# Patient Record
Sex: FEMALE | Race: WHITE | Employment: OTHER | URBAN - METROPOLITAN AREA
[De-identification: names, ages, dates, MRNs, and addresses within clinical notes are randomized per-mention and may not be internally consistent; named-entity substitution may affect disease eponyms.]

---

## 2017-01-17 RX ORDER — BUSPIRONE HYDROCHLORIDE 10 MG/1
10 TABLET ORAL 3 TIMES DAILY
Qty: 90 TABLET | Refills: 0 | Status: SHIPPED | OUTPATIENT
Start: 2017-01-17 | End: 2017-02-13 | Stop reason: SDUPTHER

## 2017-01-17 RX ORDER — ALPRAZOLAM 1 MG/1
1 TABLET ORAL NIGHTLY PRN
Qty: 90 TABLET | Refills: 0 | Status: SHIPPED | OUTPATIENT
Start: 2017-01-17 | End: 2017-02-16

## 2017-02-13 RX ORDER — BUSPIRONE HYDROCHLORIDE 10 MG/1
TABLET ORAL
Qty: 90 TABLET | Refills: 0 | Status: SHIPPED | OUTPATIENT
Start: 2017-02-13 | End: 2017-03-09 | Stop reason: SINTOL

## 2017-03-09 ENCOUNTER — OFFICE VISIT (OUTPATIENT)
Dept: FAMILY MEDICINE CLINIC | Age: 52
End: 2017-03-09

## 2017-03-09 VITALS
DIASTOLIC BLOOD PRESSURE: 97 MMHG | WEIGHT: 251 LBS | HEART RATE: 84 BPM | SYSTOLIC BLOOD PRESSURE: 150 MMHG | BODY MASS INDEX: 40.51 KG/M2 | RESPIRATION RATE: 16 BRPM | TEMPERATURE: 98.4 F

## 2017-03-09 DIAGNOSIS — F41.9 ANXIETY: Primary | ICD-10-CM

## 2017-03-09 DIAGNOSIS — R03.0 ELEVATED BP WITHOUT DIAGNOSIS OF HYPERTENSION: ICD-10-CM

## 2017-03-09 PROCEDURE — 99213 OFFICE O/P EST LOW 20 MIN: CPT | Performed by: FAMILY MEDICINE

## 2017-03-09 RX ORDER — ALPRAZOLAM 1 MG/1
1 TABLET ORAL 2 TIMES DAILY
COMMUNITY
End: 2017-03-27 | Stop reason: SDUPTHER

## 2017-03-09 ASSESSMENT — ENCOUNTER SYMPTOMS
COUGH: 0
SHORTNESS OF BREATH: 0
CONSTIPATION: 0
DIARRHEA: 0
WHEEZING: 0
ABDOMINAL PAIN: 0

## 2017-03-27 RX ORDER — ALPRAZOLAM 1 MG/1
1 TABLET ORAL 2 TIMES DAILY
Qty: 60 TABLET | Refills: 1 | Status: SHIPPED | OUTPATIENT
Start: 2017-03-27 | End: 2017-03-28 | Stop reason: DRUGHIGH

## 2017-03-27 RX ORDER — OMEPRAZOLE 40 MG/1
CAPSULE, DELAYED RELEASE ORAL
Qty: 30 CAPSULE | Refills: 5 | Status: SHIPPED | OUTPATIENT
Start: 2017-03-27 | End: 2017-03-28 | Stop reason: DRUGHIGH

## 2017-03-27 RX ORDER — IBUPROFEN 800 MG/1
TABLET ORAL
Qty: 90 TABLET | Refills: 3 | Status: SHIPPED | OUTPATIENT
Start: 2017-03-27 | End: 2017-08-30 | Stop reason: SDUPTHER

## 2017-03-28 ENCOUNTER — TELEPHONE (OUTPATIENT)
Dept: FAMILY MEDICINE CLINIC | Age: 52
End: 2017-03-28

## 2017-03-28 RX ORDER — OMEPRAZOLE 40 MG/1
40 CAPSULE, DELAYED RELEASE ORAL 2 TIMES DAILY
Qty: 60 CAPSULE | Refills: 3 | Status: SHIPPED | OUTPATIENT
Start: 2017-03-28 | End: 2017-06-28 | Stop reason: SDUPTHER

## 2017-03-29 ENCOUNTER — OFFICE VISIT (OUTPATIENT)
Dept: FAMILY MEDICINE CLINIC | Age: 52
End: 2017-03-29

## 2017-03-29 VITALS
SYSTOLIC BLOOD PRESSURE: 140 MMHG | HEART RATE: 70 BPM | OXYGEN SATURATION: 94 % | RESPIRATION RATE: 16 BRPM | DIASTOLIC BLOOD PRESSURE: 88 MMHG | HEIGHT: 66 IN | WEIGHT: 259 LBS | BODY MASS INDEX: 41.62 KG/M2

## 2017-03-29 DIAGNOSIS — M99.9 NONALLOPATHIC LESION OF RIB CAGE: ICD-10-CM

## 2017-03-29 DIAGNOSIS — S20.211A CONTUSION OF RIB ON RIGHT SIDE, INITIAL ENCOUNTER: Primary | ICD-10-CM

## 2017-03-29 PROCEDURE — 99213 OFFICE O/P EST LOW 20 MIN: CPT | Performed by: FAMILY MEDICINE

## 2017-03-29 PROCEDURE — 98925 OSTEOPATH MANJ 1-2 REGIONS: CPT | Performed by: FAMILY MEDICINE

## 2017-03-29 RX ORDER — HYDROCODONE BITARTRATE AND ACETAMINOPHEN 5; 325 MG/1; MG/1
1 TABLET ORAL EVERY 6 HOURS PRN
Qty: 15 TABLET | Refills: 0 | Status: SHIPPED | OUTPATIENT
Start: 2017-03-29 | End: 2017-04-05

## 2017-03-29 RX ORDER — METHOCARBAMOL 500 MG/1
500 TABLET, FILM COATED ORAL 4 TIMES DAILY
Qty: 40 TABLET | Refills: 0 | Status: SHIPPED | OUTPATIENT
Start: 2017-03-29 | End: 2017-04-08

## 2017-03-29 ASSESSMENT — ENCOUNTER SYMPTOMS
VOMITING: 0
DIARRHEA: 0
ABDOMINAL PAIN: 0
BACK PAIN: 0
NAUSEA: 0
CONSTIPATION: 0

## 2017-03-30 ENCOUNTER — TELEPHONE (OUTPATIENT)
Dept: FAMILY MEDICINE CLINIC | Age: 52
End: 2017-03-30

## 2017-04-03 ENCOUNTER — TELEPHONE (OUTPATIENT)
Dept: FAMILY MEDICINE CLINIC | Age: 52
End: 2017-04-03

## 2017-04-10 ENCOUNTER — TELEPHONE (OUTPATIENT)
Dept: FAMILY MEDICINE CLINIC | Age: 52
End: 2017-04-10

## 2017-04-10 RX ORDER — PREDNISONE 10 MG/1
TABLET ORAL
Qty: 30 TABLET | Refills: 0 | Status: SHIPPED | OUTPATIENT
Start: 2017-04-10 | End: 2017-06-08 | Stop reason: HOSPADM

## 2017-04-12 ENCOUNTER — TELEPHONE (OUTPATIENT)
Dept: FAMILY MEDICINE CLINIC | Age: 52
End: 2017-04-12

## 2017-04-13 RX ORDER — HYDROCODONE BITARTRATE AND ACETAMINOPHEN 5; 325 MG/1; MG/1
1 TABLET ORAL EVERY 6 HOURS PRN
Qty: 20 TABLET | Refills: 0 | Status: SHIPPED | OUTPATIENT
Start: 2017-04-13 | End: 2017-04-18 | Stop reason: SDUPTHER

## 2017-04-17 ENCOUNTER — TELEPHONE (OUTPATIENT)
Dept: FAMILY MEDICINE CLINIC | Age: 52
End: 2017-04-17

## 2017-04-17 DIAGNOSIS — S29.8XXD BLUNT TRAUMA TO CHEST, SUBSEQUENT ENCOUNTER: Primary | ICD-10-CM

## 2017-04-18 RX ORDER — HYDROCODONE BITARTRATE AND ACETAMINOPHEN 5; 325 MG/1; MG/1
1 TABLET ORAL EVERY 6 HOURS PRN
Qty: 20 TABLET | Refills: 0 | Status: SHIPPED | OUTPATIENT
Start: 2017-04-18 | End: 2017-04-28 | Stop reason: SDUPTHER

## 2017-04-19 ENCOUNTER — TELEPHONE (OUTPATIENT)
Dept: FAMILY MEDICINE CLINIC | Age: 52
End: 2017-04-19

## 2017-04-24 ENCOUNTER — TELEPHONE (OUTPATIENT)
Dept: FAMILY MEDICINE CLINIC | Age: 52
End: 2017-04-24

## 2017-04-26 ENCOUNTER — HOSPITAL ENCOUNTER (OUTPATIENT)
Dept: CT IMAGING | Age: 52
Discharge: HOME | End: 2017-04-26
Attending: FAMILY MEDICINE | Admitting: FAMILY MEDICINE

## 2017-04-26 DIAGNOSIS — S29.8XXD: ICD-10-CM

## 2017-04-26 DIAGNOSIS — S29.8XXD BLUNT TRAUMA TO CHEST, SUBSEQUENT ENCOUNTER: ICD-10-CM

## 2017-04-28 RX ORDER — HYDROCODONE BITARTRATE AND ACETAMINOPHEN 5; 325 MG/1; MG/1
1 TABLET ORAL EVERY 6 HOURS PRN
Qty: 20 TABLET | Refills: 0 | Status: SHIPPED | OUTPATIENT
Start: 2017-04-28 | End: 2017-06-08 | Stop reason: ALTCHOICE

## 2017-05-08 RX ORDER — HYDROCODONE BITARTRATE AND ACETAMINOPHEN 5; 325 MG/1; MG/1
1 TABLET ORAL EVERY 6 HOURS PRN
Qty: 20 TABLET | Refills: 0 | Status: CANCELLED | OUTPATIENT
Start: 2017-05-08

## 2017-05-22 RX ORDER — ALPRAZOLAM 1 MG/1
TABLET ORAL
Qty: 90 TABLET | Refills: 1 | Status: SHIPPED | OUTPATIENT
Start: 2017-05-22 | End: 2017-07-27 | Stop reason: SDUPTHER

## 2017-06-08 ENCOUNTER — OFFICE VISIT (OUTPATIENT)
Dept: FAMILY MEDICINE CLINIC | Age: 52
End: 2017-06-08

## 2017-06-08 VITALS
WEIGHT: 257 LBS | HEART RATE: 93 BPM | DIASTOLIC BLOOD PRESSURE: 86 MMHG | BODY MASS INDEX: 41.48 KG/M2 | TEMPERATURE: 98.2 F | RESPIRATION RATE: 16 BRPM | SYSTOLIC BLOOD PRESSURE: 125 MMHG

## 2017-06-08 DIAGNOSIS — J30.9 ALLERGIC RHINITIS, UNSPECIFIED ALLERGIC RHINITIS TRIGGER, UNSPECIFIED RHINITIS SEASONALITY: ICD-10-CM

## 2017-06-08 DIAGNOSIS — J40 SINOBRONCHITIS: Primary | ICD-10-CM

## 2017-06-08 DIAGNOSIS — J32.9 SINOBRONCHITIS: Primary | ICD-10-CM

## 2017-06-08 PROCEDURE — 99213 OFFICE O/P EST LOW 20 MIN: CPT | Performed by: FAMILY MEDICINE

## 2017-06-08 RX ORDER — MONTELUKAST SODIUM 10 MG/1
10 TABLET ORAL NIGHTLY
Qty: 30 TABLET | Refills: 3 | Status: SHIPPED | OUTPATIENT
Start: 2017-06-08 | End: 2017-07-13 | Stop reason: ALTCHOICE

## 2017-06-08 RX ORDER — PROMETHAZINE HYDROCHLORIDE AND CODEINE PHOSPHATE 6.25; 1 MG/5ML; MG/5ML
5 SYRUP ORAL 4 TIMES DAILY PRN
Qty: 240 ML | Refills: 1 | Status: SHIPPED | OUTPATIENT
Start: 2017-06-08 | End: 2017-07-13 | Stop reason: ALTCHOICE

## 2017-06-08 ASSESSMENT — ENCOUNTER SYMPTOMS
DIARRHEA: 0
SORE THROAT: 1
NAUSEA: 0
COUGH: 1
TROUBLE SWALLOWING: 0
SHORTNESS OF BREATH: 0
CONSTIPATION: 0

## 2017-06-11 PROBLEM — J18.9 PNEUMONIA: Status: ACTIVE | Noted: 2017-06-11

## 2017-06-11 PROBLEM — R06.09 DYSPNEA ON EXERTION: Status: ACTIVE | Noted: 2017-06-11

## 2017-06-11 PROBLEM — J20.9 BRONCHITIS WITH BRONCHOSPASM: Status: ACTIVE | Noted: 2017-06-11

## 2017-06-16 ENCOUNTER — OFFICE VISIT (OUTPATIENT)
Dept: FAMILY MEDICINE CLINIC | Age: 52
End: 2017-06-16

## 2017-06-16 VITALS
WEIGHT: 256.4 LBS | BODY MASS INDEX: 41.38 KG/M2 | OXYGEN SATURATION: 94 % | HEART RATE: 81 BPM | RESPIRATION RATE: 16 BRPM | DIASTOLIC BLOOD PRESSURE: 73 MMHG | TEMPERATURE: 97.8 F | SYSTOLIC BLOOD PRESSURE: 114 MMHG

## 2017-06-16 DIAGNOSIS — F32.A DEPRESSION, UNSPECIFIED DEPRESSION TYPE: Primary | ICD-10-CM

## 2017-06-16 DIAGNOSIS — Z13.31 POSITIVE DEPRESSION SCREENING: ICD-10-CM

## 2017-06-16 DIAGNOSIS — J18.9 CAP (COMMUNITY ACQUIRED PNEUMONIA): ICD-10-CM

## 2017-06-16 PROCEDURE — 99214 OFFICE O/P EST MOD 30 MIN: CPT | Performed by: NURSE PRACTITIONER

## 2017-06-16 PROCEDURE — 96160 PT-FOCUSED HLTH RISK ASSMT: CPT | Performed by: NURSE PRACTITIONER

## 2017-06-16 PROCEDURE — G8431 POS CLIN DEPRES SCRN F/U DOC: HCPCS | Performed by: NURSE PRACTITIONER

## 2017-06-16 RX ORDER — CITALOPRAM 10 MG/1
10 TABLET ORAL DAILY
Qty: 30 TABLET | Refills: 1 | Status: SHIPPED | OUTPATIENT
Start: 2017-06-16 | End: 2017-08-16 | Stop reason: SDUPTHER

## 2017-06-16 ASSESSMENT — PATIENT HEALTH QUESTIONNAIRE - PHQ9
1. LITTLE INTEREST OR PLEASURE IN DOING THINGS: 0
10. IF YOU CHECKED OFF ANY PROBLEMS, HOW DIFFICULT HAVE THESE PROBLEMS MADE IT FOR YOU TO DO YOUR WORK, TAKE CARE OF THINGS AT HOME, OR GET ALONG WITH OTHER PEOPLE: 3
SUM OF ALL RESPONSES TO PHQ QUESTIONS 1-9: 15
8. MOVING OR SPEAKING SO SLOWLY THAT OTHER PEOPLE COULD HAVE NOTICED. OR THE OPPOSITE, BEING SO FIGETY OR RESTLESS THAT YOU HAVE BEEN MOVING AROUND A LOT MORE THAN USUAL: 0
6. FEELING BAD ABOUT YOURSELF - OR THAT YOU ARE A FAILURE OR HAVE LET YOURSELF OR YOUR FAMILY DOWN: 0
7. TROUBLE CONCENTRATING ON THINGS, SUCH AS READING THE NEWSPAPER OR WATCHING TELEVISION: 3
9. THOUGHTS THAT YOU WOULD BE BETTER OFF DEAD, OR OF HURTING YOURSELF: 0
4. FEELING TIRED OR HAVING LITTLE ENERGY: 3
5. POOR APPETITE OR OVEREATING: 3
3. TROUBLE FALLING OR STAYING ASLEEP: 3
2. FEELING DOWN, DEPRESSED OR HOPELESS: 3
SUM OF ALL RESPONSES TO PHQ9 QUESTIONS 1 & 2: 3

## 2017-06-16 ASSESSMENT — ENCOUNTER SYMPTOMS
NAUSEA: 0
TROUBLE SWALLOWING: 0
EYE REDNESS: 0
RHINORRHEA: 0
ABDOMINAL PAIN: 0
CHOKING: 0
CONSTIPATION: 0
HEMOPTYSIS: 0
DIARRHEA: 0
FREQUENT THROAT CLEARING: 0
SORE THROAT: 1
SINUS PRESSURE: 0
CHEST TIGHTNESS: 1
HOARSE VOICE: 0
VOICE CHANGE: 1
DIFFICULTY BREATHING: 0
WHEEZING: 0
SHORTNESS OF BREATH: 1
SPUTUM PRODUCTION: 1
COUGH: 1
EYE ITCHING: 0

## 2017-06-28 RX ORDER — OMEPRAZOLE 40 MG/1
40 CAPSULE, DELAYED RELEASE ORAL 2 TIMES DAILY
Qty: 60 CAPSULE | Refills: 3 | Status: SHIPPED | OUTPATIENT
Start: 2017-06-28 | End: 2017-07-13 | Stop reason: DRUGHIGH

## 2017-07-13 ENCOUNTER — OFFICE VISIT (OUTPATIENT)
Dept: FAMILY MEDICINE CLINIC | Age: 52
End: 2017-07-13

## 2017-07-13 VITALS
BODY MASS INDEX: 42.45 KG/M2 | HEART RATE: 63 BPM | WEIGHT: 263 LBS | RESPIRATION RATE: 16 BRPM | DIASTOLIC BLOOD PRESSURE: 88 MMHG | SYSTOLIC BLOOD PRESSURE: 134 MMHG | TEMPERATURE: 97.8 F

## 2017-07-13 DIAGNOSIS — J18.9 PNEUMONIA OF RIGHT LOWER LOBE DUE TO INFECTIOUS ORGANISM: Primary | ICD-10-CM

## 2017-07-13 PROCEDURE — 99213 OFFICE O/P EST LOW 20 MIN: CPT | Performed by: FAMILY MEDICINE

## 2017-07-13 RX ORDER — OMEPRAZOLE 40 MG/1
40 CAPSULE, DELAYED RELEASE ORAL DAILY
Qty: 30 CAPSULE | Refills: 3 | Status: SHIPPED
Start: 2017-07-13 | End: 2017-09-18 | Stop reason: SDUPTHER

## 2017-07-13 ASSESSMENT — ENCOUNTER SYMPTOMS
COUGH: 0
WHEEZING: 0
SHORTNESS OF BREATH: 0
DIARRHEA: 0
CONSTIPATION: 0

## 2017-07-18 ENCOUNTER — TELEPHONE (OUTPATIENT)
Dept: FAMILY MEDICINE CLINIC | Age: 52
End: 2017-07-18

## 2017-08-28 ENCOUNTER — OFFICE VISIT (OUTPATIENT)
Dept: ORTHOPEDIC SURGERY | Age: 52
End: 2017-08-28

## 2017-08-28 VITALS — WEIGHT: 259 LBS | HEIGHT: 66 IN | BODY MASS INDEX: 41.62 KG/M2

## 2017-08-28 DIAGNOSIS — M25.562 PAIN, JOINT, KNEE, LEFT: Primary | ICD-10-CM

## 2017-08-28 PROCEDURE — 99214 OFFICE O/P EST MOD 30 MIN: CPT | Performed by: PHYSICIAN ASSISTANT

## 2017-08-28 PROCEDURE — L1810 KO ELASTIC WITH JOINTS: HCPCS | Performed by: PHYSICIAN ASSISTANT

## 2017-08-28 PROCEDURE — 73564 X-RAY EXAM KNEE 4 OR MORE: CPT | Performed by: PHYSICIAN ASSISTANT

## 2017-08-28 RX ORDER — IBUPROFEN 800 MG/1
800 TABLET ORAL EVERY 8 HOURS PRN
Qty: 120 TABLET | Refills: 0 | Status: ON HOLD | OUTPATIENT
Start: 2017-08-28 | End: 2017-12-27 | Stop reason: HOSPADM

## 2017-08-30 ENCOUNTER — OFFICE VISIT (OUTPATIENT)
Dept: FAMILY MEDICINE CLINIC | Age: 52
End: 2017-08-30

## 2017-08-30 ENCOUNTER — HOSPITAL ENCOUNTER (OUTPATIENT)
Dept: GENERAL RADIOLOGY | Age: 52
Discharge: HOME | End: 2017-08-30

## 2017-08-30 VITALS
BODY MASS INDEX: 41.64 KG/M2 | SYSTOLIC BLOOD PRESSURE: 118 MMHG | WEIGHT: 258 LBS | DIASTOLIC BLOOD PRESSURE: 85 MMHG | RESPIRATION RATE: 16 BRPM | HEART RATE: 70 BPM | TEMPERATURE: 98 F

## 2017-08-30 DIAGNOSIS — G25.81 RESTLESS LEG SYNDROME: ICD-10-CM

## 2017-08-30 DIAGNOSIS — J18.9 PNEUMONIA OF RIGHT LOWER LOBE DUE TO INFECTIOUS ORGANISM: Primary | ICD-10-CM

## 2017-08-30 DIAGNOSIS — M17.0 PRIMARY OSTEOARTHRITIS OF BOTH KNEES: ICD-10-CM

## 2017-08-30 DIAGNOSIS — I10 ESSENTIAL HYPERTENSION: ICD-10-CM

## 2017-08-30 DIAGNOSIS — R53.83 FATIGUE, UNSPECIFIED TYPE: ICD-10-CM

## 2017-08-30 DIAGNOSIS — J18.9 PNEUMONIA OF RIGHT LOWER LOBE DUE TO INFECTIOUS ORGANISM: ICD-10-CM

## 2017-08-30 LAB
A/G RATIO: 1.6 (ref 1.1–2.2)
ALBUMIN SERPL-MCNC: 3.9 G/DL (ref 3.4–5)
ALP BLD-CCNC: 71 U/L (ref 40–129)
ALT SERPL-CCNC: 14 U/L (ref 10–40)
ANION GAP SERPL CALCULATED.3IONS-SCNC: 14 MMOL/L (ref 3–16)
AST SERPL-CCNC: 12 U/L (ref 15–37)
BILIRUB SERPL-MCNC: 0.3 MG/DL (ref 0–1)
BUN BLDV-MCNC: 9 MG/DL (ref 7–20)
CALCIUM SERPL-MCNC: 9.2 MG/DL (ref 8.3–10.6)
CHLORIDE BLD-SCNC: 107 MMOL/L (ref 99–110)
CHOLESTEROL, TOTAL: 169 MG/DL (ref 0–199)
CO2: 23 MMOL/L (ref 21–32)
CREAT SERPL-MCNC: 0.8 MG/DL (ref 0.6–1.1)
GFR AFRICAN AMERICAN: >60
GFR NON-AFRICAN AMERICAN: >60
GLOBULIN: 2.4 G/DL
GLUCOSE BLD-MCNC: 113 MG/DL (ref 70–99)
HDLC SERPL-MCNC: 33 MG/DL (ref 40–60)
LDL CHOLESTEROL CALCULATED: 98 MG/DL
MAGNESIUM: 2 MG/DL (ref 1.8–2.4)
POTASSIUM SERPL-SCNC: 4.3 MMOL/L (ref 3.5–5.1)
SODIUM BLD-SCNC: 144 MMOL/L (ref 136–145)
TOTAL PROTEIN: 6.3 G/DL (ref 6.4–8.2)
TRIGL SERPL-MCNC: 190 MG/DL (ref 0–150)
TSH REFLEX: 3.31 UIU/ML (ref 0.27–4.2)
VITAMIN D 25-HYDROXY: 15.8 NG/ML
VLDLC SERPL CALC-MCNC: 38 MG/DL

## 2017-08-30 PROCEDURE — 99214 OFFICE O/P EST MOD 30 MIN: CPT | Performed by: FAMILY MEDICINE

## 2017-08-30 ASSESSMENT — ENCOUNTER SYMPTOMS
WHEEZING: 0
CONSTIPATION: 0
SHORTNESS OF BREATH: 0
COUGH: 0
DIARRHEA: 0
BLOOD IN STOOL: 0

## 2017-08-31 ENCOUNTER — HOSPITAL ENCOUNTER (OUTPATIENT)
Dept: PHYSICAL THERAPY | Age: 52
Discharge: HOME | End: 2017-08-31
Admitting: ORTHOPAEDIC SURGERY

## 2017-09-05 ENCOUNTER — TELEPHONE (OUTPATIENT)
Dept: FAMILY MEDICINE CLINIC | Age: 52
End: 2017-09-05

## 2017-09-05 ENCOUNTER — HOSPITAL ENCOUNTER (OUTPATIENT)
Dept: PHYSICAL THERAPY | Age: 52
Discharge: HOME | End: 2017-09-05
Admitting: ORTHOPAEDIC SURGERY

## 2017-09-07 ENCOUNTER — OFFICE VISIT (OUTPATIENT)
Dept: ORTHOPEDIC SURGERY | Age: 52
End: 2017-09-07

## 2017-09-07 VITALS
SYSTOLIC BLOOD PRESSURE: 124 MMHG | WEIGHT: 257.94 LBS | HEART RATE: 70 BPM | HEIGHT: 66 IN | DIASTOLIC BLOOD PRESSURE: 81 MMHG | BODY MASS INDEX: 41.45 KG/M2

## 2017-09-07 DIAGNOSIS — M54.30 SCIATICA, UNSPECIFIED LATERALITY: Primary | ICD-10-CM

## 2017-09-07 DIAGNOSIS — M17.12 OSTEOARTHRITIS OF LEFT KNEE, UNSPECIFIED OSTEOARTHRITIS TYPE: ICD-10-CM

## 2017-09-07 PROCEDURE — 20610 DRAIN/INJ JOINT/BURSA W/O US: CPT | Performed by: ORTHOPAEDIC SURGERY

## 2017-09-07 PROCEDURE — 99203 OFFICE O/P NEW LOW 30 MIN: CPT | Performed by: ORTHOPAEDIC SURGERY

## 2017-09-25 ENCOUNTER — TELEPHONE (OUTPATIENT)
Dept: FAMILY MEDICINE CLINIC | Age: 52
End: 2017-09-25

## 2017-09-27 ENCOUNTER — OFFICE VISIT (OUTPATIENT)
Dept: FAMILY MEDICINE CLINIC | Age: 52
End: 2017-09-27

## 2017-09-27 VITALS
BODY MASS INDEX: 41.27 KG/M2 | SYSTOLIC BLOOD PRESSURE: 119 MMHG | RESPIRATION RATE: 16 BRPM | TEMPERATURE: 97.7 F | HEIGHT: 66 IN | WEIGHT: 256.8 LBS | DIASTOLIC BLOOD PRESSURE: 76 MMHG | HEART RATE: 66 BPM | OXYGEN SATURATION: 98 %

## 2017-09-27 DIAGNOSIS — N95.0 POSTMENOPAUSAL VAGINAL BLEEDING: Primary | ICD-10-CM

## 2017-09-27 PROCEDURE — 99214 OFFICE O/P EST MOD 30 MIN: CPT | Performed by: NURSE PRACTITIONER

## 2017-09-27 ASSESSMENT — ENCOUNTER SYMPTOMS
DIARRHEA: 0
NAUSEA: 0
CONSTIPATION: 0
VOMITING: 0
GASTROINTESTINAL NEGATIVE: 1
ABDOMINAL PAIN: 0
BACK PAIN: 0
SORE THROAT: 0
ALLERGIC/IMMUNOLOGIC NEGATIVE: 1
RESPIRATORY NEGATIVE: 1
EYES NEGATIVE: 1

## 2017-10-09 RX ORDER — OMEPRAZOLE 40 MG/1
40 CAPSULE, DELAYED RELEASE ORAL DAILY
Qty: 30 CAPSULE | Refills: 3 | Status: SHIPPED | OUTPATIENT
Start: 2017-10-09 | End: 2018-02-07 | Stop reason: SDUPTHER

## 2017-10-09 RX ORDER — ROPINIROLE 2 MG/1
TABLET, FILM COATED ORAL
Qty: 30 TABLET | Refills: 11 | Status: SHIPPED | OUTPATIENT
Start: 2017-10-09 | End: 2018-01-24 | Stop reason: SDUPTHER

## 2017-10-24 ENCOUNTER — HOSPITAL ENCOUNTER (OUTPATIENT)
Dept: ULTRASOUND IMAGING | Age: 52
Discharge: HOME | End: 2017-10-24
Attending: NURSE PRACTITIONER | Admitting: NURSE PRACTITIONER

## 2017-10-24 DIAGNOSIS — N95.0 POSTMENOPAUSAL VAGINAL BLEEDING: ICD-10-CM

## 2017-10-24 DIAGNOSIS — N95.0 POSTMENOPAUSAL BLEEDING: ICD-10-CM

## 2017-10-25 NOTE — PROGRESS NOTES
Please call patient and let them know that the lab results are normal. However I would like her to make an appointment if she is still having any bleeding.

## 2017-10-27 ENCOUNTER — OFFICE VISIT (OUTPATIENT)
Dept: FAMILY MEDICINE CLINIC | Age: 52
End: 2017-10-27

## 2017-10-27 VITALS
DIASTOLIC BLOOD PRESSURE: 73 MMHG | BODY MASS INDEX: 42.23 KG/M2 | WEIGHT: 262.8 LBS | HEIGHT: 66 IN | TEMPERATURE: 97.5 F | SYSTOLIC BLOOD PRESSURE: 124 MMHG | OXYGEN SATURATION: 96 % | HEART RATE: 67 BPM | RESPIRATION RATE: 16 BRPM

## 2017-10-27 DIAGNOSIS — F32.A DEPRESSION, UNSPECIFIED DEPRESSION TYPE: ICD-10-CM

## 2017-10-27 DIAGNOSIS — N95.0 POSTMENOPAUSAL VAGINAL BLEEDING: Primary | ICD-10-CM

## 2017-10-27 PROCEDURE — 1036F TOBACCO NON-USER: CPT | Performed by: NURSE PRACTITIONER

## 2017-10-27 PROCEDURE — 3017F COLORECTAL CA SCREEN DOC REV: CPT | Performed by: NURSE PRACTITIONER

## 2017-10-27 PROCEDURE — 3014F SCREEN MAMMO DOC REV: CPT | Performed by: NURSE PRACTITIONER

## 2017-10-27 PROCEDURE — G8417 CALC BMI ABV UP PARAM F/U: HCPCS | Performed by: NURSE PRACTITIONER

## 2017-10-27 PROCEDURE — G8484 FLU IMMUNIZE NO ADMIN: HCPCS | Performed by: NURSE PRACTITIONER

## 2017-10-27 PROCEDURE — 99214 OFFICE O/P EST MOD 30 MIN: CPT | Performed by: NURSE PRACTITIONER

## 2017-10-27 PROCEDURE — G8427 DOCREV CUR MEDS BY ELIG CLIN: HCPCS | Performed by: NURSE PRACTITIONER

## 2017-10-27 RX ORDER — PENICILLIN V POTASSIUM 500 MG/1
TABLET ORAL
COMMUNITY
Start: 2017-10-09 | End: 2017-11-20 | Stop reason: ALTCHOICE

## 2017-10-27 RX ORDER — CITALOPRAM 10 MG/1
15 TABLET ORAL DAILY
Qty: 45 TABLET | Refills: 1 | Status: ON HOLD | OUTPATIENT
Start: 2017-10-27 | End: 2017-12-26 | Stop reason: SDUPTHER

## 2017-10-27 ASSESSMENT — ENCOUNTER SYMPTOMS
TROUBLE SWALLOWING: 0
SHORTNESS OF BREATH: 0
COLOR CHANGE: 0
CONSTIPATION: 0
WHEEZING: 0
ABDOMINAL PAIN: 0
SINUS PRESSURE: 0
DIARRHEA: 0
EYE ITCHING: 0
CHEST TIGHTNESS: 0
EYE REDNESS: 0
COUGH: 0
SORE THROAT: 0
NAUSEA: 0

## 2017-10-27 NOTE — PROGRESS NOTES
2    ibuprofen (ADVIL;MOTRIN) 800 MG tablet Take 1 tablet by mouth every 8 hours as needed for Pain 120 tablet 0    albuterol sulfate (PROAIR RESPICLICK) 816 (90 BASE) MCG/ACT aerosol powder inhalation Inhale 2 puffs into the lungs every 4 hours as needed for Wheezing or Shortness of Breath 1 Inhaler 2    penicillin v potassium (VEETID) 500 MG tablet        No current facility-administered medications for this visit. Allergies   Allergen Reactions    Percocet [Oxycodone-Acetaminophen] Rash       /73 (Site: Left Arm, Position: Sitting, Cuff Size: Medium Adult)   Pulse 67   Temp 97.5 °F (36.4 °C) (Oral)   Resp 16   Ht 5' 6\" (1.676 m)   Wt 262 lb 12.8 oz (119.2 kg)   LMP 09/20/2017 Comment: spotting for a few days   SpO2 96%   Breastfeeding? No   BMI 42.42 kg/m²     Social History   Substance Use Topics    Smoking status: Never Smoker    Smokeless tobacco: Never Used    Alcohol use Yes      Comment: rarely       Review of Systems   Constitutional: Negative for activity change, chills, fatigue, fever and unexpected weight change. HENT: Negative for ear discharge, mouth sores, postnasal drip, sinus pressure, sore throat and trouble swallowing. Eyes: Negative for redness, itching and visual disturbance. Respiratory: Negative for cough, chest tightness, shortness of breath and wheezing. Cardiovascular: Negative for chest pain, palpitations and leg swelling. Gastrointestinal: Negative for abdominal pain, constipation, diarrhea and nausea. Bloating and early satiety     Endocrine: Negative for cold intolerance, heat intolerance, polydipsia, polyphagia and polyuria. Genitourinary: Positive for vaginal bleeding. Negative for dysuria, frequency, urgency and vaginal discharge. Musculoskeletal: Negative for arthralgias, joint swelling and myalgias. Skin: Negative for color change, pallor and rash.    Allergic/Immunologic: Negative for environmental allergies, food allergies and Additional Contrast? Radiologist Recommendation      Ludmila Wilson MD   2. Depression, unspecified depression type F32.9 311         Plan    CT of the abdomen with contrast due to unable to visualize ovaries. And prolonged vaginal bleeding. Referral placed to Dr. Tommie Sharif for further evaluation  Situational depression and anxiety  We'll increase Celexa to 1.5 tablets daily  Name is given for counseling services in the area, patient is to call her insurance and see he is on her plan so that we may make an appropriate referral   she should follow-up in one month or sooner if signs and symptoms worsen    Orders Placed This Encounter   Procedures    CT ABDOMEN PELVIS W IV CONTRAST Additional Contrast? Radiologist Recommendation     Standing Status:   Future     Standing Expiration Date:   10/27/2018     Order Specific Question:   Additional Contrast?     Answer:   Radiologist Recommendation     Order Specific Question:   Reason for exam:     Answer:   vaginal bleeding with normal US, non visualized ovaries on US abdominal bloating   Ludmila Wilson MD     Referral Priority:   Routine     Referral Type:   Consult for Advice and Opinion     Referral Reason:   Specialty Services Required     Referred to Provider:   Sherie Blevins MD     Requested Specialty:   Gynecology     Number of Visits Requested:   1       Orders Placed This Encounter   Medications    citalopram (CELEXA) 10 MG tablet     Sig: Take 1.5 tablets by mouth daily     Dispense:  45 tablet     Refill:  1       Return in about 4 weeks (around 11/24/2017) for Mental health.

## 2017-10-27 NOTE — PROGRESS NOTES
The medications were discussed with the patient and the physician. Prescription and over the counter medicines reviewed. Pt is taking medication as prescribed  any side effects or barriers such as difficulty in taking the medication reveiwed. The purpose, side effects, dose of medication of new medications were explained to the patient and questions answered by the physician. The patients understands the information concerning medication. Individual treatment plan developed:Self-management plan and goals developed and discussed. Resources given. See patient instructions. Medication treatment plan developed by the physician. See orders. Healthy behavior discussed: Preventative behaviors discussed regarding healthy diet, exercise, and not smoking. Self management confidence of patient being able to put goal into action assessed: medium confidence. Barriers to treatment evaluated:none unless otherwise noted in the HPI.

## 2017-10-27 NOTE — PATIENT INSTRUCTIONS
[facility]  [facility]  [facility]  [facility]      Increase citalopram to 1.5 tablets daily  Call to find counselor to talk to about home situations  Return in 1 month or sooner    Call with name of GI for colonoscopy    Call to make appointment for CT scan and gynecology

## 2017-11-01 DIAGNOSIS — Z63.6 CAREGIVER STRESS: Primary | ICD-10-CM

## 2017-11-01 SDOH — SOCIAL STABILITY - SOCIAL INSECURITY: DEPENDENT RELATIVE NEEDING CARE AT HOME: Z63.6

## 2017-11-02 ENCOUNTER — CLINICAL DOCUMENTATION (OUTPATIENT)
Dept: SPIRITUAL SERVICES | Age: 52
End: 2017-11-02

## 2017-11-06 ENCOUNTER — TELEPHONE (OUTPATIENT)
Dept: FAMILY MEDICINE CLINIC | Age: 52
End: 2017-11-06

## 2017-11-09 ENCOUNTER — CLINICAL DOCUMENTATION (OUTPATIENT)
Dept: SPIRITUAL SERVICES | Age: 52
End: 2017-11-09

## 2017-11-09 NOTE — PROGRESS NOTES
11/9/2017  4:02pm/1602    Outpatient [facility] team member attempted telephone call to patient. There was no answer and voice message was left encouraging patient to contact Outpatient SCS. Contact information for Outpatient [facility] provided.

## 2017-11-20 ENCOUNTER — OFFICE VISIT (OUTPATIENT)
Dept: FAMILY MEDICINE CLINIC | Age: 52
End: 2017-11-20

## 2017-11-20 VITALS
WEIGHT: 256.8 LBS | DIASTOLIC BLOOD PRESSURE: 72 MMHG | BODY MASS INDEX: 41.27 KG/M2 | HEART RATE: 71 BPM | RESPIRATION RATE: 16 BRPM | SYSTOLIC BLOOD PRESSURE: 130 MMHG | HEIGHT: 66 IN | OXYGEN SATURATION: 98 % | TEMPERATURE: 97.6 F

## 2017-11-20 DIAGNOSIS — J40 BRONCHITIS: Primary | ICD-10-CM

## 2017-11-20 PROCEDURE — 3014F SCREEN MAMMO DOC REV: CPT | Performed by: NURSE PRACTITIONER

## 2017-11-20 PROCEDURE — 94640 AIRWAY INHALATION TREATMENT: CPT | Performed by: NURSE PRACTITIONER

## 2017-11-20 PROCEDURE — 1036F TOBACCO NON-USER: CPT | Performed by: NURSE PRACTITIONER

## 2017-11-20 PROCEDURE — 3017F COLORECTAL CA SCREEN DOC REV: CPT | Performed by: NURSE PRACTITIONER

## 2017-11-20 PROCEDURE — G8417 CALC BMI ABV UP PARAM F/U: HCPCS | Performed by: NURSE PRACTITIONER

## 2017-11-20 PROCEDURE — G8484 FLU IMMUNIZE NO ADMIN: HCPCS | Performed by: NURSE PRACTITIONER

## 2017-11-20 PROCEDURE — 99213 OFFICE O/P EST LOW 20 MIN: CPT | Performed by: NURSE PRACTITIONER

## 2017-11-20 PROCEDURE — G8427 DOCREV CUR MEDS BY ELIG CLIN: HCPCS | Performed by: NURSE PRACTITIONER

## 2017-11-20 RX ORDER — PREDNISONE 10 MG/1
TABLET ORAL
Qty: 18 TABLET | Refills: 0 | Status: SHIPPED | OUTPATIENT
Start: 2017-11-20 | End: 2017-12-07 | Stop reason: ALTCHOICE

## 2017-11-20 RX ORDER — PROMETHAZINE HYDROCHLORIDE AND CODEINE PHOSPHATE 6.25; 1 MG/5ML; MG/5ML
5 SYRUP ORAL 4 TIMES DAILY PRN
Qty: 180 ML | Refills: 0 | Status: ON HOLD | OUTPATIENT
Start: 2017-11-20 | End: 2017-12-25 | Stop reason: ALTCHOICE

## 2017-11-20 RX ORDER — AZITHROMYCIN 250 MG/1
TABLET, FILM COATED ORAL
Qty: 1 PACKET | Refills: 0 | Status: SHIPPED | OUTPATIENT
Start: 2017-11-20 | End: 2017-11-30

## 2017-11-20 RX ORDER — IPRATROPIUM BROMIDE AND ALBUTEROL SULFATE 2.5; .5 MG/3ML; MG/3ML
1 SOLUTION RESPIRATORY (INHALATION) ONCE
Status: COMPLETED | OUTPATIENT
Start: 2017-11-20 | End: 2017-11-20

## 2017-11-20 RX ORDER — PROMETHAZINE HYDROCHLORIDE AND CODEINE PHOSPHATE 6.25; 1 MG/5ML; MG/5ML
SYRUP ORAL
COMMUNITY
Start: 2017-11-15 | End: 2017-11-20 | Stop reason: SDUPTHER

## 2017-11-20 RX ADMIN — IPRATROPIUM BROMIDE AND ALBUTEROL SULFATE 1 AMPULE: 2.5; .5 SOLUTION RESPIRATORY (INHALATION) at 09:50

## 2017-11-20 ASSESSMENT — ENCOUNTER SYMPTOMS
ABDOMINAL PAIN: 0
RHINORRHEA: 1
DIARRHEA: 0
WHEEZING: 1
CONSTIPATION: 0
GASTROINTESTINAL NEGATIVE: 1
COUGH: 1
EYES NEGATIVE: 1
VOMITING: 0
SORE THROAT: 1
ALLERGIC/IMMUNOLOGIC NEGATIVE: 1
CHEST TIGHTNESS: 0
NAUSEA: 0
SHORTNESS OF BREATH: 1

## 2017-11-20 NOTE — PROGRESS NOTES
(ADVIL;MOTRIN) 800 MG tablet Take 1 tablet by mouth every 8 hours as needed for Pain 120 tablet 0    albuterol sulfate (PROAIR RESPICLICK) 008 (90 BASE) MCG/ACT aerosol powder inhalation Inhale 2 puffs into the lungs every 4 hours as needed for Wheezing or Shortness of Breath 1 Inhaler 2     No current facility-administered medications for this visit. Allergies   Allergen Reactions    Percocet [Oxycodone-Acetaminophen] Rash       /72 (Site: Left Arm, Position: Sitting, Cuff Size: Medium Adult)   Pulse 71   Temp 97.6 °F (36.4 °C) (Oral)   Resp 16   Ht 5' 6\" (1.676 m)   Wt 256 lb 12.8 oz (116.5 kg)   LMP 09/20/2017 Comment: spotting for a few days   SpO2 98%   Breastfeeding? No   BMI 41.45 kg/m²     Social History   Substance Use Topics    Smoking status: Never Smoker    Smokeless tobacco: Never Used    Alcohol use Yes      Comment: rarely       Review of Systems   Constitutional: Positive for chills, fatigue and fever. Negative for activity change, appetite change, diaphoresis and unexpected weight change. HENT: Positive for congestion, postnasal drip, rhinorrhea and sore throat. Eyes: Negative. Respiratory: Positive for cough, shortness of breath and wheezing. Negative for chest tightness. Using albuterol 2-3 times a day   Cardiovascular: Negative. Negative for chest pain, palpitations and leg swelling. Gastrointestinal: Negative. Negative for abdominal pain, constipation, diarrhea, nausea and vomiting. Endocrine: Negative. Genitourinary: Negative. Negative for difficulty urinating and dysuria. Musculoskeletal: Positive for myalgias. Negative for arthralgias and joint swelling. Skin: Negative. Negative for rash. Allergic/Immunologic: Negative. Neurological: Negative. Hematological: Negative. Psychiatric/Behavioral: Negative. Physical Exam   Constitutional: She is oriented to person, place, and time.  She appears well-developed and well-nourished. No distress. HENT:   Head: Normocephalic and atraumatic. Right Ear: External ear normal.   Left Ear: External ear normal.   Nose: Nose normal.   Mouth/Throat: Oropharynx is clear and moist. No oropharyngeal exudate. Eyes: Conjunctivae and EOM are normal. Right eye exhibits no discharge. Left eye exhibits no discharge. Neck: Normal range of motion. Neck supple. Cardiovascular: Normal rate, regular rhythm and normal heart sounds. Exam reveals no gallop and no friction rub. No murmur heard. Pulmonary/Chest: Effort normal. No respiratory distress. She has no decreased breath sounds. She has wheezes. She has no rales. Diffuse wheezes throughout all lung fields, improved after breathing treatment and office, fair air movement, encouraged deep breaths  No areas of consolidation   Abdominal: Soft. Bowel sounds are normal. She exhibits no distension. There is no tenderness. Musculoskeletal: Normal range of motion. She exhibits no edema or tenderness. Lymphadenopathy:     She has no cervical adenopathy. Neurological: She is alert and oriented to person, place, and time. She exhibits normal muscle tone. Coordination normal.   Skin: Skin is warm and dry. No rash noted. She is not diaphoretic. No erythema. No pallor. Psychiatric: She has a normal mood and affect. Her behavior is normal. Judgment and thought content normal.   Nursing note and vitals reviewed. Diagnosis    ICD-10-CM ICD-9-CM    1.  Bronchitis J40 490 NH PRESSURIZED/NONPRESSURIZED INHALATION TREATMENT        Plan  Bronchitis: Continue at-home albuterol  Fixed chest  Mucinex to break up the congestion  Start antibiotics and steroids  Refilled cough syrup for nighttime, cautioned on somnolence  Signs and symptoms to report to the emergency room discussed  Report to the office in 7 days if not better    Orders Placed This Encounter   Procedures    NH PRESSURIZED/NONPRESSURIZED INHALATION TREATMENT       Orders Placed This Encounter   Medications    azithromycin (ZITHROMAX) 250 MG tablet     Sig: Take 2 tabs (500 mg) on Day 1, and take 1 tab (250 mg) on days 2 through 5. Dispense:  1 packet     Refill:  0    predniSONE (DELTASONE) 10 MG tablet     Sig: Take 3 tabs for days 1-3, take 2 tabs day 4-6, take 1 tab days 7-9. Dispense:  18 tablet     Refill:  0    ipratropium-albuterol (DUONEB) nebulizer solution 1 ampule    promethazine-codeine (PHENERGAN WITH CODEINE) 6.25-10 MG/5ML syrup     Sig: Take 5 mLs by mouth 4 times daily as needed for Cough . Dispense:  180 mL     Refill:  0       Return in about 7 days (around 11/27/2017) for if not better.

## 2017-11-22 ENCOUNTER — CLINICAL DOCUMENTATION (OUTPATIENT)
Dept: SPIRITUAL SERVICES | Age: 52
End: 2017-11-22

## 2017-11-22 NOTE — PROGRESS NOTES
11/22/2017  2:49pm/1549      Outpatient [facility]CS team member attempted telephone call to patient. There was no answer and voice message was left encouraging patient to contact Outpatient SCS. Contact information for Outpatient SCS provided.

## 2017-12-07 ENCOUNTER — OFFICE VISIT (OUTPATIENT)
Dept: FAMILY MEDICINE CLINIC | Age: 52
End: 2017-12-07

## 2017-12-07 VITALS
BODY MASS INDEX: 42.46 KG/M2 | OXYGEN SATURATION: 94 % | HEART RATE: 76 BPM | SYSTOLIC BLOOD PRESSURE: 126 MMHG | HEIGHT: 66 IN | TEMPERATURE: 97.5 F | DIASTOLIC BLOOD PRESSURE: 72 MMHG | WEIGHT: 264.2 LBS | RESPIRATION RATE: 16 BRPM

## 2017-12-07 DIAGNOSIS — K52.9 ACUTE GASTROENTERITIS: Primary | ICD-10-CM

## 2017-12-07 PROCEDURE — 99213 OFFICE O/P EST LOW 20 MIN: CPT | Performed by: NURSE PRACTITIONER

## 2017-12-07 PROCEDURE — 1036F TOBACCO NON-USER: CPT | Performed by: NURSE PRACTITIONER

## 2017-12-07 PROCEDURE — 3014F SCREEN MAMMO DOC REV: CPT | Performed by: NURSE PRACTITIONER

## 2017-12-07 PROCEDURE — G8484 FLU IMMUNIZE NO ADMIN: HCPCS | Performed by: NURSE PRACTITIONER

## 2017-12-07 PROCEDURE — 3017F COLORECTAL CA SCREEN DOC REV: CPT | Performed by: NURSE PRACTITIONER

## 2017-12-07 PROCEDURE — G8417 CALC BMI ABV UP PARAM F/U: HCPCS | Performed by: NURSE PRACTITIONER

## 2017-12-07 PROCEDURE — G8427 DOCREV CUR MEDS BY ELIG CLIN: HCPCS | Performed by: NURSE PRACTITIONER

## 2017-12-07 RX ORDER — SIMETHICONE 80 MG
80 TABLET,CHEWABLE ORAL 4 TIMES DAILY PRN
Qty: 180 TABLET | Refills: 1 | Status: SHIPPED | OUTPATIENT
Start: 2017-12-07 | End: 2018-02-15 | Stop reason: ALTCHOICE

## 2017-12-07 ASSESSMENT — ENCOUNTER SYMPTOMS
COUGH: 0
EYES NEGATIVE: 1
RESPIRATORY NEGATIVE: 1
DIARRHEA: 1
SHORTNESS OF BREATH: 0
CHEST TIGHTNESS: 0
CHOKING: 0
NAUSEA: 0
ABDOMINAL PAIN: 1
WHEEZING: 0
VOMITING: 0
ALLERGIC/IMMUNOLOGIC NEGATIVE: 1
CONSTIPATION: 0

## 2017-12-07 NOTE — PROGRESS NOTES
mouth every 8 hours as needed for Pain 120 tablet 0    albuterol sulfate (PROAIR RESPICLICK) 163 (90 BASE) MCG/ACT aerosol powder inhalation Inhale 2 puffs into the lungs every 4 hours as needed for Wheezing or Shortness of Breath 1 Inhaler 2     No current facility-administered medications for this visit. Allergies   Allergen Reactions    Percocet [Oxycodone-Acetaminophen] Rash       /72 (Site: Left Arm, Position: Sitting, Cuff Size: Medium Adult)   Pulse 76   Temp 97.5 °F (36.4 °C) (Oral)   Resp 16   Ht 5' 6\" (1.676 m)   Wt 264 lb 3.2 oz (119.8 kg)   LMP 09/20/2017 Comment: spotting for a few days   SpO2 94%   Breastfeeding? No   BMI 42.64 kg/m²     Social History   Substance Use Topics    Smoking status: Never Smoker    Smokeless tobacco: Never Used    Alcohol use Yes      Comment: rarely       Review of Systems   Constitutional: Positive for appetite change. Negative for activity change, chills, fatigue and fever. HENT: Negative. Negative for dental problem. Dysgeusia   Eyes: Negative. Respiratory: Negative. Negative for cough, choking, chest tightness, shortness of breath and wheezing. Cardiovascular: Negative. Gastrointestinal: Positive for abdominal pain (Gas cramping) and diarrhea. Negative for constipation, nausea and vomiting. Sour somach with bad taste in mouth   Endocrine: Negative. Genitourinary: Negative. Musculoskeletal: Negative. Skin: Negative. Allergic/Immunologic: Negative. Neurological: Negative. Hematological: Negative. Psychiatric/Behavioral: Negative. Physical Exam   Constitutional: She is oriented to person, place, and time. She appears well-developed and well-nourished. No distress. HENT:   Head: Normocephalic and atraumatic. Right Ear: External ear normal.   Left Ear: External ear normal.   Nose: Nose normal.   Mouth/Throat: Oropharynx is clear and moist. No oropharyngeal exudate.    Eyes: Conjunctivae and EOM are normal. Right eye exhibits no discharge. Left eye exhibits no discharge. Neck: Normal range of motion. Neck supple. Cardiovascular: Normal rate, regular rhythm and normal heart sounds. Exam reveals no gallop and no friction rub. No murmur heard. Pulmonary/Chest: Effort normal and breath sounds normal. No respiratory distress. She has no wheezes. She has no rales. Abdominal: Soft. Normal appearance. She exhibits no distension. Bowel sounds are increased. There is tenderness (mild epigastric). There is no rigidity, no rebound and no guarding. Musculoskeletal: Normal range of motion. She exhibits no edema or tenderness. Lymphadenopathy:     She has no cervical adenopathy. Neurological: She is alert and oriented to person, place, and time. She exhibits normal muscle tone. Coordination normal.   Skin: Skin is warm and dry. No rash noted. She is not diaphoretic. No erythema. No pallor. Psychiatric: She has a normal mood and affect. Her behavior is normal. Judgment and thought content normal.   Nursing note and vitals reviewed. Diagnosis    ICD-10-CM ICD-9-CM    1. Acute gastroenteritis K52.9 558.9         Plan    Supportive care discussed  Push fluids  Simethicone for increased gas  Patient are has appointment with Dr Jonna Navarro for GI evaluation tomorrow, encouraged to share her current symptoms with him as well. Report any worsening diarrhea or if there is any blood in the stool. No orders of the defined types were placed in this encounter. Orders Placed This Encounter   Medications    simethicone (MYLICON) 80 MG chewable tablet     Sig: Take 1 tablet by mouth 4 times daily as needed for Flatulence     Dispense:  180 tablet     Refill:  1       Patient Education:  Acute gastroenteritis  Return if symptoms worsen or fail to improve.

## 2017-12-08 ENCOUNTER — OFFICE VISIT (OUTPATIENT)
Dept: SURGERY | Age: 52
End: 2017-12-08

## 2017-12-08 VITALS
WEIGHT: 265 LBS | HEIGHT: 66 IN | DIASTOLIC BLOOD PRESSURE: 84 MMHG | BODY MASS INDEX: 42.59 KG/M2 | SYSTOLIC BLOOD PRESSURE: 119 MMHG | TEMPERATURE: 97.7 F

## 2017-12-08 DIAGNOSIS — K44.9 HIATAL HERNIA: Primary | ICD-10-CM

## 2017-12-08 PROCEDURE — G8417 CALC BMI ABV UP PARAM F/U: HCPCS | Performed by: SURGERY

## 2017-12-08 PROCEDURE — G8427 DOCREV CUR MEDS BY ELIG CLIN: HCPCS | Performed by: SURGERY

## 2017-12-08 PROCEDURE — 99214 OFFICE O/P EST MOD 30 MIN: CPT | Performed by: SURGERY

## 2017-12-08 PROCEDURE — 1036F TOBACCO NON-USER: CPT | Performed by: SURGERY

## 2017-12-08 PROCEDURE — 3014F SCREEN MAMMO DOC REV: CPT | Performed by: SURGERY

## 2017-12-08 PROCEDURE — G8484 FLU IMMUNIZE NO ADMIN: HCPCS | Performed by: SURGERY

## 2017-12-08 PROCEDURE — 3017F COLORECTAL CA SCREEN DOC REV: CPT | Performed by: SURGERY

## 2017-12-15 ENCOUNTER — CLINICAL DOCUMENTATION (OUTPATIENT)
Dept: SPIRITUAL SERVICES | Age: 52
End: 2017-12-15

## 2017-12-15 NOTE — PROGRESS NOTES
12/15/2017 10:35a/1035    Outpatient [facility] team member mailed patient a note encouraging patient to contact us. [facility] contact information provided. Plan: [facility] will move patient to inactive if no response within 1 month.

## 2017-12-18 ENCOUNTER — TELEPHONE (OUTPATIENT)
Dept: FAMILY MEDICINE CLINIC | Age: 52
End: 2017-12-18

## 2017-12-18 DIAGNOSIS — K29.70 GASTRITIS, PRESENCE OF BLEEDING UNSPECIFIED, UNSPECIFIED CHRONICITY, UNSPECIFIED GASTRITIS TYPE: Primary | ICD-10-CM

## 2017-12-18 RX ORDER — ALPRAZOLAM 1 MG/1
TABLET ORAL
Qty: 90 TABLET | Refills: 1 | OUTPATIENT
Start: 2017-12-18

## 2017-12-18 NOTE — TELEPHONE ENCOUNTER
Can you please call the patient and see why she would like to see Dr Inder Miller? He is a general surgeon.   Thanks  Karla Guillaume

## 2017-12-18 NOTE — TELEPHONE ENCOUNTER
Please call the patient and tell her this prescription refill is too early. It was felt December 1. The prescription needs to last close to 30 days.

## 2017-12-21 ENCOUNTER — TELEPHONE (OUTPATIENT)
Dept: SURGERY | Age: 52
End: 2017-12-21

## 2017-12-21 DIAGNOSIS — K44.9 HIATAL HERNIA: Primary | ICD-10-CM

## 2017-12-21 NOTE — PROGRESS NOTES
perinephric stranding. No retroperitoneal   lymphadenopathy or aneurysm. Small bowel and appendix are normal.   Scattered diverticula within the distal colon without evidence of   diverticulitis. No large or small bowel distention and images   through the pelvis reveal 2 cm cyst like area within the right   ovary. No suspicious adnexal mass or free fluid in the cul-de-sac. IMPRESSION/RECOMMENDATIONS:    The patient has what appears to be a symptomatic hiatal hernia. We will obtain an esophagram and plan on EGD. I will see her back in the office after these studies.      Ramírez Taylor

## 2017-12-21 NOTE — TELEPHONE ENCOUNTER
Patient called back, gave [phone] to schedule EGD. Scheduled Esophagram FRI 1/5/18 @ 11:30am arrive @ 11:00am Raul BARRON after midnight. Patient called [facility] to schedule EGD, they need a referral to schedule appointment.     Faxed referral.

## 2017-12-25 PROBLEM — R11.2 INTRACTABLE VOMITING WITH NAUSEA: Status: ACTIVE | Noted: 2017-12-25

## 2017-12-27 ENCOUNTER — TELEPHONE (OUTPATIENT)
Dept: FAMILY MEDICINE CLINIC | Age: 52
End: 2017-12-27

## 2017-12-27 NOTE — TELEPHONE ENCOUNTER
Patient needs a refill on xanax. They need a 30 day supply.      Mail order or local pharmacy: local    Pharmacy: [email or web address] on file    Patient  or mail to patient(If mail order):     Last OV 12/7/17    Future Appointments  Date Time Provider Diann Sotelo   1/5/2018 11:30 AM [facility] 1481 [address]

## 2017-12-28 RX ORDER — ALPRAZOLAM 1 MG/1
TABLET ORAL
Qty: 90 TABLET | Refills: 1 | Status: SHIPPED | OUTPATIENT
Start: 2017-12-28 | End: 2018-02-20 | Stop reason: SDUPTHER

## 2018-01-05 DIAGNOSIS — Z12.11 SCREENING FOR COLON CANCER: Primary | ICD-10-CM

## 2018-01-11 ENCOUNTER — HOSPITAL ENCOUNTER (OUTPATIENT)
Dept: GENERAL RADIOLOGY | Age: 53
Discharge: HOME | End: 2018-01-11
Admitting: SURGERY

## 2018-01-11 DIAGNOSIS — K44.9 DIAPHRAGMATIC HERNIA WITHOUT OBSTRUCTION OR GANGRENE: ICD-10-CM

## 2018-01-11 DIAGNOSIS — K44.9 HIATAL HERNIA: ICD-10-CM

## 2018-01-17 ENCOUNTER — OFFICE VISIT (OUTPATIENT)
Dept: FAMILY MEDICINE CLINIC | Age: 53
End: 2018-01-17

## 2018-01-17 VITALS
OXYGEN SATURATION: 93 % | HEART RATE: 83 BPM | HEIGHT: 67 IN | SYSTOLIC BLOOD PRESSURE: 134 MMHG | WEIGHT: 263 LBS | DIASTOLIC BLOOD PRESSURE: 86 MMHG | BODY MASS INDEX: 41.28 KG/M2

## 2018-01-17 DIAGNOSIS — R19.7 DIARRHEA, UNSPECIFIED TYPE: Primary | ICD-10-CM

## 2018-01-17 DIAGNOSIS — F41.9 ANXIETY: ICD-10-CM

## 2018-01-17 DIAGNOSIS — K21.00 GASTROESOPHAGEAL REFLUX DISEASE WITH ESOPHAGITIS: ICD-10-CM

## 2018-01-17 PROCEDURE — 99214 OFFICE O/P EST MOD 30 MIN: CPT | Performed by: FAMILY MEDICINE

## 2018-01-17 PROCEDURE — 3014F SCREEN MAMMO DOC REV: CPT | Performed by: FAMILY MEDICINE

## 2018-01-17 PROCEDURE — 3017F COLORECTAL CA SCREEN DOC REV: CPT | Performed by: FAMILY MEDICINE

## 2018-01-17 PROCEDURE — 1036F TOBACCO NON-USER: CPT | Performed by: FAMILY MEDICINE

## 2018-01-17 PROCEDURE — G8417 CALC BMI ABV UP PARAM F/U: HCPCS | Performed by: FAMILY MEDICINE

## 2018-01-17 PROCEDURE — G8484 FLU IMMUNIZE NO ADMIN: HCPCS | Performed by: FAMILY MEDICINE

## 2018-01-17 PROCEDURE — G8427 DOCREV CUR MEDS BY ELIG CLIN: HCPCS | Performed by: FAMILY MEDICINE

## 2018-01-17 RX ORDER — SUCRALFATE 1 G/1
TABLET ORAL
Qty: 60 TABLET | Refills: 3 | Status: SHIPPED | OUTPATIENT
Start: 2018-01-17 | End: 2018-03-21 | Stop reason: SDUPTHER

## 2018-01-17 RX ORDER — DICYCLOMINE HCL 20 MG
20 TABLET ORAL EVERY 8 HOURS
Qty: 60 TABLET | Refills: 3 | Status: SHIPPED | OUTPATIENT
Start: 2018-01-17 | End: 2018-04-12 | Stop reason: ALTCHOICE

## 2018-01-17 ASSESSMENT — ENCOUNTER SYMPTOMS
COUGH: 0
SHORTNESS OF BREATH: 0

## 2018-01-23 ENCOUNTER — TELEPHONE (OUTPATIENT)
Dept: FAMILY MEDICINE CLINIC | Age: 53
End: 2018-01-23

## 2018-01-23 NOTE — TELEPHONE ENCOUNTER
Pt had endoscopy done today by Dr Alen Gross. She needs hernia surgery. Pt has appt with Dr Jarvis Huerta Monday to discuss. Pt has noticed increased cramps in her legs and wants to know if the requip dose could be increased. Please advise.

## 2018-01-24 RX ORDER — ROPINIROLE 2 MG/1
TABLET, FILM COATED ORAL
Qty: 60 TABLET | Refills: 11 | Status: SHIPPED | OUTPATIENT
Start: 2018-01-24 | End: 2019-01-11 | Stop reason: SDUPTHER

## 2018-01-29 ENCOUNTER — TELEPHONE (OUTPATIENT)
Dept: FAMILY MEDICINE CLINIC | Age: 53
End: 2018-01-29

## 2018-01-29 ENCOUNTER — OFFICE VISIT (OUTPATIENT)
Dept: SURGERY | Age: 53
End: 2018-01-29

## 2018-01-29 VITALS
HEIGHT: 67 IN | SYSTOLIC BLOOD PRESSURE: 139 MMHG | TEMPERATURE: 97.7 F | WEIGHT: 266 LBS | BODY MASS INDEX: 41.75 KG/M2 | DIASTOLIC BLOOD PRESSURE: 79 MMHG

## 2018-01-29 DIAGNOSIS — K44.9 HIATAL HERNIA: Primary | ICD-10-CM

## 2018-01-29 PROCEDURE — 3014F SCREEN MAMMO DOC REV: CPT | Performed by: SURGERY

## 2018-01-29 PROCEDURE — G8484 FLU IMMUNIZE NO ADMIN: HCPCS | Performed by: SURGERY

## 2018-01-29 PROCEDURE — G8427 DOCREV CUR MEDS BY ELIG CLIN: HCPCS | Performed by: SURGERY

## 2018-01-29 PROCEDURE — 99214 OFFICE O/P EST MOD 30 MIN: CPT | Performed by: SURGERY

## 2018-01-29 PROCEDURE — G8417 CALC BMI ABV UP PARAM F/U: HCPCS | Performed by: SURGERY

## 2018-01-29 PROCEDURE — 3017F COLORECTAL CA SCREEN DOC REV: CPT | Performed by: SURGERY

## 2018-01-29 PROCEDURE — 1036F TOBACCO NON-USER: CPT | Performed by: SURGERY

## 2018-01-30 ENCOUNTER — TELEPHONE (OUTPATIENT)
Dept: FAMILY MEDICINE CLINIC | Age: 53
End: 2018-01-30

## 2018-01-30 NOTE — TELEPHONE ENCOUNTER
Pt mother was dx with type b flu today. She is concerned and wants to know what precautions she should take. Pt says she doesn't feel bad at this time, just tired. Please call back and advise. Pharm on file correct.

## 2018-02-01 ENCOUNTER — OFFICE VISIT (OUTPATIENT)
Dept: FAMILY MEDICINE CLINIC | Age: 53
End: 2018-02-01

## 2018-02-01 VITALS
DIASTOLIC BLOOD PRESSURE: 82 MMHG | HEIGHT: 67 IN | RESPIRATION RATE: 16 BRPM | BODY MASS INDEX: 41.25 KG/M2 | WEIGHT: 262.8 LBS | HEART RATE: 67 BPM | TEMPERATURE: 98.3 F | SYSTOLIC BLOOD PRESSURE: 127 MMHG | OXYGEN SATURATION: 91 %

## 2018-02-01 DIAGNOSIS — J10.1 INFLUENZA B: Primary | ICD-10-CM

## 2018-02-01 LAB
INFLUENZA A ANTIBODY: ABNORMAL
INFLUENZA B ANTIBODY: POSITIVE

## 2018-02-01 PROCEDURE — 3014F SCREEN MAMMO DOC REV: CPT | Performed by: NURSE PRACTITIONER

## 2018-02-01 PROCEDURE — G8417 CALC BMI ABV UP PARAM F/U: HCPCS | Performed by: NURSE PRACTITIONER

## 2018-02-01 PROCEDURE — G8427 DOCREV CUR MEDS BY ELIG CLIN: HCPCS | Performed by: NURSE PRACTITIONER

## 2018-02-01 PROCEDURE — 87804 INFLUENZA ASSAY W/OPTIC: CPT | Performed by: NURSE PRACTITIONER

## 2018-02-01 PROCEDURE — 1036F TOBACCO NON-USER: CPT | Performed by: NURSE PRACTITIONER

## 2018-02-01 PROCEDURE — 99213 OFFICE O/P EST LOW 20 MIN: CPT | Performed by: NURSE PRACTITIONER

## 2018-02-01 PROCEDURE — G8484 FLU IMMUNIZE NO ADMIN: HCPCS | Performed by: NURSE PRACTITIONER

## 2018-02-01 PROCEDURE — 3017F COLORECTAL CA SCREEN DOC REV: CPT | Performed by: NURSE PRACTITIONER

## 2018-02-01 RX ORDER — DEXTROMETHORPHAN HYDROBROMIDE AND PROMETHAZINE HYDROCHLORIDE 15; 6.25 MG/5ML; MG/5ML
5 SYRUP ORAL 4 TIMES DAILY PRN
Qty: 120 ML | Refills: 0 | Status: SHIPPED | OUTPATIENT
Start: 2018-02-01 | End: 2018-02-05 | Stop reason: SDUPTHER

## 2018-02-01 RX ORDER — OSELTAMIVIR PHOSPHATE 75 MG/1
75 CAPSULE ORAL 2 TIMES DAILY
Qty: 10 CAPSULE | Refills: 0 | Status: SHIPPED | OUTPATIENT
Start: 2018-02-01 | End: 2018-02-06

## 2018-02-01 ASSESSMENT — ENCOUNTER SYMPTOMS
NAUSEA: 1
TROUBLE SWALLOWING: 0
WHEEZING: 1
SHORTNESS OF BREATH: 0
EYE REDNESS: 0
VOMITING: 0
RHINORRHEA: 1
CHEST TIGHTNESS: 0
EYE ITCHING: 0
DIARRHEA: 0
COUGH: 1
SINUS PRESSURE: 1
ABDOMINAL PAIN: 0
SORE THROAT: 1
EYE DISCHARGE: 0

## 2018-02-01 NOTE — PROGRESS NOTES
2/1/2018    This is a 46 y.o. female   Chief Complaint   Patient presents with    Cough    Headache    Nausea    Fever     99.3 yesterday    . Jos Pascual is here for evaluation of flu like symptoms. Her mother and granddaughter whom she lives with tested positive for influenza. Her mother was known tested positive for influenza B. She presents with her symptoms that started approximately a day ago. She has rhinorrhea, fever, chills, cough, wheezing, nausea. Patient Active Problem List   Diagnosis    Depression    Knee pain    Chondromalacia of left knee    Synovitis of knee    Follow-up exam    Post-operative state    Bilateral carpal tunnel syndrome    Lumbar strain    Anxiety    Restless leg syndrome    De Quervain's disease (radial styloid tenosynovitis)    Osteoarthritis of carpometacarpal joint of thumb    Hemorrhoid prolapse    Pneumonia unspecified bacteria    Bronchitis with bronchospasm    Dyspnea on exertion    Sciatica    Intractable vomiting with nausea       Current Outpatient Prescriptions   Medication Sig Dispense Refill    promethazine-dextromethorphan (PROMETHAZINE-DM) 6.25-15 MG/5ML syrup Take 5 mLs by mouth 4 times daily as needed for Cough 120 mL 0    oseltamivir (TAMIFLU) 75 MG capsule Take 1 capsule by mouth 2 times daily for 5 days 10 capsule 0    rOPINIRole (REQUIP) 2 MG tablet TAKE ONE TABLET BY MOUTH BID 60 tablet 11    dicyclomine (BENTYL) 20 MG tablet Take 1 tablet by mouth every 8 hours For cramps and diarrhea 60 tablet 3    sucralfate (CARAFATE) 1 GM tablet Take 1/2 hour before mealtime and HS for reflux 60 tablet 3    ALPRAZolam (XANAX) 1 MG tablet TAKE ONE TABLET BY MOUTH THREE TIMES A DAY AS NEEDED FOR ANXIETY.  90 tablet 1    citalopram (CELEXA) 10 MG tablet TAKE ONE AND ONE-HALF TABLET BY MOUTH DAILY 45 tablet 5    simethicone (MYLICON) 80 MG chewable tablet Take 1 tablet by mouth 4 times daily as needed for Flatulence 180 tablet 1    discussed  tamiful    Orders Placed This Encounter   Procedures    POCT Influenza A/B       Orders Placed This Encounter   Medications    promethazine-dextromethorphan (PROMETHAZINE-DM) 6.25-15 MG/5ML syrup     Sig: Take 5 mLs by mouth 4 times daily as needed for Cough     Dispense:  120 mL     Refill:  0    oseltamivir (TAMIFLU) 75 MG capsule     Sig: Take 1 capsule by mouth 2 times daily for 5 days     Dispense:  10 capsule     Refill:  0       Patient Education:  Influenza self care    Return if symptoms worsen or fail to improve.

## 2018-02-01 NOTE — PATIENT INSTRUCTIONS
your doctor if:  ? · You begin to get better and then get worse. ? · You are not getting better after 1 week. Where can you learn more? Go to [email or web address] and sign in to your [employer] account. Enter Q718 in the [facility] box to learn more about \"Influenza (Flu): Care Instructions. \"     If you do not have an account, please click on the \"Sign Up Now\" link. Current as of: May 12, 2017  Content Version: 11.5  © 3388-5624 [employer]wise, Incorporated. Care instructions adapted under license by [facility] ([facility]). If you have questions about a medical condition or this instruction, always ask your healthcare professional. [address] any warranty or liability for your use of this information.

## 2018-02-05 ENCOUNTER — TELEPHONE (OUTPATIENT)
Dept: FAMILY MEDICINE CLINIC | Age: 53
End: 2018-02-05

## 2018-02-05 RX ORDER — DEXTROMETHORPHAN HYDROBROMIDE AND PROMETHAZINE HYDROCHLORIDE 15; 6.25 MG/5ML; MG/5ML
5 SYRUP ORAL 4 TIMES DAILY PRN
Qty: 180 ML | Refills: 0 | Status: SHIPPED | OUTPATIENT
Start: 2018-02-05 | End: 2018-02-15 | Stop reason: ALTCHOICE

## 2018-02-05 NOTE — TELEPHONE ENCOUNTER
PAtient states she still isnt feeling better is on Tamiflu, now has head congestion and cough.      Reminded patient to return fit test.

## 2018-02-07 NOTE — TELEPHONE ENCOUNTER
Dorothy Godinez- please check with Pablo Buckley and see if she can contact [facility] about resources available.

## 2018-02-08 ENCOUNTER — TELEPHONE (OUTPATIENT)
Dept: FAMILY MEDICINE CLINIC | Age: 53
End: 2018-02-08

## 2018-02-15 ENCOUNTER — OFFICE VISIT (OUTPATIENT)
Dept: BARIATRICS/WEIGHT MGMT | Age: 53
End: 2018-02-15

## 2018-02-15 VITALS
SYSTOLIC BLOOD PRESSURE: 129 MMHG | BODY MASS INDEX: 41.94 KG/M2 | HEART RATE: 61 BPM | HEIGHT: 67 IN | DIASTOLIC BLOOD PRESSURE: 90 MMHG | WEIGHT: 267.2 LBS

## 2018-02-15 DIAGNOSIS — E66.01 MORBID OBESITY WITH BMI OF 40.0-44.9, ADULT (HCC): Primary | ICD-10-CM

## 2018-02-15 DIAGNOSIS — K44.9 HIATAL HERNIA: ICD-10-CM

## 2018-02-15 PROCEDURE — G8427 DOCREV CUR MEDS BY ELIG CLIN: HCPCS | Performed by: SURGERY

## 2018-02-15 PROCEDURE — 3017F COLORECTAL CA SCREEN DOC REV: CPT | Performed by: SURGERY

## 2018-02-15 PROCEDURE — G8417 CALC BMI ABV UP PARAM F/U: HCPCS | Performed by: SURGERY

## 2018-02-15 PROCEDURE — 99204 OFFICE O/P NEW MOD 45 MIN: CPT | Performed by: SURGERY

## 2018-02-15 PROCEDURE — 3014F SCREEN MAMMO DOC REV: CPT | Performed by: SURGERY

## 2018-02-15 PROCEDURE — 1036F TOBACCO NON-USER: CPT | Performed by: SURGERY

## 2018-02-15 PROCEDURE — G8484 FLU IMMUNIZE NO ADMIN: HCPCS | Performed by: SURGERY

## 2018-02-15 RX ORDER — IBUPROFEN 600 MG/1
TABLET ORAL
COMMUNITY
Start: 2018-02-01 | End: 2018-04-10 | Stop reason: SDUPTHER

## 2018-02-15 NOTE — PROGRESS NOTES
TAKE ONE CAPSULE BY MOUTH DAILY, Disp: 30 capsule, Rfl: 5    rOPINIRole (REQUIP) 2 MG tablet, TAKE ONE TABLET BY MOUTH BID, Disp: 60 tablet, Rfl: 11    dicyclomine (BENTYL) 20 MG tablet, Take 1 tablet by mouth every 8 hours For cramps and diarrhea, Disp: 60 tablet, Rfl: 3    sucralfate (CARAFATE) 1 GM tablet, Take 1/2 hour before mealtime and HS for reflux, Disp: 60 tablet, Rfl: 3    ALPRAZolam (XANAX) 1 MG tablet, TAKE ONE TABLET BY MOUTH THREE TIMES A DAY AS NEEDED FOR ANXIETY., Disp: 90 tablet, Rfl: 1    citalopram (CELEXA) 10 MG tablet, TAKE ONE AND ONE-HALF TABLET BY MOUTH DAILY, Disp: 45 tablet, Rfl: 5    albuterol sulfate (PROAIR RESPICLICK) 506 (90 BASE) MCG/ACT aerosol powder inhalation, Inhale 2 puffs into the lungs every 4 hours as needed for Wheezing or Shortness of Breath, Disp: 1 Inhaler, Rfl: 2    ibuprofen (ADVIL;MOTRIN) 600 MG tablet, , Disp: , Rfl:       Review of Systems - History obtained from the patient  General ROS: negative  Psychological ROS: negative  Ophthalmic ROS: negative  Neurological ROS: negative  ENT ROS: negative  Allergy and Immunology ROS: negative  Hematological and Lymphatic ROS: negative  Endocrine ROS: negative  Respiratory ROS: negative  Cardiovascular ROS: negative  Gastrointestinal ROS:   Genito-Urinary ROS: negative  Musculoskeletal ROS: negative   Skin ROS: negative    Physical Exam   Constitutional: Patient is oriented to person, place, and time. Vital signs are normal. Patient  appears well-developed and well-nourished. Patient  is active and cooperative. Non-toxic appearance. No distress. HENT:   Head: Normocephalic and atraumatic. Head is without laceration. Right Ear: External ear normal. No lacerations. No drainage, swelling or tenderness. Left Ear: External ear normal. No lacerations. No drainage, swelling or tenderness. Nose: Nose normal. No nose lacerations or nasal deformity.    Mouth/Throat: Uvula is midline, oropharynx is clear and moist and

## 2018-02-19 ENCOUNTER — OFFICE VISIT (OUTPATIENT)
Dept: SURGERY | Age: 53
End: 2018-02-19

## 2018-02-19 VITALS
BODY MASS INDEX: 41.91 KG/M2 | DIASTOLIC BLOOD PRESSURE: 95 MMHG | WEIGHT: 267 LBS | HEIGHT: 67 IN | SYSTOLIC BLOOD PRESSURE: 136 MMHG | TEMPERATURE: 98.1 F

## 2018-02-19 DIAGNOSIS — K44.9 HIATAL HERNIA: Primary | ICD-10-CM

## 2018-02-19 PROCEDURE — G8484 FLU IMMUNIZE NO ADMIN: HCPCS | Performed by: SURGERY

## 2018-02-19 PROCEDURE — 1036F TOBACCO NON-USER: CPT | Performed by: SURGERY

## 2018-02-19 PROCEDURE — G8417 CALC BMI ABV UP PARAM F/U: HCPCS | Performed by: SURGERY

## 2018-02-19 PROCEDURE — 99213 OFFICE O/P EST LOW 20 MIN: CPT | Performed by: SURGERY

## 2018-02-19 PROCEDURE — 3014F SCREEN MAMMO DOC REV: CPT | Performed by: SURGERY

## 2018-02-19 PROCEDURE — 3017F COLORECTAL CA SCREEN DOC REV: CPT | Performed by: SURGERY

## 2018-02-19 PROCEDURE — G8427 DOCREV CUR MEDS BY ELIG CLIN: HCPCS | Performed by: SURGERY

## 2018-02-19 NOTE — LETTER
[facility] [address] [phone]   [phone]    Hannah Guillaume MD FACS                                     SURGERY ORDER   -- Time of order -- 18    12:01 PM    [facility]:   [facility] # _________________                                  Scheduled by: ____________    Surgery Date & Time:                                              Nuc Med / Inj. - or - Needle/Seed Loc:                                            Pt arrival:      Patient Name:  Delio Vila     :  1965 PCP:  Syed Russell, DO       Home Ph:    [phone] (home) [phone] (work)                                                    PROCEDURE:  Laparoscopic paraesophageal hernia repair with mesh    DIAGNOSIS:  Paraesophageal hernia    Anesthesia: _General  Time Needed:  2.5 hours   Pt Position:  low lithotomy         Outpatient ____ Admit ___x___  Assist._____  Pre-Op H & P to be done by: ___      Labs Needed:   CBC ___  PT/PTT___ INR ____  CMP ___ EKG ___   Urine Hcg ___    Cardiac Clearance ___  (if Michelle Bleacher to be done by) __________________    Patient to meet with Anesthesiology prior to surgery ___no__     Medications to be stopped 5 days before surgery: _________  Additional / Special Orders:     **Ancef 2 gm IV OCTOR   (If patient weight is > 120 kg, give 3 gm IV OCTOR)                                                                                                       Hannah Guillaume MD [address] [place] [address]   Fax   [phone]            Date Of Procedure:    PATIENT:       Delio Vila                    :  1965        Allergies   Allergen Reactions    Percocet [Oxycodone-Acetaminophen] Rash       No.   PHYSICIAN ORDERS   HUC/  RN      ORDERS WITH CHECK BOXES MUST BE SELECTED. ALL OTHER ORDERS WILL BE AUTOMATICALLY INITIATED.            Date / Time of Order:   18   1:11 PM

## 2018-02-23 ENCOUNTER — TELEPHONE (OUTPATIENT)
Dept: SURGERY | Age: 53
End: 2018-02-23

## 2018-02-23 NOTE — TELEPHONE ENCOUNTER
Called patient to schedule surgery for 3/6/18 to arrive @ 8:30am main entrance of [facility]. Gave instructions & E-mailed to patient. Patient is scheduling pre-op with [facility] prior to surgery.

## 2018-02-26 NOTE — PROGRESS NOTES
hydronephrosis or perinephric stranding. No retroperitoneal   lymphadenopathy or aneurysm. Small bowel and appendix are normal.   Scattered diverticula within the distal colon without evidence of   diverticulitis. No large or small bowel distention and images   through the pelvis reveal 2 cm cyst like area within the right   ovary. No suspicious adnexal mass or free fluid in the cul-de-sac. UGI:  Deglutition performed normally. Initiation of normal primary   peristaltic stripping wave. Interruption of the normal primary   wave in mid-lower thoracic esophagus with retention of some of the   barium and development of a few nonperistaltic tertiary   contractions. Eventual emptying of esophagus by repetitive   swallowing in by gravity.       Normal prompt passage of 12 mm barium tablet. Small volume   gastroesophageal reflux to lower thoracic level occurs   spontaneously. No moderate or large volume gastroesophageal   reflux.       Moderate size-large hiatal hernia. Mildly narrowing thick Schatzki   ring at esophagogastric junction nonobstructive to passage of 12   mm barium tablet. Otherwise no esophageal stricture.       No esophageal mass or large ulceration. Endoscopy: Hiatal hernia, no esophagitis, no esophageal dilation      IMPRESSION/RECOMMENDATIONS:    The patient has a symptomatic hiatal hernia that should be repaired. We discussed the the details of the procedure as well as the risks, benefits, and expected recovery and she wishes to proceed. Given her BMI and after consultation with Dr. Ifrah Sykes we have elected to proceed with paraesophageal hernia repair only so as he can proceed with a sleeve at a later time.      Ramírez Taylor

## 2018-02-26 NOTE — PROGRESS NOTES
[facility] / [facility] ([facility]) [address], [address]    Acknowledgment of Informed Consent for Surgical or Medical Procedure and Sedation  I agree to allow doctor(s) DORY DONALD and his/her associates or assistants, including residents and/or other qualified medical practitioner to perform the following medical treatment or procedure and to administer or direct the administration of sedation as necessary:  Procedure(s): LAPAROSCOPIC PARAESOPHAGEAL [address]   My doctor has explained the following regarding the proposed procedure:   the explanation of the procedure   the benefits of the procedure   the potential problems that might occur during recuperation   the risks and side effects of the procedure which could include but are not limited to severe blood loss, infection, stroke or death   the benefits, risks and side effect of alternative procedures including the consequences of declining this procedure or any alternative procedures   the likelihood of achieving satisfactory results. I acknowledge no guarantee or assurance has been made to me regarding the results. I understand that during the course of this treatment/procedure, unforeseen conditions can occur which require an additional or different procedure. I agree to allow my physician or assistants to perform such extension of the original procedure as they may find necessary. I understand that sedation will often result in temporary impairment of memory and fine motor skills and that sedation can occasionally progress to a state of deep sedation or general anesthesia. I understand the risks of anesthesia for surgery include, but are not limited to, sore throat, hoarseness, injury to face, mouth, or teeth; nausea; headache; injury to blood vessels or nerves; death, brain damage, or paralysis.     I understand that if I have a Limitation of Treatment order in effect during my hospitalization, the order may or may not be in effect during this procedure. I give my doctor permission to give me blood or blood products. I understand that there are risks with receiving blood such as hepatitis, AIDS, fever, or allergic reaction. I acknowledge that the risks, benefits, and alternatives of this treatment have been explained to me and that no express or implied warranty has been given by the hospital, any blood bank, or any person or entity as to the blood or blood components transfused. At the discretion of my doctor, I agree to allow observers, equipment/product representatives and allow photographing, and/or televising of the procedure, provided my name or identity is maintained confidentially. I agree the hospital may dispose of or use for scientific or educational purposes any tissue, fluid, or body parts which may be removed.     ________________________________Date________Time______ am/pm  ([place] [facility])  Patient or Signature of Closest Relative or Legal Guardian    ________________________________Date________Time______am/pm      Page 1 of  1  Witness

## 2018-03-01 ENCOUNTER — OFFICE VISIT (OUTPATIENT)
Dept: FAMILY MEDICINE CLINIC | Age: 53
End: 2018-03-01

## 2018-03-01 VITALS
WEIGHT: 270 LBS | DIASTOLIC BLOOD PRESSURE: 88 MMHG | BODY MASS INDEX: 42.38 KG/M2 | HEIGHT: 67 IN | HEART RATE: 79 BPM | OXYGEN SATURATION: 94 % | SYSTOLIC BLOOD PRESSURE: 132 MMHG

## 2018-03-01 DIAGNOSIS — K44.9 HIATAL HERNIA WITH GERD: Primary | ICD-10-CM

## 2018-03-01 DIAGNOSIS — G25.81 RESTLESS LEG SYNDROME: ICD-10-CM

## 2018-03-01 DIAGNOSIS — K21.9 HIATAL HERNIA WITH GERD: Primary | ICD-10-CM

## 2018-03-01 DIAGNOSIS — F41.8 SITUATIONAL ANXIETY: ICD-10-CM

## 2018-03-01 DIAGNOSIS — F41.9 ANXIETY: ICD-10-CM

## 2018-03-01 DIAGNOSIS — Z01.818 PRE-OP EXAM: ICD-10-CM

## 2018-03-01 PROCEDURE — 1036F TOBACCO NON-USER: CPT | Performed by: FAMILY MEDICINE

## 2018-03-01 PROCEDURE — G8484 FLU IMMUNIZE NO ADMIN: HCPCS | Performed by: FAMILY MEDICINE

## 2018-03-01 PROCEDURE — G8427 DOCREV CUR MEDS BY ELIG CLIN: HCPCS | Performed by: FAMILY MEDICINE

## 2018-03-01 PROCEDURE — 99214 OFFICE O/P EST MOD 30 MIN: CPT | Performed by: FAMILY MEDICINE

## 2018-03-01 PROCEDURE — 3017F COLORECTAL CA SCREEN DOC REV: CPT | Performed by: FAMILY MEDICINE

## 2018-03-01 PROCEDURE — G8417 CALC BMI ABV UP PARAM F/U: HCPCS | Performed by: FAMILY MEDICINE

## 2018-03-01 PROCEDURE — 3014F SCREEN MAMMO DOC REV: CPT | Performed by: FAMILY MEDICINE

## 2018-03-01 ASSESSMENT — ENCOUNTER SYMPTOMS
CHOKING: 0
TROUBLE SWALLOWING: 0
ABDOMINAL PAIN: 0
BLOOD IN STOOL: 0
COUGH: 0
NAUSEA: 0
CONSTIPATION: 0
SORE THROAT: 0
WHEEZING: 0
SHORTNESS OF BREATH: 0
BACK PAIN: 0
PHOTOPHOBIA: 0
DIARRHEA: 0

## 2018-03-01 NOTE — PROGRESS NOTES
Subjective:      Patient ID: Ewa Mckinney is a 48 y.o. female. HPI   The patient is being seen for a pre-operative medical evaluation and medical clearance for surgery. Has hx significant GERD sx and mostly refractory to medication management. Going to have laparoscopic repair. March 6  [facility] [address]    The patients medications, medical hx and family hx were reviewed        Review of Systems   Constitutional: Negative for activity change, appetite change, fatigue and unexpected weight change. HENT: Negative for congestion, hearing loss, sore throat and trouble swallowing. Eyes: Negative for photophobia and visual disturbance. Lateral aspect left eye feels sore    Eye OK  No drainage /   Vision OK   Respiratory: Negative for cough, choking, shortness of breath and wheezing. Cardiovascular: Negative for chest pain, palpitations and leg swelling. Gastrointestinal: Negative for abdominal pain, blood in stool, constipation, diarrhea and nausea. GERD sx and heartburn. Some dysphagia   Endocrine: Negative for cold intolerance and heat intolerance. Genitourinary: Negative for dysuria, flank pain, frequency, hematuria, menstrual problem and urgency. Mild stress incontinence    Musculoskeletal: Positive for arthralgias (knees ache some. no locking). Negative for back pain and myalgias. Skin: Negative for rash. Allergic/Immunologic: Negative for environmental allergies and food allergies. Neurological: Negative for seizures, syncope, facial asymmetry, speech difficulty, numbness and headaches. Psychiatric/Behavioral: Negative for dysphoric mood and sleep disturbance. The patient is nervous/anxious. Has some anxiety. Generally managed / controlled       Objective:   Physical Exam   Constitutional: She is oriented to person, place, and time. She appears well-developed and well-nourished. No distress. HENT:   Head: Normocephalic.    Right Ear: Hearing,

## 2018-03-02 ENCOUNTER — TELEPHONE (OUTPATIENT)
Dept: FAMILY MEDICINE CLINIC | Age: 53
End: 2018-03-02

## 2018-03-02 RX ORDER — ERYTHROMYCIN 5 MG/G
OINTMENT OPHTHALMIC
Qty: 3.5 G | Refills: 0 | Status: SHIPPED | OUTPATIENT
Start: 2018-03-02 | End: 2018-03-05

## 2018-03-02 NOTE — TELEPHONE ENCOUNTER
Pharm called per pt said Dr Jabari Fletcher was supposed to call in an antibiotic eye drop for her. Chart note says L eye feels sore. Please advise if something will be sent to pharm.

## 2018-03-05 ENCOUNTER — HOSPITAL ENCOUNTER (OUTPATIENT)
Dept: PREADMISSION TESTING | Age: 53
Discharge: HOME | End: 2018-03-06
Attending: SURGERY | Admitting: SURGERY

## 2018-03-05 VITALS — HEIGHT: 66 IN | WEIGHT: 270 LBS | BODY MASS INDEX: 43.39 KG/M2

## 2018-03-05 NOTE — PROGRESS NOTES
you have not been preregistered yet. On the day of your procedure bring your insurance card and photo ID. You will be registered at your bedside once brought back to your room. 5. DO NOT EAT OR DRINK ANYTHING AFTER MIDNIGHT. 6. MEDICATIONS    Take the following medications with a SMALL sip of water: prilosec, xanax, celexa   Use your usual dose of inhalers the morning of surgery. BRING your rescue inhaler with you to hospital.    Anesthesia does NOT want you to take insulin the morning of surgery. They will control your blood sugar while you are at the hospital. Please contact your ordering physician for instructions regarding your insulin the night before your procedure. If you have an insulin pump, please keep it set on basal rate. 7. Do not swallow water when brushing teeth. No gum, candy, mints or ice chips. Refrain from smoking or at least decrease the amount. 8. Dress in loose, comfortable clothing appropriate for redressing after your procedure. Do not wear jewelry (including body piercings), make-up (especially NO eye make-up), fingernail polish (NO toenail polish if foot/leg surgery), lotion, powders or metal hairclips. 9. Dentures, glasses, or contacts will need to be removed before your procedure. Bring cases for your glasses, contacts, dentures, or hearing aids to protect them while you are in surgery. 10. If you use a CPAP, please bring it with you on the day of your procedure. 11. We recommend that valuable personal  belongings, such as credit cards, cash, cell phones, e-tablets or jewelry, be left at home during your stay. The hospital will not be responsible for valuables that are not secured in the hospital safe. However, if your insurance requires a co-pay, you may want to bring a method of payment, i.e. Check or credit card, if you wish to pay your co-pay the day of surgery. 12. If you are to stay overnight, you may bring a bag with personal items.  Please have

## 2018-03-06 PROBLEM — K44.9 HIATAL HERNIA: Status: ACTIVE | Noted: 2018-03-06

## 2018-03-19 ENCOUNTER — OFFICE VISIT (OUTPATIENT)
Dept: SURGERY | Age: 53
End: 2018-03-19

## 2018-03-19 VITALS
HEIGHT: 66 IN | DIASTOLIC BLOOD PRESSURE: 90 MMHG | BODY MASS INDEX: 42.43 KG/M2 | TEMPERATURE: 97.7 F | WEIGHT: 264 LBS | SYSTOLIC BLOOD PRESSURE: 131 MMHG

## 2018-03-19 DIAGNOSIS — Z98.890 S/P REPAIR OF PARAESOPHAGEAL HERNIA: Primary | ICD-10-CM

## 2018-03-19 DIAGNOSIS — Z87.19 S/P REPAIR OF PARAESOPHAGEAL HERNIA: Primary | ICD-10-CM

## 2018-03-19 PROCEDURE — 99024 POSTOP FOLLOW-UP VISIT: CPT | Performed by: SURGERY

## 2018-03-19 NOTE — PROGRESS NOTES
Department of General Surgery - Adult  General Surgery Service  Resident Clinic Note      CHIEF COMPLAINT:  Follow up after a lap. Paraesophageal hernia repair with mesh    History Obtained From:  patient    HISTORY OF PRESENT ILLNESS:    The patient is a 48 y.o. female with significant past medical history of large symptomatic hiatal hernia that was repaired on 3/6/2018 as a laparoscopic para-esophageal hernia repair with mesh. Since her surgery, patient has been able to tolerate her full liquid diet without any symptoms of dysphagia or odynophagia nor any nausea. vomiting. Having BM's, no coughing episodes. Has been feeling \"crummy\" but that has been present even before her surgery. Denies and fevers/chills.     Past Medical History:   Diagnosis Date    Anxiety     Depression     GERD (gastroesophageal reflux disease)     Osteoarthritis     Restless leg syndrome      Past Surgical History:   Procedure Laterality Date     SECTION      x3    HERNIA REPAIR  2018    LAPAROSCOPIC PARAESOPHAGEAL HERNIA REPAIR WITH MESH    KNEE ARTHROSCOPY Left 11/15/12    ARTHROSCOPY LEFT KNEE WITH SYOVECTOMY AND CHONDROPLASTY    OVARY REMOVAL Right 2010    TONSILLECTOMY      UPPER GASTROINTESTINAL ENDOSCOPY      UPPER GASTROINTESTINAL ENDOSCOPY      esophageasl stretch     Current Outpatient Prescriptions   Medication Sig Dispense Refill    ALPRAZolam (XANAX) 1 MG tablet TAKE ONE TABLET BY MOUTH THREE TIMES A DAY AS NEEDED FOR ANXIETY 90 tablet 1    ibuprofen (ADVIL;MOTRIN) 600 MG tablet       omeprazole (PRILOSEC) 40 MG delayed release capsule TAKE ONE CAPSULE BY MOUTH DAILY 30 capsule 5    rOPINIRole (REQUIP) 2 MG tablet TAKE ONE TABLET BY MOUTH BID 60 tablet 11    dicyclomine (BENTYL) 20 MG tablet Take 1 tablet by mouth every 8 hours For cramps and diarrhea 60 tablet 3    sucralfate (CARAFATE) 1 GM tablet Take 1/2 hour before mealtime and HS for reflux 60 tablet 3    citalopram (CELEXA) 10

## 2018-03-21 RX ORDER — SUCRALFATE 1 G/1
TABLET ORAL
Qty: 120 TABLET | Refills: 3 | Status: SHIPPED | OUTPATIENT
Start: 2018-03-21 | End: 2018-05-10 | Stop reason: ALTCHOICE

## 2018-03-21 NOTE — TELEPHONE ENCOUNTER
Last OV 3/1/18  Future Appointments  Date Time Provider Diann Sotelo   4/6/2018 11:45 AM Daphne Escobar MD [facility]

## 2018-03-29 ENCOUNTER — TELEPHONE (OUTPATIENT)
Dept: SURGERY | Age: 53
End: 2018-03-29

## 2018-03-29 NOTE — TELEPHONE ENCOUNTER
Patient called, she has a lot of gas with constant belching and feels an uncomfortable gurgling in her stomach, no pain. She states she has been drinking soda and has had mashed potatoes, trying to remain on full liquid diet for 4 weeks. Per  called Reglan to [facility]. Called patient with prescription called and advised side effect may cause diarrhea, if diarrhea starts to stop taking Reglan & Drinking soda will cause more gas. Patient agreeable.

## 2018-03-29 NOTE — TELEPHONE ENCOUNTER
Patient states that she is still having a lot of gas and OTC medication is not working. Patient would like Dr. Sophia Novak to give her something to help with gas.

## 2018-04-04 ENCOUNTER — TELEPHONE (OUTPATIENT)
Dept: FAMILY MEDICINE CLINIC | Age: 53
End: 2018-04-04

## 2018-04-06 ENCOUNTER — OFFICE VISIT (OUTPATIENT)
Dept: SURGERY | Age: 53
End: 2018-04-06

## 2018-04-06 ENCOUNTER — TELEPHONE (OUTPATIENT)
Dept: SURGERY | Age: 53
End: 2018-04-06

## 2018-04-06 VITALS
HEIGHT: 66 IN | TEMPERATURE: 98.1 F | WEIGHT: 272 LBS | BODY MASS INDEX: 43.71 KG/M2 | SYSTOLIC BLOOD PRESSURE: 128 MMHG | DIASTOLIC BLOOD PRESSURE: 80 MMHG

## 2018-04-06 DIAGNOSIS — Z98.890 S/P REPAIR OF PARAESOPHAGEAL HERNIA: Primary | ICD-10-CM

## 2018-04-06 DIAGNOSIS — Z87.19 S/P REPAIR OF PARAESOPHAGEAL HERNIA: Primary | ICD-10-CM

## 2018-04-06 PROCEDURE — 99024 POSTOP FOLLOW-UP VISIT: CPT | Performed by: SURGERY

## 2018-04-10 DIAGNOSIS — Z12.11 COLON CANCER SCREENING: Primary | ICD-10-CM

## 2018-04-10 RX ORDER — IBUPROFEN 600 MG/1
600 TABLET ORAL EVERY 6 HOURS PRN
Qty: 120 TABLET | Refills: 1 | Status: SHIPPED | OUTPATIENT
Start: 2018-04-10 | End: 2019-06-09 | Stop reason: SDUPTHER

## 2018-04-12 ENCOUNTER — OFFICE VISIT (OUTPATIENT)
Dept: FAMILY MEDICINE CLINIC | Age: 53
End: 2018-04-12

## 2018-04-12 VITALS
HEART RATE: 86 BPM | DIASTOLIC BLOOD PRESSURE: 84 MMHG | SYSTOLIC BLOOD PRESSURE: 136 MMHG | HEIGHT: 66 IN | OXYGEN SATURATION: 94 % | BODY MASS INDEX: 43.71 KG/M2 | WEIGHT: 272 LBS

## 2018-04-12 DIAGNOSIS — R10.10 PAIN OF UPPER ABDOMEN: Primary | ICD-10-CM

## 2018-04-12 DIAGNOSIS — F41.9 ANXIETY: ICD-10-CM

## 2018-04-12 PROCEDURE — G8417 CALC BMI ABV UP PARAM F/U: HCPCS | Performed by: FAMILY MEDICINE

## 2018-04-12 PROCEDURE — 1036F TOBACCO NON-USER: CPT | Performed by: FAMILY MEDICINE

## 2018-04-12 PROCEDURE — 99213 OFFICE O/P EST LOW 20 MIN: CPT | Performed by: FAMILY MEDICINE

## 2018-04-12 PROCEDURE — 3014F SCREEN MAMMO DOC REV: CPT | Performed by: FAMILY MEDICINE

## 2018-04-12 PROCEDURE — G8427 DOCREV CUR MEDS BY ELIG CLIN: HCPCS | Performed by: FAMILY MEDICINE

## 2018-04-12 PROCEDURE — 3017F COLORECTAL CA SCREEN DOC REV: CPT | Performed by: FAMILY MEDICINE

## 2018-04-12 RX ORDER — SIMETHICONE 80 MG
TABLET,CHEWABLE ORAL
Qty: 180 TABLET | Refills: 3 | Status: SHIPPED | OUTPATIENT
Start: 2018-04-12 | End: 2018-05-10 | Stop reason: ALTCHOICE

## 2018-04-12 RX ORDER — METOCLOPRAMIDE 10 MG/1
TABLET ORAL
COMMUNITY
Start: 2018-03-29 | End: 2018-04-21

## 2018-04-12 ASSESSMENT — ENCOUNTER SYMPTOMS
ABDOMINAL PAIN: 1
DIARRHEA: 1
SHORTNESS OF BREATH: 0
ABDOMINAL DISTENTION: 1

## 2018-04-13 ENCOUNTER — TELEPHONE (OUTPATIENT)
Dept: SURGERY | Age: 53
End: 2018-04-13

## 2018-04-20 RX ORDER — ALPRAZOLAM 1 MG/1
TABLET ORAL
Qty: 90 TABLET | Refills: 1 | Status: SHIPPED | OUTPATIENT
Start: 2018-04-20 | End: 2018-06-14 | Stop reason: SDUPTHER

## 2018-05-07 ENCOUNTER — TELEPHONE (OUTPATIENT)
Dept: FAMILY MEDICINE CLINIC | Age: 53
End: 2018-05-07

## 2018-05-07 DIAGNOSIS — M25.569 ACUTE KNEE PAIN, UNSPECIFIED LATERALITY: Primary | ICD-10-CM

## 2018-05-09 ENCOUNTER — TELEPHONE (OUTPATIENT)
Dept: SURGERY | Age: 53
End: 2018-05-09

## 2018-05-10 ENCOUNTER — HOSPITAL ENCOUNTER (OUTPATIENT)
Dept: ENDOSCOPY | Age: 53
Discharge: HOME | End: 2018-05-10
Attending: SURGERY | Admitting: SURGERY

## 2018-05-10 VITALS
SYSTOLIC BLOOD PRESSURE: 136 MMHG | HEART RATE: 82 BPM | DIASTOLIC BLOOD PRESSURE: 72 MMHG | BODY MASS INDEX: 32.14 KG/M2 | WEIGHT: 200 LBS | TEMPERATURE: 98.6 F | RESPIRATION RATE: 18 BRPM | HEIGHT: 66 IN | OXYGEN SATURATION: 97 %

## 2018-05-10 PROCEDURE — 45378 DIAGNOSTIC COLONOSCOPY: CPT | Performed by: SURGERY

## 2018-05-10 ASSESSMENT — ENCOUNTER SYMPTOMS: SHORTNESS OF BREATH: 1

## 2018-05-14 ENCOUNTER — OFFICE VISIT (OUTPATIENT)
Dept: ORTHOPEDIC SURGERY | Age: 53
End: 2018-05-14

## 2018-05-14 VITALS — HEIGHT: 66 IN | WEIGHT: 260 LBS | BODY MASS INDEX: 41.78 KG/M2

## 2018-05-14 DIAGNOSIS — M25.562 LEFT KNEE PAIN, UNSPECIFIED CHRONICITY: Primary | ICD-10-CM

## 2018-05-14 PROCEDURE — G8427 DOCREV CUR MEDS BY ELIG CLIN: HCPCS | Performed by: ORTHOPAEDIC SURGERY

## 2018-05-14 PROCEDURE — 1036F TOBACCO NON-USER: CPT | Performed by: ORTHOPAEDIC SURGERY

## 2018-05-14 PROCEDURE — 99214 OFFICE O/P EST MOD 30 MIN: CPT | Performed by: ORTHOPAEDIC SURGERY

## 2018-05-14 PROCEDURE — 20610 DRAIN/INJ JOINT/BURSA W/O US: CPT | Performed by: ORTHOPAEDIC SURGERY

## 2018-05-14 PROCEDURE — 3017F COLORECTAL CA SCREEN DOC REV: CPT | Performed by: ORTHOPAEDIC SURGERY

## 2018-05-14 PROCEDURE — G8417 CALC BMI ABV UP PARAM F/U: HCPCS | Performed by: ORTHOPAEDIC SURGERY

## 2018-05-14 RX ORDER — IBUPROFEN 600 MG/1
600 TABLET ORAL EVERY 8 HOURS PRN
Qty: 90 TABLET | Refills: 3 | Status: SHIPPED | OUTPATIENT
Start: 2018-05-14 | End: 2018-10-02 | Stop reason: SDUPTHER

## 2018-06-14 ENCOUNTER — OFFICE VISIT (OUTPATIENT)
Dept: FAMILY MEDICINE CLINIC | Age: 53
End: 2018-06-14

## 2018-06-14 VITALS
WEIGHT: 272.8 LBS | OXYGEN SATURATION: 95 % | RESPIRATION RATE: 16 BRPM | HEIGHT: 66 IN | HEART RATE: 71 BPM | BODY MASS INDEX: 43.84 KG/M2 | DIASTOLIC BLOOD PRESSURE: 66 MMHG | SYSTOLIC BLOOD PRESSURE: 107 MMHG | TEMPERATURE: 97.4 F

## 2018-06-14 DIAGNOSIS — Z00.00 ANNUAL PHYSICAL EXAM: Primary | ICD-10-CM

## 2018-06-14 DIAGNOSIS — Z12.39 SCREENING FOR BREAST CANCER: ICD-10-CM

## 2018-06-14 DIAGNOSIS — Z23 NEED FOR SHINGLES VACCINE: ICD-10-CM

## 2018-06-14 DIAGNOSIS — F32.A DEPRESSION, UNSPECIFIED DEPRESSION TYPE: ICD-10-CM

## 2018-06-14 PROCEDURE — 99396 PREV VISIT EST AGE 40-64: CPT | Performed by: NURSE PRACTITIONER

## 2018-06-14 RX ORDER — CITALOPRAM 20 MG/1
20 TABLET ORAL DAILY
Qty: 30 TABLET | Refills: 5 | Status: SHIPPED | OUTPATIENT
Start: 2018-06-14 | End: 2018-06-25 | Stop reason: SDUPTHER

## 2018-06-14 ASSESSMENT — ENCOUNTER SYMPTOMS
EYE ITCHING: 0
WHEEZING: 0
TROUBLE SWALLOWING: 0
CONSTIPATION: 0
EYE REDNESS: 0
COUGH: 0
SHORTNESS OF BREATH: 0
ABDOMINAL PAIN: 0
COLOR CHANGE: 0
SINUS PRESSURE: 0
NAUSEA: 0
CHEST TIGHTNESS: 0
SORE THROAT: 0
DIARRHEA: 0

## 2018-06-25 ENCOUNTER — OFFICE VISIT (OUTPATIENT)
Dept: FAMILY MEDICINE CLINIC | Age: 53
End: 2018-06-25

## 2018-06-25 VITALS
SYSTOLIC BLOOD PRESSURE: 118 MMHG | WEIGHT: 272 LBS | DIASTOLIC BLOOD PRESSURE: 72 MMHG | RESPIRATION RATE: 16 BRPM | OXYGEN SATURATION: 95 % | HEART RATE: 82 BPM | BODY MASS INDEX: 43.71 KG/M2 | HEIGHT: 66 IN

## 2018-06-25 DIAGNOSIS — B37.31 VAGINAL CANDIDIASIS: ICD-10-CM

## 2018-06-25 DIAGNOSIS — K13.0 INTERTRIGO LABIALIS: Primary | ICD-10-CM

## 2018-06-25 PROCEDURE — G8427 DOCREV CUR MEDS BY ELIG CLIN: HCPCS | Performed by: NURSE PRACTITIONER

## 2018-06-25 PROCEDURE — 1036F TOBACCO NON-USER: CPT | Performed by: NURSE PRACTITIONER

## 2018-06-25 PROCEDURE — 99213 OFFICE O/P EST LOW 20 MIN: CPT | Performed by: NURSE PRACTITIONER

## 2018-06-25 PROCEDURE — 3017F COLORECTAL CA SCREEN DOC REV: CPT | Performed by: NURSE PRACTITIONER

## 2018-06-25 PROCEDURE — G8417 CALC BMI ABV UP PARAM F/U: HCPCS | Performed by: NURSE PRACTITIONER

## 2018-06-25 RX ORDER — NYSTATIN 100000 U/G
OINTMENT TOPICAL
Qty: 30 G | Refills: 0 | Status: SHIPPED | OUTPATIENT
Start: 2018-06-25 | End: 2020-01-08

## 2018-06-25 RX ORDER — FLUCONAZOLE 150 MG/1
TABLET ORAL
Qty: 2 TABLET | Refills: 0 | Status: SHIPPED | OUTPATIENT
Start: 2018-06-25 | End: 2018-09-12 | Stop reason: ALTCHOICE

## 2018-06-25 ASSESSMENT — ENCOUNTER SYMPTOMS
GASTROINTESTINAL NEGATIVE: 1
CHEST TIGHTNESS: 0
COUGH: 0
WHEEZING: 0

## 2018-06-26 LAB
CANDIDA SPECIES, DNA PROBE: NORMAL
GARDNERELLA VAGINALIS, DNA PROBE: NORMAL
TRICHOMONAS VAGINALIS DNA: NORMAL

## 2018-06-28 ENCOUNTER — TELEPHONE (OUTPATIENT)
Dept: FAMILY MEDICINE CLINIC | Age: 53
End: 2018-06-28

## 2018-06-28 DIAGNOSIS — K13.0 INTERTRIGO LABIALIS: Primary | ICD-10-CM

## 2018-06-28 RX ORDER — CLOTRIMAZOLE 1 %
CREAM (GRAM) TOPICAL
Qty: 60 G | Refills: 0 | Status: SHIPPED | OUTPATIENT
Start: 2018-06-28 | End: 2018-09-12 | Stop reason: SDUPTHER

## 2018-07-02 ENCOUNTER — TELEPHONE (OUTPATIENT)
Dept: FAMILY MEDICINE CLINIC | Age: 53
End: 2018-07-02

## 2018-07-02 RX ORDER — METRONIDAZOLE 500 MG/1
500 TABLET ORAL 3 TIMES DAILY
Qty: 21 TABLET | Refills: 0 | Status: SHIPPED | OUTPATIENT
Start: 2018-07-02 | End: 2018-07-09

## 2018-07-13 DIAGNOSIS — F41.9 ANXIETY: ICD-10-CM

## 2018-07-16 RX ORDER — ALPRAZOLAM 1 MG/1
TABLET ORAL
Qty: 90 TABLET | Refills: 0 | Status: SHIPPED | OUTPATIENT
Start: 2018-07-16 | End: 2018-08-15 | Stop reason: SDUPTHER

## 2018-08-01 ENCOUNTER — OFFICE VISIT (OUTPATIENT)
Dept: ORTHOPEDIC SURGERY | Age: 53
End: 2018-08-01

## 2018-08-01 VITALS — WEIGHT: 260 LBS | BODY MASS INDEX: 41.78 KG/M2 | HEIGHT: 66 IN

## 2018-08-01 DIAGNOSIS — M17.12 OSTEOARTHRITIS OF LEFT KNEE, UNSPECIFIED OSTEOARTHRITIS TYPE: ICD-10-CM

## 2018-08-01 DIAGNOSIS — M25.462 EFFUSION OF LEFT KNEE: ICD-10-CM

## 2018-08-01 DIAGNOSIS — M25.562 LEFT KNEE PAIN, UNSPECIFIED CHRONICITY: Primary | ICD-10-CM

## 2018-08-01 PROCEDURE — G8427 DOCREV CUR MEDS BY ELIG CLIN: HCPCS | Performed by: ORTHOPAEDIC SURGERY

## 2018-08-01 PROCEDURE — 99213 OFFICE O/P EST LOW 20 MIN: CPT | Performed by: ORTHOPAEDIC SURGERY

## 2018-08-01 PROCEDURE — 20610 DRAIN/INJ JOINT/BURSA W/O US: CPT | Performed by: ORTHOPAEDIC SURGERY

## 2018-08-01 PROCEDURE — 3017F COLORECTAL CA SCREEN DOC REV: CPT | Performed by: ORTHOPAEDIC SURGERY

## 2018-08-01 PROCEDURE — G8417 CALC BMI ABV UP PARAM F/U: HCPCS | Performed by: ORTHOPAEDIC SURGERY

## 2018-08-01 PROCEDURE — 1036F TOBACCO NON-USER: CPT | Performed by: ORTHOPAEDIC SURGERY

## 2018-08-01 RX ORDER — MELOXICAM 15 MG/1
15 TABLET ORAL DAILY
Qty: 30 TABLET | Refills: 3 | Status: SHIPPED | OUTPATIENT
Start: 2018-08-01 | End: 2019-09-02

## 2018-08-01 NOTE — PROGRESS NOTES
[id number]     Order Specific Question:   Reason for exam:     Answer:   Pain       Treatment Plan:    I discussed the diagnosis and treatment options with Cheri Baldwin today. ***  Patient was asked to follow-up in *** weeks and we can re-evaluate her again at that time. Cheri Baldwin was instructed to call the office if her symptoms worsen or if new symptoms appear prior to the next scheduled visit. She is specifically instructed to contact the office between now and schedule appointment if she has concerns related to her condition or if she needs assistance in scheduling any above tests. She is welcome to call for an appointment sooner if she has any additional concerns or questions. This dictation was performed with a verbal recognition program (DRAGON) and it was checked for errors. It is possible that there are still dictated errors within this office note. If so, please bring any errors to my attention for an addendum. All efforts were made to ensure that this office note is accurate.

## 2018-08-01 NOTE — PROGRESS NOTES
CHIEF COMPLAINT: Left knee pain. Previous visit was 2018. History:   Barry Dillard is a 48 y.o. White female self-referred for evaluation and treatment of left knee pain. The patient complains of left knee pain. The symptoms are worse with activity. The knee has not given out or felt unstable. The patient cannot bend and straighten the knee fully. She was seen here in May for the pain in the same knee. That time it was following an injury. She was then injected and did extremely well until just the last 2 weeks. She had another injury which was a simple fall and now has once again developed swelling and pain. There is swelling in the knee. There was catching / locking of the knee. The patient has not had PT. The patient has taken NSAIDs. Outside reports reviewed: none. Past Medical History:   Diagnosis Date    Anxiety     Depression     GERD (gastroesophageal reflux disease)     Osteoarthritis     Restless leg syndrome        Past Surgical History:   Procedure Laterality Date     SECTION      x3    HERNIA REPAIR  2018    LAPAROSCOPIC PARAESOPHAGEAL HERNIA REPAIR WITH MESH    HIATAL HERNIA REPAIR  2018    KNEE ARTHROSCOPY Left 11/15/12    ARTHROSCOPY LEFT KNEE WITH SYOVECTOMY AND CHONDROPLASTY    OVARY REMOVAL Right 2010    TONSILLECTOMY  1981    UPPER GASTROINTESTINAL ENDOSCOPY      UPPER GASTROINTESTINAL ENDOSCOPY      esophageasl stretch       Family History   Problem Relation Age of Onset    Arthritis Mother     Obesity Mother    [facility] COPD Mother     Anemia Mother     Arthritis Father     Arrhythmia Father     Diabetes Other     Diabetes Paternal Grandfather     Cancer Neg Hx     Breast Cancer Neg Hx     Colon Cancer Neg Hx        Social History     Social History    Marital status:       Spouse name: N/A    Number of children: 4    Years of education: N/A     Occupational History          Social History Main Examination:     Vital signs:   Ht 5' 6\" (1.676 m)   Wt 260 lb (117.9 kg)   LMP 09/20/2013 (Exact Date)   BMI 41.97 kg/m²    General:  alert, appears stated age, cooperative and no distress   Gait:  Abnormal. The patient can bear weight on the injured extremity. Left Knee  Alignment:  varus   ROM:  -5 degrees extension to 90 degrees flexion      Crepitus:  no   Joint Tenderness:  medial joint line   Effusion:   15 cc       Patellar tilt test:  negative   Patellar facet tenderness:  negative medial   negative lateral   Trochlear tenderness:  negative   Moving patellar apprehension test:  negative   Hoffa's test:  negative medial   negative lateral      Hyperextension test:  negative medial   negative lateral   Lachman test:  negative      Anterior drawer test:  negative   Pivot shift test:  negative   Posterior drawer:   negative   Varus laxity at 30 degrees:  positive      Valgus laxity at 30 degrees:   negative      Abdi's test:  positive       Quadriceps atrophy:  None     There is not any cellulitis, lymphedema or cutaneous lesions noted in the lower extremities. Motor exam of the lower extremities show quadriceps, hamstrings, foot dorsiflexion and plantarflexion grossly intact. Sensation to both feet is grossly intact to light touch. The bilateral lower extremities are warm and well-perfused with brisk capillary refill.    4 views of the left knee showed evidence of degenerative changes in her knee. Assessment:     Left knee DJD, with acute injury. Plan:     today we elected to aspirate and inject her left knee. She has not done well with over-the-counter anti-inflammatories using at he'll. Today we elected to start her on meloxicam 15 mg daily for the next 6 weeks. The risks and benefits of an injection were discussed with the patient. The patient had full opportunity to ask questions and all were answered. The patient then provided verbal informed consent.   The skin was then

## 2018-08-27 RX ORDER — OMEPRAZOLE 40 MG/1
CAPSULE, DELAYED RELEASE ORAL
Qty: 30 CAPSULE | Refills: 4 | Status: SHIPPED | OUTPATIENT
Start: 2018-08-27 | End: 2019-01-11 | Stop reason: SDUPTHER

## 2018-09-12 ENCOUNTER — OFFICE VISIT (OUTPATIENT)
Dept: FAMILY MEDICINE CLINIC | Age: 53
End: 2018-09-12

## 2018-09-12 VITALS
DIASTOLIC BLOOD PRESSURE: 86 MMHG | HEIGHT: 66 IN | BODY MASS INDEX: 44.03 KG/M2 | TEMPERATURE: 97.6 F | OXYGEN SATURATION: 96 % | HEART RATE: 68 BPM | RESPIRATION RATE: 16 BRPM | WEIGHT: 274 LBS | SYSTOLIC BLOOD PRESSURE: 140 MMHG

## 2018-09-12 DIAGNOSIS — K64.4 BLEEDING EXTERNAL HEMORRHOIDS: ICD-10-CM

## 2018-09-12 DIAGNOSIS — R03.0 ELEVATED BLOOD PRESSURE READING: ICD-10-CM

## 2018-09-12 DIAGNOSIS — K13.0 INTERTRIGO LABIALIS: Primary | ICD-10-CM

## 2018-09-12 PROCEDURE — 1036F TOBACCO NON-USER: CPT | Performed by: NURSE PRACTITIONER

## 2018-09-12 PROCEDURE — G8417 CALC BMI ABV UP PARAM F/U: HCPCS | Performed by: NURSE PRACTITIONER

## 2018-09-12 PROCEDURE — G8427 DOCREV CUR MEDS BY ELIG CLIN: HCPCS | Performed by: NURSE PRACTITIONER

## 2018-09-12 PROCEDURE — 99214 OFFICE O/P EST MOD 30 MIN: CPT | Performed by: NURSE PRACTITIONER

## 2018-09-12 PROCEDURE — 3017F COLORECTAL CA SCREEN DOC REV: CPT | Performed by: NURSE PRACTITIONER

## 2018-09-12 RX ORDER — METOCLOPRAMIDE 10 MG/1
TABLET ORAL
COMMUNITY
Start: 2018-07-25 | End: 2019-07-01 | Stop reason: ALTCHOICE

## 2018-09-12 RX ORDER — HYDROCORTISONE ACETATE 25 MG/1
25 SUPPOSITORY RECTAL EVERY 12 HOURS
Qty: 24 SUPPOSITORY | Refills: 0 | Status: SHIPPED | OUTPATIENT
Start: 2018-09-12 | End: 2018-12-19 | Stop reason: ALTCHOICE

## 2018-09-12 RX ORDER — CLOTRIMAZOLE 1 %
CREAM (GRAM) TOPICAL
Qty: 60 G | Refills: 1 | Status: SHIPPED | OUTPATIENT
Start: 2018-09-12 | End: 2018-12-19 | Stop reason: ALTCHOICE

## 2018-09-12 RX ORDER — CITALOPRAM 20 MG/1
TABLET ORAL
COMMUNITY
Start: 2018-07-16 | End: 2018-12-19 | Stop reason: ALTCHOICE

## 2018-09-12 ASSESSMENT — ENCOUNTER SYMPTOMS
ALLERGIC/IMMUNOLOGIC NEGATIVE: 1
RESPIRATORY NEGATIVE: 1
GASTROINTESTINAL NEGATIVE: 1

## 2018-09-12 NOTE — PROGRESS NOTES
others  Discussed possible cognitive base therapy  Continue current medications  Monitor blood pressure at home 3 times weekly and return in one month as scheduled appointment with Dr. Jasmin Cates  Bring home monitored blood pressures with her to appointment    No orders of the defined types were placed in this encounter. Orders Placed This Encounter   Medications    hydrocortisone (ANUSOL-HC) 25 MG suppository     Sig: Place 1 suppository rectally every 12 hours     Dispense:  24 suppository     Refill:  0    clotrimazole (LOTRIMIN) 1 % cream     Sig: Apply topically 2 times daily for 2-4 weeks     Dispense:  60 g     Refill:  1       Patient Education:   intertrigo    Return for as scheduled next month, Mental health.

## 2018-09-19 ENCOUNTER — TELEPHONE (OUTPATIENT)
Dept: PRIMARY CARE CLINIC | Age: 53
End: 2018-09-19

## 2018-10-01 ENCOUNTER — TELEPHONE (OUTPATIENT)
Dept: FAMILY MEDICINE CLINIC | Age: 53
End: 2018-10-01

## 2018-10-02 ENCOUNTER — TELEPHONE (OUTPATIENT)
Dept: FAMILY MEDICINE CLINIC | Age: 53
End: 2018-10-02

## 2018-10-02 ENCOUNTER — OFFICE VISIT (OUTPATIENT)
Dept: FAMILY MEDICINE CLINIC | Age: 53
End: 2018-10-02
Payer: COMMERCIAL

## 2018-10-02 VITALS
RESPIRATION RATE: 16 BRPM | TEMPERATURE: 97.8 F | SYSTOLIC BLOOD PRESSURE: 137 MMHG | DIASTOLIC BLOOD PRESSURE: 86 MMHG | HEART RATE: 72 BPM | WEIGHT: 286 LBS | BODY MASS INDEX: 46.16 KG/M2

## 2018-10-02 DIAGNOSIS — K64.9 ACUTE HEMORRHOID: ICD-10-CM

## 2018-10-02 DIAGNOSIS — B37.9 YEAST INFECTION: Primary | ICD-10-CM

## 2018-10-02 DIAGNOSIS — N39.0 URINARY TRACT INFECTION WITHOUT HEMATURIA, SITE UNSPECIFIED: ICD-10-CM

## 2018-10-02 LAB
BILIRUBIN, POC: NORMAL
BLOOD URINE, POC: NORMAL
CLARITY, POC: NORMAL
COLOR, POC: NORMAL
GLUCOSE URINE, POC: NORMAL
KETONES, POC: NORMAL
LEUKOCYTE EST, POC: NORMAL
NITRITE, POC: NORMAL
PH, POC: 5.5
PROTEIN, POC: NORMAL
SPECIFIC GRAVITY, POC: 1.02
UROBILINOGEN, POC: NORMAL

## 2018-10-02 PROCEDURE — 99214 OFFICE O/P EST MOD 30 MIN: CPT | Performed by: NURSE PRACTITIONER

## 2018-10-02 PROCEDURE — G8484 FLU IMMUNIZE NO ADMIN: HCPCS | Performed by: NURSE PRACTITIONER

## 2018-10-02 PROCEDURE — 1036F TOBACCO NON-USER: CPT | Performed by: NURSE PRACTITIONER

## 2018-10-02 PROCEDURE — 81003 URINALYSIS AUTO W/O SCOPE: CPT | Performed by: NURSE PRACTITIONER

## 2018-10-02 PROCEDURE — 3017F COLORECTAL CA SCREEN DOC REV: CPT | Performed by: NURSE PRACTITIONER

## 2018-10-02 PROCEDURE — G8417 CALC BMI ABV UP PARAM F/U: HCPCS | Performed by: NURSE PRACTITIONER

## 2018-10-02 PROCEDURE — G8427 DOCREV CUR MEDS BY ELIG CLIN: HCPCS | Performed by: NURSE PRACTITIONER

## 2018-10-02 RX ORDER — CALCIUM ACETATE MONOHYDRATE AND ALUMINUM SULFATE TETRADECAHYDRATE 952; 1347 MG/2299MG; MG/2299MG
POWDER, FOR SOLUTION TOPICAL
Qty: 3 PACKET | Refills: 3 | Status: SHIPPED | OUTPATIENT
Start: 2018-10-02 | End: 2018-11-01

## 2018-10-02 RX ORDER — FLUCONAZOLE 150 MG/1
150 TABLET ORAL ONCE
Qty: 1 TABLET | Refills: 1 | Status: SHIPPED | OUTPATIENT
Start: 2018-10-02 | End: 2018-10-02

## 2018-10-02 RX ORDER — FLUCONAZOLE 150 MG/1
150 TABLET ORAL ONCE
Qty: 1 TABLET | Refills: 2 | Status: SHIPPED | OUTPATIENT
Start: 2018-10-02 | End: 2018-10-02

## 2018-10-02 ASSESSMENT — ENCOUNTER SYMPTOMS
ABDOMINAL PAIN: 1
BACK PAIN: 1
RESPIRATORY NEGATIVE: 1

## 2018-10-02 NOTE — TELEPHONE ENCOUNTER
Patient called and stated that [facility] aluminum sulfate-calcium acetate [facility] packet is there anything else you can call in.

## 2018-10-03 ENCOUNTER — TELEPHONE (OUTPATIENT)
Dept: FAMILY MEDICINE CLINIC | Age: 53
End: 2018-10-03

## 2018-10-04 ENCOUNTER — TELEPHONE (OUTPATIENT)
Dept: FAMILY MEDICINE CLINIC | Age: 53
End: 2018-10-04

## 2018-10-04 RX ORDER — CLOTRIMAZOLE AND BETAMETHASONE DIPROPIONATE 10; .64 MG/G; MG/G
CREAM TOPICAL
Qty: 45 G | Refills: 1 | Status: SHIPPED | OUTPATIENT
Start: 2018-10-04 | End: 2018-12-19 | Stop reason: ALTCHOICE

## 2018-10-04 NOTE — TELEPHONE ENCOUNTER
Pt called back and said the infection is not doing any better; she has used both antibiotics and doesn't know what she should do.       [phone] ([phone]) is pt preferred call back number.

## 2018-10-04 NOTE — TELEPHONE ENCOUNTER
Called & gave pt the message from Dr. Jonny Ortiz.     She is still needing to know what to do for her BP since it is running high?  (140-150 top & 90s for bottom)

## 2018-10-05 RX ORDER — FLUCONAZOLE 100 MG/1
100 TABLET ORAL DAILY
Qty: 7 TABLET | Refills: 0 | Status: SHIPPED | OUTPATIENT
Start: 2018-10-05 | End: 2018-10-12

## 2018-10-05 NOTE — TELEPHONE ENCOUNTER
Pt called with an update on her condition:     \"I wanted to let him know that I have a doctor appt with Dr. Gumaro Ernst on Monday to have him look at the issue going on down there; BP is still running between 140-160 with 75-90 on the bottom; discomfort seems a little better today but really really sore\".

## 2018-10-11 ENCOUNTER — TELEPHONE (OUTPATIENT)
Dept: FAMILY MEDICINE CLINIC | Age: 53
End: 2018-10-11

## 2018-10-11 NOTE — TELEPHONE ENCOUNTER
He should call the on-call provider with complaints of elevated blood pressure and mild headache. She states since her son's death her blood pressure has started to creep up. She noted blood pressures this evening of 160s to 170s over middle to high 90s. She denies any chest pain or palpitations. She denies any shortness of breath. She denies any substernal chest pressure or arm pain. She was instructed to call the office with an appointment in the morning. She was also instructed to attempt relaxation techniques tonight. However she was instructed to go to the emergency room if any of her symptoms worsened or if she became short of breath had chest pain or palpitations. She verbalized understanding.

## 2018-10-15 ENCOUNTER — TELEPHONE (OUTPATIENT)
Dept: FAMILY MEDICINE CLINIC | Age: 53
End: 2018-10-15

## 2018-10-17 DIAGNOSIS — F41.9 ANXIETY: ICD-10-CM

## 2018-10-17 RX ORDER — ALPRAZOLAM 1 MG/1
TABLET ORAL
Qty: 90 TABLET | Refills: 1 | Status: SHIPPED | OUTPATIENT
Start: 2018-10-17 | End: 2018-12-11 | Stop reason: SDUPTHER

## 2018-10-31 ENCOUNTER — OFFICE VISIT (OUTPATIENT)
Dept: ORTHOPEDIC SURGERY | Age: 53
End: 2018-10-31
Payer: COMMERCIAL

## 2018-10-31 VITALS — BODY MASS INDEX: 41.78 KG/M2 | HEIGHT: 66 IN | WEIGHT: 260 LBS

## 2018-10-31 DIAGNOSIS — M17.12 PRIMARY OSTEOARTHRITIS OF LEFT KNEE: Primary | ICD-10-CM

## 2018-10-31 PROCEDURE — 3017F COLORECTAL CA SCREEN DOC REV: CPT | Performed by: ORTHOPAEDIC SURGERY

## 2018-10-31 PROCEDURE — 1036F TOBACCO NON-USER: CPT | Performed by: ORTHOPAEDIC SURGERY

## 2018-10-31 PROCEDURE — G8427 DOCREV CUR MEDS BY ELIG CLIN: HCPCS | Performed by: ORTHOPAEDIC SURGERY

## 2018-10-31 PROCEDURE — 20610 DRAIN/INJ JOINT/BURSA W/O US: CPT | Performed by: ORTHOPAEDIC SURGERY

## 2018-10-31 PROCEDURE — G8484 FLU IMMUNIZE NO ADMIN: HCPCS | Performed by: ORTHOPAEDIC SURGERY

## 2018-10-31 PROCEDURE — G8417 CALC BMI ABV UP PARAM F/U: HCPCS | Performed by: ORTHOPAEDIC SURGERY

## 2018-10-31 PROCEDURE — 99213 OFFICE O/P EST LOW 20 MIN: CPT | Performed by: ORTHOPAEDIC SURGERY

## 2018-10-31 NOTE — PROGRESS NOTES
SITE: LEFT KNEE    MARCAINE  NDC# 9601-8670-81  LOT NUMBER#  [id number]    DEPO 40MG  NDC# [id number]  LOT NUMBER#  [id number]    LIDOCAINE    NDC# [id number]  LOT NUMBER#  [id number]

## 2018-11-07 ENCOUNTER — OFFICE VISIT (OUTPATIENT)
Dept: FAMILY MEDICINE CLINIC | Age: 53
End: 2018-11-07
Payer: COMMERCIAL

## 2018-11-07 VITALS
HEIGHT: 66 IN | WEIGHT: 274 LBS | BODY MASS INDEX: 44.03 KG/M2 | HEART RATE: 74 BPM | SYSTOLIC BLOOD PRESSURE: 128 MMHG | DIASTOLIC BLOOD PRESSURE: 86 MMHG | OXYGEN SATURATION: 98 %

## 2018-11-07 DIAGNOSIS — F32.A DEPRESSION, UNSPECIFIED DEPRESSION TYPE: ICD-10-CM

## 2018-11-07 DIAGNOSIS — F41.9 ANXIETY: Primary | ICD-10-CM

## 2018-11-07 PROCEDURE — 3017F COLORECTAL CA SCREEN DOC REV: CPT | Performed by: FAMILY MEDICINE

## 2018-11-07 PROCEDURE — 1036F TOBACCO NON-USER: CPT | Performed by: FAMILY MEDICINE

## 2018-11-07 PROCEDURE — 99213 OFFICE O/P EST LOW 20 MIN: CPT | Performed by: FAMILY MEDICINE

## 2018-11-07 PROCEDURE — G8427 DOCREV CUR MEDS BY ELIG CLIN: HCPCS | Performed by: FAMILY MEDICINE

## 2018-11-07 PROCEDURE — G8482 FLU IMMUNIZE ORDER/ADMIN: HCPCS | Performed by: FAMILY MEDICINE

## 2018-11-07 PROCEDURE — G8417 CALC BMI ABV UP PARAM F/U: HCPCS | Performed by: FAMILY MEDICINE

## 2018-11-07 ASSESSMENT — ENCOUNTER SYMPTOMS
ABDOMINAL PAIN: 0
CONSTIPATION: 0
COUGH: 0
SHORTNESS OF BREATH: 0
DIARRHEA: 0

## 2018-11-11 RX ORDER — ONDANSETRON 4 MG/1
4 TABLET, ORALLY DISINTEGRATING ORAL EVERY 8 HOURS PRN
Qty: 15 TABLET | Refills: 0 | Status: SHIPPED | OUTPATIENT
Start: 2018-11-11 | End: 2019-01-04 | Stop reason: SDUPTHER

## 2018-11-12 ENCOUNTER — TELEPHONE (OUTPATIENT)
Dept: FAMILY MEDICINE CLINIC | Age: 53
End: 2018-11-12

## 2018-11-12 DIAGNOSIS — R51.9 ACUTE NONINTRACTABLE HEADACHE, UNSPECIFIED HEADACHE TYPE: Primary | ICD-10-CM

## 2018-11-12 PROCEDURE — 90471 IMMUNIZATION ADMIN: CPT | Performed by: FAMILY MEDICINE

## 2018-11-12 PROCEDURE — 90688 IIV4 VACCINE SPLT 0.5 ML IM: CPT | Performed by: FAMILY MEDICINE

## 2018-11-12 RX ORDER — TRAMADOL HYDROCHLORIDE 50 MG/1
50 TABLET ORAL EVERY 6 HOURS PRN
Qty: 20 TABLET | Refills: 0 | Status: SHIPPED | OUTPATIENT
Start: 2018-11-12 | End: 2018-11-26 | Stop reason: SDUPTHER

## 2018-11-12 NOTE — TELEPHONE ENCOUNTER
. the patient earlier today. Her fever has abated. She has much less upset stomach and nausea. Achy and headachy.  We'll prescribe analgesic and follow in the next 1-2 days

## 2018-11-14 ENCOUNTER — TELEPHONE (OUTPATIENT)
Dept: FAMILY MEDICINE CLINIC | Age: 53
End: 2018-11-14

## 2018-11-26 ENCOUNTER — E-VISIT (OUTPATIENT)
Dept: FAMILY MEDICINE CLINIC | Age: 53
End: 2018-11-26
Payer: COMMERCIAL

## 2018-11-26 ENCOUNTER — TELEPHONE (OUTPATIENT)
Dept: FAMILY MEDICINE CLINIC | Age: 53
End: 2018-11-26

## 2018-11-26 DIAGNOSIS — R51.9 ACUTE NONINTRACTABLE HEADACHE, UNSPECIFIED HEADACHE TYPE: ICD-10-CM

## 2018-11-26 DIAGNOSIS — R19.7 DIARRHEA OF PRESUMED INFECTIOUS ORIGIN: Primary | ICD-10-CM

## 2018-11-26 DIAGNOSIS — R19.7 DIARRHEA, UNSPECIFIED TYPE: Primary | ICD-10-CM

## 2018-11-26 PROCEDURE — 98969 PR NONPHYSICIAN ONLINE ASSESSMENT AND MANAGEMENT: CPT | Performed by: NURSE PRACTITIONER

## 2018-11-26 RX ORDER — ONDANSETRON 4 MG/1
4 TABLET, FILM COATED ORAL EVERY 8 HOURS PRN
Qty: 30 TABLET | Refills: 0 | Status: SHIPPED | OUTPATIENT
Start: 2018-11-26 | End: 2020-01-08

## 2018-11-26 RX ORDER — TRAMADOL HYDROCHLORIDE 50 MG/1
50 TABLET ORAL EVERY 6 HOURS PRN
Qty: 20 TABLET | Refills: 0 | Status: SHIPPED | OUTPATIENT
Start: 2018-11-26 | End: 2018-12-01

## 2018-11-26 RX ORDER — DIPHENOXYLATE HYDROCHLORIDE AND ATROPINE SULFATE 2.5; .025 MG/1; MG/1
1 TABLET ORAL 4 TIMES DAILY PRN
Qty: 20 TABLET | Refills: 0 | Status: SHIPPED | OUTPATIENT
Start: 2018-11-26 | End: 2018-12-01

## 2018-11-26 NOTE — TELEPHONE ENCOUNTER
Called pt & gave her Dr. Michelle Juan message.   Pt made an apt tomorrow with [facility] since we did not have any openings because her BP has been running high & not sure which medications to take

## 2018-11-27 ENCOUNTER — OFFICE VISIT (OUTPATIENT)
Dept: FAMILY MEDICINE CLINIC | Age: 53
End: 2018-11-27
Payer: COMMERCIAL

## 2018-11-27 VITALS
BODY MASS INDEX: 45.35 KG/M2 | TEMPERATURE: 98 F | HEART RATE: 62 BPM | RESPIRATION RATE: 16 BRPM | OXYGEN SATURATION: 98 % | SYSTOLIC BLOOD PRESSURE: 138 MMHG | WEIGHT: 281 LBS | DIASTOLIC BLOOD PRESSURE: 82 MMHG

## 2018-11-27 DIAGNOSIS — R51.9 NONINTRACTABLE HEADACHE, UNSPECIFIED CHRONICITY PATTERN, UNSPECIFIED HEADACHE TYPE: ICD-10-CM

## 2018-11-27 DIAGNOSIS — R19.7 DIARRHEA, UNSPECIFIED TYPE: ICD-10-CM

## 2018-11-27 DIAGNOSIS — M54.2 NECK PAIN: ICD-10-CM

## 2018-11-27 DIAGNOSIS — F43.21 GRIEVING: ICD-10-CM

## 2018-11-27 DIAGNOSIS — R53.83 FATIGUE, UNSPECIFIED TYPE: ICD-10-CM

## 2018-11-27 DIAGNOSIS — F32.A DEPRESSION, UNSPECIFIED DEPRESSION TYPE: ICD-10-CM

## 2018-11-27 DIAGNOSIS — F43.9 STRESS: ICD-10-CM

## 2018-11-27 DIAGNOSIS — I10 ESSENTIAL HYPERTENSION: ICD-10-CM

## 2018-11-27 LAB
A/G RATIO: 1.6 (ref 1.1–2.2)
ALBUMIN SERPL-MCNC: 4 G/DL (ref 3.4–5)
ALP BLD-CCNC: 85 U/L (ref 40–129)
ALT SERPL-CCNC: 30 U/L (ref 10–40)
ANION GAP SERPL CALCULATED.3IONS-SCNC: 14 MMOL/L (ref 3–16)
AST SERPL-CCNC: 21 U/L (ref 15–37)
BASOPHILS ABSOLUTE: 0 K/UL (ref 0–0.2)
BASOPHILS RELATIVE PERCENT: 0.3 %
BILIRUB SERPL-MCNC: 0.3 MG/DL (ref 0–1)
BILIRUBIN, POC: NORMAL
BLOOD URINE, POC: NORMAL
BUN BLDV-MCNC: 7 MG/DL (ref 7–20)
CALCIUM SERPL-MCNC: 9.2 MG/DL (ref 8.3–10.6)
CHLORIDE BLD-SCNC: 107 MMOL/L (ref 99–110)
CLARITY, POC: CLEAR
CO2: 25 MMOL/L (ref 21–32)
COLOR, POC: YELLOW
CREAT SERPL-MCNC: 0.9 MG/DL (ref 0.6–1.1)
EOSINOPHILS ABSOLUTE: 0.1 K/UL (ref 0–0.6)
EOSINOPHILS RELATIVE PERCENT: 1.9 %
GFR AFRICAN AMERICAN: >60
GFR NON-AFRICAN AMERICAN: >60
GLOBULIN: 2.5 G/DL
GLUCOSE BLD-MCNC: 104 MG/DL (ref 70–99)
GLUCOSE URINE, POC: NORMAL
HCT VFR BLD CALC: 39.7 % (ref 36–48)
HEMOGLOBIN: 12.8 G/DL (ref 12–16)
KETONES, POC: NORMAL
LEUKOCYTE EST, POC: NORMAL
LIPASE: 31 U/L (ref 13–60)
LYMPHOCYTES ABSOLUTE: 1.8 K/UL (ref 1–5.1)
LYMPHOCYTES RELATIVE PERCENT: 23.4 %
MCH RBC QN AUTO: 27.8 PG (ref 26–34)
MCHC RBC AUTO-ENTMCNC: 32.2 G/DL (ref 31–36)
MCV RBC AUTO: 86.2 FL (ref 80–100)
MONOCYTES ABSOLUTE: 0.6 K/UL (ref 0–1.3)
MONOCYTES RELATIVE PERCENT: 7.6 %
NEUTROPHILS ABSOLUTE: 5.3 K/UL (ref 1.7–7.7)
NEUTROPHILS RELATIVE PERCENT: 66.8 %
NITRITE, POC: NORMAL
PDW BLD-RTO: 17.1 % (ref 12.4–15.4)
PH, POC: 5.5
PLATELET # BLD: 311 K/UL (ref 135–450)
PMV BLD AUTO: 9.5 FL (ref 5–10.5)
POTASSIUM SERPL-SCNC: 4.5 MMOL/L (ref 3.5–5.1)
PROTEIN, POC: 30
RBC # BLD: 4.61 M/UL (ref 4–5.2)
SODIUM BLD-SCNC: 146 MMOL/L (ref 136–145)
SPECIFIC GRAVITY, POC: 1.01
TOTAL PROTEIN: 6.5 G/DL (ref 6.4–8.2)
UROBILINOGEN, POC: 0.2
WBC # BLD: 7.9 K/UL (ref 4–11)

## 2018-11-27 PROCEDURE — G8482 FLU IMMUNIZE ORDER/ADMIN: HCPCS | Performed by: NURSE PRACTITIONER

## 2018-11-27 PROCEDURE — 81002 URINALYSIS NONAUTO W/O SCOPE: CPT | Performed by: NURSE PRACTITIONER

## 2018-11-27 PROCEDURE — G8417 CALC BMI ABV UP PARAM F/U: HCPCS | Performed by: NURSE PRACTITIONER

## 2018-11-27 PROCEDURE — G8427 DOCREV CUR MEDS BY ELIG CLIN: HCPCS | Performed by: NURSE PRACTITIONER

## 2018-11-27 PROCEDURE — 3017F COLORECTAL CA SCREEN DOC REV: CPT | Performed by: NURSE PRACTITIONER

## 2018-11-27 PROCEDURE — 99214 OFFICE O/P EST MOD 30 MIN: CPT | Performed by: NURSE PRACTITIONER

## 2018-11-27 PROCEDURE — 1036F TOBACCO NON-USER: CPT | Performed by: NURSE PRACTITIONER

## 2018-11-27 RX ORDER — METHOCARBAMOL 500 MG/1
500 TABLET, FILM COATED ORAL 3 TIMES DAILY PRN
Qty: 20 TABLET | Refills: 0 | Status: SHIPPED | OUTPATIENT
Start: 2018-11-27 | End: 2018-12-19 | Stop reason: SDUPTHER

## 2018-11-30 LAB
ORGANISM: ABNORMAL
URINE CULTURE, ROUTINE: ABNORMAL
URINE CULTURE, ROUTINE: ABNORMAL

## 2018-11-30 RX ORDER — SULFAMETHOXAZOLE AND TRIMETHOPRIM 800; 160 MG/1; MG/1
1 TABLET ORAL 2 TIMES DAILY
Qty: 6 TABLET | Refills: 0 | Status: SHIPPED | OUTPATIENT
Start: 2018-11-30 | End: 2018-12-03

## 2018-12-02 ASSESSMENT — ENCOUNTER SYMPTOMS
ABDOMINAL PAIN: 0
VOMITING: 0
DIARRHEA: 1
BLOOD IN STOOL: 0
COUGH: 0
SHORTNESS OF BREATH: 0
NAUSEA: 0
WHEEZING: 0
CHEST TIGHTNESS: 0

## 2018-12-11 DIAGNOSIS — F41.9 ANXIETY: ICD-10-CM

## 2018-12-12 RX ORDER — ALPRAZOLAM 1 MG/1
TABLET ORAL
Qty: 90 TABLET | Refills: 1 | Status: SHIPPED | OUTPATIENT
Start: 2018-12-12 | End: 2019-02-01 | Stop reason: SDUPTHER

## 2018-12-19 ENCOUNTER — OFFICE VISIT (OUTPATIENT)
Dept: FAMILY MEDICINE CLINIC | Age: 53
End: 2018-12-19
Payer: COMMERCIAL

## 2018-12-19 VITALS
HEART RATE: 68 BPM | WEIGHT: 271.8 LBS | RESPIRATION RATE: 16 BRPM | BODY MASS INDEX: 43.87 KG/M2 | OXYGEN SATURATION: 96 % | TEMPERATURE: 97.7 F | DIASTOLIC BLOOD PRESSURE: 64 MMHG | SYSTOLIC BLOOD PRESSURE: 102 MMHG

## 2018-12-19 DIAGNOSIS — Z11.59 ENCOUNTER FOR HEPATITIS C SCREENING TEST FOR LOW RISK PATIENT: ICD-10-CM

## 2018-12-19 DIAGNOSIS — F41.9 ANXIETY AND DEPRESSION: Primary | ICD-10-CM

## 2018-12-19 DIAGNOSIS — I10 ESSENTIAL HYPERTENSION: ICD-10-CM

## 2018-12-19 DIAGNOSIS — Z13.1 ENCOUNTER FOR SCREENING FOR DIABETES MELLITUS: ICD-10-CM

## 2018-12-19 DIAGNOSIS — Z23 NEED FOR PROPHYLACTIC VACCINATION AND INOCULATION AGAINST VARICELLA: ICD-10-CM

## 2018-12-19 DIAGNOSIS — F32.A ANXIETY AND DEPRESSION: Primary | ICD-10-CM

## 2018-12-19 DIAGNOSIS — Z12.31 ENCOUNTER FOR SCREENING MAMMOGRAM FOR BREAST CANCER: ICD-10-CM

## 2018-12-19 DIAGNOSIS — Z13.31 POSITIVE DEPRESSION SCREENING: ICD-10-CM

## 2018-12-19 PROCEDURE — 96160 PT-FOCUSED HLTH RISK ASSMT: CPT | Performed by: NURSE PRACTITIONER

## 2018-12-19 PROCEDURE — 1111F DSCHRG MED/CURRENT MED MERGE: CPT | Performed by: NURSE PRACTITIONER

## 2018-12-19 PROCEDURE — 99214 OFFICE O/P EST MOD 30 MIN: CPT | Performed by: NURSE PRACTITIONER

## 2018-12-19 PROCEDURE — G8431 POS CLIN DEPRES SCRN F/U DOC: HCPCS | Performed by: NURSE PRACTITIONER

## 2018-12-19 RX ORDER — DULOXETIN HYDROCHLORIDE 30 MG/1
30 CAPSULE, DELAYED RELEASE ORAL DAILY
COMMUNITY
Start: 2018-12-18 | End: 2019-01-16 | Stop reason: DRUGHIGH

## 2018-12-19 RX ORDER — LISINOPRIL 5 MG/1
5 TABLET ORAL DAILY
COMMUNITY
Start: 2018-12-11 | End: 2019-01-16 | Stop reason: DRUGHIGH

## 2018-12-19 ASSESSMENT — ENCOUNTER SYMPTOMS
VOMITING: 0
RESPIRATORY NEGATIVE: 1
NAUSEA: 1
ABDOMINAL PAIN: 0

## 2018-12-19 ASSESSMENT — PATIENT HEALTH QUESTIONNAIRE - PHQ9
3. TROUBLE FALLING OR STAYING ASLEEP: 3
8. MOVING OR SPEAKING SO SLOWLY THAT OTHER PEOPLE COULD HAVE NOTICED. OR THE OPPOSITE, BEING SO FIGETY OR RESTLESS THAT YOU HAVE BEEN MOVING AROUND A LOT MORE THAN USUAL: 0
10. IF YOU CHECKED OFF ANY PROBLEMS, HOW DIFFICULT HAVE THESE PROBLEMS MADE IT FOR YOU TO DO YOUR WORK, TAKE CARE OF THINGS AT HOME, OR GET ALONG WITH OTHER PEOPLE: 3
2. FEELING DOWN, DEPRESSED OR HOPELESS: 3
9. THOUGHTS THAT YOU WOULD BE BETTER OFF DEAD, OR OF HURTING YOURSELF: 0
SUM OF ALL RESPONSES TO PHQ9 QUESTIONS 1 & 2: 6
5. POOR APPETITE OR OVEREATING: 3
SUM OF ALL RESPONSES TO PHQ QUESTIONS 1-9: 18
1. LITTLE INTEREST OR PLEASURE IN DOING THINGS: 3
4. FEELING TIRED OR HAVING LITTLE ENERGY: 3
7. TROUBLE CONCENTRATING ON THINGS, SUCH AS READING THE NEWSPAPER OR WATCHING TELEVISION: 3
6. FEELING BAD ABOUT YOURSELF - OR THAT YOU ARE A FAILURE OR HAVE LET YOURSELF OR YOUR FAMILY DOWN: 0
SUM OF ALL RESPONSES TO PHQ QUESTIONS 1-9: 18

## 2018-12-19 NOTE — PROGRESS NOTES
2018    This is a 48 y.o. female No chief complaint on file. Trudi MOSES     Past Medical History:   Diagnosis Date    Anxiety     Depression     GERD (gastroesophageal reflux disease)     Osteoarthritis     Restless leg syndrome        Past Surgical History:   Procedure Laterality Date    ANUS SURGERY  2018    fissure     SECTION      x3    HERNIA REPAIR  2018    LAPAROSCOPIC PARAESOPHAGEAL HERNIA REPAIR WITH MESH    HIATAL HERNIA REPAIR  2018    KNEE ARTHROSCOPY Left 11/15/12    ARTHROSCOPY LEFT KNEE WITH SYOVECTOMY AND CHONDROPLASTY    OVARY REMOVAL Right 2010    TONSILLECTOMY  1981    UPPER GASTROINTESTINAL ENDOSCOPY      UPPER GASTROINTESTINAL ENDOSCOPY  2015    esophageasl stretch       Social History     Social History    Marital status:      Spouse name: N/A    Number of children: 4    Years of education: N/A     Occupational History          Social History Main Topics    Smoking status: Never Smoker    Smokeless tobacco: Never Used    Alcohol use Yes      Comment: 1 -2 x a month    Drug use: Yes     Types: Marijuana      Comment: quit years ago    Sexual activity: No     Other Topics Concern    Not on file     Social History Narrative    No narrative on file       Family History   Problem Relation Age of Onset    Arthritis Mother     Obesity Mother     COPD Mother     Anemia Mother     Arthritis Father     Arrhythmia Father     Diabetes Other     Diabetes Paternal Grandfather     Cancer Neg Hx     Breast Cancer Neg Hx     Colon Cancer Neg Hx        Current Outpatient Prescriptions   Medication Sig Dispense Refill    ALPRAZolam (XANAX) 1 MG tablet TAKE ONE TABLET BY MOUTH THREE TIMES A DAY AS NEEDED FOR ANXIETY.  90 tablet 1    ondansetron (ZOFRAN) 4 MG tablet Take 1 tablet by mouth every 8 hours as needed for Nausea or Vomiting 30 tablet 0    atenolol (TENORMIN) 25 MG tablet TAKE 1 TABLET BY MOUTH EVERY DAY 30 tablet 5   

## 2019-01-04 ENCOUNTER — OFFICE VISIT (OUTPATIENT)
Dept: FAMILY MEDICINE CLINIC | Age: 54
End: 2019-01-04
Payer: COMMERCIAL

## 2019-01-04 VITALS
RESPIRATION RATE: 16 BRPM | TEMPERATURE: 98.3 F | WEIGHT: 271.8 LBS | BODY MASS INDEX: 43.87 KG/M2 | SYSTOLIC BLOOD PRESSURE: 118 MMHG | DIASTOLIC BLOOD PRESSURE: 86 MMHG | OXYGEN SATURATION: 98 % | HEART RATE: 65 BPM

## 2019-01-04 DIAGNOSIS — R11.10 VOMITING, INTRACTABILITY OF VOMITING NOT SPECIFIED, PRESENCE OF NAUSEA NOT SPECIFIED, UNSPECIFIED VOMITING TYPE: ICD-10-CM

## 2019-01-04 DIAGNOSIS — K52.9 ACUTE GASTROENTERITIS: Primary | ICD-10-CM

## 2019-01-04 LAB
INFLUENZA A ANTIGEN, POC: NEGATIVE
INFLUENZA B ANTIGEN, POC: NEGATIVE

## 2019-01-04 PROCEDURE — 3017F COLORECTAL CA SCREEN DOC REV: CPT | Performed by: NURSE PRACTITIONER

## 2019-01-04 PROCEDURE — G8417 CALC BMI ABV UP PARAM F/U: HCPCS | Performed by: NURSE PRACTITIONER

## 2019-01-04 PROCEDURE — G8482 FLU IMMUNIZE ORDER/ADMIN: HCPCS | Performed by: NURSE PRACTITIONER

## 2019-01-04 PROCEDURE — 1036F TOBACCO NON-USER: CPT | Performed by: NURSE PRACTITIONER

## 2019-01-04 PROCEDURE — 99213 OFFICE O/P EST LOW 20 MIN: CPT | Performed by: NURSE PRACTITIONER

## 2019-01-04 PROCEDURE — 96160 PT-FOCUSED HLTH RISK ASSMT: CPT | Performed by: NURSE PRACTITIONER

## 2019-01-04 PROCEDURE — G8427 DOCREV CUR MEDS BY ELIG CLIN: HCPCS | Performed by: NURSE PRACTITIONER

## 2019-01-04 PROCEDURE — 87804 INFLUENZA ASSAY W/OPTIC: CPT | Performed by: NURSE PRACTITIONER

## 2019-01-04 ASSESSMENT — ENCOUNTER SYMPTOMS
WHEEZING: 0
BLOOD IN STOOL: 0
COUGH: 0
CHEST TIGHTNESS: 0
RECTAL PAIN: 0
SHORTNESS OF BREATH: 0
VOMITING: 1
DIARRHEA: 1
EYES NEGATIVE: 1
NAUSEA: 1
ABDOMINAL PAIN: 0

## 2019-01-04 ASSESSMENT — PATIENT HEALTH QUESTIONNAIRE - PHQ9
10. IF YOU CHECKED OFF ANY PROBLEMS, HOW DIFFICULT HAVE THESE PROBLEMS MADE IT FOR YOU TO DO YOUR WORK, TAKE CARE OF THINGS AT HOME, OR GET ALONG WITH OTHER PEOPLE: 1
SUM OF ALL RESPONSES TO PHQ QUESTIONS 1-9: 14
1. LITTLE INTEREST OR PLEASURE IN DOING THINGS: 3
7. TROUBLE CONCENTRATING ON THINGS, SUCH AS READING THE NEWSPAPER OR WATCHING TELEVISION: 2
4. FEELING TIRED OR HAVING LITTLE ENERGY: 3
2. FEELING DOWN, DEPRESSED OR HOPELESS: 3
SUM OF ALL RESPONSES TO PHQ QUESTIONS 1-9: 14
3. TROUBLE FALLING OR STAYING ASLEEP: 3
SUM OF ALL RESPONSES TO PHQ9 QUESTIONS 1 & 2: 6
5. POOR APPETITE OR OVEREATING: 0
8. MOVING OR SPEAKING SO SLOWLY THAT OTHER PEOPLE COULD HAVE NOTICED. OR THE OPPOSITE, BEING SO FIGETY OR RESTLESS THAT YOU HAVE BEEN MOVING AROUND A LOT MORE THAN USUAL: 0
6. FEELING BAD ABOUT YOURSELF - OR THAT YOU ARE A FAILURE OR HAVE LET YOURSELF OR YOUR FAMILY DOWN: 0
9. THOUGHTS THAT YOU WOULD BE BETTER OFF DEAD, OR OF HURTING YOURSELF: 0

## 2019-01-11 RX ORDER — ROPINIROLE 2 MG/1
TABLET, FILM COATED ORAL
Qty: 60 TABLET | Refills: 10 | Status: SHIPPED | OUTPATIENT
Start: 2019-01-11 | End: 2019-11-17 | Stop reason: SDUPTHER

## 2019-01-11 RX ORDER — OMEPRAZOLE 40 MG/1
CAPSULE, DELAYED RELEASE ORAL
Qty: 30 CAPSULE | Refills: 3 | Status: SHIPPED | OUTPATIENT
Start: 2019-01-11 | End: 2019-06-02 | Stop reason: SDUPTHER

## 2019-01-16 ENCOUNTER — OFFICE VISIT (OUTPATIENT)
Dept: FAMILY MEDICINE CLINIC | Age: 54
End: 2019-01-16
Payer: COMMERCIAL

## 2019-01-16 VITALS
HEART RATE: 56 BPM | SYSTOLIC BLOOD PRESSURE: 144 MMHG | OXYGEN SATURATION: 98 % | HEIGHT: 66 IN | DIASTOLIC BLOOD PRESSURE: 92 MMHG | WEIGHT: 269 LBS | BODY MASS INDEX: 43.23 KG/M2

## 2019-01-16 DIAGNOSIS — F32.A DEPRESSION, UNSPECIFIED DEPRESSION TYPE: ICD-10-CM

## 2019-01-16 DIAGNOSIS — I10 ESSENTIAL HYPERTENSION: Primary | ICD-10-CM

## 2019-01-16 DIAGNOSIS — F41.9 ANXIETY: ICD-10-CM

## 2019-01-16 PROCEDURE — G8427 DOCREV CUR MEDS BY ELIG CLIN: HCPCS | Performed by: FAMILY MEDICINE

## 2019-01-16 PROCEDURE — 1036F TOBACCO NON-USER: CPT | Performed by: FAMILY MEDICINE

## 2019-01-16 PROCEDURE — G8482 FLU IMMUNIZE ORDER/ADMIN: HCPCS | Performed by: FAMILY MEDICINE

## 2019-01-16 PROCEDURE — 99213 OFFICE O/P EST LOW 20 MIN: CPT | Performed by: FAMILY MEDICINE

## 2019-01-16 PROCEDURE — 3017F COLORECTAL CA SCREEN DOC REV: CPT | Performed by: FAMILY MEDICINE

## 2019-01-16 PROCEDURE — G8417 CALC BMI ABV UP PARAM F/U: HCPCS | Performed by: FAMILY MEDICINE

## 2019-01-16 RX ORDER — DULOXETIN HYDROCHLORIDE 60 MG/1
60 CAPSULE, DELAYED RELEASE ORAL DAILY
COMMUNITY
End: 2019-01-16 | Stop reason: SDUPTHER

## 2019-01-16 RX ORDER — LISINOPRIL 10 MG/1
10 TABLET ORAL DAILY
Qty: 30 TABLET | Refills: 5 | Status: SHIPPED | OUTPATIENT
Start: 2019-01-16 | End: 2019-02-01 | Stop reason: DRUGHIGH

## 2019-01-16 RX ORDER — DULOXETIN HYDROCHLORIDE 60 MG/1
60 CAPSULE, DELAYED RELEASE ORAL DAILY
Qty: 30 CAPSULE | Refills: 3 | Status: SHIPPED | OUTPATIENT
Start: 2019-01-16 | End: 2019-04-08 | Stop reason: SDUPTHER

## 2019-01-16 ASSESSMENT — ENCOUNTER SYMPTOMS: COUGH: 0

## 2019-01-31 DIAGNOSIS — F41.9 ANXIETY: ICD-10-CM

## 2019-02-01 ENCOUNTER — TELEPHONE (OUTPATIENT)
Dept: FAMILY MEDICINE CLINIC | Age: 54
End: 2019-02-01

## 2019-02-01 DIAGNOSIS — R03.0 ELEVATED BLOOD PRESSURE READING: Primary | ICD-10-CM

## 2019-02-01 DIAGNOSIS — F41.9 ANXIETY: ICD-10-CM

## 2019-02-01 RX ORDER — METHOCARBAMOL 500 MG/1
500 TABLET, FILM COATED ORAL 3 TIMES DAILY PRN
Refills: 1 | COMMUNITY
Start: 2019-01-14 | End: 2019-02-01 | Stop reason: SDUPTHER

## 2019-02-01 RX ORDER — LISINOPRIL 20 MG/1
20 TABLET ORAL DAILY
Qty: 30 TABLET | Refills: 0 | Status: SHIPPED | OUTPATIENT
Start: 2019-02-01 | End: 2019-03-15 | Stop reason: SDUPTHER

## 2019-02-01 RX ORDER — METHOCARBAMOL 500 MG/1
500 TABLET, FILM COATED ORAL 3 TIMES DAILY PRN
Qty: 20 TABLET | Refills: 1 | OUTPATIENT
Start: 2019-02-01 | End: 2019-02-11

## 2019-02-01 RX ORDER — METHOCARBAMOL 500 MG/1
500 TABLET, FILM COATED ORAL 3 TIMES DAILY PRN
Qty: 90 TABLET | Refills: 2 | Status: SHIPPED | OUTPATIENT
Start: 2019-02-01 | End: 2019-05-04 | Stop reason: SDUPTHER

## 2019-02-01 RX ORDER — ALPRAZOLAM 1 MG/1
TABLET ORAL
Qty: 90 TABLET | Refills: 1 | OUTPATIENT
Start: 2019-02-01 | End: 2019-03-03

## 2019-02-01 RX ORDER — ALPRAZOLAM 1 MG/1
TABLET ORAL
Qty: 90 TABLET | Refills: 1 | Status: SHIPPED | OUTPATIENT
Start: 2019-02-01 | End: 2019-04-02 | Stop reason: SDUPTHER

## 2019-03-14 ENCOUNTER — TELEPHONE (OUTPATIENT)
Dept: FAMILY MEDICINE CLINIC | Age: 54
End: 2019-03-14

## 2019-03-15 DIAGNOSIS — R03.0 ELEVATED BLOOD PRESSURE READING: ICD-10-CM

## 2019-03-15 RX ORDER — LISINOPRIL 20 MG/1
TABLET ORAL
Qty: 30 TABLET | Refills: 0 | Status: SHIPPED | OUTPATIENT
Start: 2019-03-15 | End: 2019-04-15 | Stop reason: SDUPTHER

## 2019-03-27 ENCOUNTER — OFFICE VISIT (OUTPATIENT)
Dept: ORTHOPEDIC SURGERY | Age: 54
End: 2019-03-27
Payer: COMMERCIAL

## 2019-03-27 VITALS — HEIGHT: 66 IN | WEIGHT: 268.96 LBS | BODY MASS INDEX: 43.23 KG/M2

## 2019-03-27 DIAGNOSIS — M25.562 LEFT KNEE PAIN, UNSPECIFIED CHRONICITY: Primary | ICD-10-CM

## 2019-03-27 DIAGNOSIS — M17.12 ARTHRITIS OF KNEE, LEFT: ICD-10-CM

## 2019-03-27 PROCEDURE — G8482 FLU IMMUNIZE ORDER/ADMIN: HCPCS | Performed by: ORTHOPAEDIC SURGERY

## 2019-03-27 PROCEDURE — G8427 DOCREV CUR MEDS BY ELIG CLIN: HCPCS | Performed by: ORTHOPAEDIC SURGERY

## 2019-03-27 PROCEDURE — G8417 CALC BMI ABV UP PARAM F/U: HCPCS | Performed by: ORTHOPAEDIC SURGERY

## 2019-03-27 PROCEDURE — 99213 OFFICE O/P EST LOW 20 MIN: CPT | Performed by: ORTHOPAEDIC SURGERY

## 2019-03-27 PROCEDURE — 1036F TOBACCO NON-USER: CPT | Performed by: ORTHOPAEDIC SURGERY

## 2019-03-27 PROCEDURE — 3017F COLORECTAL CA SCREEN DOC REV: CPT | Performed by: ORTHOPAEDIC SURGERY

## 2019-04-08 ENCOUNTER — OFFICE VISIT (OUTPATIENT)
Dept: FAMILY MEDICINE CLINIC | Age: 54
End: 2019-04-08
Payer: COMMERCIAL

## 2019-04-08 VITALS
SYSTOLIC BLOOD PRESSURE: 126 MMHG | DIASTOLIC BLOOD PRESSURE: 74 MMHG | HEART RATE: 76 BPM | BODY MASS INDEX: 43.82 KG/M2 | HEIGHT: 65 IN | WEIGHT: 263 LBS | OXYGEN SATURATION: 98 %

## 2019-04-08 DIAGNOSIS — I10 ESSENTIAL HYPERTENSION: ICD-10-CM

## 2019-04-08 DIAGNOSIS — F32.A ANXIETY AND DEPRESSION: Primary | ICD-10-CM

## 2019-04-08 DIAGNOSIS — F41.9 ANXIETY AND DEPRESSION: Primary | ICD-10-CM

## 2019-04-08 PROCEDURE — G8427 DOCREV CUR MEDS BY ELIG CLIN: HCPCS | Performed by: FAMILY MEDICINE

## 2019-04-08 PROCEDURE — 3017F COLORECTAL CA SCREEN DOC REV: CPT | Performed by: FAMILY MEDICINE

## 2019-04-08 PROCEDURE — 99213 OFFICE O/P EST LOW 20 MIN: CPT | Performed by: FAMILY MEDICINE

## 2019-04-08 PROCEDURE — 1036F TOBACCO NON-USER: CPT | Performed by: FAMILY MEDICINE

## 2019-04-08 PROCEDURE — G8417 CALC BMI ABV UP PARAM F/U: HCPCS | Performed by: FAMILY MEDICINE

## 2019-04-08 RX ORDER — DULOXETIN HYDROCHLORIDE 60 MG/1
60 CAPSULE, DELAYED RELEASE ORAL DAILY
Qty: 30 CAPSULE | Refills: 5 | Status: SHIPPED | OUTPATIENT
Start: 2019-04-08 | End: 2019-10-23 | Stop reason: SDUPTHER

## 2019-04-08 ASSESSMENT — ENCOUNTER SYMPTOMS
RESPIRATORY NEGATIVE: 1
GASTROINTESTINAL NEGATIVE: 1

## 2019-04-08 NOTE — PROGRESS NOTES
Subjective:      Patient ID: Madelyn Li is a 47 y.o. y.o. female. here to follow up her meds. Has been feeling some better. Feels like the nerve meds are doing ok and the right combo. Health wise feels some better'  Doing some part time work / jobs    HPI      Chief Complaint   Patient presents with    Hypertension     follow up        Allergies   Allergen Reactions    Toradol [Ketorolac Tromethamine] Other (See Comments)     \"out of it\"  \"felt like sleep paralysis\"    Mobic [Meloxicam]     Percocet [Oxycodone-Acetaminophen] Itching and Rash       Past Medical History:   Diagnosis Date    Anxiety     Depression     GERD (gastroesophageal reflux disease)     Hypertension     Osteoarthritis     Restless leg syndrome        Past Surgical History:   Procedure Laterality Date    ANUS SURGERY  2018    fissure     SECTION      x3    HERNIA REPAIR  2018    LAPAROSCOPIC PARAESOPHAGEAL HERNIA REPAIR WITH MESH    HIATAL HERNIA REPAIR  2018    KNEE ARTHROSCOPY Left 11/15/12    ARTHROSCOPY LEFT KNEE WITH SYOVECTOMY AND CHONDROPLASTY    OVARY REMOVAL Right     TONSILLECTOMY      UPPER GASTROINTESTINAL ENDOSCOPY      UPPER GASTROINTESTINAL ENDOSCOPY      esophageasl stretch       Social History     Socioeconomic History    Marital status:       Spouse name: Not on file    Number of children: 3    Years of education: Not on file    Highest education level: Not on file   Occupational History    Occupation:    Social Needs    Financial resource strain: Not on file    Food insecurity:     Worry: Not on file     Inability: Not on file    Transportation needs:     Medical: Not on file     Non-medical: Not on file   Tobacco Use    Smoking status: Never Smoker    Smokeless tobacco: Never Used   Substance and Sexual Activity    Alcohol use: Yes     Comment: 1 -2 x a month    Drug use: Yes     Types: Marijuana     Comment: quit years ago   Ethan Malhotra Sexual activity: Never   Lifestyle    Physical activity:     Days per week: Not on file     Minutes per session: Not on file    Stress: Not on file   Relationships    Social connections:     Talks on phone: Not on file     Gets together: Not on file     Attends [employer] service: Not on file     Active member of club or organization: Not on file     Attends meetings of clubs or organizations: Not on file     Relationship status: Not on file    Intimate partner violence:     Fear of current or ex partner: Not on file     Emotionally abused: Not on file     Physically abused: Not on file     Forced sexual activity: Not on file   Other Topics Concern    Not on file   Social History Narrative    Not on file       Family History   Problem Relation Age of Onset    Arthritis Mother     Obesity Mother     COPD Mother     Anemia Mother     Arthritis Father     Arrhythmia Father     Diabetes Other     Diabetes Paternal Grandfather     Cancer Neg Hx     Breast Cancer Neg Hx     Colon Cancer Neg Hx        Vitals:    04/08/19 1119   BP: 126/74   Pulse: 76   SpO2: 98%       Wt Readings from Last 3 Encounters:   04/08/19 263 lb (119.3 kg)   03/27/19 268 lb 15.4 oz (122 kg)   01/16/19 269 lb (122 kg)       Review of Systems   Constitutional: Negative. Respiratory: Negative. Gastrointestinal: Negative. Neurological: Negative. Psychiatric/Behavioral: Positive for dysphoric mood. The patient is nervous/anxious. Mood and nerves managed pretty well with the meds. Made some changes also so  Better. Does some on-line support groups for stress and death of her child       Objective:   Physical Exam   Constitutional: She is oriented to person, place, and time. She appears well-developed and well-nourished. No distress. Eyes: No scleral icterus. Neck: Neck supple. No thyromegaly present. Cardiovascular: Normal rate, regular rhythm and intact distal pulses. No murmur heard.   Pulmonary/Chest: Breath sounds normal. No respiratory distress. She has no wheezes. Abdominal: She exhibits no distension and no mass. There is no tenderness. Musculoskeletal: She exhibits no edema. Lymphadenopathy:     She has no cervical adenopathy. Neurological: She is alert and oriented to person, place, and time. No cranial nerve deficit. Psychiatric: She has a normal mood and affect. Her behavior is normal.   Vitals reviewed. Assessment:      Anxiety/depression  Hypertension, controlled        Plan:     The patient's OARRS report was reviewed today. It appeared normal and appropriate. Looks better / sounds better. Mood/affect/behavior seemed much improved. Current medication program seems appropriate and effective. Tolerating the medications well and clinically much better than previously. We'll continue the current program.    Same meds      Current Outpatient Medications   Medication Sig Dispense Refill    ALPRAZolam (XANAX) 1 MG tablet TAKE ONE TABLET BY MOUTH THREE TIMES A DAY AS NEEDED FOR ANXIETY. . 90 tablet 1    lisinopril (PRINIVIL;ZESTRIL) 20 MG tablet TAKE 1 TABLET BY MOUTH EVERY DAY 30 tablet 0    methocarbamol (ROBAXIN) 500 MG tablet Take 1 tablet by mouth 3 times daily as needed (for neck pain) 90 tablet 2    DULoxetine (CYMBALTA) 60 MG extended release capsule Take 1 capsule by mouth daily 30 capsule 3    rOPINIRole (REQUIP) 2 MG tablet TAKE ONE TABLET BY MOUTH TWICE A DAY 60 tablet 10    omeprazole (PRILOSEC) 40 MG delayed release capsule TAKE ONE CAPSULE BY MOUTH DAILY 30 capsule 3    zoster recombinant adjuvanted vaccine (SHINGRIX) 50 MCG/0.5ML SUSR injection 50 MCG IM then repeat 2-6 months.  0.5 mL 1    ondansetron (ZOFRAN) 4 MG tablet Take 1 tablet by mouth every 8 hours as needed for Nausea or Vomiting 30 tablet 0    atenolol (TENORMIN) 25 MG tablet TAKE 1 TABLET BY MOUTH EVERY DAY 30 tablet 5    metoclopramide (REGLAN) 10 MG tablet       meloxicam (MOBIC) 15 MG tablet Take 1

## 2019-04-14 ENCOUNTER — APPOINTMENT (OUTPATIENT)
Dept: CT IMAGING | Age: 54
End: 2019-04-14
Payer: COMMERCIAL

## 2019-04-14 ENCOUNTER — HOSPITAL ENCOUNTER (EMERGENCY)
Age: 54
Discharge: HOME | End: 2019-04-14
Attending: EMERGENCY MEDICINE
Payer: COMMERCIAL

## 2019-04-14 ENCOUNTER — APPOINTMENT (OUTPATIENT)
Dept: GENERAL RADIOLOGY | Age: 54
End: 2019-04-14
Payer: COMMERCIAL

## 2019-04-14 VITALS
DIASTOLIC BLOOD PRESSURE: 103 MMHG | WEIGHT: 265 LBS | OXYGEN SATURATION: 96 % | HEART RATE: 66 BPM | RESPIRATION RATE: 14 BRPM | BODY MASS INDEX: 44.1 KG/M2 | SYSTOLIC BLOOD PRESSURE: 157 MMHG | TEMPERATURE: 97 F

## 2019-04-14 DIAGNOSIS — R06.02 SOB (SHORTNESS OF BREATH): ICD-10-CM

## 2019-04-14 DIAGNOSIS — R06.02 SHORTNESS OF BREATH: ICD-10-CM

## 2019-04-14 DIAGNOSIS — R07.81 RIB PAIN ON RIGHT SIDE: Primary | ICD-10-CM

## 2019-04-14 DIAGNOSIS — S29.019A THORACIC MYOFASCIAL STRAIN, INITIAL ENCOUNTER: ICD-10-CM

## 2019-04-14 DIAGNOSIS — R42 LIGHTHEADEDNESS: ICD-10-CM

## 2019-04-14 LAB
A/G RATIO: 1.3 (ref 1.1–2.2)
ALBUMIN SERPL-MCNC: 4.1 G/DL (ref 3.4–5)
ALP BLD-CCNC: 102 U/L (ref 40–129)
ALT SERPL-CCNC: 27 U/L (ref 10–40)
ANION GAP SERPL CALCULATED.3IONS-SCNC: 13 MMOL/L (ref 3–16)
AST SERPL-CCNC: 15 U/L (ref 15–37)
BASOPHILS ABSOLUTE: 0.1 K/UL (ref 0–0.2)
BASOPHILS RELATIVE PERCENT: 0.8 %
BILIRUB SERPL-MCNC: 0.7 MG/DL (ref 0–1)
BUN BLDV-MCNC: 5 MG/DL (ref 7–20)
CALCIUM SERPL-MCNC: 9.2 MG/DL (ref 8.3–10.6)
CHLORIDE BLD-SCNC: 101 MMOL/L (ref 99–110)
CO2: 25 MMOL/L (ref 21–32)
CREAT SERPL-MCNC: 0.8 MG/DL (ref 0.6–1.1)
D DIMER: 232 NG/ML DDU (ref 0–229)
EKG ATRIAL RATE: 64 BPM
EKG DIAGNOSIS: NORMAL
EKG P AXIS: 52 DEGREES
EKG P-R INTERVAL: 108 MS
EKG Q-T INTERVAL: 432 MS
EKG QRS DURATION: 86 MS
EKG QTC CALCULATION (BAZETT): 445 MS
EKG R AXIS: 29 DEGREES
EKG T AXIS: 41 DEGREES
EKG VENTRICULAR RATE: 64 BPM
EOSINOPHILS ABSOLUTE: 0.1 K/UL (ref 0–0.6)
EOSINOPHILS RELATIVE PERCENT: 0.6 %
GFR AFRICAN AMERICAN: >60
GFR NON-AFRICAN AMERICAN: >60
GLOBULIN: 3.1 G/DL
GLUCOSE BLD-MCNC: 115 MG/DL (ref 70–99)
HCT VFR BLD CALC: 43.6 % (ref 36–48)
HEMOGLOBIN: 14.5 G/DL (ref 12–16)
LYMPHOCYTES ABSOLUTE: 2 K/UL (ref 1–5.1)
LYMPHOCYTES RELATIVE PERCENT: 18.6 %
MCH RBC QN AUTO: 28.9 PG (ref 26–34)
MCHC RBC AUTO-ENTMCNC: 33.2 G/DL (ref 31–36)
MCV RBC AUTO: 87.2 FL (ref 80–100)
MONOCYTES ABSOLUTE: 0.5 K/UL (ref 0–1.3)
MONOCYTES RELATIVE PERCENT: 4.7 %
NEUTROPHILS ABSOLUTE: 8.1 K/UL (ref 1.7–7.7)
NEUTROPHILS RELATIVE PERCENT: 75.3 %
PDW BLD-RTO: 16.6 % (ref 12.4–15.4)
PLATELET # BLD: 237 K/UL (ref 135–450)
PMV BLD AUTO: 8.8 FL (ref 5–10.5)
POTASSIUM SERPL-SCNC: 4 MMOL/L (ref 3.5–5.1)
RBC # BLD: 5 M/UL (ref 4–5.2)
SODIUM BLD-SCNC: 139 MMOL/L (ref 136–145)
TOTAL PROTEIN: 7.2 G/DL (ref 6.4–8.2)
TROPONIN: <0.01 NG/ML
WBC # BLD: 10.8 K/UL (ref 4–11)

## 2019-04-14 PROCEDURE — 80053 COMPREHEN METABOLIC PANEL: CPT

## 2019-04-14 PROCEDURE — 6360000004 HC RX CONTRAST MEDICATION: Performed by: EMERGENCY MEDICINE

## 2019-04-14 PROCEDURE — 71275 CT ANGIOGRAPHY CHEST: CPT

## 2019-04-14 PROCEDURE — 71260 CT THORAX DX C+: CPT

## 2019-04-14 PROCEDURE — 96374 THER/PROPH/DIAG INJ IV PUSH: CPT

## 2019-04-14 PROCEDURE — 2580000003 HC RX 258: Performed by: NURSE PRACTITIONER

## 2019-04-14 PROCEDURE — 96361 HYDRATE IV INFUSION ADD-ON: CPT

## 2019-04-14 PROCEDURE — 36415 COLL VENOUS BLD VENIPUNCTURE: CPT

## 2019-04-14 PROCEDURE — 99285 EMERGENCY DEPT VISIT HI MDM: CPT

## 2019-04-14 PROCEDURE — 6360000002 HC RX W HCPCS: Performed by: NURSE PRACTITIONER

## 2019-04-14 PROCEDURE — 71046 X-RAY EXAM CHEST 2 VIEWS: CPT

## 2019-04-14 PROCEDURE — 85025 COMPLETE CBC W/AUTO DIFF WBC: CPT

## 2019-04-14 PROCEDURE — 85379 FIBRIN DEGRADATION QUANT: CPT

## 2019-04-14 PROCEDURE — 93005 ELECTROCARDIOGRAM TRACING: CPT | Performed by: EMERGENCY MEDICINE

## 2019-04-14 PROCEDURE — 96375 TX/PRO/DX INJ NEW DRUG ADDON: CPT

## 2019-04-14 PROCEDURE — 84484 ASSAY OF TROPONIN QUANT: CPT

## 2019-04-14 PROCEDURE — 96372 THER/PROPH/DIAG INJ SC/IM: CPT

## 2019-04-14 PROCEDURE — 93010 ELECTROCARDIOGRAM REPORT: CPT | Performed by: INTERNAL MEDICINE

## 2019-04-14 RX ORDER — MORPHINE SULFATE 4 MG/ML
4 INJECTION, SOLUTION INTRAMUSCULAR; INTRAVENOUS ONCE
Status: COMPLETED | OUTPATIENT
Start: 2019-04-14 | End: 2019-04-14

## 2019-04-14 RX ORDER — ORPHENADRINE CITRATE 30 MG/ML
60 INJECTION INTRAMUSCULAR; INTRAVENOUS ONCE
Status: COMPLETED | OUTPATIENT
Start: 2019-04-14 | End: 2019-04-14

## 2019-04-14 RX ORDER — ORPHENADRINE CITRATE 100 MG/1
100 TABLET, EXTENDED RELEASE ORAL 2 TIMES DAILY
Qty: 14 TABLET | Refills: 0 | Status: SHIPPED | OUTPATIENT
Start: 2019-04-14 | End: 2019-04-21

## 2019-04-14 RX ORDER — 0.9 % SODIUM CHLORIDE 0.9 %
1000 INTRAVENOUS SOLUTION INTRAVENOUS ONCE
Status: COMPLETED | OUTPATIENT
Start: 2019-04-14 | End: 2019-04-14

## 2019-04-14 RX ORDER — ONDANSETRON 2 MG/ML
4 INJECTION INTRAMUSCULAR; INTRAVENOUS ONCE
Status: COMPLETED | OUTPATIENT
Start: 2019-04-14 | End: 2019-04-14

## 2019-04-14 RX ORDER — HYDROCODONE BITARTRATE AND ACETAMINOPHEN 5; 325 MG/1; MG/1
1 TABLET ORAL EVERY 6 HOURS PRN
Qty: 8 TABLET | Refills: 0 | Status: SHIPPED | OUTPATIENT
Start: 2019-04-14 | End: 2019-04-17

## 2019-04-14 RX ADMIN — MORPHINE SULFATE 4 MG: 4 INJECTION INTRAVENOUS at 12:26

## 2019-04-14 RX ADMIN — ONDANSETRON 4 MG: 2 INJECTION INTRAMUSCULAR; INTRAVENOUS at 12:26

## 2019-04-14 RX ADMIN — ORPHENADRINE CITRATE 60 MG: 60 INJECTION INTRAMUSCULAR; INTRAVENOUS at 13:49

## 2019-04-14 RX ADMIN — IOPAMIDOL 75 ML: 755 INJECTION, SOLUTION INTRAVENOUS at 12:54

## 2019-04-14 RX ADMIN — SODIUM CHLORIDE 1000 ML: 9 INJECTION, SOLUTION INTRAVENOUS at 12:26

## 2019-04-14 ASSESSMENT — ENCOUNTER SYMPTOMS
ABDOMINAL DISTENTION: 0
EYES NEGATIVE: 1
WHEEZING: 0
NAUSEA: 1
BACK PAIN: 0
COUGH: 0
VOMITING: 0
ABDOMINAL PAIN: 0
ALLERGIC/IMMUNOLOGIC NEGATIVE: 1
SHORTNESS OF BREATH: 1

## 2019-04-14 ASSESSMENT — PAIN DESCRIPTION - ORIENTATION: ORIENTATION: RIGHT

## 2019-04-14 ASSESSMENT — PAIN DESCRIPTION - LOCATION: LOCATION: RIB CAGE

## 2019-04-14 ASSESSMENT — PAIN DESCRIPTION - PAIN TYPE: TYPE: ACUTE PAIN

## 2019-04-14 ASSESSMENT — PAIN SCALES - GENERAL
PAINLEVEL_OUTOF10: 7
PAINLEVEL_OUTOF10: 9
PAINLEVEL_OUTOF10: 9

## 2019-04-14 NOTE — ED NOTES
Ambulated pt without assistance from rm 9 to rm 7. Pt did not feel comfortable going further.  O2 was 97% and HR was 82 bpm. NP notified     Lucho Art  04/14/19 2849

## 2019-04-14 NOTE — ED PROVIDER NOTES
I independently performed a history and physical on Kaitlin Su. All diagnostic, treatment, and disposition decisions were made by myself in conjunction with the advanced practice provider.     -Kaitlin Su is a 47 y.o. female  presents to ED for right rib pain that started two day s ago that radiates to RUQ, asociated with shortness of breath started last night. .  Endorses nausea, loose stool and decreased appetite. An dnausea. And loose stool an ddecrased appetite. -PE: point tenderness to right flank, CTAB/l, RRR, abdomen soft, ND, NTTP  -lab workup wnl, except for elevated D. Dimer 232  -CXR: Acute cardiopulmonary disease  -No evidence of pulmonary embolic disease. No acute pulmonary findings. The Ekg interpreted by me shows  normal sinus rhythm with a rate of 64  Axis is   Normal  QTc is  445  Intervals and Durations are unremarkable. ST Segments: no acute change  No significant change from prior EKG dated 6/11/2017    -Tachycardia secondary to muscular skeletal etiology as no other findings are unremarkable on workup. I needs were discussed with patient and plan for discharge home with close follow up with PCP was discussed with patient. Strict ED return precautions given for new/worsending symptoms. Patient in agreement with plan, verbally confirm understanding and have no further questions/concerns. For further details of [facility] emergency department encounter, please see NP Randa Goodpasture documentation.        Marii Loaiza MD  04/14/19 152       Marii Loaiza MD  04/14/19 1741

## 2019-04-14 NOTE — ED PROVIDER NOTES
[facility]  ED  eMERGENCY dEPARTMENT eNCOUnter        Pt Name: Azul Granger  MRN: [id number]  [place] 1965  Date of evaluation: 4/14/2019  Provider: AVERY Dean CNP  PCP: Lucero Hoffmann DO    This patient was seen and evaluated by the attending physician Jaylene Hardy MD.      [address] [place] [address]       Chief Complaint   Patient presents with    Rib Pain     started yesterday    Shortness of Breath     started last night    Dizziness       HISTORY OF PRESENT ILLNESS   (Location/Symptom, Timing/Onset, Context/Setting, Quality, Duration, Modifying Factors, Severity)  Note limiting factors. Azul Granger is a 47 y.o. female who presents with right sided rib pain, shortness of breath, nausea and lightheadedness that started  Last night. Denies any chest pain, cough, fevers, vomiting, abdominal pain, headache or vision changes. Denies any fall or trauma. Denies any history of blood clots, PE or recent travel. Rates pain a 9/10     Nursing Notes were all reviewed and agreed with or any disagreements were addressed  in the HPI. REVIEW OF SYSTEMS    (2-9 systems for level 4, 10 or more for level 5)     Review of Systems   Constitutional: Negative for activity change, appetite change, chills, diaphoresis, fatigue and fever. HENT: Negative. Eyes: Negative. Respiratory: Positive for shortness of breath. Negative for cough and wheezing. Cardiovascular: Negative for chest pain, palpitations and leg swelling. Gastrointestinal: Positive for nausea. Negative for abdominal distention, abdominal pain and vomiting. Endocrine: Negative. Genitourinary: Negative for dysuria, flank pain and hematuria. Musculoskeletal: Negative for back pain, myalgias and neck pain. Right sided posterior rib pain   Skin: Negative. Allergic/Immunologic: Negative. Neurological: Positive for light-headedness.  Negative for dizziness, seizures, syncope, speech difficulty, weakness, numbness and headaches. Hematological: Negative. Psychiatric/Behavioral: Negative. Positives and Pertinent negatives as per HPI. Except as noted abovein the ROS, all other systems were reviewed and negative. PAST MEDICAL HISTORY     Past Medical History:   Diagnosis Date    Anxiety     Depression     GERD (gastroesophageal reflux disease)     Hypertension     Osteoarthritis     Restless leg syndrome          SURGICAL HISTORY     Past Surgical History:   Procedure Laterality Date    ANUS SURGERY  2018    fissure     SECTION      x3    HERNIA REPAIR  2018    LAPAROSCOPIC PARAESOPHAGEAL HERNIA REPAIR WITH MESH    HIATAL HERNIA REPAIR  2018    KNEE ARTHROSCOPY Left 11/15/12    ARTHROSCOPY LEFT KNEE WITH SYOVECTOMY AND CHONDROPLASTY    OVARY REMOVAL Right 2010    TONSILLECTOMY  1981    UPPER GASTROINTESTINAL ENDOSCOPY      UPPER GASTROINTESTINAL ENDOSCOPY      esophageasl stretch         CURRENTMEDICATIONS       Discharge Medication List as of 2019  2:10 PM      CONTINUE these medications which have NOT CHANGED    Details   DULoxetine (CYMBALTA) 60 MG extended release capsule Take 1 capsule by mouth daily, Disp-30 capsule, R-5Normal      ALPRAZolam (XANAX) 1 MG tablet TAKE ONE TABLET BY MOUTH THREE TIMES A DAY AS NEEDED FOR ANXIETY. ., Disp-90 tablet, R-1Normal      lisinopril (PRINIVIL;ZESTRIL) 20 MG tablet TAKE 1 TABLET BY MOUTH EVERY DAY, Disp-30 tablet, R-0DX Code Needed  . Normal      methocarbamol (ROBAXIN) 500 MG tablet Take 1 tablet by mouth 3 times daily as needed (for neck pain), Disp-90 tablet, R-2Normal      rOPINIRole (REQUIP) 2 MG tablet TAKE ONE TABLET BY MOUTH TWICE A DAY, Disp-60 tablet, R-10Normal      omeprazole (PRILOSEC) 40 MG delayed release capsule TAKE ONE CAPSULE BY MOUTH DAILY, Disp-30 capsule, R-3Normal      zoster recombinant adjuvanted vaccine (SHINGRIX) 50 MCG/0.5ML SUSR injection 50 MCG IM then repeat 2-6 months., Disp-0.5 mL, R-1Print      ondansetron (ZOFRAN) 4 MG tablet Take 1 tablet by mouth every 8 hours as needed for Nausea or Vomiting, Disp-30 tablet, R-0Normal      atenolol (TENORMIN) 25 MG tablet TAKE 1 TABLET BY MOUTH EVERY DAY, Disp-30 tablet, R-5Normal      metoclopramide (REGLAN) 10 MG tablet Historical Med      meloxicam (MOBIC) 15 MG tablet Take 1 tablet by mouth daily, Disp-30 tablet, R-3Normal      nystatin (MYCOSTATIN) 716623 UNIT/GM ointment Apply topically 2 times daily. , Disp-30 g, R-0, Normal      MELATONIN PO Take 1 tablet by mouth nightly as neededHistorical Med      ibuprofen (ADVIL;MOTRIN) 600 MG tablet Take 1 tablet by mouth every 6 hours as needed for Pain, Disp-120 tablet, R-1Normal               ALLERGIES     Toradol [ketorolac tromethamine]; Mobic [meloxicam]; and Percocet [oxycodone-acetaminophen]    FAMILYHISTORY       Family History   Problem Relation Age of Onset    Arthritis Mother     Obesity Mother     COPD Mother     Anemia Mother     Arthritis Father     Arrhythmia Father     Diabetes Other     Diabetes Paternal Grandfather     Cancer Neg Hx     Breast Cancer Neg Hx     Colon Cancer Neg Hx           SOCIAL HISTORY       Social History     Socioeconomic History    Marital status:       Spouse name: Not on file    Number of children: 3    Years of education: Not on file    Highest education level: Not on file   Occupational History    Occupation:    Social Needs    Financial resource strain: Not on file    Food insecurity:     Worry: Not on file     Inability: Not on file    Transportation needs:     Medical: Not on file     Non-medical: Not on file   Tobacco Use    Smoking status: Never Smoker    Smokeless tobacco: Never Used   Substance and Sexual Activity    Alcohol use: Yes     Comment: 1 -2 x a month    Drug use: Yes     Types: Marijuana     Comment: quit years ago    Sexual activity: Never   Lifestyle    Physical activity:     Days per likely represents an adenoma.  Assuming no history of  malignancy, consider follow-up CT using adrenal protocol in 1 year. Ns bolus given along with zofran and morphine  Diagnosis: Right rib pain, shortness of breath, thoracic myofacial strain, lightheadedness  Patient ambulated well in the ED without difficulty  Patient will be discharged with Norco and Norflex and instructed to follow up with  PCP this week       FINAL IMPRESSION      1. Rib pain on right side    2. Thoracic myofascial strain, initial encounter    3. Shortness of breath    4. Lightheadedness    5. SOB (shortness of breath)          DISPOSITION/PLAN   DISPOSITION        PATIENT REFERREDTO:  Demetrius Steinberg DO  [address] [facility]  [address] [facility]  [phone]    Schedule an appointment as soon as possible for a visit in 3 days  For recheck    [facility]  ED  [address] [facility] [address] [facility]  Go to   For new or worsening symptoms      DISCHARGE MEDICATIONS:  Discharge Medication List as of 4/14/2019  2:10 PM      START taking these medications    Details   HYDROcodone-acetaminophen (NORCO) 5-325 MG per tablet Take 1 tablet by mouth every 6 hours as needed for Pain for up to 3 days.  Sedation precautions please, Disp-8 tablet, R-0Print      orphenadrine (NORFLEX) 100 MG extended release tablet Take 1 tablet by mouth 2 times daily for 7 days, Disp-14 tablet, R-0Print             DISCONTINUED MEDICATIONS:  Discharge Medication List as of 4/14/2019  2:10 PM                 (Please note that portions ofthis note were completed with a voice recognition program.  Efforts were made to edit the dictations but occasionally words are mis-transcribed.)    AVERY Anderson CNP (electronically signed)    AVERY Kat CNP  04/14/19 AVERY Kerr CNP  04/15/19 [id number]

## 2019-04-15 ENCOUNTER — TELEPHONE (OUTPATIENT)
Dept: FAMILY MEDICINE CLINIC | Age: 54
End: 2019-04-15

## 2019-04-15 NOTE — TELEPHONE ENCOUNTER
Pt called again. She took a nap and missed a call. Pt wasn't sure if it was our office. Please call back.

## 2019-04-15 NOTE — TELEPHONE ENCOUNTER
Pt went the [facility] yesterday for right side/back pain. They did testing and really couldn't find anything. Pt said the pain is sharp, she is vomiting, and her BP is up (this morning 186/111). Pt would like to know what to do. Please call back and advise.

## 2019-05-06 RX ORDER — ATENOLOL 25 MG/1
TABLET ORAL
Qty: 30 TABLET | Refills: 5 | Status: SHIPPED | OUTPATIENT
Start: 2019-05-06 | End: 2019-10-24 | Stop reason: SDUPTHER

## 2019-05-06 RX ORDER — METHOCARBAMOL 500 MG/1
TABLET, FILM COATED ORAL
Qty: 90 TABLET | Refills: 1 | Status: SHIPPED | OUTPATIENT
Start: 2019-05-06 | End: 2019-07-25 | Stop reason: SDUPTHER

## 2019-05-06 NOTE — TELEPHONE ENCOUNTER
Future Appointments   Date Time Provider Diann Sotelo   6/26/2019  1:10 PM [place] Madeleine Dubois, MD [facility]     4/8/2019 LOV

## 2019-05-10 ENCOUNTER — TELEPHONE (OUTPATIENT)
Dept: FAMILY MEDICINE CLINIC | Age: 54
End: 2019-05-10

## 2019-05-23 NOTE — TELEPHONE ENCOUNTER
Called pt. She states she IS using [facility]  She told someone last week. I will fax her order.

## 2019-05-28 ENCOUNTER — OFFICE VISIT (OUTPATIENT)
Dept: ORTHOPEDIC SURGERY | Age: 54
End: 2019-05-28
Payer: COMMERCIAL

## 2019-05-28 VITALS — WEIGHT: 260 LBS | BODY MASS INDEX: 41.78 KG/M2 | HEIGHT: 66 IN

## 2019-05-28 DIAGNOSIS — M25.562 LEFT KNEE PAIN, UNSPECIFIED CHRONICITY: Primary | ICD-10-CM

## 2019-05-28 PROCEDURE — G8427 DOCREV CUR MEDS BY ELIG CLIN: HCPCS | Performed by: PHYSICIAN ASSISTANT

## 2019-05-28 PROCEDURE — 1036F TOBACCO NON-USER: CPT | Performed by: PHYSICIAN ASSISTANT

## 2019-05-28 PROCEDURE — 3017F COLORECTAL CA SCREEN DOC REV: CPT | Performed by: PHYSICIAN ASSISTANT

## 2019-05-28 PROCEDURE — G8417 CALC BMI ABV UP PARAM F/U: HCPCS | Performed by: PHYSICIAN ASSISTANT

## 2019-05-28 PROCEDURE — 99214 OFFICE O/P EST MOD 30 MIN: CPT | Performed by: PHYSICIAN ASSISTANT

## 2019-05-28 RX ORDER — PREDNISONE 10 MG/1
10 TABLET ORAL DAILY
Qty: 10 TABLET | Refills: 0 | Status: SHIPPED | OUTPATIENT
Start: 2019-05-28 | End: 2019-06-07

## 2019-05-28 NOTE — PROGRESS NOTES
Subjective    Patient ID: Cindy Ewing is a 47 y.o..  female. Chief Complaint   Patient presents with    Knee Injury     knee popped 1 week ago; difficulty ambulating       Pain Assessment  Location of Pain: Knee  Location Modifiers: Left  Severity of Pain: 6  Quality of Pain: Sharp, Aching, Dull  Duration of Pain: Persistent  Frequency of Pain: Constant  Date Pain First Started: 05/22/19  Aggravating Factors: Walking, Stairs, Other (Comment), Bending, Straightening  Limiting Behavior: Yes  Relieving Factors: Rest, Ice, Nsaids, Other (Comment)  Result of Injury: Yes  Work-Related Injury: No  Are there other pain locations you wish to document?: No    Knee Pain  Patient complains of left knee pain. This is evaluated as a personal injury. There was not a history of injury. The pain began 5 days ago. The pain is located medial. She describes the symptoms as aching, sharp and shooting. Symptoms improve with rest, ice. The symptoms are worse with activity, stair climbing, kneeling, deep knee bending, getting up from a chair, weight bearing. The knee has not given out or felt unstable. The patient can bend and straighten the knee fully. The patient is active in none. Treatment to date has been ice, Tylenol, NSAID's, knee sleeve/brace, without significant relief. Patient's medications, allergies, past medical, surgical, social and family histories were reviewed and updated as appropriate. Physical Exam:   Constitutional:  Pt well groomed, no acute distress, well developed, no obvious deformities  Vitals:    05/28/19 1737   Weight: 260 lb (117.9 kg)   Height: 5' 6\" (1.676 m)     -Oriented to person, place, and time  -mood and affect are appropriate    Knee exam - left knee exam shows;    -range of motion of R. Knee is 0 to 120, and L. Knee is 0 to 120.  The patient does have  pain on motion  -there is an effusion small   - there is tenderness over the  medial, anterior region  -there are not any

## 2019-06-03 ENCOUNTER — TELEPHONE (OUTPATIENT)
Dept: FAMILY MEDICINE CLINIC | Age: 54
End: 2019-06-03

## 2019-06-03 NOTE — TELEPHONE ENCOUNTER
Pt called again to check and see if we received a new request from [facility] requesting a second dx code. Please advise.

## 2019-06-17 ENCOUNTER — HOSPITAL ENCOUNTER (OUTPATIENT)
Dept: MAMMOGRAPHY | Age: 54
Discharge: HOME | End: 2019-06-17
Payer: COMMERCIAL

## 2019-06-17 DIAGNOSIS — Z12.31 ENCOUNTER FOR SCREENING MAMMOGRAM FOR BREAST CANCER: ICD-10-CM

## 2019-06-17 PROCEDURE — 77067 SCR MAMMO BI INCL CAD: CPT

## 2019-06-26 ENCOUNTER — OFFICE VISIT (OUTPATIENT)
Dept: ORTHOPEDIC SURGERY | Age: 54
End: 2019-06-26
Payer: COMMERCIAL

## 2019-06-26 VITALS — HEIGHT: 66 IN | WEIGHT: 260 LBS | BODY MASS INDEX: 41.78 KG/M2

## 2019-06-26 DIAGNOSIS — M17.12 PRIMARY OSTEOARTHRITIS OF LEFT KNEE: Primary | ICD-10-CM

## 2019-06-26 PROCEDURE — 99213 OFFICE O/P EST LOW 20 MIN: CPT | Performed by: ORTHOPAEDIC SURGERY

## 2019-06-26 PROCEDURE — G8427 DOCREV CUR MEDS BY ELIG CLIN: HCPCS | Performed by: ORTHOPAEDIC SURGERY

## 2019-06-26 PROCEDURE — G8417 CALC BMI ABV UP PARAM F/U: HCPCS | Performed by: ORTHOPAEDIC SURGERY

## 2019-06-26 PROCEDURE — 3017F COLORECTAL CA SCREEN DOC REV: CPT | Performed by: ORTHOPAEDIC SURGERY

## 2019-06-26 PROCEDURE — 1036F TOBACCO NON-USER: CPT | Performed by: ORTHOPAEDIC SURGERY

## 2019-06-26 NOTE — PROGRESS NOTES
CHIEF COMPLAINT: Left knee pain. History:   Guido Wagner is a 47 y.o. White female self-referred for evaluation and treatment of left knee pain. Last seen on 2019 for treatment of her severe degenerative osteoarthritis of the left knee. She did have to go to the after-hours clinic on May 28, 2018. She has a previous clinical and radiographic diagnosis of knee arthritis, worse in medial compartment. Her knee pain has gotten worse most recently. She and her mother currently live together. This is currently working out well for both. The symptoms are worse with activity. The knee has not given out or felt unstable. The patient cannot bend and straighten the knee fully. There is swelling in the knee. There was catching / locking of the knee. The patient has taken NSAIDs.  (Meloxicam)    Outside reports reviewed: none. Past Medical History:   Diagnosis Date    Anxiety     Depression     GERD (gastroesophageal reflux disease)     Hypertension     Osteoarthritis     Restless leg syndrome        Past Surgical History:   Procedure Laterality Date    ANUS SURGERY  2018    fissure     SECTION      x3    HERNIA REPAIR  2018    LAPAROSCOPIC PARAESOPHAGEAL HERNIA REPAIR WITH MESH    HIATAL HERNIA REPAIR  2018    KNEE ARTHROSCOPY Left 11/15/12    ARTHROSCOPY LEFT KNEE WITH SYOVECTOMY AND CHONDROPLASTY    OVARY REMOVAL Right     TONSILLECTOMY  1981    UPPER GASTROINTESTINAL ENDOSCOPY      UPPER GASTROINTESTINAL ENDOSCOPY      esophageasl stretch       Family History   Problem Relation Age of Onset    Arthritis Mother     Obesity Mother     COPD Mother     Anemia Mother     Arthritis Father     Arrhythmia Father     Diabetes Other     Diabetes Paternal Grandfather     Cancer Neg Hx     Breast Cancer Neg Hx     Colon Cancer Neg Hx        Social History     Socioeconomic History    Marital status:       Spouse name: None    Number of children: 4    Years of education: None    Highest education level: None   Occupational History    Occupation:    Social Needs    Financial resource strain: None    Food insecurity:     Worry: None     Inability: None    Transportation needs:     Medical: None     Non-medical: None   Tobacco Use    Smoking status: Never Smoker    Smokeless tobacco: Never Used   Substance and Sexual Activity    Alcohol use: Yes     Comment: 1 -2 x a month    Drug use: Yes     Types: Marijuana     Comment: quit years ago    Sexual activity: Never   Lifestyle    Physical activity:     Days per week: None     Minutes per session: None    Stress: None   Relationships    Social connections:     Talks on phone: None     Gets together: None     Attends [employer] service: None     Active member of club or organization: None     Attends meetings of clubs or organizations: None     Relationship status: None    Intimate partner violence:     Fear of current or ex partner: None     Emotionally abused: None     Physically abused: None     Forced sexual activity: None   Other Topics Concern    None   Social History Narrative    None       Current Outpatient Medications   Medication Sig Dispense Refill    ibuprofen (ADVIL;MOTRIN) 600 MG tablet TAKE 1 TABLET BY MOUTH EVERY 8 HOURS AS NEEDED FOR PAIN 60 tablet 1    omeprazole (PRILOSEC) 40 MG delayed release capsule TAKE ONE CAPSULE BY MOUTH EVERY DAY 30 capsule 3    ALPRAZolam (XANAX) 1 MG tablet TAKE ONE TABLET BY MOUTH THREE TIMES A DAY AS NEEDED FOR ANXIETY. . 90 tablet 1    atenolol (TENORMIN) 25 MG tablet TAKE 1 TABLET BY MOUTH EVERY DAY 30 tablet 5    methocarbamol (ROBAXIN) 500 MG tablet TAKE 1 TABLET BY MOUTH 3 TIMES A DAY AS NEEDED FOR NECK PAIN 90 tablet 1    lisinopril (PRINIVIL;ZESTRIL) 20 MG tablet TAKE 1 TABLET BY MOUTH EVERY DAY 30 tablet 6    DULoxetine (CYMBALTA) 60 MG extended release capsule Take 1 capsule by mouth daily 30 capsule 5    rOPINIRole (REQUIP) 2 MG tablet TAKE ONE TABLET BY MOUTH TWICE A DAY 60 tablet 10    zoster recombinant adjuvanted vaccine (SHINGRIX) 50 MCG/0.5ML SUSR injection 50 MCG IM then repeat 2-6 months. 0.5 mL 1    ondansetron (ZOFRAN) 4 MG tablet Take 1 tablet by mouth every 8 hours as needed for Nausea or Vomiting 30 tablet 0    metoclopramide (REGLAN) 10 MG tablet       meloxicam (MOBIC) 15 MG tablet Take 1 tablet by mouth daily 30 tablet 3    nystatin (MYCOSTATIN) 679361 UNIT/GM ointment Apply topically 2 times daily. 30 g 0    MELATONIN PO Take 1 tablet by mouth nightly as needed       No current facility-administered medications for this visit. Allergies   Allergen Reactions    Toradol [Ketorolac Tromethamine] Other (See Comments)     \"out of it\"  \"felt like sleep paralysis\"    Mobic [Meloxicam]     Percocet [Oxycodone-Acetaminophen] Itching and Rash       Review of Systems:  14 point review of systems otherwise negative. See history for pertinent musculoskeletal positives. Review orthopaedic intake questionnaire for further details. Physical Examination:     Vital signs:   Ht 5' 6\" (1.676 m)   Wt 260 lb (117.9 kg)   LMP 09/20/2013 (Exact Date)   BMI 41.97 kg/m²    General:  alert, appears stated age, cooperative and no distress   Gait:  Abnormal. The patient can bear weight on the injured extremity.      She is a relatively large lady with a BMI of 43 which places her as a class III obesity    Left Knee:  Alignment:  varus   ROM:  0 degrees extension to 110 degrees flexion      Crepitus:  no   Joint Tenderness:  medial joint line   Effusion:   30 cc       Patellar tilt test:  negative   Patellar facet tenderness:  negative medial   negative lateral   Trochlear tenderness:  negative   Moving patellar apprehension test:  negative   Hoffa's test:  negative medial   negative lateral      Hyperextension test:  negative medial   negative lateral   Lachman test:  negative      Anterior drawer test: xylocaine, then approx 10ml of clear yellow fluid was aspirated. Then a mixture of 3cc of 0.25% marcaine and 2cc (80 mg) of Depomedrol was injected. There were no immediate complications following the injection. The patient was advised of the possibility of injection site reaction and instructed to apply ice to the area. Suhail Townsend PA-C, scribing for and in the presence of Dr. Liz Vegas   6/26/2019 1:23 PM    I have evaluated the patient myself and completed the examination of the patient on date of visit. I have discussed the case and reviewed all pertinent data with the Suhail AREVALO, and agree with the plan noted above. Dr. Liz Vegas MD        This dictation was performed with a verbal recognition program [facility]) and it was checked for errors. It is possible that there are still dictated errors within this office note. If so, please bring any errors to my attention for an addendum. All efforts were made to ensure that this office note is accurate.

## 2019-07-01 ENCOUNTER — OFFICE VISIT (OUTPATIENT)
Dept: FAMILY MEDICINE CLINIC | Age: 54
End: 2019-07-01
Payer: COMMERCIAL

## 2019-07-01 VITALS
SYSTOLIC BLOOD PRESSURE: 114 MMHG | OXYGEN SATURATION: 98 % | WEIGHT: 263 LBS | HEART RATE: 67 BPM | HEIGHT: 66 IN | BODY MASS INDEX: 42.27 KG/M2 | DIASTOLIC BLOOD PRESSURE: 80 MMHG

## 2019-07-01 DIAGNOSIS — Z00.00 ANNUAL PHYSICAL EXAM: Primary | ICD-10-CM

## 2019-07-01 PROCEDURE — 99396 PREV VISIT EST AGE 40-64: CPT | Performed by: FAMILY MEDICINE

## 2019-07-01 ASSESSMENT — ENCOUNTER SYMPTOMS: TROUBLE SWALLOWING: 0

## 2019-07-05 ENCOUNTER — TELEPHONE (OUTPATIENT)
Dept: ORTHOPEDIC SURGERY | Age: 54
End: 2019-07-05

## 2019-07-05 NOTE — TELEPHONE ENCOUNTER
07/05/2019. [address]  (SERIES OF 3) LEFT KNEE. APPROVED #[id number]. PROCEDURE CODE 60364. APPROVED #[id number]. PER FAX FROM [facility].   PADMINI

## 2019-07-10 ENCOUNTER — TELEPHONE (OUTPATIENT)
Dept: FAMILY MEDICINE CLINIC | Age: 54
End: 2019-07-10

## 2019-07-10 DIAGNOSIS — S62.91XD CLOSED FRACTURE OF RIGHT HAND WITH ROUTINE HEALING, SUBSEQUENT ENCOUNTER: Primary | ICD-10-CM

## 2019-07-10 RX ORDER — HYDROCODONE BITARTRATE AND ACETAMINOPHEN 5; 325 MG/1; MG/1
1 TABLET ORAL EVERY 8 HOURS PRN
Qty: 15 TABLET | Refills: 0 | Status: SHIPPED | OUTPATIENT
Start: 2019-07-10 | End: 2019-07-15

## 2019-07-10 NOTE — TELEPHONE ENCOUNTER
Pt states she fell yesterday. Went to [facility]gent care. States she broke 3 fingers on her right hand. Would like to get a referral to [facility] (I:[phone]) for evaluation. Also requesting a few days worth of pain medicine until she can be seen at [facility]. Please advise.

## 2019-07-10 NOTE — TELEPHONE ENCOUNTER
Spoke to pt and she stated she is in a lot of pain and needs a referral to [facility] for a new ortho that specializes in hands. I entered a new referral for her to see any provider that can get her in quickest. Pt would like for you to give her something for the pain while she waits. Please advise.

## 2019-07-15 ENCOUNTER — OFFICE VISIT (OUTPATIENT)
Dept: FAMILY MEDICINE CLINIC | Age: 54
End: 2019-07-15
Payer: COMMERCIAL

## 2019-07-15 VITALS
OXYGEN SATURATION: 98 % | DIASTOLIC BLOOD PRESSURE: 86 MMHG | SYSTOLIC BLOOD PRESSURE: 132 MMHG | BODY MASS INDEX: 41.97 KG/M2 | WEIGHT: 260 LBS | RESPIRATION RATE: 18 BRPM | HEART RATE: 85 BPM

## 2019-07-15 DIAGNOSIS — S80.12XA CONTUSION OF LEFT KNEE AND LOWER LEG, INITIAL ENCOUNTER: Primary | ICD-10-CM

## 2019-07-15 DIAGNOSIS — S80.02XA CONTUSION OF LEFT KNEE AND LOWER LEG, INITIAL ENCOUNTER: Primary | ICD-10-CM

## 2019-07-15 PROCEDURE — G8427 DOCREV CUR MEDS BY ELIG CLIN: HCPCS | Performed by: NURSE PRACTITIONER

## 2019-07-15 PROCEDURE — 99213 OFFICE O/P EST LOW 20 MIN: CPT | Performed by: NURSE PRACTITIONER

## 2019-07-15 PROCEDURE — 3017F COLORECTAL CA SCREEN DOC REV: CPT | Performed by: NURSE PRACTITIONER

## 2019-07-15 PROCEDURE — 1036F TOBACCO NON-USER: CPT | Performed by: NURSE PRACTITIONER

## 2019-07-15 PROCEDURE — G8417 CALC BMI ABV UP PARAM F/U: HCPCS | Performed by: NURSE PRACTITIONER

## 2019-07-15 RX ORDER — NAPROXEN 500 MG/1
1 TABLET ORAL 2 TIMES DAILY PRN
Refills: 0 | COMMUNITY
Start: 2019-07-09 | End: 2019-10-03 | Stop reason: ALTCHOICE

## 2019-07-15 ASSESSMENT — ENCOUNTER SYMPTOMS
DIARRHEA: 0
COLOR CHANGE: 1
GASTROINTESTINAL NEGATIVE: 1
SHORTNESS OF BREATH: 0
VOMITING: 0
NAUSEA: 0
ABDOMINAL PAIN: 0
CHEST TIGHTNESS: 0

## 2019-07-24 ENCOUNTER — OFFICE VISIT (OUTPATIENT)
Dept: ORTHOPEDIC SURGERY | Age: 54
End: 2019-07-24
Payer: COMMERCIAL

## 2019-07-24 VITALS — BODY MASS INDEX: 41.77 KG/M2 | WEIGHT: 259.92 LBS | HEIGHT: 66 IN

## 2019-07-24 DIAGNOSIS — M25.562 LEFT KNEE PAIN, UNSPECIFIED CHRONICITY: Primary | ICD-10-CM

## 2019-07-24 DIAGNOSIS — M25.572 LEFT ANKLE PAIN, UNSPECIFIED CHRONICITY: ICD-10-CM

## 2019-07-24 DIAGNOSIS — M17.12 ARTHRITIS OF LEFT KNEE: ICD-10-CM

## 2019-07-24 PROCEDURE — 1036F TOBACCO NON-USER: CPT | Performed by: PHYSICIAN ASSISTANT

## 2019-07-24 PROCEDURE — G8417 CALC BMI ABV UP PARAM F/U: HCPCS | Performed by: PHYSICIAN ASSISTANT

## 2019-07-24 PROCEDURE — 99213 OFFICE O/P EST LOW 20 MIN: CPT | Performed by: PHYSICIAN ASSISTANT

## 2019-07-24 PROCEDURE — G8427 DOCREV CUR MEDS BY ELIG CLIN: HCPCS | Performed by: PHYSICIAN ASSISTANT

## 2019-07-24 PROCEDURE — 3017F COLORECTAL CA SCREEN DOC REV: CPT | Performed by: PHYSICIAN ASSISTANT

## 2019-07-24 NOTE — PROGRESS NOTES
has concerns related to her condition or if she needs assistance in scheduling any above tests. She is welcome to call for an appointment sooner if she has any additional concerns or questions. This dictation was performed with a verbal recognition program (DRAGON) and it was checked for errors. It is possible that there are still dictated errors within this office note. If so, please bring any errors to my attention for an addendum. All efforts were made to ensure that this office note is accurate.

## 2019-07-25 NOTE — PROGRESS NOTES
11/2/2017  2:53pm/1553    [facility]patient [facility] team member attempted telephone call to patient. There was no answer and voice message was left encouraging patient to contact Outpatient [facility]. Contact information for Outpatient [facility] provided. Impression: Age-related nuclear cataract, bilateral: H25.13. Plan: Discussed diagnosis in detail with patient. No treatment is required at this time. Will continue to observe condition and or symptoms. Patient instructed to call if condition gets worse.

## 2019-07-29 ENCOUNTER — APPOINTMENT (OUTPATIENT)
Dept: GENERAL RADIOLOGY | Age: 54
End: 2019-07-29
Payer: COMMERCIAL

## 2019-07-29 ENCOUNTER — HOSPITAL ENCOUNTER (EMERGENCY)
Age: 54
Discharge: HOME | End: 2019-07-29
Attending: EMERGENCY MEDICINE
Payer: COMMERCIAL

## 2019-07-29 ENCOUNTER — TELEPHONE (OUTPATIENT)
Dept: FAMILY MEDICINE CLINIC | Age: 54
End: 2019-07-29

## 2019-07-29 VITALS
RESPIRATION RATE: 17 BRPM | TEMPERATURE: 97.7 F | HEIGHT: 66 IN | WEIGHT: 250 LBS | DIASTOLIC BLOOD PRESSURE: 75 MMHG | BODY MASS INDEX: 40.18 KG/M2 | SYSTOLIC BLOOD PRESSURE: 139 MMHG | OXYGEN SATURATION: 92 % | HEART RATE: 70 BPM

## 2019-07-29 DIAGNOSIS — S52.571A OTHER CLOSED INTRA-ARTICULAR FRACTURE OF DISTAL END OF RIGHT RADIUS, INITIAL ENCOUNTER: Primary | ICD-10-CM

## 2019-07-29 PROCEDURE — 73120 X-RAY EXAM OF HAND: CPT

## 2019-07-29 PROCEDURE — 73110 X-RAY EXAM OF WRIST: CPT

## 2019-07-29 PROCEDURE — 99284 EMERGENCY DEPT VISIT MOD MDM: CPT

## 2019-07-29 PROCEDURE — 96374 THER/PROPH/DIAG INJ IV PUSH: CPT

## 2019-07-29 PROCEDURE — 73100 X-RAY EXAM OF WRIST: CPT

## 2019-07-29 PROCEDURE — 73090 X-RAY EXAM OF FOREARM: CPT

## 2019-07-29 PROCEDURE — 6360000002 HC RX W HCPCS: Performed by: EMERGENCY MEDICINE

## 2019-07-29 PROCEDURE — 4500000024 HC ED LEVEL 4 PROCEDURE

## 2019-07-29 PROCEDURE — 2500000003 HC RX 250 WO HCPCS

## 2019-07-29 PROCEDURE — 2700000000 HC OXYGEN THERAPY PER DAY

## 2019-07-29 PROCEDURE — 94770 HC ETCO2 MONITOR DAILY: CPT

## 2019-07-29 PROCEDURE — 96376 TX/PRO/DX INJ SAME DRUG ADON: CPT

## 2019-07-29 PROCEDURE — 96375 TX/PRO/DX INJ NEW DRUG ADDON: CPT

## 2019-07-29 PROCEDURE — 94761 N-INVAS EAR/PLS OXIMETRY MLT: CPT

## 2019-07-29 PROCEDURE — 73562 X-RAY EXAM OF KNEE 3: CPT

## 2019-07-29 RX ORDER — HYDROCODONE BITARTRATE AND ACETAMINOPHEN 5; 325 MG/1; MG/1
1 TABLET ORAL EVERY 6 HOURS PRN
Qty: 20 TABLET | Refills: 0 | Status: SHIPPED | OUTPATIENT
Start: 2019-07-29 | End: 2019-08-03

## 2019-07-29 RX ORDER — LIDOCAINE HYDROCHLORIDE 20 MG/ML
INJECTION, SOLUTION INFILTRATION; PERINEURAL
Status: COMPLETED
Start: 2019-07-29 | End: 2019-07-29

## 2019-07-29 RX ORDER — LIDOCAINE HYDROCHLORIDE 20 MG/ML
5 INJECTION, SOLUTION INFILTRATION; PERINEURAL ONCE
Status: COMPLETED | OUTPATIENT
Start: 2019-07-29 | End: 2019-07-29

## 2019-07-29 RX ORDER — FENTANYL CITRATE 50 UG/ML
25 INJECTION, SOLUTION INTRAMUSCULAR; INTRAVENOUS ONCE
Status: COMPLETED | OUTPATIENT
Start: 2019-07-29 | End: 2019-07-29

## 2019-07-29 RX ORDER — KETAMINE HYDROCHLORIDE 50 MG/ML
INJECTION, SOLUTION, CONCENTRATE INTRAMUSCULAR; INTRAVENOUS
Status: COMPLETED
Start: 2019-07-29 | End: 2019-07-29

## 2019-07-29 RX ORDER — KETAMINE HYDROCHLORIDE 50 MG/ML
1 INJECTION, SOLUTION, CONCENTRATE INTRAMUSCULAR; INTRAVENOUS ONCE
Status: COMPLETED | OUTPATIENT
Start: 2019-07-29 | End: 2019-07-29

## 2019-07-29 RX ADMIN — LIDOCAINE HYDROCHLORIDE 20 ML: 20 INJECTION, SOLUTION INFILTRATION; PERINEURAL at 13:37

## 2019-07-29 RX ADMIN — KETAMINE HYDROCHLORIDE 80 MG: 50 INJECTION INTRAMUSCULAR; INTRAVENOUS at 14:23

## 2019-07-29 RX ADMIN — HYDROMORPHONE HYDROCHLORIDE 0.5 MG: 1 INJECTION, SOLUTION INTRAMUSCULAR; INTRAVENOUS; SUBCUTANEOUS at 11:29

## 2019-07-29 RX ADMIN — FENTANYL CITRATE 25 MCG: 50 INJECTION INTRAMUSCULAR; INTRAVENOUS at 13:36

## 2019-07-29 RX ADMIN — HYDROMORPHONE HYDROCHLORIDE 1 MG: 1 INJECTION, SOLUTION INTRAMUSCULAR; INTRAVENOUS; SUBCUTANEOUS at 12:33

## 2019-07-29 RX ADMIN — KETAMINE HYDROCHLORIDE 80 MG: 50 INJECTION, SOLUTION, CONCENTRATE INTRAMUSCULAR; INTRAVENOUS at 14:23

## 2019-07-29 ASSESSMENT — PAIN DESCRIPTION - PROGRESSION: CLINICAL_PROGRESSION: GRADUALLY WORSENING

## 2019-07-29 ASSESSMENT — PAIN DESCRIPTION - PAIN TYPE
TYPE: ACUTE PAIN
TYPE: ACUTE PAIN

## 2019-07-29 ASSESSMENT — PAIN SCALES - GENERAL
PAINLEVEL_OUTOF10: 4
PAINLEVEL_OUTOF10: 10
PAINLEVEL_OUTOF10: 7
PAINLEVEL_OUTOF10: 6

## 2019-07-29 ASSESSMENT — PAIN DESCRIPTION - FREQUENCY: FREQUENCY: CONTINUOUS

## 2019-07-29 ASSESSMENT — PAIN DESCRIPTION - LOCATION
LOCATION: WRIST
LOCATION: WRIST

## 2019-07-29 ASSESSMENT — PAIN DESCRIPTION - ORIENTATION
ORIENTATION: RIGHT
ORIENTATION: RIGHT

## 2019-07-29 ASSESSMENT — PAIN DESCRIPTION - DESCRIPTORS: DESCRIPTORS: SHARP

## 2019-07-29 NOTE — ED PROVIDER NOTES
tobacco. She reports that she drinks alcohol. She reports that she has current or past drug history. Drug: Marijuana. Medications     Previous Medications    ALPRAZOLAM (XANAX) 1 MG TABLET    TAKE ONE TABLET BY MOUTH THREE TIMES A DAY AS NEEDED FOR ANXIETY. Ramo [place] ATENOLOL (TENORMIN) 25 MG TABLET    TAKE 1 TABLET BY MOUTH EVERY DAY    DULOXETINE (CYMBALTA) 60 MG EXTENDED RELEASE CAPSULE    Take 1 capsule by mouth daily    IBUPROFEN (ADVIL;MOTRIN) 600 MG TABLET    TAKE 1 TABLET BY MOUTH EVERY 8 HOURS AS NEEDED FOR PAIN    LISINOPRIL (PRINIVIL;ZESTRIL) 20 MG TABLET    TAKE 1 TABLET BY MOUTH EVERY DAY    MELATONIN PO    Take 1 tablet by mouth nightly as needed    MELOXICAM (MOBIC) 15 MG TABLET    Take 1 tablet by mouth daily    METHOCARBAMOL (ROBAXIN) 500 MG TABLET    TAKE 1 TABLET BY MOUTH 3 TIMES A DAY AS NEEDED FOR NECK PAIN    NAPROXEN (NAPROSYN) 500 MG TABLET    Take 1 tablet by mouth 2 times daily as needed    NYSTATIN (MYCOSTATIN) 834851 UNIT/GM OINTMENT    Apply topically 2 times daily. OMEPRAZOLE (PRILOSEC) 40 MG DELAYED RELEASE CAPSULE    TAKE ONE CAPSULE BY MOUTH EVERY DAY    ONDANSETRON (ZOFRAN) 4 MG TABLET    Take 1 tablet by mouth every 8 hours as needed for Nausea or Vomiting    ROPINIROLE (REQUIP) 2 MG TABLET    TAKE ONE TABLET BY MOUTH TWICE A DAY    ZOSTER RECOMBINANT ADJUVANTED VACCINE (SHINGRIX) 50 MCG/0.5ML SUSR INJECTION    50 MCG IM then repeat 2-6 months. Allergies     She is allergic to toradol [ketorolac tromethamine]; mobic [meloxicam]; and percocet [oxycodone-acetaminophen]. Physical Exam     INITIAL VITALS: BP: (!) 108/55, Temp: 97.5 °F (36.4 °C), Pulse: 71, Resp: 17, SpO2: 100 %   General: 63-year-old female lying in bed in mild discomfort.   HEENT:  head is atraumatic, pupils equal round and reactive to light, sclera are clear, oropharynx is nonerythematous  Neck: supple, no lymphadenopathy  Chest: clear to auscultation bilaterally with no wheezes rhonchi, 7/29/2019 2:20 PM    Preoxygenation:  Room air    Sedation:  Ketamine    Intra-procedure monitoring:  Blood pressure monitoring, continuous capnometry, continuous pulse oximetry, frequent LOC assessments, frequent vital sign checks and cardiac monitor    Intra-procedure events: none    Post-procedure details:     Post-sedation assessment completed:  7/29/2019 3:34 PM    Attendance: Constant attendance by certified staff until patient recovered      Recovery: Patient returned to pre-procedure baseline      Patient tolerance: Tolerated well, no immediate complications          ED Course     Nursing Notes, Past Medical Hx, Past Surgical Hx, Social Hx, Allergies, and Family Hx were reviewed. The patient was given the following medications:  Orders Placed This Encounter   Medications    HYDROmorphone (DILAUDID) injection 0.5 mg    HYDROmorphone (DILAUDID) injection 1 mg    lidocaine 2 % injection 5 mL    lidocaine 2 % injection     SUJEY HANSON: cabinet override    fentaNYL (SUBLIMAZE) injection 25 mcg    ketamine (KETALAR) injection 115 mg    ketamine (KETALAR) 50 MG/ML injection     SUJEY HANSON: cabinet override    HYDROcodone-acetaminophen (NORCO) 5-325 MG per tablet     Sig: Take 1 tablet by mouth every 6 hours as needed for Pain for up to 5 days. Intended supply: 3 days. Take lowest dose possible to manage pain     Dispense:  20 tablet     Refill:  0       CONSULTS:  [facility] / ASSESSMENT / Kim Derrek is a 47 y.o. female who presents emergency department after a mechanical fall on outstretched right hand. On initial examination patient had an obvious deformity of the right wrist as well as ecchymoses over the left patella. X-rays of these areas revealed no evidence of fracture of the patella but a comminuted intra-articular fracture of the patient's right radius. There was significant displacement.   Initial reduction was attempted using a hematoma block with 2% lidocaine injected into the hematoma bed I attempted to place the patient in finger splints and perform a manual reduction however did not get satisfactory results the patient did not tolerate it well. Decision was then surgically made to perform a procedural sedation using ketamine. The sedation went well closed reduction went well patient was placed in a volar splint and postreduction films that showed improved alignment of the bone fragments. The patient was observed to a period of recovery to normal mental status. I am trying to speak with orthopedic surgeon with whom the patient has then scheduled follow-up tomorrow. If she is not able to follow-up with that physician she is also been provided with a orthopedic surgeon that is on-call for our group. Patient will be discharged home with pain medication and instructions to follow-up as described above    Clinical Impression     1. Other closed intra-articular fracture of distal end of right radius, initial encounter        Disposition     PATIENT REFERRED TO:  Radha Galicia MD  [facility] [address]  [phone]    Schedule an appointment as soon as possible for a visit   if your orthopedic surgeon is not comfortable taking care of your fracture      DISCHARGE MEDICATIONS:  New Prescriptions    HYDROCODONE-ACETAMINOPHEN (NORCO) 5-325 MG PER TABLET    Take 1 tablet by mouth every 6 hours as needed for Pain for up to 5 days. Intended supply: 3 days.  Take lowest dose possible to manage pain       DISPOSITION Decision To Discharge 07/29/2019 03:30:41 PM         Vadim Salgado MD  07/29/19 [id number]

## 2019-08-07 ENCOUNTER — OFFICE VISIT (OUTPATIENT)
Dept: FAMILY MEDICINE CLINIC | Age: 54
End: 2019-08-07
Payer: COMMERCIAL

## 2019-08-07 VITALS
WEIGHT: 276 LBS | OXYGEN SATURATION: 98 % | BODY MASS INDEX: 44.55 KG/M2 | DIASTOLIC BLOOD PRESSURE: 86 MMHG | HEART RATE: 80 BPM | SYSTOLIC BLOOD PRESSURE: 132 MMHG

## 2019-08-07 DIAGNOSIS — I10 ESSENTIAL HYPERTENSION: ICD-10-CM

## 2019-08-07 DIAGNOSIS — S62.101S CLOSED FRACTURE OF RIGHT WRIST, SEQUELA: Primary | ICD-10-CM

## 2019-08-07 DIAGNOSIS — Z01.818 PRE-OP EXAM: ICD-10-CM

## 2019-08-07 PROCEDURE — G8427 DOCREV CUR MEDS BY ELIG CLIN: HCPCS | Performed by: FAMILY MEDICINE

## 2019-08-07 PROCEDURE — 3017F COLORECTAL CA SCREEN DOC REV: CPT | Performed by: FAMILY MEDICINE

## 2019-08-07 PROCEDURE — 1036F TOBACCO NON-USER: CPT | Performed by: FAMILY MEDICINE

## 2019-08-07 PROCEDURE — G8417 CALC BMI ABV UP PARAM F/U: HCPCS | Performed by: FAMILY MEDICINE

## 2019-08-07 PROCEDURE — 99214 OFFICE O/P EST MOD 30 MIN: CPT | Performed by: FAMILY MEDICINE

## 2019-08-07 NOTE — PROGRESS NOTES
Take 1 capsule by mouth daily 30 capsule 5    rOPINIRole (REQUIP) 2 MG tablet TAKE ONE TABLET BY MOUTH TWICE A DAY 60 tablet 10    zoster recombinant adjuvanted vaccine (SHINGRIX) 50 MCG/0.5ML SUSR injection 50 MCG IM then repeat 2-6 months. 0.5 mL 1    ondansetron (ZOFRAN) 4 MG tablet Take 1 tablet by mouth every 8 hours as needed for Nausea or Vomiting 30 tablet 0    meloxicam (MOBIC) 15 MG tablet Take 1 tablet by mouth daily 30 tablet 3    nystatin (MYCOSTATIN) 954450 UNIT/GM ointment Apply topically 2 times daily. 30 g 0    MELATONIN PO Take 1 tablet by mouth nightly as needed       No current facility-administered medications for this visit.

## 2019-08-20 ASSESSMENT — ENCOUNTER SYMPTOMS
SHORTNESS OF BREATH: 0
BACK PAIN: 1
WHEEZING: 0
COUGH: 0
ABDOMINAL PAIN: 0

## 2019-08-29 ENCOUNTER — NURSE ONLY (OUTPATIENT)
Dept: ORTHOPEDIC SURGERY | Age: 54
End: 2019-08-29
Payer: COMMERCIAL

## 2019-08-29 VITALS — WEIGHT: 259 LBS | BODY MASS INDEX: 43.15 KG/M2 | HEIGHT: 65 IN

## 2019-08-29 DIAGNOSIS — M17.12 PRIMARY OSTEOARTHRITIS OF LEFT KNEE: Primary | ICD-10-CM

## 2019-08-29 PROCEDURE — 20610 DRAIN/INJ JOINT/BURSA W/O US: CPT | Performed by: PHYSICIAN ASSISTANT

## 2019-08-29 NOTE — PROGRESS NOTES
Subjective    Patient ID: Vivian Ibarra is a 47 y.o..  female. Chief Complaint   Patient presents with    Knee Pain     # 1 left [address]       Patient presents today for the 1st injection of Orthovisc  . Pt has been previously diagnosed with osteoarthritis of the knee as documented in the prior progress notes. This injection is for the patients Left knee. Patient denies and fevers, chills, recent infections, or new injuries to the knee. Pain Assessment:       Patient's medications, allergies, past medical, surgical, social and family histories were reviewed and updated as appropriate. Physical Exam:    The patient does have  pain on motion, there is an effusion, there is tenderness over the  medial, lateral region. No erythema noted. Sensation intact to touch. Pulses present distally. Procedure Note:    The patients Left knee was initially prepped using Betadine swab. The superior lateral aspect of the knee was prepped and used for this injection. Under aseptic technique the soft tissues were anesthetized using 1% Lidocaine without epinephrine. A total of 2ml of Lidocaine was injected into the soft tissues leading up to the joint capsule. Following adequate anesthesia, then approx 12ml of clear yellow fluid was aspirated. Then the 2ml of Orthovisc  injection was performed. This was injected directly into the joint capsule of the knee. No complications were encountered and the patient tolerated the procedure well. A band aid was placed over the injection site following the procedure.       ME ARTHROCENTESIS ASPIR&/INJ MAJOR JT/BURSA W/O US  ORTHOVISC INJ PER DOSE  Assessment:  1) Orthovisc injection of the Left knee  2) Osteoarthritis    Plan:  1) ice, elevation, gentle range of motion as needed for swelling or stiffness of the knee  2) NSAIDs for any pain after injection   3) F/U 1wk for 2nd injection

## 2019-08-29 NOTE — PROGRESS NOTES
8/29/19 2:26 PM     Site & comments:   LEFT KNEE    NDC: [address]-20    Lot number: [id number]    Product:  [address]

## 2019-09-02 ENCOUNTER — HOSPITAL ENCOUNTER (EMERGENCY)
Age: 54
Discharge: HOME | End: 2019-09-02
Attending: EMERGENCY MEDICINE
Payer: COMMERCIAL

## 2019-09-02 ENCOUNTER — APPOINTMENT (OUTPATIENT)
Dept: GENERAL RADIOLOGY | Age: 54
End: 2019-09-02
Payer: COMMERCIAL

## 2019-09-02 VITALS
HEART RATE: 70 BPM | TEMPERATURE: 97.8 F | RESPIRATION RATE: 16 BRPM | HEIGHT: 66 IN | OXYGEN SATURATION: 93 % | BODY MASS INDEX: 43.87 KG/M2 | WEIGHT: 273 LBS | DIASTOLIC BLOOD PRESSURE: 102 MMHG | SYSTOLIC BLOOD PRESSURE: 146 MMHG

## 2019-09-02 DIAGNOSIS — M25.562 CHRONIC PAIN OF LEFT KNEE: Primary | ICD-10-CM

## 2019-09-02 DIAGNOSIS — G89.29 CHRONIC PAIN OF LEFT KNEE: Primary | ICD-10-CM

## 2019-09-02 PROCEDURE — 99283 EMERGENCY DEPT VISIT LOW MDM: CPT

## 2019-09-02 PROCEDURE — 6370000000 HC RX 637 (ALT 250 FOR IP): Performed by: PHYSICIAN ASSISTANT

## 2019-09-02 PROCEDURE — 73562 X-RAY EXAM OF KNEE 3: CPT

## 2019-09-02 RX ORDER — ALPRAZOLAM 0.5 MG/1
0.5 TABLET ORAL 3 TIMES DAILY PRN
COMMUNITY
End: 2019-09-30 | Stop reason: SDUPTHER

## 2019-09-02 RX ORDER — IBUPROFEN 400 MG/1
800 TABLET ORAL ONCE
Status: COMPLETED | OUTPATIENT
Start: 2019-09-02 | End: 2019-09-02

## 2019-09-02 RX ORDER — OXYCODONE HYDROCHLORIDE 5 MG/1
5 TABLET ORAL EVERY 6 HOURS PRN
Qty: 6 TABLET | Refills: 0 | Status: SHIPPED | OUTPATIENT
Start: 2019-09-02 | End: 2019-09-05

## 2019-09-02 RX ORDER — HYDROCODONE BITARTRATE AND ACETAMINOPHEN 5; 325 MG/1; MG/1
2 TABLET ORAL ONCE
Status: COMPLETED | OUTPATIENT
Start: 2019-09-02 | End: 2019-09-02

## 2019-09-02 RX ADMIN — HYDROCODONE BITARTRATE AND ACETAMINOPHEN 2 TABLET: 5; 325 TABLET ORAL at 13:23

## 2019-09-02 RX ADMIN — IBUPROFEN 800 MG: 400 TABLET, FILM COATED ORAL at 14:30

## 2019-09-02 ASSESSMENT — PAIN SCALES - GENERAL
PAINLEVEL_OUTOF10: 4
PAINLEVEL_OUTOF10: 8

## 2019-09-02 NOTE — ED PROVIDER NOTES
ED Attending Attestation Note     Date of evaluation: 9/2/2019    This patient was seen by the advance practice provider. I have seen and examined the patient, agree with the workup, evaluation, management and diagnosis. The care plan has been discussed. My assessment reveals an uncomfortable appearing 63-year-old woman who presents with worsening left knee pain in the setting of a fall 3 weeks ago. She is seeing an orthopedic surgeon and doing gel injections for her severe osteoarthritis. Her examination reveals a tender medial left knee. She has no movement with an anterior posterior drawer sign, though she does have some mild tenderness with rotational loading of her knee. I advised her that no acute interventions were necessary, and that we would try to make her comfortable before discharging her from the emergency department. She has a follow-up scheduled with her orthopedist on Thursday.       Marcina Barthel, MD  9/2/2019 1:48 PM         Marcina Barthel, MD  09/02/19 4213
(03/2018); and Anus surgery (09/2018). Her family history includes Anemia in her mother; Arrhythmia in her father; Arthritis in her father and mother; COPD in her mother; Diabetes in her paternal grandfather and another family member; Obesity in her mother. She reports that she has never smoked. She has never used smokeless tobacco. She reports that she drinks alcohol. She reports that she has current or past drug history. Drug: Marijuana. Medications     Previous Medications    ALPRAZOLAM (XANAX) 0.5 MG TABLET    Take 0.5 mg by mouth 3 times daily as needed for Sleep or Anxiety. ATENOLOL (TENORMIN) 25 MG TABLET    TAKE 1 TABLET BY MOUTH EVERY DAY    DULOXETINE (CYMBALTA) 60 MG EXTENDED RELEASE CAPSULE    Take 1 capsule by mouth daily    IBUPROFEN (ADVIL;MOTRIN) 600 MG TABLET    TAKE 1 TABLET BY MOUTH EVERY 8 HOURS AS NEEDED FOR PAIN    LISINOPRIL (PRINIVIL;ZESTRIL) 20 MG TABLET    TAKE 1 TABLET BY MOUTH EVERY DAY    MELATONIN PO    Take 1 tablet by mouth nightly as needed    METHOCARBAMOL (ROBAXIN) 500 MG TABLET    TAKE 1 TABLET BY MOUTH 3 TIMES A DAY AS NEEDED FOR NECK PAIN    NAPROXEN (NAPROSYN) 500 MG TABLET    Take 1 tablet by mouth 2 times daily as needed    NYSTATIN (MYCOSTATIN) 393972 UNIT/GM OINTMENT    Apply topically 2 times daily. OMEPRAZOLE (PRILOSEC) 40 MG DELAYED RELEASE CAPSULE    TAKE ONE CAPSULE BY MOUTH EVERY DAY    ONDANSETRON (ZOFRAN) 4 MG TABLET    Take 1 tablet by mouth every 8 hours as needed for Nausea or Vomiting    ROPINIROLE (REQUIP) 2 MG TABLET    TAKE ONE TABLET BY MOUTH TWICE A DAY    ZOSTER RECOMBINANT ADJUVANTED VACCINE (SHINGRIX) 50 MCG/0.5ML SUSR INJECTION    50 MCG IM then repeat 2-6 months. Allergies     She is allergic to toradol [ketorolac tromethamine]; haldol [haloperidol lactate]; mobic [meloxicam]; and percocet [oxycodone-acetaminophen].     Physical Exam     INITIAL VITALS: BP: (!) 142/99, Temp: 97.8 °F (36.6 °C), Pulse: 70, Resp: 16, SpO2: 96 %  Physical

## 2019-09-05 ENCOUNTER — NURSE ONLY (OUTPATIENT)
Dept: ORTHOPEDIC SURGERY | Age: 54
End: 2019-09-05
Payer: COMMERCIAL

## 2019-09-05 VITALS — HEIGHT: 65 IN | WEIGHT: 259 LBS | BODY MASS INDEX: 43.15 KG/M2

## 2019-09-05 DIAGNOSIS — M17.12 PRIMARY OSTEOARTHRITIS OF LEFT KNEE: Primary | ICD-10-CM

## 2019-09-05 PROCEDURE — L1810 KO ELASTIC WITH JOINTS: HCPCS | Performed by: PHYSICIAN ASSISTANT

## 2019-09-05 PROCEDURE — 99213 OFFICE O/P EST LOW 20 MIN: CPT | Performed by: PHYSICIAN ASSISTANT

## 2019-09-05 RX ORDER — METHYLPREDNISOLONE 4 MG/1
TABLET ORAL
Qty: 1 KIT | Refills: 0 | Status: SHIPPED | OUTPATIENT
Start: 2019-09-05 | End: 2019-09-11

## 2019-09-05 NOTE — PROGRESS NOTES
over-the-counter or prescribed. We discussed taking the NSAID properly and the precautions. The patient understands that this medication may potentially interfere with other medications. Patient was also instructed to immediately discontinue the medication is there is any possible complication. Ascencion Rutherford was instructed to call the office if her symptoms worsen or if new symptoms appear prior to the next scheduled visit. She is specifically instructed to contact the office between now and schedule appointment if she has concerns related to her condition or if she needs assistance in scheduling any above tests. She is welcome to call for an appointment sooner if she has any additional concerns or questions. This dictation was performed with a verbal recognition program (DRAGON) and it was checked for errors. It is possible that there are still dictated errors within this office note. If so, please bring any errors to my attention for an addendum. All efforts were made to ensure that this office note is accurate.

## 2019-09-06 ENCOUNTER — TELEPHONE (OUTPATIENT)
Dept: ORTHOPEDIC SURGERY | Age: 54
End: 2019-09-06

## 2019-09-06 NOTE — TELEPHONE ENCOUNTER
9/5/19  DME   - NOT COVERED - FOLLOW MEDICAID FEE SCHEDULE AND ITEM NOT ON FEE SCHEDULE - SPLIT CODING CROSSWALK FOR MEDICAID  IS COVERED AND NO PRECERT REQUIRED - PER NOTES - NDS

## 2019-09-12 ENCOUNTER — NURSE ONLY (OUTPATIENT)
Dept: ORTHOPEDIC SURGERY | Age: 54
End: 2019-09-12
Payer: COMMERCIAL

## 2019-09-12 VITALS — HEIGHT: 65 IN | WEIGHT: 259 LBS | BODY MASS INDEX: 43.15 KG/M2

## 2019-09-12 DIAGNOSIS — M17.12 PRIMARY OSTEOARTHRITIS OF LEFT KNEE: Primary | ICD-10-CM

## 2019-09-12 PROCEDURE — 20610 DRAIN/INJ JOINT/BURSA W/O US: CPT | Performed by: PHYSICIAN ASSISTANT

## 2019-09-12 NOTE — PROGRESS NOTES
9/12/19 1:25 PM     Site & comments:   LEFT KNEE    NDC: [address]-01    Lot number: [id number]    Product:  [address]

## 2019-09-26 ENCOUNTER — OFFICE VISIT (OUTPATIENT)
Dept: ORTHOPEDIC SURGERY | Age: 54
End: 2019-09-26
Payer: COMMERCIAL

## 2019-09-26 VITALS — HEIGHT: 65 IN | BODY MASS INDEX: 43.15 KG/M2 | WEIGHT: 259 LBS

## 2019-09-26 DIAGNOSIS — M17.12 PRIMARY OSTEOARTHRITIS OF LEFT KNEE: Primary | ICD-10-CM

## 2019-09-26 DIAGNOSIS — M25.462 EFFUSION OF LEFT KNEE: ICD-10-CM

## 2019-09-26 PROCEDURE — 20610 DRAIN/INJ JOINT/BURSA W/O US: CPT | Performed by: PHYSICIAN ASSISTANT

## 2019-09-26 RX ORDER — OMEPRAZOLE 40 MG/1
CAPSULE, DELAYED RELEASE ORAL
Qty: 30 CAPSULE | Refills: 3 | Status: SHIPPED | OUTPATIENT
Start: 2019-09-26 | End: 2020-01-20

## 2019-10-03 ENCOUNTER — OFFICE VISIT (OUTPATIENT)
Dept: FAMILY MEDICINE CLINIC | Age: 54
End: 2019-10-03
Payer: COMMERCIAL

## 2019-10-03 VITALS
BODY MASS INDEX: 44.48 KG/M2 | HEIGHT: 65 IN | SYSTOLIC BLOOD PRESSURE: 114 MMHG | WEIGHT: 267 LBS | HEART RATE: 77 BPM | OXYGEN SATURATION: 98 % | DIASTOLIC BLOOD PRESSURE: 76 MMHG

## 2019-10-03 DIAGNOSIS — E78.2 ELEVATED TRIGLYCERIDES WITH HIGH CHOLESTEROL: ICD-10-CM

## 2019-10-03 DIAGNOSIS — I10 ESSENTIAL HYPERTENSION: Primary | ICD-10-CM

## 2019-10-03 DIAGNOSIS — Z23 NEED FOR INFLUENZA VACCINATION: ICD-10-CM

## 2019-10-03 DIAGNOSIS — F32.A DEPRESSION, UNSPECIFIED DEPRESSION TYPE: ICD-10-CM

## 2019-10-03 DIAGNOSIS — F41.9 ANXIETY: ICD-10-CM

## 2019-10-03 PROCEDURE — G8427 DOCREV CUR MEDS BY ELIG CLIN: HCPCS | Performed by: FAMILY MEDICINE

## 2019-10-03 PROCEDURE — 3017F COLORECTAL CA SCREEN DOC REV: CPT | Performed by: FAMILY MEDICINE

## 2019-10-03 PROCEDURE — 99213 OFFICE O/P EST LOW 20 MIN: CPT | Performed by: FAMILY MEDICINE

## 2019-10-03 PROCEDURE — 90471 IMMUNIZATION ADMIN: CPT | Performed by: FAMILY MEDICINE

## 2019-10-03 PROCEDURE — G8417 CALC BMI ABV UP PARAM F/U: HCPCS | Performed by: FAMILY MEDICINE

## 2019-10-03 PROCEDURE — G8482 FLU IMMUNIZE ORDER/ADMIN: HCPCS | Performed by: FAMILY MEDICINE

## 2019-10-03 PROCEDURE — 90686 IIV4 VACC NO PRSV 0.5 ML IM: CPT | Performed by: FAMILY MEDICINE

## 2019-10-03 PROCEDURE — 1036F TOBACCO NON-USER: CPT | Performed by: FAMILY MEDICINE

## 2019-10-03 ASSESSMENT — ENCOUNTER SYMPTOMS: RESPIRATORY NEGATIVE: 1

## 2019-10-14 ENCOUNTER — TELEPHONE (OUTPATIENT)
Dept: FAMILY MEDICINE CLINIC | Age: 54
End: 2019-10-14

## 2019-10-14 DIAGNOSIS — K04.7 ABSCESSED TOOTH: Primary | ICD-10-CM

## 2019-10-14 RX ORDER — HYDROCODONE BITARTRATE AND ACETAMINOPHEN 5; 325 MG/1; MG/1
1 TABLET ORAL EVERY 6 HOURS PRN
Qty: 16 TABLET | Refills: 0 | Status: SHIPPED | OUTPATIENT
Start: 2019-10-14 | End: 2019-10-18

## 2019-10-14 RX ORDER — AMOXICILLIN 500 MG/1
500 CAPSULE ORAL 3 TIMES DAILY
Qty: 30 CAPSULE | Refills: 0 | Status: SHIPPED | OUTPATIENT
Start: 2019-10-14 | End: 2019-11-21 | Stop reason: ALTCHOICE

## 2019-10-14 RX ORDER — METHOCARBAMOL 500 MG/1
TABLET, FILM COATED ORAL
Qty: 90 TABLET | Refills: 1 | Status: SHIPPED | OUTPATIENT
Start: 2019-10-14 | End: 2019-12-13 | Stop reason: SDUPTHER

## 2019-10-23 ENCOUNTER — OFFICE VISIT (OUTPATIENT)
Dept: ORTHOPEDIC SURGERY | Age: 54
End: 2019-10-23
Payer: COMMERCIAL

## 2019-10-23 VITALS — BODY MASS INDEX: 43.13 KG/M2 | HEIGHT: 66 IN | WEIGHT: 268.4 LBS

## 2019-10-23 DIAGNOSIS — M17.12 PRIMARY OSTEOARTHRITIS OF LEFT KNEE: Primary | ICD-10-CM

## 2019-10-23 PROCEDURE — G8427 DOCREV CUR MEDS BY ELIG CLIN: HCPCS | Performed by: ORTHOPAEDIC SURGERY

## 2019-10-23 PROCEDURE — 99214 OFFICE O/P EST MOD 30 MIN: CPT | Performed by: ORTHOPAEDIC SURGERY

## 2019-10-23 PROCEDURE — G8417 CALC BMI ABV UP PARAM F/U: HCPCS | Performed by: ORTHOPAEDIC SURGERY

## 2019-10-23 PROCEDURE — 3017F COLORECTAL CA SCREEN DOC REV: CPT | Performed by: ORTHOPAEDIC SURGERY

## 2019-10-23 PROCEDURE — G8482 FLU IMMUNIZE ORDER/ADMIN: HCPCS | Performed by: ORTHOPAEDIC SURGERY

## 2019-10-23 PROCEDURE — 1036F TOBACCO NON-USER: CPT | Performed by: ORTHOPAEDIC SURGERY

## 2019-10-23 RX ORDER — DULOXETIN HYDROCHLORIDE 60 MG/1
CAPSULE, DELAYED RELEASE ORAL
Qty: 30 CAPSULE | Refills: 5 | Status: SHIPPED | OUTPATIENT
Start: 2019-10-23 | End: 2020-04-13

## 2019-10-23 RX ORDER — METHYLPREDNISOLONE 4 MG/1
TABLET ORAL
Qty: 1 KIT | Refills: 0 | Status: SHIPPED | OUTPATIENT
Start: 2019-10-23 | End: 2019-11-21 | Stop reason: ALTCHOICE

## 2019-10-24 RX ORDER — ATENOLOL 25 MG/1
TABLET ORAL
Qty: 30 TABLET | Refills: 5 | Status: SHIPPED | OUTPATIENT
Start: 2019-10-24 | End: 2020-04-09

## 2019-11-18 RX ORDER — ROPINIROLE 2 MG/1
TABLET, FILM COATED ORAL
Qty: 60 TABLET | Refills: 5 | Status: SHIPPED | OUTPATIENT
Start: 2019-11-18 | End: 2020-05-05

## 2019-11-21 ENCOUNTER — OFFICE VISIT (OUTPATIENT)
Dept: FAMILY MEDICINE CLINIC | Age: 54
End: 2019-11-21
Payer: COMMERCIAL

## 2019-11-21 VITALS
TEMPERATURE: 97.6 F | SYSTOLIC BLOOD PRESSURE: 132 MMHG | OXYGEN SATURATION: 98 % | BODY MASS INDEX: 44.65 KG/M2 | HEART RATE: 74 BPM | WEIGHT: 268 LBS | HEIGHT: 65 IN | DIASTOLIC BLOOD PRESSURE: 86 MMHG

## 2019-11-21 DIAGNOSIS — J40 BRONCHITIS: Primary | ICD-10-CM

## 2019-11-21 DIAGNOSIS — J06.9 VIRAL URI: ICD-10-CM

## 2019-11-21 PROCEDURE — G8482 FLU IMMUNIZE ORDER/ADMIN: HCPCS | Performed by: FAMILY MEDICINE

## 2019-11-21 PROCEDURE — 1036F TOBACCO NON-USER: CPT | Performed by: FAMILY MEDICINE

## 2019-11-21 PROCEDURE — 99213 OFFICE O/P EST LOW 20 MIN: CPT | Performed by: FAMILY MEDICINE

## 2019-11-21 PROCEDURE — 3017F COLORECTAL CA SCREEN DOC REV: CPT | Performed by: FAMILY MEDICINE

## 2019-11-21 PROCEDURE — G8417 CALC BMI ABV UP PARAM F/U: HCPCS | Performed by: FAMILY MEDICINE

## 2019-11-21 PROCEDURE — G8427 DOCREV CUR MEDS BY ELIG CLIN: HCPCS | Performed by: FAMILY MEDICINE

## 2019-11-21 RX ORDER — GUAIFENESIN AND CODEINE PHOSPHATE 100; 10 MG/5ML; MG/5ML
5 SOLUTION ORAL 4 TIMES DAILY PRN
Qty: 180 ML | Refills: 1 | Status: SHIPPED | OUTPATIENT
Start: 2019-11-21 | End: 2019-12-18

## 2019-11-21 RX ORDER — AZITHROMYCIN 250 MG/1
TABLET, FILM COATED ORAL
Qty: 1 PACKET | Refills: 0 | Status: SHIPPED | OUTPATIENT
Start: 2019-11-21 | End: 2019-12-31 | Stop reason: ALTCHOICE

## 2019-12-08 DIAGNOSIS — R03.0 ELEVATED BLOOD PRESSURE READING: ICD-10-CM

## 2019-12-10 RX ORDER — LISINOPRIL 20 MG/1
TABLET ORAL
Qty: 30 TABLET | Refills: 6 | Status: SHIPPED | OUTPATIENT
Start: 2019-12-10 | End: 2020-07-06

## 2019-12-16 ENCOUNTER — OFFICE VISIT (OUTPATIENT)
Dept: ORTHOPEDIC SURGERY | Age: 54
End: 2019-12-16
Payer: COMMERCIAL

## 2019-12-16 VITALS — WEIGHT: 272.6 LBS | BODY MASS INDEX: 45.42 KG/M2 | HEIGHT: 65 IN

## 2019-12-16 DIAGNOSIS — M17.12 PRIMARY OSTEOARTHRITIS OF LEFT KNEE: Primary | ICD-10-CM

## 2019-12-16 PROCEDURE — 1036F TOBACCO NON-USER: CPT | Performed by: ORTHOPAEDIC SURGERY

## 2019-12-16 PROCEDURE — G8417 CALC BMI ABV UP PARAM F/U: HCPCS | Performed by: ORTHOPAEDIC SURGERY

## 2019-12-16 PROCEDURE — G8427 DOCREV CUR MEDS BY ELIG CLIN: HCPCS | Performed by: ORTHOPAEDIC SURGERY

## 2019-12-16 PROCEDURE — G8482 FLU IMMUNIZE ORDER/ADMIN: HCPCS | Performed by: ORTHOPAEDIC SURGERY

## 2019-12-16 PROCEDURE — 99213 OFFICE O/P EST LOW 20 MIN: CPT | Performed by: ORTHOPAEDIC SURGERY

## 2019-12-16 PROCEDURE — 3017F COLORECTAL CA SCREEN DOC REV: CPT | Performed by: ORTHOPAEDIC SURGERY

## 2019-12-16 RX ORDER — METHYLPREDNISOLONE 4 MG/1
TABLET ORAL
Qty: 1 KIT | Refills: 0 | Status: SHIPPED | OUTPATIENT
Start: 2019-12-16 | End: 2019-12-31

## 2019-12-16 RX ORDER — METHOCARBAMOL 500 MG/1
TABLET, FILM COATED ORAL
Qty: 90 TABLET | Refills: 1 | Status: SHIPPED | OUTPATIENT
Start: 2019-12-16 | End: 2020-02-10

## 2019-12-16 RX ORDER — HYDROCODONE BITARTRATE AND ACETAMINOPHEN 5; 325 MG/1; MG/1
1 TABLET ORAL EVERY 6 HOURS PRN
Qty: 20 TABLET | Refills: 0 | Status: SHIPPED | OUTPATIENT
Start: 2019-12-16 | End: 2019-12-21

## 2019-12-31 ENCOUNTER — OFFICE VISIT (OUTPATIENT)
Dept: FAMILY MEDICINE CLINIC | Age: 54
End: 2019-12-31
Payer: COMMERCIAL

## 2019-12-31 VITALS
OXYGEN SATURATION: 93 % | HEIGHT: 66 IN | DIASTOLIC BLOOD PRESSURE: 70 MMHG | RESPIRATION RATE: 16 BRPM | BODY MASS INDEX: 44.2 KG/M2 | WEIGHT: 275 LBS | HEART RATE: 66 BPM | SYSTOLIC BLOOD PRESSURE: 116 MMHG

## 2019-12-31 PROBLEM — S62.624A CLOSED DISPLACED FRACTURE OF MIDDLE PHALANX OF RIGHT RING FINGER: Status: ACTIVE | Noted: 2019-07-12

## 2019-12-31 PROBLEM — F33.1 MAJOR DEPRESSIVE DISORDER, RECURRENT, MODERATE (HCC): Status: ACTIVE | Noted: 2019-12-31

## 2019-12-31 PROBLEM — K62.9 ANAL LESION: Status: ACTIVE | Noted: 2019-12-31

## 2019-12-31 PROBLEM — I10 ESSENTIAL HYPERTENSION: Status: ACTIVE | Noted: 2018-12-10

## 2019-12-31 PROBLEM — F43.21 ADJUSTMENT DISORDER WITH DEPRESSED MOOD: Status: ACTIVE | Noted: 2018-12-10

## 2019-12-31 PROBLEM — F41.1 GENERALIZED ANXIETY DISORDER: Status: ACTIVE | Noted: 2019-12-31

## 2019-12-31 PROCEDURE — 99213 OFFICE O/P EST LOW 20 MIN: CPT | Performed by: FAMILY MEDICINE

## 2019-12-31 PROCEDURE — G8428 CUR MEDS NOT DOCUMENT: HCPCS | Performed by: FAMILY MEDICINE

## 2019-12-31 PROCEDURE — G8417 CALC BMI ABV UP PARAM F/U: HCPCS | Performed by: FAMILY MEDICINE

## 2019-12-31 PROCEDURE — 1036F TOBACCO NON-USER: CPT | Performed by: FAMILY MEDICINE

## 2019-12-31 PROCEDURE — G8482 FLU IMMUNIZE ORDER/ADMIN: HCPCS | Performed by: FAMILY MEDICINE

## 2019-12-31 PROCEDURE — 3017F COLORECTAL CA SCREEN DOC REV: CPT | Performed by: FAMILY MEDICINE

## 2019-12-31 RX ORDER — ALPRAZOLAM 1 MG/1
1 TABLET ORAL 3 TIMES DAILY PRN
COMMUNITY
End: 2020-02-18

## 2019-12-31 RX ORDER — AMOXICILLIN 500 MG/1
500 CAPSULE ORAL 3 TIMES DAILY
Qty: 30 CAPSULE | Refills: 0 | Status: SHIPPED | OUTPATIENT
Start: 2019-12-31 | End: 2020-01-08 | Stop reason: ALTCHOICE

## 2019-12-31 ASSESSMENT — PATIENT HEALTH QUESTIONNAIRE - PHQ9
1. LITTLE INTEREST OR PLEASURE IN DOING THINGS: 0
SUM OF ALL RESPONSES TO PHQ9 QUESTIONS 1 & 2: 0
SUM OF ALL RESPONSES TO PHQ QUESTIONS 1-9: 0
SUM OF ALL RESPONSES TO PHQ QUESTIONS 1-9: 0
2. FEELING DOWN, DEPRESSED OR HOPELESS: 0

## 2019-12-31 NOTE — PROGRESS NOTES
Sexual Activity    Alcohol use: Yes     Comment: 1 -2 x a month    Drug use: Not Currently     Types: Marijuana     Comment: quit years ago    Sexual activity: Never   Lifestyle    Physical activity:     Days per week: Not on file     Minutes per session: Not on file    Stress: Not on file   Relationships    Social connections:     Talks on phone: Not on file     Gets together: Not on file     Attends Zoroastrian service: Not on file     Active member of club or organization: Not on file     Attends meetings of clubs or organizations: Not on file     Relationship status: Not on file    Intimate partner violence:     Fear of current or ex partner: Not on file     Emotionally abused: Not on file     Physically abused: Not on file     Forced sexual activity: Not on file   Other Topics Concern    Not on file   Social History Narrative    Not on file       Family History   Problem Relation Age of Onset    Arthritis Mother     Obesity Mother     COPD Mother    [facility] Anemia Mother     Arthritis Father     Arrhythmia Father     Diabetes Other     Diabetes Paternal Grandfather     Cancer Neg Hx     Breast Cancer Neg Hx     Colon Cancer Neg Hx        Vitals:    12/31/19 1138   BP: 116/70   Pulse: 66   Resp: 16   SpO2: 93%       Wt Readings from Last 3 Encounters:   12/31/19 275 lb (124.7 kg)   12/16/19 272 lb 9.6 oz (123.7 kg)   11/21/19 268 lb (121.6 kg)       Review of Systems   Constitutional: Positive for fever (low grade fever). HENT: Positive for congestion and ear pain. Respiratory: Positive for cough. Gastrointestinal: Positive for diarrhea. Neurological: Negative for dizziness. Objective:   Physical Exam  Constitutional:       General: She is not in acute distress. Appearance: She is ill-appearing. HENT:      Right Ear: There is impacted cerumen. Left Ear: There is impacted cerumen. Nose: Congestion and rhinorrhea present.       Mouth/Throat:      Pharynx: Posterior

## 2020-01-08 ENCOUNTER — OFFICE VISIT (OUTPATIENT)
Dept: FAMILY MEDICINE CLINIC | Age: 55
End: 2020-01-08
Payer: COMMERCIAL

## 2020-01-08 VITALS
SYSTOLIC BLOOD PRESSURE: 132 MMHG | BODY MASS INDEX: 42.59 KG/M2 | RESPIRATION RATE: 14 BRPM | DIASTOLIC BLOOD PRESSURE: 76 MMHG | OXYGEN SATURATION: 95 % | HEART RATE: 78 BPM | WEIGHT: 265 LBS | HEIGHT: 66 IN

## 2020-01-08 DIAGNOSIS — I10 ESSENTIAL HYPERTENSION: ICD-10-CM

## 2020-01-08 DIAGNOSIS — E78.2 ELEVATED TRIGLYCERIDES WITH HIGH CHOLESTEROL: ICD-10-CM

## 2020-01-08 LAB
A/G RATIO: 1.9 (ref 1.1–2.2)
ALBUMIN SERPL-MCNC: 4.7 G/DL (ref 3.4–5)
ALP BLD-CCNC: 76 U/L (ref 40–129)
ALT SERPL-CCNC: 28 U/L (ref 10–40)
ANION GAP SERPL CALCULATED.3IONS-SCNC: 19 MMOL/L (ref 3–16)
AST SERPL-CCNC: 22 U/L (ref 15–37)
BASOPHILS ABSOLUTE: 0.1 K/UL (ref 0–0.2)
BASOPHILS RELATIVE PERCENT: 0.7 %
BILIRUB SERPL-MCNC: 1 MG/DL (ref 0–1)
BUN BLDV-MCNC: 13 MG/DL (ref 7–20)
CALCIUM SERPL-MCNC: 10.1 MG/DL (ref 8.3–10.6)
CHLORIDE BLD-SCNC: 99 MMOL/L (ref 99–110)
CHOLESTEROL, TOTAL: 204 MG/DL (ref 0–199)
CO2: 20 MMOL/L (ref 21–32)
CREAT SERPL-MCNC: 0.9 MG/DL (ref 0.6–1.1)
EOSINOPHILS ABSOLUTE: 0.1 K/UL (ref 0–0.6)
EOSINOPHILS RELATIVE PERCENT: 1.4 %
GFR AFRICAN AMERICAN: >60
GFR NON-AFRICAN AMERICAN: >60
GLOBULIN: 2.5 G/DL
GLUCOSE BLD-MCNC: 111 MG/DL (ref 70–99)
HCT VFR BLD CALC: 45.8 % (ref 36–48)
HDLC SERPL-MCNC: 33 MG/DL (ref 40–60)
HEMOGLOBIN: 15.1 G/DL (ref 12–16)
LDL CHOLESTEROL CALCULATED: 124 MG/DL
LYMPHOCYTES ABSOLUTE: 2.2 K/UL (ref 1–5.1)
LYMPHOCYTES RELATIVE PERCENT: 23.2 %
MCH RBC QN AUTO: 30.7 PG (ref 26–34)
MCHC RBC AUTO-ENTMCNC: 33 G/DL (ref 31–36)
MCV RBC AUTO: 93.3 FL (ref 80–100)
MONOCYTES ABSOLUTE: 0.8 K/UL (ref 0–1.3)
MONOCYTES RELATIVE PERCENT: 8.1 %
NEUTROPHILS ABSOLUTE: 6.2 K/UL (ref 1.7–7.7)
NEUTROPHILS RELATIVE PERCENT: 66.6 %
PDW BLD-RTO: 15.6 % (ref 12.4–15.4)
PLATELET # BLD: 218 K/UL (ref 135–450)
PMV BLD AUTO: 10 FL (ref 5–10.5)
POTASSIUM SERPL-SCNC: 3.9 MMOL/L (ref 3.5–5.1)
RBC # BLD: 4.9 M/UL (ref 4–5.2)
SODIUM BLD-SCNC: 138 MMOL/L (ref 136–145)
TOTAL PROTEIN: 7.2 G/DL (ref 6.4–8.2)
TRIGL SERPL-MCNC: 234 MG/DL (ref 0–150)
VLDLC SERPL CALC-MCNC: 47 MG/DL
WBC # BLD: 9.4 K/UL (ref 4–11)

## 2020-01-08 PROCEDURE — G8482 FLU IMMUNIZE ORDER/ADMIN: HCPCS | Performed by: FAMILY MEDICINE

## 2020-01-08 PROCEDURE — 99213 OFFICE O/P EST LOW 20 MIN: CPT | Performed by: FAMILY MEDICINE

## 2020-01-08 PROCEDURE — 1036F TOBACCO NON-USER: CPT | Performed by: FAMILY MEDICINE

## 2020-01-08 PROCEDURE — G8427 DOCREV CUR MEDS BY ELIG CLIN: HCPCS | Performed by: FAMILY MEDICINE

## 2020-01-08 PROCEDURE — 3017F COLORECTAL CA SCREEN DOC REV: CPT | Performed by: FAMILY MEDICINE

## 2020-01-08 PROCEDURE — G8417 CALC BMI ABV UP PARAM F/U: HCPCS | Performed by: FAMILY MEDICINE

## 2020-01-08 ASSESSMENT — PATIENT HEALTH QUESTIONNAIRE - PHQ9
2. FEELING DOWN, DEPRESSED OR HOPELESS: 0
SUM OF ALL RESPONSES TO PHQ9 QUESTIONS 1 & 2: 0
SUM OF ALL RESPONSES TO PHQ QUESTIONS 1-9: 0
1. LITTLE INTEREST OR PLEASURE IN DOING THINGS: 0
SUM OF ALL RESPONSES TO PHQ QUESTIONS 1-9: 0

## 2020-01-08 NOTE — PROGRESS NOTES
Subjective:      Patient ID: Silvio Francis is a 47 y.o. y.o. female. Here to follow up her ears. Left was real clogged. Used peroxide and a large wax plug came out of the left. Feels better  Still ringing in the right ear. Severity is less. HPI      Chief Complaint   Patient presents with    Cerumen Impaction     left ear ringing still but feels better don't hurt anymore but still have ringing in Right ear       Allergies   Allergen Reactions    Toradol [Ketorolac Tromethamine] Other (See Comments)     \"out of it\"  \"felt like sleep paralysis\"    Haldol [Haloperidol Lactate] Other (See Comments)     \"felt out of it, similar to the toradol\"    Mobic [Meloxicam]     Percocet [Oxycodone-Acetaminophen] Itching and Rash       Past Medical History:   Diagnosis Date    Anxiety     Depression     GERD (gastroesophageal reflux disease)     Hypertension     Osteoarthritis     Restless leg syndrome        Past Surgical History:   Procedure Laterality Date    ANUS SURGERY  2018    fissure     SECTION      x3    HERNIA REPAIR  2018    LAPAROSCOPIC PARAESOPHAGEAL HERNIA REPAIR WITH MESH    HIATAL HERNIA REPAIR  2018    KNEE ARTHROSCOPY Left 11/15/12    ARTHROSCOPY LEFT KNEE WITH SYOVECTOMY AND CHONDROPLASTY    OVARY REMOVAL Right     TONSILLECTOMY  1981    UPPER GASTROINTESTINAL ENDOSCOPY      UPPER GASTROINTESTINAL ENDOSCOPY  2015    esophageasl stretch       Social History     Socioeconomic History    Marital status:       Spouse name: Not on file    Number of children: 3    Years of education: Not on file    Highest education level: Not on file   Occupational History    Occupation:    Social Needs    Financial resource strain: Not on file    Food insecurity:     Worry: Not on file     Inability: Not on file    Transportation needs:     Medical: Not on file     Non-medical: Not on file   Tobacco Use    Smoking status: Never Smoker    Smokeless tobacco: Never Used   Substance and Sexual Activity    Alcohol use: Yes     Comment: 1 -2 x a month    Drug use: Not Currently     Types: Marijuana     Comment: quit years ago    Sexual activity: Never   Lifestyle    Physical activity:     Days per week: Not on file     Minutes per session: Not on file    Stress: Not on file   Relationships    Social connections:     Talks on phone: Not on file     Gets together: Not on file     Attends Buddhism service: Not on file     Active member of club or organization: Not on file     Attends meetings of clubs or organizations: Not on file     Relationship status: Not on file    Intimate partner violence:     Fear of current or ex partner: Not on file     Emotionally abused: Not on file     Physically abused: Not on file     Forced sexual activity: Not on file   Other Topics Concern    Not on file   Social History Narrative    Not on file       Family History   Problem Relation Age of Onset    Arthritis Mother     Obesity Mother     COPD Mother    Wandalee Brooker Anemia Mother     Arthritis Father     Arrhythmia Father     Diabetes Other     Diabetes Paternal Grandfather     Cancer Neg Hx     Breast Cancer Neg Hx     Colon Cancer Neg Hx        Vitals:    01/08/20 1103   BP: 132/76   Pulse: 78   Resp: 14   SpO2: 95%       Wt Readings from Last 3 Encounters:   01/08/20 265 lb (120.2 kg)   12/31/19 275 lb (124.7 kg)   12/16/19 272 lb 9.6 oz (123.7 kg)       Review of Systems    Objective:   Physical Exam  HENT:      Ears:      Comments: bilat cerumen    Cleared  TM and canal OK  Subjectively better        Assessment:      Cerumen impaction bilaterally with hearing loss        Plan:     Ear lavage and evacuation of impacted cerumen. Current Outpatient Medications   Medication Sig Dispense Refill    ALPRAZolam (XANAX) 1 MG tablet Take 1 mg by mouth 3 times daily as needed for Sleep.       methocarbamol (ROBAXIN) 500 MG tablet TAKE 1 TABLET BY MOUTH 3 TIMES A DAY AS

## 2020-01-09 ENCOUNTER — OFFICE VISIT (OUTPATIENT)
Dept: ORTHOPEDIC SURGERY | Age: 55
End: 2020-01-09
Payer: COMMERCIAL

## 2020-01-09 VITALS — WEIGHT: 268.6 LBS | BODY MASS INDEX: 43.17 KG/M2 | HEIGHT: 66 IN

## 2020-01-09 PROCEDURE — 1036F TOBACCO NON-USER: CPT | Performed by: PHYSICIAN ASSISTANT

## 2020-01-09 PROCEDURE — G8417 CALC BMI ABV UP PARAM F/U: HCPCS | Performed by: PHYSICIAN ASSISTANT

## 2020-01-09 PROCEDURE — G8427 DOCREV CUR MEDS BY ELIG CLIN: HCPCS | Performed by: PHYSICIAN ASSISTANT

## 2020-01-09 PROCEDURE — 99213 OFFICE O/P EST LOW 20 MIN: CPT | Performed by: PHYSICIAN ASSISTANT

## 2020-01-09 PROCEDURE — 3017F COLORECTAL CA SCREEN DOC REV: CPT | Performed by: PHYSICIAN ASSISTANT

## 2020-01-09 PROCEDURE — G8482 FLU IMMUNIZE ORDER/ADMIN: HCPCS | Performed by: PHYSICIAN ASSISTANT

## 2020-01-09 NOTE — PROGRESS NOTES
CHIEF COMPLAINT:    Chief Complaint   Patient presents with    Knee Pain     CK LEFT KNEE OA, WEIGHT CHECK       HISTORY OF PRESENT ILLNESS:                The patient is a 47 y.o. female this patient presents today for follow-up regarding her LEFT knee. She has known significant osteoarthritis of her LEFT knee. She is working on weight loss in an effort to prepare for possible upcoming total joint surgery this year. She is had a cortisone injection which really has not helped her much. She has been doing physical therapy and has lost about 10 pounds. This has improved her overall knee pain. She has been through a lot personally recently with the death of her son. She is also getting have some pretty significant dental work performed over the next 2 months including pulling all of her upper and lower teeth and fitting for dentures, this should be completed by March.   Past Medical History:   Diagnosis Date    Anxiety     Depression     GERD (gastroesophageal reflux disease)     Hypertension     Osteoarthritis     Restless leg syndrome             The pain assessment was noted & is as follows:  Pain Assessment  Location of Pain: Knee  Location Modifiers: Left  Severity of Pain: 3  Quality of Pain: Aching  Duration of Pain: Persistent  Frequency of Pain: Intermittent]      Work Status/Functionality:     Past Medical History: Medical history form was reviewed today & can be found in the media tab  Past Medical History:   Diagnosis Date    Anxiety     Depression     GERD (gastroesophageal reflux disease)     Hypertension     Osteoarthritis     Restless leg syndrome       Past Surgical History:     Past Surgical History:   Procedure Laterality Date    ANUS SURGERY  2018    fissure     SECTION      x3    HERNIA REPAIR  2018    LAPAROSCOPIC PARAESOPHAGEAL HERNIA REPAIR WITH MESH    HIATAL HERNIA REPAIR  2018    KNEE ARTHROSCOPY Left 11/15/12    ARTHROSCOPY LEFT KNEE WITH patient's chart. CONSTITUTIONAL: Denies unexplained weight loss, fevers, chills   NEUROLOGICAL: Denies unsteady gait or progressive weakness  SKIN: Denies skin changes, delayed healing, rash, itching       PHYSICAL EXAM:    Vitals: Height 5' 5.98\" (1.676 m), weight 268 lb 9.6 oz (121.8 kg), last menstrual period 09/20/2013, not currently breastfeeding. GENERAL EXAM:  · General Apparence: Patient is adequately groomed with no evidence of malnutrition. · Orientation: The patient is oriented to time, place and person. · Mood & Affect:The patient's mood and affect are appropriate       LEFT knee PHYSICAL EXAMINATION:  · Inspection: Overweight, trace swelling of the LEFT knee    · Palpation: Mild tenderness medially and laterally    · Range of Motion: 125 degrees    · Strength: Extensor mechanism intact    · Special Tests: ligamentously stable          · Skin:  There are no rashes, ulcerations or lesions. · There are no distal  dysvascular changes     Gait & station: Slightly antalgic favoring the LEFT knee      Additional Examinations:        Right Lower Extremity: Examination of the right lower extremity does not show any tenderness, deformity or injury. Range of motion is unremarkable. There is no gross instability. There are no rashes, ulcerations or lesions. Strength and tone are normal.      Diagnostic Testing:    Orders   No orders of the defined types were placed in this encounter. Assessment / Treatment Plan:     1. LEFT knee osteoarthritis    Patient will continue to work on weight loss. She is can have some dental work so we would not consider surgery until sometime late spring early summer anyway. I like to see her back in about 6 to 8 weeks for a weight check and possibly plan for total joint replacement at the earliest around May. She is in agreement.

## 2020-01-13 ENCOUNTER — TELEPHONE (OUTPATIENT)
Dept: FAMILY MEDICINE CLINIC | Age: 55
End: 2020-01-13

## 2020-01-13 RX ORDER — PRAVASTATIN SODIUM 20 MG
20 TABLET ORAL DAILY
Qty: 30 TABLET | Refills: 5 | Status: SHIPPED | OUTPATIENT
Start: 2020-01-13 | End: 2020-07-22

## 2020-01-20 RX ORDER — OMEPRAZOLE 40 MG/1
CAPSULE, DELAYED RELEASE ORAL
Qty: 30 CAPSULE | Refills: 3 | Status: SHIPPED | OUTPATIENT
Start: 2020-01-20 | End: 2020-05-11

## 2020-01-20 NOTE — TELEPHONE ENCOUNTER
Future Appointments   Date Time Provider Diann Sotelo   2/24/2020  1:00 PM MD [facility]     1/8/2020 [facility]

## 2020-01-24 ENCOUNTER — TELEPHONE (OUTPATIENT)
Dept: FAMILY MEDICINE CLINIC | Age: 55
End: 2020-01-24

## 2020-01-24 DIAGNOSIS — F41.8 ANTICIPATORY ANXIETY: Primary | ICD-10-CM

## 2020-01-24 RX ORDER — DIAZEPAM 5 MG/1
TABLET ORAL
Qty: 2 TABLET | Refills: 0 | Status: SHIPPED | OUTPATIENT
Start: 2020-01-24 | End: 2020-02-01

## 2020-01-27 ENCOUNTER — TELEPHONE (OUTPATIENT)
Dept: FAMILY MEDICINE CLINIC | Age: 55
End: 2020-01-27

## 2020-01-27 NOTE — TELEPHONE ENCOUNTER
Patient calling to let Dr Karen Zhong know since starting she has body aches and diarrhea, Please call patient back to advise at [phone]

## 2020-02-24 ENCOUNTER — OFFICE VISIT (OUTPATIENT)
Dept: ORTHOPEDIC SURGERY | Age: 55
End: 2020-02-24
Payer: COMMERCIAL

## 2020-02-24 VITALS — WEIGHT: 272 LBS | HEIGHT: 66 IN | BODY MASS INDEX: 43.71 KG/M2

## 2020-02-24 PROCEDURE — 99214 OFFICE O/P EST MOD 30 MIN: CPT | Performed by: ORTHOPAEDIC SURGERY

## 2020-02-24 PROCEDURE — 3017F COLORECTAL CA SCREEN DOC REV: CPT | Performed by: ORTHOPAEDIC SURGERY

## 2020-02-24 PROCEDURE — G8428 CUR MEDS NOT DOCUMENT: HCPCS | Performed by: ORTHOPAEDIC SURGERY

## 2020-02-24 PROCEDURE — 1036F TOBACCO NON-USER: CPT | Performed by: ORTHOPAEDIC SURGERY

## 2020-02-24 PROCEDURE — G8482 FLU IMMUNIZE ORDER/ADMIN: HCPCS | Performed by: ORTHOPAEDIC SURGERY

## 2020-02-24 PROCEDURE — G8417 CALC BMI ABV UP PARAM F/U: HCPCS | Performed by: ORTHOPAEDIC SURGERY

## 2020-02-24 RX ORDER — MELOXICAM 15 MG/1
15 TABLET ORAL DAILY
Qty: 30 TABLET | Refills: 0 | Status: SHIPPED | OUTPATIENT
Start: 2020-02-24 | End: 2020-03-19

## 2020-02-24 NOTE — PROGRESS NOTES
CHIEF COMPLAINT:    Chief Complaint   Patient presents with    Knee Pain     LET KNEE OA, WEIGHT CHECK       HISTORY OF PRESENT ILLNESS:    Returns in long-term follow-up for her Lefton knee. She is been struggle with weight reduction trying to get her BMI below 40. I went back and reviewed weights. She was at Dr. Evy Weaver office at 265 pounds in January. She is now at 272. In the process of getting her teeth pulled and has not been eating much she reports. Also reports that the note nighttime issues are difficult. She is been taking Motrin and muscle relaxant which are not helping her much. The patient is a 47 y.o. female   Past Medical History:   Diagnosis Date    Anxiety     Depression     GERD (gastroesophageal reflux disease)     Hypertension     Osteoarthritis     Restless leg syndrome         Work Status:      The pain assessment was noted & is as follows:  Pain Assessment  Location of Pain: Knee  Location Modifiers: Left  Severity of Pain: 8  Quality of Pain: Aching  Duration of Pain: Persistent  Frequency of Pain: Constant]      Work Status/Functionality:     Past Medical History: Medical history form was reviewed today & can be found in the media tab  Past Medical History:   Diagnosis Date    Anxiety     Depression     GERD (gastroesophageal reflux disease)     Hypertension     Osteoarthritis     Restless leg syndrome       Past Surgical History:     Past Surgical History:   Procedure Laterality Date    ANUS SURGERY  2018    fissure     SECTION      x3    HERNIA REPAIR  2018    LAPAROSCOPIC PARAESOPHAGEAL HERNIA REPAIR WITH MESH    HIATAL HERNIA REPAIR  2018    KNEE ARTHROSCOPY Left 11/15/12    ARTHROSCOPY LEFT KNEE WITH SYOVECTOMY AND CHONDROPLASTY    OVARY REMOVAL Right     TONSILLECTOMY      UPPER GASTROINTESTINAL ENDOSCOPY      UPPER GASTROINTESTINAL ENDOSCOPY      esophageasl stretch     Current Medications:     Current medical decision making and agree with all pertinent clinical information. I have also reviewed and agree with the past medical, family and social history unless otherwise noted. This dictation was performed with a verbal recognition program (DRAGON) and it was checked for errors. It is possible that there are still dictated errors within this office note. If so, please bring any errors to my attention for an addendum. All efforts were made to ensure that this office note is accurate.           Alejandro Hatfield MD

## 2020-03-12 ENCOUNTER — TELEPHONE (OUTPATIENT)
Dept: FAMILY MEDICINE CLINIC | Age: 55
End: 2020-03-12

## 2020-03-12 NOTE — TELEPHONE ENCOUNTER
Patient called in stating she has been going through a lot of health issues and Tuesday 3/10 she got her top teeth pulled. Has an appointment with Dr. Marcia Hughes for both her knees to be replaced.

## 2020-03-19 RX ORDER — MELOXICAM 15 MG/1
TABLET ORAL
Qty: 30 TABLET | Refills: 0 | Status: SHIPPED | OUTPATIENT
Start: 2020-03-19 | End: 2020-04-13

## 2020-04-08 ENCOUNTER — NURSE TRIAGE (OUTPATIENT)
Dept: OTHER | Facility: CLINIC | Age: 55
End: 2020-04-08

## 2020-04-08 NOTE — TELEPHONE ENCOUNTER
PREGNANCY: \"Is there any chance you are pregnant? \" \"When was your last menstrual period? \"       lmp-5 years ago    Protocols used: HEADACHE-ADULT-[place]

## 2020-04-09 RX ORDER — ATENOLOL 25 MG/1
TABLET ORAL
Qty: 30 TABLET | Refills: 1 | Status: SHIPPED | OUTPATIENT
Start: 2020-04-09 | End: 2020-06-04

## 2020-04-09 NOTE — TELEPHONE ENCOUNTER
Future Appointments   Date Time Provider Diann Sotelo   5/13/2020 12:30 PM MD [facility]     [facility] 1/8/2020

## 2020-04-10 ENCOUNTER — VIRTUAL VISIT (OUTPATIENT)
Dept: FAMILY MEDICINE CLINIC | Age: 55
End: 2020-04-10
Payer: COMMERCIAL

## 2020-04-10 VITALS — RESPIRATION RATE: 18 BRPM | DIASTOLIC BLOOD PRESSURE: 80 MMHG | TEMPERATURE: 98.7 F | SYSTOLIC BLOOD PRESSURE: 128 MMHG

## 2020-04-10 PROCEDURE — G8427 DOCREV CUR MEDS BY ELIG CLIN: HCPCS | Performed by: NURSE PRACTITIONER

## 2020-04-10 PROCEDURE — 99214 OFFICE O/P EST MOD 30 MIN: CPT | Performed by: NURSE PRACTITIONER

## 2020-04-10 PROCEDURE — 3017F COLORECTAL CA SCREEN DOC REV: CPT | Performed by: NURSE PRACTITIONER

## 2020-04-10 PROCEDURE — 1036F TOBACCO NON-USER: CPT | Performed by: NURSE PRACTITIONER

## 2020-04-10 PROCEDURE — G8417 CALC BMI ABV UP PARAM F/U: HCPCS | Performed by: NURSE PRACTITIONER

## 2020-04-10 ASSESSMENT — ENCOUNTER SYMPTOMS
CHEST TIGHTNESS: 0
BLOOD IN STOOL: 0
SINUS PRESSURE: 0
VOMITING: 0
ABDOMINAL PAIN: 0
SHORTNESS OF BREATH: 0
COUGH: 0
WHEEZING: 0
NAUSEA: 0
DIARRHEA: 1

## 2020-04-10 NOTE — PATIENT INSTRUCTIONS
room and bathroom, unless the room is occupied by child or another person for whom such supplies would not be appropriate. These supplies include tissues, paper towels,  and EPA-registered disinfectants (see list link at [facility] website). - If a separate bathroom is not available, the bathroom should be cleaned and disinfected after each use by an ill person. If this is not possible, the caregiver should wait as long as practical after use by an ill person to clean and disinfect the high-touch surfaces. How to clean and disinfect:  Hard Surfaces   Wear disposable gloves when cleaning and disinfecting surfaces. Gloves should be discarded after each cleaning. If reusable gloves are used, those gloves should be dedicated for cleaning and disinfection of surfaces for COVID-19 and should not be used for other purposes. Consult the 's instructions for cleaning and disinfection products used. Clean hands immediately after gloves are removed.  If surfaces are dirty, they should be cleaned using a detergent or soap and water prior to disinfection.  For disinfection, diluted household bleach solutions, alcohol solutions with at least 70% alcohol, and most common EPA-registered household disinfectants should be effective.   o Diluted household bleach solutions can be used if appropriate for the surface. Follow 's instructions for application and proper ventilation. Check to ensure the product is not past its expiration date. Never mix household bleach with ammonia or any other cleanser. Unexpired household bleach will be effective against coronaviruses when properly diluted. - Prepare a bleach solution by mixing:   - 5 tablespoons (1/3rd cup) bleach per gallon of water or  - 4 teaspoons bleach per quart of water  o Products with EPA-approved emerging viral pathogens [facility] iconexternal icon are expected to be effective against COVID-19 based on data for harder to kill viruses.  Follow
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
## 2020-04-13 RX ORDER — DULOXETIN HYDROCHLORIDE 60 MG/1
CAPSULE, DELAYED RELEASE ORAL
Qty: 30 CAPSULE | Refills: 5 | Status: SHIPPED | OUTPATIENT
Start: 2020-04-13 | End: 2020-09-15

## 2020-04-13 RX ORDER — MELOXICAM 15 MG/1
TABLET ORAL
Qty: 30 TABLET | Refills: 0 | Status: SHIPPED | OUTPATIENT
Start: 2020-04-13 | End: 2020-05-05

## 2020-04-13 NOTE — TELEPHONE ENCOUNTER
Future Appointments   Date Time Provider Diann Sotelo   5/13/2020 12:30 PM MD [facility] 1/8/2020

## 2020-04-27 ENCOUNTER — TELEPHONE (OUTPATIENT)
Dept: FAMILY MEDICINE CLINIC | Age: 55
End: 2020-04-27

## 2020-04-29 ENCOUNTER — OFFICE VISIT (OUTPATIENT)
Dept: FAMILY MEDICINE CLINIC | Age: 55
End: 2020-04-29
Payer: COMMERCIAL

## 2020-04-29 VITALS
TEMPERATURE: 98.5 F | WEIGHT: 272 LBS | DIASTOLIC BLOOD PRESSURE: 84 MMHG | BODY MASS INDEX: 45.32 KG/M2 | HEART RATE: 58 BPM | SYSTOLIC BLOOD PRESSURE: 126 MMHG | OXYGEN SATURATION: 97 % | HEIGHT: 65 IN

## 2020-04-29 PROCEDURE — G8417 CALC BMI ABV UP PARAM F/U: HCPCS | Performed by: NURSE PRACTITIONER

## 2020-04-29 PROCEDURE — 1036F TOBACCO NON-USER: CPT | Performed by: NURSE PRACTITIONER

## 2020-04-29 PROCEDURE — G8427 DOCREV CUR MEDS BY ELIG CLIN: HCPCS | Performed by: NURSE PRACTITIONER

## 2020-04-29 PROCEDURE — 99213 OFFICE O/P EST LOW 20 MIN: CPT | Performed by: NURSE PRACTITIONER

## 2020-04-29 PROCEDURE — 3017F COLORECTAL CA SCREEN DOC REV: CPT | Performed by: NURSE PRACTITIONER

## 2020-04-29 RX ORDER — NYSTATIN 100000 U/G
CREAM TOPICAL
Qty: 60 G | Refills: 1 | Status: SHIPPED | OUTPATIENT
Start: 2020-04-29 | End: 2020-06-16

## 2020-04-29 RX ORDER — ALPRAZOLAM 0.5 MG/1
0.5 TABLET ORAL 3 TIMES DAILY
COMMUNITY
End: 2020-05-11 | Stop reason: SDUPTHER

## 2020-04-29 RX ORDER — KETOCONAZOLE 20 MG/G
CREAM TOPICAL
Qty: 60 G | Refills: 1 | Status: SHIPPED | OUTPATIENT
Start: 2020-04-29 | End: 2020-06-16

## 2020-04-29 ASSESSMENT — ENCOUNTER SYMPTOMS
DIARRHEA: 0
ABDOMINAL PAIN: 0
COLOR CHANGE: 1
RESPIRATORY NEGATIVE: 1

## 2020-04-29 NOTE — PROGRESS NOTES
present. Left: Rash and tenderness present. Rectum: Normal.             Diagnosis       ICD-10-CM    1. Intertrigo labialis K13.0    2. Anal itch L29.0    3. Intertrigo L30.4         Plan    Start Nizoral and nystatin creams as prescribed  Wash with warm water and pat dry  Use vasoline as moisture barrier  Report if fails to improve    No orders of the defined types were placed in this encounter. Orders Placed This Encounter   Medications    ketoconazole (NIZORAL) 2 % cream     Sig: Apply topically twice daily for 7     Dispense:  60 g     Refill:  1    nystatin (MYCOSTATIN) 916440 UNIT/GM cream     Sig: Apply topically 2 times daily for 7 days     Dispense:  60 g     Refill:  1       Patient Education:  intertrigo    Return if symptoms worsen or fail to improve.

## 2020-05-05 RX ORDER — MELOXICAM 15 MG/1
TABLET ORAL
Qty: 30 TABLET | Refills: 0 | Status: SHIPPED | OUTPATIENT
Start: 2020-05-05 | End: 2020-06-02

## 2020-05-05 RX ORDER — ROPINIROLE 2 MG/1
TABLET, FILM COATED ORAL
Qty: 60 TABLET | Refills: 5 | Status: SHIPPED | OUTPATIENT
Start: 2020-05-05 | End: 2020-10-13

## 2020-05-11 RX ORDER — OMEPRAZOLE 40 MG/1
CAPSULE, DELAYED RELEASE ORAL
Qty: 30 CAPSULE | Refills: 3 | Status: SHIPPED | OUTPATIENT
Start: 2020-05-11 | End: 2020-07-27

## 2020-05-26 ENCOUNTER — OFFICE VISIT (OUTPATIENT)
Dept: ORTHOPEDIC SURGERY | Age: 55
End: 2020-05-26
Payer: COMMERCIAL

## 2020-05-26 PROCEDURE — 3017F COLORECTAL CA SCREEN DOC REV: CPT | Performed by: PHYSICIAN ASSISTANT

## 2020-05-26 PROCEDURE — 99213 OFFICE O/P EST LOW 20 MIN: CPT | Performed by: PHYSICIAN ASSISTANT

## 2020-05-26 PROCEDURE — 1036F TOBACCO NON-USER: CPT | Performed by: PHYSICIAN ASSISTANT

## 2020-05-26 PROCEDURE — G8417 CALC BMI ABV UP PARAM F/U: HCPCS | Performed by: PHYSICIAN ASSISTANT

## 2020-05-26 PROCEDURE — G8427 DOCREV CUR MEDS BY ELIG CLIN: HCPCS | Performed by: PHYSICIAN ASSISTANT

## 2020-05-26 NOTE — PROGRESS NOTES
activity     Days per week: Not on file     Minutes per session: Not on file    Stress: Not on file   Relationships    Social connections     Talks on phone: Not on file     Gets together: Not on file     Attends Cheondoism service: Not on file     Active member of club or organization: Not on file     Attends meetings of clubs or organizations: Not on file     Relationship status: Not on file    Intimate partner violence     Fear of current or ex partner: Not on file     Emotionally abused: Not on file     Physically abused: Not on file     Forced sexual activity: Not on file   Other Topics Concern    Not on file   Social History Narrative    Not on file     Current Outpatient Medications   Medication Sig Dispense Refill    omeprazole (PRILOSEC) 40 MG delayed release capsule TAKE 1 CAPSULE BY MOUTH EVERY DAY 30 capsule 3    ALPRAZolam (XANAX) 1 MG tablet TAKE 1 TABLET BY MOUTH THREE TIMES A DAY AS NEEDED FOR ANXIETY 90 tablet 1    meloxicam (MOBIC) 15 MG tablet TAKE 1 TABLET BY MOUTH EVERY DAY 30 tablet 0    rOPINIRole (REQUIP) 2 MG tablet TAKE 1 TABLET BY MOUTH TWICE A DAY 60 tablet 5    ketoconazole (NIZORAL) 2 % cream Apply topically twice daily for 7 60 g 1    nystatin (MYCOSTATIN) 207159 UNIT/GM cream Apply topically 2 times daily for 7 days 60 g 1    DULoxetine (CYMBALTA) 60 MG extended release capsule TAKE 1 CAPSULE BY MOUTH EVERY DAY 30 capsule 5    atenolol (TENORMIN) 25 MG tablet TAKE 1 TABLET BY MOUTH EVERY DAY 30 tablet 1    methocarbamol (ROBAXIN) 500 MG tablet TAKE 1 TABLET BY MOUTH 3 TIMES A DAY AS NEEDED FOR NECK PAIN 90 tablet 3    pravastatin (PRAVACHOL) 20 MG tablet Take 1 tablet by mouth daily For cholesterol (Patient not taking: Reported on 4/29/2020) 30 tablet 5    lisinopril (PRINIVIL;ZESTRIL) 20 MG tablet TAKE 1 TABLET BY MOUTH EVERY DAY 30 tablet 6    ibuprofen (ADVIL;MOTRIN) 600 MG tablet TAKE 1 TABLET BY MOUTH EVERY 8 HOURS AS NEEDED FOR PAIN 40 tablet 1    MELATONIN PO

## 2020-05-27 RX ORDER — IBUPROFEN 600 MG/1
TABLET ORAL
Qty: 40 TABLET | Refills: 1 | Status: SHIPPED | OUTPATIENT
Start: 2020-05-27 | End: 2020-08-31 | Stop reason: SDUPTHER

## 2020-05-27 NOTE — TELEPHONE ENCOUNTER
4/29/2020        Future Appointments   Date Time Provider Diann Sotelo   6/15/2020  1:00 PM MD [facility]

## 2020-05-28 ENCOUNTER — TELEPHONE (OUTPATIENT)
Dept: FAMILY MEDICINE CLINIC | Age: 55
End: 2020-05-28

## 2020-05-28 NOTE — TELEPHONE ENCOUNTER
Patient wants to know if [facility] in [place] has sent the order for her Right rotator cup, patient insurance has to approve the mri please advise.

## 2020-06-02 RX ORDER — MELOXICAM 15 MG/1
TABLET ORAL
Qty: 30 TABLET | Refills: 0 | Status: SHIPPED | OUTPATIENT
Start: 2020-06-02 | End: 2020-07-07 | Stop reason: ALTCHOICE

## 2020-06-03 ENCOUNTER — HOSPITAL ENCOUNTER (OUTPATIENT)
Dept: MRI IMAGING | Age: 55
Discharge: HOME | End: 2020-06-03
Payer: COMMERCIAL

## 2020-06-03 PROCEDURE — 73221 MRI JOINT UPR EXTREM W/O DYE: CPT

## 2020-06-05 ENCOUNTER — TELEPHONE (OUTPATIENT)
Dept: FAMILY MEDICINE CLINIC | Age: 55
End: 2020-06-05

## 2020-06-05 ENCOUNTER — OFFICE VISIT (OUTPATIENT)
Dept: ORTHOPEDIC SURGERY | Age: 55
End: 2020-06-05
Payer: COMMERCIAL

## 2020-06-05 VITALS — WEIGHT: 272.05 LBS | HEIGHT: 65 IN | BODY MASS INDEX: 45.33 KG/M2

## 2020-06-05 PROCEDURE — G8427 DOCREV CUR MEDS BY ELIG CLIN: HCPCS | Performed by: PHYSICIAN ASSISTANT

## 2020-06-05 PROCEDURE — 1036F TOBACCO NON-USER: CPT | Performed by: PHYSICIAN ASSISTANT

## 2020-06-05 PROCEDURE — G8417 CALC BMI ABV UP PARAM F/U: HCPCS | Performed by: PHYSICIAN ASSISTANT

## 2020-06-05 PROCEDURE — 99214 OFFICE O/P EST MOD 30 MIN: CPT | Performed by: PHYSICIAN ASSISTANT

## 2020-06-05 PROCEDURE — 3017F COLORECTAL CA SCREEN DOC REV: CPT | Performed by: PHYSICIAN ASSISTANT

## 2020-06-05 RX ORDER — TRAMADOL HYDROCHLORIDE 50 MG/1
50 TABLET ORAL EVERY 6 HOURS PRN
Qty: 28 TABLET | Refills: 0 | Status: SHIPPED | OUTPATIENT
Start: 2020-06-05 | End: 2020-06-12 | Stop reason: SINTOL

## 2020-06-05 NOTE — PROGRESS NOTES
Current Outpatient Medications:     traMADol (ULTRAM) 50 MG tablet, Take 1 tablet by mouth every 6 hours as needed for Pain for up to 7 days. Intended supply: 7 days. Take lowest dose possible to manage pain, Disp: 28 tablet, Rfl: 0    atenolol (TENORMIN) 25 MG tablet, TAKE 1 TABLET BY MOUTH EVERY DAY, Disp: 30 tablet, Rfl: 3    meloxicam (MOBIC) 15 MG tablet, TAKE 1 TABLET BY MOUTH EVERY DAY, Disp: 30 tablet, Rfl: 0    ibuprofen (ADVIL;MOTRIN) 600 MG tablet, TAKE 1 TABLET BY MOUTH EVERY 8 HOURS AS NEEDED FOR PAIN, Disp: 40 tablet, Rfl: 1    omeprazole (PRILOSEC) 40 MG delayed release capsule, TAKE 1 CAPSULE BY MOUTH EVERY DAY, Disp: 30 capsule, Rfl: 3    ALPRAZolam (XANAX) 1 MG tablet, TAKE 1 TABLET BY MOUTH THREE TIMES A DAY AS NEEDED FOR ANXIETY, Disp: 90 tablet, Rfl: 1    rOPINIRole (REQUIP) 2 MG tablet, TAKE 1 TABLET BY MOUTH TWICE A DAY, Disp: 60 tablet, Rfl: 5    ketoconazole (NIZORAL) 2 % cream, Apply topically twice daily for 7, Disp: 60 g, Rfl: 1    nystatin (MYCOSTATIN) 718117 UNIT/GM cream, Apply topically 2 times daily for 7 days, Disp: 60 g, Rfl: 1    DULoxetine (CYMBALTA) 60 MG extended release capsule, TAKE 1 CAPSULE BY MOUTH EVERY DAY, Disp: 30 capsule, Rfl: 5    methocarbamol (ROBAXIN) 500 MG tablet, TAKE 1 TABLET BY MOUTH 3 TIMES A DAY AS NEEDED FOR NECK PAIN, Disp: 90 tablet, Rfl: 3    pravastatin (PRAVACHOL) 20 MG tablet, Take 1 tablet by mouth daily For cholesterol (Patient not taking: Reported on 4/29/2020), Disp: 30 tablet, Rfl: 5    lisinopril (PRINIVIL;ZESTRIL) 20 MG tablet, TAKE 1 TABLET BY MOUTH EVERY DAY, Disp: 30 tablet, Rfl: 6    MELATONIN PO, Take 1 tablet by mouth nightly as needed, Disp: , Rfl:   Allergies: Toradol [ketorolac tromethamine]; Haldol [haloperidol lactate]; Mobic [meloxicam]; and Percocet [oxycodone-acetaminophen]  Social History:    reports that she has never smoked. She has never used smokeless tobacco. She reports current alcohol use.  She reports previous drug use. Drug: Marijuana. Family History:   Family History   Problem Relation Age of Onset    Arthritis Mother     Obesity Mother     COPD Mother     Anemia Mother     Arthritis Father     Arrhythmia Father     Diabetes Other     Diabetes Paternal Grandfather     Cancer Neg Hx     Breast Cancer Neg Hx     Colon Cancer Neg Hx        REVIEW OF SYSTEMS:   For new problems, a full review of systems will be found scanned in the patient's chart. CONSTITUTIONAL: Denies unexplained weight loss, fevers, chills   NEUROLOGICAL: Denies unsteady gait or progressive weakness  SKIN: Denies skin changes, delayed healing, rash, itching       PHYSICAL EXAM:    Vitals: Height 5' 5\" (1.651 m), weight 272 lb 0.8 oz (123.4 kg), last menstrual period 09/20/2017, not currently breastfeeding. GENERAL EXAM:  · General Apparence: Patient is adequately groomed with no evidence of malnutrition. · Orientation: The patient is oriented to time, place and person. · Mood & Affect:The patient's mood and affect are appropriate       Right Shoulder PHYSICAL EXAMINATION:  · Inspection: The patient holds her aright arm close to the body. No significant deformity noted    · Palpation: tender through the deltoid insertion and through the trapezius. Mild tenderness over the [facility] joint as well    · Range of Motion: She cannot perform any active forward flexion or abduction today. Gentle range of motion of the wrist and elbow is tolerated, mild stiffness to the wrist consistent with surgical history    · Strength: Gross deficits with rotator cuff testing today.  strength is intact    · Special Tests: Positive drop arm test.  Radial pulse 2+          · Skin:  There are no rashes, ulcerations or lesions.   · There are no distal dysvascular changes     Gait & station: She ambulates today unassisted      Additional Examinations:        Left Upper Extremity: Examination of the left upper extremity does not show any tenderness, deformity or injury. Range of motion is unremarkable. There is no gross instability. There are no rashes, ulcerations or lesions. Strength and tone are normal.      Diagnostic Testing: The following x rays were read and interpreted by myself        1. Near complete retracted full-thickness tear of the majority of   supraspinatus with retraction of the torn fibers measuring up to 1 cm. Moderate underlying supraspinatus tendinosis. 2. Low-grade partial-thickness interstitial and articular surface tearing of   anterior superior infraspinatus.  Moderate underlying infraspinatus   tendinopathy. 3. Low-grade partial-thickness articular surface tearing of the insertional   fibers of the subscapularis. 4. Tearing of the superior and posterosuperior labrum with associated   paralabral cyst formation extending into the suprascapular and spinoglenoid   notch.  No significant atrophy or fatty degeneration of the visualized   rotator cuff musculature. 5. Findings in association with the inferior glenohumeral ligament can be   seen in the setting of adhesive capsulitis. 6. Mild glenohumeral chondromalacia.  Small glenohumeral joint effusion. 7. Moderate degenerative change of the right AC joint. Orders   No orders of the defined types were placed in this encounter. Assessment / Treatment Plan:     1. Near complete retracted tear of supraspinatus with low-grade partial-thickness tear infraspinatus and labrum. Moderate AC joint arthritis    We discussed surgical intervention. The patient understands the lengthy recovery with rotator cuff repair as well as the severity of her tear. She may have some functional limitations postoperatively. Patient would like to move forward with right shoulder mini open rotator cuff repair, near acromioplasty, Desi procedure and debridement of the labrum    We discussed shoulder arthroscopy surgery in detail.  We talked about the arthroscopic nature of the procedure, the portals utilized and what can be done through these portals. We also discussed concerns regarding surgery, specifically including infection, deep vein thrombosis, pulmonary dystrophy, arthrofibrosis, delayed rehabilitation, etc.  We also discussed the possibility of an interscalene block and that anesthesia personnel would talk to them about this procedure and any concerns in that regard. We also touched briefly on the degree of rotator cuff damage that can be treated and labral damage that can be treated and how it is always possible that the injury is more severe than expected on clinical examination by MRI. The reality is that if there is a full thickness rotator cuff tear, the treatment is dramatically different and the rehabilitation much slower. The same would be true of labral pathology, whether it be an anterior labral tear or a slap lesion that would potentially require repair versus a lesion that can just be debrided. We also discussed prophylaxis in terms of positioning carefully intravenous antibiotics preoperative, etc. All questions were answered from the patient. I spent 25 minutes face-to-face with the patient and greater than 50% of that time was spent counseling/coordinating care for the above-stated diagnosis and treatment.

## 2020-06-08 RX ORDER — METHOCARBAMOL 500 MG/1
TABLET, FILM COATED ORAL
Qty: 90 TABLET | Refills: 3 | Status: SHIPPED | OUTPATIENT
Start: 2020-06-08 | End: 2020-09-15

## 2020-06-08 NOTE — TELEPHONE ENCOUNTER
Future Appointments   Date Time Provider Diann Sotelo   6/17/2020 10:20 AM Lindalou Gosselin, DO [place]  [facility]   6/26/2020 11:45 AM MD ELIECER Mahoney AND MARTIN QUEVEDO 4/29/2020

## 2020-06-09 ENCOUNTER — TELEPHONE (OUTPATIENT)
Dept: ADMINISTRATIVE | Age: 55
End: 2020-06-09

## 2020-06-09 PROBLEM — M54.2 NECK PAIN: Status: ACTIVE | Noted: 2020-06-09

## 2020-06-11 ENCOUNTER — TELEPHONE (OUTPATIENT)
Dept: FAMILY MEDICINE CLINIC | Age: 55
End: 2020-06-11

## 2020-06-12 ENCOUNTER — VIRTUAL VISIT (OUTPATIENT)
Dept: FAMILY MEDICINE CLINIC | Age: 55
End: 2020-06-12
Payer: COMMERCIAL

## 2020-06-12 PROCEDURE — G8417 CALC BMI ABV UP PARAM F/U: HCPCS | Performed by: NURSE PRACTITIONER

## 2020-06-12 PROCEDURE — 3017F COLORECTAL CA SCREEN DOC REV: CPT | Performed by: NURSE PRACTITIONER

## 2020-06-12 PROCEDURE — G8427 DOCREV CUR MEDS BY ELIG CLIN: HCPCS | Performed by: NURSE PRACTITIONER

## 2020-06-12 PROCEDURE — 99213 OFFICE O/P EST LOW 20 MIN: CPT | Performed by: NURSE PRACTITIONER

## 2020-06-12 PROCEDURE — 1036F TOBACCO NON-USER: CPT | Performed by: NURSE PRACTITIONER

## 2020-06-12 RX ORDER — HYDROCODONE BITARTRATE AND ACETAMINOPHEN 5; 325 MG/1; MG/1
1 TABLET ORAL EVERY 6 HOURS PRN
Qty: 10 TABLET | Refills: 0 | Status: SHIPPED | OUTPATIENT
Start: 2020-06-12 | End: 2020-06-15

## 2020-06-12 ASSESSMENT — ENCOUNTER SYMPTOMS
GASTROINTESTINAL NEGATIVE: 1
RESPIRATORY NEGATIVE: 1

## 2020-06-12 NOTE — PROGRESS NOTES
TABLET BY MOUTH THREE TIMES A DAY AS NEEDED FOR ANXIETY Yes Radha Joshua DO   rOPINIRole (REQUIP) 2 MG tablet TAKE 1 TABLET BY MOUTH TWICE A DAY Yes Radha Joshua DO   ketoconazole (NIZORAL) 2 % cream Apply topically twice daily for 7 Yes Elroy Pride APRJUWAN - CNP   nystatin (MYCOSTATIN) 129655 UNIT/GM cream Apply topically 2 times daily for 7 days Yes Elroy Pride APRN - CNP   DULoxetine (CYMBALTA) 60 MG extended release capsule TAKE 1 CAPSULE BY MOUTH EVERY DAY Yes Radha Joshau DO   pravastatin (PRAVACHOL) 20 MG tablet Take 1 tablet by mouth daily For cholesterol Yes Radha Joshua DO   lisinopril (PRINIVIL;ZESTRIL) 20 MG tablet TAKE 1 TABLET BY MOUTH EVERY DAY Yes Radha Joshua DO   MELATONIN PO Take 1 tablet by mouth nightly as needed Yes Historical Provider, MD       Social History     Tobacco Use    Smoking status: Never Smoker    Smokeless tobacco: Never Used   Substance Use Topics    Alcohol use: Yes     Comment: 1 -2 x a month    Drug use: Not Currently     Types: Marijuana     Comment: quit years ago        Allergies   Allergen Reactions    Toradol [Ketorolac Tromethamine] Other (See Comments)     \"out of it\"  \"felt like sleep paralysis\"    Haldol [Haloperidol Lactate] Other (See Comments)     \"felt out of it, similar to the toradol\"    Mobic [Meloxicam]     Tramadol Hives    Percocet [Oxycodone-Acetaminophen] Itching and Rash       PHYSICAL EXAMINATION:  There were no vitals filed for this visit. Physical Exam  Constitutional:       Appearance: Normal appearance. HENT:      Head: Normocephalic and atraumatic. Right Ear: External ear normal.      Left Ear: External ear normal.      Nose: Nose normal. No rhinorrhea. Mouth/Throat:      Pharynx: Oropharynx is clear. Eyes:      General:         Right eye: No discharge. Left eye: No discharge. Conjunctiva/sclera: Conjunctivae normal.   Neck:      Musculoskeletal: Normal range of motion.       Trachea: Phonation normal.   Pulmonary:      Effort: Pulmonary effort is normal. No respiratory distress. Musculoskeletal:         General: Tenderness (visible wincing when moving shoulder and neck) present. Skin:     Coloration: Skin is not jaundiced or pale. Neurological:      General: No focal deficit present. Mental Status: She is alert and oriented to person, place, and time. Psychiatric:         Mood and Affect: Mood normal.         Behavior: Behavior normal.         Thought Content: Thought content normal.         Judgment: Judgment normal.           ASSESSMENT/PLAN:    ICD-10-CM    1. Acute pain of right shoulder M25.511 HYDROcodone-acetaminophen (NORCO) 5-325 MG per tablet   2. Glenoid labrum tear, right, subsequent encounter S43.431D HYDROcodone-acetaminophen (NORCO) 5-325 MG per tablet       OARRS reviewed  Short course pain med for as needed  Discussed using as little as possible    Orders Placed This Encounter   Medications    HYDROcodone-acetaminophen (NORCO) 5-325 MG per tablet     Sig: Take 1 tablet by mouth every 6 hours as needed for Pain for up to 3 days. Intended supply: 3 days. Take lowest dose possible to manage pain     Dispense:  10 tablet     Refill:  0     Reduce doses taken as pain becomes manageable     No orders of the defined types were placed in this encounter. Return for keep scheduled appointment. Kris Fernandes is a 54 y.o. female being evaluated by a Virtual Visit (video visit) encounter to address concerns as mentioned above. A caregiver was present when appropriate. Due to this being a TeleHealth encounter (During [facility]- public health emergency), evaluation of the following organ systems was limited: Vitals/Constitutional/EENT/Resp/CV/GI//MS/Neuro/Skin/Heme-Lymph-Imm.   Pursuant to the emergency declaration under the [facility] authority and the [employer] and [employer] [facility] Act, this Virtual Visit was conducted with patient's (and/or legal guardian's) consent, to reduce the patient's risk of exposure to COVID-19 and provide necessary medical care. The patient (and/or legal guardian) has also been advised to contact this office for worsening conditions or problems, and seek emergency medical treatment and/or call 911 if deemed necessary. Patient identification was verified at the start of the visit: Yes    Total time spent on this encounter: Not billed by time    Services were provided through a video synchronous discussion virtually to substitute for in-person clinic visit. Patient and provider were located at their individual homes. --AVERY Healy CNP on 6/12/2020 at 9:43 AM    An electronic signature was used to authenticate this note.

## 2020-06-17 ENCOUNTER — OFFICE VISIT (OUTPATIENT)
Dept: FAMILY MEDICINE CLINIC | Age: 55
End: 2020-06-17
Payer: COMMERCIAL

## 2020-06-17 VITALS
DIASTOLIC BLOOD PRESSURE: 68 MMHG | HEART RATE: 54 BPM | SYSTOLIC BLOOD PRESSURE: 116 MMHG | RESPIRATION RATE: 18 BRPM | BODY MASS INDEX: 45.43 KG/M2 | WEIGHT: 273 LBS | OXYGEN SATURATION: 96 % | TEMPERATURE: 98.1 F

## 2020-06-17 LAB
ANION GAP SERPL CALCULATED.3IONS-SCNC: 19 MMOL/L (ref 3–16)
BASOPHILS ABSOLUTE: 0 K/UL (ref 0–0.2)
BASOPHILS RELATIVE PERCENT: 0.7 %
BUN BLDV-MCNC: 15 MG/DL (ref 7–20)
CALCIUM SERPL-MCNC: 9.7 MG/DL (ref 8.3–10.6)
CHLORIDE BLD-SCNC: 101 MMOL/L (ref 99–110)
CO2: 19 MMOL/L (ref 21–32)
CREAT SERPL-MCNC: 0.8 MG/DL (ref 0.6–1.1)
EOSINOPHILS ABSOLUTE: 0.2 K/UL (ref 0–0.6)
EOSINOPHILS RELATIVE PERCENT: 2.6 %
GFR AFRICAN AMERICAN: >60
GFR NON-AFRICAN AMERICAN: >60
GLUCOSE BLD-MCNC: 95 MG/DL (ref 70–99)
HCT VFR BLD CALC: 42 % (ref 36–48)
HEMOGLOBIN: 13.7 G/DL (ref 12–16)
LYMPHOCYTES ABSOLUTE: 1.7 K/UL (ref 1–5.1)
LYMPHOCYTES RELATIVE PERCENT: 24.1 %
MCH RBC QN AUTO: 30.6 PG (ref 26–34)
MCHC RBC AUTO-ENTMCNC: 32.6 G/DL (ref 31–36)
MCV RBC AUTO: 94 FL (ref 80–100)
MONOCYTES ABSOLUTE: 0.4 K/UL (ref 0–1.3)
MONOCYTES RELATIVE PERCENT: 6.5 %
NEUTROPHILS ABSOLUTE: 4.6 K/UL (ref 1.7–7.7)
NEUTROPHILS RELATIVE PERCENT: 66.1 %
PDW BLD-RTO: 15.4 % (ref 12.4–15.4)
PLATELET # BLD: 219 K/UL (ref 135–450)
PMV BLD AUTO: 9.6 FL (ref 5–10.5)
POTASSIUM SERPL-SCNC: 4.3 MMOL/L (ref 3.5–5.1)
RBC # BLD: 4.47 M/UL (ref 4–5.2)
SODIUM BLD-SCNC: 139 MMOL/L (ref 136–145)
WBC # BLD: 6.9 K/UL (ref 4–11)

## 2020-06-17 PROCEDURE — G8417 CALC BMI ABV UP PARAM F/U: HCPCS | Performed by: FAMILY MEDICINE

## 2020-06-17 PROCEDURE — 1036F TOBACCO NON-USER: CPT | Performed by: FAMILY MEDICINE

## 2020-06-17 PROCEDURE — G8427 DOCREV CUR MEDS BY ELIG CLIN: HCPCS | Performed by: FAMILY MEDICINE

## 2020-06-17 PROCEDURE — 99214 OFFICE O/P EST MOD 30 MIN: CPT | Performed by: FAMILY MEDICINE

## 2020-06-17 PROCEDURE — 3017F COLORECTAL CA SCREEN DOC REV: CPT | Performed by: FAMILY MEDICINE

## 2020-06-17 PROCEDURE — 93000 ELECTROCARDIOGRAM COMPLETE: CPT | Performed by: FAMILY MEDICINE

## 2020-06-17 ASSESSMENT — ENCOUNTER SYMPTOMS
RESPIRATORY NEGATIVE: 1
GASTROINTESTINAL NEGATIVE: 1

## 2020-06-17 NOTE — PROGRESS NOTES
Subjective:      Patient ID: Rossi Blevins is a 54 y.o. y.o. female. Here for pre-op for right shoulder surgery. Rotator cuff injury    Dr Gila Treviño     [facility]      Chief Complaint   Patient presents with    Pre-op Exam     R shoulder, 20, Dr. Fern Lee, [facility]       Allergies   Allergen Reactions    Toradol [Ketorolac Tromethamine] Other (See Comments)     \"out of it\"  \"felt like sleep paralysis\"    Haldol [Haloperidol Lactate] Other (See Comments)     \"felt out of it, similar to the toradol\"    Tramadol Hives       Past Medical History:   Diagnosis Date    Anxiety     Depression     GERD (gastroesophageal reflux disease)     Hypertension     Osteoarthritis     Restless leg syndrome        Past Surgical History:   Procedure Laterality Date    ANUS SURGERY  2018    fissure     SECTION      x3    FRACTURE SURGERY      right wrist    HERNIA REPAIR  2018    LAPAROSCOPIC PARAESOPHAGEAL HERNIA REPAIR WITH MESH    HIATAL HERNIA REPAIR  2018    KNEE ARTHROSCOPY Left 11/15/12    ARTHROSCOPY LEFT KNEE WITH SYOVECTOMY AND CHONDROPLASTY    OVARY REMOVAL Right     TONSILLECTOMY      UPPER GASTROINTESTINAL ENDOSCOPY      UPPER GASTROINTESTINAL ENDOSCOPY  2015    esophageasl stretch       Social History     Socioeconomic History    Marital status:       Spouse name: Not on file    Number of children: 3    Years of education: Not on file    Highest education level: Not on file   Occupational History    Occupation:    Social Needs    Financial resource strain: Not on file    Food insecurity     Worry: Not on file     Inability: Not on file   [employer] needs     Medical: Not on file     Non-medical: Not on file   Tobacco Use    Smoking status: Never Smoker    Smokeless tobacco: Never Used   Substance and Sexual Activity    Alcohol use: Yes     Comment: 1 -2 x a month    Drug use: Not Currently     Types: Marijuana 40 tablet 1    omeprazole (PRILOSEC) 40 MG delayed release capsule TAKE 1 CAPSULE BY MOUTH EVERY DAY 30 capsule 3    ALPRAZolam (XANAX) 1 MG tablet TAKE 1 TABLET BY MOUTH THREE TIMES A DAY AS NEEDED FOR ANXIETY 90 tablet 1    rOPINIRole (REQUIP) 2 MG tablet TAKE 1 TABLET BY MOUTH TWICE A DAY 60 tablet 5    DULoxetine (CYMBALTA) 60 MG extended release capsule TAKE 1 CAPSULE BY MOUTH EVERY DAY 30 capsule 5    lisinopril (PRINIVIL;ZESTRIL) 20 MG tablet TAKE 1 TABLET BY MOUTH EVERY DAY 30 tablet 6    MELATONIN PO Take 1 tablet by mouth nightly as needed      meloxicam (MOBIC) 15 MG tablet TAKE 1 TABLET BY MOUTH EVERY DAY (Patient not taking: Reported on 6/17/2020) 30 tablet 0    pravastatin (PRAVACHOL) 20 MG tablet Take 1 tablet by mouth daily For cholesterol (Patient not taking: Reported on 6/17/2020) 30 tablet 5     No current facility-administered medications for this visit.

## 2020-06-18 ENCOUNTER — OFFICE VISIT (OUTPATIENT)
Dept: PRIMARY CARE CLINIC | Age: 55
End: 2020-06-18

## 2020-06-18 ENCOUNTER — TELEPHONE (OUTPATIENT)
Dept: FAMILY MEDICINE CLINIC | Age: 55
End: 2020-06-18

## 2020-06-18 RX ORDER — HYDROCODONE BITARTRATE AND ACETAMINOPHEN 5; 325 MG/1; MG/1
1 TABLET ORAL EVERY 6 HOURS PRN
Qty: 20 TABLET | Refills: 0 | Status: ON HOLD | OUTPATIENT
Start: 2020-06-18 | End: 2020-06-23 | Stop reason: HOSPADM

## 2020-06-18 RX ORDER — ACETAMINOPHEN AND CODEINE PHOSPHATE 300; 30 MG/1; MG/1
1 TABLET ORAL PRN
Refills: 0 | Status: CANCELLED | OUTPATIENT
Start: 2020-06-18 | End: 2020-06-21

## 2020-06-18 NOTE — TELEPHONE ENCOUNTER
Tell the patient that I gave her the same medicine that Pedro Johnson had given her for the pain. I gave her enough to last till her surgery. No record of Tylenol with codeine. She was given generic for Norco.  That is what I sent to the pharmacy.

## 2020-06-18 NOTE — TELEPHONE ENCOUNTER
Please call Sandi Montgomery and tell her that the medicine that she got with generic for Vicodin. I can give her enough until her surgery. After the surgery the surgeon needs to be the one that controls her pain medicine.

## 2020-06-18 NOTE — PROGRESS NOTES
Patient is having a/an shoulder with Dr. Tommy Hurst on 6/23/20 and was seen at clinic for pre op covid test. . Patient instructed to self quarantine until the procedure.

## 2020-06-20 LAB
SARS-COV-2: NOT DETECTED
SOURCE: NORMAL

## 2020-06-22 ENCOUNTER — TELEPHONE (OUTPATIENT)
Dept: ORTHOPEDIC SURGERY | Age: 55
End: 2020-06-22

## 2020-06-22 ENCOUNTER — ANESTHESIA EVENT (OUTPATIENT)
Dept: OPERATING ROOM | Age: 55
End: 2020-06-22
Payer: COMMERCIAL

## 2020-06-23 ENCOUNTER — HOSPITAL ENCOUNTER (OUTPATIENT)
Age: 55
Setting detail: OUTPATIENT SURGERY
Discharge: HOME | End: 2020-06-23
Attending: ORTHOPAEDIC SURGERY | Admitting: ORTHOPAEDIC SURGERY
Payer: COMMERCIAL

## 2020-06-23 ENCOUNTER — ANESTHESIA (OUTPATIENT)
Dept: OPERATING ROOM | Age: 55
End: 2020-06-23
Payer: COMMERCIAL

## 2020-06-23 VITALS
DIASTOLIC BLOOD PRESSURE: 29 MMHG | SYSTOLIC BLOOD PRESSURE: 49 MMHG | OXYGEN SATURATION: 88 % | RESPIRATION RATE: 1 BRPM

## 2020-06-23 VITALS
RESPIRATION RATE: 18 BRPM | BODY MASS INDEX: 45.32 KG/M2 | HEIGHT: 65 IN | TEMPERATURE: 97 F | DIASTOLIC BLOOD PRESSURE: 97 MMHG | WEIGHT: 272 LBS | OXYGEN SATURATION: 96 % | HEART RATE: 56 BPM | SYSTOLIC BLOOD PRESSURE: 153 MMHG

## 2020-06-23 PROCEDURE — C9290 INJ, BUPIVACAINE LIPOSOME: HCPCS | Performed by: ORTHOPAEDIC SURGERY

## 2020-06-23 PROCEDURE — 6360000002 HC RX W HCPCS: Performed by: ANESTHESIOLOGY

## 2020-06-23 PROCEDURE — 6360000002 HC RX W HCPCS: Performed by: ORTHOPAEDIC SURGERY

## 2020-06-23 PROCEDURE — 2720000010 HC SURG SUPPLY STERILE: Performed by: ORTHOPAEDIC SURGERY

## 2020-06-23 PROCEDURE — 6360000002 HC RX W HCPCS: Performed by: NURSE ANESTHETIST, CERTIFIED REGISTERED

## 2020-06-23 PROCEDURE — 3700000000 HC ANESTHESIA ATTENDED CARE: Performed by: ORTHOPAEDIC SURGERY

## 2020-06-23 PROCEDURE — 7100000010 HC PHASE II RECOVERY - FIRST 15 MIN: Performed by: ORTHOPAEDIC SURGERY

## 2020-06-23 PROCEDURE — 2580000003 HC RX 258: Performed by: ANESTHESIOLOGY

## 2020-06-23 PROCEDURE — 2500000003 HC RX 250 WO HCPCS: Performed by: ORTHOPAEDIC SURGERY

## 2020-06-23 PROCEDURE — 2500000003 HC RX 250 WO HCPCS: Performed by: ANESTHESIOLOGY

## 2020-06-23 PROCEDURE — 2500000003 HC RX 250 WO HCPCS: Performed by: NURSE ANESTHETIST, CERTIFIED REGISTERED

## 2020-06-23 PROCEDURE — 6370000000 HC RX 637 (ALT 250 FOR IP): Performed by: ANESTHESIOLOGY

## 2020-06-23 PROCEDURE — 3600000014 HC SURGERY LEVEL 4 ADDTL 15MIN: Performed by: ORTHOPAEDIC SURGERY

## 2020-06-23 PROCEDURE — 2709999900 HC NON-CHARGEABLE SUPPLY: Performed by: ORTHOPAEDIC SURGERY

## 2020-06-23 PROCEDURE — 7100000000 HC PACU RECOVERY - FIRST 15 MIN: Performed by: ORTHOPAEDIC SURGERY

## 2020-06-23 PROCEDURE — 2580000003 HC RX 258: Performed by: ORTHOPAEDIC SURGERY

## 2020-06-23 PROCEDURE — 7100000011 HC PHASE II RECOVERY - ADDTL 15 MIN: Performed by: ORTHOPAEDIC SURGERY

## 2020-06-23 PROCEDURE — 7100000001 HC PACU RECOVERY - ADDTL 15 MIN: Performed by: ORTHOPAEDIC SURGERY

## 2020-06-23 PROCEDURE — 3700000001 HC ADD 15 MINUTES (ANESTHESIA): Performed by: ORTHOPAEDIC SURGERY

## 2020-06-23 PROCEDURE — C1713 ANCHOR/SCREW BN/BN,TIS/BN: HCPCS | Performed by: ORTHOPAEDIC SURGERY

## 2020-06-23 PROCEDURE — 3600000004 HC SURGERY LEVEL 4 BASE: Performed by: ORTHOPAEDIC SURGERY

## 2020-06-23 DEVICE — HEALICOIL RG SA 5.5MM W/2 UB-BL                                    CBRD BL
Type: IMPLANTABLE DEVICE | Site: SHOULDER | Status: FUNCTIONAL
Brand: HEALICOIL / ULTRABRAID

## 2020-06-23 DEVICE — MULTIFIX S-ULTRA 5.5MM KNOTLESS ANCHOR
Type: IMPLANTABLE DEVICE | Site: SHOULDER | Status: FUNCTIONAL
Brand: MULTIFIX

## 2020-06-23 RX ORDER — ONDANSETRON 2 MG/ML
INJECTION INTRAMUSCULAR; INTRAVENOUS PRN
Status: DISCONTINUED | OUTPATIENT
Start: 2020-06-23 | End: 2020-06-23 | Stop reason: SDUPTHER

## 2020-06-23 RX ORDER — SODIUM CHLORIDE 0.9 % (FLUSH) 0.9 %
10 SYRINGE (ML) INJECTION PRN
Status: DISCONTINUED | OUTPATIENT
Start: 2020-06-23 | End: 2020-06-23 | Stop reason: HOSPADM

## 2020-06-23 RX ORDER — MIDAZOLAM HYDROCHLORIDE 1 MG/ML
INJECTION INTRAMUSCULAR; INTRAVENOUS PRN
Status: DISCONTINUED | OUTPATIENT
Start: 2020-06-23 | End: 2020-06-23 | Stop reason: SDUPTHER

## 2020-06-23 RX ORDER — LIDOCAINE HYDROCHLORIDE 20 MG/ML
INJECTION, SOLUTION INFILTRATION; PERINEURAL PRN
Status: DISCONTINUED | OUTPATIENT
Start: 2020-06-23 | End: 2020-06-23 | Stop reason: SDUPTHER

## 2020-06-23 RX ORDER — ONDANSETRON 2 MG/ML
4 INJECTION INTRAMUSCULAR; INTRAVENOUS
Status: DISCONTINUED | OUTPATIENT
Start: 2020-06-23 | End: 2020-06-23 | Stop reason: HOSPADM

## 2020-06-23 RX ORDER — OXYCODONE AND ACETAMINOPHEN 7.5; 325 MG/1; MG/1
1 TABLET ORAL EVERY 4 HOURS PRN
Qty: 28 TABLET | Refills: 0 | Status: SHIPPED | OUTPATIENT
Start: 2020-06-23 | End: 2020-06-29 | Stop reason: SDUPTHER

## 2020-06-23 RX ORDER — DEXAMETHASONE SODIUM PHOSPHATE 10 MG/ML
INJECTION INTRAMUSCULAR; INTRAVENOUS PRN
Status: DISCONTINUED | OUTPATIENT
Start: 2020-06-23 | End: 2020-06-23 | Stop reason: SDUPTHER

## 2020-06-23 RX ORDER — HYDRALAZINE HYDROCHLORIDE 20 MG/ML
5 INJECTION INTRAMUSCULAR; INTRAVENOUS EVERY 10 MIN PRN
Status: DISCONTINUED | OUTPATIENT
Start: 2020-06-23 | End: 2020-06-23 | Stop reason: HOSPADM

## 2020-06-23 RX ORDER — ROCURONIUM BROMIDE 10 MG/ML
INJECTION, SOLUTION INTRAVENOUS PRN
Status: DISCONTINUED | OUTPATIENT
Start: 2020-06-23 | End: 2020-06-23 | Stop reason: SDUPTHER

## 2020-06-23 RX ORDER — OXYCODONE HYDROCHLORIDE AND ACETAMINOPHEN 5; 325 MG/1; MG/1
1 TABLET ORAL PRN
Status: COMPLETED | OUTPATIENT
Start: 2020-06-23 | End: 2020-06-23

## 2020-06-23 RX ORDER — PROMETHAZINE HYDROCHLORIDE 25 MG/ML
6.25 INJECTION, SOLUTION INTRAMUSCULAR; INTRAVENOUS
Status: DISCONTINUED | OUTPATIENT
Start: 2020-06-23 | End: 2020-06-23 | Stop reason: HOSPADM

## 2020-06-23 RX ORDER — OXYCODONE HYDROCHLORIDE AND ACETAMINOPHEN 5; 325 MG/1; MG/1
2 TABLET ORAL PRN
Status: COMPLETED | OUTPATIENT
Start: 2020-06-23 | End: 2020-06-23

## 2020-06-23 RX ORDER — FENTANYL CITRATE 50 UG/ML
25 INJECTION, SOLUTION INTRAMUSCULAR; INTRAVENOUS EVERY 5 MIN PRN
Status: DISCONTINUED | OUTPATIENT
Start: 2020-06-23 | End: 2020-06-23 | Stop reason: HOSPADM

## 2020-06-23 RX ORDER — LABETALOL HYDROCHLORIDE 5 MG/ML
INJECTION, SOLUTION INTRAVENOUS PRN
Status: DISCONTINUED | OUTPATIENT
Start: 2020-06-23 | End: 2020-06-23 | Stop reason: SDUPTHER

## 2020-06-23 RX ORDER — SUCCINYLCHOLINE CHLORIDE 20 MG/ML
INJECTION INTRAMUSCULAR; INTRAVENOUS PRN
Status: DISCONTINUED | OUTPATIENT
Start: 2020-06-23 | End: 2020-06-23 | Stop reason: SDUPTHER

## 2020-06-23 RX ORDER — FENTANYL CITRATE 50 UG/ML
INJECTION, SOLUTION INTRAMUSCULAR; INTRAVENOUS PRN
Status: DISCONTINUED | OUTPATIENT
Start: 2020-06-23 | End: 2020-06-23 | Stop reason: SDUPTHER

## 2020-06-23 RX ORDER — SODIUM CHLORIDE 0.9 % (FLUSH) 0.9 %
10 SYRINGE (ML) INJECTION EVERY 12 HOURS SCHEDULED
Status: DISCONTINUED | OUTPATIENT
Start: 2020-06-23 | End: 2020-06-23 | Stop reason: HOSPADM

## 2020-06-23 RX ORDER — SODIUM CHLORIDE, SODIUM LACTATE, POTASSIUM CHLORIDE, CALCIUM CHLORIDE 600; 310; 30; 20 MG/100ML; MG/100ML; MG/100ML; MG/100ML
INJECTION, SOLUTION INTRAVENOUS CONTINUOUS
Status: DISCONTINUED | OUTPATIENT
Start: 2020-06-23 | End: 2020-06-23 | Stop reason: HOSPADM

## 2020-06-23 RX ORDER — MEPERIDINE HYDROCHLORIDE 50 MG/ML
12.5 INJECTION INTRAMUSCULAR; INTRAVENOUS; SUBCUTANEOUS EVERY 5 MIN PRN
Status: DISCONTINUED | OUTPATIENT
Start: 2020-06-23 | End: 2020-06-23 | Stop reason: HOSPADM

## 2020-06-23 RX ORDER — PROPOFOL 10 MG/ML
INJECTION, EMULSION INTRAVENOUS PRN
Status: DISCONTINUED | OUTPATIENT
Start: 2020-06-23 | End: 2020-06-23 | Stop reason: SDUPTHER

## 2020-06-23 RX ORDER — BUPIVACAINE HYDROCHLORIDE 2.5 MG/ML
INJECTION, SOLUTION INFILTRATION; PERINEURAL PRN
Status: DISCONTINUED | OUTPATIENT
Start: 2020-06-23 | End: 2020-06-23 | Stop reason: ALTCHOICE

## 2020-06-23 RX ADMIN — FENTANYL CITRATE 100 MCG: 50 INJECTION INTRAMUSCULAR; INTRAVENOUS at 10:22

## 2020-06-23 RX ADMIN — DEXAMETHASONE SODIUM PHOSPHATE 10 MG: 10 INJECTION INTRAMUSCULAR; INTRAVENOUS at 10:34

## 2020-06-23 RX ADMIN — ROCURONIUM BROMIDE 10 MG: 10 SOLUTION INTRAVENOUS at 11:24

## 2020-06-23 RX ADMIN — HYDROMORPHONE HYDROCHLORIDE 0.25 MG: 1 INJECTION, SOLUTION INTRAMUSCULAR; INTRAVENOUS; SUBCUTANEOUS at 13:34

## 2020-06-23 RX ADMIN — HYDROMORPHONE HYDROCHLORIDE 0.25 MG: 1 INJECTION, SOLUTION INTRAMUSCULAR; INTRAVENOUS; SUBCUTANEOUS at 13:19

## 2020-06-23 RX ADMIN — MIDAZOLAM HYDROCHLORIDE 1 MG: 2 INJECTION, SOLUTION INTRAMUSCULAR; INTRAVENOUS at 10:14

## 2020-06-23 RX ADMIN — SUCCINYLCHOLINE CHLORIDE 140 MG: 20 INJECTION, SOLUTION INTRAMUSCULAR; INTRAVENOUS at 10:22

## 2020-06-23 RX ADMIN — SODIUM CHLORIDE, POTASSIUM CHLORIDE, SODIUM LACTATE AND CALCIUM CHLORIDE: 600; 310; 30; 20 INJECTION, SOLUTION INTRAVENOUS at 11:26

## 2020-06-23 RX ADMIN — FENTANYL CITRATE 50 MCG: 50 INJECTION INTRAMUSCULAR; INTRAVENOUS at 10:46

## 2020-06-23 RX ADMIN — FAMOTIDINE 20 MG: 10 INJECTION, SOLUTION INTRAVENOUS at 09:48

## 2020-06-23 RX ADMIN — OXYCODONE HYDROCHLORIDE AND ACETAMINOPHEN 2 TABLET: 5; 325 TABLET ORAL at 13:03

## 2020-06-23 RX ADMIN — HYDROMORPHONE HYDROCHLORIDE 0.5 MG: 1 INJECTION, SOLUTION INTRAMUSCULAR; INTRAVENOUS; SUBCUTANEOUS at 12:29

## 2020-06-23 RX ADMIN — PROPOFOL 250 MG: 10 INJECTION, EMULSION INTRAVENOUS at 10:22

## 2020-06-23 RX ADMIN — HYDROMORPHONE HYDROCHLORIDE 0.5 MG: 1 INJECTION, SOLUTION INTRAMUSCULAR; INTRAVENOUS; SUBCUTANEOUS at 12:17

## 2020-06-23 RX ADMIN — SODIUM CHLORIDE, POTASSIUM CHLORIDE, SODIUM LACTATE AND CALCIUM CHLORIDE: 600; 310; 30; 20 INJECTION, SOLUTION INTRAVENOUS at 09:36

## 2020-06-23 RX ADMIN — HYDROMORPHONE HYDROCHLORIDE 0.25 MG: 1 INJECTION, SOLUTION INTRAMUSCULAR; INTRAVENOUS; SUBCUTANEOUS at 12:42

## 2020-06-23 RX ADMIN — Medication 3 G: at 10:31

## 2020-06-23 RX ADMIN — ROCURONIUM BROMIDE 45 MG: 10 SOLUTION INTRAVENOUS at 10:26

## 2020-06-23 RX ADMIN — ROCURONIUM BROMIDE 5 MG: 10 SOLUTION INTRAVENOUS at 10:22

## 2020-06-23 RX ADMIN — FENTANYL CITRATE 50 MCG: 50 INJECTION INTRAMUSCULAR; INTRAVENOUS at 11:07

## 2020-06-23 RX ADMIN — HYDROMORPHONE HYDROCHLORIDE 0.25 MG: 1 INJECTION, SOLUTION INTRAMUSCULAR; INTRAVENOUS; SUBCUTANEOUS at 12:53

## 2020-06-23 RX ADMIN — ONDANSETRON 4 MG: 2 INJECTION INTRAMUSCULAR; INTRAVENOUS at 10:34

## 2020-06-23 RX ADMIN — LABETALOL HYDROCHLORIDE 2.5 MG: 5 INJECTION, SOLUTION INTRAVENOUS at 11:00

## 2020-06-23 RX ADMIN — LIDOCAINE HYDROCHLORIDE 80 MG: 20 INJECTION, SOLUTION INFILTRATION; PERINEURAL at 10:22

## 2020-06-23 RX ADMIN — SUGAMMADEX 300 MG: 100 INJECTION, SOLUTION INTRAVENOUS at 11:39

## 2020-06-23 RX ADMIN — LABETALOL HYDROCHLORIDE 2.5 MG: 5 INJECTION, SOLUTION INTRAVENOUS at 10:55

## 2020-06-23 ASSESSMENT — PULMONARY FUNCTION TESTS
PIF_VALUE: 22
PIF_VALUE: 6
PIF_VALUE: 18
PIF_VALUE: 20
PIF_VALUE: 15
PIF_VALUE: 23
PIF_VALUE: 22
PIF_VALUE: 21
PIF_VALUE: 1
PIF_VALUE: 2
PIF_VALUE: 21
PIF_VALUE: 22
PIF_VALUE: 3
PIF_VALUE: 23
PIF_VALUE: 22
PIF_VALUE: 23
PIF_VALUE: 26
PIF_VALUE: 22
PIF_VALUE: 21
PIF_VALUE: 2
PIF_VALUE: 22
PIF_VALUE: 22
PIF_VALUE: 19
PIF_VALUE: 21
PIF_VALUE: 20
PIF_VALUE: 22
PIF_VALUE: 3
PIF_VALUE: 20
PIF_VALUE: 22
PIF_VALUE: 22
PIF_VALUE: 21
PIF_VALUE: 19
PIF_VALUE: 23
PIF_VALUE: 12
PIF_VALUE: 21
PIF_VALUE: 22
PIF_VALUE: 22
PIF_VALUE: 19
PIF_VALUE: 2
PIF_VALUE: 21
PIF_VALUE: 11
PIF_VALUE: 21
PIF_VALUE: 22
PIF_VALUE: 22
PIF_VALUE: 23
PIF_VALUE: 22
PIF_VALUE: 21
PIF_VALUE: 20
PIF_VALUE: 20
PIF_VALUE: 3
PIF_VALUE: 23
PIF_VALUE: 4
PIF_VALUE: 22
PIF_VALUE: 11
PIF_VALUE: 2
PIF_VALUE: 22
PIF_VALUE: 20
PIF_VALUE: 21
PIF_VALUE: 22
PIF_VALUE: 22
PIF_VALUE: 20
PIF_VALUE: 23
PIF_VALUE: 22
PIF_VALUE: 2
PIF_VALUE: 2
PIF_VALUE: 22
PIF_VALUE: 12
PIF_VALUE: 21
PIF_VALUE: 22
PIF_VALUE: 4
PIF_VALUE: 22
PIF_VALUE: 11
PIF_VALUE: 21
PIF_VALUE: 22
PIF_VALUE: 2
PIF_VALUE: 2
PIF_VALUE: 1
PIF_VALUE: 25
PIF_VALUE: 15
PIF_VALUE: 20
PIF_VALUE: 1
PIF_VALUE: 1
PIF_VALUE: 20
PIF_VALUE: 22
PIF_VALUE: 22
PIF_VALUE: 4
PIF_VALUE: 6
PIF_VALUE: 22
PIF_VALUE: 21
PIF_VALUE: 21
PIF_VALUE: 22

## 2020-06-23 ASSESSMENT — PAIN DESCRIPTION - PROGRESSION
CLINICAL_PROGRESSION: GRADUALLY IMPROVING
CLINICAL_PROGRESSION: NOT CHANGED
CLINICAL_PROGRESSION: NOT CHANGED
CLINICAL_PROGRESSION: GRADUALLY IMPROVING
CLINICAL_PROGRESSION: RAPIDLY WORSENING
CLINICAL_PROGRESSION: NOT CHANGED
CLINICAL_PROGRESSION: GRADUALLY IMPROVING
CLINICAL_PROGRESSION: NOT CHANGED

## 2020-06-23 ASSESSMENT — PAIN DESCRIPTION - FREQUENCY: FREQUENCY: CONTINUOUS

## 2020-06-23 ASSESSMENT — PAIN SCALES - GENERAL
PAINLEVEL_OUTOF10: 9
PAINLEVEL_OUTOF10: 9
PAINLEVEL_OUTOF10: 4
PAINLEVEL_OUTOF10: 9
PAINLEVEL_OUTOF10: 7
PAINLEVEL_OUTOF10: 7
PAINLEVEL_OUTOF10: 5
PAINLEVEL_OUTOF10: 9

## 2020-06-23 ASSESSMENT — PAIN DESCRIPTION - DESCRIPTORS
DESCRIPTORS: ACHING;CONSTANT
DESCRIPTORS: ACHING

## 2020-06-23 ASSESSMENT — PAIN - FUNCTIONAL ASSESSMENT: PAIN_FUNCTIONAL_ASSESSMENT: 0-10

## 2020-06-23 ASSESSMENT — PAIN DESCRIPTION - ORIENTATION: ORIENTATION: RIGHT

## 2020-06-23 ASSESSMENT — LIFESTYLE VARIABLES: SMOKING_STATUS: 0

## 2020-06-23 ASSESSMENT — PAIN DESCRIPTION - LOCATION: LOCATION: SHOULDER

## 2020-06-23 ASSESSMENT — ENCOUNTER SYMPTOMS: SHORTNESS OF BREATH: 0

## 2020-06-23 ASSESSMENT — PAIN DESCRIPTION - PAIN TYPE: TYPE: SURGICAL PAIN

## 2020-06-23 NOTE — OP NOTE
We talked about treatment options and she elected  to have arthroscopic surgery done to address her shoulder and  potentially a rotator cuff repair. She understood the likelihood of the  repair was quite high given the MRI scan findings, although they did  describe this was a partial thickness \"retracted\" tear. We talked about  the rehabilitation after this type of surgery as well as the risks,  benefits and potential complications of the operation including concerns  regarding infection, deep vein thrombosis, pulmonary embolism,  arthrofibrosis, delayed rehabilitation, anesthetic complications  including death, cardiopulmonary issues etc.  All questions were  answered. I signed her shoulder in the preprocedure holding area. SPECIAL PROCEDURES:  The patient did receive Exparel into the shoulder  after the rotator cuff repair. The portals were injected. The incision  was injected. Subacromial space and deltoid area was injected. Nothing  was placed into the joint. DETAILS OF SURGERY:  The patient was taken to the operating room and  placed on the operating table in supine position. General anesthesia  was induced and maintained without difficulty. Examination under  anesthesia did not demonstrate any signs of adhesive capsulitis or  instability. The patient was then positioned as described above. The shoulder was  prepped and draped and the glenohumeral joint was entered. A  full-thickness rotator cuff tear was readily evident. Otherwise, there  were some fraying of the glenoid, but no significant arthritic change in  the biceps and the biceps anchor was intact. Attention was then drawn to the subacromial region and new arthroscopic  portals were established. The arthroscopic Neer acromioplasty was  performed after a fairly extensive subacromial bursectomy was completed  exposing the superior surface of the rotator cuff and the undersurface  of the acromion process.   About 6-mm of bone was

## 2020-06-23 NOTE — BRIEF OP NOTE
Brief Postoperative Note      Patient: Criselda Zuleta  YOB: 1965  MRN: [id number]    Date of Procedure: 6/23/2020    Pre-Op Diagnosis: RIGHT SHOULDER ROTATOR CUFF TEAR, AC JOINT ARTHRITIS, GLENOID LABRUM TEAR    Post-Op Diagnosis: Same       Procedure(s):  EXAM UNDER ANESTHESIA VIDEO ARTHROSCOPY RIGHT SHOULDER, MINI- OPEN ROTATOR CUFF REPAIR, NEER ACROMIOPLASTY, LENO PROCEDURE WITH EXPAREL    Surgeon(s):  Silvestre Hung MD    Assistant:  Surgical Assistant: Russ Blandon  Physician Assistant: Earla Lesches, PA    Anesthesia: General    Estimated Blood Loss (mL): Minimal    Complications: None    Specimens:   * No specimens in log *    Implants:  Implant Name Type Inv.  Item Serial No.  Lot No. LRB No. Used Action   ANCHOR SUT REGEN HEALICOIL 3.7HI Fastener ANCHOR SUT REGEN HEALICOIL 5.7GF  [employer]  Right 2 Implanted   ANCHOR MULTFX-S 5.5MM KNOTLSS Fastener ANCHOR MULTFX-S 5.5MM KNOTLSS  [employer]: ENDOSCOPY  Right 2 Implanted         Drains: * No LDAs found *    Findings: same    Electronically signed by Earla Lesches, PA on 6/23/2020 at 12:35 PM

## 2020-06-29 ENCOUNTER — OFFICE VISIT (OUTPATIENT)
Dept: ORTHOPEDIC SURGERY | Age: 55
End: 2020-06-29

## 2020-06-29 VITALS — HEIGHT: 65 IN | BODY MASS INDEX: 45.33 KG/M2 | WEIGHT: 272.05 LBS

## 2020-06-29 PROCEDURE — 99024 POSTOP FOLLOW-UP VISIT: CPT | Performed by: PHYSICIAN ASSISTANT

## 2020-06-29 RX ORDER — OXYCODONE AND ACETAMINOPHEN 7.5; 325 MG/1; MG/1
1 TABLET ORAL EVERY 4 HOURS PRN
Qty: 28 TABLET | Refills: 0 | Status: SHIPPED | OUTPATIENT
Start: 2020-06-29 | End: 2020-07-04

## 2020-06-29 NOTE — PROGRESS NOTES
Chief Complaint   Patient presents with    Post-Op Check     PO RT SHOULDER SX:6/23/2020     PREOPERATIVE DIAGNOSES:  Full-thickness rotator cuff tear - entire  supraspinatus tendon - right shoulder with associated AC joint arthritis  and lateral arch stenosis.     POSTOPERATIVE DIAGNOSES:  1.  Full-thickness supraspinatus tear, right shoulder involving 2.5 cm  with retraction. 2.  Chronic rotator cuff tendonitis and lateral impingement. 3.  AC joint arthritis and medial arch stenosis.     OPERATIVE PROCEDURE:  1. Exam under anesthesia and video arthroscopy of the right shoulder. 2.  Mini-open rotator cuff repair using two [employer] HEALICOIL  anchors and two [employer] MultiFix anchors. 3.  Arthroscopic Neer acromioplasty. 4.  Arthroscopic Desi procedure. History of present illness: The patient returns today for their first postoperative visit after right shoulder arthroscopy and mini open rotator cuff repair performed on 6/23/2020. Pain control has been satisfactory with oral medications. There have been no fevers or chills. She is struggling with pain postoperatively but the pain medication does help. Physical examination: Inspection reveals expected swelling. Incisions are clean, dry, and intact. No signs of infection. Range of motion limited by pain and swelling as expected. Strength was not assessed today. Neurovascular exam is intact. Assessment/plan: The patient is doing well after shoulder arthroscopy and rotator cuff repair. Surgical findings were reviewed. They will drain in the sling for 3 weeks postoperatively. They may remove the sling for bathing and to perform range of motion of the elbow only. I have recommended ice and judicious use of NSAIDs with GI precautions. We will see the patient back in 3 weeks with x-rays. They verbalized good understanding of the plan.       Candy Galan, [facility]    This dictation was performed with a verbal recognition program (DRAGON) and it was checked for errors. It is possible that there are still dictated errors within this office note. If so, please bring any errors to my attention for an addendum. All efforts were made to ensure that this office note is accurate.

## 2020-07-01 ENCOUNTER — TELEPHONE (OUTPATIENT)
Dept: ORTHOPEDIC SURGERY | Age: 55
End: 2020-07-01

## 2020-07-01 ENCOUNTER — TELEPHONE (OUTPATIENT)
Dept: FAMILY MEDICINE CLINIC | Age: 55
End: 2020-07-01

## 2020-07-01 ENCOUNTER — VIRTUAL VISIT (OUTPATIENT)
Dept: FAMILY MEDICINE CLINIC | Age: 55
End: 2020-07-01
Payer: COMMERCIAL

## 2020-07-01 PROCEDURE — G8427 DOCREV CUR MEDS BY ELIG CLIN: HCPCS | Performed by: FAMILY MEDICINE

## 2020-07-01 PROCEDURE — 99213 OFFICE O/P EST LOW 20 MIN: CPT | Performed by: FAMILY MEDICINE

## 2020-07-01 PROCEDURE — 3017F COLORECTAL CA SCREEN DOC REV: CPT | Performed by: FAMILY MEDICINE

## 2020-07-01 RX ORDER — ONDANSETRON 4 MG/1
TABLET, ORALLY DISINTEGRATING ORAL
Qty: 15 TABLET | Refills: 1 | Status: SHIPPED | OUTPATIENT
Start: 2020-07-01 | End: 2020-07-07 | Stop reason: ALTCHOICE

## 2020-07-01 NOTE — TELEPHONE ENCOUNTER
Patient called in to advise she has been vomiting and belching really bad . She advised it smells like rotten eggs . She advised she also has a lot of gas . She would like someone to call her to discuss .

## 2020-07-01 NOTE — TELEPHONE ENCOUNTER
Future Appointments   Date Time Provider Diann Sotelo   7/1/2020  2:40 PM [facility]   7/20/2020 12:30 PM MD ELIECER Davila AND MARTIN

## 2020-07-02 ENCOUNTER — TELEPHONE (OUTPATIENT)
Dept: FAMILY MEDICINE CLINIC | Age: 55
End: 2020-07-02

## 2020-07-03 ENCOUNTER — APPOINTMENT (OUTPATIENT)
Dept: CT IMAGING | Age: 55
End: 2020-07-03
Payer: COMMERCIAL

## 2020-07-03 ENCOUNTER — APPOINTMENT (OUTPATIENT)
Dept: GENERAL RADIOLOGY | Age: 55
End: 2020-07-03
Payer: COMMERCIAL

## 2020-07-03 ENCOUNTER — HOSPITAL ENCOUNTER (EMERGENCY)
Age: 55
Discharge: HOME | End: 2020-07-03
Attending: EMERGENCY MEDICINE
Payer: COMMERCIAL

## 2020-07-03 ENCOUNTER — TELEPHONE (OUTPATIENT)
Dept: FAMILY MEDICINE CLINIC | Age: 55
End: 2020-07-03

## 2020-07-03 VITALS
SYSTOLIC BLOOD PRESSURE: 133 MMHG | RESPIRATION RATE: 20 BRPM | HEIGHT: 66 IN | TEMPERATURE: 98.6 F | DIASTOLIC BLOOD PRESSURE: 94 MMHG | BODY MASS INDEX: 41.78 KG/M2 | OXYGEN SATURATION: 99 % | HEART RATE: 94 BPM | WEIGHT: 260 LBS

## 2020-07-03 LAB
A/G RATIO: 1.2 (ref 1.1–2.2)
ALBUMIN SERPL-MCNC: 3.6 G/DL (ref 3.4–5)
ALP BLD-CCNC: 74 U/L (ref 40–129)
ALT SERPL-CCNC: 38 U/L (ref 10–40)
ANION GAP SERPL CALCULATED.3IONS-SCNC: 14 MMOL/L (ref 3–16)
AST SERPL-CCNC: 21 U/L (ref 15–37)
BASOPHILS ABSOLUTE: 0.1 K/UL (ref 0–0.2)
BASOPHILS RELATIVE PERCENT: 0.8 %
BILIRUB SERPL-MCNC: 0.5 MG/DL (ref 0–1)
BUN BLDV-MCNC: 11 MG/DL (ref 7–20)
CALCIUM SERPL-MCNC: 9.1 MG/DL (ref 8.3–10.6)
CHLORIDE BLD-SCNC: 103 MMOL/L (ref 99–110)
CO2: 17 MMOL/L (ref 21–32)
CREAT SERPL-MCNC: 0.9 MG/DL (ref 0.6–1.1)
EKG ATRIAL RATE: 75 BPM
EKG DIAGNOSIS: NORMAL
EKG P AXIS: 33 DEGREES
EKG P-R INTERVAL: 98 MS
EKG Q-T INTERVAL: 400 MS
EKG QRS DURATION: 78 MS
EKG QTC CALCULATION (BAZETT): 446 MS
EKG R AXIS: 8 DEGREES
EKG T AXIS: -5 DEGREES
EKG VENTRICULAR RATE: 75 BPM
EOSINOPHILS ABSOLUTE: 0.2 K/UL (ref 0–0.6)
EOSINOPHILS RELATIVE PERCENT: 2.3 %
GFR AFRICAN AMERICAN: >60
GFR NON-AFRICAN AMERICAN: >60
GLOBULIN: 2.9 G/DL
GLUCOSE BLD-MCNC: 141 MG/DL (ref 70–99)
HCT VFR BLD CALC: 38.4 % (ref 36–48)
HEMOGLOBIN: 12.7 G/DL (ref 12–16)
LIPASE: 47 U/L (ref 13–60)
LYMPHOCYTES ABSOLUTE: 1.7 K/UL (ref 1–5.1)
LYMPHOCYTES RELATIVE PERCENT: 17.1 %
MCH RBC QN AUTO: 30.1 PG (ref 26–34)
MCHC RBC AUTO-ENTMCNC: 33 G/DL (ref 31–36)
MCV RBC AUTO: 91 FL (ref 80–100)
MONOCYTES ABSOLUTE: 0.8 K/UL (ref 0–1.3)
MONOCYTES RELATIVE PERCENT: 7.8 %
NEUTROPHILS ABSOLUTE: 7 K/UL (ref 1.7–7.7)
NEUTROPHILS RELATIVE PERCENT: 72 %
PDW BLD-RTO: 15.8 % (ref 12.4–15.4)
PLATELET # BLD: 231 K/UL (ref 135–450)
PMV BLD AUTO: 9 FL (ref 5–10.5)
POTASSIUM SERPL-SCNC: 3.4 MMOL/L (ref 3.5–5.1)
RBC # BLD: 4.22 M/UL (ref 4–5.2)
SODIUM BLD-SCNC: 134 MMOL/L (ref 136–145)
TOTAL PROTEIN: 6.5 G/DL (ref 6.4–8.2)
TROPONIN: <0.01 NG/ML
WBC # BLD: 9.7 K/UL (ref 4–11)

## 2020-07-03 PROCEDURE — 6370000000 HC RX 637 (ALT 250 FOR IP): Performed by: NURSE PRACTITIONER

## 2020-07-03 PROCEDURE — 6360000004 HC RX CONTRAST MEDICATION: Performed by: NURSE PRACTITIONER

## 2020-07-03 PROCEDURE — 80053 COMPREHEN METABOLIC PANEL: CPT

## 2020-07-03 PROCEDURE — 96374 THER/PROPH/DIAG INJ IV PUSH: CPT

## 2020-07-03 PROCEDURE — 96376 TX/PRO/DX INJ SAME DRUG ADON: CPT

## 2020-07-03 PROCEDURE — 85025 COMPLETE CBC W/AUTO DIFF WBC: CPT

## 2020-07-03 PROCEDURE — 74177 CT ABD & PELVIS W/CONTRAST: CPT

## 2020-07-03 PROCEDURE — 71046 X-RAY EXAM CHEST 2 VIEWS: CPT

## 2020-07-03 PROCEDURE — 93010 ELECTROCARDIOGRAM REPORT: CPT | Performed by: INTERNAL MEDICINE

## 2020-07-03 PROCEDURE — 99285 EMERGENCY DEPT VISIT HI MDM: CPT

## 2020-07-03 PROCEDURE — 93005 ELECTROCARDIOGRAM TRACING: CPT | Performed by: NURSE PRACTITIONER

## 2020-07-03 PROCEDURE — 96375 TX/PRO/DX INJ NEW DRUG ADDON: CPT

## 2020-07-03 PROCEDURE — 83690 ASSAY OF LIPASE: CPT

## 2020-07-03 PROCEDURE — 84484 ASSAY OF TROPONIN QUANT: CPT

## 2020-07-03 PROCEDURE — 6360000002 HC RX W HCPCS

## 2020-07-03 PROCEDURE — 6360000002 HC RX W HCPCS: Performed by: NURSE PRACTITIONER

## 2020-07-03 PROCEDURE — 2580000003 HC RX 258: Performed by: NURSE PRACTITIONER

## 2020-07-03 RX ORDER — DICYCLOMINE HYDROCHLORIDE 10 MG/1
10 CAPSULE ORAL
Qty: 30 CAPSULE | Refills: 0 | Status: SHIPPED | OUTPATIENT
Start: 2020-07-03 | End: 2020-07-22 | Stop reason: DRUGHIGH

## 2020-07-03 RX ORDER — NALOXONE HYDROCHLORIDE 1 MG/ML
2 INJECTION INTRAMUSCULAR; INTRAVENOUS; SUBCUTANEOUS ONCE
Status: DISCONTINUED | OUTPATIENT
Start: 2020-07-03 | End: 2020-07-03

## 2020-07-03 RX ORDER — MORPHINE SULFATE 4 MG/ML
4 INJECTION, SOLUTION INTRAMUSCULAR; INTRAVENOUS ONCE
Status: COMPLETED | OUTPATIENT
Start: 2020-07-03 | End: 2020-07-03

## 2020-07-03 RX ORDER — 0.9 % SODIUM CHLORIDE 0.9 %
1000 INTRAVENOUS SOLUTION INTRAVENOUS ONCE
Status: COMPLETED | OUTPATIENT
Start: 2020-07-03 | End: 2020-07-03

## 2020-07-03 RX ORDER — DICYCLOMINE HYDROCHLORIDE 10 MG/1
10 CAPSULE ORAL ONCE
Status: COMPLETED | OUTPATIENT
Start: 2020-07-03 | End: 2020-07-03

## 2020-07-03 RX ORDER — POTASSIUM CHLORIDE 20 MEQ/1
40 TABLET, EXTENDED RELEASE ORAL ONCE
Status: COMPLETED | OUTPATIENT
Start: 2020-07-03 | End: 2020-07-03

## 2020-07-03 RX ORDER — LOPERAMIDE HYDROCHLORIDE 2 MG/1
2 CAPSULE ORAL 4 TIMES DAILY PRN
Qty: 10 CAPSULE | Refills: 0 | Status: SHIPPED | OUTPATIENT
Start: 2020-07-03 | End: 2020-07-07 | Stop reason: ALTCHOICE

## 2020-07-03 RX ORDER — NALOXONE HYDROCHLORIDE 1 MG/ML
INJECTION INTRAMUSCULAR; INTRAVENOUS; SUBCUTANEOUS
Status: COMPLETED
Start: 2020-07-03 | End: 2020-07-03

## 2020-07-03 RX ORDER — AMMONIA INHALANTS 0.04 G/.3ML
INHALANT RESPIRATORY (INHALATION)
Status: DISCONTINUED
Start: 2020-07-03 | End: 2020-07-03 | Stop reason: HOSPADM

## 2020-07-03 RX ORDER — ONDANSETRON 2 MG/ML
INJECTION INTRAMUSCULAR; INTRAVENOUS
Status: COMPLETED
Start: 2020-07-03 | End: 2020-07-03

## 2020-07-03 RX ORDER — ONDANSETRON 2 MG/ML
4 INJECTION INTRAMUSCULAR; INTRAVENOUS ONCE
Status: COMPLETED | OUTPATIENT
Start: 2020-07-03 | End: 2020-07-03

## 2020-07-03 RX ORDER — NALOXONE HYDROCHLORIDE 1 MG/ML
2 INJECTION INTRAMUSCULAR; INTRAVENOUS; SUBCUTANEOUS ONCE
Status: COMPLETED | OUTPATIENT
Start: 2020-07-03 | End: 2020-07-03

## 2020-07-03 RX ORDER — OXYCODONE HYDROCHLORIDE AND ACETAMINOPHEN 5; 325 MG/1; MG/1
1 TABLET ORAL ONCE
Status: COMPLETED | OUTPATIENT
Start: 2020-07-03 | End: 2020-07-03

## 2020-07-03 RX ORDER — DIPHENHYDRAMINE HYDROCHLORIDE 50 MG/ML
6.25 INJECTION INTRAMUSCULAR; INTRAVENOUS ONCE
Status: COMPLETED | OUTPATIENT
Start: 2020-07-03 | End: 2020-07-03

## 2020-07-03 RX ORDER — PROMETHAZINE HYDROCHLORIDE 25 MG/1
12.5-25 TABLET ORAL EVERY 6 HOURS PRN
Qty: 12 TABLET | Refills: 0 | Status: SHIPPED | OUTPATIENT
Start: 2020-07-03 | End: 2020-07-07 | Stop reason: ALTCHOICE

## 2020-07-03 RX ORDER — METOCLOPRAMIDE HYDROCHLORIDE 5 MG/ML
10 INJECTION INTRAMUSCULAR; INTRAVENOUS ONCE
Status: COMPLETED | OUTPATIENT
Start: 2020-07-03 | End: 2020-07-03

## 2020-07-03 RX ADMIN — ONDANSETRON 4 MG: 2 INJECTION INTRAMUSCULAR; INTRAVENOUS at 05:55

## 2020-07-03 RX ADMIN — OXYCODONE HYDROCHLORIDE AND ACETAMINOPHEN 1 TABLET: 5; 325 TABLET ORAL at 04:42

## 2020-07-03 RX ADMIN — POTASSIUM CHLORIDE 40 MEQ: 1500 TABLET, EXTENDED RELEASE ORAL at 04:48

## 2020-07-03 RX ADMIN — IOPAMIDOL 75 ML: 755 INJECTION, SOLUTION INTRAVENOUS at 02:05

## 2020-07-03 RX ADMIN — ONDANSETRON 4 MG: 2 INJECTION INTRAMUSCULAR; INTRAVENOUS at 01:30

## 2020-07-03 RX ADMIN — NALOXONE HYDROCHLORIDE 2 MG: 1 INJECTION INTRAMUSCULAR; INTRAVENOUS; SUBCUTANEOUS at 05:55

## 2020-07-03 RX ADMIN — DIPHENHYDRAMINE HYDROCHLORIDE 6.25 MG: 50 INJECTION, SOLUTION INTRAMUSCULAR; INTRAVENOUS at 04:43

## 2020-07-03 RX ADMIN — MORPHINE SULFATE 4 MG: 4 INJECTION, SOLUTION INTRAMUSCULAR; INTRAVENOUS at 01:42

## 2020-07-03 RX ADMIN — NALOXONE HYDROCHLORIDE 2 MG: 1 INJECTION PARENTERAL at 05:55

## 2020-07-03 RX ADMIN — SODIUM CHLORIDE 1000 ML: 9 INJECTION, SOLUTION INTRAVENOUS at 01:30

## 2020-07-03 RX ADMIN — DICYCLOMINE HYDROCHLORIDE 10 MG: 10 CAPSULE ORAL at 04:43

## 2020-07-03 RX ADMIN — METOCLOPRAMIDE 10 MG: 5 INJECTION, SOLUTION INTRAMUSCULAR; INTRAVENOUS at 04:45

## 2020-07-03 ASSESSMENT — PAIN SCALES - GENERAL
PAINLEVEL_OUTOF10: 0
PAINLEVEL_OUTOF10: 8
PAINLEVEL_OUTOF10: 7
PAINLEVEL_OUTOF10: 9

## 2020-07-03 NOTE — ED NOTES
Attempted to call Angelica Card, patient's mother, to transport her home and no answer.        Yaneth Ang RN  07/03/20 0006

## 2020-07-03 NOTE — ED NOTES
Pt was given discharge instructions and repeated information back correctly when prompted, when asked to sign for the discharge paperwork she refused to hold the pen to sign. Pt sitting in bed with eyes closed and jerking but responding verbally.       Khoa Nassar RN  07/03/20 [id number]

## 2020-07-03 NOTE — ED PROVIDER NOTES
Patient does have a psychiatric history which may be contributing to her current symptomology. She was informed that she will be discharged. Her vital signs are stable and she does not have any symptoms consistent with seizure syncope or medication reaction. Disposition:  1. Nausea and vomiting, intractability of vomiting not specified, unspecified vomiting type    2.  Diarrhea, unspecified type          Chay Ibarra MD  Attending Emergency Physician       Chay Ibarra MD  07/08/20 2007

## 2020-07-03 NOTE — ED NOTES

## 2020-07-03 NOTE — ED PROVIDER NOTES
[facility] Emergency Department    CHIEF COMPLAINT  Shortness of Breath [facility])      HISTORY OF PRESENT ILLNESS  Epifanio Cervantes is a 54 y.o. female who presents to the ED complaining of generalized abdominal pain, nausea, vomiting, diarrhea, fever, and shortness of breath intermittently since this past . Patient reports that she did have a virtual visit with her primary care physician this past Tuesday and he prescribed her Zofran and antidiarrheal medication with no relief of the above symptoms. Patient recently had right shoulder surgery and is taking Percocet for pain. Patient denies known sick contacts or Covid-19 exposure. Patient denies cough, sore throat, chest pain, blood in her emesis or diarrhea, urinary frequency, urgency, dysuria. Patient denies any identifiable aggravating or alleviating factors. No other complaints, modifying factors or associated symptoms. Nursing notes reviewed.    Past Medical History:   Diagnosis Date    Anxiety     Depression     GERD (gastroesophageal reflux disease)     Hypertension     Osteoarthritis     Restless leg syndrome      Past Surgical History:   Procedure Laterality Date    ANUS SURGERY  2018    fissure     SECTION      x3    FRACTURE SURGERY      right wrist    HERNIA REPAIR  2018    LAPAROSCOPIC PARAESOPHAGEAL HERNIA REPAIR WITH MESH    HIATAL HERNIA REPAIR  2018    KNEE ARTHROSCOPY Left 11/15/12    ARTHROSCOPY LEFT KNEE WITH SYOVECTOMY AND CHONDROPLASTY    OVARY REMOVAL Right     ROTATOR CUFF REPAIR Right 2020    EXAM UNDER ANESTHESIA VIDEO ARTHROSCOPY RIGHT SHOULDER, MINI- OPEN ROTATOR CUFF REPAIR, NEER ACROMIOPLASTY, LENO PROCEDURE WITH EXPAREL performed by Stanley Jacques MD at [facility]      UPPER GASTROINTESTINAL ENDOSCOPY  2015    esophageasl stretch     Family History   Problem Relation Age of Onset    Arthritis Mother     Obesity Mother    [facility] Anemia Mother     Other Mother         pulmonary embolism    Arthritis Father     Arrhythmia Father     Diabetes Other     Diabetes Paternal Grandfather     Cancer Neg Hx     Breast Cancer Neg Hx     Colon Cancer Neg Hx      Social History     Socioeconomic History    Marital status:       Spouse name: Not on file    Number of children: 3    Years of education: Not on file    Highest education level: Not on file   Occupational History    Occupation:    Social Needs    Financial resource strain: Not on file    Food insecurity     Worry: Not on file     Inability: Not on file    Transportation needs     Medical: Not on file     Non-medical: Not on file   Tobacco Use    Smoking status: Never Smoker    Smokeless tobacco: Never Used   Substance and Sexual Activity    Alcohol use: Yes     Comment: 1 -2 x a month    Drug use: Not Currently     Types: Marijuana     Comment: quit 2016    Sexual activity: Never   Lifestyle    Physical activity     Days per week: Not on file     Minutes per session: Not on file    Stress: Not on file   Relationships    Social connections     Talks on phone: Not on file     Gets together: Not on file     Attends Taoist service: Not on file     Active member of club or organization: Not on file     Attends meetings of clubs or organizations: Not on file     Relationship status: Not on file    Intimate partner violence     Fear of current or ex partner: Not on file     Emotionally abused: Not on file     Physically abused: Not on file     Forced sexual activity: Not on file   Other Topics Concern    Not on file   Social History Narrative    Not on file     Current Facility-Administered Medications   Medication Dose Route Frequency Provider Last Rate Last Dose    dicyclomine (BENTYL) capsule 10 mg  10 mg Oral Once AVERY Palomares - CNP        potassium chloride (KLOR-CON M) sleep paralysis\"    Haldol [Haloperidol Lactate] Other (See Comments)     \"felt out of it, similar to the toradol\"    Tramadol Hives       REVIEW OF SYSTEMS  10 systems reviewed, pertinent positives per HPI otherwise noted to be negative    PHYSICAL EXAM  /67   Pulse 73   Temp 99.8 °F (37.7 °C) (Oral)   Resp 16   Ht 5' 6\" (1.676 m)   Wt 260 lb (117.9 kg)   LMP 09/20/2017 Comment: spotting for a few days   SpO2 96%   BMI 41.97 kg/m²   GENERAL APPEARANCE: Awake and alert. Cooperative. Patient actively vomiting. Hemodynamically stable. Afebrile. EYES: PERRL. EOM's grossly intact. ENT: Mucous membranes are dry. NECK: Supple. Normal ROM. HEART: RRR. Distal pulses are equal and intact. Cap refill less than 2 seconds. LUNGS: Respirations unlabored. CTAB. Good air exchange. Speaking comfortably in full sentences. No wheezing, rhonchi, rales, stridor. ABDOMEN: Rounded abdomen, soft, no rebound or rigidity. Tenderness diffusely noted specifically in the right upper and midepigastric region. No CVA tenderness bilaterally. EXTREMITIES: No peripheral edema. Moves all extremities equally. All extremities neurovascularly intact. SKIN: Warm and dry. No acute rashes. NEUROLOGICAL: Alert and oriented. No gross facial drooping. Strength 5/5, sensation intact. PSYCHIATRIC: Normal mood and affect. SCREENINGS       RADIOLOGY  Xr Chest Standard (2 Vw)    Result Date: 7/3/2020  EXAMINATION: TWO XRAY VIEWS OF THE CHEST 7/3/2020 12:20 am COMPARISON: April 14, 2019 HISTORY: ORDERING SYSTEM PROVIDED HISTORY: sob TECHNOLOGIST PROVIDED HISTORY: Reason for exam:->sob Reason for Exam: SOB, vomiting, denies chest pains Acuity: Acute Type of Exam: Initial FINDINGS: The lungs are without acute focal process. There is no effusion or pneumothorax. The cardiomediastinal silhouette is without acute process. The osseous structures are without acute process. Prominent cervical rib is seen bilaterally.      No acute process. Ct Abdomen Pelvis W Iv Contrast Additional Contrast? None    Result Date: 7/3/2020  EXAMINATION: CT OF THE ABDOMEN AND PELVIS WITH CONTRAST 7/3/2020 2:04 am TECHNIQUE: CT of the abdomen and pelvis was performed with the administration of intravenous contrast. Multiplanar reformatted images are provided for review. Dose modulation, iterative reconstruction, and/or weight based adjustment of the mA/kV was utilized to reduce the radiation dose to as low as reasonably achievable. COMPARISON: CT abdomen pelvis 04/29/2018, 07/08/2014 HISTORY: ORDERING SYSTEM PROVIDED HISTORY: n/v/d fever TECHNOLOGIST PROVIDED HISTORY: Reason for exam:->n/v/d fever Additional Contrast?->None Reason for Exam: VOMITING, DIARRHEA, FEVER, SHOULDER SURGERY RECENTLY Acuity: Acute Type of Exam: Initial FINDINGS: LOWER CHEST Lung bases: Clear lung bases. Normal included heart and pericardium. ABDOMEN Liver: Stable too small to characterize hypodensity in the right hepatic lobe. Mild hepatomegaly with the right hepatic lobe measuring over 19 cm craniocaudal.  Smooth liver contour. Gallbladder: Normal. Biliary: No intra or extrahepatic bile duct dilatation. Pancreas: Normal. Spleen: Normal. Adrenals: Stable bilateral adrenal gland nodules, unchanged dating back to 2014 where they measured less than 10 Hounsfield units, consistent with benign adenomas. Kidneys: Kidneys are symmetric in size and parenchymal enhancement. No hydronephrosis or nephrolithiasis. GI Tract: Normal distal esophagus, stomach and duodenal sweep. No apparent bowel wall thickening or obstruction. Colonic air-fluid levels. Normal appendix. Peritoneum/Retroperitoneum: No enlarged lymph nodes, free air or free fluid. Vascular: Normal caliber abdominal aorta and IVC. PELVIS Genitourinary: Normal urinary bladder. Normal uterus. Ovaries not well delineated. Other: No free fluid. No enlarged lymph nodes.  MUSCULOSKELETAL Bones and Soft Tissues: No acute soft tissue or osseous abnormality. Lumbar spondylosis, most pronounced at L5-S1. Colonic air-fluid levels consistent with diarrheal illness. No bowel obstruction or appreciable wall thickening. Mild hepatomegaly. ED COURSE/MDM  Patient seen and evaluated. Old records reviewed. Diagnostic testing reviewed and results discussed. I have seen this patient in collaboration with supervising physician Dr. Tito Mcmahon. We thoroughly discussed the history, physical exam, diagnostic testing and emergency department course. Danyell Angel presented to the ED today with above noted complaints. Upon arrival to the emergency department patient is actively vomiting and complaining of diffuse abdominal pain and postsurgical right shoulder pain. Patient was initially given morphine, Zofran, sodium chloride bolus for her symptoms. Initial emergency department work-up CBC and CMP unremarkable no leukocytosis, bandemia, anemia, acute kidney injury. Mild hypokalemia at 3.4. LFTs and lipase are unremarkable. Troponin less than 0.01. EKG interpreted by my attending physician. CT of the abdomen and pelvis shows colonic air fluid levels consistent with diarrheal illness. No bowel obstruction or appreciable wall thickening. Mild hepatomegaly. Gallbladder is normal no intra-or extrahepatic bile duct dilatation. Chest x-ray shows no acute process. No effusion or pneumothorax. Upon reevaluation patient is still complaining of her postop right shoulder pain, abdominal pain, cramping, nausea. Patient is no longer actively vomiting and has no episodes of diarrhea. Patient was given her home dose of Percocet, Reglan, Benadryl, and Bentyl for abdominal cramping and nausea potassium replaced and patient was signed out to my attending physician for further observation and reevaluation. Please see his note for final disposition.     While in ED patient received   Medications   dicyclomine (BENTYL) capsule 10 mg (has no

## 2020-07-03 NOTE — ED NOTES
Pt seen from nurse's desk closing her eyes and jerking. Patient will stop these movements and look out at the desk. Patient observed to be talking to herself and upon making eye contact with patient, she resumes jerking and closing eyes. Upon entering room, patient will respond verbally.       Avis Smiley RN  07/03/20 0184

## 2020-07-03 NOTE — ED NOTES
Report from Juana Márquez, night shift RN. She states patient has been uncooperative throughout her visit. She stated patient had frequent episodes of flailing arms and stating she was having a seizure, but was able to converse during each episode. Pt does have psychiatric history. Dr. Neves Patches at bedside and states patient is okay to go home. Patient states \"I want to go to sleep and I can't. \"      Willie Estes, CHAITANYA  07/03/20 [id number]

## 2020-07-03 NOTE — ED NOTES
Pt had several episodes of stating that she \"is having problems that she had years ago to Toradol\" explain to pt that she did not get any Toradol today md at bedside d/t the activity that pt has been showing.        Makenzie Cifuentes RN  07/03/20 [id number]

## 2020-07-05 ENCOUNTER — CARE COORDINATION (OUTPATIENT)
Dept: CARE COORDINATION | Age: 55
End: 2020-07-05

## 2020-07-05 ASSESSMENT — ENCOUNTER SYMPTOMS
RESPIRATORY NEGATIVE: 1
BLOOD IN STOOL: 0
DIARRHEA: 1

## 2020-07-05 NOTE — PATIENT INSTRUCTIONS
Limited diet strategies discussed. Begin the Imodium and use the Zofran as needed. Follow over 1-3 days.

## 2020-07-05 NOTE — PROGRESS NOTES
signs of difficulty breathing or respiratory distress        [] Abnormal-      Musculoskeletal:   [] Normal gait with no signs of ataxia         [] Normal range of motion of neck        [] Abnormal-       Neurological:        [x] No Facial Asymmetry (Cranial nerve 7 motor function) (limited exam to video visit)          [x] No gaze palsy        [] Abnormal-         Skin:        [x] No significant exanthematous lesions or discoloration noted on facial skin         [] Abnormal-            Psychiatric:       [x] Normal Affect [x] No Hallucinations        [] Abnormal-     Other pertinent observable physical exam findings-     ASSESSMENT/PLAN:  Diarrhea. Viral or functional in nature. Patient has not taken any antidiarrheal medicine. Nausea is minimal.  Discussed diet, trial of over-the-counter diarrhea medicine, and prescription Zofran if nauseated. Discussed expectations of the medicine and symptoms to watch for and when to get additional medical care. We will follow clinically. Danyell Angel is a 54 y.o. female being evaluated by a Virtual Visit (video visit) encounter to address concerns as mentioned above. A caregiver was present when appropriate. Due to this being a TeleHealth encounter (During [facility] public health emergency), evaluation of the following organ systems was limited: Vitals/Constitutional/EENT/Resp/CV/GI//MS/Neuro/Skin/Heme-Lymph-Imm. Pursuant to the emergency declaration under the [address] [place] [address] authority and the [employer] and [employer] Act, this Virtual Visit was conducted with patient's (and/or legal guardian's) consent, to reduce the patient's risk of exposure to COVID-19 and provide necessary medical care.   The patient (and/or legal guardian) has also been advised to contact this office for worsening conditions or problems, and seek emergency medical treatment and/or call 911 if deemed necessary. Patient identification was verified at the start of the visit: Yes    Total time spent on this encounter: Not billed by time    Services were provided through a video synchronous discussion virtually to substitute for in-person clinic visit. Patient and provider were located at their individual homes. --Nataliya Garcia DO on 7/5/2020 at 3:30 PM    An electronic signature was used to authenticate this note.

## 2020-07-05 NOTE — CARE COORDINATION
First attempt to reach the pt by the RN, GORDON. The RN, Ambulatory Care Manager called and left a message with the nurse's call back number asking the pt to return the nurse's call. As a resource only, due to the recent COVID-19 pandemic, [facility]) has a [employer], [phone] for anyone that is experiencing respiratory problems, fever or Flu-like symptoms.

## 2020-07-06 RX ORDER — LISINOPRIL 20 MG/1
TABLET ORAL
Qty: 30 TABLET | Refills: 6 | Status: SHIPPED | OUTPATIENT
Start: 2020-07-06 | End: 2021-02-08

## 2020-07-06 NOTE — TELEPHONE ENCOUNTER
Future Appointments   Date Time Provider Diann Sotelo   7/20/2020 12:30 PM MD ELIECER Llamas AND MARTIN QUEVEDO 6/17/2020

## 2020-07-07 ENCOUNTER — CARE COORDINATION (OUTPATIENT)
Dept: CARE COORDINATION | Age: 55
End: 2020-07-07

## 2020-07-07 ENCOUNTER — VIRTUAL VISIT (OUTPATIENT)
Dept: FAMILY MEDICINE CLINIC | Age: 55
End: 2020-07-07
Payer: COMMERCIAL

## 2020-07-07 PROCEDURE — 1111F DSCHRG MED/CURRENT MED MERGE: CPT | Performed by: FAMILY MEDICINE

## 2020-07-07 PROCEDURE — G8427 DOCREV CUR MEDS BY ELIG CLIN: HCPCS | Performed by: FAMILY MEDICINE

## 2020-07-07 PROCEDURE — 99213 OFFICE O/P EST LOW 20 MIN: CPT | Performed by: FAMILY MEDICINE

## 2020-07-07 PROCEDURE — 99214 OFFICE O/P EST MOD 30 MIN: CPT | Performed by: FAMILY MEDICINE

## 2020-07-07 PROCEDURE — 3017F COLORECTAL CA SCREEN DOC REV: CPT | Performed by: FAMILY MEDICINE

## 2020-07-11 ASSESSMENT — ENCOUNTER SYMPTOMS: RESPIRATORY NEGATIVE: 1

## 2020-07-11 NOTE — PROGRESS NOTES
2020    TELEHEALTH EVALUATION -- Audio/Visual (During [facility]-55 public health emergency)    HPI:    Lenard Lopez (:  1965) has requested an audio/video evaluation for the following concern(s):    Diarrhea. ER follow up. This patient is \"seen\" in a virtual medical visit for follow-up from the ER for complaints of diarrhea. Patient was seen in the ER on July 3 with diarrhea abdominal cramps and weakness. She was evaluated with labs and a CT and was able to be treated and released. As of this visit she is feeling better. She is able to eat. She has loose stools at this point of 2 or 3/day but not significant diarrhea. She is afebrile and generally feels better. The patient had shoulder surgery 2 weeks before the episode. She reports now she is improved with her shoulder, less pain, less medicine and overall feels like she is on the mend. Review of Systems   Constitutional: Negative for chills and fever. Respiratory: Negative. Cardiovascular: Negative. Gastrointestinal:        Improving GI symptoms. Genitourinary: Negative for dysuria and hematuria. Musculoskeletal:        Shoulder pain from her surgery. Psychiatric/Behavioral:        Doing generally well with her depressive condition. Functionally fairly well without acute change in her activity or mood. She has no dark thoughts or suicidal ideation. Prior to Visit Medications    Medication Sig Taking?  Authorizing Provider   lisinopril (PRINIVIL;ZESTRIL) 20 MG tablet TAKE 1 TABLET BY MOUTH EVERY DAY Yes Tala Melendezlim, DO   methocarbamol (ROBAXIN) 500 MG tablet TAKE 1 TABLET BY MOUTH 3 TIMES A DAY AS NEEDED FOR NECK PAIN Yes Tala Melendezlim, DO   atenolol (TENORMIN) 25 MG tablet TAKE 1 TABLET BY MOUTH EVERY DAY Yes Tala Melendezlim, DO   ibuprofen (ADVIL;MOTRIN) 600 MG tablet TAKE 1 TABLET BY MOUTH EVERY 8 HOURS AS NEEDED FOR PAIN Yes Tala Melendezlim, DO   omeprazole (PRILOSEC) 40 MG delayed release capsule TAKE 1 CAPSULE BY MOUTH EVERY DAY Yes Thien Camacho DO   rOPINIRole (REQUIP) 2 MG tablet TAKE 1 TABLET BY MOUTH TWICE A DAY Yes Thien Camacho DO   DULoxetine (CYMBALTA) 60 MG extended release capsule TAKE 1 CAPSULE BY MOUTH EVERY DAY Yes Thien Camacho DO   MELATONIN PO Take 1 tablet by mouth nightly as needed Yes Historical Provider, MD   ALPRAZolam (XANAX) 1 MG tablet TAKE 1 TABLET BY MOUTH THREE TIMES A DAY AS NEEDED FOR ANXIETY  Thien Camacho DO   dicyclomine (BENTYL) 10 MG capsule Take 1 capsule by mouth 4 times daily (before meals and nightly)  Patient not taking: Reported on 7/7/2020  Simon Barnes MD   pravastatin (PRAVACHOL) 20 MG tablet Take 1 tablet by mouth daily For cholesterol  Patient not taking: Reported on 7/7/2020  Thien Camacho DO       Social History     Tobacco Use    Smoking status: Never Smoker    Smokeless tobacco: Never Used   Substance Use Topics    Alcohol use: Yes     Comment: 1 -2 x a month    Drug use: Not Currently     Types: Marijuana     Comment: quit 2016            PHYSICAL EXAMINATION:  [ INSTRUCTIONS:  \"[x]\" Indicates a positive item  \"[]\" Indicates a negative item  -- DELETE ALL ITEMS NOT EXAMINED]  Vital Signs: (As obtained by patient/caregiver or practitioner observation)    Blood pressure-  Heart rate-    Respiratory rate-    Temperature-  Pulse oximetry-     Constitutional: [x] Appears well-developed and well-nourished [] No apparent distress      [x] Abnormal-she appears a bit tired. Not in acute distress. Mental status  [x] Alert and awake  [x] Oriented to person/place/time [x]Able to follow commands      Eyes:  EOM    []  Normal  [] Abnormal-  Sclera  []  Normal  [] Abnormal -         Discharge []  None visible  [] Abnormal -    HENT:   [] Normocephalic, atraumatic.   [] Abnormal   [] Mouth/Throat: Mucous membranes are moist.     External Ears [] Normal  [] Abnormal-     Neck: [] No visualized mass     Pulmonary/Chest: [x] Respiratory effort normal.  [x] No visualized signs of difficulty breathing or respiratory distress        [] Abnormal-      Musculoskeletal:   [] Normal gait with no signs of ataxia         [] Normal range of motion of neck        [] Abnormal-       Neurological:        [x] No Facial Asymmetry (Cranial nerve 7 motor function) (limited exam to video visit)          [x] No gaze palsy        [] Abnormal-         Skin:        [x] No significant exanthematous lesions or discoloration noted on facial skin         [] Abnormal-            Psychiatric:       [x] Normal Affect [] No Hallucinations        [] Abnormal-     Other pertinent observable physical exam findings-     ASSESSMENT/PLAN:  Acute diarrhea illness, nonspecific  Depressive disorder, remission  Hypertension  Obesity    The patient is counseled regarding progression of her diet. She will continue her routine medications and follow-up in the orthopedic direction regarding her shoulder and therapy and activity. The specific etiology of the diarrhea and abdominal symptoms is indeterminate. Viral and/or stress phenomenon may been involved. I asked her to follow-up with me in the fall or call sooner if any changes or worsening of her condition. Daynell Angel is a 54 y.o. female being evaluated by a Virtual Visit (video visit) encounter to address concerns as mentioned above. A caregiver was present when appropriate. Due to this being a TeleHealth encounter (During [facility]- public health emergency), evaluation of the following organ systems was limited: Vitals/Constitutional/EENT/Resp/CV/GI//MS/Neuro/Skin/Heme-Lymph-Imm. Pursuant to the emergency declaration under the [address] [place] [address], [address] [place] [address] authority and the [employer] Act, this Virtual Visit was conducted with patient's (and/or legal guardian's) consent, to reduce the patient's risk of exposure to COVID-19 and provide necessary medical care.   The patient (and/or legal guardian) has also been advised to contact this office for worsening conditions or problems, and seek emergency medical treatment and/or call 911 if deemed necessary. Patient identification was verified at the start of the visit: Yes    Total time spent on this encounter: Not billed by time    Services were provided through a video synchronous discussion virtually to substitute for in-person clinic visit. Patient and provider were located at their individual homes. --Nataliya Garcia DO on 7/11/2020 at 4:21 PM    An electronic signature was used to authenticate this note.

## 2020-07-15 ENCOUNTER — TELEPHONE (OUTPATIENT)
Dept: FAMILY MEDICINE CLINIC | Age: 55
End: 2020-07-15

## 2020-07-15 NOTE — PROGRESS NOTES
Spoke to my medical assistant, Ophelia Little, who will gather the correct specimen containers and contact Ms. Downs.

## 2020-07-18 ENCOUNTER — NURSE TRIAGE (OUTPATIENT)
Dept: OTHER | Facility: CLINIC | Age: 55
End: 2020-07-18

## 2020-07-18 NOTE — TELEPHONE ENCOUNTER
Reason for Disposition   Health Information question, no triage required and triager able to answer question    Protocols used: INFORMATION ONLY CALL-ADULT-    Received call from [facility]. Caller reports she has a c-diff sample to be dropped off at the office. Office is closed this AM. Patient will follow up on Monday AM.       Please do not reply to the triage nurse through this encounter. Any subsequent communication should be directly with the patient.

## 2020-07-20 ENCOUNTER — TELEPHONE (OUTPATIENT)
Dept: FAMILY MEDICINE CLINIC | Age: 55
End: 2020-07-20

## 2020-07-20 NOTE — TELEPHONE ENCOUNTER
Patient stated that she is still having terrible gas and belching foul taste and smell stated that it is not getting better patient stated that her diarrhea is getting better stated that she had medication from hospital that she started taking.  Runny noise , little cough, Fatigue

## 2020-07-22 ENCOUNTER — OFFICE VISIT (OUTPATIENT)
Dept: FAMILY MEDICINE CLINIC | Age: 55
End: 2020-07-22
Payer: COMMERCIAL

## 2020-07-22 ENCOUNTER — OFFICE VISIT (OUTPATIENT)
Dept: PRIMARY CARE CLINIC | Age: 55
End: 2020-07-22
Payer: COMMERCIAL

## 2020-07-22 VITALS
OXYGEN SATURATION: 97 % | SYSTOLIC BLOOD PRESSURE: 120 MMHG | TEMPERATURE: 98.4 F | HEART RATE: 54 BPM | HEIGHT: 66 IN | RESPIRATION RATE: 14 BRPM | BODY MASS INDEX: 44.84 KG/M2 | DIASTOLIC BLOOD PRESSURE: 68 MMHG | WEIGHT: 279 LBS

## 2020-07-22 LAB — C DIFF TOXIN/ANTIGEN: NORMAL

## 2020-07-22 PROCEDURE — 99213 OFFICE O/P EST LOW 20 MIN: CPT | Performed by: FAMILY MEDICINE

## 2020-07-22 PROCEDURE — G8428 CUR MEDS NOT DOCUMENT: HCPCS | Performed by: NURSE PRACTITIONER

## 2020-07-22 PROCEDURE — G8417 CALC BMI ABV UP PARAM F/U: HCPCS | Performed by: FAMILY MEDICINE

## 2020-07-22 PROCEDURE — 1036F TOBACCO NON-USER: CPT | Performed by: FAMILY MEDICINE

## 2020-07-22 PROCEDURE — 3017F COLORECTAL CA SCREEN DOC REV: CPT | Performed by: FAMILY MEDICINE

## 2020-07-22 PROCEDURE — G8427 DOCREV CUR MEDS BY ELIG CLIN: HCPCS | Performed by: FAMILY MEDICINE

## 2020-07-22 PROCEDURE — 99211 OFF/OP EST MAY X REQ PHY/QHP: CPT | Performed by: NURSE PRACTITIONER

## 2020-07-22 PROCEDURE — G8417 CALC BMI ABV UP PARAM F/U: HCPCS | Performed by: NURSE PRACTITIONER

## 2020-07-22 RX ORDER — DICYCLOMINE HCL 20 MG
20 TABLET ORAL
Qty: 60 TABLET | Refills: 2 | Status: SHIPPED | OUTPATIENT
Start: 2020-07-22 | End: 2020-11-23

## 2020-07-22 RX ORDER — ACETAMINOPHEN AND CODEINE PHOSPHATE 300; 30 MG/1; MG/1
1 TABLET ORAL EVERY 6 HOURS PRN
Qty: 28 TABLET | Refills: 0 | Status: SHIPPED | OUTPATIENT
Start: 2020-07-22 | End: 2020-09-11 | Stop reason: SDUPTHER

## 2020-07-22 RX ORDER — SACCHAROMYCES BOULARDII 250 MG
250 CAPSULE ORAL 2 TIMES DAILY
Qty: 28 CAPSULE | Refills: 1 | Status: SHIPPED | OUTPATIENT
Start: 2020-07-22 | End: 2020-11-18

## 2020-07-22 ASSESSMENT — PATIENT HEALTH QUESTIONNAIRE - PHQ9
SUM OF ALL RESPONSES TO PHQ QUESTIONS 1-9: 2
SUM OF ALL RESPONSES TO PHQ QUESTIONS 1-9: 2
1. LITTLE INTEREST OR PLEASURE IN DOING THINGS: 1
SUM OF ALL RESPONSES TO PHQ9 QUESTIONS 1 & 2: 2
2. FEELING DOWN, DEPRESSED OR HOPELESS: 1

## 2020-07-22 ASSESSMENT — ENCOUNTER SYMPTOMS
DIARRHEA: 1
ABDOMINAL PAIN: 1

## 2020-07-22 NOTE — PATIENT INSTRUCTIONS
Take the probiotic.   Take the dicyclomine- spasm and cramps-  Back down the dose as you improve    Advise me Monday

## 2020-07-22 NOTE — PROGRESS NOTES
Tobacco Use    Smoking status: Never Smoker    Smokeless tobacco: Never Used   Substance and Sexual Activity    Alcohol use: Yes     Comment: 1 -2 x a month    Drug use: Not Currently     Types: Marijuana     Comment: quit 2016    Sexual activity: Never   Lifestyle    Physical activity     Days per week: Not on file     Minutes per session: Not on file    Stress: Not on file   Relationships    Social connections     Talks on phone: Not on file     Gets together: Not on file     Attends Taoist service: Not on file     Active member of club or organization: Not on file     Attends meetings of clubs or organizations: Not on file     Relationship status: Not on file    Intimate partner violence     Fear of current or ex partner: Not on file     Emotionally abused: Not on file     Physically abused: Not on file     Forced sexual activity: Not on file   Other Topics Concern    Not on file   Social History Narrative    Not on file       Family History   Problem Relation Age of Onset    Arthritis Mother     Obesity Mother    [employer] Anemia Mother     Other Mother         pulmonary embolism    Arthritis Father     Arrhythmia Father     Diabetes Other     Diabetes Paternal Grandfather     Cancer Neg Hx     Breast Cancer Neg Hx     Colon Cancer Neg Hx        Vitals:    07/22/20 1520   BP: 120/68   Pulse: 54   Resp: 14   Temp: 98.4 °F (36.9 °C)   SpO2: 97%       Wt Readings from Last 3 Encounters:   07/22/20 279 lb (126.6 kg)   07/03/20 260 lb (117.9 kg)   06/29/20 272 lb 0.8 oz (123.4 kg)       Review of Systems   Constitutional: Positive for fatigue. Gastrointestinal: Positive for abdominal pain and diarrhea. Negative for blood in stool and vomiting. Gas / bloat   Genitourinary: Negative for dysuria. Musculoskeletal:        Had shoulder surgery is still pretty sore. Psychiatric/Behavioral: Negative for self-injury and suicidal ideas. The patient is nervous/anxious.         Objective:   Physical

## 2020-07-25 ASSESSMENT — ENCOUNTER SYMPTOMS
BLOOD IN STOOL: 0
VOMITING: 0

## 2020-07-28 ENCOUNTER — OFFICE VISIT (OUTPATIENT)
Dept: ORTHOPEDIC SURGERY | Age: 55
End: 2020-07-28

## 2020-07-28 ENCOUNTER — TELEPHONE (OUTPATIENT)
Dept: FAMILY MEDICINE CLINIC | Age: 55
End: 2020-07-28

## 2020-07-28 VITALS — WEIGHT: 279 LBS | BODY MASS INDEX: 44.84 KG/M2 | HEIGHT: 66 IN

## 2020-07-28 LAB
SARS-COV-2: NOT DETECTED
SOURCE: NORMAL

## 2020-07-28 PROCEDURE — 99024 POSTOP FOLLOW-UP VISIT: CPT | Performed by: PHYSICIAN ASSISTANT

## 2020-07-28 RX ORDER — DIPHENOXYLATE HYDROCHLORIDE AND ATROPINE SULFATE 2.5; .025 MG/1; MG/1
1 TABLET ORAL 4 TIMES DAILY PRN
Qty: 12 TABLET | Refills: 0 | Status: SHIPPED | OUTPATIENT
Start: 2020-07-28 | End: 2020-07-31

## 2020-07-28 NOTE — TELEPHONE ENCOUNTER
----- Message from Zackray Witt sent at 7/28/2020  3:00 PM EDT -----  Subject: Message to Provider    QUESTIONS  Information for Provider? Patient states she is still having diarrhea . Needs to know should she be seen or go for ultrasound. Pleased call and   advise if order will be written or to be seen. ---------------------------------------------------------------------------  --------------  Anupama SANTANA  What is the best way for the office to contact you? OK to leave message on   voicemail  Preferred Call Back Phone Number? [phone]  ---------------------------------------------------------------------------  --------------  SCRIPT ANSWERS  Relationship to Patient?  Self

## 2020-07-28 NOTE — TELEPHONE ENCOUNTER
Spoke to the patient. Persistent loose stools. We will empirically use a short course of Lomotil. Patient was instructed. I asked her to call me in 2 days and report.

## 2020-07-28 NOTE — PROGRESS NOTES
OPERATIVE PROCEDURE: Surgery 6/23/2020  1.  Exam under anesthesia and video arthroscopy of the right shoulder. 2.  Mini-open rotator cuff repair using two [employer] HEALICOIL  anchors and two [employer] and [employer] MultiFix anchors. 3.  Arthroscopic Neer acromioplasty. 4.  Arthroscopic Desi procedure. History of present illness: This patient returns today for postop visit. We have not seen her since her 6/29/2020 visit. Overall, she states she is improving. She is been doing some home stretches. She admits she does not typically wear her sling. She recently had to be in the emergency department for gallbladder issue. Pain Assessment  Location of Pain: Shoulder  Location Modifiers: Right  Severity of Pain: 6  Quality of Pain: Aching, Dull, Sharp  Duration of Pain: Persistent  Frequency of Pain: Intermittent  Aggravating Factors: Stretching, Bending  Limiting Behavior: Some  Relieving Factors: Rest]     Physical examination:  Incisions are well-healed with no signs of infection. Neurovascular exam is intact. X-rays: 3 x-rays of the Right shoulder reveal evidence of NEER acromioplasty Desi procedure with no acute bony abnormalities     Assessment/plan:   Organic begin outpatient physical therapy. The patient prefers to go closer to home in a [facility] facility. I like to see her back in 3 to 4 weeks.

## 2020-07-30 NOTE — TELEPHONE ENCOUNTER
Pt calling in to reported in that yesterday she was feeling better ,but woke up at 5am this morning with stomach pain and loose stools again the patient states that she was to report in how she was feeling because she may need ultrasound of gallbladder .

## 2020-08-01 ENCOUNTER — HOSPITAL ENCOUNTER (OUTPATIENT)
Dept: ULTRASOUND IMAGING | Age: 55
Discharge: HOME | End: 2020-08-01
Payer: COMMERCIAL

## 2020-08-01 PROCEDURE — 76705 ECHO EXAM OF ABDOMEN: CPT

## 2020-08-03 ENCOUNTER — TELEPHONE (OUTPATIENT)
Dept: FAMILY MEDICINE CLINIC | Age: 55
End: 2020-08-03

## 2020-08-03 NOTE — TELEPHONE ENCOUNTER
Had [address] of gallbladder done Saturday wanted to make sure looking for results

## 2020-08-03 NOTE — TELEPHONE ENCOUNTER
Patient was advised and stated that she would call dr. Angeline Pa and would call us if she had any issues

## 2020-08-18 RX ORDER — PRAVASTATIN SODIUM 20 MG
TABLET ORAL
Qty: 30 TABLET | Refills: 5 | Status: SHIPPED | OUTPATIENT
Start: 2020-08-18 | End: 2020-11-18

## 2020-08-18 NOTE — TELEPHONE ENCOUNTER
Future Appointments   Date Time Provider iDann Sotelo   8/24/2020 11:15 AM MD ELIECER Hood AND M[facility]IN   8/27/2020 10:30 AM [facility] RM 1 [facility] MARTINM Dia Rad   9/16/2020 10:40 AM DO JOHNSON Alexander  MMA     LOV 7/22/2020

## 2020-08-20 ENCOUNTER — NURSE TRIAGE (OUTPATIENT)
Dept: OTHER | Facility: CLINIC | Age: 55
End: 2020-08-20

## 2020-08-20 ENCOUNTER — OFFICE VISIT (OUTPATIENT)
Dept: FAMILY MEDICINE CLINIC | Age: 55
End: 2020-08-20
Payer: COMMERCIAL

## 2020-08-20 VITALS
BODY MASS INDEX: 44.8 KG/M2 | SYSTOLIC BLOOD PRESSURE: 122 MMHG | TEMPERATURE: 97.5 F | OXYGEN SATURATION: 97 % | DIASTOLIC BLOOD PRESSURE: 86 MMHG | WEIGHT: 277.4 LBS | HEART RATE: 70 BPM | RESPIRATION RATE: 18 BRPM

## 2020-08-20 PROCEDURE — G8417 CALC BMI ABV UP PARAM F/U: HCPCS | Performed by: NURSE PRACTITIONER

## 2020-08-20 PROCEDURE — 99213 OFFICE O/P EST LOW 20 MIN: CPT | Performed by: NURSE PRACTITIONER

## 2020-08-20 PROCEDURE — 1036F TOBACCO NON-USER: CPT | Performed by: NURSE PRACTITIONER

## 2020-08-20 PROCEDURE — 3017F COLORECTAL CA SCREEN DOC REV: CPT | Performed by: NURSE PRACTITIONER

## 2020-08-20 PROCEDURE — G8427 DOCREV CUR MEDS BY ELIG CLIN: HCPCS | Performed by: NURSE PRACTITIONER

## 2020-08-20 RX ORDER — CEPHALEXIN 500 MG/1
500 CAPSULE ORAL 4 TIMES DAILY
Qty: 28 CAPSULE | Refills: 0 | Status: SHIPPED | OUTPATIENT
Start: 2020-08-20 | End: 2020-08-27

## 2020-08-20 ASSESSMENT — ENCOUNTER SYMPTOMS
VOMITING: 0
ALLERGIC/IMMUNOLOGIC NEGATIVE: 1
ANAL BLEEDING: 0
ABDOMINAL PAIN: 1
DIARRHEA: 1
RESPIRATORY NEGATIVE: 1

## 2020-08-20 NOTE — PATIENT INSTRUCTIONS
Continue probiotic and bentyl  Start antibiotics for abscess  Warm compresses for boil  May take ibuprofen up to three times a day- try to take a dose at bedtime with robaxin

## 2020-08-20 NOTE — PROGRESS NOTES
8/20/2020    This is a 54 y.o. female   Chief Complaint   Patient presents with    Lesion(s)     pt has a boil like lesion on her upper right flank. states it started yesterday. Very painful, red and swollen.  Other     had right shoulder operated on 6/23/2020 and wants to make sure this has nothing to do with surgery    Diarrhea     states it is better than before, but still happening. had Cdiff test done on 7/21/2020. Rossana Smith Abdoul Parsons is here for evaluation of a lesion near her right axilla. It started yesterday. It is red, raised and very painful. She is concerned it may have something to do with her recent shoulder surgery in June. She denies any fever or chills, drainage, or limitations in movement. She is also continuing to have diarrhea. She had a c diff test in July which was negative. The diarrhea is improving nut not resolved. She is having fewer stools a day and the consistency has improved.         Patient Active Problem List   Diagnosis    Depression    Knee pain    Chondromalacia of left knee    Synovitis of knee    Bilateral carpal tunnel syndrome    Lumbar strain    Anxiety    Restless leg syndrome    De Quervain's disease (radial styloid tenosynovitis)    Osteoarthritis of carpometacarpal joint of thumb    Hemorrhoid prolapse    Pneumonia unspecified bacteria    Bronchitis with bronchospasm    Dyspnea on exertion    Sciatica    Intractable vomiting with nausea    Hiatal hernia    Elevated blood pressure reading    Major depressive disorder, recurrent, moderate (HCC)    Generalized anxiety disorder    Essential hypertension    Closed displaced fracture of middle phalanx of right ring finger    Anal lesion    Adjustment disorder with depressed mood    Neck pain       Current Outpatient Medications   Medication Sig Dispense Refill    pravastatin (PRAVACHOL) 20 MG tablet TAKE 1 TABLET BY MOUTH EVERY DAY FOR CHOLESTEROL 30 tablet 5    omeprazole (PRILOSEC) 40 MG delayed abdominal pain (cramping) and diarrhea. Negative for anal bleeding and vomiting. Musculoskeletal: Positive for arthralgias (right shoulder). Negative for joint swelling. Skin: Positive for wound (1 cm diameter abcess with surrounding 2 in area of redness). Allergic/Immunologic: Negative. Neurological: Negative. Hematological: Negative. Physical Exam  Vitals signs and nursing note reviewed. Constitutional:       General: She is not in acute distress. Appearance: She is well-developed. She is not diaphoretic. HENT:      Head: Normocephalic and atraumatic. Right Ear: External ear normal.      Left Ear: External ear normal.      Nose: Nose normal.      Mouth/Throat:      Pharynx: No oropharyngeal exudate. Eyes:      General:         Right eye: No discharge. Left eye: No discharge. Conjunctiva/sclera: Conjunctivae normal.   Neck:      Musculoskeletal: Normal range of motion and neck supple. Cardiovascular:      Rate and Rhythm: Normal rate and regular rhythm. Heart sounds: Normal heart sounds. No murmur. No friction rub. No gallop. Pulmonary:      Effort: Pulmonary effort is normal. No respiratory distress. Breath sounds: Normal breath sounds. No wheezing or rales. Chest:       Abdominal:      General: Bowel sounds are normal. There is no distension. Palpations: Abdomen is soft. Tenderness: There is no abdominal tenderness. Musculoskeletal: Normal range of motion. General: No tenderness. Lymphadenopathy:      Cervical: No cervical adenopathy. Skin:     General: Skin is warm and dry. Coloration: Skin is not pale. Findings: No erythema or rash. Neurological:      Mental Status: She is alert and oriented to person, place, and time. Motor: No abnormal muscle tone. Coordination: Coordination normal.   Psychiatric:         Behavior: Behavior normal.         Thought Content:  Thought content normal.         Judgment: Judgment normal.         Diagnosis       ICD-10-CM    1. Cutaneous abscess of chest wall  L02.213 cephALEXin (KEFLEX) 500 MG capsule   2. Diarrhea, unspecified type  R19.7         Plan    Start keflex  Warm compresses  Pt wanted pain medication- encouraged ibuprofen at bedtime with robaxin  Continue plan for diarrhea since improving    No orders of the defined types were placed in this encounter. Orders Placed This Encounter   Medications    cephALEXin (KEFLEX) 500 MG capsule     Sig: Take 1 capsule by mouth 4 times daily for 7 days     Dispense:  28 capsule     Refill:  0       Patient Education:  Abscess care    Return if symptoms worsen or fail to improve.

## 2020-08-24 ENCOUNTER — OFFICE VISIT (OUTPATIENT)
Dept: ORTHOPEDIC SURGERY | Age: 55
End: 2020-08-24

## 2020-08-24 VITALS — HEIGHT: 65 IN | BODY MASS INDEX: 46.15 KG/M2 | WEIGHT: 277 LBS

## 2020-08-24 PROCEDURE — 99024 POSTOP FOLLOW-UP VISIT: CPT | Performed by: ORTHOPAEDIC SURGERY

## 2020-08-24 NOTE — PROGRESS NOTES
OPERATIVE PROCEDURE: Surgery 6/23/2020  1.  Exam under anesthesia and video arthroscopy of the right shoulder. 2.  Mini-open rotator cuff repair using two [employer] HEALICOIL  anchors and two [employer] and [employer] MultiFix anchors. 3.  Arthroscopic Neer acromioplasty. 4.  Arthroscopic Desi procedure. She returns today in. She reports he is doing extremely well is very pleased with the results. She is finishing up with physical therapy. Vision looks excellent. [place]ley no signs of infection or DVT. She is got about 95% normal range of motion with no tenderness. End: Plan: Finish up physical therapy and return to see us PRN. I have personally performed and/or participated in the history, exam and medical decision making and agree with all pertinent clinical information. I have also reviewed and agree with the past medical, family and social history unless otherwise noted. This dictation was performed with a verbal recognition program (DRAGON) and it was checked for errors. It is possible that there are still dictated errors within this office note. If so, please bring any errors to my attention for an addendum. All efforts were made to ensure that this office note is accurate.           Maverick Bill MD

## 2020-08-28 ENCOUNTER — HOSPITAL ENCOUNTER (OUTPATIENT)
Dept: MAMMOGRAPHY | Age: 55
Discharge: HOME | End: 2020-08-28
Payer: COMMERCIAL

## 2020-08-28 PROCEDURE — 77063 BREAST TOMOSYNTHESIS BI: CPT

## 2020-08-31 ENCOUNTER — VIRTUAL VISIT (OUTPATIENT)
Dept: FAMILY MEDICINE CLINIC | Age: 55
End: 2020-08-31
Payer: COMMERCIAL

## 2020-08-31 ENCOUNTER — NURSE TRIAGE (OUTPATIENT)
Dept: OTHER | Facility: CLINIC | Age: 55
End: 2020-08-31

## 2020-08-31 PROCEDURE — G8427 DOCREV CUR MEDS BY ELIG CLIN: HCPCS | Performed by: NURSE PRACTITIONER

## 2020-08-31 PROCEDURE — 1036F TOBACCO NON-USER: CPT | Performed by: NURSE PRACTITIONER

## 2020-08-31 PROCEDURE — G8417 CALC BMI ABV UP PARAM F/U: HCPCS | Performed by: NURSE PRACTITIONER

## 2020-08-31 PROCEDURE — 99213 OFFICE O/P EST LOW 20 MIN: CPT | Performed by: NURSE PRACTITIONER

## 2020-08-31 PROCEDURE — 3017F COLORECTAL CA SCREEN DOC REV: CPT | Performed by: NURSE PRACTITIONER

## 2020-08-31 RX ORDER — SULFAMETHOXAZOLE AND TRIMETHOPRIM 800; 160 MG/1; MG/1
1 TABLET ORAL 2 TIMES DAILY
Qty: 20 TABLET | Refills: 0 | Status: SHIPPED | OUTPATIENT
Start: 2020-08-31 | End: 2020-09-10

## 2020-08-31 RX ORDER — LIDOCAINE 50 MG/G
OINTMENT TOPICAL
Qty: 50 G | Refills: 0 | Status: SHIPPED | OUTPATIENT
Start: 2020-08-31 | End: 2020-11-10

## 2020-08-31 ASSESSMENT — ENCOUNTER SYMPTOMS
CHEST TIGHTNESS: 0
WHEEZING: 0
COLOR CHANGE: 1
GASTROINTESTINAL NEGATIVE: 1
COUGH: 0
SHORTNESS OF BREATH: 0

## 2020-08-31 NOTE — TELEPHONE ENCOUNTER
Future Appointments   Date Time Provider Diann Sotelo   9/16/2020 10:40 AM DO JOHNSON Alexander  [facility] 8/20/2020

## 2020-08-31 NOTE — TELEPHONE ENCOUNTER
Pt finished Kelfex Saturday night. States boil popped about 3 days after being seen in office on 8/20/2020. Pt states pain is much worse. States she was using a heating pad all weekend. Pain in now radiating under right breast, under right shoulder blade. Are of boil is very red, swollen, tender. \"looks infected\". Alivia Owens advised pt to be seen virtually. Added pt at 5:00pm per Rosendo Adjutant.

## 2020-08-31 NOTE — TELEPHONE ENCOUNTER
----- Message from Ed Cabrera sent at 8/31/2020  9:52 AM EDT -----  Subject: Message to Provider    QUESTIONS  Information for Provider? Nurse Triage? Patient has a broil (right) arm. Pain (7 out of 10) and redness. patient needs an appointment for today. Please call as soon as possible  ---------------------------------------------------------------------------  --------------  CALL BACK INFO  What is the best way for the office to contact you? OK to leave message on   voicemail  Preferred Call Back Phone Number? [phone]  ---------------------------------------------------------------------------  --------------  SCRIPT ANSWERS  Relationship to Patient?  Self

## 2020-08-31 NOTE — PROGRESS NOTES
2020    TELEHEALTH EVALUATION -- Audio/Visual (During TRWOP-33 public health emergency)    HPI:    Epifanio Cervantes (:  1965) has requested an audio/video evaluation for the following concern(s):    Chief Complaint   Patient presents with    Wound Infection     Pt finished Kelfex Saturday night. Boil popped about 3 days after being seen in office. Pt states pain is much worse, using a heating pad all weekend. Pain now radiating under right breast, under right shoulder blade. Boil is very red, swollen, tender. \"looks infected\". Norberto Brenner is seen today through virtual visit for follow-up on her boil. She has completed the Keflex and she still feels like the skin has an infection. It drained approximately 3 days after starting the Keflex. However it is still very sore with the tenderness going from underneath her breast all the way around to her back. There is still redness and warmth at the site of the boil however this area has decreased in size since the boil drained. She has had no fever or chills. She does note some new lesions underneath her breast and more underneath her armpit. These are red and tender. They have no visible head and are not draining. Review of Systems   Constitutional: Negative for activity change, appetite change, fatigue and fever. HENT: Negative. Respiratory: Negative for cough, chest tightness, shortness of breath and wheezing. Cardiovascular: Negative for chest pain, palpitations and leg swelling. Gastrointestinal: Negative. Musculoskeletal: Positive for arthralgias (shoulder) and myalgias. Skin: Positive for color change and wound. Neurological: Negative. Prior to Visit Medications    Medication Sig Taking?  Authorizing Provider   pravastatin (PRAVACHOL) 20 MG tablet TAKE 1 TABLET BY MOUTH EVERY DAY FOR CHOLESTEROL Yes Ashwin Valdez, DO   omeprazole (PRILOSEC) 40 MG delayed release capsule TAKE 1 CAPSULE BY MOUTH EVERY DAY Yes Luc Ray A Sorenson, DO   dicyclomine (BENTYL) 20 MG tablet Take 1 tablet by mouth 4 times daily (after meals and at bedtime) For cramps and gas Yes Mio Whitt DO   saccharomyces boulardii (FLORASTOR) 250 MG capsule Take 1 capsule by mouth 2 times daily Yes Mio Whitt DO   ALPRAZolam (XANAX) 1 MG tablet TAKE 1 TABLET BY MOUTH THREE TIMES A DAY AS NEEDED FOR ANXIETY Yes Mio Whitt DO   lisinopril (PRINIVIL;ZESTRIL) 20 MG tablet TAKE 1 TABLET BY MOUTH EVERY DAY Yes Mio Whitt DO   methocarbamol (ROBAXIN) 500 MG tablet TAKE 1 TABLET BY MOUTH 3 TIMES A DAY AS NEEDED FOR NECK PAIN Yes Mio Whitt DO   atenolol (TENORMIN) 25 MG tablet TAKE 1 TABLET BY MOUTH EVERY DAY Yes Mio Whitt DO   ibuprofen (ADVIL;MOTRIN) 600 MG tablet TAKE 1 TABLET BY MOUTH EVERY 8 HOURS AS NEEDED FOR PAIN Yes Mio Whitt DO   rOPINIRole (REQUIP) 2 MG tablet TAKE 1 TABLET BY MOUTH TWICE A DAY Yes Mio Whitt DO   DULoxetine (CYMBALTA) 60 MG extended release capsule TAKE 1 CAPSULE BY MOUTH EVERY DAY Yes Mio Whitt DO   MELATONIN PO Take 1 tablet by mouth nightly as needed Yes Historical Provider, MD       Social History     Tobacco Use    Smoking status: Never Smoker    Smokeless tobacco: Never Used   Substance Use Topics    Alcohol use: Yes     Comment: 1 -2 x a month    Drug use: Not Currently     Types: Marijuana     Comment: quit 2016        Allergies   Allergen Reactions    Toradol [Ketorolac Tromethamine] Other (See Comments)     \"out of it\"  \"felt like sleep paralysis\"    Haldol [Haloperidol Lactate] Other (See Comments)     \"felt out of it, similar to the toradol\"    Tramadol Hives       PHYSICAL EXAMINATION:  Patient-Reported Vitals 7/7/2020   Patient-Reported Weight -   Patient-Reported Height -   Patient-Reported Diastolic 82         Physical Exam  Constitutional:       General: She is not in acute distress. Appearance: Normal appearance. She is obese. She is not ill-appearing or diaphoretic.    HENT: Head: Normocephalic and atraumatic. Right Ear: External ear normal.      Left Ear: External ear normal.      Nose: Nose normal. No rhinorrhea. Mouth/Throat:      Mouth: Mucous membranes are moist.      Pharynx: Oropharynx is clear. Eyes:      General:         Right eye: No discharge. Left eye: No discharge. Conjunctiva/sclera: Conjunctivae normal.   Neck:      Musculoskeletal: Normal range of motion. Trachea: Phonation normal.   Pulmonary:      Effort: Pulmonary effort is normal. No respiratory distress. Chest:      Breasts:         Right: Skin change present. Skin:     Coloration: Skin is not jaundiced or pale. Neurological:      General: No focal deficit present. Mental Status: She is alert and oriented to person, place, and time. Psychiatric:         Mood and Affect: Mood normal.         Behavior: Behavior normal.         Thought Content: Thought content normal.         Judgment: Judgment normal.           ASSESSMENT/PLAN:    ICD-10-CM    1. Cutaneous abscess of chest wall  L02.213      Start bactrim  Wash twice daily with antibacterial soap  Apply triple antibiotic cream  Lidocaine cream for shoulder/chest wall discomfort  If not improving by Thursday will get culture and reevaluate need for possible I&D    Orders Placed This Encounter   Medications    sulfamethoxazole-trimethoprim (BACTRIM DS;SEPTRA DS) 800-160 MG per tablet     Sig: Take 1 tablet by mouth 2 times daily for 10 days     Dispense:  20 tablet     Refill:  0    lidocaine (XYLOCAINE) 5 % ointment     Sig: Apply topically as needed to area of tenderness under arm. Dispense:  50 g     Refill:  0     No orders of the defined types were placed in this encounter. Return if symptoms worsen or fail to improve. Geovanna Ernst is a 54 y.o. female being evaluated by a Virtual Visit (video visit) encounter to address concerns as mentioned above. A caregiver was present when appropriate.  Due to this being a TeleHealth encounter (During YXQKA-57 public health emergency), evaluation of the following organ systems was limited: Vitals/Constitutional/EENT/Resp/CV/GI//MS/Neuro/Skin/Heme-Lymph-Imm. Pursuant to the emergency declaration under the [address] [place] [address], [address] [place] [address] and the [employer] Act, this Virtual Visit was conducted with patient's (and/or legal guardian's) consent, to reduce the patient's risk of exposure to COVID-19 and provide necessary medical care. The patient (and/or legal guardian) has also been advised to contact this office for worsening conditions or problems, and seek emergency medical treatment and/or call 911 if deemed necessary. Patient identification was verified at the start of the visit: Yes    Total time spent on this encounter: Not billed by time    Services were provided through a video synchronous discussion virtually to substitute for in-person clinic visit. Patient and provider were located at their individual homes. --AVERY Villareal CNP on 8/31/2020 at 4:36 PM    An electronic signature was used to authenticate this note.

## 2020-09-01 RX ORDER — IBUPROFEN 600 MG/1
TABLET ORAL
Qty: 40 TABLET | Refills: 1 | Status: SHIPPED | OUTPATIENT
Start: 2020-09-01 | End: 2020-12-03

## 2020-09-01 RX ORDER — ALPRAZOLAM 1 MG/1
1 TABLET ORAL 3 TIMES DAILY PRN
Qty: 90 TABLET | Refills: 1 | Status: SHIPPED | OUTPATIENT
Start: 2020-09-01 | End: 2020-10-26 | Stop reason: SDUPTHER

## 2020-09-10 ENCOUNTER — OFFICE VISIT (OUTPATIENT)
Dept: ORTHOPEDIC SURGERY | Age: 55
End: 2020-09-10
Payer: COMMERCIAL

## 2020-09-10 VITALS — WEIGHT: 277 LBS | HEIGHT: 65 IN | BODY MASS INDEX: 46.15 KG/M2

## 2020-09-10 PROCEDURE — 1036F TOBACCO NON-USER: CPT | Performed by: PHYSICIAN ASSISTANT

## 2020-09-10 PROCEDURE — 3017F COLORECTAL CA SCREEN DOC REV: CPT | Performed by: PHYSICIAN ASSISTANT

## 2020-09-10 PROCEDURE — G8427 DOCREV CUR MEDS BY ELIG CLIN: HCPCS | Performed by: PHYSICIAN ASSISTANT

## 2020-09-10 PROCEDURE — G8417 CALC BMI ABV UP PARAM F/U: HCPCS | Performed by: PHYSICIAN ASSISTANT

## 2020-09-10 PROCEDURE — 99213 OFFICE O/P EST LOW 20 MIN: CPT | Performed by: PHYSICIAN ASSISTANT

## 2020-09-10 NOTE — PROGRESS NOTES
Chief Complaint    Knee Pain (LEFT KNEE PAIN FROM FALL 4 DAYS AGO )      History of Present Illness:  Uyen Thompson is a 54 y.o. female presents to the after-hours clinic today for a new problem. She has seen Dr. Balbir Martinez in the past and diagnosed with advanced osteoarthritis of her left knee. She has had cortisone injections lubricating shots in the past.  She was contemplating a total knee arthroplasty but needed to lose some weight before hand. Her current BMI is 46.10. She did suffer a fall on Monday with increased medial knee pain.         Pain Assessment  Location of Pain: Knee  Location Modifiers: Left  Severity of Pain: 8  Quality of Pain: Aching  Duration of Pain: Persistent  Frequency of Pain: Constant]    Medical History:  Past Medical History:   Diagnosis Date    Anxiety     Depression     GERD (gastroesophageal reflux disease)     Hypertension     Osteoarthritis     Restless leg syndrome      Patient Active Problem List    Diagnosis Date Noted    Neck pain 06/09/2020    Major depressive disorder, recurrent, moderate ([address] 12/31/2019    Generalized anxiety disorder 12/31/2019    Anal lesion 12/31/2019    Closed displaced fracture of middle phalanx of right ring finger 07/12/2019    Elevated blood pressure reading 02/01/2019    Essential hypertension 12/10/2018    Adjustment disorder with depressed mood 12/10/2018    Hiatal hernia 03/06/2018    Intractable vomiting with nausea 12/25/2017    Sciatica 09/07/2017    Pneumonia unspecified bacteria 06/11/2017    Bronchitis with bronchospasm 06/11/2017    Dyspnea on exertion 06/11/2017    Hemorrhoid prolapse 05/18/2016    De Quervain's disease (radial styloid tenosynovitis) 11/20/2014    Osteoarthritis of carpometacarpal joint of thumb 11/20/2014    Anxiety     Restless leg syndrome     Lumbar strain 07/08/2014    Bilateral carpal tunnel syndrome 03/19/2013    Chondromalacia of left knee 10/31/2012    Synovitis of knee 10/31/2012    Knee pain 10/24/2012    Depression 2012     Past Surgical History:   Procedure Laterality Date    ANUS SURGERY  2018    fissure     SECTION      x3    FRACTURE SURGERY      right wrist    HERNIA REPAIR  2018    LAPAROSCOPIC PARAESOPHAGEAL HERNIA REPAIR WITH MESH    HIATAL HERNIA REPAIR  2018    KNEE ARTHROSCOPY Left 11/15/12    ARTHROSCOPY LEFT KNEE WITH SYOVECTOMY AND CHONDROPLASTY    OVARY REMOVAL Right 2010    ROTATOR CUFF REPAIR Right 2020    EXAM UNDER ANESTHESIA VIDEO ARTHROSCOPY RIGHT SHOULDER, MINI- OPEN ROTATOR CUFF REPAIR, NEER ACROMIOPLASTY, [place] PROCEDURE WITH EXPAREL performed by Pilar Harris MD at [address] [place] [address] ENDOSCOPY      UPPER GASTROINTESTINAL ENDOSCOPY      esophageasl stretch     Family History   Problem Relation Age of Onset    Arthritis Mother     Obesity Mother    [facility] Anemia Mother    [facility] Other Mother         pulmonary embolism    Arthritis Father     Arrhythmia Father     Diabetes Other     Diabetes Paternal Grandfather     Cancer Neg Hx     Breast Cancer Neg Hx     Colon Cancer Neg Hx      Social History     Socioeconomic History    Marital status:       Spouse name: None    Number of children: 3    Years of education: None    Highest education level: None   Occupational History    Occupation:    Social Needs    Financial resource strain: None    Food insecurity     Worry: None     Inability: None    Transportation needs     Medical: None     Non-medical: None   Tobacco Use    Smoking status: Never Smoker    Smokeless tobacco: Never Used   Substance and Sexual Activity    Alcohol use: Yes     Comment: 1 -2 x a month    Drug use: Not Currently     Types: Marijuana     Comment: quit     Sexual activity: Never   Lifestyle    Physical activity     Days per week: None     Minutes per session: None    Stress: None   Relationships    Social connections     Talks on phone: None     Gets together: None     Attends Congregation service: None     Active member of club or organization: None     Attends meetings of clubs or organizations: None     Relationship status: None    Intimate partner violence     Fear of current or ex partner: None     Emotionally abused: None     Physically abused: None     Forced sexual activity: None   Other Topics Concern    None   Social History Narrative    None     Current Outpatient Medications   Medication Sig Dispense Refill    ibuprofen (ADVIL;MOTRIN) 600 MG tablet TAKE 1 TABLET BY MOUTH EVERY 8 HOURS AS NEEDED FOR PAIN 40 tablet 1    ALPRAZolam (XANAX) 1 MG tablet Take 1 tablet by mouth 3 times daily as needed for Anxiety for up to 60 days. 90 tablet 1    sulfamethoxazole-trimethoprim (BACTRIM DS;SEPTRA DS) 800-160 MG per tablet Take 1 tablet by mouth 2 times daily for 10 days 20 tablet 0    lidocaine (XYLOCAINE) 5 % ointment Apply topically as needed to area of tenderness under arm.  50 g 0    pravastatin (PRAVACHOL) 20 MG tablet TAKE 1 TABLET BY MOUTH EVERY DAY FOR CHOLESTEROL 30 tablet 5    omeprazole (PRILOSEC) 40 MG delayed release capsule TAKE 1 CAPSULE BY MOUTH EVERY DAY 30 capsule 6    dicyclomine (BENTYL) 20 MG tablet Take 1 tablet by mouth 4 times daily (after meals and at bedtime) For cramps and gas 60 tablet 2    saccharomyces boulardii (FLORASTOR) 250 MG capsule Take 1 capsule by mouth 2 times daily 28 capsule 1    lisinopril (PRINIVIL;ZESTRIL) 20 MG tablet TAKE 1 TABLET BY MOUTH EVERY DAY 30 tablet 6    methocarbamol (ROBAXIN) 500 MG tablet TAKE 1 TABLET BY MOUTH 3 TIMES A DAY AS NEEDED FOR NECK PAIN 90 tablet 3    atenolol (TENORMIN) 25 MG tablet TAKE 1 TABLET BY MOUTH EVERY DAY 30 tablet 3    rOPINIRole (REQUIP) 2 MG tablet TAKE 1 TABLET BY MOUTH TWICE A DAY 60 tablet 5    DULoxetine (CYMBALTA) 60 MG extended release capsule TAKE 1 CAPSULE BY MOUTH EVERY DAY 30 capsule 5    MELATONIN PO Take 1 tablet by mouth nightly as needed       No current facility-administered medications for this visit. Review of Systems:  Relevant review of systems reviewed and available in the patient's chart    Vital Signs: There were no vitals filed for this visit. General Exam:   Constitutional: Patient is adequately groomed with no evidence of malnutrition  DTRs: Deep tendon reflexes are intact  Mental Status: The patient is oriented to time, place and person. The patient's mood and affect are appropriate. Lymphatic: The lymphatic examination bilaterally reveals all areas to be without enlargement or induration. Vascular: Examination reveals no swelling or calf tenderness. Peripheral pulses are palpable and 2+. Neurological: The patient has good coordination. There is no weakness or sensory deficit. Body mass index is 46.1 kg/m². Left Knee Examination:    Inspection:  No erythema or signs of infection. There are no cutaneous lesions    Palpation:  There is tenderness to palpation along the medial joint line. Range of Motion:  0 extension to 110 of active flexion. Strength:  4+/5 quadriceps and hamstrings    Special Tests: The knee is stable to varus valgus stressing/anterior posterior drawer. Negative Homans test.                                 Skin: There are no rashes, ulcerations or lesions. Gait: mildly antalgic favoring the right side    Reflex 2+ patellar    Examination of the right knee reveals intact skin. There is no focal tenderness. The patient demonstrates full painless range of motion with regard to flexion and extension. Strength is 5/5 throughout all planes. Ligamentous stability is grossly intact. Examination of the left hip reveals intact skin. The patient demonstrates full painless range of motion with regards to flexion, abduction, internal and external rotation. There is no tenderness about the greater trochanter.  There is a negative straight leg raise against resistance. Strength is 5/5 throughout all planes. Radiology:   X-rays obtained and reviewed in office:  Views 4 views including AP weightbearing, PA flexed weightbearing, lateral, and skyline  Location left knee:    Impression:   There is advanced medial joint space thinning with sclerosis and osteophyte formation present. The patellofemoral joint with joint space thinning and osteophyte formation  No fractures are identified. Impression:  Encounter Diagnoses   Name Primary?  Left knee pain, unspecified chronicity Yes    Arthritis of left knee        Office Procedures:  Orders Placed This Encounter   Procedures    XR KNEE LEFT (MIN 4 VIEWS)     Standing Status:   Future     Number of Occurrences:   1     Standing Expiration Date:   10/10/2020       Treatment Plan:  I discussed with the patient the nature of osteoarthritis of the knee. We talked about treatment of arthritis and the various options that are involved with this. The patient understands that the treatments can vary from essentially doing nothing to a total joint replacement arthroplasty for arthritis. I then went on to describe the utilization of glucosamine and chondroitin sulfate as a joint nutrition product. We talked about the fact that this is essentially a joint vitamin with typically minimal side effects. We also talked about utilization of prescription over-the-counter anti-inflammatory medications as the next option. We also discussed the possibility of brace wear or orthotic wear if the patient has significant varus alignment. We then went on to discuss the possibility of [place] supplementation with hyaluronate products. We talked about the typical course of this type of treatment and the fact that often times in the treatment for significant arthritis, this is successful less than half the time.  We also talked about the corticosteroid injections and the fact that this can give a brief window of relief, but does not cure the problem; in fact, the pain often has a rebound effect in 6-10 weeks after the steroid has worn off. We also discussed arthroscopy surgery in attempts to debride the joint, but the fact that this is relatively unreliable treatment in the face of significant arthritis. It can occasionally be used, particularly if there is significant meniscus pathology. Lastly we discussed total joint replacement arthroplasty as the final and definitive step in treatment of arthritis. Patient realizes the magnitude of this type of treatment as well as having voiced a general understanding to the duration of the prosthesis. The patient voiced understanding to these continuum of treatment options. Patient is suffering from advanced osteoarthritis of her knee. She will follow-up with her physician Dr. Nestor Mendoza. Patient does understand that she will still need to have a BMI under 40 before proceeding with surgery. Patient should use a walker or cane for balance control and pain control.

## 2020-09-11 ENCOUNTER — TELEPHONE (OUTPATIENT)
Dept: FAMILY MEDICINE CLINIC | Age: 55
End: 2020-09-11

## 2020-09-11 RX ORDER — ACETAMINOPHEN AND CODEINE PHOSPHATE 300; 30 MG/1; MG/1
1 TABLET ORAL EVERY 6 HOURS PRN
Qty: 20 TABLET | Refills: 0 | Status: SHIPPED | OUTPATIENT
Start: 2020-09-11 | End: 2020-09-16

## 2020-09-11 NOTE — TELEPHONE ENCOUNTER
----- Message from Nitin Saavedra sent at 9/11/2020 12:54 PM EDT -----  Subject: Message to Provider    QUESTIONS  Information for Provider? pt wants to know can she get a script for   tylenol. she fell over the weekend    stated that dr Mayra Novak did not prescribe her anything yet until she see him   Monday. please f/u  ---------------------------------------------------------------------------  --------------  CALL BACK INFO  What is the best way for the office to contact you? OK to leave message on   voicemail  Preferred Call Back Phone Number? [phone]  ---------------------------------------------------------------------------  --------------  SCRIPT ANSWERS  Relationship to Patient?  Self

## 2020-09-14 ENCOUNTER — OFFICE VISIT (OUTPATIENT)
Dept: ORTHOPEDIC SURGERY | Age: 55
End: 2020-09-14
Payer: COMMERCIAL

## 2020-09-14 VITALS — HEIGHT: 65 IN | WEIGHT: 277 LBS | BODY MASS INDEX: 46.15 KG/M2

## 2020-09-14 PROCEDURE — G8427 DOCREV CUR MEDS BY ELIG CLIN: HCPCS | Performed by: PHYSICIAN ASSISTANT

## 2020-09-14 PROCEDURE — G8417 CALC BMI ABV UP PARAM F/U: HCPCS | Performed by: PHYSICIAN ASSISTANT

## 2020-09-14 PROCEDURE — 3017F COLORECTAL CA SCREEN DOC REV: CPT | Performed by: PHYSICIAN ASSISTANT

## 2020-09-14 PROCEDURE — 99214 OFFICE O/P EST MOD 30 MIN: CPT | Performed by: PHYSICIAN ASSISTANT

## 2020-09-14 PROCEDURE — 1036F TOBACCO NON-USER: CPT | Performed by: PHYSICIAN ASSISTANT

## 2020-09-14 RX ORDER — ACETAMINOPHEN AND CODEINE PHOSPHATE 300; 30 MG/1; MG/1
1 TABLET ORAL EVERY 8 HOURS PRN
Qty: 9 TABLET | Refills: 0 | Status: SHIPPED | OUTPATIENT
Start: 2020-09-14 | End: 2020-09-30 | Stop reason: SDUPTHER

## 2020-09-14 RX ORDER — METHYLPREDNISOLONE ACETATE 40 MG/ML
80 INJECTION, SUSPENSION INTRA-ARTICULAR; INTRALESIONAL; INTRAMUSCULAR; SOFT TISSUE ONCE
Status: COMPLETED | OUTPATIENT
Start: 2020-09-14 | End: 2020-09-14

## 2020-09-14 RX ORDER — LIDOCAINE HYDROCHLORIDE 10 MG/ML
20 INJECTION, SOLUTION INFILTRATION; PERINEURAL ONCE
Status: COMPLETED | OUTPATIENT
Start: 2020-09-14 | End: 2020-09-14

## 2020-09-14 RX ORDER — BUPIVACAINE HYDROCHLORIDE 2.5 MG/ML
30 INJECTION, SOLUTION INFILTRATION; PERINEURAL ONCE
Status: COMPLETED | OUTPATIENT
Start: 2020-09-14 | End: 2020-09-14

## 2020-09-14 RX ADMIN — LIDOCAINE HYDROCHLORIDE 20 ML: 10 INJECTION, SOLUTION INFILTRATION; PERINEURAL at 17:58

## 2020-09-14 RX ADMIN — BUPIVACAINE HYDROCHLORIDE 75 MG: 2.5 INJECTION, SOLUTION INFILTRATION; PERINEURAL at 17:57

## 2020-09-14 RX ADMIN — METHYLPREDNISOLONE ACETATE 80 MG: 40 INJECTION, SUSPENSION INTRA-ARTICULAR; INTRALESIONAL; INTRAMUSCULAR; SOFT TISSUE at 17:57

## 2020-09-14 NOTE — PROGRESS NOTES
6/23/2020    EXAM UNDER ANESTHESIA VIDEO ARTHROSCOPY RIGHT SHOULDER, MINI- OPEN ROTATOR CUFF REPAIR, NEER ACROMIOPLASTY, [place] PROCEDURE WITH EXPAREL performed by Eliazar Rehman MD at [facility]  2011    UPPER GASTROINTESTINAL ENDOSCOPY  2015    esophageasl stretch     Current Medications:     Current Outpatient Medications:     acetaminophen-codeine (TYLENOL/CODEINE #3) 300-30 MG per tablet, Take 1 tablet by mouth every 6 hours as needed for Pain for up to 5 days. Intended supply: 7 days. Take lowest dose possible to manage pain, Disp: 20 tablet, Rfl: 0    ibuprofen (ADVIL;MOTRIN) 600 MG tablet, TAKE 1 TABLET BY MOUTH EVERY 8 HOURS AS NEEDED FOR PAIN, Disp: 40 tablet, Rfl: 1    ALPRAZolam (XANAX) 1 MG tablet, Take 1 tablet by mouth 3 times daily as needed for Anxiety for up to 60 days. , Disp: 90 tablet, Rfl: 1    lidocaine (XYLOCAINE) 5 % ointment, Apply topically as needed to area of tenderness under arm., Disp: 50 g, Rfl: 0    pravastatin (PRAVACHOL) 20 MG tablet, TAKE 1 TABLET BY MOUTH EVERY DAY FOR CHOLESTEROL, Disp: 30 tablet, Rfl: 5    omeprazole (PRILOSEC) 40 MG delayed release capsule, TAKE 1 CAPSULE BY MOUTH EVERY DAY, Disp: 30 capsule, Rfl: 6    dicyclomine (BENTYL) 20 MG tablet, Take 1 tablet by mouth 4 times daily (after meals and at bedtime) For cramps and gas, Disp: 60 tablet, Rfl: 2    saccharomyces boulardii (FLORASTOR) 250 MG capsule, Take 1 capsule by mouth 2 times daily, Disp: 28 capsule, Rfl: 1    lisinopril (PRINIVIL;ZESTRIL) 20 MG tablet, TAKE 1 TABLET BY MOUTH EVERY DAY, Disp: 30 tablet, Rfl: 6    methocarbamol (ROBAXIN) 500 MG tablet, TAKE 1 TABLET BY MOUTH 3 TIMES A DAY AS NEEDED FOR NECK PAIN, Disp: 90 tablet, Rfl: 3    atenolol (TENORMIN) 25 MG tablet, TAKE 1 TABLET BY MOUTH EVERY DAY, Disp: 30 tablet, Rfl: 3    rOPINIRole (REQUIP) 2 MG tablet, TAKE 1 TABLET BY MOUTH TWICE A DAY, Disp: 60 tablet, Rfl: 5   DULoxetine (CYMBALTA) 60 MG extended release capsule, TAKE 1 CAPSULE BY MOUTH EVERY DAY, Disp: 30 capsule, Rfl: 5    MELATONIN PO, Take 1 tablet by mouth nightly as needed, Disp: , Rfl:   Allergies: Toradol [ketorolac tromethamine]; Haldol [haloperidol lactate]; and Tramadol  Social History:    reports that she has never smoked. She has never used smokeless tobacco. She reports current alcohol use. She reports previous drug use. Drug: Marijuana. Family History:   Family History   Problem Relation Age of Onset    Arthritis Mother     Obesity Mother     Anemia Mother     Other Mother         pulmonary embolism    Arthritis Father     Arrhythmia Father     Diabetes Other     Diabetes Paternal Grandfather     Cancer Neg Hx     Breast Cancer Neg Hx     Colon Cancer Neg Hx        REVIEW OF SYSTEMS:   For new problems, a full review of systems will be found scanned in the patient's chart. CONSTITUTIONAL: Denies unexplained weight loss, fevers, chills   NEUROLOGICAL: Denies unsteady gait or progressive weakness  SKIN: Denies skin changes, delayed healing, rash, itching       PHYSICAL EXAM:    Vitals: Height 5' 5\" (1.651 m), weight 277 lb (125.6 kg), last menstrual period 09/20/2017, not currently breastfeeding. GENERAL EXAM:  · General Apparence: Patient is adequately groomed with no evidence of malnutrition. · Orientation: The patient is oriented to time, place and person. · Mood & Affect:The patient's mood and affect are appropriate       LEFT knee PHYSICAL EXAMINATION:  · Inspection: Upon inspection, the patient has mild effusion. No significant erythema or ecchymosis    · Palpation: He is very tender through the medial compartment of the knee even to light touch. Mild tenderness laterally as well, no significant tenderness of the lower leg. No tenderness through the hip    · Range of Motion: Range of motion today is 0 to 100 degrees.     · Strength: Extensor mechanism is intact    · Special Tests: to localize the placement of a 22-gauge needle into the Left  knee joint. Findings: Successful needle placement for knee injection. Final images were taken and saved for permanent record. The patient tolerated the injection well. The patient was instructed to call the office immediately if there is any pain, redness, warmth, fever, or chills. I spent 25 minutes face-to-face with the patient and greater than 50% of that time was spent counseling/coordinating care for the above-stated diagnosis and treatment.

## 2020-09-15 ENCOUNTER — TELEPHONE (OUTPATIENT)
Dept: FAMILY MEDICINE CLINIC | Age: 55
End: 2020-09-15

## 2020-09-15 NOTE — TELEPHONE ENCOUNTER
I spoke to patient she was just letting you know what was going on with her now she will talk to you tomorrow at her visit

## 2020-09-15 NOTE — TELEPHONE ENCOUNTER
----- Message from Bryan Gill sent at 9/15/2020  9:00 AM EDT -----  Subject: Message to Provider    QUESTIONS  Information for Provider? pt needs flu shot   blood work for A1C   she fell last weekend sept 5 seen Dr Karlee Flood at [address] has a stress   fracture on left knee   and boil under right underarm is not heal she also stated she has one on   left underarm. ---------------------------------------------------------------------------  --------------  Leyda SANTANA  What is the best way for the office to contact you? OK to leave message on   voicemail  Preferred Call Back Phone Number? [phone]  ---------------------------------------------------------------------------  --------------  SCRIPT ANSWERS  Relationship to Patient?  Self

## 2020-09-16 ENCOUNTER — OFFICE VISIT (OUTPATIENT)
Dept: FAMILY MEDICINE CLINIC | Age: 55
End: 2020-09-16
Payer: COMMERCIAL

## 2020-09-16 VITALS
RESPIRATION RATE: 14 BRPM | HEART RATE: 72 BPM | HEIGHT: 65 IN | WEIGHT: 265 LBS | OXYGEN SATURATION: 96 % | TEMPERATURE: 98.4 F | SYSTOLIC BLOOD PRESSURE: 148 MMHG | BODY MASS INDEX: 44.15 KG/M2 | DIASTOLIC BLOOD PRESSURE: 84 MMHG

## 2020-09-16 LAB — HBA1C MFR BLD: 5.8 %

## 2020-09-16 PROCEDURE — 83036 HEMOGLOBIN GLYCOSYLATED A1C: CPT | Performed by: FAMILY MEDICINE

## 2020-09-16 PROCEDURE — 3017F COLORECTAL CA SCREEN DOC REV: CPT | Performed by: FAMILY MEDICINE

## 2020-09-16 PROCEDURE — 99396 PREV VISIT EST AGE 40-64: CPT | Performed by: FAMILY MEDICINE

## 2020-09-16 RX ORDER — DULOXETIN HYDROCHLORIDE 60 MG/1
120 CAPSULE, DELAYED RELEASE ORAL DAILY
Qty: 60 CAPSULE | Refills: 5 | Status: SHIPPED | OUTPATIENT
Start: 2020-09-16 | End: 2021-04-09

## 2020-09-16 RX ORDER — MELOXICAM 15 MG/1
15 TABLET ORAL DAILY
COMMUNITY
End: 2020-11-18

## 2020-09-16 ASSESSMENT — PATIENT HEALTH QUESTIONNAIRE - PHQ9
2. FEELING DOWN, DEPRESSED OR HOPELESS: 2
SUM OF ALL RESPONSES TO PHQ9 QUESTIONS 1 & 2: 3
1. LITTLE INTEREST OR PLEASURE IN DOING THINGS: 1
4. FEELING TIRED OR HAVING LITTLE ENERGY: 2
SUM OF ALL RESPONSES TO PHQ QUESTIONS 1-9: 3
6. FEELING BAD ABOUT YOURSELF - OR THAT YOU ARE A FAILURE OR HAVE LET YOURSELF OR YOUR FAMILY DOWN: 1
10. IF YOU CHECKED OFF ANY PROBLEMS, HOW DIFFICULT HAVE THESE PROBLEMS MADE IT FOR YOU TO DO YOUR WORK, TAKE CARE OF THINGS AT HOME, OR GET ALONG WITH OTHER PEOPLE: 1
SUM OF ALL RESPONSES TO PHQ QUESTIONS 1-9: 9
3. TROUBLE FALLING OR STAYING ASLEEP: 2
SUM OF ALL RESPONSES TO PHQ QUESTIONS 1-9: 9
7. TROUBLE CONCENTRATING ON THINGS, SUCH AS READING THE NEWSPAPER OR WATCHING TELEVISION: 1
8. MOVING OR SPEAKING SO SLOWLY THAT OTHER PEOPLE COULD HAVE NOTICED. OR THE OPPOSITE, BEING SO FIGETY OR RESTLESS THAT YOU HAVE BEEN MOVING AROUND A LOT MORE THAN USUAL: 0
5. POOR APPETITE OR OVEREATING: 0
SUM OF ALL RESPONSES TO PHQ QUESTIONS 1-9: 3
2. FEELING DOWN, DEPRESSED OR HOPELESS: 2
9. THOUGHTS THAT YOU WOULD BE BETTER OFF DEAD, OR OF HURTING YOURSELF: 0
SUM OF ALL RESPONSES TO PHQ9 QUESTIONS 1 & 2: 3
1. LITTLE INTEREST OR PLEASURE IN DOING THINGS: 1

## 2020-09-16 ASSESSMENT — LIFESTYLE VARIABLES: HOW OFTEN DO YOU HAVE A DRINK CONTAINING ALCOHOL: 0

## 2020-09-16 NOTE — PROGRESS NOTES
Subjective:      Patient ID: Gifty Chiu is a 54 y.o. y.o. female. Left knee tibial fx. Dr Erin Navas-  Rest and limit weight bearing. Falls discussed. Uses a cane,  Has a walker and occ w/c for long distances. Bad recently. Lost a son this time of year. Friends took her out. Diamond Vizcarra out with his twin,  Sherleen Schwab not to hibernate. Cymbalta- for mood. Recurring boils  HPI      Chief Complaint   Patient presents with    Medicare AWV       Allergies   Allergen Reactions    Toradol [Ketorolac Tromethamine] Other (See Comments)     \"out of it\"  \"felt like sleep paralysis\"    Haldol [Haloperidol Lactate] Other (See Comments)     \"felt out of it, similar to the toradol\"    Tramadol Hives       Past Medical History:   Diagnosis Date    Anxiety     Depression     GERD (gastroesophageal reflux disease)     Hypertension     Osteoarthritis     Restless leg syndrome        Past Surgical History:   Procedure Laterality Date    ANUS SURGERY  2018    fissure     SECTION      x3    FRACTURE SURGERY      right wrist    HERNIA REPAIR  2018    LAPAROSCOPIC PARAESOPHAGEAL HERNIA REPAIR WITH MESH    HIATAL HERNIA REPAIR  2018    KNEE ARTHROSCOPY Left 11/15/12    ARTHROSCOPY LEFT KNEE WITH SYOVECTOMY AND CHONDROPLASTY    OVARY REMOVAL Right     ROTATOR CUFF REPAIR Right 2020    EXAM UNDER ANESTHESIA VIDEO ARTHROSCOPY RIGHT SHOULDER, MINI- OPEN ROTATOR CUFF REPAIR, NEER ACROMIOPLASTY, [place] PROCEDURE WITH EXPAREL performed by Miracle Mcgrath MD at [address] [place] [address]      UPPER GASTROINTESTINAL ENDOSCOPY  2015    esophageasl stretch       Social History     Socioeconomic History    Marital status:       Spouse name: Not on file    Number of children: 3    Years of education: Not on file    Highest education level: Not on file   Occupational History    Occupation:    Social Needs    Financial resource strain: Not on file    Food insecurity     Worry: Not on file     Inability: Not on file    Transportation needs     Medical: Not on file     Non-medical: Not on file   Tobacco Use    Smoking status: Never Smoker    Smokeless tobacco: Never Used   Substance and Sexual Activity    Alcohol use: Yes     Comment: 1 -2 x a month    Drug use: Not Currently     Types: Marijuana     Comment: quit 2016    Sexual activity: Never   Lifestyle    Physical activity     Days per week: Not on file     Minutes per session: Not on file    Stress: Not on file   Relationships    Social connections     Talks on phone: Not on file     Gets together: Not on file     Attends Oriental orthodox service: Not on file     Active member of club or organization: Not on file     Attends meetings of clubs or organizations: Not on file     Relationship status: Not on file    Intimate partner violence     Fear of current or ex partner: Not on file     Emotionally abused: Not on file     Physically abused: Not on file     Forced sexual activity: Not on file   Other Topics Concern    Not on file   Social History Narrative    Not on file       Family History   Problem Relation Age of Onset    Arthritis Mother     Obesity Mother    Olam Azul Anemia Mother     Other Mother         pulmonary embolism    Arthritis Father     Arrhythmia Father     Diabetes Other     Diabetes Paternal Grandfather     Cancer Neg Hx     Breast Cancer Neg Hx     Colon Cancer Neg Hx        Vitals:    09/16/20 1056   BP: (!) 148/84   Pulse:    Resp:    Temp:    SpO2:        Wt Readings from Last 3 Encounters:   09/16/20 265 lb (120.2 kg)   09/14/20 277 lb (125.6 kg)   09/10/20 277 lb (125.6 kg)       Review of Systems    Objective:   Physical Exam    Assessment:       Diagnosis Orders   1. Encounter for screening for diabetes mellitus  Glucose, Fasting   2.  Need for prophylactic vaccination and inoculation against varicella  zoster recombinant adjuvanted vaccine [facility] 50 MCG/0.5ML SUSR injection   Medicare wellness physical  IGT  Osteoarthritis          Plan:   HgbA1c  5.8   no diabetes. Discussed prediabetes. Nasal swab for staph or boils. Increase the cymbalta to 60 mg x 2      Current Outpatient Medications   Medication Sig Dispense Refill    meloxicam (MOBIC) 15 MG tablet Take 15 mg by mouth daily      atenolol (TENORMIN) 25 MG tablet TAKE 1 TABLET BY MOUTH EVERY DAY 30 tablet 5    methocarbamol (ROBAXIN) 500 MG tablet TAKE 1 TABLET BY MOUTH 3 TIMES A DAY AS NEEDED FOR NECK PAIN 90 tablet 3    DULoxetine (CYMBALTA) 60 MG extended release capsule TAKE 1 CAPSULE BY MOUTH EVERY DAY 30 capsule 5    acetaminophen-codeine (TYLENOL #3) 300-30 MG per tablet Take 1 tablet by mouth every 8 hours as needed for Pain for up to 3 days. 9 tablet 0    ALPRAZolam (XANAX) 1 MG tablet Take 1 tablet by mouth 3 times daily as needed for Anxiety for up to 60 days. 90 tablet 1    lidocaine (XYLOCAINE) 5 % ointment Apply topically as needed to area of tenderness under arm.  50 g 0    omeprazole (PRILOSEC) 40 MG delayed release capsule TAKE 1 CAPSULE BY MOUTH EVERY DAY 30 capsule 6    saccharomyces boulardii (FLORASTOR) 250 MG capsule Take 1 capsule by mouth 2 times daily 28 capsule 1    lisinopril (PRINIVIL;ZESTRIL) 20 MG tablet TAKE 1 TABLET BY MOUTH EVERY DAY 30 tablet 6    rOPINIRole (REQUIP) 2 MG tablet TAKE 1 TABLET BY MOUTH TWICE A DAY 60 tablet 5    MELATONIN PO Take 1 tablet by mouth nightly as needed      ibuprofen (ADVIL;MOTRIN) 600 MG tablet TAKE 1 TABLET BY MOUTH EVERY 8 HOURS AS NEEDED FOR PAIN (Patient not taking: Reported on 9/16/2020) 40 tablet 1    pravastatin (PRAVACHOL) 20 MG tablet TAKE 1 TABLET BY MOUTH EVERY DAY FOR CHOLESTEROL (Patient not taking: Reported on 9/16/2020) 30 tablet 5    dicyclomine (BENTYL) 20 MG tablet Take 1 tablet by mouth 4 times daily (after meals and at bedtime) For cramps and gas (Patient not taking: Reported on 9/16/2020) 60 tablet 2 No current facility-administered medications for this visit. Medicare Annual Wellness Visit  Name: Vilma Ross Date: 2020   MRN: [id number] Sex: Female   Age: 54 y.o. Ethnicity: Non-/Non    : 1965 Race: Sarwat Orozco is here for Medicare AWV    Screenings for behavioral, psychosocial and functional/safety risks, and cognitive dysfunction are all negative except as indicated below. These results, as well as other patient data from the [address] [facility] [address] form, are documented in Flowsheets linked to this Encounter. Allergies   Allergen Reactions    Toradol [Ketorolac Tromethamine] Other (See Comments)     \"out of it\"  \"felt like sleep paralysis\"    Haldol [Haloperidol Lactate] Other (See Comments)     \"felt out of it, similar to the toradol\"    Tramadol Hives       Prior to Visit Medications    Medication Sig Taking? Authorizing Provider   meloxicam (MOBIC) 15 MG tablet Take 15 mg by mouth daily Yes Historical Provider, MD   DULoxetine (CYMBALTA) 60 MG extended release capsule Take 2 capsules by mouth daily Yes Steph Hutton DO   atenolol (TENORMIN) 25 MG tablet TAKE 1 TABLET BY MOUTH EVERY DAY Yes Steph Hutton DO   methocarbamol (ROBAXIN) 500 MG tablet TAKE 1 TABLET BY MOUTH 3 TIMES A DAY AS NEEDED FOR NECK PAIN Yes Steph Hutton DO   ALPRAZolam (XANAX) 1 MG tablet Take 1 tablet by mouth 3 times daily as needed for Anxiety for up to 60 days. Yes Steph Hutton DO   lidocaine (XYLOCAINE) 5 % ointment Apply topically as needed to area of tenderness under arm.  Yes AVERY Miranda CNP   omeprazole (PRILOSEC) 40 MG delayed release capsule TAKE 1 CAPSULE BY MOUTH EVERY DAY Yes Steph Hutton DO   saccharomyces boulardii (FLORASTOR) 250 MG capsule Take 1 capsule by mouth 2 times daily Yes Steph Hutton DO   lisinopril (PRINIVIL;ZESTRIL) 20 MG tablet TAKE 1 TABLET BY MOUTH EVERY DAY Yes Steph Hutton DO   rOPINIRole (REQUIP) 2 MG providers/suppliers regularly involved in providing care):   Patient Care Team:  Kareem Cancer, DO as PCP - General (Family Medicine)  Kareem Cancer DO as PCP - [facility] Empaneled Provider  Parminder Nicholas MD as Consulting Physician (Otolaryngology)  Peter Freedman MD as Surgeon (General Surgery)  [place], [place] as Physician Assistant (Physician Assistant Surgical)  Dmitri Ramos MD (Orthopedic Surgery)    Wt Readings from Last 3 Encounters:   09/16/20 265 lb (120.2 kg)   09/14/20 277 lb (125.6 kg)   09/10/20 277 lb (125.6 kg)     Vitals:    09/16/20 1038 09/16/20 1056   BP: (!) 154/84 (!) 148/84   Site: Left Upper Arm Left Lower Arm   Pulse: 72    Resp: 14    Temp: 98.4 °F (36.9 °C)    TempSrc: Temporal    SpO2: 96%    Weight: 265 lb (120.2 kg)    Height: 5' 5\" (1.651 m)      Body mass index is 44.1 kg/m². Based upon direct observation of the patient, evaluation of cognition reveals recent and remote memory intact. Patient's complete Health Risk Assessment and screening values have been reviewed and are found in Flowsheets. The following problems were reviewed today and where indicated follow up appointments were made and/or referrals ordered. Positive Risk Factor Screenings with Interventions:     Fall Risk:  Timed Up and Go Test > 12 seconds? (Complete if either Fall Risk answers are Yes): no  2 or more falls in past year?: (!) yes  Fall with injury in past year?: (!) yes  Fall Risk Interventions:    · Safety and fall risk discussed. Strategies that should be employed and use continuously discussed. Patient is under Ortho care for her joint and gait problems. General Health:  General  In general, how would you say your health is?: Fair  In the past 7 days, have you experienced any of the following?  New or Increased Pain, New or Increased Fatigue, Loneliness, Social Isolation, Stress or Anger?: (!) New or Increased Pain  Do you get the social and emotional support that you need?: Yes  Do you have a Living Will?: (!) No  General Health Risk Interventions:  · General discussion of health maintenance and therapies discussed. Health Habits/Nutrition:  Health Habits/Nutrition  Do you exercise for at least 20 minutes 2-3 times per week?: (!) No  Have you lost any weight without trying in the past 3 months?: No  Do you eat fewer than 2 meals per day?: No  Have you seen a dentist within the past year?: Yes  Body mass index is 44.1 kg/m². Health Habits/Nutrition Interventions:  · Discussed recommendations for dental and eye care annually. Discussed recommendations for exercise and activity. Hearing/Vision:  No exam data present  Hearing/Vision  Do you or your family notice any trouble with your hearing?: No  Do you have difficulty driving, watching TV, or doing any of your daily activities because of your eyesight?: No  Have you had an eye exam within the past year?: (!) No  Hearing/Vision Interventions:  · Discussed recommendations for yearly care. Safety:  Safety  Do you have working smoke detectors?: Yes  Have all throw rugs been removed or fastened?: (!) No  Do you have non-slip mats or surfaces in all bathtubs/showers?: Yes  Do all of your stairways have a railing or banister?: Yes  Are your doorways, halls and stairs free of clutter?: Yes  Do you always fasten your seatbelt when you are in a car?: Yes  Safety Interventions:  · Home safety tips provided    ADL:  ADLs  In the past 7 days, did you need help from others to perform any of the following everyday activities? Eating, dressing, grooming, bathing, toileting, or walking/balance?: (!) Walking/Balance  In the past 7 days, did you need help from others to take care of any of the following? Laundry, housekeeping, banking/finances, shopping, telephone use, food preparation, transportation, or taking medications?: None  ADL Interventions:  · Strategies for safety and management discussed.     Personalized Preventive Plan hemoglobin (Hb A1C)    Other orders  -     DULoxetine (CYMBALTA) 60 MG extended release capsule;  Take 2 capsules by mouth daily

## 2020-09-19 LAB — MRSA CULTURE ONLY: NORMAL

## 2020-09-20 NOTE — PATIENT INSTRUCTIONS
Personalized Preventive Plan for [place] - 9/16/2020  Medicare offers a range of preventive health benefits. Some of the tests and screenings are paid in full while other may be subject to a deductible, co-insurance, and/or copay. Some of these benefits include a comprehensive review of your medical history including lifestyle, illnesses that may run in your family, and various assessments and screenings as appropriate. After reviewing your medical record and screening and assessments performed today your provider may have ordered immunizations, labs, imaging, and/or referrals for you. A list of these orders (if applicable) as well as your Preventive Care list are included within your After Visit Summary for your review. Other Preventive Recommendations:    · A preventive eye exam performed by an eye specialist is recommended every 1-2 years to screen for glaucoma; cataracts, macular degeneration, and other eye disorders. · A preventive dental visit is recommended every 6 months. · Try to get at least 150 minutes of exercise per week or 10,000 steps per day on a pedometer . · Order or download the FREE \"Exercise & Physical Activity: Your Everyday Guide\" from [employer]. Call 1-540.380.9094 or search [employer] online. · You need 4365-5460 mg of calcium and 7774-8101 IU of vitamin D per day. It is possible to meet your calcium requirement with diet alone, but a vitamin D supplement is usually necessary to meet this goal.  · When exposed to the sun, use a sunscreen that protects against both UVA and UVB radiation with an SPF of 30 or greater. Reapply every 2 to 3 hours or after sweating, drying off with a towel, or swimming. · Always wear a seat belt when traveling in a car. Always wear a helmet when riding a bicycle or motorcycle. Call for the lab results first of the week.

## 2020-09-28 ENCOUNTER — TELEPHONE (OUTPATIENT)
Dept: FAMILY MEDICINE CLINIC | Age: 55
End: 2020-09-28

## 2020-09-28 RX ORDER — CEPHALEXIN 500 MG/1
500 CAPSULE ORAL 4 TIMES DAILY
Qty: 40 CAPSULE | Refills: 1 | Status: SHIPPED | OUTPATIENT
Start: 2020-09-28 | End: 2020-10-07

## 2020-09-28 NOTE — TELEPHONE ENCOUNTER
Tell the patient that I am sending a different antibiotic in and I want her to let me know after week if it is improving him what seems to be going on.

## 2020-09-28 NOTE — TELEPHONE ENCOUNTER
----- Message from Antionette Pruitt sent at 9/28/2020 10:13 AM EDT -----  Subject: Message to Provider    QUESTIONS  Information for Provider? Pt states that her boils are not going away and   bleeding. She has them under her left and right arm.  ---------------------------------------------------------------------------  --------------  CALL BACK INFO  What is the best way for the office to contact you? OK to leave message on   voicemail  Preferred Call Back Phone Number? [phone]  ---------------------------------------------------------------------------  --------------  SCRIPT ANSWERS  Relationship to Patient? Self  Appointment reason? Well Care/Follow Ups  Select a Well Care/Follow Ups appointment reason? Adult Existing Condition   Follow Up [Diabetes   CHF   COPD   Hypertension/Blood Pressure Check]  (Is the patient requesting to be seen urgently for their symptoms?)? No  Is this follow up request related to routine Diabetes Management? No  Are you having any new concerns about your existing condition?  Yes

## 2020-09-30 ENCOUNTER — TELEPHONE (OUTPATIENT)
Dept: ORTHOPEDIC SURGERY | Age: 55
End: 2020-09-30

## 2020-09-30 ENCOUNTER — OFFICE VISIT (OUTPATIENT)
Dept: ORTHOPEDIC SURGERY | Age: 55
End: 2020-09-30
Payer: COMMERCIAL

## 2020-09-30 VITALS — HEIGHT: 65 IN | BODY MASS INDEX: 46.32 KG/M2 | WEIGHT: 278 LBS

## 2020-09-30 PROCEDURE — 3017F COLORECTAL CA SCREEN DOC REV: CPT | Performed by: PHYSICIAN ASSISTANT

## 2020-09-30 PROCEDURE — 99213 OFFICE O/P EST LOW 20 MIN: CPT | Performed by: PHYSICIAN ASSISTANT

## 2020-09-30 PROCEDURE — 1036F TOBACCO NON-USER: CPT | Performed by: PHYSICIAN ASSISTANT

## 2020-09-30 PROCEDURE — G8427 DOCREV CUR MEDS BY ELIG CLIN: HCPCS | Performed by: PHYSICIAN ASSISTANT

## 2020-09-30 PROCEDURE — L1810 KO ELASTIC WITH JOINTS: HCPCS | Performed by: PHYSICIAN ASSISTANT

## 2020-09-30 PROCEDURE — G8417 CALC BMI ABV UP PARAM F/U: HCPCS | Performed by: PHYSICIAN ASSISTANT

## 2020-09-30 RX ORDER — METHYLPREDNISOLONE 4 MG/1
TABLET ORAL
Qty: 1 KIT | Refills: 0 | Status: SHIPPED | OUTPATIENT
Start: 2020-09-30 | End: 2020-10-06

## 2020-09-30 RX ORDER — ACETAMINOPHEN AND CODEINE PHOSPHATE 300; 30 MG/1; MG/1
1 TABLET ORAL EVERY 12 HOURS PRN
Qty: 10 TABLET | Refills: 0 | Status: SHIPPED | OUTPATIENT
Start: 2020-09-30 | End: 2020-10-26 | Stop reason: SDUPTHER

## 2020-09-30 NOTE — PROGRESS NOTES
saccharomyces boulardii (FLORASTOR) 250 MG capsule, Take 1 capsule by mouth 2 times daily, Disp: 28 capsule, Rfl: 1    lisinopril (PRINIVIL;ZESTRIL) 20 MG tablet, TAKE 1 TABLET BY MOUTH EVERY DAY, Disp: 30 tablet, Rfl: 6    rOPINIRole (REQUIP) 2 MG tablet, TAKE 1 TABLET BY MOUTH TWICE A DAY, Disp: 60 tablet, Rfl: 5    MELATONIN PO, Take 1 tablet by mouth nightly as needed, Disp: , Rfl:   Allergies: Toradol [ketorolac tromethamine]; Haldol [haloperidol lactate]; and Tramadol  Social History:    reports that she has never smoked. She has never used smokeless tobacco. She reports current alcohol use. She reports previous drug use. Drug: Marijuana. Family History:   Family History   Problem Relation Age of Onset    Arthritis Mother     Obesity Mother     Anemia Mother     Other Mother         pulmonary embolism    Arthritis Father     Arrhythmia Father     Diabetes Other     Diabetes Paternal Grandfather     Cancer Neg Hx     Breast Cancer Neg Hx     Colon Cancer Neg Hx        REVIEW OF SYSTEMS:   For new problems, a full review of systems will be found scanned in the patient's chart. CONSTITUTIONAL: Denies unexplained weight loss, fevers, chills   NEUROLOGICAL: Denies unsteady gait or progressive weakness  SKIN: Denies skin changes, delayed healing, rash, itching       PHYSICAL EXAM:    Vitals: Height 5' 5\" (1.651 m), weight 278 lb (126.1 kg), last menstrual period 09/20/2017, not currently breastfeeding. GENERAL EXAM:  · General Apparence: Patient is adequately groomed with no evidence of malnutrition. · Orientation: The patient is oriented to time, place and person. · Mood & Affect:The patient's mood and affect are appropriate       Left knee PHYSICAL EXAMINATION:  · Inspection: No visible deformity. No significant edema, erythema or ecchymosis.     · Palpation: Tenderness to palpation primarily along the anterior aspect of the knee    · Range of Motion: Range of motion is limited today approximately 0 to 90 degrees pain    · Strength: No gross strength deficits are noted    · Special Tests: Amenta's testing is normal.  Negative Homans testing. · Skin:  There are no rashes, ulcerations or lesions. · There are no dysvascular changes     Gait & station: Antalgic with a cane      Additional Examinations:        Right Lower Extremity: Examination of the right lower extremity does not show any tenderness, deformity or injury. Range of motion is unremarkable. There is no gross instability. There are no rashes, ulcerations or lesions. Strength and tone are normal.      Diagnostic Testin. X-rays of the left knee were reviewed from her previous visit and reveal severe tricompartmental osteoarthritis with bone-on-bone findings the medial patellofemoral compartment    Orders     Orders Placed This Encounter   Procedures    EUFLEXXA INJ PER DOSE     LT     Standing Status:   Future     Standing Expiration Date:   2021   Liv Dunne / DJO Economy Hinged Knee Brace     Patient was prescribed an Economy Hinged Knee Brace. The left knee will require stabilization / immobilization from this semi-rigid / rigid orthosis to improve their function. The orthosis will assist in protecting the affected area, provide functional support and facilitate healing. The patient was educated and fit by a healthcare professional with expert knowledge and specialization in brace application while under the direct supervision of the treating physician. Verbal and written instructions for the use of and application of this item were provided. They were instructed to contact the office immediately should the brace result in increased pain, decreased sensation, increased swelling or worsening of the condition. Assessment / Treatment Plan:     1. Left knee osteoarthritis    2.   Left knee contusion    We discussed other treatment options and she was prescribed a Medrol Dosepak today and given some home physical therapy exercises. I discussed with her that she needs to work pretty aggressively on her range of motion of this knee. We will also submit for Visco supplementation injections in this knee to try and help with her osteoarthritis. I had a lengthy discussion with the patient regarding a possible total knee replacement. She understands that this might be in her near future however, she needs to lose a significant amount of weight before she is a good candidate. She requested a small amount of Tylenol 3 to help with her pain at nighttime. I gave her 10 pills for this and this medication will not be filled again through our office. I instructed her to ask her primary care physician if she needs more of this medication. 3.  Obesity:    I also discussed with the patient the importance of weight loss and the impact that obesity can have on their overall joint health. Given her a goal weight of 240 pounds before she will be eligible for a total knee replacement.     I spent 15 minutes face-to-face with the patient and greater than 50% of that time was spent counseling/coordinating care for the above-stated diagnosis

## 2020-10-01 ENCOUNTER — TELEPHONE (OUTPATIENT)
Dept: ORTHOPEDIC SURGERY | Age: 55
End: 2020-10-01

## 2020-10-01 NOTE — TELEPHONE ENCOUNTER
GAVE [facility] 01/15/2016 TO PRESENT ( AUGIE ) TO HEBER REECE TO SCAN IN MRO TO OOD

## 2020-10-02 ENCOUNTER — NURSE ONLY (OUTPATIENT)
Dept: FAMILY MEDICINE CLINIC | Age: 55
End: 2020-10-02
Payer: COMMERCIAL

## 2020-10-02 VITALS — TEMPERATURE: 96.7 F

## 2020-10-02 PROCEDURE — 90686 IIV4 VACC NO PRSV 0.5 ML IM: CPT | Performed by: FAMILY MEDICINE

## 2020-10-02 PROCEDURE — 90471 IMMUNIZATION ADMIN: CPT | Performed by: FAMILY MEDICINE

## 2020-10-02 NOTE — PROGRESS NOTES
Vaccine Information Sheet, \"Influenza - Inactivated\"  given to Constanza Licona, or parent/legal guardian of  Constanza Licona and verbalized understanding. Patient responses:    Have you ever had a reaction to a flu vaccine? No  Are you able to eat eggs without adverse effects? Yes  Do you have any current illness? No  Have you ever had Guillian Beverly Syndrome? No    Flu vaccine given per order. Please see immunization tab.

## 2020-10-07 ENCOUNTER — OFFICE VISIT (OUTPATIENT)
Dept: FAMILY MEDICINE CLINIC | Age: 55
End: 2020-10-07
Payer: COMMERCIAL

## 2020-10-07 ENCOUNTER — TELEPHONE (OUTPATIENT)
Dept: ORTHOPEDIC SURGERY | Age: 55
End: 2020-10-07

## 2020-10-07 VITALS
WEIGHT: 278 LBS | BODY MASS INDEX: 46.26 KG/M2 | RESPIRATION RATE: 18 BRPM | HEART RATE: 58 BPM | OXYGEN SATURATION: 94 % | SYSTOLIC BLOOD PRESSURE: 116 MMHG | DIASTOLIC BLOOD PRESSURE: 74 MMHG | TEMPERATURE: 98 F

## 2020-10-07 PROCEDURE — 3017F COLORECTAL CA SCREEN DOC REV: CPT | Performed by: NURSE PRACTITIONER

## 2020-10-07 PROCEDURE — 99213 OFFICE O/P EST LOW 20 MIN: CPT | Performed by: NURSE PRACTITIONER

## 2020-10-07 PROCEDURE — G8482 FLU IMMUNIZE ORDER/ADMIN: HCPCS | Performed by: NURSE PRACTITIONER

## 2020-10-07 PROCEDURE — 1036F TOBACCO NON-USER: CPT | Performed by: NURSE PRACTITIONER

## 2020-10-07 PROCEDURE — G8427 DOCREV CUR MEDS BY ELIG CLIN: HCPCS | Performed by: NURSE PRACTITIONER

## 2020-10-07 PROCEDURE — G8417 CALC BMI ABV UP PARAM F/U: HCPCS | Performed by: NURSE PRACTITIONER

## 2020-10-07 ASSESSMENT — ENCOUNTER SYMPTOMS
CONSTIPATION: 0
CHOKING: 0
EYE ITCHING: 0
STRIDOR: 0
NAUSEA: 0
EYE DISCHARGE: 0
CHEST TIGHTNESS: 0
VOMITING: 0
COUGH: 0
DIARRHEA: 0
RHINORRHEA: 0

## 2020-10-07 ASSESSMENT — VISUAL ACUITY: OU: 1

## 2020-10-07 NOTE — PROGRESS NOTES
10/7/2020    This is a 54 y.o. female   Chief Complaint   Patient presents with    Mass     boils on left and right upper flank are not healing as fast as pt thinks they should be. pt states they boils will start itching, she will scratch and then they will bleed. states she still get some puss out. Suhas Ascencio presents to the office for a f/u for axillary boils. Her boils have overall improved but she would like to have them looked at to make sure everything looks okay. She has finished her oral Keflex and prednisone taper. She has also tried lidocaine cream and an antibiotic cream. She continues to use the antibiotic cream once daily. She thinks it was an OTC triple antibiotic ointment. Overall, she feels that it is doing much better but conitinues to itch and will occasionally bleed after scratching her skin or having the boils rub against her clothes or bra.         Patient Active Problem List   Diagnosis    Depression    Knee pain    Chondromalacia of left knee    Synovitis of knee    Bilateral carpal tunnel syndrome    Lumbar strain    Anxiety    Restless leg syndrome    De Quervain's disease (radial styloid tenosynovitis)    Osteoarthritis of carpometacarpal joint of thumb    Hemorrhoid prolapse    Pneumonia unspecified bacteria    Bronchitis with bronchospasm    Dyspnea on exertion    Sciatica    Intractable vomiting with nausea    Hiatal hernia    Elevated blood pressure reading    Major depressive disorder, recurrent, moderate (HCC)    Generalized anxiety disorder    Essential hypertension    Closed displaced fracture of middle phalanx of right ring finger    Anal lesion    Adjustment disorder with depressed mood    Neck pain       Current Outpatient Medications   Medication Sig Dispense Refill    cephALEXin (KEFLEX) 500 MG capsule Take 1 capsule by mouth 4 times daily 40 capsule 1    meloxicam (MOBIC) 15 MG tablet Take 15 mg by mouth daily      DULoxetine (CYMBALTA) 60 MG extended release capsule Take 2 capsules by mouth daily 60 capsule 5    atenolol (TENORMIN) 25 MG tablet TAKE 1 TABLET BY MOUTH EVERY DAY 30 tablet 5    methocarbamol (ROBAXIN) 500 MG tablet TAKE 1 TABLET BY MOUTH 3 TIMES A DAY AS NEEDED FOR NECK PAIN 90 tablet 3    ibuprofen (ADVIL;MOTRIN) 600 MG tablet TAKE 1 TABLET BY MOUTH EVERY 8 HOURS AS NEEDED FOR PAIN (Patient not taking: Reported on 9/16/2020) 40 tablet 1    ALPRAZolam (XANAX) 1 MG tablet Take 1 tablet by mouth 3 times daily as needed for Anxiety for up to 60 days. 90 tablet 1    lidocaine (XYLOCAINE) 5 % ointment Apply topically as needed to area of tenderness under arm. 50 g 0    pravastatin (PRAVACHOL) 20 MG tablet TAKE 1 TABLET BY MOUTH EVERY DAY FOR CHOLESTEROL (Patient not taking: Reported on 9/16/2020) 30 tablet 5    omeprazole (PRILOSEC) 40 MG delayed release capsule TAKE 1 CAPSULE BY MOUTH EVERY DAY 30 capsule 6    dicyclomine (BENTYL) 20 MG tablet Take 1 tablet by mouth 4 times daily (after meals and at bedtime) For cramps and gas (Patient not taking: Reported on 9/16/2020) 60 tablet 2    saccharomyces boulardii (FLORASTOR) 250 MG capsule Take 1 capsule by mouth 2 times daily 28 capsule 1    lisinopril (PRINIVIL;ZESTRIL) 20 MG tablet TAKE 1 TABLET BY MOUTH EVERY DAY 30 tablet 6    rOPINIRole (REQUIP) 2 MG tablet TAKE 1 TABLET BY MOUTH TWICE A DAY 60 tablet 5    MELATONIN PO Take 1 tablet by mouth nightly as needed       No current facility-administered medications for this visit.         Allergies   Allergen Reactions    Toradol [Ketorolac Tromethamine] Other (See Comments)     \"out of it\"  \"felt like sleep paralysis\"    Haldol [Haloperidol Lactate] Other (See Comments)     \"felt out of it, similar to the toradol\"    Tramadol Hives       LMP 09/20/2017     Social History     Tobacco Use    Smoking status: Never Smoker    Smokeless tobacco: Never Used   Substance Use Topics    Alcohol use: Yes     Comment: 1 -2 x a month Review of Systems   Constitutional: Negative for activity change, appetite change, fatigue and fever. HENT: Positive for tinnitus (will schedule appointment with ENT). Negative for ear discharge, ear pain, nosebleeds, rhinorrhea and sneezing. Eyes: Negative for discharge and itching. Respiratory: Negative for cough, choking, chest tightness and stridor. Cardiovascular: Negative for chest pain, palpitations and leg swelling. Gastrointestinal: Negative for constipation, diarrhea, nausea and vomiting. Endocrine: Negative. Genitourinary: Negative. Musculoskeletal: Negative for myalgias. Skin: Positive for rash and wound. Allergic/Immunologic: Negative for environmental allergies. Neurological: Negative for dizziness, syncope, light-headedness and headaches. Psychiatric/Behavioral: Negative for decreased concentration, dysphoric mood and sleep disturbance. Physical Exam  Vitals signs and nursing note reviewed. Constitutional:       General: She is not in acute distress. Appearance: Normal appearance. She is obese. She is not ill-appearing. HENT:      Head: Normocephalic and atraumatic. Right Ear: Hearing, tympanic membrane, ear canal and external ear normal.      Left Ear: Hearing, tympanic membrane, ear canal and external ear normal.      Nose: Nose normal. No congestion. Mouth/Throat:      Lips: Pink. Mouth: Mucous membranes are moist.      Pharynx: Oropharynx is clear. Eyes:      General: Vision grossly intact. Conjunctiva/sclera: Conjunctivae normal.   Neck:      Musculoskeletal: Normal range of motion. Cardiovascular:      Rate and Rhythm: Normal rate and regular rhythm. Pulses: Normal pulses. Heart sounds: Normal heart sounds. Pulmonary:      Effort: Pulmonary effort is normal.      Breath sounds: Normal breath sounds. No stridor. No wheezing or rales. Musculoskeletal: Normal range of motion. General: No swelling. Skin:     General: Skin is warm and dry. Capillary Refill: Capillary refill takes less than 2 seconds. Findings: Erythema, rash and wound present. Rash is crusting, macular, papular and purpuric. Comments: Several scattered well-defined erythematous to purple macular and papular healing furuncles. Affected lesions were dry, healing, and no signs of infection   Neurological:      General: No focal deficit present. Mental Status: She is alert. Psychiatric:         Attention and Perception: Attention and perception normal.         Mood and Affect: Mood and affect normal.         Speech: Speech normal.         Behavior: Behavior normal. Behavior is cooperative. Thought Content: Thought content normal.         Cognition and Memory: Cognition and memory normal.         Judgment: Judgment normal.         ICD-10-CM    1. Furuncles  L02.92        Diagnosis       ICD-10-CM    1. Furuncles  L02.92         Plan  Continue using OTC triple antibiotic ointment  Furuncles are healing appropriately, no signs of infection  Skin education provided - keep area dry, pat dry after showers, and avoid itching sites  If no improvement would consider dermatology referral    No orders of the defined types were placed in this encounter. No orders of the defined types were placed in this encounter. Patient Education:  Skin Education    Return if symptoms worsen or fail to improve.

## 2020-10-07 NOTE — PATIENT INSTRUCTIONS
Continue with over the counter triple antibiotic ointment     Patient Education        Skin Abscess: Care Instructions  Your Care Instructions     A skin abscess is a bacterial infection that forms a pocket of pus. A boil is a kind of skin abscess. The doctor may have cut an opening in the abscess so that the pus can drain out. You may have gauze in the cut so that the abscess will stay open and keep draining. You may need antibiotics. You will need to follow up with your doctor to make sure the infection has gone away. The doctor has checked you carefully, but problems can develop later. If you notice any problems or new symptoms, get medical treatment right away. Follow-up care is a key part of your treatment and safety. Be sure to make and go to all appointments, and call your doctor if you are having problems. It's also a good idea to know your test results and keep a list of the medicines you take. How can you care for yourself at home? · Apply warm and dry compresses, a heating pad set on low, or a hot water bottle 3 or 4 times a day for pain. Keep a cloth between the heat source and your skin. · If your doctor prescribed antibiotics, take them as directed. Do not stop taking them just because you feel better. You need to take the full course of antibiotics. · Take pain medicines exactly as directed. ? If the doctor gave you a prescription medicine for pain, take it as prescribed. ? If you are not taking a prescription pain medicine, ask your doctor if you can take an over-the-counter medicine. · Keep your bandage clean and dry. Change the bandage whenever it gets wet or dirty, or at least one time a day. · If the abscess was packed with gauze:  ? Keep follow-up appointments to have the gauze changed or removed. If the doctor instructed you to remove the gauze, follow the instructions you were given for how to remove it. ?  After the gauze is removed, soak the area in warm water for 15 to 20 minutes 2 times a day, until the wound closes. When should you call for help? Call your doctor now or seek immediate medical care if:    · You have signs of worsening infection, such as:  ? Increased pain, swelling, warmth, or redness. ? Red streaks leading from the infected skin. ? Pus draining from the wound. ? A fever. Watch closely for changes in your health, and be sure to contact your doctor if:    · You do not get better as expected. Where can you learn more? Go to [email or web address] org and sign in to your miDrive account. Enter F875 in the [employer] box to learn more about \"Skin Abscess: Care Instructions. \"     If you do not have an account, please click on the \"Sign Up Now\" link. Current as of: July 2, 2020               Content Version: 12.6  © 6797-9611 [employer]. Care instructions adapted under license by [facility] ([facility]). If you have questions about a medical condition or this instruction, always ask your healthcare professional. [address] any warranty or liability for your use of this information.

## 2020-10-12 ENCOUNTER — NURSE ONLY (OUTPATIENT)
Dept: ORTHOPEDIC SURGERY | Age: 55
End: 2020-10-12
Payer: COMMERCIAL

## 2020-10-12 RX ORDER — HYALURONATE SODIUM 10 MG/ML
20 SYRINGE (ML) INTRAARTICULAR ONCE
Status: COMPLETED | OUTPATIENT
Start: 2020-10-12 | End: 2020-10-12

## 2020-10-12 RX ADMIN — Medication 20 MG: at 17:23

## 2020-10-12 NOTE — PROGRESS NOTES
Chief Complaint   Patient presents with    Knee Pain     #1 EUFLEXXA LT KNEE     Dx: Osteoarthritis left knee    The patient is symptomatic from osteoarthritis of the left knee joint with documented radiological signs of arthritis. The patient has also failed 3 months of conservative treatment including home exercise, education, Tylenol and/or NSAIDs use. The patient was offered a Visco supplementation today. Risks, benefits, and alternatives to the injections were discussed in detail with the patient. The risks discussed included but are not limited to infection, skin reactions, hot swollen joints, and anaphylaxis. The patient gave verbal informed consent for the injection. The patient's skin was prepped with  3 sterile gauze  pads soaked with alcohol solution and the knee joint was injected with 2cc Euflexxa intra-articularly under sterile conditions. Technique: Under sterile conditions a SonFX Aligned ultrasound unit with a variable frequency (6.0-15.0 MHz) linear transducer was used to localize the placement of a 22-gauge needle into the left knee joint. Findings: Successful needle placement for intra-articular Visco supplementation injection. Final images were taken and saved for permanent record. The patient tolerated the injection reasonably well. The patient was given instructions to ice the kne and avoid strenuous activities for 24-48 hours. The patient was instructed to call the office immediately if there is increased pain, redness, warmth, fever, or chills. We will see the patient back in one week for their second injection. [facility]    This dictation was performed with a verbal recognition program [facility]) and it was checked for errors. It is possible that there are still dictated errors within this office note. If so, please bring any errors to my attention for an addendum. All efforts were made to ensure that this office note is accurate.

## 2020-10-13 RX ORDER — ROPINIROLE 2 MG/1
TABLET, FILM COATED ORAL
Qty: 60 TABLET | Refills: 5 | Status: SHIPPED | OUTPATIENT
Start: 2020-10-13 | End: 2021-03-09

## 2020-10-13 NOTE — TELEPHONE ENCOUNTER
Future Appointments   Date Time Provider Diann Sotelo   10/19/2020  2:15 PM Caitlyn Hammond   10/26/2020  9:00 AM MICKEY Greene 10/7/2020

## 2020-10-19 ENCOUNTER — OFFICE VISIT (OUTPATIENT)
Dept: ORTHOPEDIC SURGERY | Age: 55
End: 2020-10-19
Payer: COMMERCIAL

## 2020-10-19 VITALS — BODY MASS INDEX: 46.32 KG/M2 | WEIGHT: 278 LBS | HEIGHT: 65 IN

## 2020-10-19 PROCEDURE — G8482 FLU IMMUNIZE ORDER/ADMIN: HCPCS | Performed by: PHYSICIAN ASSISTANT

## 2020-10-19 PROCEDURE — 99213 OFFICE O/P EST LOW 20 MIN: CPT | Performed by: PHYSICIAN ASSISTANT

## 2020-10-19 PROCEDURE — 1036F TOBACCO NON-USER: CPT | Performed by: PHYSICIAN ASSISTANT

## 2020-10-19 PROCEDURE — 20611 DRAIN/INJ JOINT/BURSA W/US: CPT | Performed by: PHYSICIAN ASSISTANT

## 2020-10-19 PROCEDURE — G8427 DOCREV CUR MEDS BY ELIG CLIN: HCPCS | Performed by: PHYSICIAN ASSISTANT

## 2020-10-19 PROCEDURE — G8417 CALC BMI ABV UP PARAM F/U: HCPCS | Performed by: PHYSICIAN ASSISTANT

## 2020-10-19 PROCEDURE — 3017F COLORECTAL CA SCREEN DOC REV: CPT | Performed by: PHYSICIAN ASSISTANT

## 2020-10-19 RX ORDER — BUPIVACAINE HYDROCHLORIDE 2.5 MG/ML
30 INJECTION, SOLUTION INFILTRATION; PERINEURAL ONCE
Status: COMPLETED | OUTPATIENT
Start: 2020-10-19 | End: 2020-10-19

## 2020-10-19 RX ORDER — METHYLPREDNISOLONE ACETATE 40 MG/ML
80 INJECTION, SUSPENSION INTRA-ARTICULAR; INTRALESIONAL; INTRAMUSCULAR; SOFT TISSUE ONCE
Status: COMPLETED | OUTPATIENT
Start: 2020-10-19 | End: 2020-10-19

## 2020-10-19 RX ORDER — LIDOCAINE HYDROCHLORIDE 10 MG/ML
20 INJECTION, SOLUTION INFILTRATION; PERINEURAL ONCE
Status: COMPLETED | OUTPATIENT
Start: 2020-10-19 | End: 2020-10-19

## 2020-10-19 RX ORDER — HYALURONATE SODIUM 10 MG/ML
20 SYRINGE (ML) INTRAARTICULAR ONCE
Status: COMPLETED | OUTPATIENT
Start: 2020-10-19 | End: 2020-10-19

## 2020-10-19 RX ADMIN — BUPIVACAINE HYDROCHLORIDE 75 MG: 2.5 INJECTION, SOLUTION INFILTRATION; PERINEURAL at 13:22

## 2020-10-19 RX ADMIN — LIDOCAINE HYDROCHLORIDE 20 ML: 10 INJECTION, SOLUTION INFILTRATION; PERINEURAL at 13:23

## 2020-10-19 RX ADMIN — Medication 20 MG: at 12:32

## 2020-10-19 RX ADMIN — METHYLPREDNISOLONE ACETATE 80 MG: 40 INJECTION, SUSPENSION INTRA-ARTICULAR; INTRALESIONAL; INTRAMUSCULAR; SOFT TISSUE at 13:23

## 2020-10-19 NOTE — PROGRESS NOTES
CUFF REPAIR Right 6/23/2020    EXAM UNDER ANESTHESIA VIDEO ARTHROSCOPY RIGHT SHOULDER, MINI- OPEN ROTATOR CUFF REPAIR, NEER ACROMIOPLASTY, [place] PROCEDURE WITH EXPAREL performed by Jeremy Rodríguez MD at [facility]  2011    UPPER GASTROINTESTINAL ENDOSCOPY  2015    esophageasl stretch     Current Medications:     Current Outpatient Medications:     rOPINIRole (REQUIP) 2 MG tablet, TAKE 1 TABLET BY MOUTH TWICE A DAY, Disp: 60 tablet, Rfl: 5    meloxicam (MOBIC) 15 MG tablet, Take 15 mg by mouth daily, Disp: , Rfl:     DULoxetine (CYMBALTA) 60 MG extended release capsule, Take 2 capsules by mouth daily, Disp: 60 capsule, Rfl: 5    atenolol (TENORMIN) 25 MG tablet, TAKE 1 TABLET BY MOUTH EVERY DAY, Disp: 30 tablet, Rfl: 5    methocarbamol (ROBAXIN) 500 MG tablet, TAKE 1 TABLET BY MOUTH 3 TIMES A DAY AS NEEDED FOR NECK PAIN, Disp: 90 tablet, Rfl: 3    ibuprofen (ADVIL;MOTRIN) 600 MG tablet, TAKE 1 TABLET BY MOUTH EVERY 8 HOURS AS NEEDED FOR PAIN, Disp: 40 tablet, Rfl: 1    ALPRAZolam (XANAX) 1 MG tablet, Take 1 tablet by mouth 3 times daily as needed for Anxiety for up to 60 days. , Disp: 90 tablet, Rfl: 1    lidocaine (XYLOCAINE) 5 % ointment, Apply topically as needed to area of tenderness under arm.  (Patient not taking: Reported on 10/7/2020), Disp: 50 g, Rfl: 0    pravastatin (PRAVACHOL) 20 MG tablet, TAKE 1 TABLET BY MOUTH EVERY DAY FOR CHOLESTEROL, Disp: 30 tablet, Rfl: 5    omeprazole (PRILOSEC) 40 MG delayed release capsule, TAKE 1 CAPSULE BY MOUTH EVERY DAY, Disp: 30 capsule, Rfl: 6    dicyclomine (BENTYL) 20 MG tablet, Take 1 tablet by mouth 4 times daily (after meals and at bedtime) For cramps and gas, Disp: 60 tablet, Rfl: 2    saccharomyces boulardii (FLORASTOR) 250 MG capsule, Take 1 capsule by mouth 2 times daily, Disp: 28 capsule, Rfl: 1    lisinopril (PRINIVIL;ZESTRIL) 20 MG tablet, TAKE 1 TABLET BY MOUTH EVERY DAY, Disp: 30 Gait & station: Normal      Additional Examinations:        Left Upper Extremity: Examination of the left upper extremity does not show any tenderness, deformity or injury. Range of motion is unremarkable. There is no gross instability. There are no rashes, ulcerations or lesions. Strength and tone are normal.      Diagnostic Testing: The following x rays were read and interpreted by myself      1.  3 x-rays of the right shoulder were taken today and reveal normal anatomy with no acute bony abnormalities. Evidence of previous near acromioplasty [place] procedure    Orders     Orders Placed This Encounter   Procedures    US ARTHR/ASP/INJ MAJOR JNT/BURSA LEFT     Order Specific Question:   Reason for exam:     Answer:   pain    XR SHOULDER RIGHT (MIN 2 VIEWS)     Standing Status:   Future     Number of Occurrences:   1     Standing Expiration Date:   10/19/2021    [place] ARTHROCENTESIS ASPIR&/INJ MAJOR JT/BURSA W/O US         Assessment / Treatment Plan:     1. Right shoulder rotator cuff tendinitis/history of rotator cuff repair    We discussed treatment options. I am optimistic that she simply aggravated her rotator cuff and have recommended that we attempt a cortisone injection followed by resuming her physical therapy exercises. She agrees with this treatment and will receive a cortisone injection to the right shoulder today. If her symptoms are not improved with this treatment, we will need to obtain a MRI of the right shoulder to rule out rotator cuff tear. I discussed in detail the risk, benefits, and complications of an injection which included but are not limited to infection, skin reactions, hot swollen joints, and anaphylaxis with the patient. The patient verbalized good understanding and gave informed consent for the injection. The skin was prepped using sterile alcohol solution.  A sterile 22-gauge needle was inserted into the subacromial space and a mixture of 4 mL of 2% Carbocaine, 4 mL of 0.25% Marcaine, and 80 mg of Depo-Medrol was injected under sterile technique. The needle was withdrawn and the puncture site sealed with a Band-Aid. Technique: Under sterile conditions a SonoSite ultrasound unit with a variable frequency (6.0-15.0 MHz) linear transducer was used to localize the placement of a 22-gauge needle into the right subacromial space. Findings: Successful needle placement for  subacromial space injection. Final images were taken and saved for permanent record. The patient tolerated the injection well. The patient was instructed to call the office immediately if there is any pain, redness, warmth, fever, or chills. 2.  Left knee osteoarthritis    Returns today for her second viscosupplementation injection to the left knee    Dx: Osteoarthritis left knee      The patient returns today for their second left knee Visco supplementation injection. The risks, benefits, and complications of the injections were again discussed in detail with the patient. The risks discussed included but are not limited to infection, skin reactions, hot swollen joints, and anaphylaxis. The patient gave verbal informed consent for the injection. The patient skin was prepped with 3 sterile gauze pads soaked with alcohol solution and the knee joint was injected with 2cc Euflexxa intra-articularly under sterile conditions . Technique: Under sterile conditions a SonoSite ultrasound unit with a variable frequency (6.0-15.0 MHz) linear transducer was used to localize the placement of a 22-gauge needle into the left knee joint. Findings: Successful needle placement for intra-articular Visco supplementation injection. Final images were taken and saved for permanent record. The patient tolerated the injection reasonably well. The patient was given instructions to ice the the knee and avoid strenuous activities for 24-48 hours.  The patient was instructed to call the office immediately if there is increased pain, redness, warmth, fever, or chills. We will see the patient back in one week for the third injection. I spent 15 minutes face-to-face with the patient and greater than 50% of that time was spent counseling/coordinating care for the above-stated diagnosis       Curtis Jackson, [facility]    This dictation was performed with a verbal recognition program ([employer]) and it was checked for errors. It is possible that there are still dictated errors within this office note. If so, please bring any errors to my attention for an addendum. All efforts were made to ensure that this office note is accurate.

## 2020-10-22 ENCOUNTER — TELEPHONE (OUTPATIENT)
Dept: ORTHOPEDIC SURGERY | Age: 55
End: 2020-10-22

## 2020-10-26 ENCOUNTER — NURSE ONLY (OUTPATIENT)
Dept: ORTHOPEDIC SURGERY | Age: 55
End: 2020-10-26
Payer: COMMERCIAL

## 2020-10-26 RX ORDER — HYALURONATE SODIUM 10 MG/ML
20 SYRINGE (ML) INTRAARTICULAR ONCE
Status: COMPLETED | OUTPATIENT
Start: 2020-10-26 | End: 2020-10-26

## 2020-10-26 RX ORDER — ACETAMINOPHEN AND CODEINE PHOSPHATE 300; 30 MG/1; MG/1
1 TABLET ORAL EVERY 12 HOURS PRN
Qty: 10 TABLET | Refills: 0 | Status: SHIPPED | OUTPATIENT
Start: 2020-10-26 | End: 2020-11-10

## 2020-10-26 RX ADMIN — Medication 20 MG: at 09:08

## 2020-10-26 NOTE — PROGRESS NOTES
Chief Complaint   Patient presents with    Injections     #3 EUFLEXXA LEFT KNEE     Dx: Osteoarthritis left knee      The patient returns today for their third and final left knee Visco supplementation injection. The risks, benefits, and complications of the injections were again discussed in detail with the patient. The risks discussed include but are not limited to infection, skin reactions, hot swollen joints, and anaphylaxis. The patient gave verbal informed consent for the injection. The patient's skin was prepped with 3 sterile gauze pads soaked with alcohol solution and the knee joint was injected with 2cc Euflexxa intra-articularly under sterile conditions. Technique: Under sterile conditions a SonSutus ultrasound unit with a variable frequency (6.0-15.0 MHz) linear transducer was used to localize the placement of a 22-gauge needle into the left knee joint. Findings: Successful needle placement for intra-articular Visco supplementation injection. Final images were taken and saved for permanent record. The patient tolerated the injection reasonably well. The patient was given instructions to ice of the knee and avoid strenuous activity for 24-48 hours. The patient was instructed to call the office immediately if there is increased pain, redness, warmth, fever, or chills. We will see the patient back on an as-needed basis  from this point. Silas Massey, [facility]    This dictation was performed with a verbal recognition program [facility]) and it was checked for errors. It is possible that there are still dictated errors within this office note. If so, please bring any errors to my attention for an addendum. All efforts were made to ensure that this office note is accurate.

## 2020-10-28 ENCOUNTER — TELEPHONE (OUTPATIENT)
Dept: ORTHOPEDIC SURGERY | Age: 55
End: 2020-10-28

## 2020-11-02 ENCOUNTER — TELEPHONE (OUTPATIENT)
Dept: ORTHOPEDIC SURGERY | Age: 55
End: 2020-11-02

## 2020-11-02 ENCOUNTER — TELEPHONE (OUTPATIENT)
Dept: FAMILY MEDICINE CLINIC | Age: 55
End: 2020-11-02

## 2020-11-02 NOTE — TELEPHONE ENCOUNTER
Pt called wanting to know if Dr Theodora Robins could refer her to someone to find out why she keeps getting boils. Please advise.

## 2020-11-02 NOTE — TELEPHONE ENCOUNTER
My patient, Theresa Mclean, will be calling your outpatient office for an appointment. We have been fighting recurrent furuncles for a year now. Any help you can offer would be greatly appreciated.   Thank you

## 2020-11-02 NOTE — TELEPHONE ENCOUNTER
CALLED PATIENT TODAY, STATING MRI IS APPROVED FOR [email or web address], & LEFT # FOR THEM TO CALL & SCHEDULE THAT. & TO MAKE AN FOLLOW UP APPT W/ DR. GHOSH AFTER MRI.  [facility] [employer]

## 2020-11-09 ENCOUNTER — OFFICE VISIT (OUTPATIENT)
Dept: ORTHOPEDIC SURGERY | Age: 55
End: 2020-11-09
Payer: COMMERCIAL

## 2020-11-09 VITALS — HEIGHT: 65 IN | BODY MASS INDEX: 46.32 KG/M2 | WEIGHT: 278 LBS

## 2020-11-09 PROCEDURE — 1036F TOBACCO NON-USER: CPT | Performed by: ORTHOPAEDIC SURGERY

## 2020-11-09 PROCEDURE — G8417 CALC BMI ABV UP PARAM F/U: HCPCS | Performed by: ORTHOPAEDIC SURGERY

## 2020-11-09 PROCEDURE — G8427 DOCREV CUR MEDS BY ELIG CLIN: HCPCS | Performed by: ORTHOPAEDIC SURGERY

## 2020-11-09 PROCEDURE — G8482 FLU IMMUNIZE ORDER/ADMIN: HCPCS | Performed by: ORTHOPAEDIC SURGERY

## 2020-11-09 PROCEDURE — 99213 OFFICE O/P EST LOW 20 MIN: CPT | Performed by: ORTHOPAEDIC SURGERY

## 2020-11-09 PROCEDURE — 3017F COLORECTAL CA SCREEN DOC REV: CPT | Performed by: ORTHOPAEDIC SURGERY

## 2020-11-09 NOTE — PROGRESS NOTES
PO RT SHOULDER SX:6/23/2020      PREOPERATIVE DIAGNOSES:  Full-thickness rotator cuff tear - entire  supraspinatus tendon - right shoulder with associated AC joint arthritis  and lateral arch stenosis.     POSTOPERATIVE DIAGNOSES:  1.  Full-thickness supraspinatus tear, right shoulder involving 2.5 cm  with retraction. 2.  Chronic rotator cuff tendonitis and lateral impingement. 3.  AC joint arthritis and medial arch stenosis.     OPERATIVE PROCEDURE:  1.  Exam under anesthesia and video arthroscopy of the right shoulder. 2.  Mini-open rotator cuff repair using two [employer] HEALICOIL  anchors and two [employer] MultiFix anchors. 3.  Arthroscopic Neer acromioplasty. 4.  Arthroscopic Desi procedure. CHIEF COMPLAINT:    Chief Complaint   Patient presents with    Results     TR MRI RIGHT SHOULDER       HISTORY OF PRESENT ILLNESS:    She returns in follow-up after MRI scan. She is a lady who we did the above-mentioned procedure on back in    2020. Hannah. Initially did quite well with an unfortunate had a fall where she injured her knee and now has had increased right shoulder pain as well. With ongoing symptoms and failure of a corticosteroid injection we got a repeat MRI scan. She indeed has a recurrent tear as noted below. The patient is a 54 y.o. female   Past Medical History:   Diagnosis Date    Anxiety     Depression     GERD (gastroesophageal reflux disease)     Hypertension     Osteoarthritis     Restless leg syndrome         Work Status: The pain assessment was noted & is as follows:  Pain Assessment  Location of Pain: Shoulder  Location Modifiers: Right  Severity of Pain: 6  Quality of Pain: Sharp, Dull, Aching  Duration of Pain: Persistent  Frequency of Pain: Intermittent  Aggravating Factors:  Other (Comment), Bending(CERTAIN ROM)  Limiting Behavior: Some  Relieving Factors: Rest]      Work Status/Functionality:     Past Medical History: Medical history form was reviewed today & can be found in the media tab  Past Medical History:   Diagnosis Date    Anxiety     Depression     GERD (gastroesophageal reflux disease)     Hypertension     Osteoarthritis     Restless leg syndrome       Past Surgical History:     Past Surgical History:   Procedure Laterality Date    ANUS SURGERY  2018    fissure     SECTION      x3    FRACTURE SURGERY      right wrist    HERNIA REPAIR  2018    LAPAROSCOPIC PARAESOPHAGEAL HERNIA REPAIR WITH MESH    HIATAL HERNIA REPAIR  2018    KNEE ARTHROSCOPY Left 11/15/12    ARTHROSCOPY LEFT KNEE WITH SYOVECTOMY AND CHONDROPLASTY    OVARY REMOVAL Right 2010    ROTATOR CUFF REPAIR Right 2020    EXAM UNDER ANESTHESIA VIDEO ARTHROSCOPY RIGHT SHOULDER, MINI- OPEN ROTATOR CUFF REPAIR, NEER ACROMIOPLASTY, [place] PROCEDURE WITH EXPAREL performed by Naty Tavares MD at [facility]      UPPER GASTROINTESTINAL ENDOSCOPY  2015    esophageasl stretch     Current Medications:     Current Outpatient Medications:     ALPRAZolam (XANAX) 1 MG tablet, Take 1 tablet by mouth 3 times daily as needed for Anxiety for up to 60 days. , Disp: 90 tablet, Rfl: 1    rOPINIRole (REQUIP) 2 MG tablet, TAKE 1 TABLET BY MOUTH TWICE A DAY, Disp: 60 tablet, Rfl: 5    meloxicam (MOBIC) 15 MG tablet, Take 15 mg by mouth daily, Disp: , Rfl:     DULoxetine (CYMBALTA) 60 MG extended release capsule, Take 2 capsules by mouth daily, Disp: 60 capsule, Rfl: 5    atenolol (TENORMIN) 25 MG tablet, TAKE 1 TABLET BY MOUTH EVERY DAY, Disp: 30 tablet, Rfl: 5    methocarbamol (ROBAXIN) 500 MG tablet, TAKE 1 TABLET BY MOUTH 3 TIMES A DAY AS NEEDED FOR NECK PAIN, Disp: 90 tablet, Rfl: 3    ibuprofen (ADVIL;MOTRIN) 600 MG tablet, TAKE 1 TABLET BY MOUTH EVERY 8 HOURS AS NEEDED FOR PAIN, Disp: 40 tablet, Rfl: 1    lidocaine (XYLOCAINE) 5 % ointment, Apply topically as needed to area of tenderness under arm. (Patient not taking: Reported on 10/7/2020), Disp: 50 g, Rfl: 0    pravastatin (PRAVACHOL) 20 MG tablet, TAKE 1 TABLET BY MOUTH EVERY DAY FOR CHOLESTEROL, Disp: 30 tablet, Rfl: 5    omeprazole (PRILOSEC) 40 MG delayed release capsule, TAKE 1 CAPSULE BY MOUTH EVERY DAY, Disp: 30 capsule, Rfl: 6    dicyclomine (BENTYL) 20 MG tablet, Take 1 tablet by mouth 4 times daily (after meals and at bedtime) For cramps and gas, Disp: 60 tablet, Rfl: 2    saccharomyces boulardii (FLORASTOR) 250 MG capsule, Take 1 capsule by mouth 2 times daily, Disp: 28 capsule, Rfl: 1    lisinopril (PRINIVIL;ZESTRIL) 20 MG tablet, TAKE 1 TABLET BY MOUTH EVERY DAY, Disp: 30 tablet, Rfl: 6    MELATONIN PO, Take 1 tablet by mouth nightly as needed, Disp: , Rfl:   Allergies: Toradol [ketorolac tromethamine]; Haldol [haloperidol lactate]; and Tramadol  Social History:    reports that she has never smoked. She has never used smokeless tobacco. She reports current alcohol use. She reports previous drug use. Drug: Marijuana. Family History:   Family History   Problem Relation Age of Onset    Arthritis Mother     Obesity Mother     Anemia Mother     Other Mother         pulmonary embolism    Arthritis Father     Arrhythmia Father     Diabetes Other     Diabetes Paternal Grandfather     Cancer Neg Hx     Breast Cancer Neg Hx     Colon Cancer Neg Hx        REVIEW OF SYSTEMS:   For new problems, a full review of systems will be found scanned in the patient's chart. CONSTITUTIONAL: Denies unexplained weight loss, fevers, chills   NEUROLOGICAL: Denies unsteady gait or progressive weakness  SKIN: Denies skin changes, delayed healing, rash, itching       PHYSICAL EXAM:    Vitals: Height 5' 5\" (1.651 m), weight 278 lb (126.1 kg), last menstrual period 09/20/2017, not currently breastfeeding. GENERAL EXAM:  · General Apparence: Patient is adequately groomed with no evidence of malnutrition.   · Orientation: The patient is oriented to time, place and person. · Mood & Affect:The patient's mood and affect are appropriate       Right shoulder PHYSICAL EXAMINATION:  · Inspection: Well-healed arthroscopy portals and incision. No visible asymmetry deformity or atrophy. · Palpation: Very tender over the trapezius muscles subacromial region and deltoid. · Range of Motion: Very limited by pain. She can abduct her arm to about 40 degrees with pain and forward flexed about 50 degrees with pain. · Strength: 4/5 strength supraspinatus. 4/5 strength infraspinatus. Subscapularis is okay. · Special Tests: Positive supraspinatus sign. No instability. · Skin:  There are no rashes, ulcerations or lesions. · There are no distal dysvascular changes     Gait & station:       Additional Examinations:              Diagnostic Testing: The following x rays were read and interpreted by myself      1. MRI scan demonstrates a recurrent rotator cuff tear with retraction. This is described as a large at the [facility] reading of 11/4/2020. Surgical changes noted at the [facility] joint. There are some fraying of the labrum as well. Orders   No orders of the defined types were placed in this encounter. Assessment / Treatment Plan:     1. Right full-thickness rotator cuff tear-right shoulder. Given this patient's ongoing symptoms and the recurrent nature of her tear I believe she is best evaluated by our shoulder subspecialist, Dr. Renata Kong. Is comfortable with this recommendation is going to go ahead and see Sugar Rashid in the near future. She understands that there is a distinct possibility she will require revision surgery. 2. I have personally performed and/or participated in the history, exam and medical decision making and agree with all pertinent clinical information. I have also reviewed and agree with the past medical, family and social history unless otherwise noted.        This dictation was performed with a verbal recognition program (DRAGON) and it was checked for errors. It is possible that there are still dictated errors within this office note. If so, please bring any errors to my attention for an addendum. All efforts were made to ensure that this office note is accurate.           Perry Castillo MD

## 2020-11-10 ENCOUNTER — TELEPHONE (OUTPATIENT)
Dept: FAMILY MEDICINE CLINIC | Age: 55
End: 2020-11-10

## 2020-11-10 RX ORDER — ACETAMINOPHEN AND CODEINE PHOSPHATE 300; 30 MG/1; MG/1
1 TABLET ORAL EVERY 12 HOURS PRN
Qty: 14 TABLET | Refills: 0 | Status: SHIPPED | OUTPATIENT
Start: 2020-11-10 | End: 2021-01-28

## 2020-11-10 RX ORDER — LIDOCAINE 50 MG/G
OINTMENT TOPICAL
Qty: 70.88 G | Refills: 1 | Status: SHIPPED | OUTPATIENT
Start: 2020-11-10 | End: 2021-01-11

## 2020-11-10 NOTE — TELEPHONE ENCOUNTER
Future Appointments   Date Time Provider Diann Ashleigh   11/12/2020  9:30 AM Amador Lau MD AND ORTHO [facility]   12/1/2020 10:20 AM Darylene Dolphin, MD AND INFCT DI [facility]     LOV 10/7/2020

## 2020-11-10 NOTE — TELEPHONE ENCOUNTER
----- Message from aMdina Fletcher sent at 11/10/2020  3:30 PM EST -----  Subject: Appointment Request    Reason for Call: Routine Pre-Op    QUESTIONS  Type of Appointment? Established Patient  Reason for appointment request? Requested Provider unavailable - Dr. Polo Peed  Additional Information for Provider? Patient re injured her R shoulder and   is going to see Dr. Toma Styles of [facility] on this Thursday. She will have to   have surgery again. I was unable to find appointments for pre-op.   ---------------------------------------------------------------------------  --------------  CALL BACK INFO  What is the best way for the office to contact you? OK to leave message on   voicemail  Preferred Call Back Phone Number? [phone]  ---------------------------------------------------------------------------  --------------  SCRIPT ANSWERS  Relationship to Patient? Self  Appointment reason? Symptomatic  Select script based on patient symptoms? Adult Pre-Op  Do you have question for your provider that need to be answered prior to   scheduling your pre-op appointment? No  Have you been diagnosed with   tested for   or told that you are suspected of having COVID-19 (Coronavirus)? Yes  Did your symptoms begin or have you been tested for COVID-19 in the last   10 days? No  Have you had a fever or taken medication to treat a fever within the past   3 days? No  Have you had a cough   shortness of breath or flu-like symptoms within the past 3 days? No  Do you currently have flu-like symptoms including fever or chills   cough   shortness of breath   or difficulty breathing   or new loss of taste or smell? No  (Service Expert  click yes below to proceed with [employer] As Usual   Scheduling)?  Yes

## 2020-11-10 NOTE — TELEPHONE ENCOUNTER
KRISTINA once she finds out when the surgery is scheduled for to call us back to schedule the pre op

## 2020-11-12 ENCOUNTER — OFFICE VISIT (OUTPATIENT)
Dept: ORTHOPEDIC SURGERY | Age: 55
End: 2020-11-12
Payer: COMMERCIAL

## 2020-11-12 PROCEDURE — 99243 OFF/OP CNSLTJ NEW/EST LOW 30: CPT | Performed by: ORTHOPAEDIC SURGERY

## 2020-11-12 PROCEDURE — G8417 CALC BMI ABV UP PARAM F/U: HCPCS | Performed by: ORTHOPAEDIC SURGERY

## 2020-11-12 PROCEDURE — G8427 DOCREV CUR MEDS BY ELIG CLIN: HCPCS | Performed by: ORTHOPAEDIC SURGERY

## 2020-11-12 PROCEDURE — G8482 FLU IMMUNIZE ORDER/ADMIN: HCPCS | Performed by: ORTHOPAEDIC SURGERY

## 2020-11-12 PROCEDURE — L3670 SO ACRO/CLAV CAN WEB PRE OTS: HCPCS | Performed by: ORTHOPAEDIC SURGERY

## 2020-11-12 NOTE — LETTER
[facility]   Surgery Precert & Billing Form:    DEMOGRAPHICS:                                                                                                       Patient Name:  Augusta Mina  Patient :  1965   Patient SS#:      Patient Phone:  [phone] (home)  Alt. Patient Phone:    Patient Address:  [address] [address]    PCP:  Gold Marquez DO  Insurance: Payor: [address] [place] [address] [address] / Plan: [address] [place] [address] [phone] / Product Type: *No Product type* /     DIAGNOSIS & PROCEDURE:                                                                                      Diagnosis:   1.  Traumatic complete tear of right rotator cuff, subsequent encounter      Diagnosis Code:  S46.011D      Operation: right    SURGERY  INFORMATION  Date of Surgery:   20  Location:  [facility]    Type:    Outpatient  23 hour hold:  No  Surgeon:              Shaun Gallo MD      20     BILLING INFORMATION:                                                                                                Physician Procedure                                            CPT Codes        Right shoulder arthroscopy with rotator cuff repair and biceps tenodesis                        PA, or Fellow Procedure                                      CPT Codes                     Precert information:

## 2020-11-18 NOTE — PROGRESS NOTES
Obstructive Sleep Apnea (MERI) Screening     Patient:  Kayy Solorzano    YOB: 1965      Medical Record #:  [id number]                     Date:  11/18/2020     1. Are you a loud and/or regular snorer? []  Yes       [x] No    2. Have you been observed to gasp or stop breathing during sleep? []  Yes       [x] No    3. Do you feel tired or groggy upon awakening or do you awaken with a headache?           []  Yes       [] No    4. Are you often tired or fatigued during the wake time hours? []  Yes       [] No    5. Do you fall asleep sitting, reading, watching TV or driving? []  Yes       [] No    6. Do you often have problems with memory or concentration? []  Yes       [] No    **If patient's score is ? 3 they are considered high risk for MERI. An Anesthesia provider will evaluate the patient and develop a plan of care the day of surgery. Note:  If the patient's BMI is more than 35 kg m¯² , has neck circumference > 40 cm, and/or high blood pressure the risk is greater (© American Sleep Apnea Association, 2006).

## 2020-11-18 NOTE — PROGRESS NOTES
Patient instructed to:  Bring Picture ID, insurance card, proof of address  Dress Comfortable  No jewelry  No Makeup  No contacts  Shower evening before or morning of surgery with antibacterial soap. Nothing to eat or drink after midnight the day before surgery. If instructed by MD to take meds day of surgery, take with only a sip of water. If taking am beta blocker, take morning of surgery with sip of water per Dr. Rogelio Trent.

## 2020-11-20 ENCOUNTER — OFFICE VISIT (OUTPATIENT)
Dept: PRIMARY CARE CLINIC | Age: 55
End: 2020-11-20
Payer: COMMERCIAL

## 2020-11-20 PROCEDURE — 99211 OFF/OP EST MAY X REQ PHY/QHP: CPT | Performed by: NURSE PRACTITIONER

## 2020-11-20 NOTE — PATIENT INSTRUCTIONS

## 2020-11-20 NOTE — PROGRESS NOTES
[facility] received a viral test for COVID-19. They were educated on isolation and quarantine as appropriate. For any symptoms, they were directed to seek care from their PCP, given contact information to establish with a doctor, directed to an urgent care or the emergency room.

## 2020-11-21 LAB — SARS-COV-2: NOT DETECTED

## 2020-11-23 ENCOUNTER — OFFICE VISIT (OUTPATIENT)
Dept: FAMILY MEDICINE CLINIC | Age: 55
End: 2020-11-23
Payer: COMMERCIAL

## 2020-11-23 ENCOUNTER — TELEPHONE (OUTPATIENT)
Dept: ORTHOPEDIC SURGERY | Age: 55
End: 2020-11-23

## 2020-11-23 ENCOUNTER — ANESTHESIA EVENT (OUTPATIENT)
Dept: OPERATING ROOM | Age: 55
End: 2020-11-23
Payer: COMMERCIAL

## 2020-11-23 VITALS
HEIGHT: 66 IN | DIASTOLIC BLOOD PRESSURE: 76 MMHG | WEIGHT: 274 LBS | RESPIRATION RATE: 14 BRPM | TEMPERATURE: 98.3 F | BODY MASS INDEX: 44.03 KG/M2 | OXYGEN SATURATION: 96 % | HEART RATE: 62 BPM | SYSTOLIC BLOOD PRESSURE: 108 MMHG

## 2020-11-23 PROBLEM — J18.9 PNEUMONIA: Status: RESOLVED | Noted: 2017-06-11 | Resolved: 2020-11-23

## 2020-11-23 PROBLEM — J20.9 BRONCHITIS WITH BRONCHOSPASM: Status: RESOLVED | Noted: 2017-06-11 | Resolved: 2020-11-23

## 2020-11-23 PROCEDURE — G8427 DOCREV CUR MEDS BY ELIG CLIN: HCPCS | Performed by: NURSE PRACTITIONER

## 2020-11-23 PROCEDURE — G8417 CALC BMI ABV UP PARAM F/U: HCPCS | Performed by: NURSE PRACTITIONER

## 2020-11-23 PROCEDURE — 93000 ELECTROCARDIOGRAM COMPLETE: CPT | Performed by: NURSE PRACTITIONER

## 2020-11-23 PROCEDURE — 99243 OFF/OP CNSLTJ NEW/EST LOW 30: CPT | Performed by: NURSE PRACTITIONER

## 2020-11-23 PROCEDURE — G8482 FLU IMMUNIZE ORDER/ADMIN: HCPCS | Performed by: NURSE PRACTITIONER

## 2020-11-23 ASSESSMENT — ENCOUNTER SYMPTOMS
SORE THROAT: 0
ABDOMINAL PAIN: 0
SINUS PRESSURE: 0
TROUBLE SWALLOWING: 0
NAUSEA: 0
WHEEZING: 0
COLOR CHANGE: 0
EYE REDNESS: 0
BACK PAIN: 1
CHEST TIGHTNESS: 0
DIARRHEA: 0
COUGH: 0
CONSTIPATION: 0
SHORTNESS OF BREATH: 0
EYE ITCHING: 0

## 2020-11-23 ASSESSMENT — PATIENT HEALTH QUESTIONNAIRE - PHQ9
SUM OF ALL RESPONSES TO PHQ QUESTIONS 1-9: 0
2. FEELING DOWN, DEPRESSED OR HOPELESS: 0
SUM OF ALL RESPONSES TO PHQ QUESTIONS 1-9: 0
1. LITTLE INTEREST OR PLEASURE IN DOING THINGS: 0
SUM OF ALL RESPONSES TO PHQ QUESTIONS 1-9: 0
SUM OF ALL RESPONSES TO PHQ9 QUESTIONS 1 & 2: 0

## 2020-11-23 NOTE — PROGRESS NOTES
tablet TAKE 1 TABLET BY MOUTH EVERY DAY 30 tablet 6    MELATONIN PO Take 20 mg by mouth nightly as needed          This patient presents to the office today for a preoperative consultation at the request of surgeon, Dr. David Mosher, who plans on performing Video arthroscopy of right shoulder, rotator cuff repair, biceps tenodesis and block on  at [facility]. The current problem began 2 monthsago, and symptoms have been worsening with time.   Conservative therapy:No.    Planned anesthesia: Regional and General   Known anesthesia problems:None   Bleeding risk: No recent or remote history of abnormal bleeding  Personal or FHof DVT/PE: No  Patient objection to receiving blood products: No    Patient Active Problem List   Diagnosis    Depression    Knee pain    Chondromalacia of left knee    Synovitis of knee    Bilateral carpal tunnel syndrome    Lumbar strain    Anxiety    Restless leg syndrome    De Quervain's disease (radial styloid tenosynovitis)    Osteoarthritis of carpometacarpal joint of thumb    Hemorrhoid prolapse    Pneumonia unspecified bacteria    Bronchitis with bronchospasm    Dyspnea on exertion    Sciatica    Intractable vomiting with nausea    Hiatal hernia    Elevated blood pressure reading    Major depressive disorder, recurrent, moderate (HCC)    Generalized anxiety disorder    Essential hypertension    Closed displaced fracture of middle phalanx of right ring finger    Anal lesion    Adjustment disorder with depressed mood    Neck pain       Past Medical History:   Diagnosis Date    Anxiety     Depression     GERD (gastroesophageal reflux disease)     Hypertension     Osteoarthritis     Restless leg syndrome      Past Surgical History:   Procedure Laterality Date    ANUS SURGERY  2018    fissure     SECTION      x3    FRACTURE SURGERY      right wrist    HERNIA REPAIR  2018    LAPAROSCOPIC PARAESOPHAGEAL HERNIA REPAIR WITH MESH  HIATAL HERNIA REPAIR  03/2018    KNEE ARTHROSCOPY Left 11/15/12    ARTHROSCOPY LEFT KNEE WITH SYOVECTOMY AND CHONDROPLASTY    OVARY REMOVAL Right 2010    ROTATOR CUFF REPAIR Right 6/23/2020    EXAM UNDER ANESTHESIA VIDEO ARTHROSCOPY RIGHT SHOULDER, MINI- OPEN ROTATOR CUFF REPAIR, NEER ACROMIOPLASTY, [place] PROCEDURE WITH EXPAREL performed by Charles Vasquez MD at [facility] ENDOSCOPY  2011    UPPER GASTROINTESTINAL ENDOSCOPY  2015    esophageasl stretch     Family History   Problem Relation Age of Onset    Arthritis Mother     Obesity Mother    Dunlap Sicard Anemia Mother     Other Mother         pulmonary embolism    Arthritis Father     Arrhythmia Father     Diabetes Other     Diabetes Paternal Grandfather     Cancer Neg Hx     Breast Cancer Neg Hx     Colon Cancer Neg Hx      Social History     Socioeconomic History    Marital status:       Spouse name: Not on file    Number of children: 3    Years of education: Not on file    Highest education level: Not on file   Occupational History    Occupation:    Social Needs    Financial resource strain: Not on file    Food insecurity     Worry: Not on file     Inability: Not on file    Transportation needs     Medical: Not on file     Non-medical: Not on file   Tobacco Use    Smoking status: Never Smoker    Smokeless tobacco: Never Used   Substance and Sexual Activity    Alcohol use: Yes     Comment: 1 -2 drinks per month    Drug use: Not Currently     Types: Marijuana     Comment: quit 2016    Sexual activity: Never   Lifestyle    Physical activity     Days per week: Not on file     Minutes per session: Not on file    Stress: Not on file   Relationships    Social connections     Talks on phone: Not on file     Gets together: Not on file     Attends Confucianism service: Not on file     Active member of club or organization: Not on file     Attends meetings of clubs or organizations: Not on file     Relationship status: Not on file    Intimate partner violence     Fear of current or ex partner: Not on file     Emotionally abused: Not on file     Physically abused: Not on file     Forced sexual activity: Not on file   Other Topics Concern    Not on file   Social History Narrative    Not on file       Review of Systems    Review of Systems   Constitutional: Negative for activity change, chills, fatigue, fever and unexpected weight change. HENT: Negative for ear discharge, mouth sores, postnasal drip, sinus pressure, sore throat and trouble swallowing. Dentures - upper and lower   Eyes: Negative for redness, itching and visual disturbance. Respiratory: Negative for cough, chest tightness, shortness of breath and wheezing. Cardiovascular: Negative for chest pain, palpitations and leg swelling. Gastrointestinal: Negative for abdominal pain, constipation, diarrhea and nausea. Endocrine: Negative for polyuria. Genitourinary: Negative for dysuria, frequency and urgency. Musculoskeletal: Positive for arthralgias (right shoulder), back pain (chronic) and myalgias. Negative for joint swelling. Skin: Negative for color change, pallor and rash. Allergic/Immunologic: Negative for environmental allergies, food allergies and immunocompromised state. Neurological: Negative for dizziness, syncope, weakness (right arm) and headaches. Hematological: Does not bruise/bleed easily. Psychiatric/Behavioral: Negative for behavioral problems, hallucinations and sleep disturbance. The patient is not nervous/anxious. PhysicalExam     Physical Exam  Vitals signs and nursing note reviewed. Constitutional:       General: She is not in acute distress. Appearance: She is well-developed. She is not diaphoretic. HENT:      Head: Normocephalic and atraumatic.       Right Ear: External ear normal.      Left Ear: External ear normal.      Nose: Nose normal.      Mouth/Throat: Mouth: Mucous membranes are moist.      Pharynx: No oropharyngeal exudate. Eyes:      General:         Right eye: No discharge. Left eye: No discharge. Conjunctiva/sclera: Conjunctivae normal.   Neck:      Musculoskeletal: Normal range of motion and neck supple. Cardiovascular:      Rate and Rhythm: Normal rate and regular rhythm. Heart sounds: Normal heart sounds. No murmur. No friction rub. No gallop. Pulmonary:      Effort: Pulmonary effort is normal. No respiratory distress. Breath sounds: Normal breath sounds. No wheezing or rales. Abdominal:      General: Abdomen is flat. Bowel sounds are normal. There is no distension. Palpations: Abdomen is soft. Tenderness: There is no abdominal tenderness. Musculoskeletal:         General: Tenderness present. Right shoulder: She exhibits decreased range of motion, tenderness, spasm and decreased strength. Lymphadenopathy:      Cervical: No cervical adenopathy. Skin:     General: Skin is warm and dry. Coloration: Skin is not pale. Findings: No erythema or rash. Neurological:      General: No focal deficit present. Mental Status: She is alert and oriented to person, place, and time. Motor: No abnormal muscle tone. Coordination: Coordination normal.   Psychiatric:         Mood and Affect: Mood normal.         Behavior: Behavior normal.         Thought Content: Thought content normal.         Judgment: Judgment normal.         EKG Interpretation:  Sinus rhythm, unchanged from previous tracings. Lab Review   Office Visit on 11/20/2020   Component Date Value    SARS-CoV-2 11/20/2020 Not Detected            Assessment:       54 y.o. patient with planned surgery as above. Known risk factors for perioperative complications: Hypertension  Current medications which may produce withdrawal symptoms if withheld perioperatively: none     Plan:     1. Preoperative workup as follows:ECG  2.  Change in medication regimen before surgery:  Take Atenolol   Hold all other medications on morning of surgery  3. Prophylaxis for cardiac events with perioperativebeta-blockers: current on atenolol  ACC/AHA indications forpre-operative beta-blocker use:    · Vascular surgery with history of postitive stress test  · Intermediateor high risk surgery with history of CAD   · Intermediate or high risk surgery with multiple clinicalpredictors of CAD- 2 of the following: history of compensated or prior heart failure,history of cerebrovascular disease, DM, or renal insufficiency    Routine administration of higher-dose, long-acting metoprolol in beta-blocker-naïve patients on the day of surgery, and in the absence of dose titrationis associated with an overall increasein mortality. Beta-blockers should be started days to weeks prior to surgery andtitrated to pulse < 70. 4.Deep vein thrombosis prophylaxis: regimen to be chosen by surgical team  5.  No contraindications to planned surgery    Guero Michelle, AV[facility] - CNP

## 2020-11-24 LAB
A/G RATIO: 1.7 (ref 1.1–2.2)
ALBUMIN SERPL-MCNC: 4.1 G/DL (ref 3.4–5)
ALP BLD-CCNC: 82 U/L (ref 40–129)
ALT SERPL-CCNC: 32 U/L (ref 10–40)
ANION GAP SERPL CALCULATED.3IONS-SCNC: 12 MMOL/L (ref 3–16)
AST SERPL-CCNC: 21 U/L (ref 15–37)
BILIRUB SERPL-MCNC: 0.3 MG/DL (ref 0–1)
BUN BLDV-MCNC: 9 MG/DL (ref 7–20)
CALCIUM SERPL-MCNC: 9.3 MG/DL (ref 8.3–10.6)
CHLORIDE BLD-SCNC: 102 MMOL/L (ref 99–110)
CO2: 25 MMOL/L (ref 21–32)
CREAT SERPL-MCNC: 0.8 MG/DL (ref 0.6–1.1)
GFR AFRICAN AMERICAN: >60
GFR NON-AFRICAN AMERICAN: >60
GLOBULIN: 2.4 G/DL
GLUCOSE BLD-MCNC: 131 MG/DL (ref 70–99)
POTASSIUM SERPL-SCNC: 4.6 MMOL/L (ref 3.5–5.1)
SODIUM BLD-SCNC: 139 MMOL/L (ref 136–145)
TOTAL PROTEIN: 6.5 G/DL (ref 6.4–8.2)

## 2020-11-24 ASSESSMENT — ENCOUNTER SYMPTOMS: SHORTNESS OF BREATH: 1

## 2020-11-24 ASSESSMENT — LIFESTYLE VARIABLES: SMOKING_STATUS: 0

## 2020-11-24 NOTE — ANESTHESIA PRE PROCEDURE
Department of Anesthesiology  Preprocedure Note       Name:  Constanza Licona   Age:  54 y.o.  :  1965                                          MRN:  [id number]         Date:  2020      Surgeon: Neela Méndez):  Elvira Lord MD    Procedure: Procedure(s):  VIDEO ARTHROSCOPY RIGHT SHOULDER, ROTATOR CUFF REPAIR, BICEPS TENODESIS -BLOCK-    Medications prior to admission:   Prior to Admission medications    Medication Sig Start Date End Date Taking? Authorizing Provider   lidocaine (XYLOCAINE) 5 % ointment APPLY TOPICALLY AS NEEDED TO AREA OF TENDERNESS UNDER ARM. 11/10/20  Yes Zuleima Silva DO   ALPRAZolam Unice Nigh) 1 MG tablet Take 1 tablet by mouth 3 times daily as needed for Anxiety for up to 60 days.  10/26/20 12/25/20 Yes Zuleima Silva DO   rOPINIRole (REQUIP) 2 MG tablet TAKE 1 TABLET BY MOUTH TWICE A DAY 10/13/20  Yes Zuleima Silva DO   DULoxetine (CYMBALTA) 60 MG extended release capsule Take 2 capsules by mouth daily 20  Yes Zuleima Silva DO   atenolol (TENORMIN) 25 MG tablet TAKE 1 TABLET BY MOUTH EVERY DAY 9/15/20  Yes Zuleima Silva DO   methocarbamol (ROBAXIN) 500 MG tablet TAKE 1 TABLET BY MOUTH 3 TIMES A DAY AS NEEDED FOR NECK PAIN 9/15/20  Yes Zuleima Silva DO   ibuprofen (ADVIL;MOTRIN) 600 MG tablet TAKE 1 TABLET BY MOUTH EVERY 8 HOURS AS NEEDED FOR PAIN 20  Yes Zuleima Silva DO   omeprazole (PRILOSEC) 40 MG delayed release capsule TAKE 1 CAPSULE BY MOUTH EVERY DAY 20  Yes Zuleima Silva DO   lisinopril (PRINIVIL;ZESTRIL) 20 MG tablet TAKE 1 TABLET BY MOUTH EVERY DAY 20  Yes Zuleima Silva DO   MELATONIN PO Take 20 mg by mouth nightly as needed    Yes Historical Provider, MD   Acetaminophen-Codeine (TYLENOL WITH CODEINE #3 PO) Take by mouth as needed    Historical Provider, MD       Current medications:    Current Facility-Administered Medications   Medication Dose Route Frequency Provider Last Rate Last Dose    ceFAZolin (ANCEF) 3 g in dextrose 5 % 100 mL IVPB  3 g Intravenous On Call to Amos Ferro MD        lactated ringers infusion   Intravenous Continuous Isatu Sullivan MD        sodium chloride flush 0.9 % injection 10 mL  10 mL Intravenous 2 times per day Isatu Sullivan MD        sodium chloride flush 0.9 % injection 10 mL  10 mL Intravenous PRN Isatu Sullivan MD        midazolam (VERSED) 2 MG/2ML injection                Allergies:     Allergies   Allergen Reactions    Toradol [Ketorolac Tromethamine] Other (See Comments)     \"out of it\"  \"felt like sleep paralysis\"    Haldol [Haloperidol Lactate] Other (See Comments)     \"felt out of it, similar to the toradol\"       Problem List:    Patient Active Problem List   Diagnosis Code    Depression F32.9    Knee pain M25.569    Chondromalacia of left knee M94.262    Synovitis of knee M65.9    Bilateral carpal tunnel syndrome G56.03    Lumbar strain S39.012A    Anxiety F41.9    Restless leg syndrome G25.81    De Quervain's disease (radial styloid tenosynovitis) M65.4    Osteoarthritis of carpometacarpal joint of thumb M18.9    Hemorrhoid prolapse K64.8    Dyspnea on exertion R06.00    Sciatica M54.30    Intractable vomiting with nausea R11.2    Hiatal hernia K44.9    Elevated blood pressure reading R03.0    Major depressive disorder, recurrent, moderate (HCC) F33.1    Generalized anxiety disorder F41.1    Essential hypertension I10    Closed displaced fracture of middle phalanx of right ring finger S62.624A    Anal lesion K62.9    Adjustment disorder with depressed mood F43.21    Neck pain M54.2       Past Medical History:        Diagnosis Date    Anxiety     Depression     GERD (gastroesophageal reflux disease)     Hypertension     Osteoarthritis     Restless leg syndrome        Past Surgical History:        Procedure Laterality Date    ANUS SURGERY  2018    fissure     SECTION      x3    FRACTURE SURGERY      right wrist    HERNIA REPAIR 03/06/2018    LAPAROSCOPIC PARAESOPHAGEAL HERNIA REPAIR WITH MESH    HIATAL HERNIA REPAIR  03/2018    KNEE ARTHROSCOPY Left 11/15/12    ARTHROSCOPY LEFT KNEE WITH SYOVECTOMY AND CHONDROPLASTY    OVARY REMOVAL Right 2010    ROTATOR CUFF REPAIR Right 6/23/2020    EXAM UNDER ANESTHESIA VIDEO ARTHROSCOPY RIGHT SHOULDER, MINI- OPEN ROTATOR CUFF REPAIR, NEER ACROMIOPLASTY, LENO PROCEDURE WITH EXPAREL performed by Evan Vann MD at [facility]  2011    UPPER GASTROINTESTINAL ENDOSCOPY  2015    esophageasl stretch       Social History:    Social History     Tobacco Use    Smoking status: Never Smoker    Smokeless tobacco: Never Used   Substance Use Topics    Alcohol use: Yes     Comment: 1 -2 drinks per month                                Counseling given: Not Answered      Vital Signs (Current):   Vitals:    11/18/20 1515 11/25/20 0820   BP:  (!) 99/53   Pulse:  (!) 47   Resp:  16   Temp:  96.8 °F (36 °C)   SpO2:  96%   Weight: 278 lb (126.1 kg)    Height: 5' 6\" (1.676 m)                                               BP Readings from Last 3 Encounters:   11/25/20 (!) 99/53   11/23/20 108/76   10/07/20 116/74       NPO Status: Time of last liquid consumption: 2300                        Time of last solid consumption: 2300                        Date of last liquid consumption: 11/24/20                        Date of last solid food consumption: 11/24/20    BMI:   Wt Readings from Last 3 Encounters:   11/18/20 278 lb (126.1 kg)   11/23/20 274 lb (124.3 kg)   11/09/20 278 lb (126.1 kg)     Body mass index is 44.87 kg/m².     CBC:   Lab Results   Component Value Date    WBC 9.7 07/03/2020    RBC 4.22 07/03/2020    HGB 12.7 07/03/2020    HCT 38.4 07/03/2020    MCV 91.0 07/03/2020    RDW 15.8 07/03/2020     07/03/2020       CMP:   Lab Results   Component Value Date     11/23/2020    K 4.6 11/23/2020    K 4.3 04/21/2018     11/23/2020 CO2 25 11/23/2020    BUN 9 11/23/2020    CREATININE 0.8 11/23/2020    GFRAA >60 11/23/2020    GFRAA >60 02/01/2012    AGRATIO 1.7 11/23/2020    LABGLOM >60 11/23/2020    GLUCOSE 131 11/23/2020    PROT 6.5 11/23/2020    PROT 7.9 02/02/2012    CALCIUM 9.3 11/23/2020    BILITOT 0.3 11/23/2020    ALKPHOS 82 11/23/2020    AST 21 11/23/2020    ALT 32 11/23/2020       POC Tests: No results for input(s): POCGLU, POCNA, POCK, POCCL, POCBUN, POCHEMO, POCHCT in the last 72 hours. Coags:   Lab Results   Component Value Date    PROTIME 12.0 10/16/2013    INR 1.07 10/16/2013       HCG (If Applicable):   Lab Results   Component Value Date    PREGTESTUR Neg 11/15/2012        ABGs: No results found for: PHART, PO2ART, ZIU1ADC, UMZ1XYF, BEART, U0DAQQEO     Type & Screen (If Applicable):  No results found for: LABABO, LABRH    Drug/Infectious Status (If Applicable):  No results found for: HIV, HEPCAB    COVID-19 Screening (If Applicable):   Lab Results   Component Value Date    COVID19 Not Detected 11/20/2020         Anesthesia Evaluation  Patient summary reviewed no history of anesthetic complications:   Airway: Mallampati: II  TM distance: <3 FB   Neck ROM: limited  Mouth opening: > = 3 FB Dental:    (+) lower dentures and upper dentures      Pulmonary: breath sounds clear to auscultation  (+) shortness of breath (exert):      (-) COPD, asthma, sleep apnea and not a current smoker          Patient did not smoke on day of surgery.                  Cardiovascular:    (+) hypertension:, FAULKNER:,     (-) pacemaker, past MI, CAD, CABG/stent, dysrhythmias,  angina and  CHF    ECG reviewed  Rhythm: regular  Rate: abnormal           Beta Blocker:  Dose within 24 Hrs        PE comment: OSWALDO, ATENOLOL   Neuro/Psych:   (+) neuromuscular disease ( b cts / rls):, depression/anxiety  (hx dep. / anx.)   (-) seizures, TIA and CVA           GI/Hepatic/Renal:   (+) hiatal hernia, GERD: well controlled, morbid obesity     (-) liver disease and bowel prep       Endo/Other:    (+) : arthritis: OA., no malignancy/cancer. (-) diabetes mellitus, hypothyroidism, hyperthyroidism, blood dyscrasia, no malignancy/cancer               Abdominal:   (+) obese,     Abdomen: soft. Vascular: negative vascular ROS. Anesthesia Plan      general     ASA 3     (Preop blk for pop pain assist, CONSENTED)  Induction: intravenous. MIPS: Postoperative opioids intended and Prophylactic antiemetics administered. Anesthetic plan and risks discussed with patient. Plan discussed with CRNA. This pre-anesthesia assessment may be used as a history and physical.    DOS STAFF ADDENDUM:    Pt seen and examined, chart reviewed (including anesthesia, drug and allergy history). No interval changes to history and physical examination. Anesthetic plan, risks, benefits, alternatives, and personnel involved discussed with patient. Patient verbalized an understanding and agrees to proceed.       Ale Gasca MD  November 25, 2020  8:36 AM      Ale Gasca MD   11/25/2020

## 2020-11-25 ENCOUNTER — HOSPITAL ENCOUNTER (OUTPATIENT)
Age: 55
Setting detail: OUTPATIENT SURGERY
Discharge: HOME | End: 2020-11-25
Attending: ORTHOPAEDIC SURGERY | Admitting: ORTHOPAEDIC SURGERY
Payer: COMMERCIAL

## 2020-11-25 ENCOUNTER — ANESTHESIA (OUTPATIENT)
Dept: OPERATING ROOM | Age: 55
End: 2020-11-25
Payer: COMMERCIAL

## 2020-11-25 VITALS
HEIGHT: 66 IN | RESPIRATION RATE: 18 BRPM | WEIGHT: 278 LBS | BODY MASS INDEX: 44.68 KG/M2 | DIASTOLIC BLOOD PRESSURE: 69 MMHG | OXYGEN SATURATION: 93 % | SYSTOLIC BLOOD PRESSURE: 114 MMHG | TEMPERATURE: 97 F | HEART RATE: 4 BPM

## 2020-11-25 VITALS
DIASTOLIC BLOOD PRESSURE: 68 MMHG | SYSTOLIC BLOOD PRESSURE: 118 MMHG | OXYGEN SATURATION: 100 % | RESPIRATION RATE: 16 BRPM

## 2020-11-25 PROCEDURE — 7100000011 HC PHASE II RECOVERY - ADDTL 15 MIN: Performed by: ORTHOPAEDIC SURGERY

## 2020-11-25 PROCEDURE — 2500000003 HC RX 250 WO HCPCS: Performed by: ANESTHESIOLOGY

## 2020-11-25 PROCEDURE — 7100000000 HC PACU RECOVERY - FIRST 15 MIN: Performed by: ORTHOPAEDIC SURGERY

## 2020-11-25 PROCEDURE — 2720000010 HC SURG SUPPLY STERILE: Performed by: ORTHOPAEDIC SURGERY

## 2020-11-25 PROCEDURE — 6360000002 HC RX W HCPCS: Performed by: ORTHOPAEDIC SURGERY

## 2020-11-25 PROCEDURE — 2709999900 HC NON-CHARGEABLE SUPPLY: Performed by: ORTHOPAEDIC SURGERY

## 2020-11-25 PROCEDURE — C1713 ANCHOR/SCREW BN/BN,TIS/BN: HCPCS | Performed by: ORTHOPAEDIC SURGERY

## 2020-11-25 PROCEDURE — 3600000004 HC SURGERY LEVEL 4 BASE: Performed by: ORTHOPAEDIC SURGERY

## 2020-11-25 PROCEDURE — 2580000003 HC RX 258: Performed by: ANESTHESIOLOGY

## 2020-11-25 PROCEDURE — 2500000003 HC RX 250 WO HCPCS: Performed by: NURSE ANESTHETIST, CERTIFIED REGISTERED

## 2020-11-25 PROCEDURE — 7100000010 HC PHASE II RECOVERY - FIRST 15 MIN: Performed by: ORTHOPAEDIC SURGERY

## 2020-11-25 PROCEDURE — 2580000003 HC RX 258: Performed by: ORTHOPAEDIC SURGERY

## 2020-11-25 PROCEDURE — 3700000000 HC ANESTHESIA ATTENDED CARE: Performed by: ORTHOPAEDIC SURGERY

## 2020-11-25 PROCEDURE — 64415 NJX AA&/STRD BRCH PLXS IMG: CPT | Performed by: ANESTHESIOLOGY

## 2020-11-25 PROCEDURE — 3600000014 HC SURGERY LEVEL 4 ADDTL 15MIN: Performed by: ORTHOPAEDIC SURGERY

## 2020-11-25 PROCEDURE — 7100000001 HC PACU RECOVERY - ADDTL 15 MIN: Performed by: ORTHOPAEDIC SURGERY

## 2020-11-25 PROCEDURE — 6360000002 HC RX W HCPCS: Performed by: ANESTHESIOLOGY

## 2020-11-25 PROCEDURE — C1762 CONN TISS, HUMAN(INC FASCIA): HCPCS | Performed by: ORTHOPAEDIC SURGERY

## 2020-11-25 PROCEDURE — 6360000002 HC RX W HCPCS: Performed by: NURSE ANESTHETIST, CERTIFIED REGISTERED

## 2020-11-25 PROCEDURE — 6370000000 HC RX 637 (ALT 250 FOR IP): Performed by: ORTHOPAEDIC SURGERY

## 2020-11-25 PROCEDURE — 3700000001 HC ADD 15 MINUTES (ANESTHESIA): Performed by: ORTHOPAEDIC SURGERY

## 2020-11-25 DEVICE — IMPLANTABLE DEVICE
Type: IMPLANTABLE DEVICE | Site: SHOULDER | Status: FUNCTIONAL
Brand: BIOINDUCTIVE IMPLANT WITH ARTHROSCOPIC DELIVERY SYSTEM - LARGE

## 2020-11-25 DEVICE — BONE ANCHORS 3 WITH ARTHROSCOPIC DELIVERY SYSTEM ADVANCED
Type: IMPLANTABLE DEVICE | Site: SHOULDER | Status: FUNCTIONAL
Brand: BONE ANCHORS WITH ARTHROSCOPIC DELIVERY SYSTEM - ADVANCED

## 2020-11-25 DEVICE — SYSTEM IMPL INCL 2 4.75MM BIOCOMPOSITE SWIVELOCK C ANCHR: Type: IMPLANTABLE DEVICE | Site: SHOULDER | Status: FUNCTIONAL

## 2020-11-25 DEVICE — Z INACTIVE USE 2600835 ANCHOR TEND 8 FOR REGENETEN BIOINDUCTIVE IMPL SYS: Type: IMPLANTABLE DEVICE | Site: SHOULDER | Status: FUNCTIONAL

## 2020-11-25 RX ORDER — ROCURONIUM BROMIDE 10 MG/ML
INJECTION, SOLUTION INTRAVENOUS PRN
Status: DISCONTINUED | OUTPATIENT
Start: 2020-11-25 | End: 2020-11-25 | Stop reason: SDUPTHER

## 2020-11-25 RX ORDER — OXYCODONE HYDROCHLORIDE AND ACETAMINOPHEN 5; 325 MG/1; MG/1
1 TABLET ORAL PRN
Status: DISCONTINUED | OUTPATIENT
Start: 2020-11-25 | End: 2020-11-25 | Stop reason: HOSPADM

## 2020-11-25 RX ORDER — MEPERIDINE HYDROCHLORIDE 50 MG/ML
12.5 INJECTION INTRAMUSCULAR; INTRAVENOUS; SUBCUTANEOUS EVERY 5 MIN PRN
Status: DISCONTINUED | OUTPATIENT
Start: 2020-11-25 | End: 2020-11-25 | Stop reason: HOSPADM

## 2020-11-25 RX ORDER — CELECOXIB 400 MG/1
400 CAPSULE ORAL ONCE
Status: COMPLETED | OUTPATIENT
Start: 2020-11-25 | End: 2020-11-25

## 2020-11-25 RX ORDER — OXYCODONE HYDROCHLORIDE AND ACETAMINOPHEN 5; 325 MG/1; MG/1
1 TABLET ORAL EVERY 6 HOURS PRN
Qty: 28 TABLET | Refills: 0 | Status: SHIPPED | OUTPATIENT
Start: 2020-11-25 | End: 2020-12-02

## 2020-11-25 RX ORDER — SODIUM CHLORIDE 9 MG/ML
INJECTION, SOLUTION INTRAVENOUS
Status: DISCONTINUED
Start: 2020-11-25 | End: 2020-11-25 | Stop reason: HOSPADM

## 2020-11-25 RX ORDER — FENTANYL CITRATE 50 UG/ML
INJECTION, SOLUTION INTRAMUSCULAR; INTRAVENOUS PRN
Status: DISCONTINUED | OUTPATIENT
Start: 2020-11-25 | End: 2020-11-25 | Stop reason: SDUPTHER

## 2020-11-25 RX ORDER — OXYCODONE HYDROCHLORIDE AND ACETAMINOPHEN 5; 325 MG/1; MG/1
2 TABLET ORAL PRN
Status: DISCONTINUED | OUTPATIENT
Start: 2020-11-25 | End: 2020-11-25 | Stop reason: HOSPADM

## 2020-11-25 RX ORDER — BUPIVACAINE HYDROCHLORIDE 5 MG/ML
INJECTION, SOLUTION EPIDURAL; INTRACAUDAL PRN
Status: DISCONTINUED | OUTPATIENT
Start: 2020-11-25 | End: 2020-11-25 | Stop reason: SDUPTHER

## 2020-11-25 RX ORDER — PROPOFOL 10 MG/ML
INJECTION, EMULSION INTRAVENOUS PRN
Status: DISCONTINUED | OUTPATIENT
Start: 2020-11-25 | End: 2020-11-25 | Stop reason: SDUPTHER

## 2020-11-25 RX ORDER — MORPHINE SULFATE 2 MG/ML
2 INJECTION, SOLUTION INTRAMUSCULAR; INTRAVENOUS EVERY 5 MIN PRN
Status: DISCONTINUED | OUTPATIENT
Start: 2020-11-25 | End: 2020-11-25 | Stop reason: HOSPADM

## 2020-11-25 RX ORDER — ONDANSETRON 2 MG/ML
4 INJECTION INTRAMUSCULAR; INTRAVENOUS
Status: DISCONTINUED | OUTPATIENT
Start: 2020-11-25 | End: 2020-11-25 | Stop reason: HOSPADM

## 2020-11-25 RX ORDER — ACETAMINOPHEN 500 MG
1000 TABLET ORAL ONCE
Status: COMPLETED | OUTPATIENT
Start: 2020-11-25 | End: 2020-11-25

## 2020-11-25 RX ORDER — LIDOCAINE HYDROCHLORIDE 10 MG/ML
INJECTION, SOLUTION INFILTRATION; PERINEURAL PRN
Status: DISCONTINUED | OUTPATIENT
Start: 2020-11-25 | End: 2020-11-25 | Stop reason: SDUPTHER

## 2020-11-25 RX ORDER — GLYCOPYRROLATE 0.2 MG/ML
INJECTION INTRAMUSCULAR; INTRAVENOUS PRN
Status: DISCONTINUED | OUTPATIENT
Start: 2020-11-25 | End: 2020-11-25 | Stop reason: SDUPTHER

## 2020-11-25 RX ORDER — EPHEDRINE SULFATE 50 MG/ML
INJECTION INTRAVENOUS PRN
Status: DISCONTINUED | OUTPATIENT
Start: 2020-11-25 | End: 2020-11-25 | Stop reason: SDUPTHER

## 2020-11-25 RX ORDER — SODIUM CHLORIDE 0.9 % (FLUSH) 0.9 %
10 SYRINGE (ML) INJECTION PRN
Status: DISCONTINUED | OUTPATIENT
Start: 2020-11-25 | End: 2020-11-25 | Stop reason: HOSPADM

## 2020-11-25 RX ORDER — SODIUM CHLORIDE, SODIUM LACTATE, POTASSIUM CHLORIDE, AND CALCIUM CHLORIDE .6; .31; .03; .02 G/100ML; G/100ML; G/100ML; G/100ML
IRRIGANT IRRIGATION PRN
Status: DISCONTINUED | OUTPATIENT
Start: 2020-11-25 | End: 2020-11-25 | Stop reason: ALTCHOICE

## 2020-11-25 RX ORDER — SODIUM CHLORIDE 0.9 % (FLUSH) 0.9 %
10 SYRINGE (ML) INJECTION EVERY 12 HOURS SCHEDULED
Status: DISCONTINUED | OUTPATIENT
Start: 2020-11-25 | End: 2020-11-25 | Stop reason: HOSPADM

## 2020-11-25 RX ORDER — ONDANSETRON 2 MG/ML
INJECTION INTRAMUSCULAR; INTRAVENOUS PRN
Status: DISCONTINUED | OUTPATIENT
Start: 2020-11-25 | End: 2020-11-25 | Stop reason: SDUPTHER

## 2020-11-25 RX ORDER — PHENYLEPHRINE HCL IN 0.9% NACL 1 MG/10 ML
SYRINGE (ML) INTRAVENOUS PRN
Status: DISCONTINUED | OUTPATIENT
Start: 2020-11-25 | End: 2020-11-25 | Stop reason: SDUPTHER

## 2020-11-25 RX ORDER — DEXAMETHASONE SODIUM PHOSPHATE 4 MG/ML
INJECTION, SOLUTION INTRA-ARTICULAR; INTRALESIONAL; INTRAMUSCULAR; INTRAVENOUS; SOFT TISSUE PRN
Status: DISCONTINUED | OUTPATIENT
Start: 2020-11-25 | End: 2020-11-25 | Stop reason: SDUPTHER

## 2020-11-25 RX ORDER — MIDAZOLAM HYDROCHLORIDE 1 MG/ML
INJECTION INTRAMUSCULAR; INTRAVENOUS PRN
Status: DISCONTINUED | OUTPATIENT
Start: 2020-11-25 | End: 2020-11-25 | Stop reason: SDUPTHER

## 2020-11-25 RX ORDER — MIDAZOLAM HYDROCHLORIDE 1 MG/ML
INJECTION INTRAMUSCULAR; INTRAVENOUS
Status: DISCONTINUED
Start: 2020-11-25 | End: 2020-11-25 | Stop reason: HOSPADM

## 2020-11-25 RX ORDER — MORPHINE SULFATE 2 MG/ML
1 INJECTION, SOLUTION INTRAMUSCULAR; INTRAVENOUS EVERY 5 MIN PRN
Status: DISCONTINUED | OUTPATIENT
Start: 2020-11-25 | End: 2020-11-25 | Stop reason: HOSPADM

## 2020-11-25 RX ORDER — SODIUM CHLORIDE, SODIUM LACTATE, POTASSIUM CHLORIDE, CALCIUM CHLORIDE 600; 310; 30; 20 MG/100ML; MG/100ML; MG/100ML; MG/100ML
INJECTION, SOLUTION INTRAVENOUS CONTINUOUS
Status: DISCONTINUED | OUTPATIENT
Start: 2020-11-25 | End: 2020-11-25 | Stop reason: HOSPADM

## 2020-11-25 RX ORDER — NALOXONE HYDROCHLORIDE 4 MG/.1ML
1 SPRAY NASAL PRN
Qty: 1 EACH | Refills: 5 | Status: SHIPPED | OUTPATIENT
Start: 2020-11-25 | End: 2021-04-26

## 2020-11-25 RX ADMIN — LIDOCAINE HYDROCHLORIDE 50 MG: 10 INJECTION, SOLUTION INFILTRATION; PERINEURAL at 09:54

## 2020-11-25 RX ADMIN — HYDROMORPHONE HYDROCHLORIDE 0.25 MG: 1 INJECTION, SOLUTION INTRAMUSCULAR; INTRAVENOUS; SUBCUTANEOUS at 12:32

## 2020-11-25 RX ADMIN — Medication 3 G: at 10:02

## 2020-11-25 RX ADMIN — EPHEDRINE SULFATE 10 MG: 50 INJECTION INTRAVENOUS at 10:15

## 2020-11-25 RX ADMIN — ACETAMINOPHEN 1000 MG: 500 TABLET ORAL at 08:24

## 2020-11-25 RX ADMIN — EPHEDRINE SULFATE 10 MG: 50 INJECTION INTRAVENOUS at 10:26

## 2020-11-25 RX ADMIN — EPHEDRINE SULFATE 10 MG: 50 INJECTION INTRAVENOUS at 10:49

## 2020-11-25 RX ADMIN — ONDANSETRON 4 MG: 2 INJECTION INTRAMUSCULAR; INTRAVENOUS at 11:13

## 2020-11-25 RX ADMIN — EPHEDRINE SULFATE 10 MG: 50 INJECTION INTRAVENOUS at 10:20

## 2020-11-25 RX ADMIN — SODIUM CHLORIDE, SODIUM LACTATE, POTASSIUM CHLORIDE, AND CALCIUM CHLORIDE: .6; .31; .03; .02 INJECTION, SOLUTION INTRAVENOUS at 08:59

## 2020-11-25 RX ADMIN — BUPIVACAINE HYDROCHLORIDE 25 ML: 5 INJECTION, SOLUTION EPIDURAL; INTRACAUDAL; PERINEURAL at 08:57

## 2020-11-25 RX ADMIN — GLYCOPYRROLATE 0.2 MG: 0.2 INJECTION, SOLUTION INTRAMUSCULAR; INTRAVENOUS at 10:23

## 2020-11-25 RX ADMIN — EPHEDRINE SULFATE 10 MG: 50 INJECTION INTRAVENOUS at 10:11

## 2020-11-25 RX ADMIN — MIDAZOLAM HYDROCHLORIDE 2 MG: 2 INJECTION, SOLUTION INTRAMUSCULAR; INTRAVENOUS at 08:50

## 2020-11-25 RX ADMIN — CELECOXIB 400 MG: 400 CAPSULE ORAL at 08:24

## 2020-11-25 RX ADMIN — SUGAMMADEX 200 MG: 100 INJECTION, SOLUTION INTRAVENOUS at 11:29

## 2020-11-25 RX ADMIN — FAMOTIDINE 20 MG: 10 INJECTION INTRAVENOUS at 08:24

## 2020-11-25 RX ADMIN — Medication 100 MCG: at 10:26

## 2020-11-25 RX ADMIN — PROPOFOL 200 MG: 10 INJECTION, EMULSION INTRAVENOUS at 09:54

## 2020-11-25 RX ADMIN — DEXAMETHASONE SODIUM PHOSPHATE 5 MG: 4 INJECTION, SOLUTION INTRAMUSCULAR; INTRAVENOUS at 10:07

## 2020-11-25 RX ADMIN — EPHEDRINE SULFATE 10 MG: 50 INJECTION INTRAVENOUS at 10:57

## 2020-11-25 RX ADMIN — EPHEDRINE SULFATE 10 MG: 50 INJECTION INTRAVENOUS at 10:08

## 2020-11-25 RX ADMIN — FENTANYL CITRATE 50 MCG: 50 INJECTION INTRAMUSCULAR; INTRAVENOUS at 09:54

## 2020-11-25 RX ADMIN — ROCURONIUM BROMIDE 50 MG: 10 SOLUTION INTRAVENOUS at 09:54

## 2020-11-25 RX ADMIN — ROCURONIUM BROMIDE 10 MG: 10 SOLUTION INTRAVENOUS at 10:39

## 2020-11-25 RX ADMIN — EPHEDRINE SULFATE 10 MG: 50 INJECTION INTRAVENOUS at 10:22

## 2020-11-25 ASSESSMENT — PULMONARY FUNCTION TESTS
PIF_VALUE: 25
PIF_VALUE: 25
PIF_VALUE: 23
PIF_VALUE: 24
PIF_VALUE: 25
PIF_VALUE: 25
PIF_VALUE: 24
PIF_VALUE: 24
PIF_VALUE: 27
PIF_VALUE: 2
PIF_VALUE: 24
PIF_VALUE: 25
PIF_VALUE: 25
PIF_VALUE: 4
PIF_VALUE: 24
PIF_VALUE: 25
PIF_VALUE: 24
PIF_VALUE: 25
PIF_VALUE: 23
PIF_VALUE: 1
PIF_VALUE: 24
PIF_VALUE: 2
PIF_VALUE: 26
PIF_VALUE: 23
PIF_VALUE: 23
PIF_VALUE: 24
PIF_VALUE: 33
PIF_VALUE: 23
PIF_VALUE: 24
PIF_VALUE: 25
PIF_VALUE: 2
PIF_VALUE: 25
PIF_VALUE: 33
PIF_VALUE: 3
PIF_VALUE: 24
PIF_VALUE: 23
PIF_VALUE: 24
PIF_VALUE: 24
PIF_VALUE: 23
PIF_VALUE: 24
PIF_VALUE: 25
PIF_VALUE: 24
PIF_VALUE: 24
PIF_VALUE: 25
PIF_VALUE: 3
PIF_VALUE: 26
PIF_VALUE: 24
PIF_VALUE: 22
PIF_VALUE: 23
PIF_VALUE: 24
PIF_VALUE: 27
PIF_VALUE: 24
PIF_VALUE: 24
PIF_VALUE: 23
PIF_VALUE: 3
PIF_VALUE: 24
PIF_VALUE: 23
PIF_VALUE: 31
PIF_VALUE: 25
PIF_VALUE: 24
PIF_VALUE: 22
PIF_VALUE: 23
PIF_VALUE: 26
PIF_VALUE: 23
PIF_VALUE: 25
PIF_VALUE: 24
PIF_VALUE: 27
PIF_VALUE: 23
PIF_VALUE: 24
PIF_VALUE: 27
PIF_VALUE: 23
PIF_VALUE: 25
PIF_VALUE: 23
PIF_VALUE: 26
PIF_VALUE: 25
PIF_VALUE: 22
PIF_VALUE: 24
PIF_VALUE: 24
PIF_VALUE: 26
PIF_VALUE: 24
PIF_VALUE: 25
PIF_VALUE: 24
PIF_VALUE: 26
PIF_VALUE: 26
PIF_VALUE: 23
PIF_VALUE: 19
PIF_VALUE: 22
PIF_VALUE: 24
PIF_VALUE: 24
PIF_VALUE: 3
PIF_VALUE: 2
PIF_VALUE: 24
PIF_VALUE: 23
PIF_VALUE: 33
PIF_VALUE: 23
PIF_VALUE: 23
PIF_VALUE: 25
PIF_VALUE: 24

## 2020-11-25 ASSESSMENT — PAIN SCALES - GENERAL
PAINLEVEL_OUTOF10: 6
PAINLEVEL_OUTOF10: 4

## 2020-11-25 NOTE — ANESTHESIA PROCEDURE NOTES
Peripheral Block    Patient location during procedure: pre-op  Staffing  Anesthesiologist: Keyla Davies MD  Resident/CRNA: Cisco Jewell APRN - CRNA  Performed: anesthesiologist and resident/CRNA   Preanesthetic Checklist  Completed: patient identified, site marked, pre-op evaluation, timeout performed, IV checked, risks and benefits discussed, monitors and equipment checked, anesthesia consent given, oxygen available and patient being monitored  Peripheral Block  Patient position: sitting  Prep: ChloraPrep  Patient monitoring: continuous pulse ox, frequent blood pressure checks and IV access  Block type: Brachial plexus  Laterality: right  Injection technique: single-shot  Procedures: ultrasound guided  Local infiltration: lidocaine  Infiltration strength: 2 %  Dose: 2 mL  Interscalene  Provider prep: mask and sterile gloves  Local infiltration: lidocaine  Needle  Needle type: combined needle/nerve stimulator   Needle gauge: 21 G  Needle length: 10 cm  Needle localization: anatomical landmarks and ultrasound guidance  Assessment  Injection assessment: negative aspiration for heme, no paresthesia on injection and local visualized surrounding nerve on ultrasound  Slow fractionated injection: yes  Hemodynamics: stable  Additional Notes  CONSENT, TIME OUT, PREP, IV SED, U/S LOCALIZATION( DIFFICULT BODY HABITUS), LIDO SKIN/DEEP, NEEDLE, MULT NEG ASPS, GOOD LA SPREAD, PICS TAKEN, PT JUDY WELL!   Reason for block: post-op pain management and at surgeon's request

## 2020-11-25 NOTE — H&P
Department of Orthopedic Surgery  Attending   History and Physical        CHIEF COMPLAINT: Recurrent rotator cuff tear    Reason for Admission: As Above    History Obtained From:  patient    HISTORY OF PRESENT ILLNESS:      The patient is a 54 y.o. female  who presents with plans for elective revision repair. Traumatic retear. Past Medical History:        Diagnosis Date    Anxiety     Depression     GERD (gastroesophageal reflux disease)     Hypertension     Osteoarthritis     Restless leg syndrome      Past Surgical History:        Procedure Laterality Date    ANUS SURGERY  2018    fissure     SECTION      x3    FRACTURE SURGERY      right wrist    HERNIA REPAIR  2018    LAPAROSCOPIC PARAESOPHAGEAL HERNIA REPAIR WITH MESH    HIATAL HERNIA REPAIR  2018    KNEE ARTHROSCOPY Left 11/15/12    ARTHROSCOPY LEFT KNEE WITH SYOVECTOMY AND CHONDROPLASTY    OVARY REMOVAL Right     ROTATOR CUFF REPAIR Right 2020    EXAM UNDER ANESTHESIA VIDEO ARTHROSCOPY RIGHT SHOULDER, MINI- OPEN ROTATOR CUFF REPAIR, NEER ACROMIOPLASTY, LENO PROCEDURE WITH EXPAREL performed by Evan Vann MD at [address] [place] [address] ENDOSCOPY      UPPER GASTROINTESTINAL ENDOSCOPY      esophageasl stretch         Medications Prior to Admission:   Prior to Admission medications    Medication Sig Start Date End Date Taking? Authorizing Provider   Acetaminophen-Codeine (TYLENOL WITH CODEINE #3 PO) Take by mouth as needed   Yes Historical Provider, MD   lidocaine (XYLOCAINE) 5 % ointment APPLY TOPICALLY AS NEEDED TO AREA OF TENDERNESS UNDER ARM. 11/10/20   Unknown Stephany, DO   ALPRAZolam Aldon Nones) 1 MG tablet Take 1 tablet by mouth 3 times daily as needed for Anxiety for up to 60 days.  10/26/20 12/25/20  Unknown Stephany, DO   rOPINIRole (REQUIP) 2 MG tablet TAKE 1 TABLET BY MOUTH TWICE A DAY 10/13/20   Unknown Stephany, DO   DULoxetine (CYMBALTA) 60 MG extended release capsule Take 2 capsules by mouth daily 9/16/20   Nemo Prim, DO   atenolol (TENORMIN) 25 MG tablet TAKE 1 TABLET BY MOUTH EVERY DAY 9/15/20   Nemo Prim, DO   methocarbamol (ROBAXIN) 500 MG tablet TAKE 1 TABLET BY MOUTH 3 TIMES A DAY AS NEEDED FOR NECK PAIN 9/15/20   Nemo Prim, DO   ibuprofen (ADVIL;MOTRIN) 600 MG tablet TAKE 1 TABLET BY MOUTH EVERY 8 HOURS AS NEEDED FOR PAIN 9/1/20   Nemo Prim, DO   omeprazole (PRILOSEC) 40 MG delayed release capsule TAKE 1 CAPSULE BY MOUTH EVERY DAY 7/27/20   Nemo Prim, DO   lisinopril (PRINIVIL;ZESTRIL) 20 MG tablet TAKE 1 TABLET BY MOUTH EVERY DAY 7/6/20   Nemo Prim, DO   MELATONIN PO Take 20 mg by mouth nightly as needed     Historical Provider, MD       Allergies: Toradol [ketorolac tromethamine] and Haldol [haloperidol lactate]    Social History:    Tobacco:  reports that she has never smoked. She has never used smokeless tobacco.   Alcohol:  reports current alcohol use. Illicit Drug: No  Family History:       Problem Relation Age of Onset    Arthritis Mother     Obesity Mother    Eleazar Salm Anemia Mother     Other Mother         pulmonary embolism    Arthritis Father     Arrhythmia Father     Diabetes Other     Diabetes Paternal Grandfather     Cancer Neg Hx     Breast Cancer Neg Hx     Colon Cancer Neg Hx      REVIEW OF SYSTEMS:  CONSTITUTIONAL:  negative  MUSCULOSKELETAL:  positive for  pain    PHYSICAL EXAM:  Admission weight: 278 lb (126.1 kg)  5' 6\" (167.6 cm)  VITALS:  Ht 5' 6\" (1.676 m)   Wt 278 lb (126.1 kg)   LMP 09/20/2017   BMI 44.87 kg/m²     CONSTITUTIONAL:  awake, alert, cooperative, no apparent distress, and appears stated age  Cardiac; regular rate and rhythm  Pulmonary; clear to auscultation bilaterally  MUSCULOSKELETAL:  There is no redness, warmth, or swelling of the joints. Full range of motion noted. Motor strength is 5 out of 5 all extremities bilaterally.   Tone is normal.    DATA:  CBC:   Lab Results   Component Value Date    WBC 9.7 07/03/2020    RBC 4.22 07/03/2020    HGB 12.7 07/03/2020    HCT 38.4 07/03/2020    MCV 91.0 07/03/2020    MCH 30.1 07/03/2020    MCHC 33.0 07/03/2020    RDW 15.8 07/03/2020     07/03/2020    MPV 9.0 07/03/2020     BMP:    Lab Results   Component Value Date     11/23/2020    K 4.6 11/23/2020    K 4.3 04/21/2018     11/23/2020    CO2 25 11/23/2020    BUN 9 11/23/2020    LABALBU 4.1 11/23/2020    CREATININE 0.8 11/23/2020    CALCIUM 9.3 11/23/2020    GFRAA >60 11/23/2020    GFRAA >60 02/01/2012    LABGLOM >60 11/23/2020    GLUCOSE 131 11/23/2020     PT/INR:    Lab Results   Component Value Date    PROTIME 12.0 10/16/2013    INR 1.07 10/16/2013       Radiology:  No orders to display         ASSESSMENT: No contraindications.   Proceed    PLAN:  1) standard perioperative care  2) informed consent reviewed  3) anticipate discharge home      Jim Kaur

## 2020-11-25 NOTE — ANESTHESIA POSTPROCEDURE EVALUATION
Department of Anesthesiology  Postprocedure Note    Patient: Perri Lowe  MRN: [id number]  YOB: 1965  Date of evaluation: 11/25/2020  Time:  1:14 PM     Procedure Summary     Date:  11/25/20 Room / Location:  [address]    Anesthesia Start:  9498 Anesthesia Stop:  2395    Procedure:  VIDEO ARTHROSCOPY RIGHT SHOULDER, OPEN ROTATOR CUFF REPAIR, BICEPS TENOTOMY -BLOCK- (Right Shoulder) Diagnosis:       Traumatic tear of right rotator cuff, subsequent encounter      (Traumatic tear of right rotator cuff, subsequent encounter [S46.011D])    Surgeon:  Lauren Gonzalez MD Responsible Provider:  Cindy Valenzuela MD    Anesthesia Type:  general ASA Status:  3          Anesthesia Type: general    Jodi Phase I: Jodi Score: 9    Jodi Phase II: Jodi Score: 9    Last vitals: Reviewed and per EMR flowsheets.      Vitals:    11/25/20 1221 11/25/20 1232 11/25/20 1243 11/25/20 1253   BP: 107/63 109/60 101/63 114/69   Pulse: 64 63 64 (!) 4   Resp: 14 18 18 18   Temp:       SpO2: 93% 92% 92% 93%   Weight:       Height:         Anesthesia Post Evaluation    Patient location during evaluation: bedside  Patient participation: complete - patient participated  Level of consciousness: awake and alert  Airway patency: patent  Nausea & Vomiting: no nausea  Complications: no  Cardiovascular status: hemodynamically stable  Respiratory status: acceptable  Hydration status: euvolemic

## 2020-11-25 NOTE — OP NOTE
Operative Note      Patient: Kim Jewell  YOB: 1965  MRN: [id number]    Date of Procedure: 11/25/2020    Pre-Op Diagnosis: Traumatic tear of right rotator cuff, subsequent encounter [S46.011D]    Post-Op Diagnosis: 3 cm retracted slightly delaminated traumatic rotator cuff tear with a reverse L and disruption of the long head biceps pulley with outlet tearing       Procedure(s):  VIDEO ARTHROSCOPY RIGHT SHOULDER, OPEN ROTATOR CUFF REPAIR, BICEPS TENOTOMY -BLOCK-    #1. Right shoulder arthroscopy with limited debridement of the superior and posterior labrum    #2. Right shoulder arthroscopic and open combined double row rotator cuff repair with regenerative tendon bio inductive patch augmentation    #3. Right shoulder open biceps tenodesis          Surgeon(s):  Vivi Oreilly MD    Assistant: Please note that the assistance of the physician assistant was medically necessary for this complex procedure. This included manipulation of the arm, holding the arthroscopic camera and instruments, retraction during the open portion of the procedure including suture passage and appropriate tensioning. Presence medically necessary  Surgical Assistant: Patience Alert  Physician Assistant: Lauren Banks PA-C    Anesthesia: General    Estimated Blood Loss (mL): 005     Complications: None    Specimens:   * No specimens in log *    Implants:  Implant Name Type Inv.  Item Serial No.  Lot No. LRB No. Used Action   SYSTEM IMPL INCL 2 4.75MM BIOCOMPOSITE SWIVELOCK C ANCHR  SYSTEM IMPL INCL 2 4.75MM BIOCOMPOSITE Rocio Manus  [employer]- [id number] Right 1 Implanted   IMPLANT BIOINDUCTIVE L BOV ACHILLES TEND W/ ARTHSCP DEL SYS  IMPLANT BIOINDUCTIVE L BOV ACHILLES TEND W/ ARTHSCP DEL SYS  [employer]-  Right 1 Implanted   ANCHOR BONE 3 W/DEL SYS Fastener ANCHOR BONE 3 W/DEL SYS  [employer]-[facility] [id number] Right 1 Implanted   ANCHOR TEND 8 FOR REGENETEN BIOINDUCTIVE IMPL synovitis in the interval.  There is a visible and torn suture material in a large full-thickness retracted tear of the supraspinatus. The majority of the infraspinatus remained intact on the upper fibers were torn. Subscapularis was in good shape. Axillary recess was synovitic but intact. Moved to the subacromial space. Arthroscopically we began the process of removing suture material and foreign bodies. We did this with arthroscopic scissors and graspers. We performed releases along the articular and bursal surfaces to maximize the mobility of the tendon. We appreciated a delamination between the articular and bursal surfaces. There was also a reverse L. We placed traction sutures with an antegrade suture passer. However, our visualization was poor with recurrent intra-articular hemorrhaging bleeding and limited visualization. As such in the interest of maximizing the biology and integrity of the repair process we switched the mini open approach. We utilized her previous mini open incision. The deltoid was split in line with its fibers and the deep Kobal retractors were placed. Some bursal tissue was removed for visualization but the rotation was excellent. We aggressively decorticated the tuberosity with a rongeur and bone cutter. We placed 2 Medial Row anchors. We passed all 4 fiber tapes independently. Sutures were placed appropriately to address the delamination. Free FiberWire was utilized to close the reverse L side to side. The sutures were then pulled laterally into a crossing suture bridge configuration without good repair. Cover the nearly complete tuberosity. Based upon the revision setting bio inductive augmentation with regenerative and patch was employed. The large patch was placed directly over the musculotendinous junction and extended laterally. Soft tissue staples medially bone staples laterally. Excellent secure and flush repair.   The free FiberWire's from the most anterior and lateral anchor were utilized with a locking loop stitch around the long head biceps for tenodesis at the apex of the groove. In this manner we felt we addressed the intra-articular pathology. Irrigation was completed. The joint was evacuated. The wounds were closed in layers. Soft dressing was applied. Anesthesia reversed and stable to recovery room. Patient will be placed into the massive and delayed rehabilitation protocol.     Electronically signed by Dorothy Cabrera MD on 11/25/2020 at 3:28 PM

## 2020-11-25 NOTE — BRIEF OP NOTE
Brief Postoperative Note      Patient: María Rand  YOB: 1965  MRN: [id number]    Date of Procedure: 11/25/2020    Pre-Op Diagnosis: Traumatic tear of right rotator cuff, subsequent encounter [S46.011D]    Post-Op Diagnosis: Large retracted and delaminated recurrent supraspinatus tear       Procedure(s):  VIDEO ARTHROSCOPY RIGHT SHOULDER, OPEN ROTATOR CUFF REPAIR, BICEPS TENOTOMY -BLOCK-    Surgeon(s):  Irene Hardy MD    Assistant:  Surgical Assistant: Maco Ray  Physician Assistant: Shelli Nichole PA-C    Anesthesia: General    Estimated Blood Loss (mL): 343     Complications: None    Specimens:   * No specimens in log *    Implants:  Implant Name Type Inv.  Item Serial No.  Lot No. LRB No. Used Action   SYSTEM IMPL INCL 2 4.75MM BIOCOMPOSITE SWIVELOCK C ANCHR  SYSTEM IMPL INCL 2 4.75MM BIOCOMPOSITE Lilibeth Claribel  [employer]- [id number] Right 1 Implanted   IMPLANT BIOINDUCTIVE L BOV ACHILLES TEND W/ ARTHSCP DEL SYS  IMPLANT BIOINDUCTIVE L BOV ACHILLES TEND W/ ARTHSCP DEL SYS  [employer]-  Right 1 Implanted   ANCHOR BONE 3 W/DEL SYS Fastener ANCHOR BONE 3 W/DEL SYS  [employer] [id number] Right 1 Implanted   ANCHOR TEND 8 FOR REGENETEN BIOINDUCTIVE IMPL SYS  ANCHOR TEND 8 FOR REGENETEN BIOINDUCTIVE IMPL SYS  [facility] [employer]- [id number] Right 1 Implanted         Drains: * No LDAs found *    Findings: Please see operative note    Electronically signed by Irene Hardy MD on 11/25/2020 at 3:27 PM

## 2020-11-30 NOTE — PROGRESS NOTES
had a very nice lengthy visit today. We reviewed the anatomy of the shoulder and specifically the rotator cuff. We discussed the biological principles about the repair of rotator cuff and healing. We noted how trauma can tear this immature biology loose. Given her age, dominant hand and the MRI findings I think this is worth an attempt at repair. We did note that her revisions have lower success rates. Slower healing. She will need a special considerations for a slow physical therapy program.  I am recommending that we utilize a biological inductive patch as an augment. In the end, she like to proceed if she is incredibly debilitated at this time. She understands the risks benefits and alternatives. Informed consent was discussed with the patient. This included a detailed description of the procedure. Risks, benefits and alternatives specific to this diagnosis and procedure were outlined. Standard surgical risks of anesthesia, bleeding, nerve damage, infection, need for further surgeries, disability and death also outlined. Verbal confirmation of informed consent was obtained. Signature obtained by the staff. We appreciate the opportunity to care for this patient. The trust that is implicit in this referral does not go unnoticed. We will do our very best to provide high-quality care that goes above and beyond standards. Please feel free contact us with any questions or concerns about this patient. [address] [facility] Partner of [facility] and Sports Medicine Surgery     This dictation was performed with a verbal recognition program (DRAGON) and it was checked for errors. It is possible that there are still dictated errors within this office note. If so, please bring any errors to my attention for an addendum. All efforts were made to ensure that this office note is accurate.

## 2020-12-01 ENCOUNTER — TELEPHONE (OUTPATIENT)
Dept: ORTHOPEDIC SURGERY | Age: 55
End: 2020-12-01

## 2020-12-02 RX ORDER — OXYCODONE HYDROCHLORIDE AND ACETAMINOPHEN 5; 325 MG/1; MG/1
1 TABLET ORAL EVERY 6 HOURS PRN
Qty: 18 TABLET | Refills: 0 | Status: SHIPPED | OUTPATIENT
Start: 2020-12-02 | End: 2020-12-08 | Stop reason: SDUPTHER

## 2020-12-02 NOTE — TELEPHONE ENCOUNTER
PATIENT IS REQUESTING REFILL ON PAIN MEDICATION    DOS: 11/25/2020    LAST FILL: 11/25/2020    Requested Prescriptions     Pending Prescriptions Disp Refills    oxyCODONE-acetaminophen (PERCOCET) 5-325 MG per tablet       Sig: Take 1 tablet by mouth every 6 hours as needed for Pain for up to 7 days.

## 2020-12-03 RX ORDER — IBUPROFEN 600 MG/1
TABLET ORAL
Qty: 40 TABLET | Refills: 1 | Status: SHIPPED | OUTPATIENT
Start: 2020-12-03 | End: 2021-01-28

## 2020-12-03 NOTE — TELEPHONE ENCOUNTER
Future Appointments   Date Time Provider Diann Sotelo   12/8/2020 11:15 AM Rosie Funk MD AND [facility]THO [facility]   1/5/2021 11:20 AM Yuval Gupta MD AND INFCT [facility]     11/23/2020 LOV

## 2020-12-08 ENCOUNTER — OFFICE VISIT (OUTPATIENT)
Dept: ORTHOPEDIC SURGERY | Age: 55
End: 2020-12-08

## 2020-12-08 VITALS — BODY MASS INDEX: 43.39 KG/M2 | WEIGHT: 270 LBS | HEIGHT: 66 IN

## 2020-12-08 PROCEDURE — 99024 POSTOP FOLLOW-UP VISIT: CPT | Performed by: PHYSICIAN ASSISTANT

## 2020-12-08 RX ORDER — OXYCODONE HYDROCHLORIDE AND ACETAMINOPHEN 5; 325 MG/1; MG/1
1 TABLET ORAL EVERY 8 HOURS PRN
Qty: 18 TABLET | Refills: 0 | Status: SHIPPED | OUTPATIENT
Start: 2020-12-09 | End: 2020-12-15 | Stop reason: SDUPTHER

## 2020-12-08 NOTE — PROGRESS NOTES
MD Teetee Sloan PA-C      Surgery: Right shoulder rotator cuff repair done partially arthroscopic and partially open with patch augmentation and open biceps tenodesis. Post-Op Week:  2    Chief Complaint:  Post-Op Check (Right shoulder)      History of Present of Illness: Patient comes to the office today in good spirits. She reports she is having continued pain but overall is doing better. She comes to the office today wearing her sling as directed. She reports she has been needing to take her pain medication, Percocet 5/325mg several times a day. She continues to use ice to help with inflammation and swelling as well. Review of Systems  Pertinent items are noted in HPI  Denies fever, chills, confusion, bowel/bladder active change. Review of systems reviewed from Patient History Form dated on 05/26/2020 and available in the patient's chart under the Media tab. Examination:  No gross deformities noted. Incision sites are well healing. No drainage. Range of motion of the hand wrist and elbow are within normal limits. General passive range of motion of the shoulder was not tested today. Some ecchymosis still noted around incision sites. Radiology:     No new x-rays taken today    No orders of the defined types were placed in this encounter. Impression:  Status post right shoulder rotator cuff repair performed partially arthroscopic and open with patch augmentation and open biceps tenodesis. Treatment Plan: At this point the patient appears to be doing well overall. She continues to have some discomfort and her Percocet was refilled. Would like her to hold off on any formal physical therapy as she will eventually follow the massive and delayed rehab protocol. Today her stitches were removed and Steri-Strips applied.   She will follow-up with us in approximately 4 weeks          [facility] of [facility]      This dictation was performed with a verbal recognition program [facility]) and it was checked for errors. It is possible that there are still dictated errors within this office note. If so, please bring any errors to my attention for an addendum. All efforts were made to ensure that this office note is accurate.

## 2020-12-17 ENCOUNTER — TELEPHONE (OUTPATIENT)
Dept: FAMILY MEDICINE CLINIC | Age: 55
End: 2020-12-17

## 2020-12-17 RX ORDER — ALPRAZOLAM 1 MG/1
1 TABLET ORAL 3 TIMES DAILY PRN
Qty: 90 TABLET | Refills: 1 | Status: CANCELLED | OUTPATIENT
Start: 2020-12-17 | End: 2021-02-15

## 2020-12-17 NOTE — TELEPHONE ENCOUNTER
11/23/2020        Future Appointments   Date Time Provider Diann Domínguezi   12/29/2020 10:45 AM Irene Hardy MD AND [facility]   1/5/2021 11:20 AM Ree Castaneda MD AND INFCT DI [facility]

## 2020-12-17 NOTE — TELEPHONE ENCOUNTER
Patient needs a refill on ALPRAZolam (XANAX) 1 MG tablet     . They need a 30 day supply.      Mail order or local pharmacy: local     Pharmacy:[facility]     Patient  or mail to patient(If mail order):

## 2020-12-17 NOTE — TELEPHONE ENCOUNTER
Please call the patient tell her that technically her prescription is not refillable until Monday the 21st.  I will okay it for this Saturday.

## 2020-12-21 ENCOUNTER — TELEPHONE (OUTPATIENT)
Dept: FAMILY MEDICINE CLINIC | Age: 55
End: 2020-12-21

## 2020-12-21 NOTE — TELEPHONE ENCOUNTER
Please check with the pharmacy about this PA, she has been on this medicine for a long time. Did we accidentally prescribe it is d.a.w. or something like that that it is not going through. If there is no technical issue then please begin a PA process.

## 2020-12-23 ENCOUNTER — NURSE ONLY (OUTPATIENT)
Dept: ORTHOPEDIC SURGERY | Age: 55
End: 2020-12-23
Payer: COMMERCIAL

## 2020-12-23 RX ORDER — BUPIVACAINE HYDROCHLORIDE 2.5 MG/ML
30 INJECTION, SOLUTION INFILTRATION; PERINEURAL ONCE
Status: COMPLETED | OUTPATIENT
Start: 2020-12-23 | End: 2020-12-23

## 2020-12-23 RX ORDER — LIDOCAINE HYDROCHLORIDE 10 MG/ML
20 INJECTION, SOLUTION INFILTRATION; PERINEURAL ONCE
Status: COMPLETED | OUTPATIENT
Start: 2020-12-23 | End: 2020-12-23

## 2020-12-23 RX ORDER — METHYLPREDNISOLONE ACETATE 40 MG/ML
80 INJECTION, SUSPENSION INTRA-ARTICULAR; INTRALESIONAL; INTRAMUSCULAR; SOFT TISSUE ONCE
Status: COMPLETED | OUTPATIENT
Start: 2020-12-23 | End: 2020-12-23

## 2020-12-23 RX ADMIN — METHYLPREDNISOLONE ACETATE 80 MG: 40 INJECTION, SUSPENSION INTRA-ARTICULAR; INTRALESIONAL; INTRAMUSCULAR; SOFT TISSUE at 15:02

## 2020-12-23 RX ADMIN — BUPIVACAINE HYDROCHLORIDE 75 MG: 2.5 INJECTION, SOLUTION INFILTRATION; PERINEURAL at 15:02

## 2020-12-23 RX ADMIN — LIDOCAINE HYDROCHLORIDE 20 ML: 10 INJECTION, SOLUTION INFILTRATION; PERINEURAL at 15:02

## 2020-12-23 NOTE — PROGRESS NOTES
Chief Complaint   Patient presents with    Injections     LEFT KNEE CORTISONE INJ     Diagnosis: Left knee osteoarthritis    She returns to clinic today interested in a repeat left knee cortisone injection. She attempted viscosupplementation injections several months ago however, her pain has recently returned. She is had good results from cortisone in the past.  Denies any new injury. Orders Placed This Encounter   Procedures    US ARTHR/ASP/INJ MAJOR JNT/BURSA LEFT     Standing Status:   Future     Number of Occurrences:   1     Standing Expiration Date:   12/23/2021     Order Specific Question:   Reason for exam:     Answer:   PAIN     I discussed in detail the risks, benefits and complications of an injection which included but are not limited to infection, skin reactions, hot swollen joint, and anaphylaxis with the patient. The patient verbalized understanding and gave informed consent for the injection. The patient's knee was flexed to 90° and the skin prepped using sterile alcohol solution. A sterile 22-gauge needle was inserted into the knee and the mixture of 4 mL of 2% Carbocaine, 4 mL of 0.25% Marcaine, and 80 mg of Depo-Medrol was injected under sterile technique. The needle was withdrawn and the puncture site sealed with a Band-Aid. Technique: Under sterile conditions a SonFormatta ultrasound unit with a variable frequency (6.0-15.0 MHz) linear transducer was used to localize the placement of a 22-gauge needle into the left knee joint. Findings: Successful needle placement for knee injection. Final images were taken and saved for permanent record. The patient tolerated the injection well. The patient was instructed to call the office immediately if there is any pain, redness, warmth, fever, or chills. Rena Ulrich, [facility]    This dictation was performed with a verbal recognition program [facility]) and it was checked for errors.  It is possible that there are still dictated errors within this office note. If so, please bring any errors to my attention for an addendum. All efforts were made to ensure that this office note is accurate.

## 2020-12-29 ENCOUNTER — OFFICE VISIT (OUTPATIENT)
Dept: ORTHOPEDIC SURGERY | Age: 55
End: 2020-12-29

## 2020-12-29 VITALS — BODY MASS INDEX: 43.39 KG/M2 | WEIGHT: 270 LBS | HEIGHT: 66 IN

## 2020-12-29 PROCEDURE — 99024 POSTOP FOLLOW-UP VISIT: CPT | Performed by: ORTHOPAEDIC SURGERY

## 2020-12-29 NOTE — PROGRESS NOTES
Surgery: Right shoulder rotator cuff repair with a combined arthroscopic and open approach with a patch augmentation and tenodesis    Post-Op Week: 5    Chief Complaint:  Follow-up (RIGHT SHOULDER)      History of Present of Illness: Heber Carrel is been doing really quite well. Minimal to no pain. She is comfortable at rest.  She is that her very best to be cautious. Review of Systems  Pertinent items are noted in HPI  Denies fever, chills, confusion, bowel/bladder active change. Review of systems reviewed from Patient History Form dated on December 29 and available in the patient's chart under the Media tab. Examination:  On exam today her portals are well-healed this is her incision. Normal sensation axillary nerve. Good movement of the elbow wrist hand. Tolerates pendulums. Radiology: We reviewed her imaging    Orders Placed This Encounter   Procedures    Amb External Referral To Physical Therapy     Referral Priority:   Routine     Referral Reason:   Patient Preference     Requested Specialty:   Physical Therapy     Number of Visits Requested:   1       Impression:  Status post large rotator cuff repair with patch augmentation      Treatment Plan: We had a good discussion with Leslie Black about the healing process and the biology. She understands that we are moving slowly and prioritize the tendon bone repair. She will be initiating the large rotator cuff rehabilitation protocol next week at the [facility] facility in [place]. We discussed appropriate precautions in her day-to-day life. Answered her questions we will see her back in a month          [facility] and Sports Medicine Surgery     This dictation was performed with a verbal recognition program (DRAGON) and it was checked for errors. It is possible that there are still dictated errors within this office note. If so, please bring any errors to my attention for an addendum.  All efforts were made to ensure that this office note is accurate.

## 2020-12-30 ENCOUNTER — PATIENT MESSAGE (OUTPATIENT)
Dept: ORTHOPEDIC SURGERY | Age: 55
End: 2020-12-30

## 2020-12-31 ENCOUNTER — PATIENT MESSAGE (OUTPATIENT)
Dept: FAMILY MEDICINE CLINIC | Age: 55
End: 2020-12-31

## 2020-12-31 DIAGNOSIS — Z98.890 S/P ROTATOR CUFF REPAIR: ICD-10-CM

## 2020-12-31 RX ORDER — OXYCODONE HYDROCHLORIDE AND ACETAMINOPHEN 5; 325 MG/1; MG/1
1 TABLET ORAL EVERY 8 HOURS PRN
Qty: 18 TABLET | Refills: 0 | Status: CANCELLED | OUTPATIENT
Start: 2020-12-31 | End: 2021-01-07

## 2020-12-31 RX ORDER — OXYCODONE HYDROCHLORIDE AND ACETAMINOPHEN 5; 325 MG/1; MG/1
1 TABLET ORAL EVERY 8 HOURS PRN
Qty: 18 TABLET | Refills: 0 | Status: SHIPPED | OUTPATIENT
Start: 2020-12-31 | End: 2021-01-06

## 2020-12-31 NOTE — TELEPHONE ENCOUNTER
From: Charisma Aguilera  To: Jaswinder Adkins MD  Sent: 12/30/2020 10:00 PM EST  Subject: Visit Follow-Up Question    Am I allowed to drive yet? Also I will need a refill on my pain medicine. Just don't want to run out over the weekend.  Thank you Chasity Montes

## 2020-12-31 NOTE — TELEPHONE ENCOUNTER
Last office visit 12/29/2020     Last written 12/22/20     Surgery 11/25/20 (5 weeks 1 day)    Next office visit scheduled 1/26/2021    Requested Prescriptions     Pending Prescriptions Disp Refills    oxyCODONE-acetaminophen (PERCOCET) 5-325 MG per tablet 18 tablet 0     Sig: Take 1 tablet by mouth every 8 hours as needed for Pain for up to 7 days.

## 2020-12-31 NOTE — TELEPHONE ENCOUNTER
From: Kim Jewell  To: Bessie Mae DO  Sent: 12/31/2020 11:14 AM EST  Subject: Prescription Question    I am taking pain medicine for my shoulder surgery that I had the day before Thanksgiving. Dr. Vicenta Travis is out of town and can't send a refill in for me and I won't be able to talk with him until Monday. I was just hoping that you could help.  Thank you Baljeet Garcia

## 2021-01-04 RX ORDER — METHOCARBAMOL 500 MG/1
TABLET, FILM COATED ORAL
Qty: 90 TABLET | Refills: 3 | Status: SHIPPED | OUTPATIENT
Start: 2021-01-04 | End: 2021-04-05

## 2021-01-06 ENCOUNTER — OFFICE VISIT (OUTPATIENT)
Dept: PRIMARY CARE CLINIC | Age: 56
End: 2021-01-06
Payer: COMMERCIAL

## 2021-01-06 DIAGNOSIS — Z11.59 SCREENING FOR VIRAL DISEASE: Primary | ICD-10-CM

## 2021-01-06 PROCEDURE — 99211 OFF/OP EST MAY X REQ PHY/QHP: CPT | Performed by: NURSE PRACTITIONER

## 2021-01-06 PROCEDURE — G8428 CUR MEDS NOT DOCUMENT: HCPCS | Performed by: NURSE PRACTITIONER

## 2021-01-06 PROCEDURE — G8417 CALC BMI ABV UP PARAM F/U: HCPCS | Performed by: NURSE PRACTITIONER

## 2021-01-06 NOTE — PATIENT INSTRUCTIONS

## 2021-01-07 LAB — SARS-COV-2, NAA: NOT DETECTED

## 2021-01-11 RX ORDER — LIDOCAINE 50 MG/G
OINTMENT TOPICAL
Qty: 70.88 G | Refills: 1 | Status: SHIPPED | OUTPATIENT
Start: 2021-01-11 | End: 2021-03-15

## 2021-01-11 NOTE — TELEPHONE ENCOUNTER
Future Appointments   Date Time Provider Diann Sotelo   1/13/2021  2:15 PM Brady Banks MD AND [facility]   1/26/2021 11:30 AM Emmie Alonzo MD AND [facility]     LOV 11/23/2020

## 2021-01-13 ENCOUNTER — OFFICE VISIT (OUTPATIENT)
Dept: ORTHOPEDIC SURGERY | Age: 56
End: 2021-01-13
Payer: COMMERCIAL

## 2021-01-13 VITALS — HEIGHT: 66 IN | WEIGHT: 270 LBS | BODY MASS INDEX: 43.39 KG/M2

## 2021-01-13 DIAGNOSIS — M17.12 PRIMARY OSTEOARTHRITIS OF LEFT KNEE: Primary | ICD-10-CM

## 2021-01-13 PROCEDURE — 1036F TOBACCO NON-USER: CPT | Performed by: ORTHOPAEDIC SURGERY

## 2021-01-13 PROCEDURE — 3017F COLORECTAL CA SCREEN DOC REV: CPT | Performed by: ORTHOPAEDIC SURGERY

## 2021-01-13 PROCEDURE — G8482 FLU IMMUNIZE ORDER/ADMIN: HCPCS | Performed by: ORTHOPAEDIC SURGERY

## 2021-01-13 PROCEDURE — G8427 DOCREV CUR MEDS BY ELIG CLIN: HCPCS | Performed by: ORTHOPAEDIC SURGERY

## 2021-01-13 PROCEDURE — G8417 CALC BMI ABV UP PARAM F/U: HCPCS | Performed by: ORTHOPAEDIC SURGERY

## 2021-01-13 PROCEDURE — 99214 OFFICE O/P EST MOD 30 MIN: CPT | Performed by: ORTHOPAEDIC SURGERY

## 2021-01-13 RX ORDER — LIDOCAINE 4 G/G
1 PATCH TOPICAL DAILY
Qty: 30 PATCH | Refills: 0 | Status: SHIPPED | OUTPATIENT
Start: 2021-01-13 | End: 2021-02-12

## 2021-01-13 NOTE — PROGRESS NOTES
CHIEF COMPLAINT:    Chief Complaint   Patient presents with    Knee Pain     CK LEFT KNEE, CORTISONE INJ ON 20- ONLY HELPED A LITTLE. HISTORY OF PRESENT ILLNESS:    She presents in follow-up for LEFT knee. She was recently had a corticosteroid injection to the [place] on 2020. She is also viscosupplementation in the past.  Previous arthroscopy dating back to . Knows that she has a weight issue and she is working on trying to lose weight. All of her pain is on the medial joint space. Also had recent right shoulder surgery so she is having a hard time utilizing a walker or cane in the right hand to protect her Lefton knee because of this. The patient is a 54 y.o. female   Past Medical History:   Diagnosis Date    Anxiety     Depression     GERD (gastroesophageal reflux disease)     Hypertension     Osteoarthritis     Restless leg syndrome         Work Status:      The pain assessment was noted & is as follows:  Pain Assessment  Location of Pain: Knee  Location Modifiers: Left  Severity of Pain: 5  Quality of Pain: Aching  Duration of Pain: Persistent  Frequency of Pain: Constant]      Work Status/Functionality:     Past Medical History: Medical history form was reviewed today & can be found in the media tab  Past Medical History:   Diagnosis Date    Anxiety     Depression     GERD (gastroesophageal reflux disease)     Hypertension     Osteoarthritis     Restless leg syndrome       Past Surgical History:     Past Surgical History:   Procedure Laterality Date    ANUS SURGERY  2018    fissure     SECTION      x3    FRACTURE SURGERY      right wrist    HERNIA REPAIR  2018    LAPAROSCOPIC PARAESOPHAGEAL HERNIA REPAIR WITH MESH    HIATAL HERNIA REPAIR  2018    KNEE ARTHROSCOPY Left 11/15/12    ARTHROSCOPY LEFT KNEE WITH SYOVECTOMY AND CHONDROPLASTY    OVARY REMOVAL Right     ROTATOR CUFF REPAIR Right 2020 EXAM UNDER ANESTHESIA VIDEO ARTHROSCOPY RIGHT SHOULDER, MINI- OPEN ROTATOR CUFF REPAIR, NEER ACROMIOPLASTY, Alexander PROCEDURE WITH EXPAREL performed by Shantelle Singh MD at [place] [address] ARTHROSCOPY Right 11/25/2020    VIDEO ARTHROSCOPY RIGHT SHOULDER, OPEN ROTATOR CUFF REPAIR, BICEPS TENOTOMY -BLOCK- performed by Yocasta Worthy MD at [address] [place] [address]  2011    UPPER GASTROINTESTINAL ENDOSCOPY  2015    esophageasl stretch     Current Medications:     Current Outpatient Medications:     lidocaine (XYLOCAINE) 5 % ointment, APPLY TOPICALLY AS NEEDED TO AREA OF TENDERNESS UNDER ARM. , Disp: 70.88 g, Rfl: 1    methocarbamol (ROBAXIN) 500 MG tablet, TAKE 1 TABLET BY MOUTH 3 TIMES A DAY AS NEEDED FOR NECK PAIN, Disp: 90 tablet, Rfl: 3    ALPRAZolam (XANAX) 1 MG tablet, TAKE 1 TABLET BY MOUTH 3 TIMES DAILY AS NEEDED FOR ANXIETY FOR UP TO 60 DAYS., Disp: 90 tablet, Rfl: 1    ibuprofen (ADVIL;MOTRIN) 600 MG tablet, TAKE 1 TABLET BY MOUTH EVERY 8 HOURS AS NEEDED FOR PAIN, Disp: 40 tablet, Rfl: 1    naloxone 4 MG/0.1ML LIQD nasal spray, 1 spray by Nasal route as needed for Opioid Reversal, Disp: 1 each, Rfl: 5    Acetaminophen-Codeine (TYLENOL WITH CODEINE #3 PO), Take by mouth as needed, Disp: , Rfl:     rOPINIRole (REQUIP) 2 MG tablet, TAKE 1 TABLET BY MOUTH TWICE A DAY, Disp: 60 tablet, Rfl: 5    DULoxetine (CYMBALTA) 60 MG extended release capsule, Take 2 capsules by mouth daily, Disp: 60 capsule, Rfl: 5    atenolol (TENORMIN) 25 MG tablet, TAKE 1 TABLET BY MOUTH EVERY DAY, Disp: 30 tablet, Rfl: 5    omeprazole (PRILOSEC) 40 MG delayed release capsule, TAKE 1 CAPSULE BY MOUTH EVERY DAY, Disp: 30 capsule, Rfl: 6    lisinopril (PRINIVIL;ZESTRIL) 20 MG tablet, TAKE 1 TABLET BY MOUTH EVERY DAY, Disp: 30 tablet, Rfl: 6    MELATONIN PO, Take 20 mg by mouth nightly as needed , Disp: , Rfl: Allergies: Toradol [ketorolac tromethamine] and Haldol [haloperidol lactate]  Social History:    reports that she has never smoked. She has never used smokeless tobacco. She reports current alcohol use. She reports previous drug use. Drug: Marijuana. Family History:   Family History   Problem Relation Age of Onset    Arthritis Mother     Obesity Mother     Anemia Mother     Other Mother         pulmonary embolism    Arthritis Father     Arrhythmia Father     Diabetes Other     Diabetes Paternal Grandfather     Cancer Neg Hx     Breast Cancer Neg Hx     Colon Cancer Neg Hx        REVIEW OF SYSTEMS:   For new problems, a full review of systems will be found scanned in the patient's chart. CONSTITUTIONAL: Denies unexplained weight loss, fevers, chills   NEUROLOGICAL: Denies unsteady gait or progressive weakness  SKIN: Denies skin changes, delayed healing, rash, itching       PHYSICAL EXAM:    Vitals: Height 5' 6\" (1.676 m), weight 270 lb (122.5 kg), last menstrual period 09/20/2017, not currently breastfeeding. GENERAL EXAM:  · General Apparence: Patient is adequately groomed with no evidence of malnutrition. · Orientation: The patient is oriented to time, place and person. · Mood & Affect:The patient's mood and affect are appropriate       Lefton knee PHYSICAL EXAMINATION:  · Inspection: 2+ effusion. Moderate varus alignment. No obvious rotational or angular deformity. Well the arthroscopy portals. · Palpation: Tender particular in the medial joint space and posterior medially. · Range of Motion: 21 and 30 degrees    · Strength: 5/5    · Special Tests: No gross ligamentous instability. Negative straight leg raise examination. Negative logroll examination. · Skin:  There are no rashes, ulcerations or lesions.   · There are no distal dysvascular changes     Gait & station: No ambulatory aid      Additional Examinations: Straight leg raise examination is negative. Logroll examination is negative. Good hip range of motion. Diagnostic Testing: The following x rays were read and interpreted by myself      I reviewed previous x-rays of the Lefton right knee. She is absolutely end-stage bone-on-bone medial compartment osteoarthritis of the Lefton knee and fairly severe patellofemoral arthritis as well. Orders   No orders of the defined types were placed in this encounter. Assessment / Treatment Plan:     1. Known end-stage bone-on-bone Lefton knee arthritis. She knows she is on the knee replacement. Lizette Tolliver try some light of cane patches for her and continuation of Voltaren gel over-the-counter. She will continue to work on losing weight and return to see us back again in 2 months. 2. I have personally performed and/or participated in the history, exam and medical decision making and agree with all pertinent clinical information. I have also reviewed and agree with the past medical, family and social history unless otherwise noted. This dictation was performed with a verbal recognition program (DRAGON) and it was checked for errors. It is possible that there are still dictated errors within this office note. If so, please bring any errors to my attention for an addendum. All efforts were made to ensure that this office note is accurate.           Rossi Amador MD

## 2021-01-27 DIAGNOSIS — M17.12 PRIMARY OSTEOARTHRITIS OF LEFT KNEE: ICD-10-CM

## 2021-01-28 RX ORDER — ACETAMINOPHEN AND CODEINE PHOSPHATE 300; 30 MG/1; MG/1
1 TABLET ORAL EVERY 12 HOURS PRN
Qty: 14 TABLET | Refills: 0 | Status: SHIPPED | OUTPATIENT
Start: 2021-01-28 | End: 2021-02-04

## 2021-01-28 RX ORDER — IBUPROFEN 600 MG/1
TABLET ORAL
Qty: 40 TABLET | Refills: 1 | Status: SHIPPED | OUTPATIENT
Start: 2021-01-28 | End: 2021-03-23

## 2021-01-28 NOTE — TELEPHONE ENCOUNTER
CLM with patient- will not refill this medication again.   SHe needs to speak with Dr Mee Holm if her shoulder pain persists and may be a candidate for pain management

## 2021-02-01 DIAGNOSIS — M17.12 PRIMARY OSTEOARTHRITIS OF LEFT KNEE: Primary | ICD-10-CM

## 2021-02-07 DIAGNOSIS — R03.0 ELEVATED BLOOD PRESSURE READING: ICD-10-CM

## 2021-02-08 RX ORDER — OMEPRAZOLE 40 MG/1
CAPSULE, DELAYED RELEASE ORAL
Qty: 30 CAPSULE | Refills: 6 | Status: SHIPPED | OUTPATIENT
Start: 2021-02-08 | End: 2021-08-30

## 2021-02-08 RX ORDER — LISINOPRIL 20 MG/1
TABLET ORAL
Qty: 30 TABLET | Refills: 6 | Status: SHIPPED | OUTPATIENT
Start: 2021-02-08 | End: 2021-04-21

## 2021-02-11 DIAGNOSIS — F41.1 GAD (GENERALIZED ANXIETY DISORDER): ICD-10-CM

## 2021-02-15 RX ORDER — ALPRAZOLAM 1 MG/1
TABLET ORAL
Qty: 90 TABLET | Refills: 1 | Status: SHIPPED | OUTPATIENT
Start: 2021-02-15 | End: 2021-03-17

## 2021-02-19 LAB
BASOPHILS ABSOLUTE: 0 K/UL (ref 0–0.2)
BASOPHILS RELATIVE PERCENT: 0.4 %
C-REACTIVE PROTEIN: 12.4 MG/L (ref 0–5.1)
EOSINOPHILS ABSOLUTE: 0.1 K/UL (ref 0–0.6)
EOSINOPHILS RELATIVE PERCENT: 2 %
HCT VFR BLD CALC: 41.8 % (ref 36–48)
HEMOGLOBIN: 13.6 G/DL (ref 12–16)
LYMPHOCYTES ABSOLUTE: 1.7 K/UL (ref 1–5.1)
LYMPHOCYTES RELATIVE PERCENT: 22.3 %
MCH RBC QN AUTO: 30.5 PG (ref 26–34)
MCHC RBC AUTO-ENTMCNC: 32.5 G/DL (ref 31–36)
MCV RBC AUTO: 93.8 FL (ref 80–100)
MONOCYTES ABSOLUTE: 0.5 K/UL (ref 0–1.3)
MONOCYTES RELATIVE PERCENT: 6.9 %
NEUTROPHILS ABSOLUTE: 5.1 K/UL (ref 1.7–7.7)
NEUTROPHILS RELATIVE PERCENT: 68.4 %
PDW BLD-RTO: 14.9 % (ref 12.4–15.4)
PLATELET # BLD: 186 K/UL (ref 135–450)
PMV BLD AUTO: 9.6 FL (ref 5–10.5)
RBC # BLD: 4.45 M/UL (ref 4–5.2)
RHEUMATOID FACTOR: <10 IU/ML
SEDIMENTATION RATE, ERYTHROCYTE: 6 MM/HR (ref 0–30)
WBC # BLD: 7.5 K/UL (ref 4–11)

## 2021-02-20 LAB — ANTI-NUCLEAR ANTIBODY (ANA): NEGATIVE

## 2021-02-23 ENCOUNTER — ANESTHESIA EVENT (OUTPATIENT)
Dept: OPERATING ROOM | Age: 56
End: 2021-02-23
Payer: COMMERCIAL

## 2021-02-23 NOTE — PROGRESS NOTES
Obstructive Sleep Apnea (MERI) Screening     Patient:  Shannan Schmitz    YOB: 1965      Medical Record #:  [id number]                     Date:  2/23/2021     1. Are you a loud and/or regular snorer? []  Yes       [x] No    2. Have you been observed to gasp or stop breathing during sleep? []  Yes       [x] No    3. Do you feel tired or groggy upon awakening or do you awaken with a headache?           []  Yes       [] No    4. Are you often tired or fatigued during the wake time hours? []  Yes       [] No    5. Do you fall asleep sitting, reading, watching TV or driving? []  Yes       [] No    6. Do you often have problems with memory or concentration? []  Yes       [] No    **If patient's score is ? 3 they are considered high risk for MERI. An Anesthesia provider will evaluate the patient and develop a plan of care the day of surgery. Note:  If the patient's BMI is more than 35 kg m¯² , has neck circumference > 40 cm, and/or high blood pressure the risk is greater (© American Sleep Apnea Association, 2006).

## 2021-02-23 NOTE — PROGRESS NOTES
Preoperative Screening for Elective Surgery/Invasive Procedures While COVID-19 present in the community     Have you had any of the following symptoms? No  o Fever, chills  o Cough  o Shortness of breath  o Muscle aches/pain  o Diarrhea  o Abdominal pain, nausea, vomiting  o Loss or decrease in taste and / or smell   Risk of Exposure  o Have you recently been hospitalized for COVID-19 or flu-like illness, if so when? No  o Recently diagnosed with COVID-19, if so when? No  o Recently tested for COVID-19, if so when? Jan 2021  o Have you been in close contact with a person or family member who currently has or recently had [phone]? If yes, when and in what context? No  o Do you live with anybody who in the last 14 days has had fever, chills, shortness of breath, muscle aches, flu-like illness? No  o Do you have any close contacts or family members who are currently in the hospital for COVID-19 or flu-like illness? No  If yes, assess recent close contact with this person. Indicate if the patient has a positive screen by answering yes to one or more of the above questions. Patients who test positive or screen positive prior to surgery or on the day of surgery should be evaluated in conjunction with the surgeon/proceduralist/anesthesiologist to determine the urgency of the procedure.

## 2021-02-24 ENCOUNTER — HOSPITAL ENCOUNTER (OUTPATIENT)
Age: 56
Setting detail: OUTPATIENT SURGERY
Discharge: HOME | End: 2021-02-24
Attending: ORTHOPAEDIC SURGERY | Admitting: ORTHOPAEDIC SURGERY
Payer: COMMERCIAL

## 2021-02-24 ENCOUNTER — ANESTHESIA (OUTPATIENT)
Dept: OPERATING ROOM | Age: 56
End: 2021-02-24
Payer: COMMERCIAL

## 2021-02-24 VITALS
WEIGHT: 269 LBS | HEIGHT: 66 IN | TEMPERATURE: 96.8 F | OXYGEN SATURATION: 95 % | BODY MASS INDEX: 43.23 KG/M2 | RESPIRATION RATE: 12 BRPM | HEART RATE: 57 BPM | DIASTOLIC BLOOD PRESSURE: 78 MMHG | SYSTOLIC BLOOD PRESSURE: 120 MMHG

## 2021-02-24 VITALS
RESPIRATION RATE: 3 BRPM | SYSTOLIC BLOOD PRESSURE: 121 MMHG | DIASTOLIC BLOOD PRESSURE: 63 MMHG | OXYGEN SATURATION: 90 %

## 2021-02-24 DIAGNOSIS — M00.9 PYOGENIC ARTHRITIS OF SHOULDER REGION, DUE TO UNSPECIFIED ORGANISM, UNSPECIFIED LATERALITY (HCC): Primary | ICD-10-CM

## 2021-02-24 LAB — SARS-COV-2, NAAT: NOT DETECTED

## 2021-02-24 PROCEDURE — 2709999900 HC NON-CHARGEABLE SUPPLY: Performed by: ORTHOPAEDIC SURGERY

## 2021-02-24 PROCEDURE — 3600000014 HC SURGERY LEVEL 4 ADDTL 15MIN: Performed by: ORTHOPAEDIC SURGERY

## 2021-02-24 PROCEDURE — 6370000000 HC RX 637 (ALT 250 FOR IP): Performed by: ORTHOPAEDIC SURGERY

## 2021-02-24 PROCEDURE — 3700000001 HC ADD 15 MINUTES (ANESTHESIA): Performed by: ORTHOPAEDIC SURGERY

## 2021-02-24 PROCEDURE — 2720000010 HC SURG SUPPLY STERILE: Performed by: ORTHOPAEDIC SURGERY

## 2021-02-24 PROCEDURE — 3700000000 HC ANESTHESIA ATTENDED CARE: Performed by: ORTHOPAEDIC SURGERY

## 2021-02-24 PROCEDURE — 3600000004 HC SURGERY LEVEL 4 BASE: Performed by: ORTHOPAEDIC SURGERY

## 2021-02-24 PROCEDURE — 7100000010 HC PHASE II RECOVERY - FIRST 15 MIN: Performed by: ORTHOPAEDIC SURGERY

## 2021-02-24 PROCEDURE — 2500000003 HC RX 250 WO HCPCS

## 2021-02-24 PROCEDURE — 87205 SMEAR GRAM STAIN: CPT

## 2021-02-24 PROCEDURE — 87070 CULTURE OTHR SPECIMN AEROBIC: CPT

## 2021-02-24 PROCEDURE — 2580000003 HC RX 258: Performed by: ANESTHESIOLOGY

## 2021-02-24 PROCEDURE — 6370000000 HC RX 637 (ALT 250 FOR IP)

## 2021-02-24 PROCEDURE — 7100000011 HC PHASE II RECOVERY - ADDTL 15 MIN: Performed by: ORTHOPAEDIC SURGERY

## 2021-02-24 PROCEDURE — 87635 SARS-COV-2 COVID-19 AMP PRB: CPT

## 2021-02-24 PROCEDURE — 87075 CULTR BACTERIA EXCEPT BLOOD: CPT

## 2021-02-24 PROCEDURE — 6360000002 HC RX W HCPCS: Performed by: ORTHOPAEDIC SURGERY

## 2021-02-24 PROCEDURE — 2580000003 HC RX 258: Performed by: ORTHOPAEDIC SURGERY

## 2021-02-24 PROCEDURE — 6360000002 HC RX W HCPCS: Performed by: ANESTHESIOLOGY

## 2021-02-24 PROCEDURE — 7100000000 HC PACU RECOVERY - FIRST 15 MIN: Performed by: ORTHOPAEDIC SURGERY

## 2021-02-24 PROCEDURE — 6360000002 HC RX W HCPCS

## 2021-02-24 PROCEDURE — 7100000001 HC PACU RECOVERY - ADDTL 15 MIN: Performed by: ORTHOPAEDIC SURGERY

## 2021-02-24 RX ORDER — ACETAMINOPHEN 500 MG
1000 TABLET ORAL ONCE
Status: COMPLETED | OUTPATIENT
Start: 2021-02-24 | End: 2021-02-24

## 2021-02-24 RX ORDER — OXYCODONE HYDROCHLORIDE AND ACETAMINOPHEN 5; 325 MG/1; MG/1
1 TABLET ORAL EVERY 6 HOURS PRN
Qty: 28 TABLET | Refills: 0 | Status: SHIPPED | OUTPATIENT
Start: 2021-02-24 | End: 2021-03-03

## 2021-02-24 RX ORDER — OXYCODONE HYDROCHLORIDE AND ACETAMINOPHEN 5; 325 MG/1; MG/1
1 TABLET ORAL PRN
Status: DISCONTINUED | OUTPATIENT
Start: 2021-02-24 | End: 2021-02-24 | Stop reason: HOSPADM

## 2021-02-24 RX ORDER — PROMETHAZINE HYDROCHLORIDE 25 MG/ML
6.25 INJECTION, SOLUTION INTRAMUSCULAR; INTRAVENOUS
Status: DISCONTINUED | OUTPATIENT
Start: 2021-02-24 | End: 2021-02-24 | Stop reason: HOSPADM

## 2021-02-24 RX ORDER — ONDANSETRON 2 MG/ML
INJECTION INTRAMUSCULAR; INTRAVENOUS PRN
Status: DISCONTINUED | OUTPATIENT
Start: 2021-02-24 | End: 2021-02-24 | Stop reason: SDUPTHER

## 2021-02-24 RX ORDER — PHENYLEPHRINE HCL IN 0.9% NACL 1 MG/10 ML
SYRINGE (ML) INTRAVENOUS PRN
Status: DISCONTINUED | OUTPATIENT
Start: 2021-02-24 | End: 2021-02-24 | Stop reason: SDUPTHER

## 2021-02-24 RX ORDER — MIDAZOLAM HYDROCHLORIDE 1 MG/ML
INJECTION INTRAMUSCULAR; INTRAVENOUS PRN
Status: DISCONTINUED | OUTPATIENT
Start: 2021-02-24 | End: 2021-02-24 | Stop reason: SDUPTHER

## 2021-02-24 RX ORDER — SODIUM CHLORIDE 0.9 % (FLUSH) 0.9 %
10 SYRINGE (ML) INJECTION PRN
Status: DISCONTINUED | OUTPATIENT
Start: 2021-02-24 | End: 2021-02-24 | Stop reason: HOSPADM

## 2021-02-24 RX ORDER — LIDOCAINE HYDROCHLORIDE 20 MG/ML
INJECTION, SOLUTION INFILTRATION; PERINEURAL PRN
Status: DISCONTINUED | OUTPATIENT
Start: 2021-02-24 | End: 2021-02-24 | Stop reason: SDUPTHER

## 2021-02-24 RX ORDER — OXYCODONE HYDROCHLORIDE 5 MG/1
5 TABLET ORAL ONCE
Status: COMPLETED | OUTPATIENT
Start: 2021-02-24 | End: 2021-02-24

## 2021-02-24 RX ORDER — PROPOFOL 10 MG/ML
INJECTION, EMULSION INTRAVENOUS PRN
Status: DISCONTINUED | OUTPATIENT
Start: 2021-02-24 | End: 2021-02-24 | Stop reason: SDUPTHER

## 2021-02-24 RX ORDER — GLYCOPYRROLATE 0.2 MG/ML
INJECTION INTRAMUSCULAR; INTRAVENOUS PRN
Status: DISCONTINUED | OUTPATIENT
Start: 2021-02-24 | End: 2021-02-24 | Stop reason: SDUPTHER

## 2021-02-24 RX ORDER — CELECOXIB 400 MG/1
400 CAPSULE ORAL ONCE
Status: COMPLETED | OUTPATIENT
Start: 2021-02-24 | End: 2021-02-24

## 2021-02-24 RX ORDER — SODIUM CHLORIDE, SODIUM LACTATE, POTASSIUM CHLORIDE, AND CALCIUM CHLORIDE .6; .31; .03; .02 G/100ML; G/100ML; G/100ML; G/100ML
IRRIGANT IRRIGATION PRN
Status: DISCONTINUED | OUTPATIENT
Start: 2021-02-24 | End: 2021-02-24 | Stop reason: ALTCHOICE

## 2021-02-24 RX ORDER — MEPERIDINE HYDROCHLORIDE 50 MG/ML
12.5 INJECTION INTRAMUSCULAR; INTRAVENOUS; SUBCUTANEOUS EVERY 5 MIN PRN
Status: DISCONTINUED | OUTPATIENT
Start: 2021-02-24 | End: 2021-02-24 | Stop reason: HOSPADM

## 2021-02-24 RX ORDER — EPHEDRINE SULFATE 50 MG/ML
INJECTION, SOLUTION INTRAVENOUS PRN
Status: DISCONTINUED | OUTPATIENT
Start: 2021-02-24 | End: 2021-02-24 | Stop reason: SDUPTHER

## 2021-02-24 RX ORDER — OXYCODONE HYDROCHLORIDE AND ACETAMINOPHEN 5; 325 MG/1; MG/1
2 TABLET ORAL PRN
Status: DISCONTINUED | OUTPATIENT
Start: 2021-02-24 | End: 2021-02-24 | Stop reason: HOSPADM

## 2021-02-24 RX ORDER — MORPHINE SULFATE 2 MG/ML
2 INJECTION, SOLUTION INTRAMUSCULAR; INTRAVENOUS EVERY 5 MIN PRN
Status: DISCONTINUED | OUTPATIENT
Start: 2021-02-24 | End: 2021-02-24 | Stop reason: HOSPADM

## 2021-02-24 RX ORDER — DEXAMETHASONE SODIUM PHOSPHATE 10 MG/ML
INJECTION INTRAMUSCULAR; INTRAVENOUS PRN
Status: DISCONTINUED | OUTPATIENT
Start: 2021-02-24 | End: 2021-02-24 | Stop reason: SDUPTHER

## 2021-02-24 RX ORDER — OXYCODONE HYDROCHLORIDE 5 MG/1
TABLET ORAL
Status: COMPLETED
Start: 2021-02-24 | End: 2021-02-24

## 2021-02-24 RX ORDER — HYDRALAZINE HYDROCHLORIDE 20 MG/ML
5 INJECTION INTRAMUSCULAR; INTRAVENOUS EVERY 10 MIN PRN
Status: DISCONTINUED | OUTPATIENT
Start: 2021-02-24 | End: 2021-02-24 | Stop reason: HOSPADM

## 2021-02-24 RX ORDER — SODIUM CHLORIDE, SODIUM LACTATE, POTASSIUM CHLORIDE, CALCIUM CHLORIDE 600; 310; 30; 20 MG/100ML; MG/100ML; MG/100ML; MG/100ML
INJECTION, SOLUTION INTRAVENOUS CONTINUOUS
Status: DISCONTINUED | OUTPATIENT
Start: 2021-02-24 | End: 2021-02-24 | Stop reason: HOSPADM

## 2021-02-24 RX ORDER — MORPHINE SULFATE 2 MG/ML
1 INJECTION, SOLUTION INTRAMUSCULAR; INTRAVENOUS EVERY 5 MIN PRN
Status: DISCONTINUED | OUTPATIENT
Start: 2021-02-24 | End: 2021-02-24 | Stop reason: HOSPADM

## 2021-02-24 RX ORDER — LIDOCAINE HYDROCHLORIDE 10 MG/ML
0.3 INJECTION, SOLUTION EPIDURAL; INFILTRATION; INTRACAUDAL; PERINEURAL
Status: DISCONTINUED | OUTPATIENT
Start: 2021-02-24 | End: 2021-02-24 | Stop reason: HOSPADM

## 2021-02-24 RX ORDER — ROCURONIUM BROMIDE 10 MG/ML
INJECTION, SOLUTION INTRAVENOUS PRN
Status: DISCONTINUED | OUTPATIENT
Start: 2021-02-24 | End: 2021-02-24 | Stop reason: SDUPTHER

## 2021-02-24 RX ORDER — ONDANSETRON 2 MG/ML
4 INJECTION INTRAMUSCULAR; INTRAVENOUS PRN
Status: DISCONTINUED | OUTPATIENT
Start: 2021-02-24 | End: 2021-02-24 | Stop reason: HOSPADM

## 2021-02-24 RX ORDER — DIPHENHYDRAMINE HYDROCHLORIDE 50 MG/ML
12.5 INJECTION INTRAMUSCULAR; INTRAVENOUS
Status: DISCONTINUED | OUTPATIENT
Start: 2021-02-24 | End: 2021-02-24 | Stop reason: HOSPADM

## 2021-02-24 RX ORDER — LABETALOL HYDROCHLORIDE 5 MG/ML
5 INJECTION, SOLUTION INTRAVENOUS EVERY 10 MIN PRN
Status: DISCONTINUED | OUTPATIENT
Start: 2021-02-24 | End: 2021-02-24 | Stop reason: HOSPADM

## 2021-02-24 RX ORDER — DOXYCYCLINE HYCLATE 100 MG
100 TABLET ORAL 2 TIMES DAILY
Qty: 10 TABLET | Refills: 0 | Status: SHIPPED | OUTPATIENT
Start: 2021-02-24 | End: 2021-03-01

## 2021-02-24 RX ORDER — SUCCINYLCHOLINE CHLORIDE 20 MG/ML
INJECTION INTRAMUSCULAR; INTRAVENOUS PRN
Status: DISCONTINUED | OUTPATIENT
Start: 2021-02-24 | End: 2021-02-24 | Stop reason: SDUPTHER

## 2021-02-24 RX ORDER — SODIUM CHLORIDE 0.9 % (FLUSH) 0.9 %
10 SYRINGE (ML) INJECTION EVERY 12 HOURS SCHEDULED
Status: DISCONTINUED | OUTPATIENT
Start: 2021-02-24 | End: 2021-02-24 | Stop reason: HOSPADM

## 2021-02-24 RX ORDER — FENTANYL CITRATE 50 UG/ML
INJECTION, SOLUTION INTRAMUSCULAR; INTRAVENOUS PRN
Status: DISCONTINUED | OUTPATIENT
Start: 2021-02-24 | End: 2021-02-24 | Stop reason: SDUPTHER

## 2021-02-24 RX ADMIN — PROPOFOL 100 MG: 10 INJECTION, EMULSION INTRAVENOUS at 08:45

## 2021-02-24 RX ADMIN — EPHEDRINE SULFATE 10 MG: 50 INJECTION INTRAMUSCULAR; INTRAVENOUS; SUBCUTANEOUS at 08:54

## 2021-02-24 RX ADMIN — SUGAMMADEX 250 MG: 100 INJECTION, SOLUTION INTRAVENOUS at 09:46

## 2021-02-24 RX ADMIN — FENTANYL CITRATE 50 MCG: 50 INJECTION, SOLUTION INTRAMUSCULAR; INTRAVENOUS at 09:48

## 2021-02-24 RX ADMIN — FENTANYL CITRATE 50 MCG: 50 INJECTION, SOLUTION INTRAMUSCULAR; INTRAVENOUS at 08:41

## 2021-02-24 RX ADMIN — SUCCINYLCHOLINE CHLORIDE 160 MG: 20 INJECTION, SOLUTION INTRAMUSCULAR; INTRAVENOUS at 08:46

## 2021-02-24 RX ADMIN — DEXAMETHASONE SODIUM PHOSPHATE 10 MG: 10 INJECTION INTRAMUSCULAR; INTRAVENOUS at 08:57

## 2021-02-24 RX ADMIN — EPHEDRINE SULFATE 15 MG: 50 INJECTION INTRAMUSCULAR; INTRAVENOUS; SUBCUTANEOUS at 09:02

## 2021-02-24 RX ADMIN — Medication 3 G: at 08:51

## 2021-02-24 RX ADMIN — ROCURONIUM BROMIDE 30 MG: 10 SOLUTION INTRAVENOUS at 09:02

## 2021-02-24 RX ADMIN — GLYCOPYRROLATE 0.4 MG: 0.2 INJECTION, SOLUTION INTRAMUSCULAR; INTRAVENOUS at 08:51

## 2021-02-24 RX ADMIN — FENTANYL CITRATE 50 MCG: 50 INJECTION, SOLUTION INTRAMUSCULAR; INTRAVENOUS at 09:58

## 2021-02-24 RX ADMIN — ONDANSETRON 4 MG: 2 INJECTION INTRAMUSCULAR; INTRAVENOUS at 08:36

## 2021-02-24 RX ADMIN — CELECOXIB 400 MG: 400 CAPSULE ORAL at 07:19

## 2021-02-24 RX ADMIN — ACETAMINOPHEN 1000 MG: 500 TABLET ORAL at 07:19

## 2021-02-24 RX ADMIN — HYDROMORPHONE HYDROCHLORIDE 0.5 MG: 1 INJECTION, SOLUTION INTRAMUSCULAR; INTRAVENOUS; SUBCUTANEOUS at 10:11

## 2021-02-24 RX ADMIN — FENTANYL CITRATE 50 MCG: 50 INJECTION, SOLUTION INTRAMUSCULAR; INTRAVENOUS at 08:36

## 2021-02-24 RX ADMIN — HYDROMORPHONE HYDROCHLORIDE 0.5 MG: 1 INJECTION, SOLUTION INTRAMUSCULAR; INTRAVENOUS; SUBCUTANEOUS at 10:20

## 2021-02-24 RX ADMIN — SODIUM CHLORIDE, POTASSIUM CHLORIDE, SODIUM LACTATE AND CALCIUM CHLORIDE: 600; 310; 30; 20 INJECTION, SOLUTION INTRAVENOUS at 07:01

## 2021-02-24 RX ADMIN — Medication 100 MCG: at 08:54

## 2021-02-24 RX ADMIN — ROCURONIUM BROMIDE 10 MG: 10 SOLUTION INTRAVENOUS at 08:45

## 2021-02-24 RX ADMIN — EPHEDRINE SULFATE 25 MG: 50 INJECTION INTRAMUSCULAR; INTRAVENOUS; SUBCUTANEOUS at 08:51

## 2021-02-24 RX ADMIN — MIDAZOLAM HYDROCHLORIDE 2 MG: 2 INJECTION, SOLUTION INTRAMUSCULAR; INTRAVENOUS at 08:36

## 2021-02-24 RX ADMIN — OXYCODONE HYDROCHLORIDE 5 MG: 5 TABLET ORAL at 10:33

## 2021-02-24 RX ADMIN — LIDOCAINE HYDROCHLORIDE 120 MG: 20 INJECTION, SOLUTION INFILTRATION; PERINEURAL at 08:36

## 2021-02-24 RX ADMIN — HYDROMORPHONE HYDROCHLORIDE 0.5 MG: 1 INJECTION, SOLUTION INTRAMUSCULAR; INTRAVENOUS; SUBCUTANEOUS at 10:40

## 2021-02-24 RX ADMIN — SODIUM CHLORIDE, POTASSIUM CHLORIDE, SODIUM LACTATE AND CALCIUM CHLORIDE: 600; 310; 30; 20 INJECTION, SOLUTION INTRAVENOUS at 10:23

## 2021-02-24 RX ADMIN — Medication 100 MCG: at 09:21

## 2021-02-24 RX ADMIN — FENTANYL CITRATE 50 MCG: 50 INJECTION, SOLUTION INTRAMUSCULAR; INTRAVENOUS at 09:16

## 2021-02-24 ASSESSMENT — PAIN SCALES - GENERAL
PAINLEVEL_OUTOF10: 6
PAINLEVEL_OUTOF10: 0
PAINLEVEL_OUTOF10: 5
PAINLEVEL_OUTOF10: 0
PAINLEVEL_OUTOF10: 7

## 2021-02-24 ASSESSMENT — PULMONARY FUNCTION TESTS
PIF_VALUE: 22
PIF_VALUE: 24
PIF_VALUE: 12
PIF_VALUE: 26
PIF_VALUE: 32
PIF_VALUE: 32
PIF_VALUE: 22
PIF_VALUE: 20
PIF_VALUE: 26
PIF_VALUE: 24
PIF_VALUE: 15
PIF_VALUE: 14
PIF_VALUE: 32
PIF_VALUE: 24
PIF_VALUE: 1
PIF_VALUE: 22
PIF_VALUE: 25
PIF_VALUE: 36
PIF_VALUE: 15
PIF_VALUE: 15
PIF_VALUE: 28
PIF_VALUE: 20
PIF_VALUE: 2
PIF_VALUE: 32
PIF_VALUE: 24
PIF_VALUE: 22
PIF_VALUE: 0
PIF_VALUE: 22
PIF_VALUE: 36
PIF_VALUE: 36
PIF_VALUE: 0
PIF_VALUE: 15
PIF_VALUE: 32
PIF_VALUE: 26
PIF_VALUE: 36
PIF_VALUE: 22
PIF_VALUE: 1
PIF_VALUE: 22
PIF_VALUE: 20

## 2021-02-24 ASSESSMENT — PAIN - FUNCTIONAL ASSESSMENT: PAIN_FUNCTIONAL_ASSESSMENT: 0-10

## 2021-02-24 ASSESSMENT — PAIN DESCRIPTION - ORIENTATION: ORIENTATION: RIGHT

## 2021-02-24 NOTE — BRIEF OP NOTE
Brief Postoperative Note      Patient: Sukhi Guzman  YOB: 1965  MRN: [id number]    Date of Procedure: 2/24/2021    Pre-Op Diagnosis: Pyogenic arthritis of shoulder region, due to unspecified organism, unspecified laterality ([facility] [M00.9]    Post-Op Diagnosis: Same       Procedure(s):  RIGHT SHOULDER ARTHROSCOPIC INCISION AND DRAINAGE    Surgeon(s):  Diamond Jamison MD    Assistant:  Surgical Assistant: Shey Mahan  Physician Assistant: Consuelo Jarvis PA-C    Anesthesia: General    Estimated Blood Loss (mL): less than 50     Complications: None    Specimens:   ID Type Source Tests Collected by Time Destination   1 : synovium #1 ANTERIOR  Specimen Joint, Shoulder CULTURE, SURGICAL (Canceled) Diamond Jamison MD 2/24/2021 8558    2 : synovium #2 posterior  Specimen Joint, Shoulder CULTURE, SURGICAL (Canceled) Diamond Jamison MD 2/24/2021 8294    3 : synovium #3  Specimen Joint, Shoulder CULTURE, SURGICAL (Canceled) Diamond Jamison MD 2/24/2021 [id number]        Implants:  * No implants in log *      Drains: * No LDAs found *    Findings: Please see operative note    Electronically signed by Diamond Jamison MD on 2/24/2021 at 9:59 AM

## 2021-02-24 NOTE — ANESTHESIA PRE PROCEDURE
Department of Anesthesiology  Preprocedure Note       Name:  Brianna Lee   Age:  54 y.o.  :  1965                                          MRN:  [id number]         Date:  2021      Surgeon: Mani Warren):  Genia Whitt MD    Procedure: Procedure(s):  RIGHT SHOULDER ARTHROSCOPIC INCISION AND DRAINAGE    Medications prior to admission:   Prior to Admission medications    Medication Sig Start Date End Date Taking? Authorizing Provider   TRAMADOL HCL PO Take by mouth as needed. Yes Historical Provider, MD   ALPRAZolam (XANAX) 1 MG tablet ****TAKE 1 TABLET BY MOUTH 3 TIMES DAILY AS NEEDED FOR ANXIETY FOR UP TO 60 DAYS. 2/15/21 3/17/21  Kaelyn Baca DO   lisinopril (PRINIVIL;ZESTRIL) 20 MG tablet TAKE 1 TABLET BY MOUTH EVERY DAY 21   Kaelyn Baca DO   omeprazole (PRILOSEC) 40 MG delayed release capsule TAKE 1 CAPSULE BY MOUTH EVERY DAY 21   Kaelyn Baca DO   ibuprofen (ADVIL;MOTRIN) 600 MG tablet TAKE 1 TABLET BY MOUTH EVERY 8 HOURS AS NEEDED FOR PAIN 21   Kaelyn Baca DO   lidocaine (XYLOCAINE) 5 % ointment APPLY TOPICALLY AS NEEDED TO AREA OF TENDERNESS UNDER ARM.  21   Kaelyn Baca DO   methocarbamol (ROBAXIN) 500 MG tablet TAKE 1 TABLET BY MOUTH 3 TIMES A DAY AS NEEDED FOR NECK PAIN 21   Kaelyn Baca DO   naloxone 4 MG/0.1ML LIQD nasal spray 1 spray by Nasal route as needed for Opioid Reversal 20   Genia Whitt MD   rOPINIRole (REQUIP) 2 MG tablet TAKE 1 TABLET BY MOUTH TWICE A DAY 10/13/20   Kaelyn Baca DO   DULoxetine (CYMBALTA) 60 MG extended release capsule Take 2 capsules by mouth daily 20   Kaelyn Baca DO   atenolol (TENORMIN) 25 MG tablet TAKE 1 TABLET BY MOUTH EVERY DAY 9/15/20   Kaelyn Baca DO   MELATONIN PO Take 20 mg by mouth nightly as needed     Historical Provider, MD       Current medications:    Current Facility-Administered Medications   Medication Dose Route Frequency Provider Last Rate Last Admin  ceFAZolin (ANCEF) 2000 mg in dextrose 5 % 100 mL IVPB  2,000 mg Intravenous On Call to Young Salas MD        acetaminophen (TYLENOL) tablet 1,000 mg  1,000 mg Oral Once Emmie Alonzo MD        celecoxib (CELEBREX) capsule 400 mg  400 mg Oral Once Emmie Alonzo MD        lactated ringers infusion   Intravenous Continuous Jyoti Oreilly MD        sodium chloride flush 0.9 % injection 10 mL  10 mL Intravenous 2 times per day Jyoti Oreilly MD        sodium chloride flush 0.9 % injection 10 mL  10 mL Intravenous PRN Jyoti Oreilly MD        lidocaine PF 1 % injection 0.3 mL  0.3 mL Intradermal Once PRN Jyoti Oreilly MD           Allergies:     Allergies   Allergen Reactions    Toradol [Ketorolac Tromethamine] Other (See Comments)     \"out of it\"  \"felt like sleep paralysis\"    Haldol [Haloperidol Lactate] Other (See Comments)     \"felt out of it, similar to the toradol\"       Problem List:    Patient Active Problem List   Diagnosis Code    Depression F32.9    Knee pain M25.569    Chondromalacia of left knee M94.262    Synovitis of knee M65.9    Bilateral carpal tunnel syndrome G56.03    Lumbar strain S39.012A    Anxiety F41.9    Restless leg syndrome G25.81    De Quervain's disease (radial styloid tenosynovitis) M65.4    Osteoarthritis of carpometacarpal joint of thumb M18.9    Hemorrhoid prolapse K64.8    Dyspnea on exertion R06.00    Sciatica M54.30    Intractable vomiting with nausea R11.2    Hiatal hernia K44.9    Elevated blood pressure reading R03.0    Major depressive disorder, recurrent, moderate (HCC) F33.1    Generalized anxiety disorder F41.1    Essential hypertension I10    Closed displaced fracture of middle phalanx of right ring finger S62.624A    Anal lesion K62.9    Adjustment disorder with depressed mood F43.21    Neck pain M54.2       Past Medical History:        Diagnosis Date    Anxiety     Depression     GERD (gastroesophageal reflux disease)  Hypertension     Osteoarthritis     Restless leg syndrome        Past Surgical History:        Procedure Laterality Date    ANUS SURGERY  2018    fissure     SECTION      x3    FRACTURE SURGERY      right wrist    HERNIA REPAIR  2018    LAPAROSCOPIC PARAESOPHAGEAL HERNIA REPAIR WITH MESH    HIATAL HERNIA REPAIR  2018    KNEE ARTHROSCOPY Left 11/15/12    ARTHROSCOPY LEFT KNEE WITH SYOVECTOMY AND CHONDROPLASTY    OVARY REMOVAL Right     ROTATOR CUFF REPAIR Right 2020    EXAM UNDER ANESTHESIA VIDEO ARTHROSCOPY RIGHT SHOULDER, MINI- OPEN ROTATOR CUFF REPAIR, NEER ACROMIOPLASTY, LENO PROCEDURE WITH EXPAREL performed by Renetta Hendricks MD at [place] [address] ARTHROSCOPY Right 2020    VIDEO ARTHROSCOPY RIGHT SHOULDER, OPEN ROTATOR CUFF REPAIR, BICEPS TENOTOMY -BLOCK- performed by Diamond Jamison MD at [address] [place] [address]      UPPER GASTROINTESTINAL ENDOSCOPY  2015    esophageasl stretch       Social History:    Social History     Tobacco Use    Smoking status: Never Smoker    Smokeless tobacco: Never Used   Substance Use Topics    Alcohol use: Yes     Comment: 1 -2 drinks per month                                Counseling given: Not Answered      Vital Signs (Current):   Vitals:    21 1047   Weight: 260 lb (117.9 kg)   Height: 5' 6\" (1.676 m)                                              BP Readings from Last 3 Encounters:   20 118/68   20 114/69   20 108/76       NPO Status:                                                                                 BMI:   Wt Readings from Last 3 Encounters:   21 260 lb (117.9 kg)   21 270 lb (122.5 kg)   20 270 lb (122.5 kg)     Body mass index is 41.97 kg/m².     CBC:   Lab Results   Component Value Date    WBC 7.5 2021    RBC 4.45 2021    HGB 13.6 2021    HCT 41.8 2021    MCV 93.8 2021 RDW 14.9 02/19/2021     02/19/2021       CMP:   Lab Results   Component Value Date     11/23/2020    K 4.6 11/23/2020    K 4.3 04/21/2018     11/23/2020    CO2 25 11/23/2020    BUN 9 11/23/2020    CREATININE 0.8 11/23/2020    GFRAA >60 11/23/2020    GFRAA >60 02/01/2012    AGRATIO 1.7 11/23/2020    LABGLOM >60 11/23/2020    GLUCOSE 131 11/23/2020    PROT 6.5 11/23/2020    PROT 7.9 02/02/2012    CALCIUM 9.3 11/23/2020    BILITOT 0.3 11/23/2020    ALKPHOS 82 11/23/2020    AST 21 11/23/2020    ALT 32 11/23/2020       POC Tests: No results for input(s): POCGLU, POCNA, POCK, POCCL, POCBUN, POCHEMO, POCHCT in the last 72 hours.     Coags:   Lab Results   Component Value Date    PROTIME 12.0 10/16/2013    INR 1.07 10/16/2013       HCG (If Applicable):   Lab Results   Component Value Date    PREGTESTUR Neg 11/15/2012        ABGs: No results found for: PHART, PO2ART, YPU0UFC, UQI0IEK, BEART, U9WHUCJK     Type & Screen (If Applicable):  No results found for: LABABO, LABRH    Drug/Infectious Status (If Applicable):  No results found for: HIV, HEPCAB    COVID-19 Screening (If Applicable):   Lab Results   Component Value Date    COVID19 NOT DETECTED 01/06/2021         Anesthesia Evaluation  Patient summary reviewed no history of anesthetic complications:   Airway: Mallampati: II  TM distance: >3 FB   Neck ROM: full  Mouth opening: > = 3 FB Dental: normal exam   (+) edentulous      Pulmonary:Negative Pulmonary ROS and normal exam  breath sounds clear to auscultation                             Cardiovascular:Negative CV ROS  Exercise tolerance: good (>4 METS),   (+) hypertension:,         Rhythm: regular  Rate: normal                    Neuro/Psych:   Negative Neuro/Psych ROS  (+) neuromuscular disease:, psychiatric history:            GI/Hepatic/Renal: Neg GI/Hepatic/Renal ROS  (+) hiatal hernia, GERD: well controlled,           Endo/Other: Negative Endo/Other ROS                    Abdominal: Vascular: negative vascular ROS. Pre-Operative Diagnosis: Pyogenic arthritis of shoulder region, due to unspecified organism, unspecified laterality ([facility] [M00.9]    54 y.o.   BMI:  Body mass index is 41.97 kg/m². Vitals:    02/23/21 1047   Weight: 260 lb (117.9 kg)   Height: 5' 6\" (1.676 m)       Allergies   Allergen Reactions    Toradol [Ketorolac Tromethamine] Other (See Comments)     \"out of it\"  \"felt like sleep paralysis\"    Haldol [Haloperidol Lactate] Other (See Comments)     \"felt out of it, similar to the toradol\"       Social History     Tobacco Use    Smoking status: Never Smoker    Smokeless tobacco: Never Used   Substance Use Topics    Alcohol use: Yes     Comment: 1 -2 drinks per month       LABS:    CBC  Lab Results   Component Value Date/Time    WBC 7.5 02/19/2021 02:21 PM    HGB 13.6 02/19/2021 02:21 PM    HCT 41.8 02/19/2021 02:21 PM     02/19/2021 02:21 PM     RENAL  Lab Results   Component Value Date/Time     11/23/2020 09:08 PM    K 4.6 11/23/2020 09:08 PM    K 4.3 04/21/2018 01:10 PM     11/23/2020 09:08 PM    CO2 25 11/23/2020 09:08 PM    BUN 9 11/23/2020 09:08 PM    CREATININE 0.8 11/23/2020 09:08 PM    GLUCOSE 131 (H) 11/23/2020 09:08 PM     COAGS  Lab Results   Component Value Date/Time    PROTIME 12.0 10/16/2013 12:20 PM    INR 1.07 10/16/2013 12:20 PM       11/23/20 EKG  Sinus  Bradycardia  -Short MS syndrome   Tal = 104  Low voltage in precordial leads.    -  Nonspecific T-abnormality. ABNORMAL      Anesthesia Plan      general     ASA 3     (I discussed with the patient the risks and benefits of PIV, anesthesia, IV Narcotics, PACU. All questions were answered the patient agrees with the plan and wishes to proceed)  Induction: intravenous.                           Perry Graves MD   2/24/2021

## 2021-02-24 NOTE — OP NOTE
Operative Note      Patient: Katie Peñaloza  YOB: 1965  MRN: [id number]    Date of Procedure: 2/24/2021    Pre-Op Diagnosis: Pyogenic arthritis of shoulder region, due to unspecified organism, unspecified laterality ([address] [M00.9]    Post-Op Diagnosis: Suspected septic arthritis right shoulder, significant synovitic change encountered, traumatic appearing recurrent retracted tear of the supraspinatus tendon with some retained suture material       Procedure(s):  RIGHT SHOULDER ARTHROSCOPIC INCISION AND DRAINAGE   #1. Right shoulder arthroscopy with extensive debridement and irrigation with bactisure topical bacteriocidal    #2. Right shoulder arthroscopy with foreign body removal; retained loose suture material    #3. Right shoulder arthroscopy with suprascapular nerve release        Surgeon(s):  Ariella Steele MD    Assistant:   Surgical Assistant: Donald Mcrae  Physician Assistant: Sonam Coleman PA-C please note the presence of the physician assistant was medically required for this complex procedure including assistance in manipulation of the arm, assistance in the irrigation with the topical bactericidal material, maintaining retractor placement arthroscopically during the suprascapular nerve release. Anesthesia: General    Estimated Blood Loss (mL): less than 50     Complications: None    Specimens:   ID Type Source Tests Collected by Time Destination   1 : synovium #1 ANTERIOR  Specimen Joint, Shoulder CULTURE, SURGICAL (Canceled) Ariella Steele MD 2/24/2021 9219    2 : synovium #2 posterior  Specimen Joint, Shoulder CULTURE, SURGICAL (Canceled) Ariella Steele MD 2/24/2021 5061    3 : synovium #3  Specimen Joint, Shoulder CULTURE, SURGICAL (Canceled) Ariella Steele MD 2/24/2021 3690        Implants:  * No implants in log *      Drains: * No LDAs found *    Findings: Please see operative note    Detailed Description of Procedure:    This patient is a 59-year-old female who underwent a revision rotator cuff repair in October with biologic augmentation regenerative patch. She had a previous repair which failed traumatically. She did well after surgery. Postoperatively she had to traumatic events concerning for repeat tears. However her pain escalated to a 10 out of 10. She had a general malaise and did not feel well. I ultrasounded the shoulder and encountered a recurrent tear of the supraspinatus tendon but also a fluid collection in the subacromial space. I performed an aspiration of the glenohumeral joint and the cell count was elevated suspicious for infection. She had a normal white count and sedimentation rate but a significantly elevated CRP. Based upon his clinical presentation I felt she needed to be treated preemptively for possible infection. She also had significant medical comorbidities making the metastasis of infection dangerous. As such we elected to proceed with diagnostic and therapeutic arthroscopy. Plan for synovial biopsies, extensive debridement and washout    Informed consent was discussed with the patient. This included a detailed description of the procedure. Risks, benefits and alternatives specific to this diagnosis and procedure were outlined. Standard surgical risks of anesthesia, bleeding, nerve damage, infection, need for further surgeries, disability and death also outlined. Verbal confirmation of informed consent was obtained. Signature obtained by the staff. Patient identified marked in the preoperative area. Informed consent confirmed. Accompanied to the operative suite. Placed supine. After the induction of anesthesia carefully padded and positioned in a 60 degree modified beachchair position. Cervical spine well stabilized. Meticulous sterile prep and drape. Surgical timeout carried out. Anatomic landmarks marked. Posterior portal created sharply dilated bluntly.   Joint was insufflated and diagnostic arthroscopy debridement. At this point we irrigated the entire back of Jeremy Bactisure bacteriocidal material through the joint. It was thoroughly irrigated    In all we had irrigated over 9 L to the joint. At this point we felt we addressed the relevant pathology the risks of infection obtain good biopsies. We evacuated shoulder fluid. Closed the portals with nylon. Also dressing and sling. Anesthesia reversed and stable to recovery.             Electronically signed by Yeimi Duque MD on 2/24/2021 at 10:10 AM

## 2021-02-24 NOTE — ANESTHESIA POSTPROCEDURE EVALUATION
Department of Anesthesiology  Postprocedure Note    Patient: Thi Arauz  MRN: [id number]  YOB: 1965  Date of evaluation: 2/24/2021  Time:  12:01 PM     Procedure Summary     Date: 02/24/21 Room / Location: [address] [place] [address]    Anesthesia Start: 6489 Anesthesia Stop: 1003    Procedure: RIGHT SHOULDER ARTHROSCOPIC INCISION AND DRAINAGE (Right Shoulder) Diagnosis:       Pyogenic arthritis of shoulder region, due to unspecified organism, unspecified laterality (HCC)      (Pyogenic arthritis of shoulder region, due to unspecified organism, unspecified laterality ([facility] [M00.9])    Surgeons: Emmie Alonzo MD Responsible Provider: Jyoti Oreilly MD    Anesthesia Type: general ASA Status: 3          Anesthesia Type: general    Jodi Phase I: Jodi Score: 9    Jodi Phase II: Jodi Score: 10    Last vitals: Reviewed and per EMR flowsheets.        Anesthesia Post Evaluation    Comments: Postoperative Anesthesia Note    Name:    Thi Arauz  MRN:      [id number]    Patient Vitals in the past 12 hrs:  02/24/21 1115, BP:120/78, Pulse:57, Resp:12, SpO2:95 %  02/24/21 1100, BP:116/67, Pulse:58, Resp:12, SpO2:93 %  02/24/21 1045, BP:118/80, Pulse:59, Resp:9, SpO2:97 %  02/24/21 1030, BP:(!) 90/48, Pulse:62, Resp:17, SpO2:96 %  02/24/21 1015, BP:(!) 126/58, Pulse:62, Resp:13, SpO2:95 %  02/24/21 1010, BP:123/66, Temp:96.8 °F (36 °C), Pulse:61, Resp:15, SpO2:93 %  02/24/21 1005, BP:111/73, Pulse:66, Resp:23, SpO2:91 %  02/24/21 1000, BP:129/76, Pulse:65, Resp:22, SpO2:90 %  02/24/21 0955, BP:129/76, Temp:96.6 °F (35.9 °C), Temp src:Temporal, Pulse:63, Resp:16, SpO2:90 %  02/24/21 0709, Weight:269 lb (122 kg)  02/24/21 0658, BP:104/77, Temp:97.5 °F (36.4 °C), Temp src:Temporal, Pulse:72, Resp:16, SpO2:94 %, Height:5' 6\" (1.676 m), Weight:260 lb (117.9 kg)     LABS:    CBC  Lab Results       Component                Value               Date/Time WBC                      7.5                 02/19/2021 02:21 PM        HGB                      13.6                02/19/2021 02:21 PM        HCT                      41.8                02/19/2021 02:21 PM        PLT                      186                 02/19/2021 02:21 PM   RENAL  Lab Results       Component                Value               Date/Time                  NA                       139                 11/23/2020 09:08 PM        K                        4.6                 11/23/2020 09:08 PM        K                        4.3                 04/21/2018 01:10 PM        CL                       102                 11/23/2020 09:08 PM        CO2                      25                  11/23/2020 09:08 PM        BUN                      9                   11/23/2020 09:08 PM        CREATININE               0.8                 11/23/2020 09:08 PM        GLUCOSE                  131 (H)             11/23/2020 09:08 PM   COAGS  Lab Results       Component                Value               Date/Time                  PROTIME                  12.0                10/16/2013 12:20 PM        INR                      1.07                10/16/2013 12:20 PM     Intake & Output:  @88BGEA@    Nausea & Vomiting:  No    Level of Consciousness:  Awake    Pain Assessment:  Adequate analgesia    Anesthesia Complications:  No apparent anesthetic complications    SUMMARY      Vital signs stable  OK to discharge from Stage I post anesthesia care.   Care transferred from Anesthesiology department on discharge from perioperative area

## 2021-02-24 NOTE — H&P
Department of Orthopedic Surgery  Attending   History and Physical        CHIEF COMPLAINT: Possible septic arthritis of the right shoulder post arthroscopy    Reason for Admission: As Above    History Obtained From:  patient    HISTORY OF PRESENT ILLNESS:      The patient is a 54 y.o. female  who presents with plans for elective arthroscopic diagnostic and therapeutic procedure with irrigation and debridement, synovial biopsies. Nursing change to health medications       Past Medical History:        Diagnosis Date    Anxiety     Depression     GERD (gastroesophageal reflux disease)     Hypertension     Osteoarthritis     Restless leg syndrome      Past Surgical History:        Procedure Laterality Date    ANUS SURGERY  2018    fissure     SECTION      x3    COLONOSCOPY      FRACTURE SURGERY      right wrist    HERNIA REPAIR  2018    LAPAROSCOPIC PARAESOPHAGEAL HERNIA REPAIR WITH MESH    HIATAL HERNIA REPAIR  2018    KNEE ARTHROSCOPY Left 11/15/12    ARTHROSCOPY LEFT KNEE WITH SYOVECTOMY AND CHONDROPLASTY    OVARY REMOVAL Right     ROTATOR CUFF REPAIR Right 2020    EXAM UNDER ANESTHESIA VIDEO ARTHROSCOPY RIGHT SHOULDER, MINI- OPEN ROTATOR CUFF REPAIR, NEER ACROMIOPLASTY, LENO PROCEDURE WITH EXPAREL performed by Parker Ramon MD at [place] [address] ARTHROSCOPY Right 2020    VIDEO ARTHROSCOPY RIGHT SHOULDER, OPEN ROTATOR CUFF REPAIR, BICEPS TENOTOMY -BLOCK- performed by Iveth Hays MD at [address] [place] [address] ENDOSCOPY      UPPER GASTROINTESTINAL ENDOSCOPY  2015    esophageasl stretch         Medications Prior to Admission:   Prior to Admission medications    Medication Sig Start Date End Date Taking? Authorizing Provider   TRAMADOL HCL PO Take by mouth as needed.    Yes Historical Provider, MD   ALPRAZolam (XANAX) 1 MG tablet ****TAKE 1 TABLET BY MOUTH 3 TIMES DAILY AS NEEDED FOR ANXIETY FOR UP TO 60 DAYS. 2/15/21 3/17/21 Yes Abundio Avelar DO   lisinopril (PRINIVIL;ZESTRIL) 20 MG tablet TAKE 1 TABLET BY MOUTH EVERY DAY 2/8/21  Yes Abundio Avelar DO   omeprazole (PRILOSEC) 40 MG delayed release capsule TAKE 1 CAPSULE BY MOUTH EVERY DAY 2/8/21  Yes Abundio Avelar DO   ibuprofen (ADVIL;MOTRIN) 600 MG tablet TAKE 1 TABLET BY MOUTH EVERY 8 HOURS AS NEEDED FOR PAIN 1/28/21  Yes Abundio Avelar DO   lidocaine (XYLOCAINE) 5 % ointment APPLY TOPICALLY AS NEEDED TO AREA OF TENDERNESS UNDER ARM. 1/11/21  Yes Abundio Avelar DO   methocarbamol (ROBAXIN) 500 MG tablet TAKE 1 TABLET BY MOUTH 3 TIMES A DAY AS NEEDED FOR NECK PAIN 1/4/21  Yes Abundio Avelar DO   rOPINIRole (REQUIP) 2 MG tablet TAKE 1 TABLET BY MOUTH TWICE A DAY 10/13/20  Yes Abundio Avelar DO   DULoxetine (CYMBALTA) 60 MG extended release capsule Take 2 capsules by mouth daily 9/16/20  Yes Abundio Avelar DO   atenolol (TENORMIN) 25 MG tablet TAKE 1 TABLET BY MOUTH EVERY DAY 9/15/20  Yes Abundio Avelar DO   MELATONIN PO Take 20 mg by mouth nightly as needed    Yes Historical Provider, MD   naloxone 4 MG/0.1ML LIQD nasal spray 1 spray by Nasal route as needed for Opioid Reversal 11/25/20   Jet Mclean MD       Allergies: Toradol [ketorolac tromethamine] and Haldol [haloperidol lactate]    Social History:    Tobacco:  reports that she has never smoked. She has never used smokeless tobacco.   Alcohol:  reports current alcohol use.    Illicit Drug: No  Family History:       Problem Relation Age of Onset    Arthritis Mother     Obesity Mother    [facility] Anemia Mother     Other Mother         pulmonary embolism    Arthritis Father     Arrhythmia Father     Diabetes Other     Diabetes Paternal Grandfather     Cancer Neg Hx     Breast Cancer Neg Hx     Colon Cancer Neg Hx      REVIEW OF SYSTEMS:  CONSTITUTIONAL:  negative  MUSCULOSKELETAL:  positive for  pain    PHYSICAL EXAM:  Admission weight: 260 lb (117.9 kg)  5' 6\" (167.6 cm)  VITALS:  /77   Pulse 72 Temp 97.5 °F (36.4 °C) (Temporal)   Resp 16   Ht 5' 6\" (1.676 m)   Wt 269 lb (122 kg)   LMP 09/20/2017   SpO2 94%   BMI 43.42 kg/m²     CONSTITUTIONAL:  awake, alert, cooperative, no apparent distress, and appears stated age  Cardiac : Regular rate and rhythm   Pulmonary : Clear to auscultation bilaterally   MUSCULOSKELETAL:  There is no redness, warmth, or swelling of the joints. Full range of motion noted. Motor strength is 5 out of 5 all extremities bilaterally.   Tone is normal.    DATA:  CBC:   Lab Results   Component Value Date    WBC 7.5 02/19/2021    RBC 4.45 02/19/2021    HGB 13.6 02/19/2021    HCT 41.8 02/19/2021    MCV 93.8 02/19/2021    MCH 30.5 02/19/2021    MCHC 32.5 02/19/2021    RDW 14.9 02/19/2021     02/19/2021    MPV 9.6 02/19/2021     BMP:    Lab Results   Component Value Date     11/23/2020    K 4.6 11/23/2020    K 4.3 04/21/2018     11/23/2020    CO2 25 11/23/2020    BUN 9 11/23/2020    LABALBU 4.1 11/23/2020    CREATININE 0.8 11/23/2020    CALCIUM 9.3 11/23/2020    GFRAA >60 11/23/2020    GFRAA >60 02/01/2012    LABGLOM >60 11/23/2020    GLUCOSE 131 11/23/2020     PT/INR:    Lab Results   Component Value Date    PROTIME 12.0 10/16/2013    INR 1.07 10/16/2013       Radiology:  No orders to display         ASSESSMENT: No contraindications apparent to proceed    PLAN:  1) standard perioperative care  2) informed consent  3) anticipate discharge home      Cammie Tristan

## 2021-03-03 ENCOUNTER — TELEPHONE (OUTPATIENT)
Dept: FAMILY MEDICINE CLINIC | Age: 56
End: 2021-03-03

## 2021-03-03 NOTE — TELEPHONE ENCOUNTER
----- Message from Karolina Ozzy sent at 3/3/2021  3:17 PM EST -----  Subject: Message to Provider    QUESTIONS  Information for Provider? Pt got surgery on 2/24/2021 for a tear in her   rotator cuff. Pt then got an infection in her rotator cuff and had to get   it repaired. Pt just wanted to follow up on this info.   ---------------------------------------------------------------------------  --------------  [address]  What is the best way for the office to contact you? OK to leave message on   voicemail  Preferred Call Back Phone Number? [phone]  ---------------------------------------------------------------------------  --------------  SCRIPT ANSWERS  Relationship to Patient?  Self

## 2021-03-09 RX ORDER — ROPINIROLE 2 MG/1
TABLET, FILM COATED ORAL
Qty: 60 TABLET | Refills: 5 | Status: SHIPPED | OUTPATIENT
Start: 2021-03-09 | End: 2021-07-12 | Stop reason: SDUPTHER

## 2021-03-10 RX ORDER — ATENOLOL 25 MG/1
TABLET ORAL
Qty: 30 TABLET | Refills: 5 | Status: SHIPPED | OUTPATIENT
Start: 2021-03-10 | End: 2021-09-27

## 2021-03-11 DIAGNOSIS — M00.9 PYOGENIC ARTHRITIS OF SHOULDER REGION, DUE TO UNSPECIFIED ORGANISM, UNSPECIFIED LATERALITY (HCC): ICD-10-CM

## 2021-03-11 NOTE — TELEPHONE ENCOUNTER
----- Message from Jero Hernandez sent at 3/11/2021  2:26 PM EST -----  Subject: Message to Provider    QUESTIONS  Information for Provider? Patient had shoulder operated in Feb. 24th and   she needs a refill on her pain medication and her orthopedic surgeon is   out of town till Monday and doesn't have enough of it can you fill till he   is back Dr. Thuan Calixto. Is Percocet 5-325 mg   ---------------------------------------------------------------------------  --------------  CALL BACK INFO  What is the best way for the office to contact you? OK to leave message on   voicemail  Preferred Call Back Phone Number? [phone]  ---------------------------------------------------------------------------  --------------  SCRIPT ANSWERS  Relationship to Patient?  Self

## 2021-03-11 NOTE — TELEPHONE ENCOUNTER
Call her and tell her she needs to contact Dr. Sherrie Patricia office and they need to find somebody to cover for him and give her a few pills. Tell her I am sorry but she cannot have more than one provider prescribe that kind of medicine. The surgeon should be refilling it if he wants her to continue on it. She will have to call his office and have his staff or partners get it taken care of.

## 2021-03-12 RX ORDER — OXYCODONE HYDROCHLORIDE AND ACETAMINOPHEN 5; 325 MG/1; MG/1
1 TABLET ORAL EVERY 6 HOURS PRN
Qty: 28 TABLET | Refills: 0 | OUTPATIENT
Start: 2021-03-12 | End: 2021-03-19

## 2021-03-12 NOTE — TELEPHONE ENCOUNTER
Spoke to patient she will call Dr. Rachel Morgan office. Please refuse script so we can close encounter.

## 2021-03-15 LAB
CULTURE, JOINT AEROBIC: NORMAL
CULTURE, JOINT AEROBIC: NORMAL
CULTURE, JOINT ANAEROBIC: NORMAL
CULTURE, JOINT ANAEROBIC: NORMAL

## 2021-03-15 RX ORDER — LIDOCAINE 50 MG/G
OINTMENT TOPICAL
Qty: 70.88 G | Refills: 1 | Status: SHIPPED | OUTPATIENT
Start: 2021-03-15 | End: 2021-08-30

## 2021-03-22 LAB
CULTURE, JOINT AEROBIC: NORMAL
CULTURE, JOINT ANAEROBIC: NORMAL

## 2021-03-23 RX ORDER — IBUPROFEN 600 MG/1
TABLET ORAL
Qty: 40 TABLET | Refills: 1 | Status: SHIPPED | OUTPATIENT
Start: 2021-03-23 | End: 2021-05-07

## 2021-04-02 NOTE — TELEPHONE ENCOUNTER
[address] [facility] PRIMARY CARE  [address] [address] [facility] [address] [address]  Dept: 630 Florentin Christianson is a 48 y.o. female Established patient, who presents today for her medical conditions/complaints as noted below. Chief Complaint   Patient presents with    Medication Check     3 month Follow up        HPI:     HPI  the patient is taking adipex and was unable to make it due to a migraine. She also started her Lipitor. She was getting pain in her joints ache was aching every where. Cuba Otero had stopped taking that medication. She did have two weeks where she was unable to exercise due to this pain. This will be month three. She is feeling better but her weight has still not dropped. She is using potion control at dinner. Her appetite is much lower. Using protein bar in the morning. She is eating fruits and veggies. She Is going to start using an elliptical. No cehst pain not keeping her up at night. Reviewed prior notes: none  Reviewed previous weight     LDL Cholesterol (mg/dL)   Date Value   2021 167 (H)   03/15/2018 172 (H)   2013 133 (H)       (goal LDL is <100)   AST (U/L)   Date Value   2021 18     ALT (U/L)   Date Value   2021 16     BUN (mg/dL)   Date Value   2021 14     Hemoglobin A1C (%)   Date Value   2021 5.2     TSH (mIU/L)   Date Value   03/15/2018 1.17     BP Readings from Last 3 Encounters:   21 124/76   21 136/82   21 128/80          (goal 120/80)    Past Medical History:   Diagnosis Date    Asthma     Hypertension     MRSA infection     Osteoarthritis of right knee       Past Surgical History:   Procedure Laterality Date     SECTION      KNEE SURGERY Right     5 surgeries between -       No family history on file.     Social History     Tobacco Use    Smoking status: Never Smoker    Smokeless tobacco: Never Used   Substance Use Topics    Alcohol use: No     Alcohol/week: 0.0 Reason for Disposition   SEVERE pain (e.g., excruciating)    Answer Assessment - Initial Assessment Questions  1. APPEARANCE of SWELLING: \"What does it look like? \" (e.g., lymph node, insect bite, mole)      \"bright red bump with no head\"  2. SIZE: \"How large is the swelling? \" (inches, cm or compare to coins)      quarter  3. LOCATION: \"Where is the swelling located? \"      Under right arm near breast  4. ONSET: \"When did the swelling start? \"      8/19/20  5. PAIN: \"Is it painful? \" If so, ask: \"How much? \"      severe  6. ITCH: \"Does it itch? \" If so, ask: \"How much? \"      No  7. CAUSE: \"What do you think caused the swelling? \"      unknown  8. OTHER SYMPTOMS: \"Do you have any other symptoms? \" (e.g., fever)      No    Protocols used: SKIN LUMP OR LOCALIZED SWELLING-ADULT-AH    Patient called [facility]) to schedule appointment with red flag complaint; transferred to Nurse Access for triage by [facility]. Pt calls to report symptoms of bump under right arm. Pt states the swelling started yesterday. Reports recent surgery on that arm. Describes it as a quarter sized, bright red bump with no head to it, with surrounding redness. Rates the pain as severe. Denies fevers    Informed of disposition. Care advice as documented. Instructed to call back with worsening symptoms. Soft transfer to [facility] ([facility]) to schedule appointment as recommended. Please do not respond to the triage nurse through this encounter. Any subsequent communication should be directly with the patient. standard drinks      Current Outpatient Medications   Medication Sig Dispense Refill    phentermine 37.5 MG capsule Take 1 capsule by mouth every morning for 30 days. 30 capsule 0    pravastatin (PRAVACHOL) 40 MG tablet Take 1 tablet by mouth every evening 30 tablet 3    MAGNESIUM-OXIDE PO Take by mouth      lisinopril-hydroCHLOROthiazide (PRINZIDE;ZESTORETIC) 10-12.5 MG per tablet TAKE 1 TABLET BY MOUTH ONE TIME A DAY 30 tablet 5    fluticasone-salmeterol (ADVAIR) 500-50 MCG/DOSE diskus inhaler INHALE 1 INHALATION ORALLY EVERY TWELVE HOURS 60 each 5    montelukast (SINGULAIR) 10 MG tablet TAKE 1 TABLET BY MOUTH IN THE EVENING 30 tablet 5    celecoxib (CELEBREX) 200 MG capsule TAKE 1 CAPSULE BY MOUTH ONE TIME A DAY 30 capsule 5    pantoprazole (PROTONIX) 40 MG tablet TAKE 1 TABLET BY MOUTH ONE TIME A DAY 30 tablet 5    albuterol sulfate HFA (PROAIR HFA) 108 (90 Base) MCG/ACT inhaler Inhale 2 puffs into the lungs every 6 hours as needed for Wheezing 1 Inhaler 5    fluticasone (FLONASE) 50 MCG/ACT nasal spray 2 sprays by Nasal route daily 1 Bottle 5    Vitamins A & D (VITAMIN A & D PO) Take by mouth      b complex vitamins capsule Take 1 capsule by mouth daily      Coenzyme Q10 (CO Q 10 PO) Take by mouth daily       Probiotic Product (PROBIOTIC ADVANCED PO) Take by mouth       No current facility-administered medications for this visit. Allergies   Allergen Reactions    Topamax [Topiramate] Other (See Comments)     Emotional lability    Penicillins Other (See Comments)     Was [age] when had a reaction    Eggs Or Egg-Derived Products Diarrhea     Abdominal cramping.        Health Maintenance   Topic Date Due    Hepatitis C screen  Never done    COVID-19 Vaccine (1) Never done    Cervical cancer screen  Never done    Breast cancer screen  Never done    Shingles Vaccine (1 of 2) Never done    Colon cancer screen colonoscopy  Never done    Flu vaccine (Season Ended) 12/04/2038 (Originally normal.      Nose: Nose normal.      Mouth/Throat:      Mouth: Mucous membranes are moist.   Eyes:      Extraocular Movements: Extraocular movements intact. Conjunctiva/sclera: Conjunctivae normal.      Pupils: Pupils are equal, round, and reactive to light. Neck:      Musculoskeletal: Normal range of motion and neck supple. Vascular: No carotid bruit. Cardiovascular:      Rate and Rhythm: Normal rate and regular rhythm. Pulses: Normal pulses. Heart sounds: Normal heart sounds. Pulmonary:      Effort: Pulmonary effort is normal. No respiratory distress. Breath sounds: Normal breath sounds. Abdominal:      General: Bowel sounds are normal. There is no distension. Tenderness: There is no abdominal tenderness. Musculoskeletal: Normal range of motion. Lymphadenopathy:      Cervical: No cervical adenopathy. Skin:     General: Skin is warm and dry. Neurological:      General: No focal deficit present. Mental Status: She is alert and oriented to person, place, and time. Psychiatric:         Mood and Affect: Mood normal.         Behavior: Behavior normal.         Thought Content: Thought content normal.         Assessment and Plan:        1. Class 3 severe obesity due to excess calories with serious comorbidity and body mass index (BMI) of 40.0 to 44.9 in adult (HCC)  -     phentermine 37.5 MG capsule; Take 1 capsule by mouth every morning for 30 days. , Disp-30 capsule, R-0Normal  2. Medication management  -     phentermine 37.5 MG capsule; Take 1 capsule by mouth every morning for 30 days. , Disp-30 capsule, R-0Normal          Patient given educational materials - see patient instructions. Discussed use, benefit, and side effects of prescribed medications. All patient questions answered. Pt voiced understanding. Reviewed health maintenance. Instructed to continue current medications, diet and exercise. Patient agreed with treatment plan. Follow up as directed. Electronically signed by FARHAN Bruce on 4/2/2021 at 3:07 PM

## 2021-04-05 RX ORDER — CYCLOBENZAPRINE HCL 10 MG
10 TABLET ORAL 3 TIMES DAILY PRN
Qty: 21 TABLET | Refills: 0 | Status: SHIPPED | OUTPATIENT
Start: 2021-04-05 | End: 2021-04-15

## 2021-04-07 RX ORDER — METHOCARBAMOL 500 MG/1
TABLET, FILM COATED ORAL
Qty: 90 TABLET | Refills: 3 | Status: SHIPPED | OUTPATIENT
Start: 2021-04-07 | End: 2021-08-03

## 2021-04-07 NOTE — TELEPHONE ENCOUNTER
Future Appointments   Date Time Provider Diann Sotelo   4/8/2021  2:00 PM Aggie Pickett DO [facility] [address]     9/16/2020

## 2021-04-08 ENCOUNTER — NURSE TRIAGE (OUTPATIENT)
Dept: OTHER | Facility: CLINIC | Age: 56
End: 2021-04-08

## 2021-04-08 ENCOUNTER — VIRTUAL VISIT (OUTPATIENT)
Dept: FAMILY MEDICINE CLINIC | Age: 56
End: 2021-04-08
Payer: COMMERCIAL

## 2021-04-08 DIAGNOSIS — F41.1 GAD (GENERALIZED ANXIETY DISORDER): ICD-10-CM

## 2021-04-08 DIAGNOSIS — N95.0 POST-MENOPAUSAL BLEEDING: Primary | ICD-10-CM

## 2021-04-08 DIAGNOSIS — I10 ESSENTIAL HYPERTENSION: ICD-10-CM

## 2021-04-08 PROCEDURE — 3017F COLORECTAL CA SCREEN DOC REV: CPT | Performed by: FAMILY MEDICINE

## 2021-04-08 PROCEDURE — G8427 DOCREV CUR MEDS BY ELIG CLIN: HCPCS | Performed by: FAMILY MEDICINE

## 2021-04-08 PROCEDURE — 99213 OFFICE O/P EST LOW 20 MIN: CPT | Performed by: FAMILY MEDICINE

## 2021-04-08 RX ORDER — ALPRAZOLAM 1 MG/1
1 TABLET ORAL 3 TIMES DAILY PRN
COMMUNITY
End: 2021-04-13

## 2021-04-08 ASSESSMENT — PATIENT HEALTH QUESTIONNAIRE - PHQ9
SUM OF ALL RESPONSES TO PHQ9 QUESTIONS 1 & 2: 1
SUM OF ALL RESPONSES TO PHQ QUESTIONS 1-9: 1
1. LITTLE INTEREST OR PLEASURE IN DOING THINGS: 0

## 2021-04-08 NOTE — TELEPHONE ENCOUNTER
Reason for Disposition   Postmenopausal vaginal bleeding    Answer Assessment - Initial Assessment Questions  1. AMOUNT: \"Describe the bleeding that you are having. \" \"How much bleeding is there? \"     - SPOTTING: spotting, or pinkish / brownish mucous discharge; does not fill panti-liner or pad     - MILD:  less than 1 pad / hour; less than patient's  menstrual bleeding when she still had menstrual periods    - MODERATE: 1-2 pads / hour; small-medium blood clots (e.g., pea, grape, small coin)     - SEVERE: soaking 2 or more pads/hour for 2 or more hours; bleeding not contained by pads or continuous red blood from vagina; large blood clots (e.g., golf ball, large coin)       States it is like a light period states that it is real red. 2. ONSET: \"When did the bleeding begin? \" \"Is it continuing now? \"      Started yesterday. Yes. 3. MENOPAUSE: \"When was your last menstrual period? \"       States over 5 years ago    4. ABDOMINAL PAIN: \"Do you have any pain? \" \"How bad is the pain? \"  (e.g., Scale 1-10; mild, moderate, or severe)    - MILD (1-3): doesn't interfere with normal activities, abdomen soft and not tender to touch     - MODERATE (4-7): interferes with normal activities or awakens from sleep, tender to touch     - SEVERE (8-10): excruciating pain, doubled over, unable to do any normal activities       States mild cramping. States about 4/10.     5. BLOOD THINNERS: \"Do you take any blood thinners? \" (e.g., Coumadin/warfarin, Pradaxa/dabigatran, aspirin)      Denies    6. HORMONES: Hortenciaarturo Baeze you taking any hormone medications, prescription or OTC? \" (e.g., birth control pills, estrogen)      Denies    7. CAUSE: \"What do you think is causing the bleeding? \" (e.g., recent gyn surgery, recent gyn procedure; known bleeding disorder, uterine cancer)        Unknown    8. HEMODYNAMIC STATUS: \"Are you weak or feeling lightheaded? \" If so, ask: \"Can you stand and walk normally? \"        States yesterday she was feeling weak. States b/p 116/60.     9. OTHER SYMPTOMS: \"What other symptoms are you having with the bleeding? \" (e.g., back pain, burning with urination, fever)      States this am cramping and lower back pain    Protocols used: VAGINAL BLEEDING - POSTMENOPAUSAL-ADULT-AH    Received call from Juanis at pre-service center [facility]) [address] with Red Flag Complaint. Brief description of triage: see above    Triage indicates for patient to be seen in the next 2 weeks. Pt with vv today for med check. Care advice provided, patient verbalizes understanding; denies any other questions or concerns; instructed to call back for any new or worsening symptoms. Writer provided warm transfer to [facility] for appointment scheduling. Attention Provider: Thank you for allowing me to participate in the care of your patient. The patient was connected to triage in response to information provided to the ECC. Please do not respond through this encounter as the response is not directed to a shared pool.

## 2021-04-08 NOTE — PROGRESS NOTES
2021    TELEHEALTH EVALUATION -- Audio/Visual (During REPGP-37 public health emergency)    HPI:    Minerva Silva (:  1965) has requested an audio/video evaluation for the following concern(s):    Follow up and vaginal bleeding   Last period 5 years. Light period like bleeding vaginally.'  Mild cramps. No hormones. Has shoulder surgery and got infected. Had surgery to clean  Going to need another surgery to repair rotator cuff          Review of Systems   Constitutional: Positive for fatigue. Negative for chills and fever. Respiratory: Negative. Cardiovascular: Negative for chest pain. Bp has been running low. Feeling like it is low    Atenolol and lisinopril     Genitourinary: Positive for menstrual problem. Menstrual bleeding. 5 years since her last cycle. Bleeding is light. No associated fever chills or significant pelvic pain. Musculoskeletal:        Right shoulder pain  Had shoulder surgery. Developed infection which then had to be operated and she is now facing a third surgery for rotator cuff. Neurological: Negative for facial asymmetry. Psychiatric/Behavioral: Negative for self-injury and suicidal ideas. Anxious and frustrated/depressed over the surgical scenario. Prior to Visit Medications    Medication Sig Taking? Authorizing Provider   ALPRAZolam Shade Nones) 1 MG tablet Take 1 mg by mouth 3 times daily as needed for Sleep. Yes Historical Provider, MD   methocarbamol (ROBAXIN) 500 MG tablet TAKE 1 TABLET BY MOUTH 3 TIMES A DAY AS NEEDED FOR NECK PAIN Yes Karo Alvarez DO   cyclobenzaprine (FLEXERIL) 10 MG tablet Take 1 tablet by mouth 3 times daily as needed for Muscle spasms Yes Joana Mckeon [facility]   ibuprofen (ADVIL;MOTRIN) 600 MG tablet TAKE 1 TABLET BY MOUTH EVERY 8 HOURS AS NEEDED FOR PAIN Yes Karo Alvarez DO   lidocaine (XYLOCAINE) 5 % ointment APPLY TOPICALLY AS NEEDED TO AREA OF TENDERNESS UNDER ARM.  Yes Karo Alvarez DO atenolol (TENORMIN) 25 MG tablet TAKE 1 TABLET BY MOUTH EVERY DAY Yes Mary Jhaveri, DO   rOPINIRole (REQUIP) 2 MG tablet TAKE 1 TABLET BY MOUTH TWICE A DAY Yes Mary Jhaveri, DO   TRAMADOL HCL PO Take by mouth as needed. Yes Historical Provider, MD   lisinopril (PRINIVIL;ZESTRIL) 20 MG tablet TAKE 1 TABLET BY MOUTH EVERY DAY Yes Mary Jhaveri, DO   omeprazole (PRILOSEC) 40 MG delayed release capsule TAKE 1 CAPSULE BY MOUTH EVERY DAY Yes Mary Jhaveri, DO   DULoxetine (CYMBALTA) 60 MG extended release capsule Take 2 capsules by mouth daily Yes Mary Jhaveri, DO   MELATONIN PO Take 20 mg by mouth nightly as needed  Yes Historical Provider, MD   naloxone 4 MG/0.1ML LIQD nasal spray 1 spray by Nasal route as needed for Opioid Reversal  Patient not taking: Reported on 4/8/2021  Sonam Momin MD       Social History     Tobacco Use    Smoking status: Never Smoker    Smokeless tobacco: Never Used   Substance Use Topics    Alcohol use: Yes     Comment: 1 -2 drinks per month    Drug use: Not Currently     Types: Marijuana     Comment: quit 2016            PHYSICAL EXAMINATION:  [ INSTRUCTIONS:  \"[x]\" Indicates a positive item  \"[]\" Indicates a negative item  -- DELETE ALL ITEMS NOT EXAMINED]  Vital Signs: (As obtained by patient/caregiver or practitioner observation)    Blood pressure-  Heart rate-    Respiratory rate-    Temperature-  Pulse oximetry-     Constitutional: [x] Appears well-developed and well-nourished [x] No apparent distress      [] Abnormal-   Mental status  [x] Alert and awake  [x] Oriented to person/place/time [x]Able to follow commands      Eyes:  EOM    []  Normal  [] Abnormal-  Sclera  []  Normal  [] Abnormal -         Discharge []  None visible  [] Abnormal -    HENT:   [x] Normocephalic, atraumatic.   [] Abnormal   [] Mouth/Throat: Mucous membranes are moist.     External Ears [] Normal  [] Abnormal-     Neck: [] No visualized mass     Pulmonary/Chest: [x] Respiratory effort normal.  [x] No PM    An electronic signature was used to authenticate this note.

## 2021-04-09 ENCOUNTER — TELEPHONE (OUTPATIENT)
Dept: FAMILY MEDICINE CLINIC | Age: 56
End: 2021-04-09

## 2021-04-09 RX ORDER — DULOXETIN HYDROCHLORIDE 60 MG/1
CAPSULE, DELAYED RELEASE ORAL
Qty: 30 CAPSULE | Refills: 5 | Status: SHIPPED | OUTPATIENT
Start: 2021-04-09 | End: 2021-10-04

## 2021-04-09 NOTE — TELEPHONE ENCOUNTER
Please call Dr. Berry November office and see if anybody in the office can see Ms. Downs sooner than the 19th. If not, call [facility] internal medicine use her bleeding does not worsen I think it would be okay. It is not that far off.

## 2021-04-09 NOTE — TELEPHONE ENCOUNTER
----- Message from Yohannes James sent at 4/9/2021 10:33 AM EDT -----  Subject: Message to Provider    QUESTIONS  Information for Provider? PT is calling about her gynecologist apt being   4/19; BP is better but still bleeding.   ---------------------------------------------------------------------------  --------------  CALL BACK INFO  What is the best way for the office to contact you? OK to leave message on   voicemail  Preferred Call Back Phone Number? [phone]  ---------------------------------------------------------------------------  --------------  SCRIPT ANSWERS  Relationship to Patient?  Self

## 2021-04-11 ASSESSMENT — ENCOUNTER SYMPTOMS: RESPIRATORY NEGATIVE: 1

## 2021-04-12 NOTE — PATIENT INSTRUCTIONS
Call GYN for an appointment. Blood pressure medication adjustments as per the progress note. Continue with Ortho for the shoulder.

## 2021-04-13 ENCOUNTER — TELEPHONE (OUTPATIENT)
Dept: FAMILY MEDICINE CLINIC | Age: 56
End: 2021-04-13

## 2021-04-13 ENCOUNTER — OFFICE VISIT (OUTPATIENT)
Dept: OBGYN CLINIC | Age: 56
End: 2021-04-13
Payer: COMMERCIAL

## 2021-04-13 DIAGNOSIS — N95.0 POSTMENOPAUSAL BLEEDING: Primary | ICD-10-CM

## 2021-04-13 DIAGNOSIS — Z12.4 PAP SMEAR FOR CERVICAL CANCER SCREENING: ICD-10-CM

## 2021-04-13 DIAGNOSIS — N95.0 POSTMENOPAUSAL BLEEDING: ICD-10-CM

## 2021-04-13 DIAGNOSIS — F41.1 GENERALIZED ANXIETY DISORDER: ICD-10-CM

## 2021-04-13 PROCEDURE — 76856 US EXAM PELVIC COMPLETE: CPT | Performed by: OBSTETRICS & GYNECOLOGY

## 2021-04-13 PROCEDURE — 58100 BIOPSY OF UTERUS LINING: CPT | Performed by: OBSTETRICS & GYNECOLOGY

## 2021-04-13 RX ORDER — ALPRAZOLAM 1 MG/1
1 TABLET ORAL 3 TIMES DAILY PRN
Qty: 90 TABLET | Refills: 1 | Status: SHIPPED | OUTPATIENT
Start: 2021-04-13 | End: 2021-06-08

## 2021-04-13 NOTE — PROGRESS NOTES
New Patient Gynecologic Exam      CC:   Chief Complaint   Patient presents with    New Patient     Postmenopausal Bleeding       HPI:  64 y.o. Catia Frias presents for evaluation of postmenopausal bleeding. Patient seen and examined. Patient is overall doing well. Patient underwent menopause approx 5 years ago. Reports last week she started bleeding for 5 days. Bleeding stopped on . Reports cramping prior to onset of bleeding. Patient reports she is on Tramadol and Muscle relaxer for torn rotator cuff and thought these might have something to do with it. Reports 10 years ago had right oophorectomy due to ovarian torsion. Medical history significant for obesity, hypertension, restless legs, anxiety, depression, and several orthopedic concerns. Is currently on her fourth shoulder surgery in the past year. Obstetric history significant for 3 pregnancies, one was a set of twins. All delivered via  delivery. One of the twins since has passed in adulthood due to complications of alcoholism. Health Maintenance:  Birth control: Menopause  Safe relationship:  - when twins were 8 months    Screening:  Last pap smear:  - reports as normal   History of abnormal pap smears: Denies  Mammogram: 2020 - Birads 1  Colonoscopy: Has had a couple of years ago     Vaccines:  Pneumococcal vaccine: Has not had   Flu vaccine: Has had   Zoster vaccine: Has not had   COVID-19: Has had first in series      Review of Systems:   Review of Systems   Constitutional: Negative for chills and fever. HENT: Negative for congestion, sneezing and sore throat. Respiratory: Negative for cough and shortness of breath. Cardiovascular: Negative for chest pain and palpitations. Gastrointestinal: Negative for abdominal pain, constipation, diarrhea, nausea and vomiting. Genitourinary: Positive for menstrual problem and vaginal bleeding.  Negative for dysuria, frequency, pelvic pain and vaginal discharge. Neurological: Negative for dizziness and headaches. Primary Care Physician: Darío Cortez DO    Obstetric History  OB History    Para Term  AB Living   3 3 3 0 0 3   SAB TAB Ectopic Molar Multiple Live Births   0 0 0 0 1 3      # Outcome Date GA Lbr Schuyler/2nd Weight Sex Delivery Anes PTL Lv   3 Term 90    F CS-Unspec   SANTIAGO   2A Term 86    M CS-Unspec   SANTIAGO   2B Term 86    M CS-Unspec      1 Term 12/15/84    F CS-Unspec   SANTIAGO       Gynecologic History  Menstrual History:   LMP: 5 years ago   [facility] Age of Menarche: 15   Postmenopausal bleeding: Current episode, none prior   Hx of endometriosis    Sexual History:   Contraception: see above   Currently is sexually active   5-10 Lifetime partners   Denies history of STIs   Denies sexual problems    Pap History:   History of abnormal pap smears: see above   Last pap: see above      Medical History:  Past Medical History:   Diagnosis Date    Anxiety     Depression     Endometriosis     GERD (gastroesophageal reflux disease)     Hypertension     Osteoarthritis     Restless leg syndrome        Medications:  Current Outpatient Medications   Medication Sig Dispense Refill    ALPRAZolam (XANAX) 1 MG tablet Take 1 tablet by mouth 3 times daily as needed for Anxiety for up to 60 days. 90 tablet 1    DULoxetine (CYMBALTA) 60 MG extended release capsule TAKE 1 CAPSULE BY MOUTH EVERY DAY 30 capsule 5    methocarbamol (ROBAXIN) 500 MG tablet TAKE 1 TABLET BY MOUTH 3 TIMES A DAY AS NEEDED FOR NECK PAIN 90 tablet 3    ibuprofen (ADVIL;MOTRIN) 600 MG tablet TAKE 1 TABLET BY MOUTH EVERY 8 HOURS AS NEEDED FOR PAIN 40 tablet 1    lidocaine (XYLOCAINE) 5 % ointment APPLY TOPICALLY AS NEEDED TO AREA OF TENDERNESS UNDER ARM.  70.88 g 1    atenolol (TENORMIN) 25 MG tablet TAKE 1 TABLET BY MOUTH EVERY DAY 30 tablet 5    rOPINIRole (REQUIP) 2 MG tablet TAKE 1 TABLET BY MOUTH TWICE A DAY 60 tablet 5    TRAMADOL HCL PO conducted with a chaperone present. Constitutional:       General: She is not in acute distress. Appearance: She is well-developed. HENT:      Head: Normocephalic and atraumatic. Eyes:      Conjunctiva/sclera: Conjunctivae normal.   Cardiovascular:      Rate and Rhythm: Normal rate. Pulmonary:      Effort: Pulmonary effort is normal. No respiratory distress. Abdominal:      General: There is no distension. Palpations: Abdomen is soft. Tenderness: There is no abdominal tenderness. There is no guarding or rebound. Genitourinary:     General: Normal vulva. Exam position: Lithotomy position. Labia:         Right: No rash, tenderness or lesion. Left: No rash, tenderness or lesion. Vagina: No signs of injury. No vaginal discharge, erythema, tenderness, bleeding or lesions. Cervix: No cervical motion tenderness, discharge, friability, lesion, erythema or cervical bleeding. Uterus: Not deviated, not enlarged, not fixed, not tender and no uterine prolapse. Adnexa:         Right: No mass, tenderness or fullness. Left: No mass, tenderness or fullness. Musculoskeletal:         General: No swelling. Skin:     General: Skin is warm and dry. Neurological:      Mental Status: She is alert and oriented to person, place, and time. Psychiatric:         Mood and Affect: Mood normal.         Behavior: Behavior normal.         Thought Content: Thought content normal.          Ultrasound:   PELVIC ULTRASOUND without DOPPLER INTERROGATION   NON OB    DATE: 04/13/2021    PHYSICIAN: MARY ALICE Gonzalez D.O.     SONOGRAPHER: RUFUS Sullivan [facility]    INDICATION: post-menopausal bleeding    TYPE OF SCAN: vaginal    FINDINGS:    The cul de sac is normal. No free fluid appreciated. The cervix is normal and not enlarged. Nabothian cyst/s is not noted within the uterine cervix. The uterus measures 8.89 cm x 3.49 cm x 2.77 cm. The uterus is anteverted.   The endometrium

## 2021-04-14 LAB
HPV COMMENT: NORMAL
HPV TYPE 16: NOT DETECTED
HPV TYPE 18: NOT DETECTED
HPVOH (OTHER TYPES): NOT DETECTED

## 2021-04-15 ENCOUNTER — TELEPHONE (OUTPATIENT)
Dept: OBGYN CLINIC | Age: 56
End: 2021-04-15

## 2021-04-15 NOTE — TELEPHONE ENCOUNTER
Patient calling requesting a call back from Dr. Romayne Rouleau to discuss options with her from [address] [place] [address] appointment. She spoke with her mom about options and just wants to make sure she is giving all the correct information. Routing to Dr. Romayne Rouleau.

## 2021-04-15 NOTE — TELEPHONE ENCOUNTER
Patient is requesting a call back sooner as she would like her mother to be on the line with her. Please call patient at your earliest convenience. Routing to Dr. Vance Santiago.

## 2021-04-15 NOTE — TELEPHONE ENCOUNTER
Please call patient and advise that I anticipate her pathology to be back today or tomorrow and see if she would be okay with me calling her after this results as we will be able to have a more directed conversation regarding options. Right now next steps depend entirely on the pathology results. If patient still desires to speak prior to that I would be happy to, however will be able to give more directed information following the pathology. Thank you.

## 2021-04-16 ENCOUNTER — TELEPHONE (OUTPATIENT)
Dept: OBGYN CLINIC | Age: 56
End: 2021-04-16

## 2021-04-16 NOTE — TELEPHONE ENCOUNTER
Called patient to review pathology results.  Reviewed benign EMB.  Reviewed next step would be evaluation with hysteroscopy D&C with possible Myosure polypectomy due to thickened lining on ultrasound.  Please start prior authorization procedure for such.  Patient may require Cardiac clearance prior to surgery.  Thank you. Routing to [place] to start the surgery scheduling process.

## 2021-04-19 NOTE — TELEPHONE ENCOUNTER
Spoke with patient authorization process has been started, spoke with Dr. Cait Baker and this is not a urgent surgery so I will call when I get approval to schedule at next available OR day.

## 2021-04-20 NOTE — PROGRESS NOTES
Sherly Mais    Age 64 y.o.    female    1965    MRN [id number]    4/28/2021  Arrival Time_____________  OR Time____________[address]     Procedure(s):  RIGHT SHOULDER OPEN ROTATOR CUFF REPAIR -BLOCK-                      General    Surgeon(s):  Joseline Voss, MD       Phone [phone] ([place])     [address]  Cell         Work  _____________________________________________________________________  _____________________________________________________________________  _____________________________________________________________________  _____________________________________________________________________  _____________________________________________________________________      PCP _____________________________ Phone_________________       H&P__________________Bringing      Chart            Epic   DOS      Called________  EKG__________________Bringing      Chart            Epic   DOS      Called________  LAB__________________ Bringing      Chart            Epic   DOS      Called________  Cardiac Clearance_______Bringing      Chart            Epic      DOS      Called________    Cardiologist________________________ Phone___________________________    ? Restorationism concerns / Waiver on Chart            PAT Communications________________  ? Pre-op Instructions Given [place]          _________________________________  ? Directions to Surgery Center                          _________________________________  ? Transportation Home_______________      __________________________________  ?  Crutches/Walker__________________        __________________________________      ________Pre-op Orders   _______Transcribed    _______Wt.  ________Pharmacy          _______SCD  ______VTE     ______Beta [facility]ocker  ________Consent             ________TED HOSE    COVID DATE_________   LOCATION___________________  RESULT____________

## 2021-04-21 VITALS
SYSTOLIC BLOOD PRESSURE: 144 MMHG | DIASTOLIC BLOOD PRESSURE: 96 MMHG | WEIGHT: 280.4 LBS | HEART RATE: 60 BPM | TEMPERATURE: 97.5 F | BODY MASS INDEX: 45.26 KG/M2

## 2021-04-21 ASSESSMENT — ENCOUNTER SYMPTOMS
SHORTNESS OF BREATH: 0
NAUSEA: 0
DIARRHEA: 0
VOMITING: 0
COUGH: 0
SORE THROAT: 0
CONSTIPATION: 0
ABDOMINAL PAIN: 0

## 2021-04-21 NOTE — PROGRESS NOTES
Obstructive Sleep Apnea (MERI) Screening     Patient:  Nasrin Polanco    YOB: 1965      Medical Record #:  [id number]                     Date:  4/21/2021     1. Are you a loud and/or regular snorer? []  Yes       [x] No    2. Have you been observed to gasp or stop breathing during sleep? []  Yes       [x] No    3. Do you feel tired or groggy upon awakening or do you awaken with a headache?           []  Yes       [] No    4. Are you often tired or fatigued during the wake time hours? []  Yes       [] No    5. Do you fall asleep sitting, reading, watching TV or driving? []  Yes       [] No    6. Do you often have problems with memory or concentration? []  Yes       [] No    **If patient's score is ? 3 they are considered high risk for MERI. An Anesthesia provider will evaluate the patient and develop a plan of care the day of surgery. Note:  If the patient's BMI is more than 35 kg m¯² , has neck circumference > 40 cm, and/or high blood pressure the risk is greater (© American Sleep Apnea Association, 2006).

## 2021-04-22 ENCOUNTER — OFFICE VISIT (OUTPATIENT)
Dept: PRIMARY CARE CLINIC | Age: 56
End: 2021-04-22
Payer: COMMERCIAL

## 2021-04-22 DIAGNOSIS — Z01.818 PRE-OP TESTING: Primary | ICD-10-CM

## 2021-04-22 LAB — SARS-COV-2: NOT DETECTED

## 2021-04-22 PROCEDURE — G8417 CALC BMI ABV UP PARAM F/U: HCPCS | Performed by: NURSE PRACTITIONER

## 2021-04-22 PROCEDURE — G8428 CUR MEDS NOT DOCUMENT: HCPCS | Performed by: NURSE PRACTITIONER

## 2021-04-22 PROCEDURE — 99211 OFF/OP EST MAY X REQ PHY/QHP: CPT | Performed by: NURSE PRACTITIONER

## 2021-04-22 NOTE — PROGRESS NOTES
Michele Minor received a viral test for COVID-19. They were educated on isolation and quarantine as appropriate. For any symptoms, they were directed to seek care from their PCP, given contact information to establish with a doctor, directed to an urgent care or the emergency room.

## 2021-04-26 ENCOUNTER — OFFICE VISIT (OUTPATIENT)
Dept: FAMILY MEDICINE CLINIC | Age: 56
End: 2021-04-26
Payer: COMMERCIAL

## 2021-04-26 VITALS
WEIGHT: 272 LBS | HEIGHT: 66 IN | SYSTOLIC BLOOD PRESSURE: 100 MMHG | TEMPERATURE: 98 F | OXYGEN SATURATION: 95 % | BODY MASS INDEX: 43.71 KG/M2 | HEART RATE: 58 BPM | DIASTOLIC BLOOD PRESSURE: 64 MMHG | RESPIRATION RATE: 14 BRPM

## 2021-04-26 DIAGNOSIS — M75.101 NONTRAUMATIC TEAR OF RIGHT ROTATOR CUFF, UNSPECIFIED TEAR EXTENT: Primary | ICD-10-CM

## 2021-04-26 DIAGNOSIS — I10 ESSENTIAL HYPERTENSION: ICD-10-CM

## 2021-04-26 DIAGNOSIS — Z01.818 PRE-OP EXAMINATION: ICD-10-CM

## 2021-04-26 LAB
ANION GAP SERPL CALCULATED.3IONS-SCNC: 13 MMOL/L (ref 3–16)
BASOPHILS ABSOLUTE: 0 K/UL (ref 0–0.2)
BASOPHILS RELATIVE PERCENT: 0.4 %
BUN BLDV-MCNC: 15 MG/DL (ref 7–20)
CALCIUM SERPL-MCNC: 8.9 MG/DL (ref 8.3–10.6)
CHLORIDE BLD-SCNC: 104 MMOL/L (ref 99–110)
CO2: 25 MMOL/L (ref 21–32)
CREAT SERPL-MCNC: 1 MG/DL (ref 0.6–1.1)
EOSINOPHILS ABSOLUTE: 0.2 K/UL (ref 0–0.6)
EOSINOPHILS RELATIVE PERCENT: 3 %
GFR AFRICAN AMERICAN: >60
GFR NON-AFRICAN AMERICAN: 57
GLUCOSE BLD-MCNC: 125 MG/DL (ref 70–99)
HCT VFR BLD CALC: 43.4 % (ref 36–48)
HEMOGLOBIN: 14.2 G/DL (ref 12–16)
LYMPHOCYTES ABSOLUTE: 1.7 K/UL (ref 1–5.1)
LYMPHOCYTES RELATIVE PERCENT: 22.7 %
MCH RBC QN AUTO: 30.8 PG (ref 26–34)
MCHC RBC AUTO-ENTMCNC: 32.7 G/DL (ref 31–36)
MCV RBC AUTO: 94.1 FL (ref 80–100)
MONOCYTES ABSOLUTE: 0.5 K/UL (ref 0–1.3)
MONOCYTES RELATIVE PERCENT: 6.1 %
NEUTROPHILS ABSOLUTE: 5.1 K/UL (ref 1.7–7.7)
NEUTROPHILS RELATIVE PERCENT: 67.8 %
PDW BLD-RTO: 15.2 % (ref 12.4–15.4)
PLATELET # BLD: 203 K/UL (ref 135–450)
PMV BLD AUTO: 9.5 FL (ref 5–10.5)
POTASSIUM SERPL-SCNC: 4.2 MMOL/L (ref 3.5–5.1)
RBC # BLD: 4.61 M/UL (ref 4–5.2)
SODIUM BLD-SCNC: 142 MMOL/L (ref 136–145)
WBC # BLD: 7.5 K/UL (ref 4–11)

## 2021-04-26 PROCEDURE — 99214 OFFICE O/P EST MOD 30 MIN: CPT | Performed by: FAMILY MEDICINE

## 2021-04-26 PROCEDURE — G8427 DOCREV CUR MEDS BY ELIG CLIN: HCPCS | Performed by: FAMILY MEDICINE

## 2021-04-26 PROCEDURE — G8417 CALC BMI ABV UP PARAM F/U: HCPCS | Performed by: FAMILY MEDICINE

## 2021-04-26 PROCEDURE — 3017F COLORECTAL CA SCREEN DOC REV: CPT | Performed by: FAMILY MEDICINE

## 2021-04-26 PROCEDURE — 1036F TOBACCO NON-USER: CPT | Performed by: FAMILY MEDICINE

## 2021-04-26 ASSESSMENT — PATIENT HEALTH QUESTIONNAIRE - PHQ9
SUM OF ALL RESPONSES TO PHQ QUESTIONS 1-9: 0
1. LITTLE INTEREST OR PLEASURE IN DOING THINGS: 0
SUM OF ALL RESPONSES TO PHQ9 QUESTIONS 1 & 2: 0

## 2021-04-26 ASSESSMENT — ENCOUNTER SYMPTOMS
DIARRHEA: 0
COUGH: 0
TROUBLE SWALLOWING: 0
WHEEZING: 0
SHORTNESS OF BREATH: 0
ABDOMINAL PAIN: 0
CONSTIPATION: 0
SORE THROAT: 0

## 2021-04-26 NOTE — PROGRESS NOTES
Subjective:      Patient ID: Juan Pablo Workman is a 64 y.o. y.o. female. Right shoulder rotator cuff pathology. Going to have a tendon transfer repair    Dr Stas MOSES      Chief Complaint   Patient presents with   Estefani [place] Right shoulder on 2021 Rotator Cuff repair       Allergies   Allergen Reactions    Toradol [Ketorolac Tromethamine] Other (See Comments)     \"out of it\"  \"felt like sleep paralysis\"    Haldol [Haloperidol Lactate] Other (See Comments)     \"felt out of it, similar to the toradol\"       Past Medical History:   Diagnosis Date    Anxiety     Depression     Endometriosis     GERD (gastroesophageal reflux disease)     Hypertension     Osteoarthritis     Restless leg syndrome        Past Surgical History:   Procedure Laterality Date    ANUS SURGERY  2018    fissure     SECTION      x3    COLONOSCOPY      FRACTURE SURGERY      right wrist    HERNIA REPAIR  2018    LAPAROSCOPIC PARAESOPHAGEAL HERNIA REPAIR WITH MESH    HIATAL HERNIA REPAIR  2018    KNEE ARTHROSCOPY Left 11/15/12    ARTHROSCOPY LEFT KNEE WITH SYOVECTOMY AND CHONDROPLASTY    OVARY REMOVAL Right     ROTATOR CUFF REPAIR Right 2020    EXAM UNDER ANESTHESIA VIDEO ARTHROSCOPY RIGHT SHOULDER, MINI- OPEN ROTATOR CUFF REPAIR, NEER ACROMIOPLASTY, LENO PROCEDURE WITH EXPAREL performed by Derek Tejada MD at [place] [address] ARTHROSCOPY Right 2020    VIDEO ARTHROSCOPY RIGHT SHOULDER, OPEN ROTATOR CUFF REPAIR, BICEPS TENOTOMY -BLOCK- performed by Jennifer Frederick MD at [place] [address] ARTHROSCOPY Right 2021    RIGHT SHOULDER ARTHROSCOPIC INCISION AND DRAINAGE performed by Jennifer Frederick MD at [address] [place] [address]      UPPER GASTROINTESTINAL ENDOSCOPY  2015    esophageasl stretch       Social History     Socioeconomic History    Marital status:       Spouse name: Not on file    Number of children: 3    Years of education: Not on file    Highest education level: Not on file   Occupational History    Occupation:    Social Needs    Financial resource strain: Not on file    Food insecurity     Worry: Not on file     Inability: Not on file    Transportation needs     Medical: Not on file     Non-medical: Not on file   Tobacco Use    Smoking status: Never Smoker    Smokeless tobacco: Never Used   Substance and Sexual Activity    Alcohol use: Yes     Comment: 1 -2 drinks per month    Drug use: Not Currently     Types: Marijuana     Comment: quit 2016    Sexual activity: Never   Lifestyle    Physical activity     Days per week: Not on file     Minutes per session: Not on file    Stress: Not on file   Relationships    Social connections     Talks on phone: Not on file     Gets together: Not on file     Attends Gnosticism service: Not on file     Active member of club or organization: Not on file     Attends meetings of clubs or organizations: Not on file     Relationship status: Not on file    Intimate partner violence     Fear of current or ex partner: Not on file     Emotionally abused: Not on file     Physically abused: Not on file     Forced sexual activity: Not on file   Other Topics Concern    Not on file   Social History Narrative    Not on file       Family History   Problem Relation Age of Onset    Arthritis Mother     Obesity Mother    Laxmi Burroughs Anemia Mother    Laxmi Burroughs Other Mother         pulmonary embolism    Arthritis Father     Arrhythmia Father     Diabetes Other     Diabetes Paternal Grandfather     Cancer Neg Hx     Breast Cancer Neg Hx     Colon Cancer Neg Hx        Vitals:    04/26/21 0917   BP: 100/64   Pulse: 58   Resp: 14   Temp: 98 °F (36.7 °C)   SpO2: 95%       Wt Readings from Last 3 Encounters:   04/26/21 272 lb (123.4 kg)   04/13/21 280 lb 6.4 oz (127.2 kg)   02/24/21 269 lb (122 kg)       Review of Systems   Constitutional: Positive for activity change. Negative for appetite change and unexpected weight change. Limited some by shoulder condition   HENT: Positive for congestion. Negative for sore throat and trouble swallowing. Some seasonal runny nose. Not severe. Respiratory: Negative for cough, shortness of breath and wheezing. Cardiovascular: Negative for chest pain, palpitations and leg swelling. Gastrointestinal: Negative for abdominal pain, constipation and diarrhea. Genitourinary:        Had her pap. recommended she have hysteroscopy-  Endometrium thick  Post menopausal bleeding. Musculoskeletal:        Right shoulder sx    Neurological: Negative. Psychiatric/Behavioral: Negative for self-injury and suicidal ideas. Mood / depression sx are decent. Feels like meds effective,       Objective:   Physical Exam  Vitals signs reviewed. Constitutional:       Appearance: She is obese. She is not ill-appearing. HENT:      Right Ear: Tympanic membrane normal.      Left Ear: Tympanic membrane normal.      Mouth/Throat:      Mouth: Mucous membranes are moist.      Pharynx: Oropharynx is clear. Eyes:      General: No scleral icterus. Extraocular Movements: Extraocular movements intact. Pupils: Pupils are equal, round, and reactive to light. Cardiovascular:      Rate and Rhythm: Normal rate and regular rhythm. Pulses: Normal pulses. Heart sounds: Normal heart sounds. Abdominal:      General: Abdomen is flat. Bowel sounds are normal. There is no distension. Palpations: Abdomen is soft. There is no mass. Musculoskeletal:      Right lower leg: No edema. Left lower leg: No edema. Comments: Shoulder -rt- abduction 30 degrees. General decrease motion   Lymphadenopathy:      Cervical: No cervical adenopathy. Skin:     General: Skin is warm and dry. Neurological:      General: No focal deficit present.       Mental Status: She is alert and oriented to person,

## 2021-04-27 ENCOUNTER — ANESTHESIA EVENT (OUTPATIENT)
Dept: OPERATING ROOM | Age: 56
End: 2021-04-27
Payer: COMMERCIAL

## 2021-04-27 ENCOUNTER — PATIENT MESSAGE (OUTPATIENT)
Dept: OBGYN CLINIC | Age: 56
End: 2021-04-27

## 2021-04-28 ENCOUNTER — HOSPITAL ENCOUNTER (OUTPATIENT)
Age: 56
Setting detail: OUTPATIENT SURGERY
Discharge: HOME | End: 2021-04-28
Attending: ORTHOPAEDIC SURGERY | Admitting: ORTHOPAEDIC SURGERY
Payer: COMMERCIAL

## 2021-04-28 ENCOUNTER — ANESTHESIA (OUTPATIENT)
Dept: OPERATING ROOM | Age: 56
End: 2021-04-28
Payer: COMMERCIAL

## 2021-04-28 VITALS
OXYGEN SATURATION: 92 % | BODY MASS INDEX: 45 KG/M2 | WEIGHT: 280 LBS | DIASTOLIC BLOOD PRESSURE: 79 MMHG | RESPIRATION RATE: 20 BRPM | TEMPERATURE: 97.4 F | SYSTOLIC BLOOD PRESSURE: 117 MMHG | HEART RATE: 63 BPM | HEIGHT: 66 IN

## 2021-04-28 VITALS
OXYGEN SATURATION: 87 % | RESPIRATION RATE: 19 BRPM | DIASTOLIC BLOOD PRESSURE: 74 MMHG | SYSTOLIC BLOOD PRESSURE: 120 MMHG

## 2021-04-28 DIAGNOSIS — S46.011D TRAUMATIC COMPLETE TEAR OF RIGHT ROTATOR CUFF, SUBSEQUENT ENCOUNTER: Primary | ICD-10-CM

## 2021-04-28 PROCEDURE — 7100000001 HC PACU RECOVERY - ADDTL 15 MIN: Performed by: ORTHOPAEDIC SURGERY

## 2021-04-28 PROCEDURE — 2580000003 HC RX 258: Performed by: ORTHOPAEDIC SURGERY

## 2021-04-28 PROCEDURE — 6360000002 HC RX W HCPCS: Performed by: ORTHOPAEDIC SURGERY

## 2021-04-28 PROCEDURE — 2580000003 HC RX 258: Performed by: ANESTHESIOLOGY

## 2021-04-28 PROCEDURE — 2709999900 HC NON-CHARGEABLE SUPPLY: Performed by: ORTHOPAEDIC SURGERY

## 2021-04-28 PROCEDURE — 2720000010 HC SURG SUPPLY STERILE: Performed by: ORTHOPAEDIC SURGERY

## 2021-04-28 PROCEDURE — 3600000004 HC SURGERY LEVEL 4 BASE: Performed by: ORTHOPAEDIC SURGERY

## 2021-04-28 PROCEDURE — 3600000014 HC SURGERY LEVEL 4 ADDTL 15MIN: Performed by: ORTHOPAEDIC SURGERY

## 2021-04-28 PROCEDURE — 6370000000 HC RX 637 (ALT 250 FOR IP): Performed by: ORTHOPAEDIC SURGERY

## 2021-04-28 PROCEDURE — 3700000000 HC ANESTHESIA ATTENDED CARE: Performed by: ORTHOPAEDIC SURGERY

## 2021-04-28 PROCEDURE — 7100000011 HC PHASE II RECOVERY - ADDTL 15 MIN: Performed by: ORTHOPAEDIC SURGERY

## 2021-04-28 PROCEDURE — C1713 ANCHOR/SCREW BN/BN,TIS/BN: HCPCS | Performed by: ORTHOPAEDIC SURGERY

## 2021-04-28 PROCEDURE — 64415 NJX AA&/STRD BRCH PLXS IMG: CPT | Performed by: ANESTHESIOLOGY

## 2021-04-28 PROCEDURE — 6360000002 HC RX W HCPCS: Performed by: NURSE ANESTHETIST, CERTIFIED REGISTERED

## 2021-04-28 PROCEDURE — 7100000010 HC PHASE II RECOVERY - FIRST 15 MIN: Performed by: ORTHOPAEDIC SURGERY

## 2021-04-28 PROCEDURE — 3700000001 HC ADD 15 MINUTES (ANESTHESIA): Performed by: ORTHOPAEDIC SURGERY

## 2021-04-28 PROCEDURE — 6360000002 HC RX W HCPCS: Performed by: ANESTHESIOLOGY

## 2021-04-28 PROCEDURE — 7100000000 HC PACU RECOVERY - FIRST 15 MIN: Performed by: ORTHOPAEDIC SURGERY

## 2021-04-28 PROCEDURE — 2500000003 HC RX 250 WO HCPCS: Performed by: NURSE ANESTHETIST, CERTIFIED REGISTERED

## 2021-04-28 DEVICE — ANCHOR SUTURE BIOCOMP 4.75X19.1 MM SWIVELOCK C: Type: IMPLANTABLE DEVICE | Site: SHOULDER | Status: FUNCTIONAL

## 2021-04-28 RX ORDER — MAGNESIUM HYDROXIDE 1200 MG/15ML
LIQUID ORAL CONTINUOUS PRN
Status: COMPLETED | OUTPATIENT
Start: 2021-04-28 | End: 2021-04-28

## 2021-04-28 RX ORDER — OXYCODONE HYDROCHLORIDE AND ACETAMINOPHEN 5; 325 MG/1; MG/1
1 TABLET ORAL PRN
Status: DISCONTINUED | OUTPATIENT
Start: 2021-04-28 | End: 2021-04-28 | Stop reason: HOSPADM

## 2021-04-28 RX ORDER — CELECOXIB 400 MG/1
400 CAPSULE ORAL ONCE
Status: COMPLETED | OUTPATIENT
Start: 2021-04-28 | End: 2021-04-28

## 2021-04-28 RX ORDER — PROMETHAZINE HYDROCHLORIDE 25 MG/ML
6.25 INJECTION, SOLUTION INTRAMUSCULAR; INTRAVENOUS
Status: DISCONTINUED | OUTPATIENT
Start: 2021-04-28 | End: 2021-04-28 | Stop reason: HOSPADM

## 2021-04-28 RX ORDER — MEPERIDINE HYDROCHLORIDE 50 MG/ML
12.5 INJECTION INTRAMUSCULAR; INTRAVENOUS; SUBCUTANEOUS EVERY 5 MIN PRN
Status: DISCONTINUED | OUTPATIENT
Start: 2021-04-28 | End: 2021-04-28 | Stop reason: HOSPADM

## 2021-04-28 RX ORDER — EPHEDRINE SULFATE 50 MG/ML
INJECTION INTRAVENOUS PRN
Status: DISCONTINUED | OUTPATIENT
Start: 2021-04-28 | End: 2021-04-28 | Stop reason: SDUPTHER

## 2021-04-28 RX ORDER — SODIUM CHLORIDE, SODIUM LACTATE, POTASSIUM CHLORIDE, AND CALCIUM CHLORIDE .6; .31; .03; .02 G/100ML; G/100ML; G/100ML; G/100ML
IRRIGANT IRRIGATION PRN
Status: DISCONTINUED | OUTPATIENT
Start: 2021-04-28 | End: 2021-04-28 | Stop reason: ALTCHOICE

## 2021-04-28 RX ORDER — OXYCODONE HYDROCHLORIDE AND ACETAMINOPHEN 5; 325 MG/1; MG/1
2 TABLET ORAL PRN
Status: DISCONTINUED | OUTPATIENT
Start: 2021-04-28 | End: 2021-04-28 | Stop reason: HOSPADM

## 2021-04-28 RX ORDER — MORPHINE SULFATE 2 MG/ML
2 INJECTION, SOLUTION INTRAMUSCULAR; INTRAVENOUS EVERY 5 MIN PRN
Status: DISCONTINUED | OUTPATIENT
Start: 2021-04-28 | End: 2021-04-28 | Stop reason: HOSPADM

## 2021-04-28 RX ORDER — ONDANSETRON 2 MG/ML
INJECTION INTRAMUSCULAR; INTRAVENOUS PRN
Status: DISCONTINUED | OUTPATIENT
Start: 2021-04-28 | End: 2021-04-28 | Stop reason: SDUPTHER

## 2021-04-28 RX ORDER — FENTANYL CITRATE 50 UG/ML
INJECTION, SOLUTION INTRAMUSCULAR; INTRAVENOUS
Status: COMPLETED
Start: 2021-04-28 | End: 2021-04-28

## 2021-04-28 RX ORDER — HYDRALAZINE HYDROCHLORIDE 20 MG/ML
5 INJECTION INTRAMUSCULAR; INTRAVENOUS EVERY 10 MIN PRN
Status: DISCONTINUED | OUTPATIENT
Start: 2021-04-28 | End: 2021-04-28 | Stop reason: HOSPADM

## 2021-04-28 RX ORDER — MIDAZOLAM HYDROCHLORIDE 1 MG/ML
INJECTION INTRAMUSCULAR; INTRAVENOUS PRN
Status: DISCONTINUED | OUTPATIENT
Start: 2021-04-28 | End: 2021-04-28 | Stop reason: SDUPTHER

## 2021-04-28 RX ORDER — SODIUM CHLORIDE, SODIUM LACTATE, POTASSIUM CHLORIDE, CALCIUM CHLORIDE 600; 310; 30; 20 MG/100ML; MG/100ML; MG/100ML; MG/100ML
INJECTION, SOLUTION INTRAVENOUS CONTINUOUS
Status: DISCONTINUED | OUTPATIENT
Start: 2021-04-28 | End: 2021-04-28 | Stop reason: HOSPADM

## 2021-04-28 RX ORDER — DIPHENHYDRAMINE HYDROCHLORIDE 50 MG/ML
12.5 INJECTION INTRAMUSCULAR; INTRAVENOUS
Status: DISCONTINUED | OUTPATIENT
Start: 2021-04-28 | End: 2021-04-28 | Stop reason: HOSPADM

## 2021-04-28 RX ORDER — MORPHINE SULFATE 2 MG/ML
1 INJECTION, SOLUTION INTRAMUSCULAR; INTRAVENOUS EVERY 5 MIN PRN
Status: DISCONTINUED | OUTPATIENT
Start: 2021-04-28 | End: 2021-04-28 | Stop reason: HOSPADM

## 2021-04-28 RX ORDER — SODIUM CHLORIDE 9 MG/ML
25 INJECTION, SOLUTION INTRAVENOUS PRN
Status: DISCONTINUED | OUTPATIENT
Start: 2021-04-28 | End: 2021-04-28 | Stop reason: HOSPADM

## 2021-04-28 RX ORDER — OXYCODONE HYDROCHLORIDE AND ACETAMINOPHEN 5; 325 MG/1; MG/1
1 TABLET ORAL EVERY 6 HOURS PRN
Qty: 28 TABLET | Refills: 0 | Status: SHIPPED | OUTPATIENT
Start: 2021-04-28 | End: 2021-05-05

## 2021-04-28 RX ORDER — MIDAZOLAM HYDROCHLORIDE 1 MG/ML
INJECTION INTRAMUSCULAR; INTRAVENOUS
Status: COMPLETED
Start: 2021-04-28 | End: 2021-04-28

## 2021-04-28 RX ORDER — DEXAMETHASONE SODIUM PHOSPHATE 10 MG/ML
INJECTION INTRAMUSCULAR; INTRAVENOUS PRN
Status: DISCONTINUED | OUTPATIENT
Start: 2021-04-28 | End: 2021-04-28 | Stop reason: SDUPTHER

## 2021-04-28 RX ORDER — LIDOCAINE HYDROCHLORIDE 10 MG/ML
0.3 INJECTION, SOLUTION EPIDURAL; INFILTRATION; INTRACAUDAL; PERINEURAL
Status: DISCONTINUED | OUTPATIENT
Start: 2021-04-28 | End: 2021-04-28 | Stop reason: HOSPADM

## 2021-04-28 RX ORDER — LIDOCAINE HYDROCHLORIDE 20 MG/ML
INJECTION, SOLUTION INFILTRATION; PERINEURAL PRN
Status: DISCONTINUED | OUTPATIENT
Start: 2021-04-28 | End: 2021-04-28 | Stop reason: SDUPTHER

## 2021-04-28 RX ORDER — LABETALOL HYDROCHLORIDE 5 MG/ML
5 INJECTION, SOLUTION INTRAVENOUS EVERY 10 MIN PRN
Status: DISCONTINUED | OUTPATIENT
Start: 2021-04-28 | End: 2021-04-28 | Stop reason: HOSPADM

## 2021-04-28 RX ORDER — SODIUM CHLORIDE 0.9 % (FLUSH) 0.9 %
5-40 SYRINGE (ML) INJECTION PRN
Status: DISCONTINUED | OUTPATIENT
Start: 2021-04-28 | End: 2021-04-28 | Stop reason: HOSPADM

## 2021-04-28 RX ORDER — ACETAMINOPHEN 500 MG
1000 TABLET ORAL ONCE
Status: COMPLETED | OUTPATIENT
Start: 2021-04-28 | End: 2021-04-28

## 2021-04-28 RX ORDER — SODIUM CHLORIDE 0.9 % (FLUSH) 0.9 %
5-40 SYRINGE (ML) INJECTION EVERY 12 HOURS SCHEDULED
Status: DISCONTINUED | OUTPATIENT
Start: 2021-04-28 | End: 2021-04-28 | Stop reason: HOSPADM

## 2021-04-28 RX ORDER — ONDANSETRON 2 MG/ML
4 INJECTION INTRAMUSCULAR; INTRAVENOUS PRN
Status: DISCONTINUED | OUTPATIENT
Start: 2021-04-28 | End: 2021-04-28 | Stop reason: HOSPADM

## 2021-04-28 RX ORDER — ROCURONIUM BROMIDE 10 MG/ML
INJECTION, SOLUTION INTRAVENOUS PRN
Status: DISCONTINUED | OUTPATIENT
Start: 2021-04-28 | End: 2021-04-28 | Stop reason: SDUPTHER

## 2021-04-28 RX ORDER — PROPOFOL 10 MG/ML
INJECTION, EMULSION INTRAVENOUS PRN
Status: DISCONTINUED | OUTPATIENT
Start: 2021-04-28 | End: 2021-04-28 | Stop reason: SDUPTHER

## 2021-04-28 RX ORDER — FENTANYL CITRATE 50 UG/ML
INJECTION, SOLUTION INTRAMUSCULAR; INTRAVENOUS PRN
Status: DISCONTINUED | OUTPATIENT
Start: 2021-04-28 | End: 2021-04-28 | Stop reason: SDUPTHER

## 2021-04-28 RX ADMIN — DEXAMETHASONE SODIUM PHOSPHATE 10 MG: 10 INJECTION INTRAMUSCULAR; INTRAVENOUS at 07:39

## 2021-04-28 RX ADMIN — EPHEDRINE SULFATE 10 MG: 50 INJECTION INTRAVENOUS at 07:58

## 2021-04-28 RX ADMIN — SODIUM CHLORIDE, POTASSIUM CHLORIDE, SODIUM LACTATE AND CALCIUM CHLORIDE: 600; 310; 30; 20 INJECTION, SOLUTION INTRAVENOUS at 08:13

## 2021-04-28 RX ADMIN — ACETAMINOPHEN 1000 MG: 500 TABLET ORAL at 06:33

## 2021-04-28 RX ADMIN — Medication 3000 MG: at 07:15

## 2021-04-28 RX ADMIN — LIDOCAINE HYDROCHLORIDE 60 MG: 20 INJECTION, SOLUTION INFILTRATION; PERINEURAL at 07:26

## 2021-04-28 RX ADMIN — SUGAMMADEX 200 MG: 100 INJECTION, SOLUTION INTRAVENOUS at 08:39

## 2021-04-28 RX ADMIN — EPHEDRINE SULFATE 10 MG: 50 INJECTION INTRAVENOUS at 07:55

## 2021-04-28 RX ADMIN — ROCURONIUM BROMIDE 50 MG: 10 SOLUTION INTRAVENOUS at 07:28

## 2021-04-28 RX ADMIN — FENTANYL CITRATE 100 MCG: 50 INJECTION INTRAMUSCULAR; INTRAVENOUS at 07:01

## 2021-04-28 RX ADMIN — CELECOXIB 400 MG: 400 CAPSULE ORAL at 06:34

## 2021-04-28 RX ADMIN — ONDANSETRON 4 MG: 2 INJECTION INTRAMUSCULAR; INTRAVENOUS at 07:39

## 2021-04-28 RX ADMIN — MIDAZOLAM HYDROCHLORIDE 2 MG: 2 INJECTION, SOLUTION INTRAMUSCULAR; INTRAVENOUS at 07:01

## 2021-04-28 RX ADMIN — PROPOFOL 150 MG: 10 INJECTION, EMULSION INTRAVENOUS at 07:26

## 2021-04-28 RX ADMIN — SODIUM CHLORIDE, POTASSIUM CHLORIDE, SODIUM LACTATE AND CALCIUM CHLORIDE: 600; 310; 30; 20 INJECTION, SOLUTION INTRAVENOUS at 06:31

## 2021-04-28 ASSESSMENT — PULMONARY FUNCTION TESTS
PIF_VALUE: 30
PIF_VALUE: 2
PIF_VALUE: 27
PIF_VALUE: 30
PIF_VALUE: 2
PIF_VALUE: 26
PIF_VALUE: 30
PIF_VALUE: 27
PIF_VALUE: 28
PIF_VALUE: 2
PIF_VALUE: 30
PIF_VALUE: 6
PIF_VALUE: 0
PIF_VALUE: 2
PIF_VALUE: 30
PIF_VALUE: 0
PIF_VALUE: 30
PIF_VALUE: 4
PIF_VALUE: 30
PIF_VALUE: 5
PIF_VALUE: 3
PIF_VALUE: 8
PIF_VALUE: 2
PIF_VALUE: 29
PIF_VALUE: 29
PIF_VALUE: 21
PIF_VALUE: 5
PIF_VALUE: 30
PIF_VALUE: 1
PIF_VALUE: 16
PIF_VALUE: 10
PIF_VALUE: 29
PIF_VALUE: 30
PIF_VALUE: 1
PIF_VALUE: 0
PIF_VALUE: 30
PIF_VALUE: 29
PIF_VALUE: 3
PIF_VALUE: 30
PIF_VALUE: 0
PIF_VALUE: 30
PIF_VALUE: 3
PIF_VALUE: 23
PIF_VALUE: 2
PIF_VALUE: 30
PIF_VALUE: 29
PIF_VALUE: 2
PIF_VALUE: 30
PIF_VALUE: 2
PIF_VALUE: 30
PIF_VALUE: 8
PIF_VALUE: 20
PIF_VALUE: 31
PIF_VALUE: 29
PIF_VALUE: 0
PIF_VALUE: 25

## 2021-04-28 ASSESSMENT — PAIN SCALES - GENERAL
PAINLEVEL_OUTOF10: 8
PAINLEVEL_OUTOF10: 0
PAINLEVEL_OUTOF10: 8

## 2021-04-28 ASSESSMENT — PAIN - FUNCTIONAL ASSESSMENT: PAIN_FUNCTIONAL_ASSESSMENT: 0-10

## 2021-04-28 NOTE — PROGRESS NOTES
I.S given with good understanding and technique. Patient agreed to do it every 1 hour while awake while block still working. Walked to car without event.

## 2021-04-28 NOTE — TELEPHONE ENCOUNTER
Jorge Reynoso ,  As of today I do not have authorization, I will call to schedule when we have that. I hope as goes well with your shoulder surgery. This surgery even when approval comes will take place sometime in June because Dr. Rogers Bean is scheduled out that far.

## 2021-04-28 NOTE — OP NOTE
Operative Note      Patient: Reuben Morley  YOB: 1965  MRN: [id number]    Date of Procedure: 4/28/2021    Pre-Op Diagnosis: RIGHT SHOULDER ROTATOR CUFF TEAR    Post-Op Diagnosis: And a massive retracted tear of the supraspinatus tendon, tear of the infraspinatus, numerous adhesions both in the intra-articular and subacromial spaces,       Procedure(s):  VIDEO ARTHROSCOPY RIGHT SHOULDER -BLOCK-  OPEN ROTATOR CUFF REPAIR     #1. Right shoulder arthroscopy with double row rotator cuff repair    #2. Extensive debridement right shoulder arthroscopy with lysis of adhesions both intra-articular and subacromial, extensive mobilization of the infraspinatus and supraspinatus tendons for a limited tension repair and margin convergence techniques to address tendon damage and retraction. Surgeon(s):  Rachel Jones MD    Assistant:   Surgical Assistant: Lyly Sanchez    Anesthesia: General    Estimated Blood Loss (mL): less than 50     Complications: None    Specimens:   * No specimens in log *    Implants:  Implant Name Type Inv. Item Serial No.  Lot No. LRB No. Used Action   ANCHOR SUTURE BIOCOMP 4.75X19.1 MM [employer] C  ANCHOR SUTURE BIOCOMP 4.75X19.1 MM [facility] [employer] [id number] Right 2 Implanted         Drains: * No LDAs found *    Findings: Please see operative note    Detailed Description of Procedure: This patient is a 49-year-old female with multiple medical comorbidities and a BMI of 45 that I know quite well. She has an unfortunate and prolonged history with regard to the shoulder. She originally underwent a open rotator cuff repair by one of my partners for a traumatic tear. She did very well for several months when she suffered a traumatic fall and a retear. At that point she was referred for me for revision surgery. We performed a stable arthroscopic revision repair with biologically inductive patch augmentation.   Again, she seemed to do well for a period anchor creating a nice area of 15 mm x 18 mm of infraspinatus opposition to bone. Again I would note however this did not pull the entire supraspinatus over. I used the free sutures to tack down the anterior margin to try to seal the area of healing from fluid for better biological repair. I probed rotated the arm and felt that this infraspinatus repair was solid. The margin convergence of the supraspinatus was secure. We completed with a gentle smoothing of the undersurface of the acromion but again I was careful to minimize any damage to the coracoacromial ligament for fear of future anterior superior escape. At this point we felt we had addressed all the relevant pathology in the shoulder. We evacuated the joint fluid. We closed the portals with nylon. We placed also dressing and sling. Anesthesia was reversed and she was come in stable fashion the recovery room.     Electronically signed by Ryder Duncan MD on 4/28/2021 at 8:36 AM

## 2021-04-28 NOTE — H&P
Department of Orthopedic Surgery  Attending   History and Physical        CHIEF COMPLAINT:  RCT    Reason for Admission: As Above    History Obtained From:  patient    HISTORY OF PRESENT ILLNESS:      The patient is a 64 y.o. female  who presents with no recent changes to health or meds       Past Medical History:        Diagnosis Date    Anxiety     Depression     Endometriosis     GERD (gastroesophageal reflux disease)     Hypertension     Osteoarthritis     Restless leg syndrome      Past Surgical History:        Procedure Laterality Date    ANUS SURGERY  2018    fissure     SECTION      x3    COLONOSCOPY      FRACTURE SURGERY      right wrist    HERNIA REPAIR  2018    LAPAROSCOPIC PARAESOPHAGEAL HERNIA REPAIR WITH MESH    HIATAL HERNIA REPAIR  2018    KNEE ARTHROSCOPY Left 11/15/12    ARTHROSCOPY LEFT KNEE WITH SYOVECTOMY AND CHONDROPLASTY    OVARY REMOVAL Right     ROTATOR CUFF REPAIR Right 2020    EXAM UNDER ANESTHESIA VIDEO ARTHROSCOPY RIGHT SHOULDER, MINI- OPEN ROTATOR CUFF REPAIR, NEER ACROMIOPLASTY, LENO PROCEDURE WITH EXPAREL performed by Negro Mendoza MD at [place] [address] ARTHROSCOPY Right 2020    VIDEO ARTHROSCOPY RIGHT SHOULDER, OPEN ROTATOR CUFF REPAIR, BICEPS TENOTOMY -BLOCK- performed by Doris Nguyen MD at [place] [address] ARTHROSCOPY Right 2021    RIGHT SHOULDER ARTHROSCOPIC INCISION AND DRAINAGE performed by Doris Nguyen MD at [address] [place] [address] ENDOSCOPY  2015    esophageasl stretch         Medications Prior to Admission:   Prior to Admission medications    Medication Sig Start Date End Date Taking? Authorizing Provider   Acetaminophen (TYLENOL PO) Take by mouth   Yes Historical Provider, MD   ALPRAZolam Sheridan Romero) 1 MG tablet Take 1 tablet by mouth 3 times daily as needed for Anxiety for up to 60 days.  21 Yes Darrol Apley A Sorenson, DO   DULoxetine (CYMBALTA) 60 MG extended release capsule TAKE 1 CAPSULE BY MOUTH EVERY DAY 4/9/21  Yes Hermes All, DO   methocarbamol (ROBAXIN) 500 MG tablet TAKE 1 TABLET BY MOUTH 3 TIMES A DAY AS NEEDED FOR NECK PAIN 4/7/21  Yes Hermes All, DO   ibuprofen (ADVIL;MOTRIN) 600 MG tablet TAKE 1 TABLET BY MOUTH EVERY 8 HOURS AS NEEDED FOR PAIN 3/23/21  Yes Hermes All, DO   atenolol (TENORMIN) 25 MG tablet TAKE 1 TABLET BY MOUTH EVERY DAY 3/10/21  Yes Hermes All, DO   rOPINIRole (REQUIP) 2 MG tablet TAKE 1 TABLET BY MOUTH TWICE A DAY 3/9/21  Yes Hermes All, DO   TRAMADOL HCL PO Take by mouth as needed. Yes Historical Provider, MD   omeprazole (PRILOSEC) 40 MG delayed release capsule TAKE 1 CAPSULE BY MOUTH EVERY DAY 2/8/21  Yes Hermes All, DO   MELATONIN PO Take 20 mg by mouth nightly as needed    Yes Historical Provider, MD   lidocaine (XYLOCAINE) 5 % ointment APPLY TOPICALLY AS NEEDED TO AREA OF TENDERNESS UNDER ARM. 3/15/21   Hermes All, DO       Allergies: Toradol [ketorolac tromethamine] and Haldol [haloperidol lactate]    Social History:    Tobacco:  reports that she has never smoked. She has never used smokeless tobacco.   Alcohol:  reports current alcohol use.    Illicit Drug: No  Family History:       Problem Relation Age of Onset    Arthritis Mother     Obesity Mother    [facility] Anemia Mother    [facility] Other Mother         pulmonary embolism    Arthritis Father     Arrhythmia Father     Diabetes Other     Diabetes Paternal Grandfather     Cancer Neg Hx     Breast Cancer Neg Hx     Colon Cancer Neg Hx      REVIEW OF SYSTEMS:  CONSTITUTIONAL:  negative  MUSCULOSKELETAL:  positive for  pain    PHYSICAL EXAM:  Admission weight: 280 lb (127 kg)  5' 6\" (167.6 cm)  VITALS:  /65   Pulse 59   Temp 96.9 °F (36.1 °C) (Temporal)   Resp 16   Ht 5' 6\" (1.676 m)   Wt 280 lb (127 kg)   LMP 09/20/2017   SpO2 97%   BMI 45.19 kg/m²     CONSTITUTIONAL:  awake, alert, cooperative, no apparent distress, and appears stated age  Cardiac: RRR  Pulm CTA b  MUSCULOSKELETAL:  There is no redness, warmth, or swelling of the joints. Full range of motion noted. Motor strength is 5 out of 5 all extremities bilaterally. Tone is normal.    DATA:  CBC:   Lab Results   Component Value Date    WBC 7.5 04/26/2021    RBC 4.61 04/26/2021    HGB 14.2 04/26/2021    HCT 43.4 04/26/2021    MCV 94.1 04/26/2021    MCH 30.8 04/26/2021    MCHC 32.7 04/26/2021    RDW 15.2 04/26/2021     04/26/2021    MPV 9.5 04/26/2021     BMP:    Lab Results   Component Value Date     04/26/2021    K 4.2 04/26/2021    K 4.3 04/21/2018     04/26/2021    CO2 25 04/26/2021    BUN 15 04/26/2021    LABALBU 4.1 11/23/2020    CREATININE 1.0 04/26/2021    CALCIUM 8.9 04/26/2021    GFRAA >60 04/26/2021    GFRAA >60 02/01/2012    LABGLOM 57 04/26/2021    GLUCOSE 125 04/26/2021     PT/INR:    Lab Results   Component Value Date    PROTIME 12.0 10/16/2013    INR 1.07 10/16/2013       Radiology:  No orders to display         ASSESSMENT: No contrindications to proceed    PLAN:  1)  RCR  2)  Std periop care  3)  Anticipate d/c home    Informed consent was discussed with the patient. This included a detailed description of the procedure. Risks, benefits and alternatives specific to this diagnosis and procedure were outlined. Standard surgical risks of anesthesia, bleeding, nerve damage, infection, need for further surgeries, disability and death also outlined. Verbal confirmation of informed consent was obtained. Signature obtained by the staff.       Yudelka Moise

## 2021-04-28 NOTE — ANESTHESIA POSTPROCEDURE EVALUATION
Department of Anesthesiology  Postprocedure Note    Patient: eRd Smith  MRN: [id number]  YOB: 1965  Date of evaluation: 4/28/2021  Time:  11:17 AM     Procedure Summary     Date: 04/28/21 Room / Location: [facility] / [place]    Anesthesia Start: 7965 Anesthesia Stop: 6343    Procedure: VIDEO ARTHROSCOPY RIGHT SHOULDER, ROTATOR CUFF REPAIR, DEBRIDEMENT (Right Shoulder) Diagnosis:       Tear of right rotator cuff, unspecified tear extent, unspecified whether traumatic      (RIGHT SHOULDER ROTATOR CUFF TEAR)    Surgeons: Jenna Modi MD Responsible Provider: Berna Castro MD    Anesthesia Type: general ASA Status: 3          Anesthesia Type: general    Jodi Phase I: Jodi Score: 8    Jodi Phase II: Jodi Score: 10    Last vitals: Reviewed and per EMR flowsheets.        Anesthesia Post Evaluation    Comments: Postoperative Anesthesia Note    Name:    Red Smith  MRN:      [id number]    Patient Vitals in the past 12 hrs:  04/28/21 0940, Pulse:63, Resp:20, SpO2:92 %  04/28/21 0930, BP:117/79, Pulse:64, Resp:20, SpO2:92 %  04/28/21 0915, BP:117/76, Temp:97.4 °F (36.3 °C), Temp src:Temporal, Pulse:63, Resp:20, SpO2:95 %  04/28/21 0900, BP:111/71, Pulse:62, Resp:20, SpO2:93 %  04/28/21 0855, BP:101/65, Pulse:63, Resp:25, SpO2:92 %  04/28/21 0850, BP:101/64, Temp:96.9 °F (36.1 °C), Temp src:Temporal, Pulse:63, Resp:20, SpO2:93 %  04/28/21 0621, BP:109/65, Temp:96.9 °F (36.1 °C), Temp src:Temporal, Pulse:59, Resp:16, SpO2:97 %, Height:5' 6\" (1.676 m), Weight:280 lb (127 kg)     LABS:    CBC  Lab Results       Component                Value               Date/Time                  WBC                      7.5                 04/26/2021 09:54 AM        HGB                      14.2                04/26/2021 09:54 AM        HCT                      43.4                04/26/2021 09:54 AM        PLT                      203                 04/26/2021 09:54 AM   RENAL  Lab Results       Component                Value               Date/Time                  NA                       142                 04/26/2021 09:54 AM        K                        4.2                 04/26/2021 09:54 AM        K                        4.3                 04/21/2018 01:10 PM        CL                       104                 04/26/2021 09:54 AM        CO2                      25                  04/26/2021 09:54 AM        BUN                      15                  04/26/2021 09:54 AM        CREATININE               1.0                 04/26/2021 09:54 AM        GLUCOSE                  125 (H)             04/26/2021 09:54 AM   COAGS  Lab Results       Component                Value               Date/Time                  PROTIME                  12.0                10/16/2013 12:20 PM        INR                      1.07                10/16/2013 12:20 PM     Intake & Output:  @[facility]@    Nausea & Vomiting:  No    Level of Consciousness:  Awake    Pain Assessment:  Adequate analgesia    Anesthesia Complications:  No apparent anesthetic complications    SUMMARY      Vital signs stable  OK to discharge from Stage I post anesthesia care.   Care transferred from Anesthesiology department on discharge from perioperative area

## 2021-04-28 NOTE — BRIEF OP NOTE
Brief Postoperative Note      Patient: Boris Lopez  YOB: 1965  MRN: [id number]    Date of Procedure: 4/28/2021    Pre-Op Diagnosis: RIGHT SHOULDER ROTATOR CUFF TEAR    Post-Op Diagnosis: Same       Procedure performed: Right shoulder arthroscopy with debridement and rotator cuff repair    Surgeon(s):  Crow Freire MD    Assistant:  Surgical Assistant: Mira Stevenson    Anesthesia: General    Estimated Blood Loss (mL): less than 50     Complications: None    Specimens:   * No specimens in log *    Implants:  Implant Name Type Inv.  Item Serial No.  Lot No. LRB No. Used Action   ANCHOR SUTURE BIOCOMP 4.75X19.1 MM [employer]  ANCHOR SUTURE BIOCOMP 4.75X19.1 MM [facility] [id number] Right 2 Implanted         Drains: * No LDAs found *    Findings: Please see operative note    Electronically signed by Crow Freire MD on 4/28/2021 at 8:35 AM

## 2021-04-28 NOTE — ANESTHESIA PRE PROCEDURE
mg Oral Once James Freeman MD        lactated ringers infusion   Intravenous Continuous Van Gatica MD        sodium chloride flush 0.9 % injection 5-40 mL  5-40 mL Intravenous 2 times per day Van Gatica MD        sodium chloride flush 0.9 % injection 5-40 mL  5-40 mL Intravenous PRN Van Gatica MD        0.9 % sodium chloride infusion  25 mL Intravenous PRN Van Gatica MD        lidocaine PF 1 % injection 0.3 mL  0.3 mL Intradermal Once PRN Vna Gatica MD           Allergies:     Allergies   Allergen Reactions    Toradol [Ketorolac Tromethamine] Other (See Comments)     \"out of it\"  \"felt like sleep paralysis\"    Haldol [Haloperidol Lactate] Other (See Comments)     \"felt out of it, similar to the toradol\"       Problem List:    Patient Active Problem List   Diagnosis Code    Depression F32.9    Knee pain M25.569    Chondromalacia of left knee M94.262    Synovitis of knee M65.9    Bilateral carpal tunnel syndrome G56.03    Lumbar strain S39.012A    Anxiety F41.9    Restless leg syndrome G25.81    De Quervain's disease (radial styloid tenosynovitis) M65.4    Osteoarthritis of carpometacarpal joint of thumb M18.9    Hemorrhoid prolapse K64.8    Dyspnea on exertion R06.00    Sciatica M54.30    Intractable vomiting with nausea R11.2    Hiatal hernia K44.9    Elevated blood pressure reading R03.0    Major depressive disorder, recurrent, moderate (HCC) F33.1    Generalized anxiety disorder F41.1    Essential hypertension I10    Closed displaced fracture of middle phalanx of right ring finger S62.624A    Anal lesion K62.9    Adjustment disorder with depressed mood F43.21    Neck pain M54.2       Past Medical History:        Diagnosis Date    Anxiety     Depression     Endometriosis     GERD (gastroesophageal reflux disease)     Hypertension     Osteoarthritis     Restless leg syndrome        Past Surgical History:        Procedure Laterality Date    ANUS SURGERY  09/2018 RDW 15.2 04/26/2021     04/26/2021       CMP:   Lab Results   Component Value Date     04/26/2021    K 4.2 04/26/2021    K 4.3 04/21/2018     04/26/2021    CO2 25 04/26/2021    BUN 15 04/26/2021    CREATININE 1.0 04/26/2021    GFRAA >60 04/26/2021    GFRAA >60 02/01/2012    AGRATIO 1.7 11/23/2020    LABGLOM 57 04/26/2021    GLUCOSE 125 04/26/2021    PROT 6.5 11/23/2020    PROT 7.9 02/02/2012    CALCIUM 8.9 04/26/2021    BILITOT 0.3 11/23/2020    ALKPHOS 82 11/23/2020    AST 21 11/23/2020    ALT 32 11/23/2020       POC Tests: No results for input(s): POCGLU, POCNA, POCK, POCCL, POCBUN, POCHEMO, POCHCT in the last 72 hours.     Coags:   Lab Results   Component Value Date    PROTIME 12.0 10/16/2013    INR 1.07 10/16/2013       HCG (If Applicable):   Lab Results   Component Value Date    PREGTESTUR Neg 11/15/2012        ABGs: No results found for: PHART, PO2ART, IEJ7BBX, PFI8HZL, BEART, M5NSJCUL     Type & Screen (If Applicable):  No results found for: LABABO, LABRH    Drug/Infectious Status (If Applicable):  No results found for: HIV, HEPCAB    COVID-19 Screening (If Applicable):   Lab Results   Component Value Date    COVID19 Not Detected 04/22/2021    COVID19 NOT DETECTED 01/06/2021           Anesthesia Evaluation  Patient summary reviewed no history of anesthetic complications:   Airway: Mallampati: II  TM distance: >3 FB   Neck ROM: full  Mouth opening: > = 3 FB Dental: normal exam   (+) edentulous      Pulmonary:Negative Pulmonary ROS and normal exam  breath sounds clear to auscultation                             Cardiovascular:Negative CV ROS  Exercise tolerance: good (>4 METS),   (+) hypertension:,         Rhythm: regular  Rate: normal                    Neuro/Psych:   Negative Neuro/Psych ROS  (+) neuromuscular disease:, psychiatric history:            GI/Hepatic/Renal: Neg GI/Hepatic/Renal ROS  (+) hiatal hernia, GERD: well controlled, morbid obesity          Endo/Other: Negative Endo/Other ROS                    Abdominal:           Vascular: negative vascular ROS. Pre-Operative Diagnosis: Tear of right rotator cuff, unspecified tear extent, unspecified whether traumatic [M75.101]    64 y.o.   BMI:  Body mass index is 45.19 kg/m². Vitals:    04/21/21 1423 04/28/21 0621   BP:  109/65   Pulse:  59   Resp:  16   Temp:  96.9 °F (36.1 °C)   TempSrc:  Temporal   SpO2:  97%   Weight: 280 lb (127 kg) 280 lb (127 kg)   Height: 5' 6\" (1.676 m) 5' 6\" (1.676 m)       Allergies   Allergen Reactions    Toradol [Ketorolac Tromethamine] Other (See Comments)     \"out of it\"  \"felt like sleep paralysis\"    Haldol [Haloperidol Lactate] Other (See Comments)     \"felt out of it, similar to the toradol\"       Social History     Tobacco Use    Smoking status: Never Smoker    Smokeless tobacco: Never Used   Substance Use Topics    Alcohol use: Yes     Comment: 1 -2 drinks per month       LABS:    CBC  Lab Results   Component Value Date/Time    WBC 7.5 04/26/2021 09:54 AM    HGB 14.2 04/26/2021 09:54 AM    HCT 43.4 04/26/2021 09:54 AM     04/26/2021 09:54 AM     RENAL  Lab Results   Component Value Date/Time     04/26/2021 09:54 AM    K 4.2 04/26/2021 09:54 AM    K 4.3 04/21/2018 01:10 PM     04/26/2021 09:54 AM    CO2 25 04/26/2021 09:54 AM    BUN 15 04/26/2021 09:54 AM    CREATININE 1.0 04/26/2021 09:54 AM    GLUCOSE 125 (H) 04/26/2021 09:54 AM     COAGS  Lab Results   Component Value Date/Time    PROTIME 12.0 10/16/2013 12:20 PM    INR 1.07 10/16/2013 12:20 PM            Anesthesia Plan      general     ASA 3     (I discussed with the patient the risks and benefits of regional anesthesia (inlcuding infection, bleeding, damage to nerves and surrounding structures) PIV, General, IV Narcotics, PACU. All questions were answered the patient agrees with the plan and wishes to proceed.)  Induction: intravenous.                           Rodriguez Clinton MD   4/28/2021

## 2021-04-28 NOTE — ANESTHESIA PROCEDURE NOTES
Peripheral Block    Patient location during procedure: pre-op  Staffing  Performed: anesthesiologist   Anesthesiologist: Van Gatica MD  Preanesthetic Checklist  Completed: patient identified, IV checked, site marked, risks and benefits discussed, surgical consent, monitors and equipment checked, pre-op evaluation, timeout performed, anesthesia consent given, oxygen available and patient being monitored  Peripheral Block  Patient position: sitting  Prep: ChloraPrep  Patient monitoring: cardiac monitor, continuous pulse ox, frequent blood pressure checks and IV access  Block type: Brachial plexus  Laterality: right  Injection technique: single-shot  Guidance: ultrasound guided  Local infiltration: lidocaine  Infiltration strength: 1 %  Dose: 3 mL  Interscalene  Provider prep: mask and sterile gloves  Local infiltration: lidocaine  Needle  Needle gauge: 21 G  Needle length: 10 cm  Needle localization: ultrasound guidance  Assessment  Injection assessment: negative aspiration for heme, no paresthesia on injection and local visualized surrounding nerve on ultrasound  Paresthesia pain: none  Slow fractionated injection: yes  Hemodynamics: stable  Additional Notes  Immediately prior to procedure a \"time out\" was called to verify the correct patient, allergies, laterality, procedure and equipment. Time out performed with  RN    Local Anesthetic: 0.5 %  Bupivacaine   Amount: 20 ml  in 5 ml increments after negative aspiration each time. Anterior scalene and middle scalene muscles, upper, middle and lower trunks of the brachial plexus are identified, the tip of the needle and the spread of the local anesthetic around the brachial plexus are visualized. The Brachial plexus appeared to be anatomically normal and there were no abnormal pathologically findings seen.          Reason for block: post-op pain management and at surgeon's request

## 2021-05-04 ENCOUNTER — TELEPHONE (OUTPATIENT)
Dept: FAMILY MEDICINE CLINIC | Age: 56
End: 2021-05-04

## 2021-05-04 NOTE — TELEPHONE ENCOUNTER
Pt called. She had shoulder surgery 1 week ago tomorrow. She noticed on Sunday that her feet/ankles were swollen. Pt would like to know what she should do. Please advise.

## 2021-05-05 RX ORDER — FUROSEMIDE 20 MG/1
20 TABLET ORAL DAILY PRN
Qty: 30 TABLET | Refills: 1 | Status: SHIPPED | OUTPATIENT
Start: 2021-05-05 | End: 2021-05-17 | Stop reason: ALTCHOICE

## 2021-05-07 RX ORDER — IBUPROFEN 600 MG/1
TABLET ORAL
Qty: 40 TABLET | Refills: 1 | Status: SHIPPED | OUTPATIENT
Start: 2021-05-07 | End: 2021-06-29

## 2021-05-10 ENCOUNTER — TELEPHONE (OUTPATIENT)
Dept: FAMILY MEDICINE CLINIC | Age: 56
End: 2021-05-10

## 2021-05-10 NOTE — TELEPHONE ENCOUNTER
----- Message from Graciehema Devi sent at 5/10/2021  8:55 AM EDT -----  Subject: Message to Provider    QUESTIONS  Information for Provider? swelling in ankles after last surgery 4/28 and   Dr provided medication for swelling and pt wants  to know swelling went   down.   ---------------------------------------------------------------------------  --------------  CALL BACK INFO  What is the best way for the office to contact you? OK to leave message on   voicemail  Preferred Call Back Phone Number? [phone]  ---------------------------------------------------------------------------  --------------  SCRIPT ANSWERS  Relationship to Patient?  Self

## 2021-05-17 ENCOUNTER — TELEPHONE (OUTPATIENT)
Dept: OBGYN CLINIC | Age: 56
End: 2021-05-17

## 2021-05-17 ENCOUNTER — OFFICE VISIT (OUTPATIENT)
Dept: FAMILY MEDICINE CLINIC | Age: 56
End: 2021-05-17
Payer: COMMERCIAL

## 2021-05-17 VITALS
WEIGHT: 290 LBS | HEART RATE: 59 BPM | SYSTOLIC BLOOD PRESSURE: 132 MMHG | TEMPERATURE: 98.2 F | RESPIRATION RATE: 16 BRPM | BODY MASS INDEX: 46.61 KG/M2 | HEIGHT: 66 IN | DIASTOLIC BLOOD PRESSURE: 80 MMHG | OXYGEN SATURATION: 97 %

## 2021-05-17 DIAGNOSIS — N95.0 POST-MENOPAUSAL BLEEDING: Primary | ICD-10-CM

## 2021-05-17 DIAGNOSIS — Z01.818 PRE-OP EXAM: ICD-10-CM

## 2021-05-17 DIAGNOSIS — I10 ESSENTIAL HYPERTENSION: ICD-10-CM

## 2021-05-17 PROCEDURE — G8427 DOCREV CUR MEDS BY ELIG CLIN: HCPCS | Performed by: FAMILY MEDICINE

## 2021-05-17 PROCEDURE — 1036F TOBACCO NON-USER: CPT | Performed by: FAMILY MEDICINE

## 2021-05-17 PROCEDURE — 3017F COLORECTAL CA SCREEN DOC REV: CPT | Performed by: FAMILY MEDICINE

## 2021-05-17 PROCEDURE — 99214 OFFICE O/P EST MOD 30 MIN: CPT | Performed by: FAMILY MEDICINE

## 2021-05-17 PROCEDURE — G8417 CALC BMI ABV UP PARAM F/U: HCPCS | Performed by: FAMILY MEDICINE

## 2021-05-17 RX ORDER — OXYCODONE HYDROCHLORIDE 5 MG/1
5 TABLET ORAL EVERY 6 HOURS PRN
COMMUNITY
End: 2021-06-02

## 2021-05-17 ASSESSMENT — PATIENT HEALTH QUESTIONNAIRE - PHQ9
SUM OF ALL RESPONSES TO PHQ QUESTIONS 1-9: 0
SUM OF ALL RESPONSES TO PHQ QUESTIONS 1-9: 0

## 2021-05-17 ASSESSMENT — ENCOUNTER SYMPTOMS: RESPIRATORY NEGATIVE: 1

## 2021-05-17 NOTE — TELEPHONE ENCOUNTER
Future Appointments   Date Time Provider Diann Sotelo   5/17/2021 10:40 AM DO KATALINA Lawson  [facility]   5/18/2021 11:45 AM DO YUNIOR Morgan [facility]   6/16/2021  1:30 PM DO YUNIOR Morgan OB/[facility]

## 2021-05-17 NOTE — PROGRESS NOTES
Subjective:      Patient ID: Darrell Gray is a 64 y.o. y.o. female. Here for pre-op exam,  Post menopausal bleeding. Currently not bleeding  Had in office biopsy.     Going to have hysteroscopy and further biopsies if needed,  Hannah 3.   Dr Henry Melvin  [facility]      Chief Complaint   Patient presents with    Pre-op Exam     Hysteroscopy Surgery 2021 Dr Ron Sophie [Ketorolac Tromethamine] Other (See Comments)     \"out of it\"  \"felt like sleep paralysis\"    Haldol [Haloperidol Lactate] Other (See Comments)     \"felt out of it, similar to the toradol\"       Past Medical History:   Diagnosis Date    Anxiety     Depression     Endometriosis     GERD (gastroesophageal reflux disease)     Hypertension     Osteoarthritis     Restless leg syndrome        Past Surgical History:   Procedure Laterality Date    ANUS SURGERY  2018    fissure     SECTION      x3    COLONOSCOPY      FRACTURE SURGERY      right wrist    HERNIA REPAIR  2018    LAPAROSCOPIC PARAESOPHAGEAL HERNIA REPAIR WITH MESH    HIATAL HERNIA REPAIR  2018    KNEE ARTHROSCOPY Left 11/15/12    ARTHROSCOPY LEFT KNEE WITH SYOVECTOMY AND CHONDROPLASTY    OVARY REMOVAL Right     ROTATOR CUFF REPAIR Right 2020    EXAM UNDER ANESTHESIA VIDEO ARTHROSCOPY RIGHT SHOULDER, MINI- OPEN ROTATOR CUFF REPAIR, NEER ACROMIOPLASTY, LENO PROCEDURE WITH EXPAREL performed by Negro Mendoza MD at [place] [address] ARTHROSCOPY Right 2020    VIDEO ARTHROSCOPY RIGHT SHOULDER, OPEN ROTATOR CUFF REPAIR, BICEPS TENOTOMY -BLOCK- performed by Doris Nguyen MD at [place] [address] ARTHROSCOPY Right 2021    RIGHT SHOULDER ARTHROSCOPIC INCISION AND DRAINAGE performed by Doris Nguyen MD at [place] [address] ARTHROSCOPY Right 2021    VIDEO ARTHROSCOPY RIGHT SHOULDER, ROTATOR CUFF REPAIR, DEBRIDEMENT performed by Doris Nguyen MD at [address] [place] [address] Mood normal.         Behavior: Behavior normal.         Thought Content: Thought content normal.         Judgment: Judgment normal.         Assessment:      Post menopausal bleeding  Pre-op        Plan:   Labs from April-  CBC/ BMP ok no new preop labs needed. Stable and OK for surgery  Medically cleared. Current Outpatient Medications   Medication Sig Dispense Refill    oxyCODONE (ROXICODONE) 5 MG immediate release tablet Take 5 mg by mouth every 6 hours as needed for Pain.  ibuprofen (ADVIL;MOTRIN) 600 MG tablet TAKE 1 TABLET BY MOUTH EVERY 8 HOURS AS NEEDED FOR PAIN 40 tablet 1    Acetaminophen (TYLENOL PO) Take by mouth      ALPRAZolam (XANAX) 1 MG tablet Take 1 tablet by mouth 3 times daily as needed for Anxiety for up to 60 days. 90 tablet 1    DULoxetine (CYMBALTA) 60 MG extended release capsule TAKE 1 CAPSULE BY MOUTH EVERY DAY 30 capsule 5    methocarbamol (ROBAXIN) 500 MG tablet TAKE 1 TABLET BY MOUTH 3 TIMES A DAY AS NEEDED FOR NECK PAIN 90 tablet 3    lidocaine (XYLOCAINE) 5 % ointment APPLY TOPICALLY AS NEEDED TO AREA OF TENDERNESS UNDER ARM. 70.88 g 1    atenolol (TENORMIN) 25 MG tablet TAKE 1 TABLET BY MOUTH EVERY DAY 30 tablet 5    rOPINIRole (REQUIP) 2 MG tablet TAKE 1 TABLET BY MOUTH TWICE A DAY 60 tablet 5    omeprazole (PRILOSEC) 40 MG delayed release capsule TAKE 1 CAPSULE BY MOUTH EVERY DAY 30 capsule 6    MELATONIN PO Take 20 mg by mouth nightly as needed        No current facility-administered medications for this visit.

## 2021-05-18 ENCOUNTER — OFFICE VISIT (OUTPATIENT)
Dept: OBGYN CLINIC | Age: 56
End: 2021-05-18

## 2021-05-18 DIAGNOSIS — R93.89 ENDOMETRIAL THICKENING ON ULTRASOUND: ICD-10-CM

## 2021-05-18 DIAGNOSIS — N95.0 POSTMENOPAUSAL BLEEDING: Primary | ICD-10-CM

## 2021-05-18 PROCEDURE — 99024 POSTOP FOLLOW-UP VISIT: CPT | Performed by: OBSTETRICS & GYNECOLOGY

## 2021-05-18 NOTE — PROGRESS NOTES
Return Gyn Office Visit    CC:   Chief Complaint   Patient presents with    Pre-op Exam       HPI:  Yaw Mcghee is a 64 y.o. female who presents to sign consents for upcoming hysteroscopy, dilation and curettage. Patient is seen and examined today. Patient is overall doing well. Patient with history of postmenopausal bleeding. Denies chest pain, shortness of breath, fever, chills, nausea, vomiting. Review of Systems - The following ROS was otherwise negative, except as noted in the HPI: constitutional, respiratory, cardiovascular, gastrointestinal, genitourinary    Objective:  /76 (Position: Sitting)   Pulse 58   Temp 97.8 °F (36.6 °C)   Wt 293 lb 3.2 oz (133 kg)   LMP 09/20/2017   BMI 47.32 kg/m²     Physical Exam  Vitals reviewed. Constitutional:       General: She is not in acute distress. Appearance: She is well-developed. HENT:      Head: Normocephalic and atraumatic. Eyes:      Conjunctiva/sclera: Conjunctivae normal.   Cardiovascular:      Rate and Rhythm: Normal rate. Pulmonary:      Effort: Pulmonary effort is normal. No respiratory distress. Abdominal:      General: There is no distension. Palpations: Abdomen is soft. Tenderness: There is no abdominal tenderness. There is no guarding or rebound. Musculoskeletal:         General: No swelling. Skin:     General: Skin is warm and dry. Neurological:      Mental Status: She is alert and oriented to person, place, and time. Psychiatric:         Mood and Affect: Mood normal.         Behavior: Behavior normal.         Thought Content: Thought content normal.       Ultrasound:   Impression   PELVIC ULTRASOUND without DOPPLER INTERROGATION    NON OB       DATE: 04/13/2021       PHYSICIAN: MARY ALICE SCOTT        SONOGRAPHER: RUFUS Sullivan [facility]       INDICATION: post-menopausal bleeding       TYPE OF SCAN: vaginal       FINDINGS:     The cul de sac is normal. No free fluid appreciated.       The cervix is

## 2021-05-25 NOTE — PROGRESS NOTES
Saige Feeler    Age 64 y.o.    female    1965    MRN [id number]    6/3/2021  Arrival Time_____________  OR Time____________[address]     Procedure(s):  VIDEO HYSTEROSCOPY DILATATION AND CURETTAGE, POSSIBLE MYOSURE, POSSIBLE POLYPECTOMY                      General    Surgeon(s):  Eddye Adas, DO       Phone [phone] ([place])     [facility]  Cell         Work  _____________________________________________________________________  _____________________________________________________________________  _____________________________________________________________________  _____________________________________________________________________  _____________________________________________________________________    PCP _____________________________ Phone_________________     H&P__________________Bringing      Chart            Epic   DOS      Called________  EKG__________________Bringing      Chart            Epic   DOS      Called________  LAB__________________ Bringing      Chart            Epic   DOS      Called________  Cardiac Clearance_______Bringing      Chart            Epic      DOS      Called________    Cardiologist________________________ Phone___________________________    ? Mandaen concerns / Waiver on Chart            PAT Communications________________  ? Pre-op Instructions Given [place]          _________________________________  ? Directions to Surgery Center                          _________________________________  ? Transportation Home_______________      __________________________________  ?  Crutches/Walker__________________        __________________________________    ________Pre-op Orders   _______Transcribed    _______Wt.  ________Pharmacy          _______SCD  ______VTE     ______Beta Blocker  ________Consent             ________TED HOSE    COVID DATE______________LOCATION________________ RESULT__________     2ND VACCINE DATE_____________

## 2021-05-26 ENCOUNTER — PATIENT MESSAGE (OUTPATIENT)
Dept: OBGYN CLINIC | Age: 56
End: 2021-05-26

## 2021-05-27 NOTE — TELEPHONE ENCOUNTER
See My Chart:Recent Policy change: I was advised by PAT that testing is now to be done 72 hours prior to surgery. Routing to Surgery scheduler to confirm as well.  Please advise

## 2021-05-27 NOTE — TELEPHONE ENCOUNTER
From: Carie Magaña  To: Nicolás Garvey DO  Sent: 5/26/2021 8:59 PM EDT  Subject: Visit Follow-Up Question    I rescheduled my covid test to baron May 30 at [facility] at 2:30. That should give them time to get the results in before my surgery on Thursday Hannah 3.  Thank you [facility] [phone]

## 2021-06-01 VITALS
HEART RATE: 58 BPM | TEMPERATURE: 97.8 F | WEIGHT: 293 LBS | DIASTOLIC BLOOD PRESSURE: 76 MMHG | BODY MASS INDEX: 47.32 KG/M2 | SYSTOLIC BLOOD PRESSURE: 118 MMHG

## 2021-06-01 NOTE — TELEPHONE ENCOUNTER
Per Kate Diaz at the [facility] so long as patient has her COVID testing within 6 days of surgery she will be cleared.

## 2021-06-02 ENCOUNTER — ANESTHESIA EVENT (OUTPATIENT)
Dept: OPERATING ROOM | Age: 56
End: 2021-06-02
Payer: COMMERCIAL

## 2021-06-02 RX ORDER — TRAMADOL HYDROCHLORIDE 50 MG/1
50 TABLET ORAL EVERY 6 HOURS PRN
COMMUNITY
End: 2021-09-13 | Stop reason: ALTCHOICE

## 2021-06-02 NOTE — PROGRESS NOTES
Obstructive Sleep Apnea (MERI) Screening     Patient:  Carline Gutiérrez    YOB: 1965      Medical Record #:  [id number]                     Date:  6/2/2021     1. Are you a loud and/or regular snorer? []  Yes       [x] No    2. Have you been observed to gasp or stop breathing during sleep? []  Yes       [x] No    3. Do you feel tired or groggy upon awakening or do you awaken with a headache?           []  Yes       [] No    4. Are you often tired or fatigued during the wake time hours? []  Yes       [] No    5. Do you fall asleep sitting, reading, watching TV or driving? []  Yes       [] No    6. Do you often have problems with memory or concentration? []  Yes       [] No    **If patient's score is ? 3 they are considered high risk for MERI. An Anesthesia provider will evaluate the patient and develop a plan of care the day of surgery. Note:  If the patient's BMI is more than 35 kg m¯² , has neck circumference > 40 cm, and/or high blood pressure the risk is greater (© American Sleep Apnea Association, 2006).

## 2021-06-03 ENCOUNTER — ANESTHESIA (OUTPATIENT)
Dept: OPERATING ROOM | Age: 56
End: 2021-06-03
Payer: COMMERCIAL

## 2021-06-03 ENCOUNTER — HOSPITAL ENCOUNTER (OUTPATIENT)
Age: 56
Setting detail: OUTPATIENT SURGERY
Discharge: HOME | End: 2021-06-03
Attending: OBSTETRICS & GYNECOLOGY | Admitting: OBSTETRICS & GYNECOLOGY
Payer: COMMERCIAL

## 2021-06-03 VITALS
WEIGHT: 293 LBS | DIASTOLIC BLOOD PRESSURE: 80 MMHG | HEIGHT: 66 IN | OXYGEN SATURATION: 92 % | HEART RATE: 68 BPM | TEMPERATURE: 97.4 F | RESPIRATION RATE: 16 BRPM | BODY MASS INDEX: 47.09 KG/M2 | SYSTOLIC BLOOD PRESSURE: 143 MMHG

## 2021-06-03 VITALS
SYSTOLIC BLOOD PRESSURE: 116 MMHG | OXYGEN SATURATION: 94 % | RESPIRATION RATE: 18 BRPM | TEMPERATURE: 98.6 F | DIASTOLIC BLOOD PRESSURE: 61 MMHG

## 2021-06-03 DIAGNOSIS — Z98.890 S/P D&C (STATUS POST DILATION AND CURETTAGE): Primary | ICD-10-CM

## 2021-06-03 DIAGNOSIS — N95.0 POST-MENOPAUSAL BLEEDING: ICD-10-CM

## 2021-06-03 LAB
ANION GAP SERPL CALCULATED.3IONS-SCNC: 13 MMOL/L (ref 3–16)
BUN BLDV-MCNC: 9 MG/DL (ref 7–20)
CALCIUM SERPL-MCNC: 9.1 MG/DL (ref 8.3–10.6)
CHLORIDE BLD-SCNC: 103 MMOL/L (ref 99–110)
CO2: 22 MMOL/L (ref 21–32)
CREAT SERPL-MCNC: 0.7 MG/DL (ref 0.6–1.1)
GFR AFRICAN AMERICAN: >60
GFR NON-AFRICAN AMERICAN: >60
GLUCOSE BLD-MCNC: 129 MG/DL (ref 70–99)
HCT VFR BLD CALC: 40.9 % (ref 36–48)
HEMOGLOBIN: 13.6 G/DL (ref 12–16)
MCH RBC QN AUTO: 30.6 PG (ref 26–34)
MCHC RBC AUTO-ENTMCNC: 33.2 G/DL (ref 31–36)
MCV RBC AUTO: 92.1 FL (ref 80–100)
PDW BLD-RTO: 14.9 % (ref 12.4–15.4)
PLATELET # BLD: 187 K/UL (ref 135–450)
PMV BLD AUTO: 9.3 FL (ref 5–10.5)
POTASSIUM SERPL-SCNC: 4.4 MMOL/L (ref 3.5–5.1)
RBC # BLD: 4.44 M/UL (ref 4–5.2)
SODIUM BLD-SCNC: 138 MMOL/L (ref 136–145)
WBC # BLD: 11 K/UL (ref 4–11)

## 2021-06-03 PROCEDURE — 58558 HYSTEROSCOPY BIOPSY: CPT | Performed by: OBSTETRICS & GYNECOLOGY

## 2021-06-03 PROCEDURE — 6360000002 HC RX W HCPCS: Performed by: NURSE ANESTHETIST, CERTIFIED REGISTERED

## 2021-06-03 PROCEDURE — 7100000000 HC PACU RECOVERY - FIRST 15 MIN: Performed by: OBSTETRICS & GYNECOLOGY

## 2021-06-03 PROCEDURE — 3700000000 HC ANESTHESIA ATTENDED CARE: Performed by: OBSTETRICS & GYNECOLOGY

## 2021-06-03 PROCEDURE — 3700000001 HC ADD 15 MINUTES (ANESTHESIA): Performed by: OBSTETRICS & GYNECOLOGY

## 2021-06-03 PROCEDURE — 2709999900 HC NON-CHARGEABLE SUPPLY: Performed by: OBSTETRICS & GYNECOLOGY

## 2021-06-03 PROCEDURE — 7100000001 HC PACU RECOVERY - ADDTL 15 MIN: Performed by: OBSTETRICS & GYNECOLOGY

## 2021-06-03 PROCEDURE — 3600000014 HC SURGERY LEVEL 4 ADDTL 15MIN: Performed by: OBSTETRICS & GYNECOLOGY

## 2021-06-03 PROCEDURE — 85027 COMPLETE CBC AUTOMATED: CPT

## 2021-06-03 PROCEDURE — 3600000004 HC SURGERY LEVEL 4 BASE: Performed by: OBSTETRICS & GYNECOLOGY

## 2021-06-03 PROCEDURE — 6360000002 HC RX W HCPCS

## 2021-06-03 PROCEDURE — 7100000010 HC PHASE II RECOVERY - FIRST 15 MIN: Performed by: OBSTETRICS & GYNECOLOGY

## 2021-06-03 PROCEDURE — 2580000003 HC RX 258: Performed by: OBSTETRICS & GYNECOLOGY

## 2021-06-03 PROCEDURE — 6370000000 HC RX 637 (ALT 250 FOR IP): Performed by: NURSE ANESTHETIST, CERTIFIED REGISTERED

## 2021-06-03 PROCEDURE — 80048 BASIC METABOLIC PNL TOTAL CA: CPT

## 2021-06-03 PROCEDURE — 7100000011 HC PHASE II RECOVERY - ADDTL 15 MIN: Performed by: OBSTETRICS & GYNECOLOGY

## 2021-06-03 PROCEDURE — 2500000003 HC RX 250 WO HCPCS: Performed by: NURSE ANESTHETIST, CERTIFIED REGISTERED

## 2021-06-03 PROCEDURE — 2580000003 HC RX 258: Performed by: ANESTHESIOLOGY

## 2021-06-03 PROCEDURE — 6360000002 HC RX W HCPCS: Performed by: ANESTHESIOLOGY

## 2021-06-03 PROCEDURE — 88305 TISSUE EXAM BY PATHOLOGIST: CPT

## 2021-06-03 RX ORDER — OXYCODONE HYDROCHLORIDE AND ACETAMINOPHEN 5; 325 MG/1; MG/1
1 TABLET ORAL PRN
Status: DISCONTINUED | OUTPATIENT
Start: 2021-06-03 | End: 2021-06-03 | Stop reason: HOSPADM

## 2021-06-03 RX ORDER — PHENYLEPHRINE HCL IN 0.9% NACL 1 MG/10 ML
SYRINGE (ML) INTRAVENOUS PRN
Status: DISCONTINUED | OUTPATIENT
Start: 2021-06-03 | End: 2021-06-03 | Stop reason: SDUPTHER

## 2021-06-03 RX ORDER — SUCCINYLCHOLINE CHLORIDE 20 MG/ML
INJECTION INTRAMUSCULAR; INTRAVENOUS PRN
Status: DISCONTINUED | OUTPATIENT
Start: 2021-06-03 | End: 2021-06-03 | Stop reason: SDUPTHER

## 2021-06-03 RX ORDER — HYDRALAZINE HYDROCHLORIDE 20 MG/ML
5 INJECTION INTRAMUSCULAR; INTRAVENOUS EVERY 10 MIN PRN
Status: DISCONTINUED | OUTPATIENT
Start: 2021-06-03 | End: 2021-06-03 | Stop reason: HOSPADM

## 2021-06-03 RX ORDER — SODIUM CHLORIDE, SODIUM LACTATE, POTASSIUM CHLORIDE, CALCIUM CHLORIDE 600; 310; 30; 20 MG/100ML; MG/100ML; MG/100ML; MG/100ML
INJECTION, SOLUTION INTRAVENOUS CONTINUOUS
Status: DISCONTINUED | OUTPATIENT
Start: 2021-06-03 | End: 2021-06-03 | Stop reason: HOSPADM

## 2021-06-03 RX ORDER — LIDOCAINE HYDROCHLORIDE 10 MG/ML
0.3 INJECTION, SOLUTION EPIDURAL; INFILTRATION; INTRACAUDAL; PERINEURAL
Status: DISCONTINUED | OUTPATIENT
Start: 2021-06-03 | End: 2021-06-03 | Stop reason: HOSPADM

## 2021-06-03 RX ORDER — ONDANSETRON 2 MG/ML
4 INJECTION INTRAMUSCULAR; INTRAVENOUS
Status: DISCONTINUED | OUTPATIENT
Start: 2021-06-03 | End: 2021-06-03 | Stop reason: HOSPADM

## 2021-06-03 RX ORDER — DIAPER,BRIEF,INFANT-TODD,DISP
EACH MISCELLANEOUS PRN
Status: DISCONTINUED | OUTPATIENT
Start: 2021-06-03 | End: 2021-06-03 | Stop reason: ALTCHOICE

## 2021-06-03 RX ORDER — DIPHENHYDRAMINE HYDROCHLORIDE 50 MG/ML
12.5 INJECTION INTRAMUSCULAR; INTRAVENOUS
Status: DISCONTINUED | OUTPATIENT
Start: 2021-06-03 | End: 2021-06-03 | Stop reason: HOSPADM

## 2021-06-03 RX ORDER — DEXAMETHASONE SODIUM PHOSPHATE 10 MG/ML
INJECTION INTRAMUSCULAR; INTRAVENOUS PRN
Status: DISCONTINUED | OUTPATIENT
Start: 2021-06-03 | End: 2021-06-03 | Stop reason: SDUPTHER

## 2021-06-03 RX ORDER — PROMETHAZINE HYDROCHLORIDE 25 MG/ML
6.25 INJECTION, SOLUTION INTRAMUSCULAR; INTRAVENOUS
Status: DISCONTINUED | OUTPATIENT
Start: 2021-06-03 | End: 2021-06-03 | Stop reason: HOSPADM

## 2021-06-03 RX ORDER — MORPHINE SULFATE 2 MG/ML
1 INJECTION, SOLUTION INTRAMUSCULAR; INTRAVENOUS EVERY 5 MIN PRN
Status: DISCONTINUED | OUTPATIENT
Start: 2021-06-03 | End: 2021-06-03 | Stop reason: HOSPADM

## 2021-06-03 RX ORDER — SODIUM CHLORIDE 9 MG/ML
25 INJECTION, SOLUTION INTRAVENOUS PRN
Status: DISCONTINUED | OUTPATIENT
Start: 2021-06-03 | End: 2021-06-03 | Stop reason: HOSPADM

## 2021-06-03 RX ORDER — LABETALOL HYDROCHLORIDE 5 MG/ML
5 INJECTION, SOLUTION INTRAVENOUS EVERY 10 MIN PRN
Status: DISCONTINUED | OUTPATIENT
Start: 2021-06-03 | End: 2021-06-03 | Stop reason: HOSPADM

## 2021-06-03 RX ORDER — PROPOFOL 10 MG/ML
INJECTION, EMULSION INTRAVENOUS PRN
Status: DISCONTINUED | OUTPATIENT
Start: 2021-06-03 | End: 2021-06-03 | Stop reason: SDUPTHER

## 2021-06-03 RX ORDER — MAGNESIUM HYDROXIDE 1200 MG/15ML
LIQUID ORAL CONTINUOUS PRN
Status: COMPLETED | OUTPATIENT
Start: 2021-06-03 | End: 2021-06-03

## 2021-06-03 RX ORDER — HYDROCODONE BITARTRATE AND ACETAMINOPHEN 5; 325 MG/1; MG/1
1 TABLET ORAL EVERY 6 HOURS PRN
Qty: 12 TABLET | Refills: 0 | Status: SHIPPED | OUTPATIENT
Start: 2021-06-03 | End: 2021-06-06

## 2021-06-03 RX ORDER — MEPERIDINE HYDROCHLORIDE 50 MG/ML
12.5 INJECTION INTRAMUSCULAR; INTRAVENOUS; SUBCUTANEOUS EVERY 5 MIN PRN
Status: DISCONTINUED | OUTPATIENT
Start: 2021-06-03 | End: 2021-06-03 | Stop reason: HOSPADM

## 2021-06-03 RX ORDER — ROCURONIUM BROMIDE 10 MG/ML
INJECTION, SOLUTION INTRAVENOUS PRN
Status: DISCONTINUED | OUTPATIENT
Start: 2021-06-03 | End: 2021-06-03 | Stop reason: SDUPTHER

## 2021-06-03 RX ORDER — GLYCOPYRROLATE 0.2 MG/ML
INJECTION INTRAMUSCULAR; INTRAVENOUS PRN
Status: DISCONTINUED | OUTPATIENT
Start: 2021-06-03 | End: 2021-06-03 | Stop reason: SDUPTHER

## 2021-06-03 RX ORDER — SODIUM CHLORIDE 0.9 % (FLUSH) 0.9 %
5-40 SYRINGE (ML) INJECTION PRN
Status: DISCONTINUED | OUTPATIENT
Start: 2021-06-03 | End: 2021-06-03 | Stop reason: HOSPADM

## 2021-06-03 RX ORDER — EPHEDRINE SULFATE 50 MG/ML
INJECTION INTRAVENOUS PRN
Status: DISCONTINUED | OUTPATIENT
Start: 2021-06-03 | End: 2021-06-03 | Stop reason: SDUPTHER

## 2021-06-03 RX ORDER — ALBUTEROL SULFATE 90 UG/1
AEROSOL, METERED RESPIRATORY (INHALATION) PRN
Status: DISCONTINUED | OUTPATIENT
Start: 2021-06-03 | End: 2021-06-03 | Stop reason: SDUPTHER

## 2021-06-03 RX ORDER — MORPHINE SULFATE 2 MG/ML
2 INJECTION, SOLUTION INTRAMUSCULAR; INTRAVENOUS EVERY 5 MIN PRN
Status: DISCONTINUED | OUTPATIENT
Start: 2021-06-03 | End: 2021-06-03 | Stop reason: HOSPADM

## 2021-06-03 RX ORDER — SODIUM CHLORIDE 0.9 % (FLUSH) 0.9 %
5-40 SYRINGE (ML) INJECTION EVERY 12 HOURS SCHEDULED
Status: DISCONTINUED | OUTPATIENT
Start: 2021-06-03 | End: 2021-06-03 | Stop reason: HOSPADM

## 2021-06-03 RX ORDER — OXYCODONE HYDROCHLORIDE AND ACETAMINOPHEN 5; 325 MG/1; MG/1
2 TABLET ORAL PRN
Status: DISCONTINUED | OUTPATIENT
Start: 2021-06-03 | End: 2021-06-03 | Stop reason: HOSPADM

## 2021-06-03 RX ORDER — FENTANYL CITRATE 50 UG/ML
INJECTION, SOLUTION INTRAMUSCULAR; INTRAVENOUS PRN
Status: DISCONTINUED | OUTPATIENT
Start: 2021-06-03 | End: 2021-06-03 | Stop reason: SDUPTHER

## 2021-06-03 RX ORDER — ONDANSETRON 2 MG/ML
INJECTION INTRAMUSCULAR; INTRAVENOUS PRN
Status: DISCONTINUED | OUTPATIENT
Start: 2021-06-03 | End: 2021-06-03 | Stop reason: SDUPTHER

## 2021-06-03 RX ORDER — MIDAZOLAM HYDROCHLORIDE 1 MG/ML
2 INJECTION INTRAMUSCULAR; INTRAVENOUS ONCE
Status: COMPLETED | OUTPATIENT
Start: 2021-06-03 | End: 2021-06-03

## 2021-06-03 RX ORDER — MIDAZOLAM HYDROCHLORIDE 1 MG/ML
INJECTION INTRAMUSCULAR; INTRAVENOUS
Status: COMPLETED
Start: 2021-06-03 | End: 2021-06-03

## 2021-06-03 RX ORDER — LIDOCAINE HYDROCHLORIDE 20 MG/ML
INJECTION, SOLUTION EPIDURAL; INFILTRATION; INTRACAUDAL; PERINEURAL PRN
Status: DISCONTINUED | OUTPATIENT
Start: 2021-06-03 | End: 2021-06-03 | Stop reason: SDUPTHER

## 2021-06-03 RX ORDER — IBUPROFEN 600 MG/1
600 TABLET ORAL EVERY 8 HOURS PRN
Qty: 30 TABLET | Refills: 0 | Status: SHIPPED | OUTPATIENT
Start: 2021-06-03 | End: 2021-06-29

## 2021-06-03 RX ADMIN — Medication 100 MCG: at 09:41

## 2021-06-03 RX ADMIN — SUCCINYLCHOLINE CHLORIDE 100 MG: 20 INJECTION, SOLUTION INTRAMUSCULAR; INTRAVENOUS at 09:06

## 2021-06-03 RX ADMIN — LIDOCAINE HYDROCHLORIDE 4 ML: 20 INJECTION, SOLUTION EPIDURAL; INFILTRATION; INTRACAUDAL; PERINEURAL at 09:05

## 2021-06-03 RX ADMIN — ROCURONIUM BROMIDE 15 MG: 10 SOLUTION INTRAVENOUS at 09:52

## 2021-06-03 RX ADMIN — EPHEDRINE SULFATE 10 MG: 50 INJECTION INTRAVENOUS at 09:58

## 2021-06-03 RX ADMIN — SUGAMMADEX 400 MG: 100 INJECTION, SOLUTION INTRAVENOUS at 09:59

## 2021-06-03 RX ADMIN — ROCURONIUM BROMIDE 10 MG: 10 SOLUTION INTRAVENOUS at 09:28

## 2021-06-03 RX ADMIN — SODIUM CHLORIDE, POTASSIUM CHLORIDE, SODIUM LACTATE AND CALCIUM CHLORIDE: 600; 310; 30; 20 INJECTION, SOLUTION INTRAVENOUS at 07:38

## 2021-06-03 RX ADMIN — PROPOFOL 200 MG: 10 INJECTION, EMULSION INTRAVENOUS at 09:05

## 2021-06-03 RX ADMIN — HYDROMORPHONE HYDROCHLORIDE 0.25 MG: 1 INJECTION, SOLUTION INTRAMUSCULAR; INTRAVENOUS; SUBCUTANEOUS at 10:39

## 2021-06-03 RX ADMIN — Medication 4 PUFF: at 09:46

## 2021-06-03 RX ADMIN — MIDAZOLAM HYDROCHLORIDE 2 MG: 1 INJECTION INTRAMUSCULAR; INTRAVENOUS at 08:35

## 2021-06-03 RX ADMIN — MIDAZOLAM 2 MG: 1 INJECTION INTRAMUSCULAR; INTRAVENOUS at 08:35

## 2021-06-03 RX ADMIN — SODIUM CHLORIDE, POTASSIUM CHLORIDE, SODIUM LACTATE AND CALCIUM CHLORIDE: 600; 310; 30; 20 INJECTION, SOLUTION INTRAVENOUS at 09:36

## 2021-06-03 RX ADMIN — DEXAMETHASONE SODIUM PHOSPHATE 10 MG: 10 INJECTION INTRAMUSCULAR; INTRAVENOUS at 09:21

## 2021-06-03 RX ADMIN — ONDANSETRON 4 MG: 2 INJECTION INTRAMUSCULAR; INTRAVENOUS at 09:21

## 2021-06-03 RX ADMIN — Medication 100 MCG: at 09:58

## 2021-06-03 RX ADMIN — Medication 100 MCG: at 09:42

## 2021-06-03 RX ADMIN — GLYCOPYRROLATE 0.2 MG: 0.2 INJECTION, SOLUTION INTRAMUSCULAR; INTRAVENOUS at 09:24

## 2021-06-03 RX ADMIN — PROPOFOL 40 MG: 10 INJECTION, EMULSION INTRAVENOUS at 09:28

## 2021-06-03 RX ADMIN — Medication 100 MCG: at 09:28

## 2021-06-03 RX ADMIN — ROCURONIUM BROMIDE 20 MG: 10 SOLUTION INTRAVENOUS at 09:19

## 2021-06-03 RX ADMIN — FENTANYL CITRATE 50 MCG: 50 INJECTION INTRAMUSCULAR; INTRAVENOUS at 09:03

## 2021-06-03 RX ADMIN — HYDROMORPHONE HYDROCHLORIDE 0.25 MG: 1 INJECTION, SOLUTION INTRAMUSCULAR; INTRAVENOUS; SUBCUTANEOUS at 10:45

## 2021-06-03 RX ADMIN — EPHEDRINE SULFATE 10 MG: 50 INJECTION INTRAVENOUS at 09:48

## 2021-06-03 RX ADMIN — PROPOFOL 100 MG: 10 INJECTION, EMULSION INTRAVENOUS at 09:06

## 2021-06-03 ASSESSMENT — PULMONARY FUNCTION TESTS
PIF_VALUE: 0
PIF_VALUE: 35
PIF_VALUE: 35
PIF_VALUE: 33
PIF_VALUE: 35
PIF_VALUE: 0
PIF_VALUE: 33
PIF_VALUE: 33
PIF_VALUE: 35
PIF_VALUE: 35
PIF_VALUE: 2
PIF_VALUE: 35
PIF_VALUE: 30
PIF_VALUE: 35
PIF_VALUE: 0
PIF_VALUE: 35
PIF_VALUE: 19
PIF_VALUE: 36
PIF_VALUE: 36
PIF_VALUE: 33
PIF_VALUE: 33
PIF_VALUE: 30
PIF_VALUE: 35
PIF_VALUE: 5
PIF_VALUE: 35
PIF_VALUE: 36
PIF_VALUE: 33
PIF_VALUE: 25
PIF_VALUE: 35
PIF_VALUE: 35
PIF_VALUE: 28
PIF_VALUE: 30
PIF_VALUE: 36
PIF_VALUE: 33
PIF_VALUE: 36
PIF_VALUE: 35
PIF_VALUE: 35
PIF_VALUE: 36
PIF_VALUE: 36
PIF_VALUE: 35
PIF_VALUE: 12
PIF_VALUE: 41
PIF_VALUE: 35
PIF_VALUE: 35
PIF_VALUE: 12
PIF_VALUE: 35
PIF_VALUE: 29
PIF_VALUE: 1
PIF_VALUE: 37
PIF_VALUE: 35
PIF_VALUE: 35
PIF_VALUE: 0
PIF_VALUE: 35
PIF_VALUE: 36
PIF_VALUE: 33
PIF_VALUE: 13
PIF_VALUE: 5
PIF_VALUE: 35
PIF_VALUE: 3
PIF_VALUE: 35
PIF_VALUE: 36
PIF_VALUE: 34

## 2021-06-03 ASSESSMENT — PAIN SCALES - GENERAL
PAINLEVEL_OUTOF10: 6
PAINLEVEL_OUTOF10: 0
PAINLEVEL_OUTOF10: 5
PAINLEVEL_OUTOF10: 0
PAINLEVEL_OUTOF10: 0

## 2021-06-03 ASSESSMENT — PAIN - FUNCTIONAL ASSESSMENT: PAIN_FUNCTIONAL_ASSESSMENT: 0-10

## 2021-06-03 ASSESSMENT — PAIN DESCRIPTION - DESCRIPTORS: DESCRIPTORS: DISCOMFORT

## 2021-06-03 ASSESSMENT — ENCOUNTER SYMPTOMS: SHORTNESS OF BREATH: 1

## 2021-06-04 NOTE — OP NOTE
curettage with possible MyoSure  polypectomy at this time. Consents were signed in the chart. OPERATIVE PROCEDURE:  The patient was taken to the operating room and  placed on the operating room table, where general anesthesia was  obtained without difficulty. The patient was placed in the dorsal  lithotomy position using Yellofins stirrups. The patient was prepped  and draped in the usual sterile fashion. A universal time-out was  performed prior to the start of the procedure. A weighted speculum was placed in the posterior aspect of the patient's  Vagina, secondary to high cervical station the weighted speculum was  removed and Deavers were utilized to provide visualization through the  anterior and posterior aspects of the vagina. The cervix was grasped  using a single-tooth tenaculum. The uterus was sounded to 7 cm. The  cervix was sequentially dilated using Caballero dilators. The MyoSure  hysteroscope was prepped and primed and inserted; however, was not able  to be inserted past the level of the internal cervical os secondary to  cervical stenosis and angle of endocervical canal.  MyoSure hysteroscope  was then transitioned to a 3 mm hysteroscope. The hysteroscope was  advanced to the level of the fundus with overall atrophic appearing  endometrium. The hysteroscope was withdrawn. Attempts were made to  pass #1 endometrial curette with inability to traverse pass internal  cervical os. An endometrial Pipelle was then utilized at approximately  four passes to obtain endometrial tissue. Secondary to no discrete  polypoid structures and overall atrophic appearing endometrium, the  sampling was deemed adequate for hysteroscopic findings. Pipelle was removed. Specimen was handed off to be sent to Pathology. Tenaculum was removed  from the anterior aspect of the cervix with excellent hemostasis noted  with minimal bleeding from the tenaculum site.   All instruments were  removed from the patient's

## 2021-06-10 ENCOUNTER — TELEPHONE (OUTPATIENT)
Dept: FAMILY MEDICINE CLINIC | Age: 56
End: 2021-06-10

## 2021-06-10 NOTE — TELEPHONE ENCOUNTER
Spoke to the patient. FBS prior to surgery 129. Some diabetes in her family. Has been indulging in sweets and snacks from a stress standpoint. Recommend she reduce quantity of sweets and carbohydrates, recheck in the office in 2 months and we will follow-up her sugar and an A1c. Discussed possible diabetes but feel some trial of dietary changes appropriate at this time.

## 2021-06-16 ENCOUNTER — OFFICE VISIT (OUTPATIENT)
Dept: OBGYN CLINIC | Age: 56
End: 2021-06-16

## 2021-06-16 VITALS
HEART RATE: 74 BPM | TEMPERATURE: 97.5 F | BODY MASS INDEX: 47.68 KG/M2 | SYSTOLIC BLOOD PRESSURE: 130 MMHG | WEIGHT: 293 LBS | DIASTOLIC BLOOD PRESSURE: 84 MMHG

## 2021-06-16 DIAGNOSIS — Z09 POSTOPERATIVE EXAMINATION: Primary | ICD-10-CM

## 2021-06-16 PROCEDURE — 99024 POSTOP FOLLOW-UP VISIT: CPT | Performed by: OBSTETRICS & GYNECOLOGY

## 2021-06-16 NOTE — LETTER
Name: Patricia Trivedi  : 1965  MR#: [id number]        Alisa Pisano / PROCEDURE    1. I (we), Patricia Trivedi authorize Dr. Nicole Mota and/or such assistants as may be selected by him/her, to perform the following operation/procedures:  Robotic Assisted Total Laparoscopic Hysterectomy with Bilateral Salpingectomy  Note: If unable to obtain consent prior to an emergent procedure, document the emergent reason in the medical record. This procedure has been explained to my (our) satisfaction and included in the explanation was:  A.) the intended benefit, nature, and extent of the procedure to be performed;  B.) the significant risks involved and the probability of success;  C.) alternative procedures and methods of treatment;  D.) the dangers and probable consequences of such alternatives (including no procedure or treatment);  E.) the expected consequences of the procedure on my future health;  F.) whether other qualified individuals would be performing important surgical tasks and/or whether  would be present to advise or support the procedure. I (we) understand that there are other risks of infection and other serious complications in the pre-operative/procedural and postoperative/procedural stages of my (our) care. I (we) have asked all of the questions which I (we) thought were important in deciding whether or not to undergo treatment or diagnosis. These questions have been answered to my (our) satisfaction. I (we) understand that no assurance can be given that the procedure will be a success, and no guarantee or warrant of success has been given to me (us). 2. It has been explained to me (us) that during the course of the operation/procedure, unforeseen conditions may be revealed that necessitate extension of the original procedure(s) or different procedure(s) than those set forth in Paragraph 1.  I (we) authorize and request that has above-named physician, his/her assistants or his/her designees, perform procedures as necessary and desirable if deemed to be in my (our) best interest.     3. I acknowledge that other health care personnel may be observing this procedure for the purpose of medical eduction or other specified purposes as may be necessary as requested and/or approved by my (our) physician. 4. I (we) consent to the disposal by the hospital Pathologist of the removed tissue, parts or organs in accordance with hospital policy. 5. I {Actions; do/do not:83069} consent to the use of a local infiltration pain blocking agent that will be used by my provider/surgical provider to help alleviate pain during my procedure. 6. I {Actions; do/do not:78777} consent to an emergent blood transfusion in the case of a life-threatening situation that requires blood components to be administered. This consent is valid for 24 hours from the beginning of the procedure. 7. This patient {DOES/NOT:19883} currently have a DNR status/order. If DNR order is in place, obtain \"Addendum to the Surgical Consent for ALL Patients with a DNR Order\" to address miguel-operative status for limited intervention or DNR suspension.      8. I have read and fully understand the above Consent for Operation/Procedure and that all blanks were completed before I signed the consent.     ________________________  Signature of Patient or Legal Representative _____________________________  Printed Name/Relationship ________/________  Date/Time     ________________________  Witness to Signature _____________________________  Printed Name ________/________  Date/Time     John Self (If patient is unable to sign or is a minor, complete the following)  Patient is a minor, ___ years of age, or unable to sign because: ______________________________________________________________________  John Self If a phone consent is obtained, consent will be documented by using two [facility] professionals, each affirming that the consenting party has no questions and gives consent for the procedure discussed with the physician/provider.     ________________________  Witness to Phone Consent _____________________________  Printed Name ________/________  Date/Time       Informed Consent:   I have provided the explanation described above in Section 1 to the patient and/or legal representative.  I have provided the patient and/or legal representative with an opportunity to ask any questions about the proposed operation/procedure.    ________________________  Provider/Proceduralist _____________________________  Printed Name ________/________  Date/Time     Revised 8/2/2021

## 2021-06-16 NOTE — PROGRESS NOTES
Return Gyn Office Visit    CC:   Chief Complaint   Patient presents with    Post-Op Check       HPI:  Oni Newberry is a 64 y.o. female who presents for post-op check following hysteroscopy D&C. Patient is seen and examined today. Patient is doing well without complaints. Denies vaginal bleeding. Reports cramping and occasional gas pains. Denies concerns with bowel or bladder function. Ambulating without difficulty, denies dizziness on standing. Denies chest pain, shortness of breath, fever, chills, nausea, vomiting. Review of Systems - The following ROS was otherwise negative, except as noted in the HPI: constitutional, respiratory, cardiovascular, gastrointestinal, genitourinary    Objective:  /84 (Site: Left Upper Arm, Position: Sitting, Cuff Size: Large Adult)   Pulse 74   Temp 97.5 °F (36.4 °C) (Infrared)   Wt 295 lb 6.4 oz (134 kg)   LMP 09/20/2017   BMI 47.68 kg/m²     Physical Exam  Vitals reviewed. Exam conducted with a chaperone present. Constitutional:       General: She is not in acute distress. Appearance: She is well-developed. HENT:      Head: Normocephalic and atraumatic. Eyes:      Conjunctiva/sclera: Conjunctivae normal.   Cardiovascular:      Rate and Rhythm: Normal rate. Pulmonary:      Effort: Pulmonary effort is normal. No respiratory distress. Abdominal:      General: There is no distension. Palpations: Abdomen is soft. Tenderness: There is no abdominal tenderness. There is no guarding or rebound. Musculoskeletal:         General: No swelling. Skin:     General: Skin is warm and dry. Neurological:      Mental Status: She is alert and oriented to person, place, and time. Psychiatric:         Mood and Affect: Mood normal.         Behavior: Behavior normal.         Thought Content:  Thought content normal.       Ultrasound:   Impression   PELVIC ULTRASOUND without DOPPLER INTERROGATION    NON OB       DATE: 04/13/2021       PHYSICIAN: Anjelica SCOTT        SONOGRAPHER: RUFUS Sullivan RDMS       INDICATION: post-menopausal bleeding       TYPE OF SCAN: vaginal       FINDINGS:     The cul de sac is normal. No free fluid appreciated.       The cervix is normal and not enlarged. Nabothian cyst/s is not noted within the uterine cervix.       The uterus measures 8.89 cm x 3.49 cm x 2.77 cm.     The uterus is anteverted. The endometrium measures 9.04 mm. The myometrium is heterogeneous in appearance. No uterine anomalies are noted.        The right ovary is surgically absent. The right adnexa is normal.       The left ovary is present and normal.     The left ovary measures 2.72 cm x 1.85 cm x 1.37 cm.     Ovary findings: No masses seen. The left adnexa is normal.        IMPRESSION: heterogenous uterus. Surgically absent right ovary.  Normal left ovary. Normal blood flow seen to right and left ovary. Imaging is limited secondary to body habitus. The patient is well aware of the limitations of ultrasound in the detection of anomalies of the abdomen and pelvis.       Angel Luis Jensen DO           Pathology:   FINAL DIAGNOSIS:     Endometrial curettings:   - Mostly proliferative endometrial glands without associated stroma. - Negative for hyperplasia or malignancy.  DONNA/DONNA       Assessment/Plan:      Karina Shafer is a 64 y.o. female who presents for post-op check following hysteroscopy D&C. 1. Postoperative examination     - Patient is doing well today     - Meeting post-operative milestones     - Reviewed post-op care and instructions     - Return precautions reviewed    2. Postmenopausal bleeding     - Pathology consistent with proliferative endometrial glands, no stroma present     - Risks, benefits and alternatives were reviewed with the patient regarding management of post-menopausal proliferative endometrium.    Reviewed obesity increasing unopposed estrogen and risk for hyperplasia and neoplastic process, reviewed recommendations for weight loss. - Options reviewed including Provera, IUD and surgical intervention     - Patient does not desire hormonal intervention due to the risks involved     - Patient desires to proceed with hysterectomy. - Will plan for Robotic approach due to surgical findings at the time of hysteroscopy D&C - Robotic assisted total laparoscopic hysterectomy with bilateral salpingectomy, possible bilateral oophorectomy with Dr. Whitney Hussein as primary due to Robotic approach     - Reviewed with Dr. Whitney Hussein prior to scheduling     - Will have back for pre-op exam       Note addended secondary to insurance request for surgical prior authorization.      Darwin Allred,

## 2021-06-16 NOTE — LETTER
[facility] OB/GYN SURGERY SCHEDULING WORKSHEET    Patient Name: Landry Swartz  Patient : 1965  Patient Sex: female  Patient SSN:   Patient Address: [address] [place] [address]  Patient Home Phone: [phone] (home)  Cell:   Telephone Information:   Mobile [phone]     Patient Insurance: Payor: [employer] / Plan: [address] / Product Type: *No Product type* /   Insurance I.D. #: [id number]  Authorization #: see surgery approval letter in epic   PCP: Jayne Mayorga DO     Patient Type:  Outpatient  Anesthesia Type:  general  Surgeon:  Dr. Adelaida Lynne  With Dr. Hermilo Wilson to assist   Procedure:  Robotic Assisted Total Laparoscopic Hysterectomy with Bilateral Salpingectomy  CPT Code(s): 41356 , 85067  PRE OP Diagnosis:  Postmenopausal bleeding  - Primary, Endometrial thickening on ultrasound   ICD-10 Code(s): N95.0 R93.89    Surgery Date:  2021  Surgery Time:  7:30 AM   OR Time Needed:  3 hours  Surgery Location:  [address] [place] [address] or     Allergies: Allergies   Allergen Reactions    Toradol [Ketorolac Tromethamine] Other (See Comments)     \"out of it\"  \"felt like sleep paralysis\"    Haldol [Haloperidol Lactate] Other (See Comments)     \"felt out of it, similar to the toradol\"     Latex Sensitive? no    Who will do History and Physical?  Jayne Mayorga, DO  Cardiac Clearance Needed?   yes  Does patient have pacemaker or implanted cardiac defibrillator? no  Special Equipment:  robot      Name: Allie Yao, CARLY   FAX Number: [phone]      PRE-SURGICAL PHYSICIAN ORDERS    Height:   Ht Readings from Last 1 Encounters:   21 5' 6\" (1.676 m)     Weight:   Wt Readings from Last 1 Encounters:   21 295 lb 6.4 oz (134 kg)       Pre-surgery testing orders:     *Pre-surgical Anesthesia Orders per Anesthesiologist  CBC see physician orders   *Knee Antithrombic Compression James        Past Medical History:   1. Cold/Chronic Cough/Tuberculosis  No  2. Bronchitis/COPD  No  3. Asthma/SOB  No  4. Rheumatic Fever/Heart Murmur  No  5. High Blood Pressure/Low Blood Pressure  Yes High Blood Pressure  6. Swelling of Feet/Fluid in Lungs  Yes Swelling of feet  7. Heart Attack/Chest Pain  No  8. Irregular, Fast Heartbeats  No  9. Do you bruise, bleed easily? Yes  10. Anemia: Type  No  11. Diabetes/Low Blood Sugar  No  12. Are you pregnant? No  13. Last Menstrual Period  May 10, 2015  14. Serious Illness During Pregnancy  No  15. Breast Disease  No  16. Kidney Disease  No  17. Jaundice/Hepatitis  No  18. Hiatal Hernia/Peptic Ulcer Disease  Yes; Hiatal Hernia  19. Convulsions/Epilepsy/Stroke  No  20. Polio/Meningitis Paralysis  No  21. Back Pain/Slipped Disc  No  22. Psychological Disease  Yes; Depression, Anxiety  23. Thyroid Disease   No  24. Glaucoma/Visual Impairment   No  25. Skeletal Deformities/Defects/Arthritis  Yes; Arthritis  26. Loose Teeth/Caps on Front Teeth  No  27. Have you had any surgery? Yes  28. Any history of anesthesia complication in self or family? No  29. Prostate Disease  No  30. Cancer  No  31. DVT/PE/PVD  No  32.  Weight Lose/Gain  Yes    Past Medical History:   Diagnosis Date    Anxiety     Depression     Endometriosis     GERD (gastroesophageal reflux disease)     Hypertension     Osteoarthritis     Restless leg syndrome      Past Surgical History:   Past Surgical History:   Procedure Laterality Date    ANUS SURGERY  2018    fissure     SECTION      x3    COLONOSCOPY      DILATION AND CURETTAGE OF UTERUS N/A 6/3/2021    VIDEO HYSTEROSCOPY DILATATION AND CURETTAGE, POSSIBLE MYOSURE, POSSIBLE POLYPECTOMY performed by Nam Durand DO at [address]       right wrist    HERNIA REPAIR  2018    LAPAROSCOPIC PARAESOPHAGEAL HERNIA REPAIR WITH MESH    KNEE ARTHROSCOPY Left 11/15/12    ARTHROSCOPY LEFT KNEE WITH SYOVECTOMY AND CHONDROPLASTY    OVARY REMOVAL Right 2010    ROTATOR CUFF REPAIR Right 6/23/2020    EXAM UNDER ANESTHESIA VIDEO ARTHROSCOPY RIGHT SHOULDER, MINI- OPEN ROTATOR CUFF REPAIR, NEER ACROMIOPLASTY, LENO PROCEDURE WITH EXPAREL performed by Jessica Bernal MD at [place] [address] ARTHROSCOPY Right 11/25/2020    VIDEO ARTHROSCOPY RIGHT SHOULDER, OPEN ROTATOR CUFF REPAIR, BICEPS TENOTOMY -BLOCK- performed by Yna Clements MD at [place] [address] ARTHROSCOPY Right 2/24/2021    RIGHT SHOULDER ARTHROSCOPIC INCISION AND DRAINAGE performed by Yan Clements MD at [place] [address] ARTHROSCOPY Right 4/28/2021    VIDEO ARTHROSCOPY RIGHT SHOULDER, ROTATOR CUFF REPAIR, DEBRIDEMENT performed by Yan Clements MD at [address] [place] [address] ENDOSCOPY  2015    esophageasl stretch     Current Outpatient Medications   Medication Sig Dispense Refill    ALPRAZolam (XANAX) 1 MG tablet TAKE 1 TABLET BY MOUTH 3 TIMES DAILY AS NEEDED FOR ANXIETY FOR UP TO 60 DAYS. 90 tablet 1    ibuprofen (ADVIL;MOTRIN) 600 MG tablet Take 1 tablet by mouth every 8 hours as needed for Pain Do not combine with additional NSAIDs. 30 tablet 0    traMADol (ULTRAM) 50 MG tablet Take 50 mg by mouth every 6 hours as needed for Pain.  ibuprofen (ADVIL;MOTRIN) 600 MG tablet TAKE 1 TABLET BY MOUTH EVERY 8 HOURS AS NEEDED FOR PAIN 40 tablet 1    Acetaminophen (TYLENOL PO) Take by mouth      DULoxetine (CYMBALTA) 60 MG extended release capsule TAKE 1 CAPSULE BY MOUTH EVERY DAY 30 capsule 5    methocarbamol (ROBAXIN) 500 MG tablet TAKE 1 TABLET BY MOUTH 3 TIMES A DAY AS NEEDED FOR NECK PAIN 90 tablet 3    lidocaine (XYLOCAINE) 5 % ointment APPLY TOPICALLY AS NEEDED TO AREA OF TENDERNESS UNDER ARM.  70.88 g 1    atenolol (TENORMIN) 25 MG tablet TAKE 1 TABLET BY MOUTH EVERY DAY 30 tablet 5    rOPINIRole (REQUIP) 2 MG tablet TAKE 1 TABLET BY MOUTH TWICE A DAY 60 tablet 5    omeprazole (PRILOSEC) 40 MG delayed release capsule TAKE 1 CAPSULE BY MOUTH EVERY DAY 30 capsule 6    MELATONIN PO Take 20 mg by mouth nightly as needed        No current facility-administered medications for this visit. IN ACCORDANCE WITH OUR FORMULARY SYSTEM, A GENERIC EQUIVALENT DRUG MAY BE DISPENSED AND ADMINISTERED UNLESS D. A. W. IS WRITTEN WITH THE MEDICATION ORDER  PRE-SURGICAL PHYSICIAN ORDERS:  GYNECOLOGY SURGERY    Patient Name __Nydia Downs_____    _1965__    Surgical Procedure_____Robotic Assisted Total Laparoscopic Hysterectomy with Bilateral Salpingectomy      Date of Surgery__10/21/21_________________             ? Admit as Inpatient        X Outpatient 23 hour hold with bed     Height__5'6\"______Weight___295______    Allergies_  Toradol [ketorolac Tromethamine] Medium Other (See Comments) \"out of it\" \"felt like sleep paralysis\"   Haldol [haloperidol Lactate] Not Specified Other (See Comments) \"felt out of it, similar to the toradol\"       Pre-surgery Testing Orders:  ? Pre-surgical Anesthesia Orders Per Anesthesiologist ? Type & Screen ? RH ABO ? CBC ? Chem 7   ? Serum HCG quantitative ? Serum HCG qualitative ? CXR _____ Reason ? EKG _____reason                           ? Urine Pregnancy on Day of Surgery for all local anesthesia patients ?  Other:     Preop Antibiotic Prophylaxis/Hysterectomy/Lap assisted Hysterectomy/Vaginal Hysterectomy-Day of Surgery     Cefazolin IVPB per weight base protocol   Cefazolin 2 grams if <119.9kg   Cefazolin 3 grams if ?120kg (?264 lbs.)    ALTERNATIVE:     (Consider for PCN/Cephalosporin allergic pts)                                                                                                                      Metronidazole 500 mg IVPB x 1 and Levofloxacin 500 mg IVPB x1      Metronidazole 500 mg IVPB x 1 and Gentamicin 1.5 mg/kg IVPB x 1 Pre-op Antibiotics Prophylaxis: Pubovaginal Sling-Day of Surgery     Cefazolin IVPB per weight base protocol   Cefazolin 2 grams if <119.9kg   Cefazolin 3 grams if ?120kg (?264 lbs.)      Ampicillin/Sulbactam 3g IVPB       ALTERNATIVE:    Levofloxacin 500 mg IVPB     ALTERNATIVE: (Consider for PCN/Cephalosporin allergic pts)        Clindamycin 900 mg IVPB + Gentamicin 1.5 mg/kg IVPB x 1    Metronidazole 500 mg IVPB x1 and Gentamicin 1.5 mg/kg IVPB x1   ADDITIONAL MEDICATIONS:    DVT PREVENTION:       ? Knee Antithrombic compression wraps    Additional Orders: _____________________________________________________________________ ________________________________________________________________________________                Signature _____________________________________________Date _____________________    Please fax at time of booking the case to the Scheduling office at [phone] [facility] at [phone]                                                                                                              Patient: David Downs___________________________________________________  I hereby authorize Dr. Evelyne Su  and the associate(s) or assistant(s) of his/her choice to perform on (Name of Patient or Me) __________________________________ the following procedures:  Robotic Assisted Total Laparoscopic Hysterectomy with Bilateral Salpingectomy and/or to do any other therapeutic procedure that the practitioners judgement may dictate to be advisable for my well-being. The above named practitioner has explained the nature of the procedures, the risks, the benefits and the treatment options to me. I acknowledge that no warranty or guarantee has been made to the results of such procedures or cure. ? I have a Do Not Resuscitate (DNR) order and wish to suspend the DNR status during my surgery and immediate post-operative recovery period.  (If patient does not wish to suspend DNR, complete page two of this form.)    1. I hereby authorize the above named practitioner and associate(s) or assistant(s) to provide such additional services to me as deemed reasonable and necessary, including but not limited to the administration and maintenance of anesthesia; procedures involving pathology and radiology; the administration of blood or blood derivatives; and IV procedural sedation. 2. I have been informed that I need, or I may need during treatment, a transfusion of blood and/or one of its products in the interest of my health and proper medical care. The risks and benefits of receiving transfusion(s) have been described to me. These risks exist despite the fact that the blood has been carefully tested. The alternatives to transfusion, including the risks and consequences of not receiving this therapy, have been explained to me. I understand that although blood and blood products are carefully tested, there is a low risk of hepatitis, HIV transmission (AIDS), fever or allergic reaction or other blood borne disease of adverse reactions; and I agree that no expressed or implied warranty is given by the hospital, any blood bank, or any person or entity as to the blood to blood components so transfused. 3. I recognized that during the course of the operation unforeseen conditions may necessitate additional or different procedures that those set forth above. I therefore further authorize and request that the above named practitioner and associate(s) perform such procedures as are in the practitioners professional judgment necessary and desirable.   4. I consent to the observing, including by , photographing, video/audio, taping or televising of the operations or procedures to be performed including appropriate portions of my body, for medical, scientific, or educational purposes, provided my identity is not revealed by the pictures or by descriptive texts accompanying them. 5. I hereby authorize the entities of [facility] to preserve for scientific teaching purposes or to otherwise dispose of dismembered tissue, parts or organs resulting from the procedures authorized above. 6. I authorize the use of my social security number to track any medical device implanted during my surgery. I understand that my number may be released to the [address] [place] [address] Way or the  of the implant (if any). 7. A contrast agent may be required for my imaging procedure. I may experience a warm sensation, hives, nausea, vomiting, or a small amount of bleeding under the skin. In rare instances, more serious complications could be encountered which could include shock, kidney failure, and cardiac arrest.  8. This form has been fully explained to me, and I certify that I understand its contents. Patient Signature: _______________________________________________________  Patient Representative: ____________________________Relationship: ____________  Witness: _______________________________________ Date/Time: ______________                                                                                                [facility] OB/GYN  Notice of Pain Agreement    Date: _________________________________    Name: ___Nydia Downs___________________   : ____1965_______    Controlled substance/medication agreements are signed agreements between a patient and a doctor specifying the terms and conditions under which the doctor agrees to treat a patient's chronic pain with controlled medications. You are being scheduled for a surgery where a narcotic medication may be prescribed to control your pain after your procedure. It is required that you share information with our office if you are under contract/agreement with another physician. I attest that I   ? DO    ?  DO NOT   have a pain contract/ narcotic agreement established with another physician. (If under contract, complete the following information.)    [facility] Physician Name: _____________________________________________    Office Name/Address: ___________________________________________________                                         ___________________________________________________    Office Phone: ___________________________ Fax: __________________________    I understand that if I am not forthcoming with information regarding a pain contract currently in place, it may result in a violation of the contract with that provider. The violation may result in cessation of prescribing any controlled medications. Violation may also result in the dismissal of care from the mando provider. It is your responsibility as the patient under contract to understand the contract and adhere to the agreement. Patient Signature: __________________________________  Date: ________________                                                          CONSENT FOR TREATMENT    PATIENT: ____Nydia Downs___________________________________________  I hereby authorize Dr. Evelyne Su and the associate(s) or assistant of his/her choice to perform the following procedures:  DaVinci robotic total laparoscopic hysterectomy, possible total abdominal hysterectomy,  bilateral salpingectomy, possible bilateral oophorectomy for the purpose of removal of uterus, uterine cervix and/or do any other therapeutic procedure that the practitioners judgment may dictate to be advisable for my well being. The above named practitioner has explained the nature of the procedure, the risks, the benefits, and the treatment options have been explained. I have had a chance to ask questions and agree that all questions have been answered to my satisfaction. I acknowledge that no warranty or guarantee has been made to the results of such procedure.   Risks to include but not limited to:  bleeding, infection, pain, need for further procedure, injury to nearby organs (bladder, uterus, bowel), risk of anesthesia, risk of death and infertility. Poor outcome and  risk of menopause if ovaries are removed in conjunction with procedure. 1. I hereby authorize the above named practitioner and associate(s) or assistant to provide such additional services to me deemed reasonable and necessary, including but not limited to administration and maintenance of anesthesia: procedures involving pathology, radiology and IV sedation. 2. I recognize that during the course of the procedure unforeseen conditions may necessitate additional procedures than those set forth above. I therefore further authorize and request that the above named practitioner and associate(s) perform such procedures as are in the practitioners professional judgment necessary and desirable. 3. I hereby authorize the entities of [employer] to preserve for scientific teaching purposes or to otherwise dispose of tissue/specimen resulting from the procedures authorized above. 4. The form has been fully explained to me, and I certify that I understand its contents. Patient Name _____________________________________  _________________  Please print  Patients Signature ________________________________ Date __________________  (or person authorized to sign for patient)  Witness _________________________________________ Date __________________                              Devyn Vergara have been scheduled for Robotic Assisted Total Laparoscopic Hysterectomy with Bilateral Salpingectomy  at [facility] on 2021 at 7:30 AM.  Please plan to arrive at 5:30 am.    **Please note all dates and times are subject to change. **    Please contact your primary care provider to schedule a \"pre-op H&P\" visit at least one week prior to surgery at your convenience.     You have been scheduled with Dr. Karla Osullivan on 2021 at 1:20 PM to sign surgical consents. Our office has contacted your insurance company to obtain a pre-certification for your procedure. It is YOUR responsibility to contact your insurance company regarding the amount they will cover and what you are responsible for. The following are the CPT (procedure) codes you will need to obtain this information. 69786 , 82299    Our office has also spoke with your insurance company regarding your current benefits. You will be responsible for your copayment and any charges not covered by your insurance. **If you have an outstanding balance with our office you will need to make arrangements to pay balances prior to your surgery. Payment plans are available by contacting Physician Billing at [phone]. If you have any questions around how to set up a payment you can reach our office at [phone]. Failure to do so may result in cancelling your surgery or rescheduling to a later date when balance is paid. **    Our office will send a letter after the surgery confirming outstanding balance. Payment plan or payment in full will be due at the post-operative appointment with the office.

## 2021-06-18 ENCOUNTER — TELEPHONE (OUTPATIENT)
Dept: FAMILY MEDICINE CLINIC | Age: 56
End: 2021-06-18

## 2021-06-18 NOTE — TELEPHONE ENCOUNTER
----- Message from 1 Omega Sweeney sent at 6/18/2021 10:31 AM EDT -----  Subject: Message to Provider    QUESTIONS  Information for Provider? Patient had an apt 06/16/21 with Sadia Duarte,   and the doctor state she will have a hysterectomy on August of this year,   the patient want to inform his PCP Maryam Cano .  ---------------------------------------------------------------------------  --------------  [address]  What is the best way for the office to contact you? OK to leave message on   voicemail  Preferred Call Back Phone Number? [phone]  ---------------------------------------------------------------------------  --------------  SCRIPT ANSWERS  Relationship to Patient?  Self

## 2021-06-29 RX ORDER — IBUPROFEN 600 MG/1
TABLET ORAL
Qty: 40 TABLET | Refills: 1 | Status: SHIPPED | OUTPATIENT
Start: 2021-06-29 | End: 2021-08-30

## 2021-06-30 RX ORDER — FUROSEMIDE 20 MG/1
20 TABLET ORAL DAILY PRN
Qty: 30 TABLET | Refills: 1 | Status: SHIPPED | OUTPATIENT
Start: 2021-06-30 | End: 2021-12-05

## 2021-07-12 ENCOUNTER — TELEPHONE (OUTPATIENT)
Dept: FAMILY MEDICINE CLINIC | Age: 56
End: 2021-07-12

## 2021-07-12 ENCOUNTER — TELEPHONE (OUTPATIENT)
Dept: OBGYN CLINIC | Age: 56
End: 2021-07-12

## 2021-07-12 DIAGNOSIS — G89.29 CHRONIC PAIN OF LEFT KNEE: Primary | ICD-10-CM

## 2021-07-12 DIAGNOSIS — M25.562 CHRONIC PAIN OF LEFT KNEE: Primary | ICD-10-CM

## 2021-07-12 RX ORDER — ROPINIROLE 2 MG/1
TABLET, FILM COATED ORAL
Qty: 90 TABLET | Refills: 2 | Status: SHIPPED | OUTPATIENT
Start: 2021-07-12 | End: 2021-09-08

## 2021-07-12 NOTE — TELEPHONE ENCOUNTER
----- Message from Yoselin Mathews sent at 7/12/2021  8:40 AM EDT -----  Subject: Message to Provider    QUESTIONS  Information for Provider? patient is waiting on a phone call to have a   hysterectomy and patient would like to know if she can get a stronger dose   of rOPINIRole (REQUIP) 2 MG tablet.  ---------------------------------------------------------------------------  --------------  CALL BACK INFO  What is the best way for the office to contact you? OK to leave message on   voicemail  Preferred Call Back Phone Number? [phone]  ---------------------------------------------------------------------------  --------------  SCRIPT ANSWERS  Relationship to Patient?  Self

## 2021-07-12 NOTE — TELEPHONE ENCOUNTER
I believe Liane Castaneda is already working on this. Please touch base with her. There is nothing that the patient needs to do, we will proceed with prior authorization and touch base with her at that time. Thank you.

## 2021-07-12 NOTE — TELEPHONE ENCOUNTER
----- Message from Elis Kumar sent at 7/12/2021  8:37 AM EDT -----  Subject: Referral Request    QUESTIONS   Reason for referral request? patient had to find a new ortho doctor due to   Dr. Freeman Napier not accepting her insurance any longer and needs a referral .   Has the physician seen you for this condition before? No   Preferred Specialist (if applicable)? Cyndi Rajan  Do you already have an appointment scheduled? Yes  Select Scheduled Date? 2021-07-14  Select Scheduled Physician? Cyndi Rajan   Additional Information for Provider?   ---------------------------------------------------------------------------  --------------  Aliza SANTANA  What is the best way for the office to contact you? OK to leave message on   voicemail  Preferred Call Back Phone Number?  [phone]

## 2021-07-12 NOTE — TELEPHONE ENCOUNTER
Called in and stated she wants to know what to do to get the ball rolling on her hysterectomy. Stated she would like to speak to you about this. Please advise. Routing to Dr. Santiago Fernandez.

## 2021-07-13 NOTE — TELEPHONE ENCOUNTER
This is being worked on . .. I had to wait until the physician fished all her charting , with her insurance all clinical notes have to be faxed to insurance company for review to make sure this surgery is needed. I faxed over all clinical notes along with pa request form on 7/6/21 it generally takes about 14 days from the time it was faxed over. Soon as I get a response from insurance I will call her with some surgery dates.      Thanks

## 2021-07-14 ENCOUNTER — OFFICE VISIT (OUTPATIENT)
Dept: ORTHOPEDIC SURGERY | Age: 56
End: 2021-07-14
Payer: COMMERCIAL

## 2021-07-14 VITALS — BODY MASS INDEX: 47.09 KG/M2 | WEIGHT: 293 LBS | TEMPERATURE: 98.6 F | HEIGHT: 66 IN

## 2021-07-14 DIAGNOSIS — M17.12 PRIMARY OSTEOARTHRITIS OF LEFT KNEE: ICD-10-CM

## 2021-07-14 DIAGNOSIS — M25.562 LEFT KNEE PAIN, UNSPECIFIED CHRONICITY: Primary | ICD-10-CM

## 2021-07-14 PROCEDURE — 99203 OFFICE O/P NEW LOW 30 MIN: CPT | Performed by: ORTHOPAEDIC SURGERY

## 2021-07-14 PROCEDURE — G8417 CALC BMI ABV UP PARAM F/U: HCPCS | Performed by: ORTHOPAEDIC SURGERY

## 2021-07-14 PROCEDURE — 1036F TOBACCO NON-USER: CPT | Performed by: ORTHOPAEDIC SURGERY

## 2021-07-14 PROCEDURE — 20610 DRAIN/INJ JOINT/BURSA W/O US: CPT | Performed by: ORTHOPAEDIC SURGERY

## 2021-07-14 PROCEDURE — G8427 DOCREV CUR MEDS BY ELIG CLIN: HCPCS | Performed by: ORTHOPAEDIC SURGERY

## 2021-07-14 PROCEDURE — 3017F COLORECTAL CA SCREEN DOC REV: CPT | Performed by: ORTHOPAEDIC SURGERY

## 2021-07-14 RX ORDER — ROPIVACAINE HYDROCHLORIDE 5 MG/ML
30 INJECTION, SOLUTION EPIDURAL; INFILTRATION; PERINEURAL ONCE
Status: COMPLETED | OUTPATIENT
Start: 2021-07-14 | End: 2021-07-14

## 2021-07-14 RX ORDER — METHYLPREDNISOLONE ACETATE 40 MG/ML
40 INJECTION, SUSPENSION INTRA-ARTICULAR; INTRALESIONAL; INTRAMUSCULAR; SOFT TISSUE ONCE
Status: COMPLETED | OUTPATIENT
Start: 2021-07-14 | End: 2021-07-14

## 2021-07-14 RX ADMIN — METHYLPREDNISOLONE ACETATE 40 MG: 40 INJECTION, SUSPENSION INTRA-ARTICULAR; INTRALESIONAL; INTRAMUSCULAR; SOFT TISSUE at 10:57

## 2021-07-14 RX ADMIN — ROPIVACAINE HYDROCHLORIDE 30 ML: 5 INJECTION, SOLUTION EPIDURAL; INFILTRATION; PERINEURAL at 10:58

## 2021-07-14 NOTE — TELEPHONE ENCOUNTER
Myah Esqueda from [facility] called regarding th PA request for  hysterectomy. The diagnosis code provided 96 [id number] can not be used/ billed for a hysterectomy. The request is now canceled need to start over. She said the last office not needs updated with a different diagnosis code, last pap, full medication list needs to be sent to see if hormone therapy was tried. As soon as the last office not is updated with a new DX code I will re send and start the PA process over. I will notify patient and let her know.    Routing to physician

## 2021-07-14 NOTE — PROGRESS NOTES
Administrations This Visit     methylPREDNISolone acetate (DEPO-MEDROL) injection 40 mg     Admin Date  07/14/2021  10:57 Action  Given Dose  40 mg Route  Intramuscular Site  Knee Left Administered By  Nyra Cart    Ordering Provider: Yari Ku DO    NDC: 5222-6533-97    Lot#: [id number]    : Koby Singh.     Patient Supplied?: No          ropivacaine (NAROPIN) 0.5% injection 30 mL     Admin Date  07/14/2021  10:58 Action  Given Dose  30 mL Route  Epidural Site  Knee Left Administered By  Nyra Cart    Ordering Provider: Yari Ku DO    [address]: [id number]    Lot#: [id number]    : [address] [place] [address]    Patient Supplied?: No

## 2021-07-14 NOTE — PROGRESS NOTES
Date:  2021    Name:  Kim Jewell  Address:  [address] [place] [address] Av  [facility] [address]    :  1965      Age:   64 y.o.    SSN:  [id number]      Medical Record Number:  [id number]    Reason for Visit:    Chief Complaint    Knee Pain (op/op left knee pain -- ongoing for years -- would like to discuss VISCO injections)      DOS:2021     HPI: Baljeet Garcia is a 64 y.o. female here today for new patient evaluation for left knee pain. The patient had been seeing Dr. Lindsey Andrews with [facility] for many years. She is seeing us because he switched practices and they do not take her insurance. She continues to have left knee pain that bothers her almost all the time. She has not had any good quality sleep for the past few nights. She reports she takes over-the-counter ibuprofen which helps some. She denies any instability in the knee. She is requesting an injection today. She has had 4 shoulder surgeries recently and she would like to avoid surgery on her left knee as long as possible. She does not have diabetes. Pain Assessment  Location of Pain: Knee  Location Modifiers: Left  Severity of Pain: 7  Quality of Pain: Sharp, Aching  Duration of Pain: Persistent  Frequency of Pain: Constant  Date Pain First Started:  (ongoing for years)  Aggravating Factors: Stretching (starightening knee and night time)  Limiting Behavior: Yes  Relieving Factors: Rest  Result of Injury: No  Work-Related Injury: No  Are there other pain locations you wish to document?: No    ROS: All systems reviewed on patient intake form. Pertinent items are noted in HPI.         Past Medical History:   Diagnosis Date    Anxiety     Depression     Endometriosis     GERD (gastroesophageal reflux disease)     Hypertension     Osteoarthritis     Restless leg syndrome         Past Surgical History:   Procedure Laterality Date    ANUS SURGERY  2018    fissure     SECTION      x3    COLONOSCOPY      DILATION AND CURETTAGE OF UTERUS N/A 6/3/2021    VIDEO HYSTEROSCOPY DILATATION AND CURETTAGE, POSSIBLE MYOSURE, POSSIBLE POLYPECTOMY performed by Ulysses Pacer, DO at [address]       right wrist    HERNIA REPAIR  03/06/2018    LAPAROSCOPIC PARAESOPHAGEAL HERNIA REPAIR WITH MESH    KNEE ARTHROSCOPY Left 11/15/12    ARTHROSCOPY LEFT KNEE WITH SYOVECTOMY AND CHONDROPLASTY    OVARY REMOVAL Right 2010    ROTATOR CUFF REPAIR Right 6/23/2020    EXAM UNDER ANESTHESIA VIDEO ARTHROSCOPY RIGHT SHOULDER, MINI- OPEN ROTATOR CUFF REPAIR, NEER ACROMIOPLASTY, LENO PROCEDURE WITH EXPAREL performed by Suzanne Hurtado MD at [place] [address] ARTHROSCOPY Right 11/25/2020    VIDEO ARTHROSCOPY RIGHT SHOULDER, OPEN ROTATOR CUFF REPAIR, BICEPS TENOTOMY -BLOCK- performed by Brenda Xavier MD at [place] [address] ARTHROSCOPY Right 2/24/2021    RIGHT SHOULDER ARTHROSCOPIC INCISION AND DRAINAGE performed by Brenda Xavier MD at [place] [address] ARTHROSCOPY Right 4/28/2021    VIDEO ARTHROSCOPY RIGHT SHOULDER, ROTATOR CUFF REPAIR, DEBRIDEMENT performed by Brenda Xavier MD at [address] [place] [address] ENDOSCOPY  2011    UPPER GASTROINTESTINAL ENDOSCOPY  2015    esophageasl stretch       Family History   Problem Relation Age of Onset    Arthritis Mother     Obesity Mother    Dossie Stacey Anemia Mother     Other Mother         pulmonary embolism    Arthritis Father     Arrhythmia Father     Diabetes Other     Diabetes Paternal Grandfather     Cancer Neg Hx     Breast Cancer Neg Hx     Colon Cancer Neg Hx        Social History     Socioeconomic History    Marital status:       Spouse name: None    Number of children: 3    Years of education: None    Highest education level: None   Occupational History    Occupation:    Tobacco Use    Smoking status: Never Smoker    Smokeless tobacco: Never Used   Vaping Use    Vaping Use: Never used   Substance TIMES A DAY AS NEEDED FOR NECK PAIN 90 tablet 3    lidocaine (XYLOCAINE) 5 % ointment APPLY TOPICALLY AS NEEDED TO AREA OF TENDERNESS UNDER ARM. 70.88 g 1    atenolol (TENORMIN) 25 MG tablet TAKE 1 TABLET BY MOUTH EVERY DAY 30 tablet 5    omeprazole (PRILOSEC) 40 MG delayed release capsule TAKE 1 CAPSULE BY MOUTH EVERY DAY 30 capsule 6    MELATONIN PO Take 20 mg by mouth nightly as needed        No current facility-administered medications for this visit. Allergies   Allergen Reactions    Toradol [Ketorolac Tromethamine] Other (See Comments)     \"out of it\"  \"felt like sleep paralysis\"    Haldol [Haloperidol Lactate] Other (See Comments)     \"felt out of it, similar to the toradol\"       Vital signs:  Temp 98.6 °F (37 °C)   Ht 5' 6\" (1.676 m)   Wt 295 lb (133.8 kg)   LMP 09/20/2017   BMI 47.61 kg/m²            Left knee exam    Gait: No assistive devices but she walks with a large limp. Alignment: normal alignment. Inspection/skin: Skin is intact without erythema or ecchymosis. No gross deformity. Palpation: Patellofemoral crepitation noted. Joint line tenderness noted over the medial lateral joint lines. Range of Motion: There is full range of motion. Strength: Weak quadriceps. Effusion: Mild effusion. Ligamentous stability: No cruciate or collateral ligament instability. Neurologic and vascular: Skin is warm and well-perfused. Sensation is intact to light-touch. Special tests: Negative Abdi sign. Right knee exam    Gait: No use of assistive devices. No antalgic gait. Alignment: normal alignment. Inspection/skin: Skin is intact without erythema or ecchymosis. No gross deformity. Palpation: no crepitus. no joint line tenderness present. Range of Motion: There is full range of motion. Strength: Normal quadriceps development. Effusion: No effusion or swelling present.      Ligamentous stability: No cruciate or collateral ligament instability. Neurologic and vascular: Skin is warm and well-perfused. Sensation is intact to light-touch. Special tests: Negative Abdi sign. Diagnostics:  Radiology:       Findings:   Views: 3 views left knee  Weight bearing: Yes  Findings: 3 views of the left knee taken in the office today demonstrate no acute osseous abnormalities. There is overall varus alignment with severe tricompartmental osteoarthritis. Complete loss of medial joint space noted. Appropriate patellar height noted. Previous comparison films: None    Impression:  1. Severe tricompartmental osteoarthritis of the left knee with varus alignment      Assessment: 60-year-old female with severe left tricompartmental osteoarthritis of the knee with varus alignment    Plan: Pertinent imaging was reviewed. The etiology, natural history, and treatment options for the disorder were discussed. The roles of activity medication, antiinflammatories, injections, bracing, physical therapy, and surgical interventions were all described to the patient and questions were answered. Given the patient's severe symptoms and poor pain control, we will do an injection to the knee today. She reports that these do help her. We will also switch her from ibuprofen to diclofenac. We did caution her not to take both at the same time. Follow-up with us as needed or for another injection in a minimum of 3 months. Lata Yin is in agreement with this plan. All questions were answered to patient's satisfaction and was encouraged to call with any further questions. The patient was advised that NSAID-type medications have several potential side effects that include: gastrointestinal irritation including hemorrhage, renal injury, as well as an increased risk for heart attack and stroke.  The patient was asked to take the medication with food and to stop if there is any symptoms of GI upset, including heartburn, nausea, increased gas or diarrhea. I asked the patient to contact their medical provider for vomiting, abdominal pain or black/bloody stools. The patient should have renal function testing per his medical provider periodically if the medication is taken on a regular basis. The patient should be alert for any swelling in the lower extremities and should stop taking the medication immediately and contact their medical provider should this occur. In addition, the patient should stop taking the medication immediately and contact their medical provider should there be any shortness of breath, fatigue and be evaluated in an emergency facility for any chest pain. The patient expresses understanding of these issues and questions were answered. Procedure: Left knee intra-articular injection  After discussion of the risks, benefits, alternatives to injection, patient agreeable. The skin was cleansed and prepped. Using a 25 gauge needle an injection with 2 mL of 40 mg/ml Depo-Medrol and 3 mL of 0.5% Ropivacaine was injected without difficulty into the left knee joint from a suprapatellar approach. Sterile dressing was applied. The patient tolerated the procedure well. Total time spent for evaluation, education, and development of treatment plan: 30 minutes. Radha Jasmine DO Attestation:  I was physically present and performed my own examination of this patient and have discussed the case, including pertinent history and exam findings with the fellow. I agree with the documented assessment and plan. Jasmin Jimenez MD  [facility] Clinical Fellow  7/14/2021    Orders Placed This Encounter   Procedures    XR KNEE LEFT (3 VIEWS)     Standing Status:   Future     Number of Occurrences:   1     Standing Expiration Date:   7/14/2022     Order Specific Question:   Reason for exam:     Answer:   Pain

## 2021-07-22 ENCOUNTER — TELEPHONE (OUTPATIENT)
Dept: OBGYN CLINIC | Age: 56
End: 2021-07-22

## 2021-07-22 NOTE — TELEPHONE ENCOUNTER
Called and wanted clarification on PAP/HPV readings and why the surgery was needed. Was given the requested information and stated she would process this as an approval and we would receive a letter soon.      Routing to [place]

## 2021-07-28 NOTE — PROGRESS NOTES
Poppy Zara    Age 64 y.o.    female    1965    MRN [id number]    10/21/2021  Arrival Time_____________  OR Time____________145 Min     Procedure(s):  ROBOTIC ASSISTED LAPAROSCOPIC HYSTERECTOMY WITH BILATERAL SALPINGECTOMY  [facility] CODE - [id number]                      General     Surgeon(s):  Tonya Del Valle, DO      DAY ADMIT ___  SDS/OP ___  Ruchi Franco IN BED ___         Phone [phone] (home)    PCP _____________________ Phone_________________ Epic ( ) Epic CE ( ) Appt ________    ADDITIONAL INFO __________________________________ Cardio/Consult _____________    NOTES _____________________________________________________________________    ____________________________________________________________________________    PAT APPT DATE:________ TIME: ________  FAXED QAD: _______  (__) H&P w/ hospitalist  ____________________________________________________________________________    COVID TEST: Date/Location______________        NURSING HISTORY COMPLETE: _______  (__) CBC       (__) W/ DIFF ___________  (__)  ECHO    __________  (__) Hgb A1C    ___________  (__) CHEST X RAY   __________  (__) LIPID PROFILE  ___________  (__) EKG   __________  (__) PT/PTT   ___________  (__) PFT's   __________  (__) BMP   ___________  (__) CAROTIDS  __________  (__) CMP   ___________  (__) VEIN MAPPING  __________  (__) U/A   ___________  (__) HISTORY & PHYSICAL __________  (__) URINE C & S  ___________  (__) CARDIAC CLEARANCE __________  (__) U/A W/ FLEX  ___________  (__) PULM.  CLEARANCE __________  (__) SERUM PREGNANCY ___________  (__) Check Epic DOS orders __________  (__) TYPE & SCREEN ________ repeat ( ) (__)  __________________ __________  (__) ALBUMIN   ___________  (__)  __________________ __________  (__) TRANSFERRIN  ___________  (__)  __________________ __________  (__) LIVER PROFILE  ___________  (__)  __________________ __________  (__) CARBOXY HGB  ___________  (__) URINE PREG DOS __________  (__) NICOTINE & MET. ___________  (__) BLOOD SUGAR DOS __________  (__) PREALBUMIN  ___________    (__) MRSA NASAL SWAB ___________  (__) BLOOD THINNERS __________  (__) ACE/ ARBS: _____________________    (__) BETABLOCKERS ___________________

## 2021-07-30 NOTE — PROGRESS NOTES
DOS __________  (__) NICOTINE & MET.  ___________  (__) BLOOD SUGAR DOS __________  (__) PREALBUMIN  ___________    (__) MRSA NASAL SWAB ___________  (__) BLOOD THINNERS __________  (__) ACE/ ARBS: _____________________    (__) BETABLOCKERS ___________________

## 2021-08-03 RX ORDER — METHOCARBAMOL 500 MG/1
TABLET, FILM COATED ORAL
Qty: 90 TABLET | Refills: 3 | Status: SHIPPED | OUTPATIENT
Start: 2021-08-03 | End: 2021-11-30

## 2021-08-03 NOTE — TELEPHONE ENCOUNTER
Last ov  05/17/2021   Future Appointments   Date Time Provider Diann Sotelo   10/13/2021  1:20 PM DO [facility]

## 2021-08-17 ENCOUNTER — VIRTUAL VISIT (OUTPATIENT)
Dept: FAMILY MEDICINE CLINIC | Age: 56
End: 2021-08-17
Payer: COMMERCIAL

## 2021-08-17 DIAGNOSIS — N61.1 BOIL, BREAST: Primary | ICD-10-CM

## 2021-08-17 PROCEDURE — G8428 CUR MEDS NOT DOCUMENT: HCPCS | Performed by: FAMILY MEDICINE

## 2021-08-17 PROCEDURE — 3017F COLORECTAL CA SCREEN DOC REV: CPT | Performed by: FAMILY MEDICINE

## 2021-08-17 PROCEDURE — 99213 OFFICE O/P EST LOW 20 MIN: CPT | Performed by: FAMILY MEDICINE

## 2021-08-17 RX ORDER — DOXYCYCLINE HYCLATE 100 MG
100 TABLET ORAL 2 TIMES DAILY
Qty: 20 TABLET | Refills: 0 | Status: SHIPPED | OUTPATIENT
Start: 2021-08-17 | End: 2021-08-27

## 2021-08-17 NOTE — PROGRESS NOTES
2021    TELEHEALTH EVALUATION -- Audio/Visual (During [place] [address] public health emergency)    HPI:    Elliott Dewitt (:  1965) has requested an audio/video evaluation for the following concern(s):    Boil-  Right breast / axilla  Started 2 days  Has a area left foot where got stung    Tried to burst the breat lesion. No pus  Not used any warm compresses    Review of Systems   Constitutional: Negative for chills and fever. Musculoskeletal:        Shoulder doing better. Skin:        Boil right lateral breast   Neurological: Negative. Prior to Visit Medications    Medication Sig Taking? Authorizing Provider   ALPRAZolam (XANAX) 1 MG tablet TAKE 1 TABLET BY MOUTH 3 TIMES DAILY AS NEEDED FOR ANXIETY FOR UP TO 60 DAYS. **  Nik Damico DO   methocarbamol (ROBAXIN) 500 MG tablet TAKE 1 TABLET BY MOUTH 3 TIMES A DAY AS NEEDED FOR NECK PAIN  Nik Damico DO   diclofenac (VOLTAREN) 50 MG EC tablet Take 1 tablet by mouth 2 times daily (with meals)  Isreal John DO   rOPINIRole (REQUIP) 2 MG tablet Take two tablets at night and one in themoring for legs  Nik Damico DO   furosemide (LASIX) 20 MG tablet TAKE 1 TABLET BY MOUTH DAILY AS NEEDED (FOR SWELLING)  Nik Damico DO   ibuprofen (ADVIL;MOTRIN) 600 MG tablet TAKE 1 TABLET BY MOUTH EVERY 8 HOURS AS NEEDED FOR PAIN  Nik Damico DO   traMADol (ULTRAM) 50 MG tablet Take 50 mg by mouth every 6 hours as needed for Pain. Historical Provider, MD   Acetaminophen (TYLENOL PO) Take by mouth  Historical Provider, MD   DULoxetine (CYMBALTA) 60 MG extended release capsule TAKE 1 CAPSULE BY MOUTH EVERY DAY  Nik Damico DO   lidocaine (XYLOCAINE) 5 % ointment APPLY TOPICALLY AS NEEDED TO AREA OF TENDERNESS UNDER ARM.   Nik Damico DO   atenolol (TENORMIN) 25 MG tablet TAKE 1 TABLET BY MOUTH EVERY DAY  Nik Damico DO   omeprazole (PRILOSEC) 40 MG delayed release capsule TAKE 1 CAPSULE BY MOUTH EVERY DAY  Nik Damico DO   MELATONIN PO Take Abnormal-     Other pertinent observable physical exam findings-     ASSESSMENT/PLAN:    Boil / abscess right breast    inst pt in care. Warm compresses 5-10 minutes qid  Antibiotics    Look for some initial improvement 48 hours. Advise me. Will ask to follow in 7-10 days in office- may need  I & D ? Pt inst        [place], was evaluated through a synchronous (real-time) audio-video encounter. The patient (or guardian if applicable) is aware that this is a billable service. Verbal consent to proceed has been obtained within the past 12 months. The visit was conducted pursuant to the emergency declaration under the [address] [place] [address], [address] [place] [address] authority and the [employer] Act. Patient identification was verified, and a caregiver was present when appropriate. The patient was located in a state where the provider was credentialed to provide care. Total time spent on this encounter: Not billed by time    --Nafisa Blair DO on 8/17/2021 at 4:41 PM    An electronic signature was used to authenticate this note.

## 2021-08-19 ENCOUNTER — TELEPHONE (OUTPATIENT)
Dept: FAMILY MEDICINE CLINIC | Age: 56
End: 2021-08-19

## 2021-08-19 DIAGNOSIS — R19.7 DIARRHEA, UNSPECIFIED TYPE: Primary | ICD-10-CM

## 2021-08-19 RX ORDER — CEPHALEXIN 500 MG/1
500 CAPSULE ORAL 4 TIMES DAILY
Qty: 30 CAPSULE | Refills: 1 | Status: SHIPPED | OUTPATIENT
Start: 2021-08-19 | End: 2021-08-27

## 2021-08-19 RX ORDER — DIPHENOXYLATE HYDROCHLORIDE AND ATROPINE SULFATE 2.5; .025 MG/1; MG/1
1 TABLET ORAL 4 TIMES DAILY PRN
Qty: 12 TABLET | Refills: 0 | Status: SHIPPED | OUTPATIENT
Start: 2021-08-19 | End: 2021-08-22

## 2021-08-19 NOTE — TELEPHONE ENCOUNTER
----- Message from Aishwarya Morning sent at 8/19/2021 11:46 AM EDT -----  Subject: Message to Provider    QUESTIONS  Information for Provider? patient boil on arm is not getting anybetter and   wants to know what dr Karlene Mahoney wants her to do can him or his assistant give   patient a call back to let futher steps to take she also experiencing   diarrhea and vomiting think it may be from the antibiotics she is on  ---------------------------------------------------------------------------  --------------  CALL BACK INFO  What is the best way for the office to contact you? OK to leave message on   voicemail  Preferred Call Back Phone Number? [phone]  ---------------------------------------------------------------------------  --------------  SCRIPT ANSWERS  Relationship to Patient?  Self

## 2021-08-19 NOTE — TELEPHONE ENCOUNTER
Spoke to the patient. Diarrhea may well be from the antibiotic. We will stop the Doxy. Hydration discussed. We will send her some Lomotil and change to cephalexin. Patient instructed in follow-up.

## 2021-08-27 ENCOUNTER — OFFICE VISIT (OUTPATIENT)
Dept: FAMILY MEDICINE CLINIC | Age: 56
End: 2021-08-27
Payer: COMMERCIAL

## 2021-08-27 VITALS
DIASTOLIC BLOOD PRESSURE: 84 MMHG | SYSTOLIC BLOOD PRESSURE: 132 MMHG | WEIGHT: 286 LBS | OXYGEN SATURATION: 97 % | RESPIRATION RATE: 18 BRPM | HEART RATE: 52 BPM | BODY MASS INDEX: 46.16 KG/M2 | TEMPERATURE: 96.9 F

## 2021-08-27 DIAGNOSIS — L02.92 FURUNCULOSIS: Primary | ICD-10-CM

## 2021-08-27 PROCEDURE — 1036F TOBACCO NON-USER: CPT | Performed by: NURSE PRACTITIONER

## 2021-08-27 PROCEDURE — G8417 CALC BMI ABV UP PARAM F/U: HCPCS | Performed by: NURSE PRACTITIONER

## 2021-08-27 PROCEDURE — 99212 OFFICE O/P EST SF 10 MIN: CPT | Performed by: NURSE PRACTITIONER

## 2021-08-27 PROCEDURE — G8427 DOCREV CUR MEDS BY ELIG CLIN: HCPCS | Performed by: NURSE PRACTITIONER

## 2021-08-27 PROCEDURE — 3017F COLORECTAL CA SCREEN DOC REV: CPT | Performed by: NURSE PRACTITIONER

## 2021-08-27 SDOH — ECONOMIC STABILITY: FOOD INSECURITY: WITHIN THE PAST 12 MONTHS, YOU WORRIED THAT YOUR FOOD WOULD RUN OUT BEFORE YOU GOT MONEY TO BUY MORE.: NEVER TRUE

## 2021-08-27 SDOH — ECONOMIC STABILITY: FOOD INSECURITY: WITHIN THE PAST 12 MONTHS, THE FOOD YOU BOUGHT JUST DIDN'T LAST AND YOU DIDN'T HAVE MONEY TO GET MORE.: NEVER TRUE

## 2021-08-27 ASSESSMENT — ENCOUNTER SYMPTOMS
RESPIRATORY NEGATIVE: 1
GASTROINTESTINAL NEGATIVE: 1
COLOR CHANGE: 1

## 2021-08-27 ASSESSMENT — SOCIAL DETERMINANTS OF HEALTH (SDOH): HOW HARD IS IT FOR YOU TO PAY FOR THE VERY BASICS LIKE FOOD, HOUSING, MEDICAL CARE, AND HEATING?: NOT HARD AT ALL

## 2021-08-27 NOTE — PROGRESS NOTES
for legs 90 tablet 2    furosemide (LASIX) 20 MG tablet TAKE 1 TABLET BY MOUTH DAILY AS NEEDED (FOR SWELLING) 30 tablet 1    ibuprofen (ADVIL;MOTRIN) 600 MG tablet TAKE 1 TABLET BY MOUTH EVERY 8 HOURS AS NEEDED FOR PAIN 40 tablet 1    traMADol (ULTRAM) 50 MG tablet Take 50 mg by mouth every 6 hours as needed for Pain.  Acetaminophen (TYLENOL PO) Take by mouth      DULoxetine (CYMBALTA) 60 MG extended release capsule TAKE 1 CAPSULE BY MOUTH EVERY DAY 30 capsule 5    lidocaine (XYLOCAINE) 5 % ointment APPLY TOPICALLY AS NEEDED TO AREA OF TENDERNESS UNDER ARM. 70.88 g 1    atenolol (TENORMIN) 25 MG tablet TAKE 1 TABLET BY MOUTH EVERY DAY 30 tablet 5    omeprazole (PRILOSEC) 40 MG delayed release capsule TAKE 1 CAPSULE BY MOUTH EVERY DAY 30 capsule 6    MELATONIN PO Take 20 mg by mouth nightly as needed        No current facility-administered medications for this visit. Allergies   Allergen Reactions    Toradol [Ketorolac Tromethamine] Other (See Comments)     \"out of it\"  \"felt like sleep paralysis\"    Haldol [Haloperidol Lactate] Other (See Comments)     \"felt out of it, similar to the toradol\"       LMP 09/20/2017     Social History     Tobacco Use    Smoking status: Never Smoker    Smokeless tobacco: Never Used   Substance Use Topics    Alcohol use: Yes     Comment: 1 -2 drinks per month       Review of Systems   Constitutional: Negative for activity change and fatigue. HENT: Negative. Respiratory: Negative. Cardiovascular: Negative. Gastrointestinal: Negative. Genitourinary: Negative. Musculoskeletal: Negative. Chronic back pain managed by pain management   Skin: Positive for color change and wound (Boil). Physical Exam  Vitals and nursing note reviewed. Constitutional:       Appearance: Normal appearance. She is obese. HENT:      Head: Normocephalic and atraumatic.       Right Ear: External ear normal.      Left Ear: External ear normal.      Nose: Nose normal. No rhinorrhea. Mouth/Throat:      Pharynx: Oropharynx is clear. Eyes:      General:         Right eye: No discharge. Left eye: No discharge. Conjunctiva/sclera: Conjunctivae normal.   Neck:      Trachea: Phonation normal.   Cardiovascular:      Rate and Rhythm: Normal rate. Pulmonary:      Effort: Pulmonary effort is normal. No respiratory distress. Chest:      Breasts:         Right: Skin change present. Comments: Multiple areas of scarring and other areas of abscesses  Musculoskeletal:      Cervical back: Normal range of motion. Skin:     Coloration: Skin is not jaundiced or pale. Neurological:      General: No focal deficit present. Mental Status: She is alert and oriented to person, place, and time. Psychiatric:         Mood and Affect: Mood normal.         Behavior: Behavior normal.         Thought Content: Thought content normal.         Judgment: Judgment normal.         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 Patient Education:  Plan    Return if symptoms worsen or fail to improve.

## 2021-08-30 RX ORDER — LIDOCAINE 50 MG/G
OINTMENT TOPICAL
Qty: 70.88 G | Refills: 1 | Status: SHIPPED | OUTPATIENT
Start: 2021-08-30 | End: 2021-12-27

## 2021-08-30 RX ORDER — OMEPRAZOLE 40 MG/1
CAPSULE, DELAYED RELEASE ORAL
Qty: 30 CAPSULE | Refills: 6 | Status: SHIPPED | OUTPATIENT
Start: 2021-08-30 | End: 2022-03-28

## 2021-08-30 RX ORDER — IBUPROFEN 600 MG/1
TABLET ORAL
Qty: 40 TABLET | Refills: 1 | Status: SHIPPED | OUTPATIENT
Start: 2021-08-30 | End: 2021-11-02

## 2021-08-30 NOTE — TELEPHONE ENCOUNTER
LOV:  8/27/21    Future Appointments   Date Time Provider Diann Sotelo   9/13/2021 10:40 AM DO TREVA Rocha [place] [facility]   9/29/2021 11:00 AM DO TREVA Rocha [place] [facility]   10/13/2021  1:20 PM DO [facility]

## 2021-09-02 ENCOUNTER — TELEPHONE (OUTPATIENT)
Dept: OBGYN CLINIC | Age: 56
End: 2021-09-02

## 2021-09-02 NOTE — TELEPHONE ENCOUNTER
Pt calling because she has surgery already scheduled for October 21st. She is calling because she has had an insurance change, She now has Medicare and Medicaid ( [facility]) Pt states she will fax copy of the cards to our office when they send them to her. Pt is asking for return call at [phone]. Routing to Surgery scheduler.

## 2021-09-02 NOTE — TELEPHONE ENCOUNTER
Please advise patient that Jerod Azul will be in touch with her regarding insurance change. Thank you.

## 2021-09-08 DIAGNOSIS — R03.0 ELEVATED BLOOD PRESSURE READING: ICD-10-CM

## 2021-09-08 RX ORDER — ROPINIROLE 2 MG/1
TABLET, FILM COATED ORAL
Qty: 60 TABLET | Refills: 5 | Status: SHIPPED | OUTPATIENT
Start: 2021-09-08 | End: 2021-11-03

## 2021-09-08 RX ORDER — LISINOPRIL 20 MG/1
TABLET ORAL
Qty: 30 TABLET | Refills: 6 | Status: SHIPPED | OUTPATIENT
Start: 2021-09-08 | End: 2021-09-13

## 2021-09-08 NOTE — TELEPHONE ENCOUNTER
Called patient regarding insurance changes. Patient will e-mail new cards today and I will call new insurance for surgery PA.

## 2021-09-08 NOTE — TELEPHONE ENCOUNTER
Future Appointments   Date Time Provider Diann Sotelo   9/13/2021 10:40 AM DO TREVA Maldonado [place] [facility]   9/29/2021 11:00 AM DO TREVA Maldonado [place] [facility]   10/13/2021  1:20 PM DO YUNIOR Gallegos [facility] [address] [place] [address]     8/17/2021

## 2021-09-13 ENCOUNTER — OFFICE VISIT (OUTPATIENT)
Dept: FAMILY MEDICINE CLINIC | Age: 56
End: 2021-09-13
Payer: COMMERCIAL

## 2021-09-13 VITALS
OXYGEN SATURATION: 97 % | SYSTOLIC BLOOD PRESSURE: 126 MMHG | BODY MASS INDEX: 46.61 KG/M2 | WEIGHT: 290 LBS | HEART RATE: 58 BPM | HEIGHT: 66 IN | DIASTOLIC BLOOD PRESSURE: 78 MMHG | RESPIRATION RATE: 14 BRPM

## 2021-09-13 DIAGNOSIS — M25.562 CHRONIC PAIN OF LEFT KNEE: ICD-10-CM

## 2021-09-13 DIAGNOSIS — G89.29 CHRONIC PAIN OF LEFT KNEE: ICD-10-CM

## 2021-09-13 DIAGNOSIS — I10 ESSENTIAL HYPERTENSION: Primary | ICD-10-CM

## 2021-09-13 DIAGNOSIS — F41.1 GENERALIZED ANXIETY DISORDER: ICD-10-CM

## 2021-09-13 PROCEDURE — 3017F COLORECTAL CA SCREEN DOC REV: CPT | Performed by: FAMILY MEDICINE

## 2021-09-13 PROCEDURE — 1036F TOBACCO NON-USER: CPT | Performed by: FAMILY MEDICINE

## 2021-09-13 PROCEDURE — G8417 CALC BMI ABV UP PARAM F/U: HCPCS | Performed by: FAMILY MEDICINE

## 2021-09-13 PROCEDURE — 99213 OFFICE O/P EST LOW 20 MIN: CPT | Performed by: FAMILY MEDICINE

## 2021-09-13 PROCEDURE — G8427 DOCREV CUR MEDS BY ELIG CLIN: HCPCS | Performed by: FAMILY MEDICINE

## 2021-09-13 ASSESSMENT — PATIENT HEALTH QUESTIONNAIRE - PHQ9
SUM OF ALL RESPONSES TO PHQ9 QUESTIONS 1 & 2: 1
2. FEELING DOWN, DEPRESSED OR HOPELESS: 1
SUM OF ALL RESPONSES TO PHQ QUESTIONS 1-9: 1
1. LITTLE INTEREST OR PLEASURE IN DOING THINGS: 0

## 2021-09-13 ASSESSMENT — ENCOUNTER SYMPTOMS: RESPIRATORY NEGATIVE: 1

## 2021-09-13 NOTE — PROGRESS NOTES
Subjective:      Patient ID: Dg Arce is a 64 y.o. y.o. female. Here to follow up meds. Has been a bit better re her shoulder. Having arthritis in knees,  Ortho put her on Diclofenac and it is helping.,   No major GI sx, perhaps abit of loose stool. disucssed and cautioned to [facility] sx-    Meds and history reviewed.     HPI      Chief Complaint   Patient presents with    Medication Check       Allergies   Allergen Reactions    Toradol [Ketorolac Tromethamine] Other (See Comments)     \"out of it\"  \"felt like sleep paralysis\"    Haldol [Haloperidol Lactate] Other (See Comments)     \"felt out of it, similar to the toradol\"       Past Medical History:   Diagnosis Date    Anxiety     Depression     Endometriosis     GERD (gastroesophageal reflux disease)     Hypertension     Osteoarthritis     Restless leg syndrome        Past Surgical History:   Procedure Laterality Date    ANUS SURGERY  2018    fissure     SECTION      x3    COLONOSCOPY      DILATION AND CURETTAGE OF UTERUS N/A 6/3/2021    VIDEO HYSTEROSCOPY DILATATION AND CURETTAGE, POSSIBLE MYOSURE, POSSIBLE POLYPECTOMY performed by Seng Molina DO at [address] [place] [address] Dr      right wrist    HERNIA REPAIR  2018    LAPAROSCOPIC PARAESOPHAGEAL HERNIA REPAIR WITH MESH    KNEE ARTHROSCOPY Left 11/15/12    ARTHROSCOPY LEFT KNEE WITH SYOVECTOMY AND CHONDROPLASTY    OVARY REMOVAL Right     ROTATOR CUFF REPAIR Right 2020    EXAM UNDER ANESTHESIA VIDEO ARTHROSCOPY RIGHT SHOULDER, MINI- OPEN ROTATOR CUFF REPAIR, NEER ACROMIOPLASTY, LENO PROCEDURE WITH EXPAREL performed by Ann Marie Ledbetter MD at [place] [address] ARTHROSCOPY Right 2020    VIDEO ARTHROSCOPY RIGHT SHOULDER, OPEN ROTATOR CUFF REPAIR, BICEPS TENOTOMY -BLOCK- performed by Jose Maria Mitchell MD at [place] [address] ARTHROSCOPY Right 2021    RIGHT SHOULDER ARTHROSCOPIC INCISION AND DRAINAGE performed by Jose Maria Mitchell MD at [facility] OR    SHOULDER ARTHROSCOPY Right 4/28/2021    VIDEO ARTHROSCOPY RIGHT SHOULDER, ROTATOR CUFF REPAIR, DEBRIDEMENT performed by Delisa Tolbert MD at [facility]  2011    UPPER GASTROINTESTINAL ENDOSCOPY  2015    esophage[address]l stretch       Social History     Socioeconomic History    Marital status:      Spouse name: Not on file    Number of children: 3    Years of education: Not on file    Highest education level: Not on file   Occupational History    Occupation:    Tobacco Use    Smoking status: Never Smoker    Smokeless tobacco: Never Used   Vaping Use    Vaping Use: Never used   Substance and Sexual Activity    Alcohol use: Yes     Comment: 1 -2 drinks per month    Drug use: Not Currently     Types: Marijuana     Comment: quit 2016    Sexual activity: Never   Other Topics Concern    Not on file   Social History Narrative    Not on file     Social Determinants of Health     Financial Resource Strain: Low Risk     Difficulty of Paying Living Expenses: Not hard at all   Food Insecurity: No Food Insecurity    Worried About [facility] in the Last Year: Never true    [facility] in the Last Year: Never true   Transportation Needs:     Lack of Transportation (Medical):      Lack of Transportation (Non-Medical):    Physical Activity:     Days of Exercise per Week:     Minutes of Exercise per Session:    Stress:     Feeling of Stress :    Social Connections:     Frequency of Communication with Friends and Family:     Frequency of Social Gatherings with Friends and Family:     Attends Yazdanism Services:     Active Member of Clubs or Organizations:     Attends Club or Organization Meetings:     Marital Status:    Intimate Partner Violence:     Fear of Current or Ex-Partner:     Emotionally Abused:     Physically Abused:     Sexually Abused:        Family History   Problem Relation Age of Onset  Arthritis Mother     Obesity Mother    [employer] Anemia Mother    [employer] Other Mother         pulmonary embolism    Arthritis Father     Arrhythmia Father     Diabetes Other     Diabetes Paternal Grandfather     Cancer Neg Hx     Breast Cancer Neg Hx     Colon Cancer Neg Hx        Vitals:    09/13/21 1057   BP: 126/78   Pulse: 58   Resp: 14   SpO2: 97%       Wt Readings from Last 3 Encounters:   09/13/21 290 lb (131.5 kg)   08/27/21 286 lb (129.7 kg)   07/14/21 295 lb (133.8 kg)       Review of Systems   Constitutional: Negative for unexpected weight change. Is able to be more active since her shoulder is improving and her pain is significantly improved. Eyes: Negative for visual disturbance. Respiratory: Negative. Cardiovascular: Negative. Gastrointestinal:        No sig GI sx.   Genitourinary: Negative. Musculoskeletal: Positive for arthralgias. Knee pain and ache. Not giving way  Left knee hard to extend fully   Neurological: Negative. Psychiatric/Behavioral: Negative for self-injury and suicidal ideas. September is month her son was born and when he passed away. Has been rough but says has a lot of support and visits her grand babies and that helps       Objective:   Physical Exam  Vitals reviewed. Constitutional:       Appearance: She is obese. She is not ill-appearing. Eyes:      General: No scleral icterus. Conjunctiva/sclera: Conjunctivae normal.   Cardiovascular:      Rate and Rhythm: Normal rate and regular rhythm. Heart sounds: No murmur heard. Pulmonary:      Effort: Pulmonary effort is normal.      Breath sounds: Normal breath sounds. Abdominal:      General: There is no distension. Tenderness: There is no abdominal tenderness. Comments: overweight   Musculoskeletal:      Right lower leg: No edema. Left lower leg: No edema. Comments: Shoulder motions are near normal.  Hypertrophic knees bilaterally. No acute heat or erythema. No acute effusion   Lymphadenopathy:      Cervical: No cervical adenopathy. Skin:     General: Skin is warm and dry. Neurological:      Mental Status: She is alert and oriented to person, place, and time. Psychiatric:         Mood and Affect: Mood normal.         Behavior: Behavior normal.         Assessment:   Depressive disorder, treated, remission  Osteoarthritis of the knees  Pretension        Plan:     Looks stable  Try to increase activity,  Cut back food some,  Maybe try and reduce weight some. Same meds    Get a flu shot in October    Current Outpatient Medications   Medication Sig Dispense Refill    rOPINIRole (REQUIP) 2 MG tablet TAKE 1 TABLET BY MOUTH TWICE A DAY 60 tablet 5    ibuprofen (ADVIL;MOTRIN) 600 MG tablet TAKE 1 TABLET BY MOUTH EVERY 8 HOURS AS NEEDED FOR PAIN 40 tablet 1    lidocaine (XYLOCAINE) 5 % ointment APPLY TOPICALLY AS NEEDED TO AREA OF TENDERNESS UNDER ARM. 70.88 g 1    omeprazole (PRILOSEC) 40 MG delayed release capsule TAKE 1 CAPSULE BY MOUTH EVERY DAY 30 capsule 6    ALPRAZolam (XANAX) 1 MG tablet TAKE 1 TABLET BY MOUTH 3 TIMES DAILY AS NEEDED FOR ANXIETY FOR UP TO 60 DAYS. *6/9* 90 tablet 1    methocarbamol (ROBAXIN) 500 MG tablet TAKE 1 TABLET BY MOUTH 3 TIMES A DAY AS NEEDED FOR NECK PAIN 90 tablet 3    diclofenac (VOLTAREN) 50 MG EC tablet Take 1 tablet by mouth 2 times daily (with meals) 60 tablet 3    Acetaminophen (TYLENOL PO) Take by mouth      DULoxetine (CYMBALTA) 60 MG extended release capsule TAKE 1 CAPSULE BY MOUTH EVERY DAY 30 capsule 5    atenolol (TENORMIN) 25 MG tablet TAKE 1 TABLET BY MOUTH EVERY DAY 30 tablet 5    MELATONIN PO Take 20 mg by mouth nightly as needed       furosemide (LASIX) 20 MG tablet TAKE 1 TABLET BY MOUTH DAILY AS NEEDED (FOR SWELLING) (Patient not taking: Reported on 9/13/2021) 30 tablet 1     No current facility-administered medications for this visit.

## 2021-09-15 ENCOUNTER — PATIENT MESSAGE (OUTPATIENT)
Dept: FAMILY MEDICINE CLINIC | Age: 56
End: 2021-09-15

## 2021-09-16 NOTE — TELEPHONE ENCOUNTER
From: Linda Chan  To: Davida Denise DO  Sent: 9/15/2021 9:36 PM EDT  Subject: Non-Urgent Medical Question    My insurance is changing. I am going on Medicare A&B. Mable Sourav is my HMO as long as Dr. Cely Armstrong accepts it. I don't want to change doctors. Can you let me know if I need to change insurance?  Thanks Jessy Jean

## 2021-09-21 NOTE — TELEPHONE ENCOUNTER
Pt called office and was given fax( [phone])  and e-mail ( [email or web address])  to send insurance card information.

## 2021-09-26 ENCOUNTER — APPOINTMENT (OUTPATIENT)
Dept: GENERAL RADIOLOGY | Age: 56
End: 2021-09-26
Payer: MEDICARE

## 2021-09-26 ENCOUNTER — HOSPITAL ENCOUNTER (EMERGENCY)
Age: 56
Discharge: HOME | End: 2021-09-26
Attending: EMERGENCY MEDICINE
Payer: MEDICARE

## 2021-09-26 ENCOUNTER — APPOINTMENT (OUTPATIENT)
Dept: CT IMAGING | Age: 56
End: 2021-09-26
Payer: MEDICARE

## 2021-09-26 VITALS
RESPIRATION RATE: 12 BRPM | OXYGEN SATURATION: 92 % | DIASTOLIC BLOOD PRESSURE: 79 MMHG | HEART RATE: 71 BPM | TEMPERATURE: 98.3 F | SYSTOLIC BLOOD PRESSURE: 123 MMHG

## 2021-09-26 DIAGNOSIS — R60.0 BILATERAL LOWER EXTREMITY EDEMA: ICD-10-CM

## 2021-09-26 DIAGNOSIS — R06.02 SHORTNESS OF BREATH: Primary | ICD-10-CM

## 2021-09-26 LAB
A/G RATIO: 1.3 (ref 1.1–2.2)
ALBUMIN SERPL-MCNC: 4.1 G/DL (ref 3.4–5)
ALP BLD-CCNC: 99 U/L (ref 40–129)
ALT SERPL-CCNC: 37 U/L (ref 10–40)
AMORPHOUS: ABNORMAL /HPF
ANION GAP SERPL CALCULATED.3IONS-SCNC: 13 MMOL/L (ref 3–16)
AST SERPL-CCNC: 31 U/L (ref 15–37)
BACTERIA: ABNORMAL /HPF
BASE EXCESS VENOUS: 3.9 MMOL/L (ref -2–3)
BASOPHILS ABSOLUTE: 0 K/UL (ref 0–0.2)
BASOPHILS RELATIVE PERCENT: 0.3 %
BILIRUB SERPL-MCNC: 0.5 MG/DL (ref 0–1)
BILIRUBIN URINE: NEGATIVE
BLOOD, URINE: ABNORMAL
BUN BLDV-MCNC: 11 MG/DL (ref 7–20)
C-REACTIVE PROTEIN: 9.4 MG/L (ref 0–5.1)
CALCIUM SERPL-MCNC: 9 MG/DL (ref 8.3–10.6)
CARBOXYHEMOGLOBIN: 1.4 % (ref 0–1.5)
CHLORIDE BLD-SCNC: 100 MMOL/L (ref 99–110)
CLARITY: CLEAR
CO2: 26 MMOL/L (ref 21–32)
COLOR: YELLOW
CREAT SERPL-MCNC: 0.7 MG/DL (ref 0.6–1.1)
D DIMER: <200 NG/ML DDU (ref 0–229)
EOSINOPHILS ABSOLUTE: 0.1 K/UL (ref 0–0.6)
EOSINOPHILS RELATIVE PERCENT: 1.4 %
EPITHELIAL CELLS, UA: ABNORMAL /HPF (ref 0–5)
GFR AFRICAN AMERICAN: >60
GFR NON-AFRICAN AMERICAN: >60
GLOBULIN: 3.2 G/DL
GLUCOSE BLD-MCNC: 97 MG/DL (ref 70–99)
GLUCOSE URINE: NEGATIVE MG/DL
HCO3 VENOUS: 29.2 MMOL/L (ref 24–28)
HCT VFR BLD CALC: 41.2 % (ref 36–48)
HEMOGLOBIN, VEN, REDUCED: 30.3 %
HEMOGLOBIN: 13.6 G/DL (ref 12–16)
KETONES, URINE: NEGATIVE MG/DL
LACTIC ACID, SEPSIS: 1.2 MMOL/L (ref 0.4–1.9)
LEUKOCYTE ESTERASE, URINE: NEGATIVE
LYMPHOCYTES ABSOLUTE: 1.6 K/UL (ref 1–5.1)
LYMPHOCYTES RELATIVE PERCENT: 19.6 %
MCH RBC QN AUTO: 30.3 PG (ref 26–34)
MCHC RBC AUTO-ENTMCNC: 32.9 G/DL (ref 31–36)
MCV RBC AUTO: 91.9 FL (ref 80–100)
METHEMOGLOBIN VENOUS: 0.4 % (ref 0–1.5)
MICROSCOPIC EXAMINATION: YES
MONOCYTES ABSOLUTE: 0.5 K/UL (ref 0–1.3)
MONOCYTES RELATIVE PERCENT: 6.2 %
MUCUS: ABNORMAL /LPF
NEUTROPHILS ABSOLUTE: 5.7 K/UL (ref 1.7–7.7)
NEUTROPHILS RELATIVE PERCENT: 72.5 %
NITRITE, URINE: NEGATIVE
O2 SAT, VEN: 69 %
PCO2, VEN: 45.7 MMHG (ref 41–51)
PDW BLD-RTO: 15.9 % (ref 12.4–15.4)
PH UA: 6 (ref 5–8)
PH VENOUS: 7.41 (ref 7.35–7.45)
PLATELET # BLD: 188 K/UL (ref 135–450)
PMV BLD AUTO: 9.5 FL (ref 5–10.5)
PO2, VEN: 37.4 MMHG (ref 25–40)
POTASSIUM REFLEX MAGNESIUM: 4 MMOL/L (ref 3.5–5.1)
PRO-BNP: 729 PG/ML (ref 0–124)
PROTEIN UA: ABNORMAL MG/DL
RAPID INFLUENZA  B AGN: NEGATIVE
RAPID INFLUENZA A AGN: NEGATIVE
RBC # BLD: 4.48 M/UL (ref 4–5.2)
RBC UA: ABNORMAL /HPF (ref 0–4)
REPORT: NORMAL
RESPIRATORY PANEL PCR: NORMAL
SEDIMENTATION RATE, ERYTHROCYTE: 13 MM/HR (ref 0–30)
SODIUM BLD-SCNC: 139 MMOL/L (ref 136–145)
SPECIFIC GRAVITY UA: 1.02 (ref 1–1.03)
TCO2 CALC VENOUS: 31 MMOL/L
TOTAL PROTEIN: 7.3 G/DL (ref 6.4–8.2)
URINE TYPE: ABNORMAL
UROBILINOGEN, URINE: 0.2 E.U./DL
WBC # BLD: 7.9 K/UL (ref 4–11)
WBC UA: ABNORMAL /HPF (ref 0–5)

## 2021-09-26 PROCEDURE — 82803 BLOOD GASES ANY COMBINATION: CPT

## 2021-09-26 PROCEDURE — 6370000000 HC RX 637 (ALT 250 FOR IP): Performed by: STUDENT IN AN ORGANIZED HEALTH CARE EDUCATION/TRAINING PROGRAM

## 2021-09-26 PROCEDURE — 0202U NFCT DS 22 TRGT SARS-COV-2: CPT

## 2021-09-26 PROCEDURE — 6360000004 HC RX CONTRAST MEDICATION: Performed by: STUDENT IN AN ORGANIZED HEALTH CARE EDUCATION/TRAINING PROGRAM

## 2021-09-26 PROCEDURE — 81001 URINALYSIS AUTO W/SCOPE: CPT

## 2021-09-26 PROCEDURE — 83605 ASSAY OF LACTIC ACID: CPT

## 2021-09-26 PROCEDURE — 94640 AIRWAY INHALATION TREATMENT: CPT

## 2021-09-26 PROCEDURE — 94761 N-INVAS EAR/PLS OXIMETRY MLT: CPT

## 2021-09-26 PROCEDURE — U0003 INFECTIOUS AGENT DETECTION BY NUCLEIC ACID (DNA OR RNA); SEVERE ACUTE RESPIRATORY SYNDROME CORONAVIRUS 2 (SARS-COV-2) (CORONAVIRUS DISEASE [COVID-19]), AMPLIFIED PROBE TECHNIQUE, MAKING USE OF HIGH THROUGHPUT TECHNOLOGIES AS DESCRIBED BY CMS-2020-01-R: HCPCS

## 2021-09-26 PROCEDURE — 85025 COMPLETE CBC W/AUTO DIFF WBC: CPT

## 2021-09-26 PROCEDURE — 83880 ASSAY OF NATRIURETIC PEPTIDE: CPT

## 2021-09-26 PROCEDURE — 99283 EMERGENCY DEPT VISIT LOW MDM: CPT

## 2021-09-26 PROCEDURE — 87804 INFLUENZA ASSAY W/OPTIC: CPT

## 2021-09-26 PROCEDURE — 86140 C-REACTIVE PROTEIN: CPT

## 2021-09-26 PROCEDURE — U0005 INFEC AGEN DETEC AMPLI PROBE: HCPCS

## 2021-09-26 PROCEDURE — 96374 THER/PROPH/DIAG INJ IV PUSH: CPT

## 2021-09-26 PROCEDURE — 71045 X-RAY EXAM CHEST 1 VIEW: CPT

## 2021-09-26 PROCEDURE — 85652 RBC SED RATE AUTOMATED: CPT

## 2021-09-26 PROCEDURE — 85379 FIBRIN DEGRADATION QUANT: CPT

## 2021-09-26 PROCEDURE — 87449 NOS EACH ORGANISM AG IA: CPT

## 2021-09-26 PROCEDURE — 93005 ELECTROCARDIOGRAM TRACING: CPT | Performed by: STUDENT IN AN ORGANIZED HEALTH CARE EDUCATION/TRAINING PROGRAM

## 2021-09-26 PROCEDURE — 6360000002 HC RX W HCPCS: Performed by: STUDENT IN AN ORGANIZED HEALTH CARE EDUCATION/TRAINING PROGRAM

## 2021-09-26 PROCEDURE — 71275 CT ANGIOGRAPHY CHEST: CPT

## 2021-09-26 PROCEDURE — 80053 COMPREHEN METABOLIC PANEL: CPT

## 2021-09-26 RX ORDER — IPRATROPIUM BROMIDE AND ALBUTEROL SULFATE 2.5; .5 MG/3ML; MG/3ML
1 SOLUTION RESPIRATORY (INHALATION) ONCE
Status: COMPLETED | OUTPATIENT
Start: 2021-09-26 | End: 2021-09-26

## 2021-09-26 RX ORDER — ROPINIROLE 2 MG/1
2 TABLET, FILM COATED ORAL 2 TIMES DAILY
Status: DISCONTINUED | OUTPATIENT
Start: 2021-09-26 | End: 2021-09-26

## 2021-09-26 RX ORDER — FUROSEMIDE 10 MG/ML
20 INJECTION INTRAMUSCULAR; INTRAVENOUS ONCE
Status: COMPLETED | OUTPATIENT
Start: 2021-09-26 | End: 2021-09-26

## 2021-09-26 RX ORDER — ROPINIROLE 2 MG/1
2 TABLET, FILM COATED ORAL ONCE
Status: COMPLETED | OUTPATIENT
Start: 2021-09-26 | End: 2021-09-26

## 2021-09-26 RX ORDER — ONDANSETRON 2 MG/ML
4 INJECTION INTRAMUSCULAR; INTRAVENOUS ONCE
Status: DISCONTINUED | OUTPATIENT
Start: 2021-09-26 | End: 2021-09-26

## 2021-09-26 RX ORDER — FENTANYL CITRATE 50 UG/ML
25 INJECTION, SOLUTION INTRAMUSCULAR; INTRAVENOUS
Status: DISCONTINUED | OUTPATIENT
Start: 2021-09-26 | End: 2021-09-26

## 2021-09-26 RX ORDER — ACETAMINOPHEN 325 MG/1
650 TABLET ORAL ONCE
Status: COMPLETED | OUTPATIENT
Start: 2021-09-26 | End: 2021-09-26

## 2021-09-26 RX ORDER — ALBUTEROL SULFATE 90 UG/1
2 AEROSOL, METERED RESPIRATORY (INHALATION) EVERY 6 HOURS PRN
Qty: 18 G | Refills: 0 | Status: SHIPPED | OUTPATIENT
Start: 2021-09-26 | End: 2022-05-18

## 2021-09-26 RX ADMIN — ROPINIROLE 2 MG: 2 TABLET, FILM COATED ORAL at 17:50

## 2021-09-26 RX ADMIN — IPRATROPIUM BROMIDE AND ALBUTEROL SULFATE 1 AMPULE: .5; 2.5 SOLUTION RESPIRATORY (INHALATION) at 19:23

## 2021-09-26 RX ADMIN — IOPAMIDOL 80 ML: 755 INJECTION, SOLUTION INTRAVENOUS at 20:46

## 2021-09-26 RX ADMIN — FUROSEMIDE 20 MG: 10 INJECTION, SOLUTION INTRAVENOUS at 17:50

## 2021-09-26 RX ADMIN — ACETAMINOPHEN 650 MG: 325 TABLET ORAL at 15:30

## 2021-09-26 ASSESSMENT — ENCOUNTER SYMPTOMS
SHORTNESS OF BREATH: 1
VOMITING: 0
COUGH: 0
EYES NEGATIVE: 1
ABDOMINAL PAIN: 0
SORE THROAT: 0
ABDOMINAL DISTENTION: 0
CONSTIPATION: 0
APNEA: 0
WHEEZING: 0
CHOKING: 0
DIARRHEA: 0
CHEST TIGHTNESS: 0
NAUSEA: 0
STRIDOR: 0
ALLERGIC/IMMUNOLOGIC NEGATIVE: 1

## 2021-09-26 ASSESSMENT — PAIN SCALES - GENERAL
PAINLEVEL_OUTOF10: 8
PAINLEVEL_OUTOF10: 8

## 2021-09-26 ASSESSMENT — PAIN DESCRIPTION - LOCATION: LOCATION: GENERALIZED

## 2021-09-26 ASSESSMENT — PAIN DESCRIPTION - PAIN TYPE: TYPE: ACUTE PAIN

## 2021-09-26 NOTE — ED NOTES
Pt walked back and forth to the bathroom, pulse ox remained above 95% with HR in the 70s. Pt states she felt SOB.      December CHAITANYA Celaya  09/26/21 [id number]

## 2021-09-27 ENCOUNTER — TELEPHONE (OUTPATIENT)
Dept: FAMILY MEDICINE CLINIC | Age: 56
End: 2021-09-27

## 2021-09-27 LAB
EKG ATRIAL RATE: 55 BPM
EKG DIAGNOSIS: NORMAL
EKG P AXIS: 42 DEGREES
EKG P-R INTERVAL: 146 MS
EKG Q-T INTERVAL: 464 MS
EKG QRS DURATION: 84 MS
EKG QTC CALCULATION (BAZETT): 443 MS
EKG R AXIS: 8 DEGREES
EKG T AXIS: 17 DEGREES
EKG VENTRICULAR RATE: 55 BPM
L. PNEUMOPHILA SEROGP 1 UR AG: NORMAL
SARS-COV-2: NOT DETECTED
STREP PNEUMONIAE ANTIGEN, URINE: NORMAL

## 2021-09-27 RX ORDER — ATENOLOL 25 MG/1
TABLET ORAL
Qty: 30 TABLET | Refills: 5 | Status: SHIPPED | OUTPATIENT
Start: 2021-09-27 | End: 2022-03-28

## 2021-09-27 NOTE — TELEPHONE ENCOUNTER
----- Message from Leah Biswas sent at 9/27/2021 11:10 AM EDT -----  Subject: Message to Provider    QUESTIONS  Information for Provider? Was at [facility] ER yesterday They suspect Covid   and she is waiting for test to come back. She has a preop on Wednesday   please advise on what she should do   ---------------------------------------------------------------------------  --------------  CALL BACK INFO  What is the best way for the office to contact you? OK to leave message on   voicemail, OK to respond with electronic message via [employer] portal (only   for patients who have registered [employer] account)  Preferred Call Back Phone Number? [phone]  ---------------------------------------------------------------------------  --------------  SCRIPT ANSWERS  Relationship to Patient?  Self

## 2021-09-27 NOTE — TELEPHONE ENCOUNTER
I spoke to patient and she is giong to call us and let us know results of COVID and reschedule her Pre Op due to having symptoms.

## 2021-09-27 NOTE — TELEPHONE ENCOUNTER
----- Message from Leah Biswas sent at 9/27/2021 11:10 AM EDT -----  Subject: Message to Provider    QUESTIONS  Information for Provider? Was at [facility] yesterday They suspect Covid   and she is waiting for test to come back. She has a preop on Wednesday   please advise on what she should do   ---------------------------------------------------------------------------  --------------  CALL BACK INFO  What is the best way for the office to contact you? OK to leave message on   voicemail, OK to respond with electronic message via [employer] portal (only   for patients who have registered [employer] account)  Preferred Call Back Phone Number? [phone]  ---------------------------------------------------------------------------  --------------  SCRIPT ANSWERS  Relationship to Patient?  Self New Prescription: prednisol ace-gatiflox-bromfen (prednisol ace-gatiflox-bromfen): drops,suspension: 1-0.5-0.075% 1 drop three times a day into affected eye 12-

## 2021-09-27 NOTE — TELEPHONE ENCOUNTER
Future Appointments   Date Time Provider Diann Sotelo   9/29/2021 11:00 AM DO TREVA Garcia [place] [facility]   10/13/2021  1:20 PM DO YUNIOR [facility]     LOV 9/13/2021

## 2021-09-27 NOTE — ED PROVIDER NOTES
[address] RESIDENT NOTE       Date of evaluation: 9/26/2021    Chief Complaint     Headache, Shortness of Breath, Generalized Body Aches, and Fatigue      History of Present Illness     Natalie Durham is a 64 y.o. female who presents subjective shortness of breath and bilateral swelling and tenderness in her lower extremities. Patient states her symptoms started last Thursday and has progressively gotten worse. He states that she gets short of breath walking from room to room in her home. She does not have a history of congestive heart failure or oxygen use. She denies any recent history of surgeries or periods of immobilization. During this time her lower extremities also came more edematous to the point where she cannot put on her shoes. Endorses tenderness and paresthesias in her feet. She does not have any productive cough, chest pain, fevers, chills, orthopnea, PND, nominal pain, constipation or diarrhea. Patient is Covid vaccinated. Denies any recent contact with ill contacts. Review of Systems     Review of Systems   Constitutional: Positive for activity change, fatigue and fever. Negative for chills and diaphoresis. HENT: Negative for congestion, sneezing and sore throat. Eyes: Negative. Respiratory: Positive for shortness of breath. Negative for apnea, cough, choking, chest tightness, wheezing and stridor. Cardiovascular: Positive for leg swelling. Negative for chest pain and palpitations. Gastrointestinal: Negative for abdominal distention, abdominal pain, constipation, diarrhea, nausea and vomiting. Endocrine: Negative. Genitourinary: Negative. Musculoskeletal: Negative. Skin: Negative. Allergic/Immunologic: Negative. Neurological: Negative. Hematological: Negative. Psychiatric/Behavioral: Negative.         Past Medical, Surgical, Family, and Social History     She has a past medical history of Anxiety, Depression, Endometriosis, GERD (gastroesophageal reflux disease), Hypertension, Osteoarthritis, and Restless leg syndrome. She has a past surgical history that includes Tonsillectomy ();  section; Ovary removal (Right, ); Upper gastrointestinal endoscopy (); Knee arthroscopy (Left, 11/15/12); Upper gastrointestinal endoscopy (); hernia repair (2018); Anus surgery (2018); fracture surgery; Rotator cuff repair (Right, 2020); Shoulder arthroscopy (Right, 2020); Colonoscopy; Shoulder arthroscopy (Right, 2021); Shoulder arthroscopy (Right, 2021); and Dilation and curettage of uterus (N/A, 6/3/2021). Her family history includes Anemia in her mother; Arrhythmia in her father; Arthritis in her father and mother; Diabetes in her paternal grandfather and another family member; Obesity in her mother; Other in her mother. She reports that she has never smoked. She has never used smokeless tobacco. She reports current alcohol use. She reports previous drug use. Drug: Marijuana. Medications     Discharge Medication List as of 2021  9:26 PM      CONTINUE these medications which have NOT CHANGED    Details   rOPINIRole (REQUIP) 2 MG tablet TAKE 1 TABLET BY MOUTH TWICE A DAY, Disp-60 tablet, R-5Normal      ibuprofen (ADVIL;MOTRIN) 600 MG tablet TAKE 1 TABLET BY MOUTH EVERY 8 HOURS AS NEEDED FOR PAIN, Disp-40 tablet, R-1Normal      lidocaine (XYLOCAINE) 5 % ointment APPLY TOPICALLY AS NEEDED TO AREA OF TENDERNESS UNDER ARM. , Disp-70.88 g, R-1, Normal      omeprazole (PRILOSEC) 40 MG delayed release capsule TAKE 1 CAPSULE BY MOUTH EVERY DAY, Disp-30 capsule, R-6Normal      ALPRAZolam (XANAX) 1 MG tablet TAKE 1 TABLET BY MOUTH 3 TIMES DAILY AS NEEDED FOR ANXIETY FOR UP TO 60 DAYS.  **, Disp-90 tablet, R-1Normal      methocarbamol (ROBAXIN) 500 MG tablet TAKE 1 TABLET BY MOUTH 3 TIMES A DAY AS NEEDED FOR NECK PAIN, Disp-90 tablet, R-3Normal      diclofenac (VOLTAREN) 50 MG EC tablet Take 1 tablet by mouth 2 times daily (with meals), Disp-60 tablet, R-3Normal      furosemide (LASIX) 20 MG tablet TAKE 1 TABLET BY MOUTH DAILY AS NEEDED (FOR SWELLING), Disp-30 tablet, R-1Normal      Acetaminophen (TYLENOL PO) Take by mouthHistorical Med      DULoxetine (CYMBALTA) 60 MG extended release capsule TAKE 1 CAPSULE BY MOUTH EVERY DAY, Disp-30 capsule, R-5Normal      atenolol (TENORMIN) 25 MG tablet TAKE 1 TABLET BY MOUTH EVERY DAY, Disp-30 tablet, R-5Normal      MELATONIN PO Take 20 mg by mouth nightly as needed Historical Med             Allergies     She is allergic to toradol [ketorolac tromethamine] and haldol [haloperidol lactate]. Physical Exam     INITIAL VITALS: BP: (!) 163/115, Temp: 98.3 °F (36.8 °C), Pulse: 59, Resp: 18, SpO2: 97 %   Physical Exam  Constitutional:       Appearance: She is well-developed. She is obese. HENT:      Head: Normocephalic and atraumatic. Eyes:      Extraocular Movements: Extraocular movements intact. Pupils: Pupils are equal, round, and reactive to light. Cardiovascular:      Rate and Rhythm: Normal rate and regular rhythm. Pulmonary:      Effort: Pulmonary effort is normal.      Breath sounds: Normal breath sounds and air entry. No decreased breath sounds, wheezing, rhonchi or rales. Chest:      Chest wall: No tenderness or edema. Musculoskeletal:      Cervical back: Normal range of motion and neck supple. Neurological:      Mental Status: She is alert. Diagnostic Results     EKG   Interpreted inconjunction with emergency department physician No att. providers found  Rhythm: sinus bradycardia  Rate: bradycardia  Axis: normal  Ectopy: none  Conduction: normal  ST Segments: normal  T Waves: normal  Q Waves: none  Clinical Impression: sinus bradycardia    RADIOLOGY:  CTA PULMONARY W CONTRAST   Final Result   1. No evidence for pulmonary embolism. No cor pulmonale.    2. Likely early alveolitis right lower lobe of the lung   3. 3.6 mm noncalcified nodule right lower lung. This is stable and compared to prior CTA performed April 14, 2019, therefore is likely benign. XR CHEST PORTABLE   Final Result   1. No active airspace disease          LABS:   Results for orders placed or performed during the hospital encounter of 09/26/21   Strep Pneumoniae Antigen    Specimen: Urine, clean catch   Result Value Ref Range    STREP PNEUMONIAE ANTIGEN, URINE       Presumptive Negative  Presumptive negative suggests no current or recent  pneumococcal infection. Infection due to Strep pneumoniae  cannot be ruled out since the antigen present in the sample  may be below the detection limit of the test.  Normal Range:Presumptive Negative     Legionella antigen, urine    Specimen: Urine, clean catch   Result Value Ref Range    L. pneumophila Serogp 1 Ur Ag       Presumptive Negative  No Legionella pneumophila serogroup 1 antigens detected. A negative result does not exclude infection with  Legionella pneumophila serogroup 1 nor does it rule out  other microbial-caused respiratory infections or  disease caused by other serogroups of  Legionella pneumophila.   Normal Range: Presumptive Negative     Rapid influenza A/B antigens    Specimen: Nasopharyngeal   Result Value Ref Range    Rapid Influenza A Ag Negative Negative    Rapid Influenza B Ag Negative Negative   Respiratory Panel, Molecular, with COVID-19 (Restricted: peds pts or suitable admitted adults)    Specimen: Nasopharyngeal   Result Value Ref Range    Respiratory Panel PCR       Respiratory Pathogens Panel PCR Result: Not Detected  See additional report for complete Respiratory Pathogens Panel     Respiratory Panel Film Array Report   Result Value Ref Range    Report SEE IMAGE    CBC Auto Differential   Result Value Ref Range    WBC 7.9 4.0 - 11.0 K/uL    RBC 4.48 4.00 - 5.20 M/uL    Hemoglobin 13.6 12.0 - 16.0 g/dL    Hematocrit 41.2 36.0 - 48.0 %    MCV 91.9 80.0 - 100.0 fL    MCH 30.3 26.0 - 34.0 pg    MCHC 32.9 31.0 - 36.0 g/dL    RDW 15.9 (H) 12.4 - 15.4 %    Platelets 989 981 - 687 K/uL    MPV 9.5 5.0 - 10.5 fL    Neutrophils % 72.5 %    Lymphocytes % 19.6 %    Monocytes % 6.2 %    Eosinophils % 1.4 %    Basophils % 0.3 %    Neutrophils Absolute 5.7 1.7 - 7.7 K/uL    Lymphocytes Absolute 1.6 1.0 - 5.1 K/uL    Monocytes Absolute 0.5 0.0 - 1.3 K/uL    Eosinophils Absolute 0.1 0.0 - 0.6 K/uL    Basophils Absolute 0.0 0.0 - 0.2 K/uL   Comprehensive Metabolic Panel w/ Reflex to MG   Result Value Ref Range    Sodium 139 136 - 145 mmol/L    Potassium reflex Magnesium 4.0 3.5 - 5.1 mmol/L    Chloride 100 99 - 110 mmol/L    CO2 26 21 - 32 mmol/L    Anion Gap 13 3 - 16    Glucose 97 70 - 99 mg/dL    BUN 11 7 - 20 mg/dL    CREATININE 0.7 0.6 - 1.1 mg/dL    GFR Non-African American >60 >60    GFR African American >60 >60    Calcium 9.0 8.3 - 10.6 mg/dL    Total Protein 7.3 6.4 - 8.2 g/dL    Albumin 4.1 3.4 - 5.0 g/dL    Albumin/Globulin Ratio 1.3 1.1 - 2.2    Total Bilirubin 0.5 0.0 - 1.0 mg/dL    Alkaline Phosphatase 99 40 - 129 U/L    ALT 37 10 - 40 U/L    AST 31 15 - 37 U/L    Globulin 3.2 g/dL   Blood Gas, Venous   Result Value Ref Range    pH, Jorge 7.414 7.350 - 7.450    pCO2, Jorge 45.7 41.0 - 51.0 mmHg    pO2, Jorge 37.4 25 - 40 mmHg    HCO3, Venous 29.2 (H) 24.0 - 28.0 mmol/L    Base Excess, Jorge 3.9 (H) -2.0 - 3.0 mmol/L    O2 Sat, Jorge 69 Not established %    Carboxyhemoglobin 1.4 0.0 - 1.5 %    MetHgb, Jorge 0.4 0.0 - 1.5 %    TC02 (Calc), Jorge 31 mmol/L    Hemoglobin, Jorge, Reduced 30.30 %   Lactate, Sepsis   Result Value Ref Range    Lactic Acid, Sepsis 1.2 0.4 - 1.9 mmol/L   Urinalysis, reflex to microscopic   Result Value Ref Range    Color, UA Yellow Straw/Yellow    Clarity, UA Clear Clear    Glucose, Ur Negative Negative mg/dL    Bilirubin Urine Negative Negative    Ketones, Urine Negative Negative mg/dL    Specific Gravity, UA 1.025 1.005 - 1.030    Blood, Urine TRACE-INTACT (A) Negative pH, UA 6.0 5.0 - 8.0    Protein, UA TRACE (A) Negative mg/dL    Urobilinogen, Urine 0.2 <2.0 E.U./dL    Nitrite, Urine Negative Negative    Leukocyte Esterase, Urine Negative Negative    Microscopic Examination YES     Urine Type Other    Brain Natriuretic Peptide   Result Value Ref Range    Pro- (H) 0 - 124 pg/mL   D-Dimer, Quantitative   Result Value Ref Range    D-Dimer, Quant <200 0 - 229 ng/mL DDU   Sedimentation Rate   Result Value Ref Range    Sed Rate 13 0 - 30 mm/Hr   C-Reactive Protein   Result Value Ref Range    CRP 9.4 (H) 0.0 - 5.1 mg/L   Microscopic Urinalysis   Result Value Ref Range    Mucus, UA 2+ (A) None Seen /LPF    WBC, UA 3-5 0 - 5 /HPF    RBC, UA 3-4 0 - 4 /HPF    Epithelial Cells, UA 21-50 (A) 0 - 5 /HPF    Bacteria, UA 2+ (A) None Seen /HPF    Amorphous, UA 1+ /HPF   COVID-19   Result Value Ref Range    SARS-CoV-2 Not Detected Not detected   EKG 12 Lead   Result Value Ref Range    Ventricular Rate 55 BPM    Atrial Rate 55 BPM    P-R Interval 146 ms    QRS Duration 84 ms    Q-T Interval 464 ms    QTc Calculation (Bazett) 443 ms    P Axis 42 degrees    R Axis 8 degrees    T Axis 17 degrees    Diagnosis       EKG performed in ER and to be interpreted by ER physician. Confirmed by MD, ER ([id number]),  [address] ([facility]) on 9/27/2021 6:01:39 AM       ED BEDSIDE ULTRASOUND:  None performed. RECENT VITALS:  BP: 123/79, Temp: 98.3 °F (36.8 °C),Pulse: 71, Resp: 12, SpO2: 92 %     Procedures     None performed. ED Course     Nursing Notes, Past Medical Hx, Past Surgical Hx, Social Hx, Allergies, and FamilyHx were reviewed.     The patient was giventhe following medications:  Orders Placed This Encounter   Medications    acetaminophen (TYLENOL) tablet 650 mg    DISCONTD: fentaNYL (SUBLIMAZE) injection 25 mcg    DISCONTD: ondansetron (ZOFRAN) injection 4 mg    furosemide (LASIX) injection 20 mg    DISCONTD: rOPINIRole (REQUIP) tablet 2 mg    rOPINIRole (REQUIP) tablet 2 mg    ipratropium-albuterol (DUONEB) nebulizer solution 1 ampule     Order Specific Question:   Initiate RT Bronchodilator Protocol     Answer: Yes    iopamidol (ISOVUE-370) 76 % injection 80 mL    albuterol sulfate HFA (PROVENTIL HFA) 108 (90 Base) MCG/ACT inhaler     Sig: Inhale 2 puffs into the lungs every 6 hours as needed for Wheezing or Shortness of Breath (Space out to every 6 hours as symptoms improve) Space out to every 6 hours as symptoms improve. Dispense:  18 g     Refill:  0       CONSULTS:  [facility] / ASSESSMENT / Yaron Demario is a 64 y.o. female who presents with subjective shortness of breath and bilateral lower extremity swelling. Upon initial presentation, she was normotensive, slightly tachypneic (19-20), nontachycardic, afebrile, and saturating 98% on room air. EKG showed no acute signs of ischemia. Chest x-ray was clear. BNP and CBC were unremarkable. Patient had an elevated CRP (9.4), elevated proBNP (729), and negative D-dimer. Rapid influenza test was negative. UA was negative for any signs of infection. Given the patient to be afebrile, no white count, and clear chest x-ray it was safe to assume the patient had no signs of infection. It could be a possibility that patient might have a small component of acute congestive heart failure given the elevated BNP, but patient did not show any signs of fluid overload, no oxygen requirements, or typical CHF symptoms making this diagnosis less likely. Advised patient to follow-up with primary care doctor for further cardiac work-up. Patient was given a small dose of Lasix and a DuoNeb treatment however patient still endorses subjective dyspnea. She had a well score of 3 with a negative D-dimer. Even suspicion of PE to be low, the decision to order a CTPA was ordered given the nonimprovement of her symptoms. As suspected, it was negative for any pulmonary emboli.   Patient was prescribed an albuterol inhaler and instructed to follow-up with her PCP within a few days to reassess her symptoms. This patient was also evaluated by the attending physician. All care plans were discussed and agreed upon. Clinical Impression     1. Shortness of breath    2. Bilateral lower extremity edema      ED Course as of Sep 30 1249   Sun Sep 26, 2021   2025 Though lab work and imaging results show no obvious etiology for the patient's subjective dyspnea, and her D-dimer is negative, given her complaints of still being symptomatic and feeling short of breath will obtain CT scan of chest to observe for any specific abnormality such as a pulmonary embolism. [NM]   2040 Attending note: 63 yo F with viral URI symptoms presents for evaluation of worsening shortness of breath and lower extremity swelling. PT states SOB did improve after using family member's albuterol yesterday. Noticed lower extremity swelling 5 days ago. Denies h/o CAD, CHF, or VTE    Exam: VSS afebrile on room air. Lungs with distant sounds 2/2 body haibtus. No wheeing, no stridor. HR RRR with warm and well perfused extremity. Abdomen obese but nttp. . Pulses palpable in all exremities. 1+ pitting edema in the bilateral lower extremities to mid shin. A and O x3, grossly neuro intact. Ddx: Bronchitis, less c/f ACS, less c/f acute aortic pathology, less c.f PE, less c/f pna    PLAN: imaging, lab work, reevaluation. Given edema and sob will trial diuretic (no prev ECHo for review); will also give albuterol and ambulate. [NM]   2116 Imaging results and lab work with patient at the bedside. Her vitals continue to remain stable on room air. Though she still endorses shortness of breath. I discussed with her trialing albuterol inhaler every 4-6 hours for next 24 hours to assist with her subjective dyspnea. As well as close outpatient follow with her primary care physician.   Patient is comfortable with this plan and is agreeable to discharge at this time. Return precautions were provided in great detail which patient stated complete understanding of.    [NM]   2130 She shows sinus bradycardia. Unchanged compared to prior dated 11/23/2020. [NM]   2136 Spoke with on-call provider for Dr. Pippa Novak regarding outpatient followup and patients ED evaluation. They will have her evaluated in the office in the next 1-2 days    [NM]      ED Course User Index  [NM] Donavan Steele MD     ED Attending Attestation Note     Date of evaluation: 9/26/2021    This patient was seen by the resident. I have seen and examined the patient, agree with the workup, evaluation, management and diagnosis. The care plan has been discussed. I have reviewed the ECG and concur with the resident's interpretation. My assessment is documented above in the ED Course.   Disposition     PATIENT REFERRED TO:  Brianna Corral DO  [address]  [phone]    Schedule an appointment as soon as possible for a visit in 1 day      [facility] Emergency Department  [address]  [address]  [phone]    If symptoms worsen      DISCHARGE MEDICATIONS:  Discharge Medication List as of 9/26/2021  9:26 PM      START taking these medications    Details   albuterol sulfate HFA (PROVENTIL HFA) 108 (90 Base) MCG/ACT inhaler Inhale 2 puffs into the lungs every 6 hours as needed for Wheezing or Shortness of Breath (Space out to every 6 hours as symptoms improve) Space out to every 6 hours as symptoms improve., Disp-18 g, R-0Print             DISPOSITION Decision To Discharge 09/26/2021 09:10:08 PM        Moncho Ferreira MD  Resident  09/26/21 [address], MD  09/30/21 [id number]

## 2021-09-30 ENCOUNTER — TELEPHONE (OUTPATIENT)
Dept: FAMILY MEDICINE CLINIC | Age: 56
End: 2021-09-30

## 2021-09-30 NOTE — TELEPHONE ENCOUNTER
Just calling to have provider look at test results from when she was in hospital . She would like to draw his attention to result discussing nodule in lung

## 2021-10-01 NOTE — TELEPHONE ENCOUNTER
Spoke to the patient. Her ER report and imaging was reviewed. She says she is not doing too badly. The peripheral edema has resolved. Her pulse ox is 96. She does not feel particularly badly. Discussed the CT with a stable pulmonary nodule for 2 years. Does not need follow-up. Discussed imaging report and follow clinically as she probably has a bit of syndrome.

## 2021-10-04 RX ORDER — DULOXETIN HYDROCHLORIDE 60 MG/1
CAPSULE, DELAYED RELEASE ORAL
Qty: 30 CAPSULE | Refills: 5 | Status: SHIPPED | OUTPATIENT
Start: 2021-10-04 | End: 2022-09-01 | Stop reason: SDUPTHER

## 2021-10-04 NOTE — TELEPHONE ENCOUNTER
Future Appointments   Date Time Provider Diann Sotelo   10/7/2021  1:40 PM DO TREVA [facility] [place] - [facility]   10/13/2021  1:20 PM DO YUNIOR [facility] [address] [place] [address]     9/13/2021

## 2021-10-07 ENCOUNTER — OFFICE VISIT (OUTPATIENT)
Dept: FAMILY MEDICINE CLINIC | Age: 56
End: 2021-10-07
Payer: MEDICARE

## 2021-10-07 VITALS
DIASTOLIC BLOOD PRESSURE: 70 MMHG | BODY MASS INDEX: 47.09 KG/M2 | TEMPERATURE: 97.8 F | SYSTOLIC BLOOD PRESSURE: 120 MMHG | WEIGHT: 293 LBS | HEART RATE: 64 BPM | RESPIRATION RATE: 14 BRPM | OXYGEN SATURATION: 96 % | HEIGHT: 66 IN

## 2021-10-07 DIAGNOSIS — N95.0 POSTMENOPAUSAL BLEEDING: Primary | ICD-10-CM

## 2021-10-07 DIAGNOSIS — F32.5 MAJOR DEPRESSIVE DISORDER WITH SINGLE EPISODE, IN FULL REMISSION (HCC): ICD-10-CM

## 2021-10-07 DIAGNOSIS — I10 ESSENTIAL HYPERTENSION: ICD-10-CM

## 2021-10-07 DIAGNOSIS — E66.01 CLASS 3 SEVERE OBESITY DUE TO EXCESS CALORIES WITH SERIOUS COMORBIDITY AND BODY MASS INDEX (BMI) OF 45.0 TO 49.9 IN ADULT (HCC): ICD-10-CM

## 2021-10-07 DIAGNOSIS — Z01.818 PRE-OP EXAM: ICD-10-CM

## 2021-10-07 PROCEDURE — 3017F COLORECTAL CA SCREEN DOC REV: CPT | Performed by: FAMILY MEDICINE

## 2021-10-07 PROCEDURE — G8482 FLU IMMUNIZE ORDER/ADMIN: HCPCS | Performed by: FAMILY MEDICINE

## 2021-10-07 PROCEDURE — G8417 CALC BMI ABV UP PARAM F/U: HCPCS | Performed by: FAMILY MEDICINE

## 2021-10-07 PROCEDURE — 99214 OFFICE O/P EST MOD 30 MIN: CPT | Performed by: FAMILY MEDICINE

## 2021-10-07 PROCEDURE — 90674 CCIIV4 VAC NO PRSV 0.5 ML IM: CPT | Performed by: FAMILY MEDICINE

## 2021-10-07 PROCEDURE — 1036F TOBACCO NON-USER: CPT | Performed by: FAMILY MEDICINE

## 2021-10-07 PROCEDURE — G8427 DOCREV CUR MEDS BY ELIG CLIN: HCPCS | Performed by: FAMILY MEDICINE

## 2021-10-07 PROCEDURE — G0008 ADMIN INFLUENZA VIRUS VAC: HCPCS | Performed by: FAMILY MEDICINE

## 2021-10-07 PROCEDURE — 93000 ELECTROCARDIOGRAM COMPLETE: CPT | Performed by: FAMILY MEDICINE

## 2021-10-07 ASSESSMENT — ENCOUNTER SYMPTOMS
COUGH: 0
CONSTIPATION: 0
DIARRHEA: 0
WHEEZING: 0
PHOTOPHOBIA: 0
SORE THROAT: 0
ABDOMINAL PAIN: 0
BLOOD IN STOOL: 0
TROUBLE SWALLOWING: 0
SHORTNESS OF BREATH: 0

## 2021-10-07 ASSESSMENT — PATIENT HEALTH QUESTIONNAIRE - PHQ9
SUM OF ALL RESPONSES TO PHQ QUESTIONS 1-9: 0
SUM OF ALL RESPONSES TO PHQ QUESTIONS 1-9: 0
1. LITTLE INTEREST OR PLEASURE IN DOING THINGS: 0
SUM OF ALL RESPONSES TO PHQ QUESTIONS 1-9: 0
SUM OF ALL RESPONSES TO PHQ9 QUESTIONS 1 & 2: 0
2. FEELING DOWN, DEPRESSED OR HOPELESS: 0

## 2021-10-07 NOTE — PROGRESS NOTES
Subjective:      Patient ID: Tim Zepdea is a 64 y.o. y.o. female. Here for Pre-Op exam  Hyst    ,  Dr Gaye Sullivan    Had bleeding post menopause.   Uterine wall thickened and d & c and biopsy was not able to be completed  With her age and concerns / risk post menopause  Going to have surgery  HPI      Chief Complaint   Patient presents with   Verl Raw Pre-op Exam     Oct 21 Hysterectomy Dr Cleve Sexton at [facility] Had EKG done at [facility] on 2021 Had Labs done on 2021        Allergies   Allergen Reactions    Toradol [Ketorolac Tromethamine] Other (See Comments)     \"out of it\"  \"felt like sleep paralysis\"    Haldol [Haloperidol Lactate] Other (See Comments)     \"felt out of it, similar to the toradol\"       Past Medical History:   Diagnosis Date    Anxiety     Depression     Endometriosis     GERD (gastroesophageal reflux disease)     Hypertension     Osteoarthritis     Restless leg syndrome        Past Surgical History:   Procedure Laterality Date    ANUS SURGERY  2018    fissure     SECTION      x3    COLONOSCOPY      DILATION AND CURETTAGE OF UTERUS N/A 6/3/2021    VIDEO HYSTEROSCOPY DILATATION AND CURETTAGE, POSSIBLE MYOSURE, POSSIBLE POLYPECTOMY performed by Mykel Funez DO at [address]       right wrist    HERNIA REPAIR  2018    LAPAROSCOPIC PARAESOPHAGEAL HERNIA REPAIR WITH MESH    KNEE ARTHROSCOPY Left 11/15/12    ARTHROSCOPY LEFT KNEE WITH SYOVECTOMY AND CHONDROPLASTY    OVARY REMOVAL Right     ROTATOR CUFF REPAIR Right 2020    EXAM UNDER ANESTHESIA VIDEO ARTHROSCOPY RIGHT SHOULDER, MINI- OPEN ROTATOR CUFF REPAIR, NEER ACROMIOPLASTY, LENO PROCEDURE WITH EXPAREL performed by Abundio Biswas MD at [facility] ARTHROSCOPY Right 2020    VIDEO ARTHROSCOPY RIGHT SHOULDER, OPEN ROTATOR CUFF REPAIR, BICEPS TENOTOMY -BLOCK- performed by Kyaw Art MD at [address] Right 2/24/2021    RIGHT SHOULDER ARTHROSCOPIC INCISION AND DRAINAGE performed by Maru Wagner MD at [place] [address] ARTHROSCOPY Right 4/28/2021    VIDEO ARTHROSCOPY RIGHT SHOULDER, ROTATOR CUFF REPAIR, DEBRIDEMENT performed by Maru Wagner MD at [address] [place] [address] ENDOSCOPY  2011    UPPER GASTROINTESTINAL ENDOSCOPY  2015    esophageasl stretch       Social History     Socioeconomic History    Marital status:      Spouse name: Not on file    Number of children: 3    Years of education: Not on file    Highest education level: Not on file   Occupational History    Occupation:    Tobacco Use    Smoking status: Never Smoker    Smokeless tobacco: Never Used   Vaping Use    Vaping Use: Never used   Substance and Sexual Activity    Alcohol use: Yes     Comment: 1 -2 drinks per month    Drug use: Not Currently     Types: Marijuana     Comment: quit 2016    Sexual activity: Never   Other Topics Concern    Not on file   Social History Narrative    Not on file     Social Determinants of Health     Financial Resource Strain: Low Risk     Difficulty of Paying Living Expenses: Not hard at all   Food Insecurity: No Food Insecurity    Worried About [facility] in the Last Year: Never true    [facility] in the Last Year: Never true   Transportation Needs:     Lack of Transportation (Medical):      Lack of Transportation (Non-Medical):    Physical Activity:     Days of Exercise per Week:     Minutes of Exercise per Session:    Stress:     Feeling of Stress :    Social Connections:     Frequency of Communication with Friends and Family:     Frequency of Social Gatherings with Friends and Family:     Attends Christianity Services:     Active Member of Clubs or Organizations:     Attends Club or Organization Meetings:     Marital Status:    Intimate Partner Violence:     Fear of Current or Ex-Partner:     Emotionally Abused:     rhonchi. Abdominal:      General: There is no distension. Palpations: Abdomen is soft. There is no mass. Tenderness: There is no abdominal tenderness. Comments: Overweight / rotund abdomen   Musculoskeletal:      Right lower leg: No edema. Left lower leg: No edema. Lymphadenopathy:      Cervical: No cervical adenopathy. Skin:     General: Skin is warm and dry. Neurological:      Mental Status: She is alert and oriented to person, place, and time. Psychiatric:         Mood and Affect: Mood normal.         Behavior: Behavior normal.         Assessment:       Diagnosis Orders   1. Pre-op exam  EKG 12 Lead     Post menopausal bleeding  Hypertension, controlled  Anxiety, treated  Hyperlipidemia  Depression, treated, remission   Plan:   Looks generally stable. Had labs on September 26 for evaluation of respiratory condition which has now cleared, and the labs are satisfactory. Stop diclofenac and ibuprofen five days before. Stable    Medically cleared in stable for proposed surgery. EKG and labs mid September-  OK    Medically clear / OK for the surgery  Flu vaccine today    Current Outpatient Medications   Medication Sig Dispense Refill    DULoxetine (CYMBALTA) 60 MG extended release capsule TAKE 1 CAPSULE BY MOUTH EVERY DAY 30 capsule 5    ALPRAZolam (XANAX) 1 MG tablet TAKE 1 TABLET BY MOUTH 3 TIMES DAILY AS NEEDED FOR ANXIETY 90 tablet 1    atenolol (TENORMIN) 25 MG tablet TAKE 1 TABLET BY MOUTH EVERY DAY 30 tablet 5    albuterol sulfate HFA (PROVENTIL HFA) 108 (90 Base) MCG/ACT inhaler Inhale 2 puffs into the lungs every 6 hours as needed for Wheezing or Shortness of Breath (Space out to every 6 hours as symptoms improve) Space out to every 6 hours as symptoms improve.  18 g 0    rOPINIRole (REQUIP) 2 MG tablet TAKE 1 TABLET BY MOUTH TWICE A DAY 60 tablet 5    ibuprofen (ADVIL;MOTRIN) 600 MG tablet TAKE 1 TABLET BY MOUTH EVERY 8 HOURS AS NEEDED FOR PAIN 40 tablet 1    lidocaine (XYLOCAINE) 5 % ointment APPLY TOPICALLY AS NEEDED TO AREA OF TENDERNESS UNDER ARM. 70.88 g 1    omeprazole (PRILOSEC) 40 MG delayed release capsule TAKE 1 CAPSULE BY MOUTH EVERY DAY 30 capsule 6    methocarbamol (ROBAXIN) 500 MG tablet TAKE 1 TABLET BY MOUTH 3 TIMES A DAY AS NEEDED FOR NECK PAIN 90 tablet 3    diclofenac (VOLTAREN) 50 MG EC tablet Take 1 tablet by mouth 2 times daily (with meals) 60 tablet 3    furosemide (LASIX) 20 MG tablet TAKE 1 TABLET BY MOUTH DAILY AS NEEDED (FOR SWELLING) 30 tablet 1    Acetaminophen (TYLENOL PO) Take by mouth      MELATONIN PO Take 20 mg by mouth nightly as needed        No current facility-administered medications for this visit.

## 2021-10-07 NOTE — Clinical Note
Ronn Vizcaino for her preop. She is medically stable and cleared. Full H&P is in [facility].     [facility]

## 2021-10-07 NOTE — PROGRESS NOTES
Vaccine Information Sheet, \"Influenza - Inactivated\"  given to Jackie Goldberg, or parent/legal guardian of  Jackie Goldberg and verbalized understanding. Patient responses:    Have you ever had a reaction to a flu vaccine? No  Are you able to eat eggs without adverse effects? Yes  Do you have any current illness? No  Have you ever had Guillian Blue Syndrome? No    Flu vaccine given per order. Please see immunization tab.

## 2021-10-13 ENCOUNTER — OFFICE VISIT (OUTPATIENT)
Dept: OBGYN CLINIC | Age: 56
End: 2021-10-13

## 2021-10-13 VITALS
DIASTOLIC BLOOD PRESSURE: 78 MMHG | HEIGHT: 66 IN | TEMPERATURE: 97.4 F | WEIGHT: 293 LBS | SYSTOLIC BLOOD PRESSURE: 124 MMHG | HEART RATE: 74 BPM | BODY MASS INDEX: 47.09 KG/M2

## 2021-10-13 DIAGNOSIS — N95.0 POSTMENOPAUSAL BLEEDING: ICD-10-CM

## 2021-10-13 DIAGNOSIS — Z98.891 HISTORY OF C-SECTION: ICD-10-CM

## 2021-10-13 DIAGNOSIS — Z01.818 PREOP TESTING: Primary | ICD-10-CM

## 2021-10-13 DIAGNOSIS — E66.01 MORBID OBESITY (HCC): ICD-10-CM

## 2021-10-13 DIAGNOSIS — R93.89 THICKENED ENDOMETRIUM: ICD-10-CM

## 2021-10-13 DIAGNOSIS — Z98.890 S/P D&C (STATUS POST DILATION AND CURETTAGE): ICD-10-CM

## 2021-10-13 PROCEDURE — PREOPEXAM PRE-OP EXAM: Performed by: OBSTETRICS & GYNECOLOGY

## 2021-10-14 ENCOUNTER — TELEPHONE (OUTPATIENT)
Dept: FAMILY MEDICINE CLINIC | Age: 56
End: 2021-10-14

## 2021-10-14 PROBLEM — N95.0 POSTMENOPAUSAL BLEEDING: Status: ACTIVE | Noted: 2021-10-14

## 2021-10-14 PROBLEM — R93.89 THICKENED ENDOMETRIUM: Status: ACTIVE | Noted: 2021-10-14

## 2021-10-14 PROBLEM — E66.01 MORBID OBESITY (HCC): Status: ACTIVE | Noted: 2021-10-14

## 2021-10-14 ASSESSMENT — ENCOUNTER SYMPTOMS: SHORTNESS OF BREATH: 0

## 2021-10-14 NOTE — TELEPHONE ENCOUNTER
Patient is scheduled for hysterectomy a week from today and needs to stop taking diclofenac (VOLTAREN) 50 MG EC tablet     5 days prior to procedure . Patient is asking if there is anything else she can take besides tylenol .

## 2021-10-14 NOTE — PROGRESS NOTES
Subjective:      Patient ID: Maryjane Conde is a 64 y.o. female. HPI  65 y/o  female presents for preoperative visit to sign consents. Patient is a patient of Dr. Martha Poole and is scheduled for Vertie Clap robotic total laparoscopic hysterectomy, bilateral salpingo-oophorectomy, possible lysis of adhesions secondary to proliferative endometrial glands on D&C. Patient is 4 months s/p hysteroscopy, D&C secondary to postmenopausal bleeding. Menopause occurred 5 years ago. History is positive for right oophorectomy 10 years ago secondary to ovarian torsion. Medical history is significant for hypertension, restless legs, anxiety, depression and several orthopedic concerns. Surgical history is positive for  x 3, D&C, nd right oophorectomy. Last pap smear was 2021--endometrial cells in a woman >39years of age. Review of Systems   Constitutional: Negative. Negative for activity change, appetite change, chills, fatigue, fever and unexpected weight change. Respiratory: Negative for shortness of breath. Cardiovascular: Negative for chest pain. Genitourinary: Negative for difficulty urinating, hematuria, pelvic pain, vaginal bleeding, vaginal discharge and vaginal pain. Psychiatric/Behavioral: Negative. Objective:   Physical Exam  Vitals and nursing note reviewed. Constitutional:       General: She is not in acute distress. Appearance: She is well-developed. She is not diaphoretic. Pulmonary:      Effort: Pulmonary effort is normal.   Abdominal:      General: There is no distension. Tenderness: There is no abdominal tenderness. There is no guarding. Skin:     General: Skin is warm and dry. Neurological:      Mental Status: She is alert and oriented to person, place, and time. Psychiatric:         Behavior: Behavior normal.         Thought Content:  Thought content normal.         Judgment: Judgment normal.       PELVIC ULTRASOUND without DOPPLER INTERROGATION    NON OB       DATE: 2021       PHYSICIAN: MARY ALICE SCOTT        SONOGRAPHER: RUFUS Sullivan [facility]       INDICATION: post-menopausal bleeding       TYPE OF SCAN: vaginal       FINDINGS:     The cul de sac is normal. No free fluid appreciated.       The cervix is normal and not enlarged. Nabothian cyst/s is not noted within the uterine cervix.       The uterus measures 8.89 cm x 3.49 cm x 2.77 cm.     The uterus is anteverted. The endometrium measures 9.04 mm. The myometrium is heterogeneous in appearance. No uterine anomalies are noted.        The right ovary is surgically absent. The right adnexa is normal.       The left ovary is present and normal.     The left ovary measures 2.72 cm x 1.85 cm x 1.37 cm.     Ovary findings: No masses seen. The left adnexa is normal.        IMPRESSION: heterogenous uterus. Surgically absent right ovary.  Normal left ovary. Normal blood flow seen to right and left ovary. Imaging is limited secondary to body habitus.    The patient is well aware of the limitations of ultrasound in the detection of anomalies of the abdomen and pelvis.       Cass Durand DO        2021 10:36 AM - Doug De Santiago Incoming Lab Results From Soft (Epic Adt)    Narrative  Performed by: [facility]                                   [address] [place] [address]                                        Fax [phone]   [phone]   Department of Pathology   FINAL SURGICAL PATHOLOGY REPORT   Patient Name: Krunal Kaplan            Accession No:  [id number]    Age Sex:   1965    56 Y / F      Location:      [facility] NON   Account No:   [de-identified]                 Collected:     2021   Med Rec No:    [id number]                Received:      2021   Attend Phys: Bradford Zavaleta DO           Completed:     2021

## 2021-10-14 NOTE — TELEPHONE ENCOUNTER
Please call the patient tell her there are no other arthritic drugs to substitute. She can use topicals, such as icy hot or BenGay or preparations like that which sometimes do provide enough relief to get her through the 5 days or so. Also remind her she can use cool compresses or warm compresses which can provide some degree of relief also. All of the other arthritic type medicines may increase the risk of bleeding or bruising during surgery which is what we are trying to prevent.

## 2021-10-18 ENCOUNTER — ANESTHESIA EVENT (OUTPATIENT)
Dept: OPERATING ROOM | Age: 56
DRG: 742 | End: 2021-10-18
Payer: MEDICARE

## 2021-10-18 ENCOUNTER — HOSPITAL ENCOUNTER (OUTPATIENT)
Age: 56
Discharge: HOME | End: 2021-10-18
Payer: MEDICARE

## 2021-10-18 LAB — SARS-COV-2: NOT DETECTED

## 2021-10-18 PROCEDURE — U0005 INFEC AGEN DETEC AMPLI PROBE: HCPCS

## 2021-10-18 PROCEDURE — U0003 INFECTIOUS AGENT DETECTION BY NUCLEIC ACID (DNA OR RNA); SEVERE ACUTE RESPIRATORY SYNDROME CORONAVIRUS 2 (SARS-COV-2) (CORONAVIRUS DISEASE [COVID-19]), AMPLIFIED PROBE TECHNIQUE, MAKING USE OF HIGH THROUGHPUT TECHNOLOGIES AS DESCRIBED BY CMS-2020-01-R: HCPCS

## 2021-10-18 NOTE — PROGRESS NOTES
Preoperative Screening for Elective Surgery/Invasive Procedures While COVID-19 present in the community     Have you had any of the following symptoms?none  o Fever, chills  o Cough  o Shortness of breath  o Muscle aches/pain  o Diarrhea  o Abdominal pain, nausea, vomiting  o Loss or decrease in taste and / or smell   Risk of Exposure  o Have you recently been hospitalized for COVID-19 or flu-like illness, if so when?no  o Recently diagnosed with COVID-19, if so when?no  o Recently tested for COVID-19, if so when?yes 10/18/21 for preop - result pending  o Have you been in close contact with a person or family member who currently has or recently had COVID-19? If yes, when and in what context?no  o Do you live with anybody who in the last 14 days has had fever, chills, shortness of breath, muscle aches, flu-like illness?no  o Do you have any close contacts or family members who are currently in the hospital for COVID-19 or flu-like illness? If yes, assess recent close contact with this person. no    Indicate if the patient has a positive screen by answering yes to one or more of the above questions. Patients who test positive or screen positive prior to surgery or on the day of surgery should be evaluated in conjunction with the surgeon/proceduralist/anesthesiologist to determine the urgency of the procedure.

## 2021-10-18 NOTE — PROGRESS NOTES
1. Do not eat or drink anything after 12 midnight prior to surgery. This includes no water, chewing gum mints, or ice chips. You may brush your teeth and gargle the day of surgery but DO NOT SWALLOW THE WATER. 2. Please see your family doctor/pediatrician for a history and physical and/or concerning medications. Bring any test results/reports from your physician's office. If you are under the care of a heart doctor or specialist please be aware that you may be asked to see him or her for clearance. 3. You may be asked to stop blood thinners such as Coumadin, Plavix, Fragmin, and Lovenox or Anti-inflammatories such as Aspirin, Ibuprofen, Advil, and Naproxen prior to your surgery. Please check with your doctor before stopping these or any other medications. 4. Do not smoke, and do not drink any alcoholic beverages 24 hours prior to surgery. 5. You MUST make arrangements for a responsible adult to take you home after your surgery. For your safety, you will not be allowed to leave alone or drive yourself home. Your surgery will be cancelled if you do not have a ride home. Also for your safety, it is strongly suggested someone stay with you the first 24 hrs after your surgery. 6. A parent/legal guardian must accompany a child scheduled for surgery and plan to stay at the hospital until the child is discharged. Please do not bring other children with you. 7. For your comfort,please wear simple, loose fitting clothing to the hospital.  Please do not bring valuables (money, credit cards, checkbooks, etc.) Do not wear any makeup (including no eye makeup) or nail polish on your fingers or toes. 8. For your safety, please DO NOT wear any jewelry or piercings on day of surgery. All body piercing jewelry must be removed. 9. If you have dentures, they will be removed before going to the OR; for your convenience we will provide you with a container.   If you wear contact lenses or glasses, they will be removed, they will be removed, please bring a case for them. 10. If appicable,Please see your family doctor/pediatrician for a history & physical and/or concerning medications. Bring any test results/reports from your physician's office. 11. Remember to bring Blood Bank bracelet to the hospital on the day of surgery. 12. If you have a Living Will and Durable Power of  for Healthcare, please bring in a copy. 15. Notify your Surgeon if you develop any illness between now and surgery  time, cough, cold, fever, sore throat, nausea, vomiting, etc.  Please notify your surgeon if you experience dizziness, shortness of breath or blurred vision between now & the time of your surgery   14. DO NOT shave your operative site 96 hours prior to surgery. For face & neck surgery, men may use an electric razor 48 hours prior to surgery. 15. Shower the night before surgery with _x__Antibacterial soap ___Hibiclens   16. To provide excellent care visitors will be limited to one in the room at any given time. 17.  Please bring picture ID and insurance card. 18.  Visit our web site for additional information:  Personal Estate Manager/surgery.           Patient instructed to take Atenolol am of surgery with a small sip of water

## 2021-10-21 ENCOUNTER — ANESTHESIA (OUTPATIENT)
Dept: OPERATING ROOM | Age: 56
DRG: 742 | End: 2021-10-21
Payer: MEDICARE

## 2021-10-21 ENCOUNTER — HOSPITAL ENCOUNTER (INPATIENT)
Age: 56
LOS: 2 days | Discharge: HOME | DRG: 742 | End: 2021-10-23
Attending: OBSTETRICS & GYNECOLOGY | Admitting: OBSTETRICS & GYNECOLOGY
Payer: MEDICARE

## 2021-10-21 VITALS
OXYGEN SATURATION: 98 % | TEMPERATURE: 97 F | SYSTOLIC BLOOD PRESSURE: 127 MMHG | DIASTOLIC BLOOD PRESSURE: 70 MMHG | RESPIRATION RATE: 26 BRPM

## 2021-10-21 DIAGNOSIS — N95.0: ICD-10-CM

## 2021-10-21 DIAGNOSIS — Z98.890 S/P ROBOT-ASSISTED SURGICAL PROCEDURE: Primary | ICD-10-CM

## 2021-10-21 DIAGNOSIS — R93.89 ENDOMETRIAL THICKENING ON ULTRASOUND: ICD-10-CM

## 2021-10-21 LAB
ABO/RH: NORMAL
ANTIBODY SCREEN: NORMAL

## 2021-10-21 PROCEDURE — 7100000001 HC PACU RECOVERY - ADDTL 15 MIN: Performed by: OBSTETRICS & GYNECOLOGY

## 2021-10-21 PROCEDURE — 3700000000 HC ANESTHESIA ATTENDED CARE: Performed by: OBSTETRICS & GYNECOLOGY

## 2021-10-21 PROCEDURE — 86901 BLOOD TYPING SEROLOGIC RH(D): CPT

## 2021-10-21 PROCEDURE — 2700000000 HC OXYGEN THERAPY PER DAY

## 2021-10-21 PROCEDURE — 86850 RBC ANTIBODY SCREEN: CPT

## 2021-10-21 PROCEDURE — 3700000001 HC ADD 15 MINUTES (ANESTHESIA): Performed by: OBSTETRICS & GYNECOLOGY

## 2021-10-21 PROCEDURE — 6360000002 HC RX W HCPCS: Performed by: NURSE ANESTHETIST, CERTIFIED REGISTERED

## 2021-10-21 PROCEDURE — 94761 N-INVAS EAR/PLS OXIMETRY MLT: CPT

## 2021-10-21 PROCEDURE — 8E0W4CZ ROBOTIC ASSISTED PROCEDURE OF TRUNK REGION, PERCUTANEOUS ENDOSCOPIC APPROACH: ICD-10-PCS | Performed by: OBSTETRICS & GYNECOLOGY

## 2021-10-21 PROCEDURE — 6360000002 HC RX W HCPCS: Performed by: ANESTHESIOLOGY

## 2021-10-21 PROCEDURE — 88307 TISSUE EXAM BY PATHOLOGIST: CPT

## 2021-10-21 PROCEDURE — 1200000000 HC SEMI PRIVATE

## 2021-10-21 PROCEDURE — 2580000003 HC RX 258: Performed by: ANESTHESIOLOGY

## 2021-10-21 PROCEDURE — 58571 TLH W/T/O 250 G OR LESS: CPT | Performed by: OBSTETRICS & GYNECOLOGY

## 2021-10-21 PROCEDURE — 6360000002 HC RX W HCPCS: Performed by: OBSTETRICS & GYNECOLOGY

## 2021-10-21 PROCEDURE — 2500000003 HC RX 250 WO HCPCS: Performed by: NURSE ANESTHETIST, CERTIFIED REGISTERED

## 2021-10-21 PROCEDURE — 2500000003 HC RX 250 WO HCPCS: Performed by: ANESTHESIOLOGY

## 2021-10-21 PROCEDURE — 86900 BLOOD TYPING SEROLOGIC ABO: CPT

## 2021-10-21 PROCEDURE — 7100000000 HC PACU RECOVERY - FIRST 15 MIN: Performed by: OBSTETRICS & GYNECOLOGY

## 2021-10-21 PROCEDURE — 6370000000 HC RX 637 (ALT 250 FOR IP): Performed by: OBSTETRICS & GYNECOLOGY

## 2021-10-21 PROCEDURE — 2720000010 HC SURG SUPPLY STERILE: Performed by: OBSTETRICS & GYNECOLOGY

## 2021-10-21 PROCEDURE — S2900 ROBOTIC SURGICAL SYSTEM: HCPCS | Performed by: OBSTETRICS & GYNECOLOGY

## 2021-10-21 PROCEDURE — 0TJB8ZZ INSPECTION OF BLADDER, VIA NATURAL OR ARTIFICIAL OPENING ENDOSCOPIC: ICD-10-PCS | Performed by: OBSTETRICS & GYNECOLOGY

## 2021-10-21 PROCEDURE — 3600000009 HC SURGERY ROBOT BASE: Performed by: OBSTETRICS & GYNECOLOGY

## 2021-10-21 PROCEDURE — 2580000003 HC RX 258: Performed by: OBSTETRICS & GYNECOLOGY

## 2021-10-21 PROCEDURE — 0UT94ZZ RESECTION OF UTERUS, PERCUTANEOUS ENDOSCOPIC APPROACH: ICD-10-PCS | Performed by: OBSTETRICS & GYNECOLOGY

## 2021-10-21 PROCEDURE — 2709999900 HC NON-CHARGEABLE SUPPLY: Performed by: OBSTETRICS & GYNECOLOGY

## 2021-10-21 PROCEDURE — 0UT04ZZ RESECTION OF RIGHT OVARY, PERCUTANEOUS ENDOSCOPIC APPROACH: ICD-10-PCS | Performed by: OBSTETRICS & GYNECOLOGY

## 2021-10-21 PROCEDURE — 0UT54ZZ RESECTION OF RIGHT FALLOPIAN TUBE, PERCUTANEOUS ENDOSCOPIC APPROACH: ICD-10-PCS | Performed by: OBSTETRICS & GYNECOLOGY

## 2021-10-21 PROCEDURE — 3600000019 HC SURGERY ROBOT ADDTL 15MIN: Performed by: OBSTETRICS & GYNECOLOGY

## 2021-10-21 RX ORDER — SODIUM CHLORIDE 0.9 % (FLUSH) 0.9 %
5-40 SYRINGE (ML) INJECTION PRN
Status: DISCONTINUED | OUTPATIENT
Start: 2021-10-21 | End: 2021-10-23 | Stop reason: HOSPADM

## 2021-10-21 RX ORDER — ROCURONIUM BROMIDE 10 MG/ML
INJECTION, SOLUTION INTRAVENOUS PRN
Status: DISCONTINUED | OUTPATIENT
Start: 2021-10-21 | End: 2021-10-21 | Stop reason: SDUPTHER

## 2021-10-21 RX ORDER — MORPHINE SULFATE 2 MG/ML
2 INJECTION, SOLUTION INTRAMUSCULAR; INTRAVENOUS EVERY 5 MIN PRN
Status: DISCONTINUED | OUTPATIENT
Start: 2021-10-21 | End: 2021-10-21 | Stop reason: HOSPADM

## 2021-10-21 RX ORDER — FENTANYL CITRATE 50 UG/ML
INJECTION, SOLUTION INTRAMUSCULAR; INTRAVENOUS PRN
Status: DISCONTINUED | OUTPATIENT
Start: 2021-10-21 | End: 2021-10-21 | Stop reason: SDUPTHER

## 2021-10-21 RX ORDER — MORPHINE SULFATE 2 MG/ML
1 INJECTION, SOLUTION INTRAMUSCULAR; INTRAVENOUS EVERY 5 MIN PRN
Status: DISCONTINUED | OUTPATIENT
Start: 2021-10-21 | End: 2021-10-21 | Stop reason: HOSPADM

## 2021-10-21 RX ORDER — ALBUTEROL SULFATE 90 UG/1
2 AEROSOL, METERED RESPIRATORY (INHALATION) EVERY 6 HOURS PRN
Status: DISCONTINUED | OUTPATIENT
Start: 2021-10-21 | End: 2021-10-23 | Stop reason: HOSPADM

## 2021-10-21 RX ORDER — GLYCOPYRROLATE 0.2 MG/ML
INJECTION INTRAMUSCULAR; INTRAVENOUS PRN
Status: DISCONTINUED | OUTPATIENT
Start: 2021-10-21 | End: 2021-10-21 | Stop reason: SDUPTHER

## 2021-10-21 RX ORDER — ROPINIROLE 2 MG/1
4 TABLET, FILM COATED ORAL NIGHTLY
Status: DISCONTINUED | OUTPATIENT
Start: 2021-10-21 | End: 2021-10-23 | Stop reason: HOSPADM

## 2021-10-21 RX ORDER — SUCCINYLCHOLINE CHLORIDE 20 MG/ML
INJECTION INTRAMUSCULAR; INTRAVENOUS PRN
Status: DISCONTINUED | OUTPATIENT
Start: 2021-10-21 | End: 2021-10-21 | Stop reason: SDUPTHER

## 2021-10-21 RX ORDER — OXYCODONE HYDROCHLORIDE AND ACETAMINOPHEN 5; 325 MG/1; MG/1
2 TABLET ORAL PRN
Status: DISCONTINUED | OUTPATIENT
Start: 2021-10-21 | End: 2021-10-21 | Stop reason: HOSPADM

## 2021-10-21 RX ORDER — SODIUM CHLORIDE 9 MG/ML
25 INJECTION, SOLUTION INTRAVENOUS PRN
Status: DISCONTINUED | OUTPATIENT
Start: 2021-10-21 | End: 2021-10-23 | Stop reason: HOSPADM

## 2021-10-21 RX ORDER — PANTOPRAZOLE SODIUM 40 MG/1
40 TABLET, DELAYED RELEASE ORAL NIGHTLY PRN
Status: DISCONTINUED | OUTPATIENT
Start: 2021-10-21 | End: 2021-10-23 | Stop reason: HOSPADM

## 2021-10-21 RX ORDER — MEPERIDINE HYDROCHLORIDE 50 MG/ML
12.5 INJECTION INTRAMUSCULAR; INTRAVENOUS; SUBCUTANEOUS EVERY 5 MIN PRN
Status: DISCONTINUED | OUTPATIENT
Start: 2021-10-21 | End: 2021-10-21 | Stop reason: HOSPADM

## 2021-10-21 RX ORDER — ONDANSETRON 4 MG/1
4 TABLET, ORALLY DISINTEGRATING ORAL EVERY 8 HOURS PRN
Status: DISCONTINUED | OUTPATIENT
Start: 2021-10-21 | End: 2021-10-23 | Stop reason: HOSPADM

## 2021-10-21 RX ORDER — EPHEDRINE SULFATE 50 MG/ML
INJECTION INTRAVENOUS PRN
Status: DISCONTINUED | OUTPATIENT
Start: 2021-10-21 | End: 2021-10-21 | Stop reason: SDUPTHER

## 2021-10-21 RX ORDER — SODIUM CHLORIDE, SODIUM LACTATE, POTASSIUM CHLORIDE, CALCIUM CHLORIDE 600; 310; 30; 20 MG/100ML; MG/100ML; MG/100ML; MG/100ML
INJECTION, SOLUTION INTRAVENOUS CONTINUOUS
Status: DISCONTINUED | OUTPATIENT
Start: 2021-10-21 | End: 2021-10-21

## 2021-10-21 RX ORDER — LIDOCAINE HYDROCHLORIDE 20 MG/ML
INJECTION, SOLUTION INFILTRATION; PERINEURAL PRN
Status: DISCONTINUED | OUTPATIENT
Start: 2021-10-21 | End: 2021-10-21 | Stop reason: SDUPTHER

## 2021-10-21 RX ORDER — OXYCODONE HYDROCHLORIDE 5 MG/1
10 TABLET ORAL EVERY 6 HOURS PRN
Status: DISCONTINUED | OUTPATIENT
Start: 2021-10-21 | End: 2021-10-23 | Stop reason: HOSPADM

## 2021-10-21 RX ORDER — SODIUM CHLORIDE 0.9 % (FLUSH) 0.9 %
10 SYRINGE (ML) INJECTION EVERY 12 HOURS SCHEDULED
Status: DISCONTINUED | OUTPATIENT
Start: 2021-10-21 | End: 2021-10-21 | Stop reason: HOSPADM

## 2021-10-21 RX ORDER — ACETAMINOPHEN 325 MG/1
650 TABLET ORAL EVERY 4 HOURS PRN
Status: DISCONTINUED | OUTPATIENT
Start: 2021-10-21 | End: 2021-10-23 | Stop reason: HOSPADM

## 2021-10-21 RX ORDER — SODIUM CHLORIDE 0.9 % (FLUSH) 0.9 %
10 SYRINGE (ML) INJECTION PRN
Status: DISCONTINUED | OUTPATIENT
Start: 2021-10-21 | End: 2021-10-21 | Stop reason: HOSPADM

## 2021-10-21 RX ORDER — ATROPINE SULFATE 0.4 MG/ML
AMPUL (ML) INJECTION PRN
Status: DISCONTINUED | OUTPATIENT
Start: 2021-10-21 | End: 2021-10-21 | Stop reason: SDUPTHER

## 2021-10-21 RX ORDER — SODIUM CHLORIDE 0.9 % (FLUSH) 0.9 %
5-40 SYRINGE (ML) INJECTION EVERY 12 HOURS SCHEDULED
Status: DISCONTINUED | OUTPATIENT
Start: 2021-10-21 | End: 2021-10-23 | Stop reason: HOSPADM

## 2021-10-21 RX ORDER — OXYCODONE HYDROCHLORIDE AND ACETAMINOPHEN 5; 325 MG/1; MG/1
1 TABLET ORAL PRN
Status: DISCONTINUED | OUTPATIENT
Start: 2021-10-21 | End: 2021-10-21 | Stop reason: HOSPADM

## 2021-10-21 RX ORDER — OXYCODONE HYDROCHLORIDE 5 MG/1
5 TABLET ORAL EVERY 6 HOURS PRN
Status: DISCONTINUED | OUTPATIENT
Start: 2021-10-21 | End: 2021-10-23 | Stop reason: HOSPADM

## 2021-10-21 RX ORDER — HYDROMORPHONE HCL 110MG/55ML
0.5 PATIENT CONTROLLED ANALGESIA SYRINGE INTRAVENOUS
Status: DISCONTINUED | OUTPATIENT
Start: 2021-10-21 | End: 2021-10-23 | Stop reason: HOSPADM

## 2021-10-21 RX ORDER — SODIUM CHLORIDE, SODIUM LACTATE, POTASSIUM CHLORIDE, AND CALCIUM CHLORIDE .6; .31; .03; .02 G/100ML; G/100ML; G/100ML; G/100ML
IRRIGANT IRRIGATION PRN
Status: DISCONTINUED | OUTPATIENT
Start: 2021-10-21 | End: 2021-10-21 | Stop reason: HOSPADM

## 2021-10-21 RX ORDER — ALPRAZOLAM 0.25 MG/1
0.5 TABLET ORAL 3 TIMES DAILY PRN
Status: DISCONTINUED | OUTPATIENT
Start: 2021-10-21 | End: 2021-10-23 | Stop reason: HOSPADM

## 2021-10-21 RX ORDER — FUROSEMIDE 20 MG/1
20 TABLET ORAL DAILY PRN
Status: DISCONTINUED | OUTPATIENT
Start: 2021-10-22 | End: 2021-10-23 | Stop reason: HOSPADM

## 2021-10-21 RX ORDER — ONDANSETRON 2 MG/ML
4 INJECTION INTRAMUSCULAR; INTRAVENOUS
Status: DISCONTINUED | OUTPATIENT
Start: 2021-10-21 | End: 2021-10-21 | Stop reason: HOSPADM

## 2021-10-21 RX ORDER — DULOXETIN HYDROCHLORIDE 60 MG/1
60 CAPSULE, DELAYED RELEASE ORAL DAILY
Status: DISCONTINUED | OUTPATIENT
Start: 2021-10-22 | End: 2021-10-23 | Stop reason: HOSPADM

## 2021-10-21 RX ORDER — SODIUM CHLORIDE, SODIUM LACTATE, POTASSIUM CHLORIDE, CALCIUM CHLORIDE 600; 310; 30; 20 MG/100ML; MG/100ML; MG/100ML; MG/100ML
INJECTION, SOLUTION INTRAVENOUS CONTINUOUS
Status: DISCONTINUED | OUTPATIENT
Start: 2021-10-21 | End: 2021-10-23 | Stop reason: HOSPADM

## 2021-10-21 RX ORDER — SODIUM CHLORIDE 9 MG/ML
25 INJECTION, SOLUTION INTRAVENOUS PRN
Status: DISCONTINUED | OUTPATIENT
Start: 2021-10-21 | End: 2021-10-21 | Stop reason: HOSPADM

## 2021-10-21 RX ORDER — ONDANSETRON 2 MG/ML
4 INJECTION INTRAMUSCULAR; INTRAVENOUS EVERY 6 HOURS PRN
Status: DISCONTINUED | OUTPATIENT
Start: 2021-10-21 | End: 2021-10-23 | Stop reason: HOSPADM

## 2021-10-21 RX ORDER — PROPOFOL 10 MG/ML
INJECTION, EMULSION INTRAVENOUS PRN
Status: DISCONTINUED | OUTPATIENT
Start: 2021-10-21 | End: 2021-10-21 | Stop reason: SDUPTHER

## 2021-10-21 RX ORDER — ROPINIROLE 2 MG/1
2 TABLET, FILM COATED ORAL DAILY
Status: DISCONTINUED | OUTPATIENT
Start: 2021-10-21 | End: 2021-10-23 | Stop reason: HOSPADM

## 2021-10-21 RX ORDER — HYDROMORPHONE HCL 110MG/55ML
PATIENT CONTROLLED ANALGESIA SYRINGE INTRAVENOUS PRN
Status: DISCONTINUED | OUTPATIENT
Start: 2021-10-21 | End: 2021-10-21 | Stop reason: SDUPTHER

## 2021-10-21 RX ORDER — ATENOLOL 25 MG/1
25 TABLET ORAL DAILY
Status: DISCONTINUED | OUTPATIENT
Start: 2021-10-22 | End: 2021-10-23 | Stop reason: HOSPADM

## 2021-10-21 RX ADMIN — SODIUM CHLORIDE, POTASSIUM CHLORIDE, SODIUM LACTATE AND CALCIUM CHLORIDE: 600; 310; 30; 20 INJECTION, SOLUTION INTRAVENOUS at 06:51

## 2021-10-21 RX ADMIN — ROPINIROLE HYDROCHLORIDE 4 MG: 2 TABLET, FILM COATED ORAL at 19:39

## 2021-10-21 RX ADMIN — EPHEDRINE SULFATE 10 MG: 50 INJECTION INTRAVENOUS at 08:18

## 2021-10-21 RX ADMIN — FAMOTIDINE 20 MG: 10 INJECTION, SOLUTION INTRAVENOUS at 06:51

## 2021-10-21 RX ADMIN — ONDANSETRON 4 MG: 2 INJECTION INTRAMUSCULAR; INTRAVENOUS at 18:29

## 2021-10-21 RX ADMIN — HYDROMORPHONE HYDROCHLORIDE 0.5 MG: 2 INJECTION, SOLUTION INTRAMUSCULAR; INTRAVENOUS; SUBCUTANEOUS at 19:56

## 2021-10-21 RX ADMIN — Medication 3000 MG: at 07:55

## 2021-10-21 RX ADMIN — SUCCINYLCHOLINE CHLORIDE 160 MG: 20 INJECTION, SOLUTION INTRAMUSCULAR; INTRAVENOUS at 07:48

## 2021-10-21 RX ADMIN — OXYCODONE 5 MG: 5 TABLET ORAL at 23:12

## 2021-10-21 RX ADMIN — HYDROMORPHONE HYDROCHLORIDE 0.5 MG: 1 INJECTION, SOLUTION INTRAMUSCULAR; INTRAVENOUS; SUBCUTANEOUS at 13:24

## 2021-10-21 RX ADMIN — ATROPINE SULFATE 0.2 MG: 0.4 INJECTION, SOLUTION INTRAMUSCULAR; INTRAVENOUS; SUBCUTANEOUS at 07:57

## 2021-10-21 RX ADMIN — EPHEDRINE SULFATE 20 MG: 50 INJECTION INTRAVENOUS at 08:00

## 2021-10-21 RX ADMIN — PROPOFOL 350 MG: 10 INJECTION, EMULSION INTRAVENOUS at 07:48

## 2021-10-21 RX ADMIN — SODIUM CHLORIDE, POTASSIUM CHLORIDE, SODIUM LACTATE AND CALCIUM CHLORIDE: 600; 310; 30; 20 INJECTION, SOLUTION INTRAVENOUS at 12:10

## 2021-10-21 RX ADMIN — ATROPINE SULFATE 0.2 MG: 0.4 INJECTION, SOLUTION INTRAMUSCULAR; INTRAVENOUS; SUBCUTANEOUS at 08:03

## 2021-10-21 RX ADMIN — FENTANYL CITRATE 25 MCG: 50 INJECTION INTRAMUSCULAR; INTRAVENOUS at 11:26

## 2021-10-21 RX ADMIN — SODIUM CHLORIDE, POTASSIUM CHLORIDE, SODIUM LACTATE AND CALCIUM CHLORIDE: 600; 310; 30; 20 INJECTION, SOLUTION INTRAVENOUS at 23:10

## 2021-10-21 RX ADMIN — SODIUM CHLORIDE, POTASSIUM CHLORIDE, SODIUM LACTATE AND CALCIUM CHLORIDE: 600; 310; 30; 20 INJECTION, SOLUTION INTRAVENOUS at 08:25

## 2021-10-21 RX ADMIN — HYDROMORPHONE HYDROCHLORIDE 0.5 MG: 2 INJECTION INTRAMUSCULAR; INTRAVENOUS; SUBCUTANEOUS at 12:11

## 2021-10-21 RX ADMIN — ROCURONIUM BROMIDE 40 MG: 10 SOLUTION INTRAVENOUS at 08:00

## 2021-10-21 RX ADMIN — SUGAMMADEX 200 MG: 100 INJECTION, SOLUTION INTRAVENOUS at 12:11

## 2021-10-21 RX ADMIN — Medication 2000 MG: at 11:45

## 2021-10-21 RX ADMIN — PANTOPRAZOLE SODIUM 40 MG: 40 TABLET, DELAYED RELEASE ORAL at 23:41

## 2021-10-21 RX ADMIN — SODIUM CHLORIDE, POTASSIUM CHLORIDE, SODIUM LACTATE AND CALCIUM CHLORIDE: 600; 310; 30; 20 INJECTION, SOLUTION INTRAVENOUS at 16:05

## 2021-10-21 RX ADMIN — Medication 2000 MG: at 12:13

## 2021-10-21 RX ADMIN — EPHEDRINE SULFATE 20 MG: 50 INJECTION INTRAVENOUS at 08:08

## 2021-10-21 RX ADMIN — EPHEDRINE SULFATE 10 MG: 50 INJECTION INTRAVENOUS at 09:02

## 2021-10-21 RX ADMIN — ROCURONIUM BROMIDE 10 MG: 10 SOLUTION INTRAVENOUS at 07:48

## 2021-10-21 RX ADMIN — GLYCOPYRROLATE 0.2 MG: 0.2 INJECTION, SOLUTION INTRAMUSCULAR; INTRAVENOUS at 07:44

## 2021-10-21 RX ADMIN — ENOXAPARIN SODIUM 40 MG: 40 INJECTION SUBCUTANEOUS at 19:39

## 2021-10-21 RX ADMIN — ALPRAZOLAM 0.5 MG: 0.25 TABLET ORAL at 23:13

## 2021-10-21 RX ADMIN — ROCURONIUM BROMIDE 20 MG: 10 SOLUTION INTRAVENOUS at 09:02

## 2021-10-21 RX ADMIN — HYDROMORPHONE HYDROCHLORIDE 0.5 MG: 1 INJECTION, SOLUTION INTRAMUSCULAR; INTRAVENOUS; SUBCUTANEOUS at 14:42

## 2021-10-21 RX ADMIN — LIDOCAINE HYDROCHLORIDE 100 MG: 20 INJECTION, SOLUTION INFILTRATION; PERINEURAL at 07:48

## 2021-10-21 RX ADMIN — GLYCOPYRROLATE 0.2 MG: 0.2 INJECTION, SOLUTION INTRAMUSCULAR; INTRAVENOUS at 07:53

## 2021-10-21 RX ADMIN — FENTANYL CITRATE 50 MCG: 50 INJECTION INTRAMUSCULAR; INTRAVENOUS at 12:10

## 2021-10-21 RX ADMIN — ROCURONIUM BROMIDE 25 MG: 10 SOLUTION INTRAVENOUS at 10:31

## 2021-10-21 RX ADMIN — FENTANYL CITRATE 25 MCG: 50 INJECTION INTRAMUSCULAR; INTRAVENOUS at 11:06

## 2021-10-21 ASSESSMENT — PULMONARY FUNCTION TESTS
PIF_VALUE: 37
PIF_VALUE: 30
PIF_VALUE: 37
PIF_VALUE: 35
PIF_VALUE: 19
PIF_VALUE: 35
PIF_VALUE: 37
PIF_VALUE: 35
PIF_VALUE: 34
PIF_VALUE: 37
PIF_VALUE: 35
PIF_VALUE: 35
PIF_VALUE: 37
PIF_VALUE: 35
PIF_VALUE: 37
PIF_VALUE: 35
PIF_VALUE: 35
PIF_VALUE: 28
PIF_VALUE: 35
PIF_VALUE: 27
PIF_VALUE: 35
PIF_VALUE: 34
PIF_VALUE: 26
PIF_VALUE: 35
PIF_VALUE: 21
PIF_VALUE: 20
PIF_VALUE: 35
PIF_VALUE: 35
PIF_VALUE: 36
PIF_VALUE: 35
PIF_VALUE: 37
PIF_VALUE: 35
PIF_VALUE: 31
PIF_VALUE: 35
PIF_VALUE: 35
PIF_VALUE: 29
PIF_VALUE: 35
PIF_VALUE: 38
PIF_VALUE: 23
PIF_VALUE: 27
PIF_VALUE: 27
PIF_VALUE: 36
PIF_VALUE: 35
PIF_VALUE: 37
PIF_VALUE: 34
PIF_VALUE: 27
PIF_VALUE: 35
PIF_VALUE: 27
PIF_VALUE: 37
PIF_VALUE: 35
PIF_VALUE: 23
PIF_VALUE: 22
PIF_VALUE: 27
PIF_VALUE: 37
PIF_VALUE: 35
PIF_VALUE: 22
PIF_VALUE: 35
PIF_VALUE: 36
PIF_VALUE: 22
PIF_VALUE: 35
PIF_VALUE: 35
PIF_VALUE: 27
PIF_VALUE: 37
PIF_VALUE: 35
PIF_VALUE: 23
PIF_VALUE: 35
PIF_VALUE: 37
PIF_VALUE: 37
PIF_VALUE: 22
PIF_VALUE: 35
PIF_VALUE: 19
PIF_VALUE: 35
PIF_VALUE: 27
PIF_VALUE: 35
PIF_VALUE: 35
PIF_VALUE: 27
PIF_VALUE: 35
PIF_VALUE: 22
PIF_VALUE: 24
PIF_VALUE: 6
PIF_VALUE: 35
PIF_VALUE: 22
PIF_VALUE: 35
PIF_VALUE: 23
PIF_VALUE: 35
PIF_VALUE: 37
PIF_VALUE: 35
PIF_VALUE: 35
PIF_VALUE: 27
PIF_VALUE: 35
PIF_VALUE: 35
PIF_VALUE: 37
PIF_VALUE: 35
PIF_VALUE: 34
PIF_VALUE: 35
PIF_VALUE: 35
PIF_VALUE: 27
PIF_VALUE: 1
PIF_VALUE: 35
PIF_VALUE: 35
PIF_VALUE: 36
PIF_VALUE: 35
PIF_VALUE: 37
PIF_VALUE: 35
PIF_VALUE: 35
PIF_VALUE: 37
PIF_VALUE: 37
PIF_VALUE: 35
PIF_VALUE: 37
PIF_VALUE: 35
PIF_VALUE: 37
PIF_VALUE: 38
PIF_VALUE: 3
PIF_VALUE: 25
PIF_VALUE: 35
PIF_VALUE: 37
PIF_VALUE: 35
PIF_VALUE: 35
PIF_VALUE: 36
PIF_VALUE: 35
PIF_VALUE: 38
PIF_VALUE: 35
PIF_VALUE: 2
PIF_VALUE: 20
PIF_VALUE: 0
PIF_VALUE: 35
PIF_VALUE: 27
PIF_VALUE: 35
PIF_VALUE: 37
PIF_VALUE: 27
PIF_VALUE: 20
PIF_VALUE: 22
PIF_VALUE: 38
PIF_VALUE: 35
PIF_VALUE: 34
PIF_VALUE: 37
PIF_VALUE: 22
PIF_VALUE: 35
PIF_VALUE: 38
PIF_VALUE: 35
PIF_VALUE: 6
PIF_VALUE: 37
PIF_VALUE: 27
PIF_VALUE: 27
PIF_VALUE: 35
PIF_VALUE: 21
PIF_VALUE: 35
PIF_VALUE: 35
PIF_VALUE: 27
PIF_VALUE: 37
PIF_VALUE: 23
PIF_VALUE: 37
PIF_VALUE: 35
PIF_VALUE: 36
PIF_VALUE: 37
PIF_VALUE: 27
PIF_VALUE: 35
PIF_VALUE: 35
PIF_VALUE: 22
PIF_VALUE: 22
PIF_VALUE: 36
PIF_VALUE: 37
PIF_VALUE: 35
PIF_VALUE: 37
PIF_VALUE: 1
PIF_VALUE: 35
PIF_VALUE: 22
PIF_VALUE: 27
PIF_VALUE: 35
PIF_VALUE: 27
PIF_VALUE: 35
PIF_VALUE: 35
PIF_VALUE: 33
PIF_VALUE: 36
PIF_VALUE: 27
PIF_VALUE: 35
PIF_VALUE: 1
PIF_VALUE: 37
PIF_VALUE: 35
PIF_VALUE: 37
PIF_VALUE: 35
PIF_VALUE: 22
PIF_VALUE: 35
PIF_VALUE: 1
PIF_VALUE: 1
PIF_VALUE: 35
PIF_VALUE: 27
PIF_VALUE: 35
PIF_VALUE: 35
PIF_VALUE: 37
PIF_VALUE: 35
PIF_VALUE: 37
PIF_VALUE: 24
PIF_VALUE: 22
PIF_VALUE: 35
PIF_VALUE: 35
PIF_VALUE: 37
PIF_VALUE: 35
PIF_VALUE: 35
PIF_VALUE: 22
PIF_VALUE: 34
PIF_VALUE: 35
PIF_VALUE: 22
PIF_VALUE: 22
PIF_VALUE: 23
PIF_VALUE: 35
PIF_VALUE: 35
PIF_VALUE: 24
PIF_VALUE: 35
PIF_VALUE: 35
PIF_VALUE: 20
PIF_VALUE: 36
PIF_VALUE: 20
PIF_VALUE: 35
PIF_VALUE: 37
PIF_VALUE: 35
PIF_VALUE: 1
PIF_VALUE: 5
PIF_VALUE: 35
PIF_VALUE: 37
PIF_VALUE: 27
PIF_VALUE: 37
PIF_VALUE: 38
PIF_VALUE: 21
PIF_VALUE: 36

## 2021-10-21 ASSESSMENT — PAIN DESCRIPTION - PAIN TYPE
TYPE: SURGICAL PAIN
TYPE: SURGICAL PAIN

## 2021-10-21 ASSESSMENT — PAIN SCALES - GENERAL
PAINLEVEL_OUTOF10: 9
PAINLEVEL_OUTOF10: 7
PAINLEVEL_OUTOF10: 5
PAINLEVEL_OUTOF10: 5
PAINLEVEL_OUTOF10: 6

## 2021-10-21 ASSESSMENT — PAIN DESCRIPTION - DESCRIPTORS
DESCRIPTORS: ACHING
DESCRIPTORS: ACHING;CRAMPING
DESCRIPTORS: ACHING

## 2021-10-21 ASSESSMENT — ENCOUNTER SYMPTOMS: SHORTNESS OF BREATH: 1

## 2021-10-21 ASSESSMENT — PAIN - FUNCTIONAL ASSESSMENT
PAIN_FUNCTIONAL_ASSESSMENT: 0-10
PAIN_FUNCTIONAL_ASSESSMENT: ACTIVITIES ARE NOT PREVENTED

## 2021-10-21 ASSESSMENT — LIFESTYLE VARIABLES: SMOKING_STATUS: 0

## 2021-10-21 NOTE — PROGRESS NOTES
Post-Operative Gynecology Progress Note       [facility] Obstetrics and Gynecology    Subjective:  Cindy Guerrero is a 64 y.o. female who is s/p Davinci robotic total laparoscopic hysterectomy, right salpingo-oophorectomy, lysis of adhesions, diagnostic cystoscopy, POD #0    Patient is doing well today without complaints. Reports biggest complaint right now is nausea, is requesting nausea medication. Pain is well controlled with IV pain medications. Has not been up to ambulate. Unger catheter in place, denies passage of flatus. Denies chest pain, shortness of breath, fever, chills, calf pain or tenderness. Review of Systems - The following ROS was otherwise negative, except as noted in the HPI: constitutional, respiratory, cardiovascular, gastrointestinal, genitourinary    Objective:  Vitals:    10/21/21 1600   BP: (!) 159/90   Pulse: 56   Resp: 20   Temp: 98.2 °F (36.8 °C)   SpO2:        General: Alert, well appearing, no acute distress  Heart: Regular rate and rhythm  Lungs: Respirations unlabored  Abdomen: Soft, appropriately tender to palpation, non-distended, no rebound or guarding. Incisions with dressing in place, clean, dry and intact. No significant drainage or surrounding erythema. Extremities: No redness or tenderness in LE, SCDs in place bilaterally       Assessment/Plan:    Cindy Guerrero is a 64 y.o. female who is s/p Davinci robotic total laparoscopic hysterectomy, right salpingo-oophorectomy, lysis of adhesions, diagnostic cystoscopy    1. POD #0     - Doing well overall, however with significant nausea without vomiting     - Meeting milestones     - Pain well controlled     - Benign exam     - Continue routine post-operative care    2. Nausea and vomiting     - Continue IV antiemetics and IV fluids     - Slow advancing of diet prn symptoms    Disposition:   Continue in house post-operative care.       Dennard Gosselin, DO

## 2021-10-21 NOTE — PROGRESS NOTES
4 Eyes Skin Assessment     The patient is being assess for   {Blank single:25704::\"Admission\",\"Transfer to New Unit\",\"Post-Op Surgical\",\"Cath Lab Post-Op\",\"Shift Handoff\"}    I agree that 2 RN's have performed a thorough Head to Toe Skin Assessment on the patient. ALL assessment sites listed below have been assessed. Areas assessed for pressure by both nurses:   []   Head, Face, and Ears   []   Shoulders, Back, and Chest, Abdomen  []   Arms, Elbows, and Hands   []   Coccyx, Sacrum, and Ischium  []   Legs, Feet, and Heels        Skin Assessed Under all Medical Devices by both nurses:  {Medical devices:85712}              All Mepilex Borders were peeled back and area peeked at by both nurses:  {Yes/No:08926}  Please list where Mepilex Borders are located:  ***             **SHARE this note so that the co-signing nurse is able to place an eSignature**    Co-signer eSignature: Electronically signed by Spencer Anaya RN on 10/21/21 at 4:00 PM EDT    Does the Patient have Skin Breakdown related to pressure?   {Blank single:65674::\"No\",\"Yes [facility] WOUND CARE was Initiated documentation include the Kathie-wound, Wound Assessment, Measurements, Dressing Treatment, Drainage, and Color\",\"}     (Insert Photo here***)         Julito Prevention initiated:  {YES/NO:19732}   Wound Care Orders initiated:  {YES/NO:19732}      [facility] nurse consulted for Pressure Injury (Stage 3,4, Unstageable, DTI, NWPT, Complex wounds)and New or Established Ostomies:  {YES/NO:19732}      Primary Nurse eSignature: {Esignature:[id number]}

## 2021-10-21 NOTE — ANESTHESIA PRE PROCEDURE
Department of Anesthesiology  Preprocedure Note       Name:  Cindy Guerrero   Age:  64 y.o.  :  1965                                          MRN:  [id number]         Date:  10/21/2021      Surgeon: Bryanna Boone):  Geoffrey Charon Federer, DO Dennard Gosselin, DO    Procedure: Procedure(s):  ROBOTIC ASSISTED LAPAROSCOPIC HYSTERECTOMY WITH BILATERAL SALPINGECTOMY  CPT CODE - 10489    Medications prior to admission:   Prior to Admission medications    Medication Sig Start Date End Date Taking? Authorizing Provider   atenolol (TENORMIN) 25 MG tablet TAKE 1 TABLET BY MOUTH EVERY DAY  Patient taking differently: Take 25 mg by mouth daily  21  Yes Eugenio Connolly DO   rOPINIRole (REQUIP) 2 MG tablet TAKE 1 TABLET BY MOUTH TWICE A DAY  Patient taking differently: Take 2 mg by mouth 2mg every afternoon and 4mg at night 21  Yes Eugenio Connolly DO   DULoxetine (CYMBALTA) 60 MG extended release capsule TAKE 1 CAPSULE BY MOUTH EVERY DAY  Patient taking differently: Take 60 mg by mouth daily  10/4/21   Eugenio Connolly DO   ALPRAZolam Robbieflora RaymondRamana) 1 MG tablet TAKE 1 TABLET BY MOUTH 3 TIMES DAILY AS NEEDED FOR ANXIETY 10/4/21 11/3/21  Eugenio Connolly DO   albuterol sulfate HFA (PROVENTIL HFA) 108 (90 Base) MCG/ACT inhaler Inhale 2 puffs into the lungs every 6 hours as needed for Wheezing or Shortness of Breath (Space out to every 6 hours as symptoms improve) Space out to every 6 hours as symptoms improve. 21   Boby Scott MD   ibuprofen (ADVIL;MOTRIN) 600 MG tablet TAKE 1 TABLET BY MOUTH EVERY 8 HOURS AS NEEDED FOR PAIN 21   Eugenio Connolly DO   lidocaine (XYLOCAINE) 5 % ointment APPLY TOPICALLY AS NEEDED TO AREA OF TENDERNESS UNDER ARM.  21   Eugenio Connolly DO   omeprazole (PRILOSEC) 40 MG delayed release capsule TAKE 1 CAPSULE BY MOUTH EVERY DAY  Patient taking differently: Take 40 mg by mouth nightly  21   Eugenio Connolly DO   methocarbamol (ROBAXIN) 500 MG tablet TAKE 1 TABLET BY MOUTH 3 TIMES A DAY AS NEEDED FOR NECK PAIN 8/3/21   Samara Acosta DO   diclofenac (VOLTAREN) 50 MG EC tablet Take 1 tablet by mouth 2 times daily (with meals) 7/14/21   Carlene Ortiz DO   furosemide (LASIX) 20 MG tablet TAKE 1 TABLET BY MOUTH DAILY AS NEEDED (FOR SWELLING) 6/30/21   Samara Acosta DO   Acetaminophen (TYLENOL PO) Take 650 mg by mouth daily as needed     Historical Provider, MD   MELATONIN PO Take 20 mg by mouth nightly as needed     Historical Provider, MD       Current medications:    Current Facility-Administered Medications   Medication Dose Route Frequency Provider Last Rate Last Admin    lactated ringers infusion   IntraVENous Continuous Dani Ortiz MD        sodium chloride flush 0.9 % injection 10 mL  10 mL IntraVENous 2 times per day Dani Ortiz MD        sodium chloride flush 0.9 % injection 10 mL  10 mL IntraVENous PRN Dani Ortiz MD        0.9 % sodium chloride infusion  25 mL IntraVENous PRN Dani Ortiz MD        famotidine (PEPCID) injection 20 mg  20 mg IntraVENous Once Dani Ortiz MD        ceFAZolin (ANCEF) 3000 mg in dextrose 5 % 100 mL IVPB  3,000 mg IntraVENous On Call to Maritza Elliott 912, DO           Allergies: Allergies   Allergen Reactions    Toradol [Ketorolac Tromethamine] Other (See Comments)     \"out of it\"  \"felt like sleep paralysis\"    Haldol [Haloperidol Lactate] Other (See Comments)     \"felt out of it, similar to the toradol\"       Problem List:    Patient Active Problem List   Diagnosis Code    Depression F32. A    Knee pain M25.569    Chondromalacia of left knee M94.262    Synovitis of knee M65.9    Bilateral carpal tunnel syndrome G56.03    Lumbar strain S39.012A    Anxiety F41.9    Restless leg syndrome G25.81    De Quervain's disease (radial styloid tenosynovitis) M65.4    Osteoarthritis of carpometacarpal joint of thumb M18.9    Hemorrhoid prolapse K64.8    Dyspnea on exertion R06.00    Sciatica M54.30    Intractable vomiting with nausea R11.2    Hiatal hernia K44.9    Elevated blood pressure reading R03.0    Major depressive disorder, recurrent, moderate (HCC) F33.1    Generalized anxiety disorder F41.1    Essential hypertension I10    Closed displaced fracture of middle phalanx of right ring finger S62.624A    Anal lesion K62.9    Adjustment disorder with depressed mood F43.21    Neck pain M54.2    S/P D&C (status post dilation and curettage) Z98.890    Postmenopausal bleeding N95.0    Thickened endometrium R93.89    Morbid obesity (HCC) E66.01       Past Medical History:        Diagnosis Date    Anxiety     Depression     Endometriosis     GERD (gastroesophageal reflux disease)     Hypertension     Osteoarthritis     Restless leg syndrome        Past Surgical History:        Procedure Laterality Date    ANUS SURGERY  2018    fissure     SECTION      x3    COLONOSCOPY      DILATION AND CURETTAGE OF UTERUS N/A 6/3/2021    VIDEO HYSTEROSCOPY DILATATION AND CURETTAGE, POSSIBLE MYOSURE, POSSIBLE POLYPECTOMY performed by Sheila Ordonez DO at [address] [place] [address]      right wrist    HERNIA REPAIR  2018    LAPAROSCOPIC PARAESOPHAGEAL HERNIA REPAIR WITH MESH    KNEE ARTHROSCOPY Left 11/15/12    ARTHROSCOPY LEFT KNEE WITH SYOVECTOMY AND CHONDROPLASTY    OVARY REMOVAL Right     ROTATOR CUFF REPAIR Right 2020    EXAM UNDER ANESTHESIA VIDEO ARTHROSCOPY RIGHT SHOULDER, MINI- OPEN ROTATOR CUFF REPAIR, NEER ACROMIOPLASTY, LENO PROCEDURE WITH EXPAREL performed by Rosa Holder MD at [place] [address] ARTHROSCOPY Right 2020    VIDEO ARTHROSCOPY RIGHT SHOULDER, OPEN ROTATOR CUFF REPAIR, BICEPS TENOTOMY -BLOCK- performed by Nupur Olivas MD at [place] [address] ARTHROSCOPY Right 2021    RIGHT SHOULDER ARTHROSCOPIC INCISION AND DRAINAGE performed by Nupur Olivas MD at [place] [address] ARTHROSCOPY Right 4/28/2021    VIDEO ARTHROSCOPY RIGHT SHOULDER, ROTATOR CUFF REPAIR, DEBRIDEMENT performed by Candida Sahu MD at [facility]  2011    UPPER GASTROINTESTINAL ENDOSCOPY  2015    esophageasl stretch       Social History:    Social History     Tobacco Use    Smoking status: Never Smoker    Smokeless tobacco: Never Used   Substance Use Topics    Alcohol use: Yes     Comment: 1 -2 drinks per month                                Counseling given: Not Answered      Vital Signs (Current):   Vitals:    10/18/21 1426 10/21/21 0642   BP:  (!) 123/59   Pulse:  52   Resp:  16   Temp:  96.9 °F (36.1 °C)   TempSrc:  Temporal   SpO2:  93%   Weight: 297 lb (134.7 kg)    Height: 5' 6\" (1.676 m)                                               BP Readings from Last 3 Encounters:   10/21/21 (!) 123/59   10/13/21 124/78   10/07/21 120/70       NPO Status: Time of last liquid consumption: 0100                        Time of last solid consumption: 0100                        Date of last liquid consumption: 10/20/21                        Date of last solid food consumption: 10/20/21    BMI:   Wt Readings from Last 3 Encounters:   10/18/21 297 lb (134.7 kg)   10/13/21 297 lb 9.6 oz (135 kg)   10/07/21 295 lb (133.8 kg)     Body mass index is 47.94 kg/m².     CBC:   Lab Results   Component Value Date    WBC 7.9 09/26/2021    RBC 4.48 09/26/2021    HGB 13.6 09/26/2021    HCT 41.2 09/26/2021    MCV 91.9 09/26/2021    RDW 15.9 09/26/2021     09/26/2021       CMP:   Lab Results   Component Value Date     09/26/2021    K 4.0 09/26/2021     09/26/2021    CO2 26 09/26/2021    BUN 11 09/26/2021    CREATININE 0.7 09/26/2021    GFRAA >60 09/26/2021    GFRAA >60 02/01/2012    AGRATIO 1.3 09/26/2021    LABGLOM >60 09/26/2021    GLUCOSE 97 09/26/2021    PROT 7.3 09/26/2021    PROT 7.9 02/02/2012    CALCIUM 9.0 09/26/2021    BILITOT 0.5 09/26/2021 ALKPHOS 99 09/26/2021    AST 31 09/26/2021    ALT 37 09/26/2021       POC Tests: No results for input(s): POCGLU, POCNA, POCK, POCCL, POCBUN, POCHEMO, POCHCT in the last 72 hours. Coags:   Lab Results   Component Value Date    PROTIME 12.0 10/16/2013    INR 1.07 10/16/2013       HCG (If Applicable):   Lab Results   Component Value Date    PREGTESTUR Neg 11/15/2012        ABGs: No results found for: PHART, PO2ART, PEJ5SVH, LLU1EGX, BEART, A8LCIUXK     Type & Screen (If Applicable):  No results found for: LABABO, LABRH    Drug/Infectious Status (If Applicable):  No results found for: HIV, HEPCAB    COVID-19 Screening (If Applicable):   Lab Results   Component Value Date    COVID19 Not Detected 10/18/2021    COVID19 NOT DETECTED 01/06/2021           Anesthesia Evaluation  Patient summary reviewed no history of anesthetic complications:   Airway: Mallampati: III  TM distance: <3 FB   Neck ROM: limited  Mouth opening: > = 3 FB Dental:    (+) upper dentures and lower dentures      Pulmonary: breath sounds clear to auscultation  (+) shortness of breath (EXERT):      (-) COPD, asthma, recent URI, sleep apnea and not a current smoker          Patient did not smoke on day of surgery. Cardiovascular:    (+) hypertension:, FAULKNER:,     (-) pacemaker, past MI, CAD, CABG/stent, dysrhythmias,  angina and  CHF    ECG reviewed  Rhythm: regular  Rate: abnormal           Beta Blocker:  Dose within 24 Hrs        PE comment: OSWALDO   Neuro/Psych:   (+) neuromuscular disease (RLS / RUE WEAKNESS, S/P MULT SHOULDER AND WRIST SURG.):, depression/anxiety    (-) seizures, TIA and CVA           GI/Hepatic/Renal:   (+) hiatal hernia, GERD: well controlled,      (-) liver disease, no renal disease, bowel prep and no morbid obesity       Endo/Other:    (+) : arthritis:., no malignancy/cancer.     (-) diabetes mellitus, hypothyroidism, hyperthyroidism, blood dyscrasia, no malignancy/cancer               Abdominal:   (+) obese, Abdomen: soft. Vascular: negative vascular ROS. Other Findings:             Anesthesia Plan      general     ASA 3       Induction: intravenous. MIPS: Postoperative opioids intended and Prophylactic antiemetics administered. Anesthetic plan and risks discussed with patient. Use of blood products discussed with patient whom consented to blood products. Plan discussed with CRNA. This pre-anesthesia assessment may be used as a history and physical.    DOS STAFF ADDENDUM:    Pt seen and examined, chart reviewed (including anesthesia, drug and allergy history). No interval changes to history and physical examination. Anesthetic plan, risks, benefits, alternatives, and personnel involved discussed with patient. Patient verbalized an understanding and agrees to proceed.       Danielle Tran MD  October 21, 2021  6:53 AM      Danielle Tran MD   10/21/2021

## 2021-10-21 NOTE — ANESTHESIA POSTPROCEDURE EVALUATION
Department of Anesthesiology  Postprocedure Note    Patient: Paola Mcconnell  MRN: [id number]  YOB: 1965  Date of evaluation: 10/21/2021  Time:  2:25 PM     Procedure Summary     Date: 10/21/21 Room / Location: [address] [place] [address]    Anesthesia Start: Andrade Leary Anesthesia Stop: 4355    Procedure: ROBOTIC ASSISTED LAPAROSCOPIC HYSTERECTOMY WITH RIGHT SALPINGECTOMY, RIGHT OOPHORECTOMY, CYSTOSCOPY, LYSIS OF ADHESIONS  CPT CODE - 56325 (N/A Abdomen) Diagnosis:       Postmenopausal hemorrhage      Endometrial thickening on ultrasound      (POSTMENOPAUSAL BLEEDING - PRIMARY; ENDOMETRIAL THICKENING ON ULTRASOUND)    Surgeons: Tc Olivares DO Responsible Provider: Casi Damon MD    Anesthesia Type: general ASA Status: 3          Anesthesia Type: general    Jodi Phase I: Jodi Score: 8    Jodi Phase II:      Last vitals: Reviewed and per EMR flowsheets.      Vitals:    10/21/21 1230 10/21/21 1245 10/21/21 1300 10/21/21 1330   BP: (!) 142/56 (!) 136/51 (!) 119/48    Pulse: 63 64 67    Resp: 10 19 11 10   Temp:       TempSrc:       SpO2: 95% 94% 94%    Weight:       Height:         Anesthesia Post Evaluation    Patient location during evaluation: bedside  Patient participation: complete - patient participated  Level of consciousness: awake and alert  Airway patency: patent  Nausea & Vomiting: no nausea  Complications: no  Cardiovascular status: hemodynamically stable  Respiratory status: acceptable  Hydration status: euvolemic

## 2021-10-21 NOTE — OP NOTE
Operative Note      Patient: Radha Cervantes  YOB: 1965  MRN: [id number]    Date of Procedure: 10/21/2021    Pre-Op Diagnosis: postmenopausal bleeding, proliferative endometrial glands, thickened endometrium. Post-Op Diagnosis: Same with extensive pelvic adhesions    Procedure: Davinci robotic total laparoscopic hysterectomy, right salpingo-oophorectomy, lysis of adhesions, diagnostic cystoscopy.         Surgeon(s):  DO Dani Jordan DO    Assistant:   Surgical Assistant: Nolvia Nicholas RN; Alyssa Mosley    Anesthesia: General    Estimated Blood Loss (mL): 100 cc  Urine Output:  300 cc   IVF: 3470 cc    Complications: None    Specimens:   ID Type Source Tests Collected by Time Destination   A : UTERUS, CERVIX, RIGHT FALLOPIAN TUBE, RIGHT OVARY Tissue Cervix SURGICAL [address] Malhotra DO 10/21/2021 0907        Implants:  * No implants in log *      Drains:   Urethral Catheter Non-latex (Active)     Surgiflo--3 cc   Findings: uterus with features of adenomyosis, extensive pelvic and abdominal adhesions  Condition: stable   Disposition: PACU    Detailed Description of Procedure:   See dictation  [id number]    Electronically signed by Norberto Bahena DO on 10/21/2021 at 12:12 PM

## 2021-10-22 ENCOUNTER — APPOINTMENT (OUTPATIENT)
Dept: CT IMAGING | Age: 56
DRG: 742 | End: 2021-10-22
Attending: OBSTETRICS & GYNECOLOGY
Payer: MEDICARE

## 2021-10-22 LAB
A/G RATIO: 1.4 (ref 1.1–2.2)
ALBUMIN SERPL-MCNC: 3.8 G/DL (ref 3.4–5)
ALP BLD-CCNC: 90 U/L (ref 40–129)
ALT SERPL-CCNC: 24 U/L (ref 10–40)
ANION GAP SERPL CALCULATED.3IONS-SCNC: 11 MMOL/L (ref 3–16)
AST SERPL-CCNC: 19 U/L (ref 15–37)
BASOPHILS ABSOLUTE: 0 K/UL (ref 0–0.2)
BASOPHILS RELATIVE PERCENT: 0.4 %
BILIRUB SERPL-MCNC: 0.8 MG/DL (ref 0–1)
BUN BLDV-MCNC: 7 MG/DL (ref 7–20)
CALCIUM SERPL-MCNC: 9.2 MG/DL (ref 8.3–10.6)
CHLORIDE BLD-SCNC: 104 MMOL/L (ref 99–110)
CO2: 26 MMOL/L (ref 21–32)
CREAT SERPL-MCNC: 0.7 MG/DL (ref 0.6–1.1)
EOSINOPHILS ABSOLUTE: 0 K/UL (ref 0–0.6)
EOSINOPHILS RELATIVE PERCENT: 0.2 %
GFR AFRICAN AMERICAN: >60
GFR NON-AFRICAN AMERICAN: >60
GLOBULIN: 2.7 G/DL
GLUCOSE BLD-MCNC: 122 MG/DL (ref 70–99)
GLUCOSE BLD-MCNC: 138 MG/DL (ref 70–99)
HCT VFR BLD CALC: 40 % (ref 36–48)
HCT VFR BLD CALC: 40.9 % (ref 36–48)
HEMOGLOBIN: 13.2 G/DL (ref 12–16)
HEMOGLOBIN: 13.5 G/DL (ref 12–16)
LV EF: 58 %
LVEF MODALITY: NORMAL
LYMPHOCYTES ABSOLUTE: 1.6 K/UL (ref 1–5.1)
LYMPHOCYTES RELATIVE PERCENT: 12.4 %
MCH RBC QN AUTO: 29.9 PG (ref 26–34)
MCHC RBC AUTO-ENTMCNC: 32.9 G/DL (ref 31–36)
MCV RBC AUTO: 90.8 FL (ref 80–100)
MONOCYTES ABSOLUTE: 0.7 K/UL (ref 0–1.3)
MONOCYTES RELATIVE PERCENT: 5.8 %
NEUTROPHILS ABSOLUTE: 10.5 K/UL (ref 1.7–7.7)
NEUTROPHILS RELATIVE PERCENT: 81.2 %
PDW BLD-RTO: 15.6 % (ref 12.4–15.4)
PERFORMED ON: ABNORMAL
PLATELET # BLD: 187 K/UL (ref 135–450)
PMV BLD AUTO: 10.5 FL (ref 5–10.5)
POTASSIUM SERPL-SCNC: 3.8 MMOL/L (ref 3.5–5.1)
RBC # BLD: 4.51 M/UL (ref 4–5.2)
SODIUM BLD-SCNC: 141 MMOL/L (ref 136–145)
TOTAL PROTEIN: 6.5 G/DL (ref 6.4–8.2)
WBC # BLD: 12.9 K/UL (ref 4–11)

## 2021-10-22 PROCEDURE — 6360000002 HC RX W HCPCS: Performed by: OBSTETRICS & GYNECOLOGY

## 2021-10-22 PROCEDURE — 6370000000 HC RX 637 (ALT 250 FOR IP): Performed by: OBSTETRICS & GYNECOLOGY

## 2021-10-22 PROCEDURE — 95816 EEG AWAKE AND DROWSY: CPT | Performed by: PSYCHIATRY & NEUROLOGY

## 2021-10-22 PROCEDURE — 6360000002 HC RX W HCPCS: Performed by: INTERNAL MEDICINE

## 2021-10-22 PROCEDURE — 2580000003 HC RX 258: Performed by: OBSTETRICS & GYNECOLOGY

## 2021-10-22 PROCEDURE — 70450 CT HEAD/BRAIN W/O DYE: CPT

## 2021-10-22 PROCEDURE — 85014 HEMATOCRIT: CPT

## 2021-10-22 PROCEDURE — 85018 HEMOGLOBIN: CPT

## 2021-10-22 PROCEDURE — 36415 COLL VENOUS BLD VENIPUNCTURE: CPT

## 2021-10-22 PROCEDURE — 80053 COMPREHEN METABOLIC PANEL: CPT

## 2021-10-22 PROCEDURE — 6370000000 HC RX 637 (ALT 250 FOR IP): Performed by: INTERNAL MEDICINE

## 2021-10-22 PROCEDURE — 95816 EEG AWAKE AND DROWSY: CPT

## 2021-10-22 PROCEDURE — 2700000000 HC OXYGEN THERAPY PER DAY

## 2021-10-22 PROCEDURE — 85025 COMPLETE CBC W/AUTO DIFF WBC: CPT

## 2021-10-22 PROCEDURE — C9113 INJ PANTOPRAZOLE SODIUM, VIA: HCPCS | Performed by: INTERNAL MEDICINE

## 2021-10-22 PROCEDURE — 94761 N-INVAS EAR/PLS OXIMETRY MLT: CPT

## 2021-10-22 PROCEDURE — C8929 TTE W OR WO FOL WCON,DOPPLER: HCPCS

## 2021-10-22 PROCEDURE — 1200000000 HC SEMI PRIVATE

## 2021-10-22 PROCEDURE — 99024 POSTOP FOLLOW-UP VISIT: CPT | Performed by: OBSTETRICS & GYNECOLOGY

## 2021-10-22 RX ORDER — PROCHLORPERAZINE EDISYLATE 5 MG/ML
10 INJECTION INTRAMUSCULAR; INTRAVENOUS EVERY 4 HOURS PRN
Status: DISCONTINUED | OUTPATIENT
Start: 2021-10-22 | End: 2021-10-23 | Stop reason: HOSPADM

## 2021-10-22 RX ORDER — PANTOPRAZOLE SODIUM 40 MG/10ML
40 INJECTION, POWDER, LYOPHILIZED, FOR SOLUTION INTRAVENOUS DAILY
Status: DISCONTINUED | OUTPATIENT
Start: 2021-10-22 | End: 2021-10-23 | Stop reason: HOSPADM

## 2021-10-22 RX ORDER — ASPIRIN 81 MG/1
81 TABLET, CHEWABLE ORAL DAILY
Status: DISCONTINUED | OUTPATIENT
Start: 2021-10-22 | End: 2021-10-23 | Stop reason: HOSPADM

## 2021-10-22 RX ORDER — LORAZEPAM 2 MG/ML
INJECTION INTRAMUSCULAR
Status: DISPENSED
Start: 2021-10-22 | End: 2021-10-22

## 2021-10-22 RX ADMIN — PANTOPRAZOLE SODIUM 40 MG: 40 TABLET, DELAYED RELEASE ORAL at 20:14

## 2021-10-22 RX ADMIN — SODIUM CHLORIDE, PRESERVATIVE FREE 10 ML: 5 INJECTION INTRAVENOUS at 11:23

## 2021-10-22 RX ADMIN — ACETAMINOPHEN 650 MG: 325 TABLET ORAL at 11:08

## 2021-10-22 RX ADMIN — OXYCODONE 5 MG: 5 TABLET ORAL at 16:26

## 2021-10-22 RX ADMIN — PROCHLORPERAZINE EDISYLATE 10 MG: 5 INJECTION INTRAMUSCULAR; INTRAVENOUS at 05:52

## 2021-10-22 RX ADMIN — ASPIRIN 81 MG: 81 TABLET, CHEWABLE ORAL at 18:43

## 2021-10-22 RX ADMIN — ACETAMINOPHEN 650 MG: 325 TABLET ORAL at 02:56

## 2021-10-22 RX ADMIN — ROPINIROLE HYDROCHLORIDE 2 MG: 2 TABLET, FILM COATED ORAL at 11:08

## 2021-10-22 RX ADMIN — ENOXAPARIN SODIUM 40 MG: 40 INJECTION SUBCUTANEOUS at 08:52

## 2021-10-22 RX ADMIN — BISACODYL 5 MG: 5 TABLET, COATED ORAL at 08:52

## 2021-10-22 RX ADMIN — DULOXETINE HYDROCHLORIDE 60 MG: 60 CAPSULE, DELAYED RELEASE ORAL at 08:52

## 2021-10-22 RX ADMIN — OXYCODONE 10 MG: 5 TABLET ORAL at 05:52

## 2021-10-22 RX ADMIN — ALPRAZOLAM 0.5 MG: 0.25 TABLET ORAL at 23:18

## 2021-10-22 RX ADMIN — ONDANSETRON 4 MG: 2 INJECTION INTRAMUSCULAR; INTRAVENOUS at 02:56

## 2021-10-22 RX ADMIN — PANTOPRAZOLE SODIUM 40 MG: 40 INJECTION, POWDER, FOR SOLUTION INTRAVENOUS at 08:52

## 2021-10-22 RX ADMIN — ATENOLOL 25 MG: 25 TABLET ORAL at 08:52

## 2021-10-22 RX ADMIN — ROPINIROLE HYDROCHLORIDE 4 MG: 2 TABLET, FILM COATED ORAL at 20:11

## 2021-10-22 ASSESSMENT — PAIN SCALES - GENERAL
PAINLEVEL_OUTOF10: 7
PAINLEVEL_OUTOF10: 5
PAINLEVEL_OUTOF10: 6
PAINLEVEL_OUTOF10: 6

## 2021-10-22 NOTE — PROCEDURES
Patient: Khadar Jordan    MR Number: [id number]  YOB: 1965  Date of Visit: 10/22/2021    Clinical History:  The patient is a 64y.o. years old female with increased confusion and possible seizure. Method: The EEG was performed utilizing the international 10/20 of electrode placements of both referential and bipolar montages. The patient was awake and drowsy through out the recording. Photic stimulation was performed. Findings: The background of the EEG showed normal alpha posterior background of 8-9 HZ and amplitude of 20-40 UV. This background was symmetric, waxing and waning, and reactive with eye opening and closure. As the patient became drowsy, generalized diffuse slowing was seen through recording at 6-7 HZ. This generalized slowing was symmetric, non rhythmical, and continuous. No spike or sharp waves were seen. Photic stimulation did not activate EEG. Impression: This EEG  is within normal limits. There is no evidence of epileptiform discharges, focal, or lateralizing abnormalities.       Herrera Levine MD      Board certified in clinical neurophysiology

## 2021-10-22 NOTE — CONSULTS
Consult placed  Perfect served    [address]  Date:10/22/2021,  Pancho Salguero AM        Electronically signed by Jesus Garza on 10/22/2021 at 8:46 AM

## 2021-10-22 NOTE — SIGNIFICANT EVENT
Rapid response was called. Story: Patient just had her Unger removed and then was going to the bathroom sat down then suddenly started to shake lower extremity upper extremity and would try to talk but only incomprehensible sounds would come out. Rapid was then called. On arrival to the room patient was on the commode with eyes closed. With multiple staff holding the patient. Patient was able to localize pain and said ouch when eyes was closed. Then  Patient initially was put in a safe environment and transition to bed to the bed. Patient neurological was assessed with no focal deficits. Patient will be sent for CT head for aphasia. Noted patient is orthostatic thus repeat H&H, type and screen was ordered. OB/GYN to be consulted for active bleeding.

## 2021-10-22 NOTE — CARE COORDINATION
CASE MANAGEMENT INITIAL ASSESSMENT      Reviewed chart and completed assessment with:patient  Explained Case Management role/services. Primary contact [address] :   Primary Decision Maker: Shanthi Byers - Parent - [phone]    Secondary Decision Maker: Jodi Rothman - [phone]          Can this person be reached and be able to respond quickly, such as within a few minutes or hours? Yes    Admit date/status:10/21/21  Diagnosis:hysterectomy   Is this a Readmission?:  No      Insurance:humana   Precert required for SNF: Yes       3 night stay required: No    Living arrangements, Adls, care needs, prior to admission:livesin duplex with mother 2 JUSTA. Flight to bedroom    [address] at home:  Walker__Cane__RTS__ BSC__Shower Chair__  02__ HHN__ CPAP__  BiPap__  Hospital Bed__ W/C___ Other__________    Services in the home and/or outpatient, prior to [address] (if applicable)   · Name:  · Address:  · Dialysis Schedule:  · Phone:  · Fax:    PT/OT recs:none    Hospital Exemption Notification (HEN):needed for SNF    Barriers to discharge:none    Plan/comments:spoke with patient. Plans on rturning home at discharge. Reported IPTA uses no DME has support of family and friends. Denied any DCP needs. Please notify should needs arise.  Yanira Castillo RN       ECOC on chart for MD signature

## 2021-10-22 NOTE — PROGRESS NOTES
Post-Operative Gynecology Progress Note                                        [facility]     Subjective:  Tommy Anaya is a 64 y.o. female who is s/p Davinci robotic total laparoscopic hysterectomy, right salpingo-oophorectomy, lysis of adhesions, diagnostic cystoscopy, POD #1    Pain is well controlled with pain medications. Has not been up to ambulate. Has been up to void. Unger catheter removed this morning (see notes). Denies fever, chills, chest pain, shortness of breath, calf pain or tenderness. Experienced near syncopal episode after removal of Unger catheter. Review of Systems - The following ROS was otherwise negative, except as noted in the HPI: constitutional, respiratory, cardiovascular, gastrointestinal, genitourinary     Objective:  Vitals:    10/22/21 1631   BP:    Pulse:    Resp:    Temp:    SpO2: 93%     Vitals:    10/22/21 0654 10/22/21 0839 10/22/21 1629 10/22/21 1631   BP: 119/62 122/78 135/77    Pulse: 71 62 62    Resp: 18 20 20    Temp:  98.3 °F (36.8 °C) 98 °F (36.7 °C)    TempSrc:  Oral Oral    SpO2: 92% 94% 94% 93%   Weight:       Height:         Events of the morning/rapid response reviewed:   Near syncope/seizure  CT scan, H&H, Neuro checks, Seizure precautions, EEG, Lipid profile, Tele, Echo, IVF  Episode occurred after receiving Dilaudid and compazine    EXAMINATION:   CT OF THE HEAD WITHOUT CONTRAST  10/22/2021 7:21 am       TECHNIQUE:   CT of the head was performed without the administration of intravenous   contrast. Dose modulation, iterative reconstruction, and/or weight based   adjustment of the mA/kV was utilized to reduce the radiation dose to as low   as reasonably achievable.       COMPARISON:   10/16/2013       HISTORY:   ORDERING SYSTEM PROVIDED HISTORY: seizure like activity   TECHNOLOGIST PROVIDED HISTORY:   Reason for exam:->seizure like activity   Has a \"code stroke\" or \"stroke alert\" been called? ->No   Reason for Exam: seizure like activity, no LOC per pt, has had one seizure in   the past, no prior stroke   Acuity: Acute   Type of Exam: Initial       FINDINGS:   BRAIN/VENTRICLES: Generalized cerebral atrophy.  Mild periventricular white   matter hypodensity is seen bilaterally.  No hydrocephalus.  No mass effect or   midline shift.  No gross acute hemorrhage.       ORBITS: The visualized portion of the orbits demonstrate no acute abnormality.       SINUSES: Mucosal thickening of the sphenoid sinus and left maxillary sinus   though as a whole, sinuses are aerated.       SOFT TISSUES/SKULL:  No acute abnormality of the visualized skull or soft   tissues.  Smoothly marginated defect at the posterior C1 ring, likely   developmental.           Impression   Atrophy and mild small vessel ischemic disease. EEG awake and asleep  Order: [id number]  Status:  Final result   Visible to patient:  Yes ([address]) Next appt:  10/27/2021 at 10:50 AM in Obstetrics and Gynecology Namrata Floyd DO)   0 Result Notes     Narrative    Monty Mendez MD     10/22/2021  5:25 PM       Patient: Vivi Maldonado     MR Number: [id number]   YOB: 1965   Date of Visit: 10/22/2021     Clinical History:   The patient is a 64y.o. years old female with increased   confusion and possible seizure. Method: The EEG was performed utilizing the international 10/20 of   electrode placements of both referential and bipolar montages. The patient was awake and drowsy through out the recording. Photic stimulation was performed. Findings: The background of the EEG showed normal alpha posterior   background of 8-9 HZ and amplitude of 20-40 UV. This background   was symmetric, waxing and waning, and reactive with eye opening   and closure. As the patient became drowsy, generalized diffuse   slowing was seen through recording at 6-7 HZ.  This generalized   slowing was symmetric, non rhythmical, and continuous.  No spike   or sharp waves were seen.  Photic stimulation did not activate   EEG.     Impression: This EEG  is within normal limits. There is no evidence of   epileptiform discharges, focal, or lateralizing abnormalities.       Tammie Patterson MD       Board certified in clinical neurophysiology       Last Resulted: 10/22/21 17:25           Echo Complete  Order: [id number]  Status:  Final result   Visible to patient:  Yes (MyChart) Next appt:  10/27/2021 at 10:50 AM in Obstetrics and Gynecology Yaneth Price, DO)   0 Result Notes  Details    Reading Physician Reading Date Result Priority   Zurdo Monahan MD  [phone] 10/22/2021       Narrative & Impression  Transthoracic Echocardiography Report (TTE)      Demographics      Patient Name       Pia Epperson      Date of Study      10/22/2021         Gender              Female      Patient Number     [id number]         Date of Birth       1965      Visit Number       [id number]          Age                 64 year(s)      Accession Number   [id number]         Room Number         6674      Corporate ID       [id number]           Sonographer         Jamil Rodriguez, RT      Ordering Physician Hector Currie, Interpreting        Avery Zee.                      MD                 Physician           Abraham Phan MD, [facility]     Procedure     Type of Study      TTE procedure:ECHOCARDIOGRAM COMPLETE 2D W DOPPLER W COLOR. Procedure Date  Date: 10/22/2021 Start: 01:51 PM     Study Location: [address] [place] [address] - Echo Lab  Technical Quality: Adequate visualization     Indications:Syncope.     Patient Status: Routine     Contrast Medium: Definity.     Height: 66 inches Weight: 295.01 pounds BSA: 2.36 m2 BMI: 47.61 kg/m2     BP: 122/78 mmHg      Conclusions      Summary   Technically difficult study due to body surface area and poor acoustic   window.    Normal left ventricular systolic function with ejection fraction of 55-60%. No regional wall motion abnormalites are seen. Mild concentric left ventricular hypertrophy. Left ventricular diastolic filling pressure is normal.   Systolic pulmonic artery pressure (SPAP) is normal estimated at 22 mmHg   (Right atrial pressure of 8 mmHg). Signature      ------------------------------------------------------------------   Electronically signed by Bernardino Rodriguez MD, [facility]   (Interpreting physician) on 10/22/2021 at 02:58 PM   ------------------------------------------------------------------      Findings      Left Ventricle   Normal left ventricular systolic function with ejection fraction of 55-60%. No regional wall motion abnormalites are seen. Normal left ventricular size with mild concentric left ventricular   hypertrophy. Left ventricular diastolic filling pressure is normal.      Mitral Valve   Mitral valve is structurally normal.   No evidence of mitral regurgitation. Left Atrium   The left atrium is normal in size. Aortic Valve   Individual aortic valve leaflets are not clearly visualized. The aortic valve appears sclerotic but opens well. No evidence of aortic valve regurgitation. Aorta   The aortic root is normal in size. Right Ventricle   The right ventricle is normal in size and function. TAPSE is measured at 25 mm. S'prime velocity is measured at 15 cm/s. Tricuspid Valve   Tricuspid valve is structurally normal.   Trivial tricuspid regurgitation. Systolic pulmonic artery pressure (SPAP) is normal estimated at 22 mmHg   (Right atrial pressure of 8 mmHg). Right Atrium   The right atrium is normal in size at 15 cm2. Pulmonic Valve   The pulmonic valve is not well visualized. No evidence of pulmonic valve regurgitation. Pericardial Effusion   There is a trivial localized near right ventricle pericardial effusion vs   fatpad noted. No tamponade physiology.       Pleural Effusion   No pleural effusion. Miscellaneous   IVC size is normal (<2.1 cm) but collapses < 50% with respiration consistent   with elevated RA pressure (8 mmHg). Technically difficult study due to body   surface area and poor acoustic window.      M-Mode/2D Measurements (cm)      LV Diastolic Dimension: 9.35 cm LV Systolic Dimension: 0.48 cm   LV Septum Diastolic: 3.27 cm   LV PW Diastolic: 4.17 cm        AO Root Dimension: 2.7 cm                                   AV Cusp Separation: 1.5 cm                                   LA Dimension: 3.4 cm                                   LA Area: 19.33 cm2                                   LA volume/Index: 59.4 ml /25 ml/m2     Doppler Measurements      AV Peak Velocity: 141 cm/s     MV Peak E-Wave: 95.5 cm/s   AV Peak Gradient: 7.95 mmHg    MV Peak A-Wave: 84.8 cm/s                                  MV E/A Ratio: 1.13      TR Velocity:187 cm/s   TR Gradient:13.99 mmHg   Estimated RAP:8 mmHg   Estimated RVSP: 22 mmHg   E' Septal Velocity: 12.7 cm/s   E' Lateral Velocity: 9.57 cm/s   E/E' ratio: 8.8   PV Peak Velocity: 137 cm/s   PV Peak Gradient: 7.51 mmHg      Aortic Valve      Peak Velocity: 141 cm/s   Peak Gradient: 7.95 mmHg      Cusp Separation: 1.5 cm     Aorta      Aortic Root: 2.7 cm           Specimen Collected: 10/22/21 13:51 Last Resulted: 10/22/21 14:58       Order Details     View Encounter     Lab and Collection Details     Routing     Result History           Scans on Order [id number]    Scan on 10/22/2021  2:58 PM: ECHOCARDIOGRAM COMPLETE 2D [address] [place] COLOR      10/22/2021  7:31 AM - Manley Aschoff Incoming Lab Results From Soft (Epic Adt)    Component Value Ref Range & Units Status Collected Lab   Hemoglobin 13.2  12.0 - 16.0 g/dL Final 10/22/2021  7:02 AM  - [facility] Lab   Hematocrit 40.0  36.0 - 48.0 % Final 10/22/2021  7:02 AM  - [facility] Lab   Testing Performed By    Lab - Abbreviation Name Director Address Valid Date Range   25- - [facility] Lab [facility] LAB Joshua Watters M.D. [address] [address] 10/08/21 1318-Present     10/22/2021  6:53 AM - Eudelia Forte Incoming Lab Results From Soft (Epic Adt)    Component Value Ref Range & Units Status Collected Lab   POC Glucose 138High   70 - 99 mg/dl Final 10/22/2021  6:50 AM [facility] Lab   Performed on ACCU-CHEK   Final 10/22/2021  6:50 AM [facility] Lab   Testing Performed By    Amee Foley Name Director Address Valid Date Range   25-[facility] - Door [facility] [address] LAB Joshua Watters M.D. [address] [address] 10/08/21 1318-Present     10/22/2021  7:08 AM - Eudelia Forte Incoming Lab Results From Soft (Epic Adt)    Component Value Ref Range & Units Status Collected Lab   Sodium 141  136 - 145 mmol/L Final 10/22/2021  5:56  [facility]   Potassium 3.8  3.5 - 5.1 mmol/L Final 10/22/2021  5:56 AM [facility] Lab   Chloride 104  99 - 110 mmol/L Final 10/22/2021  5:56  [facility]   CO2 26  21 - 32 mmol/L Final 10/22/2021  5:56  [facility]   Anion Gap 11  3 - 16 Final 10/22/2021  5:56 AM [facility] Lab   Glucose 122High   70 - 99 mg/dL Final 10/22/2021  5:56  [facility]   BUN 7  7 - 20 mg/dL Final 10/22/2021  5:56 AM [facility] Lab   CREATININE 0.7  0.6 - 1.1 mg/dL Final 10/22/2021  5:56 AM [facility] Lab   GFR Non-African American >60  >60 Final 10/22/2021  5:56 AM [facility] Lab   >60 mL/min/1.73m2 EGFR, calc. for ages 25 and older using the   MDRD formula (not corrected for weight), is valid for stable   renal function. GFR  >60  >60 Final 10/22/2021  5:56 AM  - [facility]   Chronic Kidney Disease: less than 60 ml/min/1.73 sq. m.         Kidney Failure: less than 15 ml/min/1.73 sq.m. Results valid for patients 18 years and older.     Calcium 9.2  8.3 - 10.6 mg/dL Final 10/22/2021  5:56 AM [facility] Lab   Total Protein 6.5  6.4 - 8.2 g/dL Final 10/22/2021  5:56 AM [facility]   Albumin 3.8  3.4 - 5.0 g/dL Final 10/22/2021  5:56 AM [facility]   Albumin/Globulin Ratio 1.4  1.1 - 2.2 Final 10/22/2021  5:56 AM [facility] Lab   Total Bilirubin 0.8  0.0 - 1.0 mg/dL Final 10/22/2021  5:56 AM [facility]   Alkaline Phosphatase 90  40 - 129 U/L Final 10/22/2021  5:56  [facility]   ALT 24  10 - 40 U/L Final 10/22/2021  5:56  [facility]   AST 19  15 - 37 U/L Final 10/22/2021  5:56 AM [facility]   Globulin 2.7  Not Established g/dL Final 10/22/2021  5:56 AM [facility]   Testing Performed By    Amee Foley Name Director Address Valid Date Range   25- - [facility] LAB Mackenzie Ybarra M.D. [address] [address] 10/08/21 1318-Present   10/22/2021  6:42 AM - Efrain Balderas Incoming Lab Results From Soft (Epic Adt)    Component Value Ref Range & Units Status Collected Lab   WBC 12. 9High   4.0 - 11.0 K/uL Final 10/22/2021  5:56 AM [facility] Lab   RBC 4.51  4.00 - 5.20 M/uL Final 10/22/2021  5:56 AM [facility]   Hemoglobin 13.5  12.0 - 16.0 g/dL Final 10/22/2021  5:56 AM [facility] Lab   Hematocrit 40.9  36.0 - 48.0 % Final 10/22/2021  5:56 AM [facility] Lab   MCV 90.8  80.0 - 100.0 fL Final 10/22/2021  5:56 AM [facility] Lab   MCH 29.9  26.0 - 34.0 pg Final 10/22/2021  5:56 AM [facility] Lab   MCHC 32.9  31.0 - 36.0 g/dL Final 10/22/2021  5:56 AM [facility] Lab   RDW 15.6High   12.4 - 15.4 % Final 10/22/2021  5:56 AM [facility] Lab   Platelets 606  024 - 450 K/uL Final 10/22/2021  5:56  [facility] Lab   MPV 10.5  5.0 - 10.5 fL Final 10/22/2021  5:56 AM  [facility]   Neutrophils % 81.2  % Final 10/22/2021  5:56  [facility]   Lymphocytes % 12.4  % Final 10/22/2021  5:56  [facility]   Monocytes % 5.8  % Final 10/22/2021  5:56  [facility]   Eosinophils % 0.2  % Final 10/22/2021  5:56  [facility]   Basophils % 0.4  % Final 10/22/2021  5:56 AM [facility] Lab   Neutrophils Absolute 10. 5High   1.7 - 7.7 K/uL Final 10/22/2021  5:56 AM [facility] Lab   Lymphocytes Absolute 1.6  1.0 - 5.1 K/uL Final 10/22/2021  5:56 AM [facility] Lab   Monocytes Absolute 0.7  0.0 - 1.3 K/uL Final 10/22/2021  5:56 AM [facility] Lab   Eosinophils Absolute 0.0  0.0 - 0.6 K/uL Final 10/22/2021  5:56 AM [facility] Lab   Basophils Absolute 0.0  0.0 - 0.2 K/uL Final 10/22/2021  5:56 AM [facility] Lab   Testing Performed By    Amee Foley Name Director Address Valid Date Range   25- - Door [facility] [address] LAB Emilie Queen M.D. [address] [place] [id number] 10/08/21 1318-Present             General: Alert, well appearing, no acute distress  Heart: Regular rate and rhythm  Lungs: Respirations unlabored  Abdomen: Soft, appropriately tender to palpation, mild abdominal distention, no rebound or guarding. Incisions with dressing in place, clean, dry and intact. No significant drainage or surrounding erythema. Extremities: No redness or tenderness in LE, SCDs in place bilaterally         Assessment/Plan:    Jackie Goldberg is a 64 y.o. female who is s/p Davinci robotic total laparoscopic hysterectomy, right salpingo-oophorectomy, lysis of adhesions, diagnostic cystoscopy     1. POD #1     - Pain well controlled     - Benign exam     - Continue routine post-operative care   2. Near syncopal episode        OK for ASA  Plan:   Continue in house post-operative care.    Appreciate input and support from

## 2021-10-22 NOTE — CODE DOCUMENTATION
Patient was in the restroom on the toilet and patient started with seizure like activity. Patient was safely lowered to the floor and transferred to bed with thomas lift. Blood sugar was obtained and stroke scale performed.

## 2021-10-22 NOTE — CONSULTS
Hospital Medicine  Consult History & Physical        Chief Complaint:   Near syncope / seizure     Date of Service: Pt seen/examined in consultation on 10/22/21     History Of Present Illness:      64 y.o. female who we are asked to see/evaluate by Namrata Floyd DO for medical management of  Above c/o    pt had robotic vag hysterectomy 10/21 for endometriosis   While she was sitting in the commode after the removal of p[place] she developed seizure like activity and she could not respond to the questions   It was so brief  Currently she does not have HA, dizziness , fever , chest pain , sob , n , V , d or any focal neuro symptoms  She was found to have othostasis  durin the above episode      Past Medical History:        Diagnosis Date    Anxiety     Depression     Endometriosis     GERD (gastroesophageal reflux disease)     Hypertension     Osteoarthritis     Restless leg syndrome        Past Surgical History:        Procedure Laterality Date    ANUS SURGERY  2018    fissure     SECTION      x3    COLONOSCOPY      DILATION AND CURETTAGE OF UTERUS N/A 6/3/2021    VIDEO HYSTEROSCOPY DILATATION AND CURETTAGE, POSSIBLE MYOSURE, POSSIBLE POLYPECTOMY performed by [facility], DO at [address] [place] [address]       right wrist    HERNIA REPAIR  2018    LAPAROSCOPIC PARAESOPHAGEAL HERNIA REPAIR WITH MESH    KNEE ARTHROSCOPY Left 11/15/12    ARTHROSCOPY LEFT KNEE WITH SYOVECTOMY AND CHONDROPLASTY    OVARY REMOVAL Right     ROTATOR CUFF REPAIR Right 2020    EXAM UNDER ANESTHESIA VIDEO ARTHROSCOPY RIGHT SHOULDER, MINI- OPEN ROTATOR CUFF REPAIR, NEER ACROMIOPLASTY, LENO PROCEDURE WITH EXPAREL performed by Anna Claros MD at [place] [address] ARTHROSCOPY Right 2020    VIDEO ARTHROSCOPY RIGHT SHOULDER, OPEN ROTATOR CUFF REPAIR, BICEPS TENOTOMY -BLOCK- performed by Jasen Davis MD at [place] [address] ARTHROSCOPY Right 2021    RIGHT SHOULDER ARTHROSCOPIC INCISION AND DRAINAGE performed by Berna Scanlon MD at [place] [address] ARTHROSCOPY Right 4/28/2021    VIDEO ARTHROSCOPY RIGHT SHOULDER, ROTATOR CUFF REPAIR, DEBRIDEMENT performed by Berna Scanlon MD at [address] ENDOSCOPY  2011    UPPER GASTROINTESTINAL ENDOSCOPY  2015    esophageasl stretch       Medications Prior to Admission:    Prior to Admission medications    Medication Sig Start Date End Date Taking? Authorizing Provider   DULoxetine (CYMBALTA) 60 MG extended release capsule TAKE 1 CAPSULE BY MOUTH EVERY DAY  Patient taking differently: Take 60 mg by mouth daily  10/4/21  Yes Radha Shaffer DO   ALPRAZolam Valaria Paling) 1 MG tablet TAKE 1 TABLET BY MOUTH 3 TIMES DAILY AS NEEDED FOR ANXIETY 10/4/21 11/3/21 Yes Radha Shaffer DO   atenolol (TENORMIN) 25 MG tablet TAKE 1 TABLET BY MOUTH EVERY DAY  Patient taking differently: Take 25 mg by mouth daily  9/27/21  Yes Radha Shaffer DO   albuterol sulfate HFA (PROVENTIL HFA) 108 (90 Base) MCG/ACT inhaler Inhale 2 puffs into the lungs every 6 hours as needed for Wheezing or Shortness of Breath (Space out to every 6 hours as symptoms improve) Space out to every 6 hours as symptoms improve. 9/26/21  Yes Nasima Scott MD   rOPINIRole (REQUIP) 2 MG tablet TAKE 1 TABLET BY MOUTH TWICE A DAY  Patient taking differently: Take 2 mg by mouth 2mg every afternoon and 4mg at night 9/8/21  Yes Radha Shaffer DO   ibuprofen (ADVIL;MOTRIN) 600 MG tablet TAKE 1 TABLET BY MOUTH EVERY 8 HOURS AS NEEDED FOR PAIN 8/30/21  Yes Radha Shaffer DO   lidocaine (XYLOCAINE) 5 % ointment APPLY TOPICALLY AS NEEDED TO AREA OF TENDERNESS UNDER ARM.  8/30/21  Yes Radha Shaffer DO   omeprazole (PRILOSEC) 40 MG delayed release capsule TAKE 1 CAPSULE BY MOUTH EVERY DAY  Patient taking differently: Take 40 mg by mouth nightly  8/30/21  Yes Radha Shaffer DO   methocarbamol (ROBAXIN) 500 MG tablet TAKE 1 TABLET BY MOUTH 3 TIMES A DAY AS NEEDED FOR NECK PAIN 8/3/21  Yes Jonna Paulino, DO   diclofenac (VOLTAREN) 50 MG EC tablet Take 1 tablet by mouth 2 times daily (with meals) 7/14/21  Yes Jelena Dougherty, DO   Acetaminophen (TYLENOL PO) Take 650 mg by mouth daily as needed    Yes Historical Provider, MD   MELATONIN PO Take 20 mg by mouth nightly as needed    Yes Historical Provider, MD   furosemide (LASIX) 20 MG tablet TAKE 1 TABLET BY MOUTH DAILY AS NEEDED (FOR SWELLING) 6/30/21   Jonna Jefferson, DO       Allergies: Toradol [ketorolac tromethamine] and Haldol [haloperidol lactate]    Social History:      The patient currently lives at home     TOBACCO:   reports that she has never smoked. She has never used smokeless tobacco.  ETOH:   reports current alcohol use. Family History:     Reviewed in detail and negative for DM, CAD, Cancer, CVA. Positive as follows:        Problem Relation Age of Onset    Arthritis Mother     Obesity Mother    [facility] Anemia Mother     Other Mother         pulmonary embolism    Arthritis Father     Arrhythmia Father     Diabetes Other     Diabetes Paternal Grandfather     Cancer Neg Hx     Breast Cancer Neg Hx     Colon Cancer Neg Hx        REVIEW OF SYSTEMS COMPLETED:   Pertinent positives as noted in the HPI. All other systems reviewed and negative. PHYSICAL EXAM PERFORMED:  /78   Pulse 62   Temp 98.3 °F (36.8 °C) (Oral)   Resp 20   Ht 5' 6\" (1.676 m)   Wt 295 lb (133.8 kg)   LMP 09/20/2017   SpO2 94%   BMI 47.61 kg/m²   General appearance: No apparent distress, appears stated age and cooperative. obese   HEENT: Normal cephalic, atraumatic without obvious deformity. Pupils equal, round, and reactive to light. Extra ocular muscles intact. Conjunctivae/corneas clear. Neck: Supple, with full range of motion. No jugular venous distention. Trachea midline. Respiratory:  Normal respiratory effort. Clear to auscultation, bilaterally without Rales/Wheezes/Rhonchi.   Cardiovascular: Regular rate and rhythm with normal S1/S2 without murmurs, rubs or gallops. Abdomen: Soft,  -tender,  distended with normal bowel sounds. Laparoscopic incisions  look good   Musculoskeletal:   No clubbing, cyanosis or edema bilaterally. Skin: Skin color, texture, turgor normal.  No rashes or lesions. Neurologic:  Neurovascularly intact without any focal sensory/motor deficits. Psychiatric: Alert and oriented, thought content appropriate, normal insight  Capillary Refill: Brisk,3 seconds, normal   Peripheral Pulses: +2 palpable, equal bilaterally     Labs:     Recent Labs     10/22/21  0556 10/22/21  0702   WBC 12.9*  --    HGB 13.5 13.2   HCT 40.9 40.0     --      Recent Labs     10/22/21  0556      K 3.8      CO2 26   BUN 7   CREATININE 0.7   CALCIUM 9.2     Recent Labs     10/22/21  0556   AST 19   ALT 24   BILITOT 0.8   ALKPHOS 90     No results for input(s): INR in the last 72 hours. No results for input(s): Talisha Gazella in the last 72 hours. Urinalysis:    Lab Results   Component Value Date    NITRU Negative 09/26/2021    WBCUA 3-5 09/26/2021    BACTERIA 2+ 09/26/2021    RBCUA 3-4 09/26/2021    BLOODU TRACE-INTACT 09/26/2021    SPECGRAV 1.025 09/26/2021    GLUCOSEU Negative 09/26/2021       Radiology: I have reviewed the radiology reports with the following interpretation:     CT HEAD WO CONTRAST   Final Result   Atrophy and mild small vessel ischemic disease.                 EKG:  I have reviewed the EKG with the following interpretation:    @[email or web address]@      ASSESSMENT:    Active Hospital Problems    Diagnosis Date Noted    S/P robot-assisted surgical procedure [Z98.890] 10/21/2021       PLAN:    S/p s/p [facility] robotic total laparoscopic hysterectomy, right salpingo-oophorectomy, lysis of adhesions, diagnostic cystoscopy- 10 /21 - NPO started  on clears l , pain is controlled , primary team is following     Near syncope / aphasia / seizure like activity transient  episode resolved  - no residual symptoms- Ct head is neg   Possibly 2/2 surgery / meds / ortho stasis- agree with seizure precautions , neuro check , will get EEG ,   Aspirin if ok with Gyn , lipid profile , tele , echo   Consider neuro if symptoms recurs   IVF    mo bid obesity - needs counseling on diet / exercise and weight loss     Hypoxia - possibly 2/2 hypoventilation 2/2 pain meds / ab sx / obesity -  , wean o2 ,ISS CXR prn     Mild leukocytosis - 2/2 sx - rpt     HTN - Bp controlled - on atenolol     RLS/ depression - stable - on meds           DVT Prophylaxis: Lovenox   Diet: ADULT DIET;  Clear Liquid -  Code Status: Full Code    PT/OT Eval Status: Active and ongoing    Dispo - pending w/u/ progress    Thank you for the consultation, will follow up as needed    Electronically signed by Ken Newberry MD on 10/22/21 at 9:24 AM EDT

## 2021-10-23 VITALS
RESPIRATION RATE: 16 BRPM | DIASTOLIC BLOOD PRESSURE: 86 MMHG | HEART RATE: 66 BPM | OXYGEN SATURATION: 94 % | HEIGHT: 66 IN | WEIGHT: 293 LBS | TEMPERATURE: 98.1 F | BODY MASS INDEX: 47.09 KG/M2 | SYSTOLIC BLOOD PRESSURE: 132 MMHG

## 2021-10-23 LAB
BASOPHILS ABSOLUTE: 0 K/UL (ref 0–0.2)
BASOPHILS RELATIVE PERCENT: 0.5 %
CHOLESTEROL, TOTAL: 137 MG/DL (ref 0–199)
EOSINOPHILS ABSOLUTE: 0.1 K/UL (ref 0–0.6)
EOSINOPHILS RELATIVE PERCENT: 1.8 %
HCT VFR BLD CALC: 37 % (ref 36–48)
HDLC SERPL-MCNC: 29 MG/DL (ref 40–60)
HEMOGLOBIN: 12.2 G/DL (ref 12–16)
LDL CHOLESTEROL CALCULATED: 83 MG/DL
LYMPHOCYTES ABSOLUTE: 1.1 K/UL (ref 1–5.1)
LYMPHOCYTES RELATIVE PERCENT: 14.2 %
MCH RBC QN AUTO: 30.7 PG (ref 26–34)
MCHC RBC AUTO-ENTMCNC: 33 G/DL (ref 31–36)
MCV RBC AUTO: 93.1 FL (ref 80–100)
MONOCYTES ABSOLUTE: 0.6 K/UL (ref 0–1.3)
MONOCYTES RELATIVE PERCENT: 7.9 %
NEUTROPHILS ABSOLUTE: 6 K/UL (ref 1.7–7.7)
NEUTROPHILS RELATIVE PERCENT: 75.6 %
PDW BLD-RTO: 15.4 % (ref 12.4–15.4)
PLATELET # BLD: 136 K/UL (ref 135–450)
PMV BLD AUTO: 9.4 FL (ref 5–10.5)
RBC # BLD: 3.98 M/UL (ref 4–5.2)
TRIGL SERPL-MCNC: 123 MG/DL (ref 0–150)
VLDLC SERPL CALC-MCNC: 25 MG/DL
WBC # BLD: 8 K/UL (ref 4–11)

## 2021-10-23 PROCEDURE — 80061 LIPID PANEL: CPT

## 2021-10-23 PROCEDURE — 6370000000 HC RX 637 (ALT 250 FOR IP): Performed by: INTERNAL MEDICINE

## 2021-10-23 PROCEDURE — 99024 POSTOP FOLLOW-UP VISIT: CPT | Performed by: OBSTETRICS & GYNECOLOGY

## 2021-10-23 PROCEDURE — 2580000003 HC RX 258: Performed by: OBSTETRICS & GYNECOLOGY

## 2021-10-23 PROCEDURE — 85025 COMPLETE CBC W/AUTO DIFF WBC: CPT

## 2021-10-23 PROCEDURE — 6370000000 HC RX 637 (ALT 250 FOR IP): Performed by: OBSTETRICS & GYNECOLOGY

## 2021-10-23 PROCEDURE — 6360000002 HC RX W HCPCS: Performed by: INTERNAL MEDICINE

## 2021-10-23 PROCEDURE — 6360000002 HC RX W HCPCS: Performed by: OBSTETRICS & GYNECOLOGY

## 2021-10-23 PROCEDURE — 36415 COLL VENOUS BLD VENIPUNCTURE: CPT

## 2021-10-23 PROCEDURE — C9113 INJ PANTOPRAZOLE SODIUM, VIA: HCPCS | Performed by: INTERNAL MEDICINE

## 2021-10-23 RX ORDER — OXYCODONE HYDROCHLORIDE 5 MG/1
5 TABLET ORAL EVERY 6 HOURS PRN
Qty: 28 TABLET | Refills: 0 | Status: SHIPPED | OUTPATIENT
Start: 2021-10-23 | End: 2021-11-12 | Stop reason: SDUPTHER

## 2021-10-23 RX ORDER — ONDANSETRON 4 MG/1
4 TABLET, ORALLY DISINTEGRATING ORAL EVERY 8 HOURS PRN
Qty: 20 TABLET | Refills: 0 | Status: SHIPPED | OUTPATIENT
Start: 2021-10-23 | End: 2021-11-17

## 2021-10-23 RX ADMIN — ENOXAPARIN SODIUM 40 MG: 40 INJECTION SUBCUTANEOUS at 09:06

## 2021-10-23 RX ADMIN — ACETAMINOPHEN 650 MG: 325 TABLET ORAL at 00:57

## 2021-10-23 RX ADMIN — ASPIRIN 81 MG: 81 TABLET, CHEWABLE ORAL at 09:06

## 2021-10-23 RX ADMIN — SODIUM CHLORIDE, PRESERVATIVE FREE 10 ML: 5 INJECTION INTRAVENOUS at 09:04

## 2021-10-23 RX ADMIN — DULOXETINE HYDROCHLORIDE 60 MG: 60 CAPSULE, DELAYED RELEASE ORAL at 09:07

## 2021-10-23 RX ADMIN — OXYCODONE 10 MG: 5 TABLET ORAL at 07:56

## 2021-10-23 RX ADMIN — ATENOLOL 25 MG: 25 TABLET ORAL at 09:07

## 2021-10-23 RX ADMIN — PANTOPRAZOLE SODIUM 40 MG: 40 INJECTION, POWDER, FOR SOLUTION INTRAVENOUS at 09:04

## 2021-10-23 ASSESSMENT — PAIN SCALES - GENERAL
PAINLEVEL_OUTOF10: 6
PAINLEVEL_OUTOF10: 4

## 2021-10-23 NOTE — DISCHARGE SUMMARY
Discharge Summary    Allen Booker  :  1965  MRN:  [id number]    ADMIT DATE:  10/21/2021  DISCHARGE DATE:  10/24/2021    PRIMARY CARE PHYSICIAN:  Renata Colin DO    VISIT STATUS: Admission    CODE STATUS:  Prior    DISCHARGE DIAGNOSES:  Active Problems:    S/P robot-assisted surgical procedure    Spell of abnormal behavior  Resolved Problems:    * No resolved hospital problems. *      HOSPITAL COURSE:  Admitted for [address] robotic total laparoscopic hysterectomy. Normal postop course until AM POD #1. Experienced near syncopal episode and rapid response called. Workup normal.    SIGNIFICANT DIAGNOSTIC STUDIES:  EEG, CT of head, echocardiogram  CONSULTANTS:  Internal medicine  RECOMMENDED NEXT STEPS:   Normal post op recommendations, follow up with primary care     DISCHARGE MEDICATIONS:     Marcello Leos   Home Medication Instructions [id number]    Printed on:10/24/21 1924     Medication Information                        Acetaminophen (TYLENOL PO)  Take 650 mg by mouth daily as needed              albuterol sulfate HFA (PROVENTIL HFA) 108 (90 Base) MCG/ACT inhaler  Inhale 2 puffs into the lungs every 6 hours as needed for Wheezing or Shortness of Breath (Space out to every 6 hours as symptoms improve) Space out to every 6 hours as symptoms improve.              ALPRAZolam (XANAX) 1 MG tablet  TAKE 1 TABLET BY MOUTH 3 TIMES DAILY AS NEEDED FOR ANXIETY             atenolol (TENORMIN) 25 MG tablet  TAKE 1 TABLET BY MOUTH EVERY DAY             bisacodyl (DULCOLAX) 5 MG EC tablet  Take 1 tablet by mouth daily as needed for Constipation             diclofenac (VOLTAREN) 50 MG EC tablet  Take 1 tablet by mouth 2 times daily (with meals)             DULoxetine (CYMBALTA) 60 MG extended release capsule  TAKE 1 CAPSULE BY MOUTH EVERY DAY             furosemide (LASIX) 20 MG tablet  TAKE 1 TABLET BY MOUTH DAILY AS NEEDED (FOR SWELLING)             ibuprofen (ADVIL;MOTRIN) 600 MG tablet  TAKE 1 TABLET BY MOUTH EVERY 8 HOURS AS NEEDED FOR PAIN             lidocaine (XYLOCAINE) 5 % ointment  APPLY TOPICALLY AS NEEDED TO AREA OF TENDERNESS UNDER ARM. MELATONIN PO  Take 20 mg by mouth nightly as needed              methocarbamol (ROBAXIN) 500 MG tablet  TAKE 1 TABLET BY MOUTH 3 TIMES A DAY AS NEEDED FOR NECK PAIN             omeprazole (PRILOSEC) 40 MG delayed release capsule  TAKE 1 CAPSULE BY MOUTH EVERY DAY             ondansetron (ZOFRAN-ODT) 4 MG disintegrating tablet  Take 1 tablet by mouth every 8 hours as needed for Nausea or Vomiting             oxyCODONE (ROXICODONE) 5 MG immediate release tablet  Take 1 tablet by mouth every 6 hours as needed for Pain for up to 7 days. rOPINIRole (REQUIP) 2 MG tablet  TAKE 1 TABLET BY MOUTH TWICE A DAY                 DIET: regular diet    ACTIVITY: No heavy lifting or driving x 2 weeks, pelvic rest x 6 weeks.  .  ______________________________________________________________________  COMPLEXITY OF FOLLOW UP:   [x] Moderate Complexity: follow up within 7-14 calendar days (44086)   [] Severe Complexity: follow up within 7 calendar days (20030)    FOLLOW UP TESTING, PENDING RESULTS OR REFERRALS AT TRANSITIONAL CARE VISIT:   []  Yes    [x]  No    PENDING STUDIES:   Surgical pathology    DISPOSITION: Home    FACILITY/HOME CARE AGENCY NAME: N/A    Follow up with Loraine Parikh D.O. in 2 weeks  Condition:  stable  DISCHARGE TIME: < 30 minutes    SIGNED:  Andreea Rust DO   10/24/2021, 7:24 PM

## 2021-10-23 NOTE — PLAN OF CARE
Problem: Falls - Risk of:  Goal: Will remain free from falls  Description: Will remain free from falls  Outcome: Ongoing   Pt remained free of falls. Call light within reach. Bed alarm on. Non skid footwear in place. Bed locked and in lowest position.
Problem: Falls - Risk of:  Goal: Will remain free from falls  Description: Will remain free from falls  Outcome: Ongoing   Pt remained free of falls. Call light within reach. Bed alarm on. Non skid footwear in place. Bed locked and in lowest position. X1 assist with ambulation.
RN  Outcome: Met This Shift     Problem: Physical Regulation:  Goal: Postoperative complications will be avoided or minimized  Description: Postoperative complications will be avoided or minimized  10/23/2021 1000 by Maria Luisa Cavazos RN  Outcome: Completed  10/23/2021 1000 by Maria Luisa Cavazos RN  Outcome: Met This Shift     Problem: Respiratory:  Goal: Ability to achieve and maintain a regular respiratory rate will improve  Description: Ability to achieve and maintain a regular respiratory rate will improve  10/23/2021 1000 by Maria Luisa Cavazos RN  Outcome: Completed  10/23/2021 1000 by Maria Luisa Cavazos RN  Outcome: Met This Shift     Problem: Safety:  Goal: Ability to remain free from injury will improve  Description: Ability to remain free from injury will improve  10/23/2021 1000 by Maria Luisa Cavazos RN  Outcome: Completed  10/23/2021 1000 by Maria Luisa Cavazos RN  Outcome: Met This Shift     Problem: Sensory:  Goal: General experience of comfort will improve  Description: General experience of comfort will improve  10/23/2021 1000 by Maria Luisa Cavazos RN  Outcome: Completed  10/23/2021 1000 by Maria Luisa Cavazos RN  Outcome: Met This Shift     Problem: Skin Integrity:  Goal: Demonstration of wound healing without infection will improve  Description: Demonstration of wound healing without infection will improve  10/23/2021 1000 by Maria Luisa Cavazos RN  Outcome: Completed  10/23/2021 1000 by Maria Luisa Cavazos RN  Outcome: Met This Shift

## 2021-10-23 NOTE — PROGRESS NOTES
Assessment completed and documented. VSS. A/ox4. States she has gas pain of 4/10, tolerable. x1 assist to bathroom. 4 lap incisions C/D/I. Bed locked and in lowest position. Bedside table and call light within reach. Denies further needs at this time.

## 2021-10-23 NOTE — PROGRESS NOTES
Pt is a/o x4. VSS. Assessment     - Pt has x4 surg ix that are dressed and C/D/I- pt has slight distension and surgical soreness- PRN Rachana given per MD order. Pt is tolerating her diet well, has passed a BM since her procedure. - Pts neuro assessment is negative aside from some tingling in her RUE that she states is her baseline d/t past shoulder surgeries- she also has some limited movement. - Pt has tele monitoring on running SR.    - Pts still has some vaginal bleeding but states that it has decreased. Pt is currently resting in her bed that is locked and in its lowest position w/ her call light within reach, non-skid socks, and bed alarm on. Pt denies any other needs at this time.

## 2021-10-23 NOTE — PROGRESS NOTES
Hospitalist Progress Note      PCP: Kate Cain DO    Date of Admission: 10/21/2021    Chief Complaint: Near syncope / seizure     Hospital Course:     Subjective:   No more episodes of syncope, seizure-like activity, change in mental status, speech difficulty or any episodes of neurological symptoms  Tolerating activity very well and able to walk up to the bathroom with no symptoms like chest pain shortness of breath dizziness or fainting  Tolerating regular food  No abdominal pain nausea vomiting or diarrhea. Had 2 bowel movements        Medications:  Reviewed    Infusion Medications    lactated ringers Stopped (10/22/21 3456)    sodium chloride       Scheduled Medications    pantoprazole  40 mg IntraVENous Daily    aspirin  81 mg Oral Daily    atenolol  25 mg Oral Daily    DULoxetine  60 mg Oral Daily    rOPINIRole  2 mg Oral Daily    sodium chloride flush  5-40 mL IntraVENous 2 times per day    enoxaparin  40 mg SubCUTAneous Daily    bisacodyl  5 mg Oral Daily    rOPINIRole  4 mg Oral Nightly     PRN Meds: prochlorperazine, perflutren lipid microspheres, albuterol sulfate HFA, ALPRAZolam, furosemide, pantoprazole, sodium chloride flush, sodium chloride, acetaminophen, ondansetron **OR** ondansetron, oxyCODONE **OR** oxyCODONE, HYDROmorphone      Intake/Output Summary (Last 24 hours) at 10/23/2021 0853  Last data filed at 10/23/2021 0548  Gross per 24 hour   Intake 241.53 ml   Output 1200 ml   Net -958.47 ml       Physical Exam Performed:    /86   Pulse 66   Temp 98.1 °F (36.7 °C) (Oral)   Resp 16   Ht 5' 6\" (1.676 m)   Wt (!) 364 lb 13.8 oz (165.5 kg)   LMP 09/20/2017   SpO2 94%   BMI 58.89 kg/m²     General appearance: No apparent distress, appears stated age and cooperative. Morbidly obese  HEENTt. Conjunctivae/corneas clear. Neck: Supple, with full range of motion. No jugular venous distention. Trachea midline. Respiratory:  Normal respiratory effort.  Clear to auscultation, bilaterally without Rales/Wheezes/Rhonchi. Cardiovascular: Regular rate and rhythm with normal S1/S2 without murmurs, rubs or gallops. Abdomen: Soft, mildly-tender, non-distended with normal bowel sounds. Obese laparoscopic incision looks good   musculoskeletal: No clubbing, cyanosis or edema bilaterally. Full range of motion without deformity. Skin: Skin color, texture, turgor normal.  No rashes or lesions. Neurologic:  Neurovascularly intact without any focal sensory/motor deficits. .  Psychiatric: Alert and oriented, thought content appropriate, normal insight  Capillary Refill: Brisk,3 seconds, normal   Peripheral Pulses: +2 palpable, equal bilaterally       Labs:   Recent Labs     10/22/21  0556 10/22/21  0702 10/23/21  0642   WBC 12.9*  --  8.0   HGB 13.5 13.2 12.2   HCT 40.9 40.0 37.0     --  136     Recent Labs     10/22/21  0556      K 3.8      CO2 26   BUN 7   CREATININE 0.7   CALCIUM 9.2     Recent Labs     10/22/21  0556   AST 19   ALT 24   BILITOT 0.8   ALKPHOS 90     No results for input(s): INR in the last 72 hours. No results for input(s): Domi Comber in the last 72 hours. Urinalysis:      Lab Results   Component Value Date    NITRU Negative 09/26/2021    WBCUA 3-5 09/26/2021    BACTERIA 2+ 09/26/2021    RBCUA 3-4 09/26/2021    BLOODU TRACE-INTACT 09/26/2021    SPECGRAV 1.025 09/26/2021    GLUCOSEU Negative 09/26/2021       Radiology:  CT HEAD WO CONTRAST   Final Result   Atrophy and mild small vessel ischemic disease.                  Assessment/Plan:    Active Hospital Problems    Diagnosis     Spell of abnormal behavior [R46.89]     S/P robot-assisted surgical procedure [Z98.890]      S/p s/p [facility] robotic total laparoscopic hysterectomy, right salpingo-oophorectomy, lysis of adhesions, diagnostic cystoscopy- 10 /21 -   Doing well, tolerating p.o., primary team is following    Near syncope / aphasia / seizure like activity transient  episode resolved  - no residual symptoms- Ct head is neg   Possibly 2/2 surgery / meds / ortho stasis-no more episodes or any new neurological symptoms, EEG normal, echocardiogram ejection fraction preserved and no valvular lesion, telemetry no concerning abnormal rhythm, patient was started on aspirin 81, lipid profile -LDL 83  Patient is advised to follow-up with PCP discussed about  prophylactic baby aspirin        mor bid obesity -counseled on diet / exercise and weight loss      Hypoxia - possibly 2/2 hypoventilation 2/2 pain meds / ab sx / obesity -  , resolved currently on room air    Mild leukocytosis - 2/2 sx -resolved     HTN - Bp controlled - on atenolol      RLS/ depression - stable - on meds     DVT Prophylaxis: Lovenox  Diet: ADULT DIET;  Regular  Code Status: Full Code    PT/OT Eval Status: Ambulatory    Dispo -can be discharged on our standpoint  Discussed with CHAITANYA Cabrera MD

## 2021-10-23 NOTE — PROGRESS NOTES
Post-Operative Gynecology Progress Note  [facility] Obstetrics and Gynecology     Subjective:  Reyna Barahona a 37 y.o. female who is s/p Davinci robotic total laparoscopic hysterectomy, right salpingo-oophorectomy, lysis of adhesions, diagnostic cystoscopy, POD #2     Pain is well controlled.  Denies fever, chills, chest pain, shortness of breath, nausea, vomiting, diarrhea, constipation, calf pain or tenderness.    Experienced near syncopal episode after removal of Unger catheter on 10/22/2021. Doing well today. Ambulating. Tolerating regular diet. Passing flatus. Has had bowel movement.          Objective:      Vitals:     10/22/21 1631   BP:     Pulse:     Resp:     Temp:     SpO2: 93%      Vitals          Vitals:     10/22/21 0654 10/22/21 0839 10/22/21 1629 10/22/21 1631   BP: 119/62 122/78 135/77     Pulse: 71 62 62     Resp: 18 20 20     Temp:   98.3 °F (36.8 °C) 98 °F (36.7 °C)     TempSrc:   Oral Oral     SpO2: 92% 94% 94% 93%   Weight:           Height:                    EXAMINATION:   CT OF THE HEAD WITHOUT CONTRAST  10/22/2021 7:21 am       TECHNIQUE:   CT of the head was performed without the administration of intravenous   contrast. Dose modulation, iterative reconstruction, and/or weight based   adjustment of the mA/kV was utilized to reduce the radiation dose to as low   as reasonably achievable.       COMPARISON:   10/16/2013       HISTORY:   ORDERING SYSTEM PROVIDED HISTORY: seizure like activity   TECHNOLOGIST PROVIDED HISTORY:   Reason for exam:->seizure like activity   Has a \"code stroke\" or \"stroke alert\" been called? ->No   Reason for Exam: seizure like activity, no LOC per pt, has had one seizure in   the past, no prior stroke   Acuity: Acute   Type of Exam: Initial       FINDINGS:   BRAIN/VENTRICLES: Generalized cerebral atrophy.  Mild periventricular white   matter hypodensity is seen bilaterally.  No hydrocephalus.  No mass effect or   midline shift.  No gross acute hemorrhage.       ORBITS: The visualized portion of the orbits demonstrate no acute abnormality.       SINUSES: Mucosal thickening of the sphenoid sinus and left maxillary sinus   though as a whole, sinuses are aerated.       SOFT TISSUES/SKULL:  No acute abnormality of the visualized skull or soft   tissues.  Smoothly marginated defect at the posterior C1 ring, likely   developmental.           Impression   Atrophy and mild small vessel ischemic disease.      EEG awake and asleep  Order: [id number]  Status:  Final result   Visible to patient:  Yes (MyChart) Next appt:  10/27/2021 at 10:50 AM [facility])   0 Result Notes      Narrative     Monty Mendez MD     10/22/2021  5:25 PM       Patient: Vivi Maldonado     MR Number: [id number]   YOB: 1965   Date of Visit: 10/22/2021     Clinical History:   The patient is a 64y.o. years old female with increased   confusion and possible seizure. Method: The EEG was performed utilizing the international 10/20 of   electrode placements of both referential and bipolar montages. The patient was awake and drowsy through out the recording. Photic stimulation was performed. Findings: The background of the EEG showed normal alpha posterior   background of 8-9 HZ and amplitude of 20-40 UV. This background   was symmetric, waxing and waning, and reactive with eye opening   and closure. As the patient became drowsy, generalized diffuse   slowing was seen through recording at 6-7 HZ.  This generalized   slowing was symmetric, non rhythmical, and continuous. No spike   or sharp waves were seen.  Photic stimulation did not activate   EEG.     Impression: This EEG  is within normal limits.  There is no evidence of   epileptiform discharges, focal, or lateralizing abnormalities.       Tomas Guerrero MD       Board certified in clinical neurophysiology        Last Resulted: 10/22/21 17:25            Echo Complete  Order: [id number]  Status:  Final result   Visible to patient:  Yes (MyChart) Next appt:  10/27/2021 at 10:50 AM in Obstetrics and Gynecology Filibertonarendra Ibarra, DO)   0 Result Notes       Details           Reading Physician Reading Date Result Priority   Cynthia Dhaliwal MD  [phone] 10/22/2021          Narrative & Impression  Transthoracic Echocardiography Report (TTE)      Demographics      Patient Name Jacobo Starpeg      Date of Study      10/22/2021         Gender              Female      Patient Number     [id number]         Date of Birth       1965      Visit Number       [id number]          Age                 12 year(s)      Accession Number   [id number]         Room Number         8556      Corporate ID       [id number]           Sonographer         Wolfgang Lee, RT      Ordering Physician Lalita Sun, Interpreting        [facility]                      Yamile Blas, [facility]     Procedure     Type of Study      TTE procedure:ECHOCARDIOGRAM COMPLETE 2D W DOPPLER W COLOR.     Procedure Date  Date: 10/22/2021 Start: 01:51 PM     Study Location: [address] [place] [address] - Echo Lab  Technical Quality: Adequate visualization     Indications:Syncope.     Patient Status: Routine     Contrast Medium: Definity.     Height: 66 inches Weight: 295.01 pounds BSA: 2.36 m2 BMI: 47.61 kg/m2     BP: 122/78 mmHg      Conclusions      Summary   Technically difficult study due to body surface area and poor acoustic   window.   Normal left ventricular systolic function with ejection fraction of 55-60%.   No regional wall motion abnormalites are seen.   Mild concentric left ventricular hypertrophy.   Left ventricular diastolic filling pressure is normal.   Systolic pulmonic artery pressure (SPAP) is normal estimated at 22 mmHg   (Right atrial pressure of 8 mmHg).      Signature      ------------------------------------------------------------------   Electronically signed by Goldie Moe MD, [facility]   (Interpreting physician) on 10/22/2021 at 02:58 PM   ------------------------------------------------------------------      Findings      Left Ventricle   Normal left ventricular systolic function with ejection fraction of 55-60%.   No regional wall motion abnormalites are seen.   Normal left ventricular size with mild concentric left ventricular   hypertrophy.   Left ventricular diastolic filling pressure is normal.      Mitral Valve   Mitral valve is structurally normal.   No evidence of mitral regurgitation.      Left Atrium   The left atrium is normal in size.      Aortic Valve   Individual aortic valve leaflets are not clearly visualized.   The aortic valve appears sclerotic but opens well.   No evidence of aortic valve regurgitation.      Aorta   The aortic root is normal in size.      Right Ventricle   The right ventricle is normal in size and function.   TAPSE is measured at 25 mm.   S'prime velocity is measured at 15 cm/s.      Tricuspid Valve   Tricuspid valve is structurally normal.   Trivial tricuspid regurgitation.   Systolic pulmonic artery pressure (SPAP) is normal estimated at 22 mmHg   (Right atrial pressure of 8 mmHg).     Right Atrium   The right atrium is normal in size at 15 cm2.      Pulmonic Valve   The pulmonic valve is not well visualized.   No evidence of pulmonic valve regurgitation.      Pericardial Effusion  Calleen Elverson is a trivial localized near right ventricle pericardial effusion vs   fatpad noted.   No tamponade physiology.      Pleural Effusion   No pleural effusion.      Miscellaneous   IVC size is normal (<2.1 cm) but collapses < 50% with respiration consistent   with elevated RA pressure (8 mmHg).  Technically difficult study due to body   surface area and poor acoustic window.     M-Mode/2D Measurements (cm)      LV Diastolic Dimension: 0.85 cm LV Systolic Dimension: 8.70 cm   LV Septum Diastolic: 1.20 cm   LV PW Diastolic: 9.71 cm        AO Root Dimension: 2.7 cm                                   AV Cusp Separation: 1.5 cm                                   LA Dimension: 3.4 cm                                   LA Area: 19.33 cm2                                   LA volume/Index: 59.4 ml /25 ml/m2     Doppler Measurements      AV Peak Velocity: 141 cm/s     MV Peak E-Wave: 95.5 cm/s   AV Peak Gradient: 7.95 mmHg    MV Peak A-Wave: 84.8 cm/s                                  MV E/A Ratio: 1.13      TR Velocity:187 cm/s   TR Gradient:13.99 mmHg   Estimated RAP:8 mmHg   Estimated RVSP: 22 mmHg   E' Septal Velocity: 12.7 cm/s   E' Lateral Velocity: 9.57 cm/s   E/E' ratio: 8.8   PV Peak Velocity: 137 cm/s   PV Peak Gradient: 7.51 mmHg      Aortic Valve      Peak Velocity: 141 cm/s   Peak Gradient: 7.95 mmHg      Cusp Separation: 1.5 cm     Aorta      Aortic Root: 2.7 cm            Specimen Collected: 10/22/21 13:51 Last Resulted: 10/22/21 14:58       Order Details     View Encounter     Lab and Collection Details     Routing     Result History               Scans on Order [id number]     Scan on 10/22/2021  2:58 PM: ECHOCARDIOGRAM COMPLETE 2D W [place] COLOR        10/22/2021  7:31 AM - Merril Drone Incoming Lab Results From Soft (Epic Adt)     Component Value Ref Range & Units Status Collected Lab   Hemoglobin 13.2  12.0 - 16.0 g/dL Final 10/22/2021  7:02 AM [facility] Lab   Hematocrit 40.0  36.0 - 48.0 % Final 10/22/2021  7:02 AM [facility] Lab   Testing Performed By     Lab - Abbreviation Name Director Address Valid Date Range   25- - [facility] Jesse Ramirez 430 LAB Nicholette Epley., M.D. [address] [facility] [address] 10/08/21 1318-Present      10/22/2021  6:53 AM - Merril Drone Incoming Lab Results From Soft (Epic Adt)     Component Value Ref Range & Units Status Collected Lab   POC Glucose 138High   70 - 99 mg/dl Final 10/22/2021  6:50 AM [facility] Lab   Performed on [address]    Final 10/22/2021  6:50 AM [facility] Lab   Testing Performed By  [address] [facility] [address] Name Director Address Valid Date Range   25-[facility] - [address] [place] [address] LAB Rossie Curling., M.D. [address] [address] 10/08/21 1318-Present      10/22/2021  7:08 AM - Arturo Donald Incoming Lab Results From Soft (Epic Adt)     Component Value Ref Range & Units Status Collected Lab   Sodium 141  136 - 145 mmol/L Final 10/22/2021  5:56  [facility]   Potassium 3.8  3.5 - 5.1 mmol/L Final 10/22/2021  5:56 AM [facility] Lab   Chloride 104  99 - 110 mmol/L Final 10/22/2021  5:56  [facility]   CO2 26  21 - 32 mmol/L Final 10/22/2021  5:56  [facility]   Anion Gap 11  3 - 16 Final 10/22/2021  5:56 AM [facility] Lab   Glucose 122High   70 - 99 mg/dL Final 10/22/2021  5:56  [facility]   BUN 7  7 - 20 mg/dL Final 10/22/2021  5:56 AM [facility] Lab   CREATININE 0.7  0.6 - 1.1 mg/dL Final 10/22/2021  5:56 AM [facility] Lab   GFR Non-African American >60  >60 Final 10/22/2021  5:56 AM [facility] Lab   >60 mL/min/1.73m2 EGFR, calc. for ages 25 and older using the   MDRD formula (not corrected for weight), is valid for stable   renal function. GFR  >60  >60 Final 10/22/2021  5:56 AM [facility] Lab   Chronic Kidney Disease: less than 60 ml/min/1.73 sq. m.         Kidney Failure: less than 15 ml/min/1.73 sq.m. Results valid for patients 18 years and older.     Calcium 9.2  8.3 - 10.6 mg/dL Final 10/22/2021  5:56 AM  - [facility] Lab   Total Protein 6.5  6.4 - 8.2 g/dL Final 10/22/2021  5:56 AM [facility] Lab   Albumin 3.8  3.4 - 5.0 g/dL Final 10/22/2021  5:56 AM  - [facility] Lab   Albumin/Globulin Ratio 1.4  1.1 - 2.2 Final 10/22/2021  5:56 AM [facility] Lab   Total Bilirubin 0.8  0.0 - 1.0 mg/dL Final 10/22/2021  5:56 AM [facility] Lab   Alkaline Phosphatase 90  40 - 129 U/L Final 10/22/2021  5:56  [facility] [address] [facility]   ALT 24  10 - 40 U/L Final 10/22/2021  5:56 AM [facility] Lab   AST 19  15 - 37 U/L Final 10/22/2021  5:56 AM [facility] Lab   Globulin 2.7  Not Established g/dL Final 10/22/2021  5:56 AM [facility] Lab   Testing Performed By     Lab - Abbreviation Name Director Address Valid Date Range   25-[facility] - Door Jesse Ramirez 430 LAB Wilber Figueroa M.D. [address] [address] 10/08/21 1318-Present   10/22/2021  6:42 AM - Carolyn Martin Incoming Lab Results From Soft (Epic Adt)     Component Value Ref Range & Units Status Collected Lab   WBC 12. 9High   4.0 - 11.0 K/uL Final 10/22/2021  5:56 AM [facility] Lab   RBC 4.51  4.00 - 5.20 M/uL Final 10/22/2021  5:56 AM [facility] Lab   Hemoglobin 13.5  12.0 - 16.0 g/dL Final 10/22/2021  5:56 AM [facility] Lab   Hematocrit 40.9  36.0 - 48.0 % Final 10/22/2021  5:56 AM [facility] Lab   MCV 90.8  80.0 - 100.0 fL Final 10/22/2021  5:56 AM [facility] Lab   MCH 29.9  26.0 - 34.0 pg Final 10/22/2021  5:56 AM [facility]   MCHC 32.9  31.0 - 36.0 g/dL Final 10/22/2021  5:56 AM [facility] Lab   RDW 15.6High   12.4 - 15.4 % Final 10/22/2021  5:56 AM [facility] Lab   Platelets 947  353 - 450 K/uL Final 10/22/2021  5:56  [facility] [address] [facility]   MPV 10.5  5.0 - 10.5 fL Final 10/22/2021  5:56 AM [facility] Lab   Neutrophils % 81.2  % Final 10/22/2021  5:56  [facility] [facility]   Lymphocytes % 12.4  % Final 10/22/2021  5:56  [facility] [facility]   Monocytes % 5.8  % Final 10/22/2021  5:56 AM [facility] Lab   Eosinophils % 0.2 % Final 10/22/2021  5:56  [facility]   Basophils % 0.4  % Final 10/22/2021  5:56 AM [facility] Lab   Neutrophils Absolute 10. 5High   1.7 - 7.7 K/uL Final 10/22/2021  5:56 AM [facility] Lab   Lymphocytes Absolute 1.6  1.0 - 5.1 K/uL Final 10/22/2021  5:56 AM [facility] Lab   Monocytes Absolute 0.7  0.0 - 1.3 K/uL Final 10/22/2021  5:56 AM [facility] Lab   Eosinophils Absolute 0.0  0.0 - 0.6 K/uL Final 10/22/2021  5:56 AM [facility] Lab   Basophils Absolute 0.0  0.0 - 0.2 K/uL Final 10/22/2021  5:56 AM [facility] Lab   Testing Performed By     Lab - Abbreviation Name Director Address Valid Date Range   25- - Door [facility] 430 LAB Jose Luis Araiza M.D. [address] [facility] [address] 10/08/21 1318-Present                  General: Alert, well appearing, no acute distress  Heart: Regular rate and rhythm  Lungs: Respirations unlabored  Abdomen: Soft, appropriately tender to palpation, mild abdominal distention, no rebound or guarding.  Incisions with dressing in place, clean, dry and intact.  No significant drainage or surrounding erythema. Extremities: No redness or tenderness in LE, SCDs in place bilaterally         Assessment/Plan:    Bibiaan Downs is a 64 y.o. female who is s/p Davinci robotic total laparoscopic hysterectomy, right salpingo-oophorectomy, lysis of adhesions, diagnostic cystoscopy     1. POD #2     - Pain well controlled     - Benign exam   2. Near syncopal episode       Plan:   Home today if ok with internal medicine  Appreciate input and support from Internal medicine.      HERMINIO Story.

## 2021-10-23 NOTE — OP NOTE
[address] [address]                                OPERATIVE REPORT    PATIENT NAME: Nesha Dozier                    :        1965  MED REC NO:   [id number]                          ROOM:       9317  ACCOUNT NO:   [de-identified]                           ADMIT DATE: 10/21/2021  PROVIDER:     Jarvis Mitchell DO    DATE OF PROCEDURE:  10/21/2021    PREOPERATIVE DIAGNOSES:  Postmenopausal bleeding, proliferative  endometrial glands, thickened endometrium. POSTOPERATIVE DIAGNOSES:  Postmenopausal bleeding, proliferative  endometrial glands, thickened endometrium with extensive pelvic  adhesions. OPERATION PERFORMED:  [facility] Severe robotic total laparoscopic hysterectomy,  right salpingo-oophorectomy, lysis of adhesions, diagnostic cystoscopy,  lysis of adhesions requiring one hour extra time. SURGEON:  Jarvis Mitchell DO    CO-SURGEON:  Pat Garsia DO    ANESTHESIA:  General endotracheal anesthetic. ESTIMATED BLOOD LOSS:  100 mL. URINE OUTPUT:  300 mL clear yellow urine via Unger catheter. IV FLUIDS:  2000 mL crystalloid. COMPLICATIONS:  None. SPECIMENS:  Uterus, cervix, right fallopian tube, right ovary. FINDINGS:  Uterus with features of adenomyosis, extensive pelvic and  abdominal adhesions. CONDITION:  Stable. DISPOSITION:  PACU. INDICATIONS:  The patient is a 59-year-old  3, para 3-0-0-3  female who presents for [place] [id number] robotic total laparoscopic  hysterectomy, bilateral salpingo-oophorectomy, possible lysis of  adhesions secondary to proliferative endometrial glands found on D and  C. The patient was seen four months prior, at which time a hysteroscopy  D and C was performed secondary to postmenopausal bleeding. Menopause  had occurred five years earlier. She has also had a history of a right  oophorectomy 10 years prior secondary to an ovarian torsion.   Due to  findings on pathology from her hysteroscopy D and C as well as the  postmenopausal bleeding, the patient opted to proceed with definitive  therapy in the form of a hysterectomy. She was aware of the potential for finding occult cancer or other some other abnormality during surgery. In  addition, lysis of adhesions were also discussed. Risks and benefits of  the procedure were reviewed and written informed consent was obtained. OPERATIVE PROCEDURE:  The patient was taken to the OR on 10/21/2021. She was given preoperative antibiotics and bilateral lower extremity  SCDs were placed. She was taken to the OR and placed under general  endotracheal anesthetic and positioned in dorsal lithotomy position. She was then prepped and draped in the usual fashion. Upon prepping and  draping the patient, a time-out was performed. Upon performing the  time-out, a speculum was placed in the patient's vagina. The anterior  lip of the cervix was grasped and two sutures were placed at the 3  o'clock and 9 o'clock position of the cervix. The cervix was very  difficult to reach due to a diminutive vaginal introitus. Ultimately, a  OZZIE manipulator was applied to the patient's cervix using a 2.5 cup. After placement of the OZZIE manipulator, attention was turned to the  patient's abdomen. Moderate abdominal distention was noted on examination with a pronounced reticular venous vasculature. Lab work was reviewed and found to be normal/appropriate. A supraumbilical skin incision was made. A  5-mm blunt trocar was inserted into the patient's abdomen under direct  visualization and a pneumoperitoneum was introduced. Two  further ports were then placed in the right upper quadrant and left  upper quadrant under direct visualization. After placement of the  initial three trocars, the supraumbilical trocar was replaced with an 8  mm robotic sleeve.   At that point, evaluation of the pelvis revealed  extensive pelvic and abdominal adhesions involving omentum and bowel  extending along the previous abdominal incision site. These were taken  down sharply and hemostasis was noted. The adhesions extended to the lower pelvis, but spared the region adjacent to the  bladder. The lysis of adhesions required an extra hours time and after  completion of lysis of adhesions and after visualization could be  obtained, the patient was placed in further Trendelenburg. Visualization was limited despite repositioning. The uterus was found to be adhered to some  omentum and those adhesions were taken down. After freeing the uterus,  limited manipulation was noted. Therefore, extra time was required due  to the patient's body habitus to visualize and isolate the uterus for  the remainder of the procedure. The patient was found to have a right  ovary (despite history of right oophorectomy. The left ovary and  fallopian tube were completely absent and scarring was noted throughout the left ovarian  fossa. That scarring was released and after evaluation, the right lower  quadrant port was placed using a 12-mm disposable trocar. The right  fallopian tube and ovary were then identified and found to be well away  from the ureteral site and excised. The ovary was placed in an  Endopouch bag and was removed along with the uterus later in the procedure. The round ligaments were then cauterized and transected  bilaterally. The anterior leaf of the broad ligament was taken down to  the level of the bladder and the bladder reflection was then created  sharply. The remainder of the broad ligament was then cauterized and  taken down to the level of the uterine arteries. The uterine arteries  were then cauterized. Further dissection of the cervix required further  steps due to extensive length of the cervix. Ultimately, the cervix and  its vasculature was taken down and a  colpotomy was performed.   The uterus was removed from the pelvis with the Endopouch bag. The vaginal cuff was closed in a running  fashion using 0 PDS. Two further sutures of 0 Vicryl were utilized as  well at the lateral thirds of the vaginal cuff. Some slight oozing was  noted at the vaginal cuff where the extensive dissection was performed. Surgiflo was applied after copious  irrigation. After application of the Surgiflo, hemostasis was noted. The right lower quadrant port was then closed in a single interrupted  fashion using 0 Vicryl and a closure device. The pneumoperitoneum was  reduced and trocars were removed. The incisions were then closed in a  subcuticular fashion. Steri-Strips and Mastisol were applied to each of  the incisions and excellent hemostasis was noted. The attention was  then turned to the patient's vagina, where all instruments were removed. No trauma was noted to the vaginal wall epithelium or vagina itself. Hemostasis was noted. The Unger catheter was removed and a diagnostic  cystoscopy was performed. Clear yellow urine could be seen effluxing  from both ureters. The bladder appeared narrowed in anatomy with some  excess tissue at ureteral opening. Pictures were taken. No  masses or lesions or other abnormalities were otherwise noted. The  bladder was found to be free of any injury or trauma. After performing  a cystoscopy, the bladder was drained and a catheter was placed. Excellent hemostasis was noted. All instruments were removed from the  patient's vagina. Sponge, lap and needle counts were correct x2. The  patient tolerated the procedure well and was taken to the PACU in stable  condition.         Faustino Heading, DO    D: 10/22/2021 18:59:51       T: 10/22/2021 23:40:20     [email or web address]  Job#: [id number]     Doc#: [id number]    CC:

## 2021-10-25 ENCOUNTER — TELEPHONE (OUTPATIENT)
Dept: FAMILY MEDICINE CLINIC | Age: 56
End: 2021-10-25

## 2021-10-25 NOTE — TELEPHONE ENCOUNTER
----- Message from Shantel Cantu sent at 10/25/2021 10:41 AM EDT -----  Subject: Message to Provider    QUESTIONS  Information for Provider? PT had hysterectomy on 10/21/21 and expressed it   was a rough surgery, a lot of scar tissue, issues getting to cervix and   uterus. Friday morning when PT tried to go to rest room but was over   medicated with 2 roxycodone and compesine combined, ended up falling and   seizing for about 15 mins. CAT scan shortly after, brain activity test,   EEG; PT is home but still really sore.  ---------------------------------------------------------------------------  --------------  CALL BACK INFO  What is the best way for the office to contact you? OK to leave message on   voicemail  Preferred Call Back Phone Number? [phone]  ---------------------------------------------------------------------------  --------------  SCRIPT ANSWERS  Relationship to Patient?  Self

## 2021-11-02 ENCOUNTER — OFFICE VISIT (OUTPATIENT)
Dept: OBGYN CLINIC | Age: 56
End: 2021-11-02

## 2021-11-02 VITALS
WEIGHT: 293 LBS | HEART RATE: 64 BPM | TEMPERATURE: 97.9 F | SYSTOLIC BLOOD PRESSURE: 136 MMHG | BODY MASS INDEX: 47.78 KG/M2 | DIASTOLIC BLOOD PRESSURE: 74 MMHG

## 2021-11-02 DIAGNOSIS — R10.2 PELVIC PAIN IN FEMALE: ICD-10-CM

## 2021-11-02 DIAGNOSIS — Z98.890 S/P ROBOT-ASSISTED SURGICAL PROCEDURE: ICD-10-CM

## 2021-11-02 DIAGNOSIS — E66.01 MORBID OBESITY (HCC): ICD-10-CM

## 2021-11-02 DIAGNOSIS — Z09 POSTOP CHECK: Primary | ICD-10-CM

## 2021-11-02 LAB
BILIRUBIN URINE: ABNORMAL
BLOOD, URINE: ABNORMAL
CLARITY: CLEAR
COLOR: YELLOW
CRYSTALS, UA: ABNORMAL /HPF
EPITHELIAL CELLS, UA: 4 /HPF (ref 0–5)
GLUCOSE URINE: NEGATIVE MG/DL
HYALINE CASTS: 3 /LPF (ref 0–8)
KETONES, URINE: NEGATIVE MG/DL
LEUKOCYTE ESTERASE, URINE: NEGATIVE
MICROSCOPIC EXAMINATION: YES
NITRITE, URINE: NEGATIVE
PH UA: 6 (ref 5–8)
PROTEIN UA: 30 MG/DL
RBC UA: 1 /HPF (ref 0–4)
SPECIFIC GRAVITY UA: 1.03 (ref 1–1.03)
URINE TYPE: ABNORMAL
UROBILINOGEN, URINE: 0.2 E.U./DL
WBC UA: 4 /HPF (ref 0–5)

## 2021-11-02 PROCEDURE — 99024 POSTOP FOLLOW-UP VISIT: CPT | Performed by: OBSTETRICS & GYNECOLOGY

## 2021-11-02 RX ORDER — IBUPROFEN 600 MG/1
TABLET ORAL
Qty: 40 TABLET | Refills: 1 | Status: SHIPPED | OUTPATIENT
Start: 2021-11-02 | End: 2022-03-14

## 2021-11-02 NOTE — TELEPHONE ENCOUNTER
----- Message from Guillermo Snowden sent at 11/2/2021 12:49 PM EDT -----  Subject: Message to Provider    QUESTIONS  Information for Provider? Pharmacy will be sending over in one of her   meds, there's a problem refilling it. She also would like to speak to Dr. Zackery Biggs about a few things she didn't want to disclose.  ---------------------------------------------------------------------------  --------------  CALL BACK INFO  What is the best way for the office to contact you? OK to leave message on   voicemail  Preferred Call Back Phone Number? [phone]  ---------------------------------------------------------------------------  --------------  SCRIPT ANSWERS  Relationship to Patient?  Self

## 2021-11-03 LAB — URINE CULTURE, ROUTINE: NORMAL

## 2021-11-03 RX ORDER — ROPINIROLE 2 MG/1
TABLET, FILM COATED ORAL
Qty: 90 TABLET | Refills: 2 | Status: SHIPPED | OUTPATIENT
Start: 2021-11-03 | End: 2022-01-26

## 2021-11-03 NOTE — TELEPHONE ENCOUNTER
Future Appointments   Date Time Provider Diann Sotelo   11/17/2021  1:40 PM Roz Habermann, DO [facility] [place] [facility]   12/1/2021 11:00 AM DO [facility]     Dose was increase to 3 qd patient is almost out of meds.

## 2021-11-07 ENCOUNTER — TELEPHONE (OUTPATIENT)
Dept: OBGYN CLINIC | Age: 56
End: 2021-11-07

## 2021-11-07 NOTE — TELEPHONE ENCOUNTER
Patient called in earlier today with fevers. Is s/p RA-TLH, RSO and GRAEME on 10/22/2021. States yesterday she started to notice a fever, up to 101.6F. Has been alternating tylenol and ibuprofen and most recent temp today was 100.7F. Denies any other symptoms, no chest pain, SOB, cough. Mild runny nose. Denies any dysuria (recent urine culture sent from office on 11/2 was negative for UTI) or hematuria. No vaginal discharge or bleeding. Denies any increased abdominal pain or swelling/redness at incisions. Reviewed with patient both surgical and nonsurgical causes of post-operative fever and advised to continue with tylenol/motrin alternating. If fever persists or increases or any other symptoms develop will present to ER today for evaluation, otherwise to call office in AM to update us regarding symptoms.     Nino Zuniga MD

## 2021-11-08 NOTE — TELEPHONE ENCOUNTER
Patient returned call to office and stated she is continuing to run a fever, Sat  Night was 101.8 degrees Farenheit. Stated she is having a lot of cramping, and is spotting (very light), stated when she urinates it smells like blood(doesnt see anything in the toilet.)     Is complaining of being increasingly tired. Stated she slept all weekend. Please advise.      Routing to Dr. Yesi Moran

## 2021-11-09 ENCOUNTER — TELEPHONE (OUTPATIENT)
Dept: OBGYN CLINIC | Age: 56
End: 2021-11-09

## 2021-11-09 NOTE — TELEPHONE ENCOUNTER
Patient called the office this am, stated her fever has broken, she is still spotting and have some mild cramping. Still is saying she is extremely tired (sleeps for hours a day and night) and her headache has eased up now as well. Please see previous encounter with on call/office.

## 2021-11-09 NOTE — TELEPHONE ENCOUNTER
[phone] ([place])     Placed call to patient, verbalized understanding. Is scheduled for appt the end of the week.

## 2021-11-09 NOTE — TELEPHONE ENCOUNTER
Attempted to call patient. No answer. Please check on patient's symptoms/fever. If temperature elevations (>101) are persistent will need to report to ER for evaluation. Otherwise, please have patient come in this week for evaluation. Thanks.

## 2021-11-12 ENCOUNTER — OFFICE VISIT (OUTPATIENT)
Dept: OBGYN CLINIC | Age: 56
End: 2021-11-12
Payer: MEDICARE

## 2021-11-12 VITALS
BODY MASS INDEX: 47.29 KG/M2 | WEIGHT: 293 LBS | DIASTOLIC BLOOD PRESSURE: 80 MMHG | TEMPERATURE: 97.4 F | HEART RATE: 67 BPM | SYSTOLIC BLOOD PRESSURE: 130 MMHG

## 2021-11-12 DIAGNOSIS — R53.83 FATIGUE, UNSPECIFIED TYPE: ICD-10-CM

## 2021-11-12 DIAGNOSIS — F41.1 GENERALIZED ANXIETY DISORDER: ICD-10-CM

## 2021-11-12 DIAGNOSIS — Z09 POSTOP CHECK: Primary | ICD-10-CM

## 2021-11-12 DIAGNOSIS — R73.03 PREDIABETES: ICD-10-CM

## 2021-11-12 DIAGNOSIS — Z98.890 S/P ROBOT-ASSISTED SURGICAL PROCEDURE: ICD-10-CM

## 2021-11-12 DIAGNOSIS — I10 ESSENTIAL HYPERTENSION: ICD-10-CM

## 2021-11-12 DIAGNOSIS — E66.01 MORBID OBESITY (HCC): ICD-10-CM

## 2021-11-12 PROBLEM — N95.0 POSTMENOPAUSAL BLEEDING: Status: RESOLVED | Noted: 2021-10-14 | Resolved: 2021-11-12

## 2021-11-12 PROBLEM — R93.89 THICKENED ENDOMETRIUM: Status: RESOLVED | Noted: 2021-10-14 | Resolved: 2021-11-12

## 2021-11-12 LAB
BASOPHILS ABSOLUTE: 0.1 K/UL (ref 0–0.2)
BASOPHILS RELATIVE PERCENT: 0.6 %
EOSINOPHILS ABSOLUTE: 0.2 K/UL (ref 0–0.6)
EOSINOPHILS RELATIVE PERCENT: 1.5 %
HCT VFR BLD CALC: 40.9 % (ref 36–48)
HEMOGLOBIN: 12.9 G/DL (ref 12–16)
LYMPHOCYTES ABSOLUTE: 1.7 K/UL (ref 1–5.1)
LYMPHOCYTES RELATIVE PERCENT: 16.8 %
MCH RBC QN AUTO: 29.7 PG (ref 26–34)
MCHC RBC AUTO-ENTMCNC: 31.6 G/DL (ref 31–36)
MCV RBC AUTO: 94.1 FL (ref 80–100)
MONOCYTES ABSOLUTE: 0.6 K/UL (ref 0–1.3)
MONOCYTES RELATIVE PERCENT: 5.9 %
NEUTROPHILS ABSOLUTE: 7.5 K/UL (ref 1.7–7.7)
NEUTROPHILS RELATIVE PERCENT: 75.2 %
PDW BLD-RTO: 15.6 % (ref 12.4–15.4)
PLATELET # BLD: 212 K/UL (ref 135–450)
PMV BLD AUTO: 10.1 FL (ref 5–10.5)
RBC # BLD: 4.35 M/UL (ref 4–5.2)
WBC # BLD: 10 K/UL (ref 4–11)

## 2021-11-12 PROCEDURE — 36415 COLL VENOUS BLD VENIPUNCTURE: CPT | Performed by: OBSTETRICS & GYNECOLOGY

## 2021-11-12 PROCEDURE — 99024 POSTOP FOLLOW-UP VISIT: CPT | Performed by: OBSTETRICS & GYNECOLOGY

## 2021-11-12 RX ORDER — OXYCODONE HYDROCHLORIDE 5 MG/1
5 TABLET ORAL EVERY 6 HOURS PRN
Qty: 10 TABLET | Refills: 0 | Status: SHIPPED | OUTPATIENT
Start: 2021-11-12 | End: 2021-11-19

## 2021-11-12 ASSESSMENT — ENCOUNTER SYMPTOMS
BACK PAIN: 1
NAUSEA: 0
SHORTNESS OF BREATH: 0
VOMITING: 0
ABDOMINAL PAIN: 0
CONSTIPATION: 0
ABDOMINAL DISTENTION: 0

## 2021-11-12 NOTE — PROGRESS NOTES
Blood draw from [facility] x 1 attempt without difficulty. 0 SST, 1 PST, 1 LAV tubes drawn. Patient tolerated well.  Saige Jenkins, SAMANN

## 2021-11-12 NOTE — PROGRESS NOTES
Subjective:      Patient ID: Dario Acuna is a 64 y.o. female. HPI  65 y/o  female presents for post-operative visit. Patient is 3 weeks s/p Davinci robotic total laparoscopic hysterectomy, bilateral salpingo-oophorectomy, diagnostic cystoscopy, and lysis of adhesions secondary to postmenopausal bleeding, proliferative endometrial glands, and thickened endometrium. Surgery was complicated by BMI and patient history of  x 3. Patient seen 1 week ago for routine postop. Since last visit patient has noted fatigue, shortness of breath, fever, headache, cramping, low back pain and spotting/brown vaginal discharge. First noted symptoms on Saturday. Fever has slowly improved over time. Admits to some loose stools, otherwise, denies any issues with urination or BMs. Tested negative for COVID-19 on Tuesday. Menopause occurred 5 years ago. History is positive for right oophorectomy 10 years ago secondary to ovarian torsion. Medical history is significant for hypertension, restless legs, anxiety, depression and several orthopedic concerns. Surgical history is positive for  x 3, D&C, and right oophorectomy. Last pap smear was 2021--endometrial cells in a woman >39years of age. Review of Systems   Constitutional: Positive for fatigue and fever. Negative for activity change, appetite change, chills and unexpected weight change. Respiratory: Negative for shortness of breath. Cardiovascular: Negative for chest pain. Gastrointestinal: Negative for abdominal distention, abdominal pain, constipation, nausea and vomiting. Diarrhea: loose stools. Genitourinary: Positive for vaginal bleeding (spotting) and vaginal discharge (brown). Negative for difficulty urinating, dysuria, hematuria, pelvic pain (cramping) and vaginal pain. Musculoskeletal: Positive for back pain. Neurological: Positive for headaches. Psychiatric/Behavioral: Negative.         Objective: Physical Exam  Vitals and nursing note reviewed. Exam conducted with a chaperone present. Constitutional:       General: She is not in acute distress. Appearance: Normal appearance. She is well-developed. She is not ill-appearing, toxic-appearing or diaphoretic. Pulmonary:      Effort: Pulmonary effort is normal.   Abdominal:      General: There is no distension. Palpations: Abdomen is soft. Tenderness: There is no abdominal tenderness. There is no guarding. Comments: Incisions clean dry intact. No apparent signs of infection or compromise   Genitourinary:     General: Normal vulva. Exam position: Lithotomy position. Labia:         Right: No rash, tenderness, lesion or injury. Left: No rash, tenderness, lesion or injury. Vagina: No signs of injury and foreign body. No erythema, tenderness, bleeding, lesions or prolapsed vaginal walls. Vaginal discharge: mild mucous discharge. Uterus: Absent. Comments: Vaginal cuff appears intact. No apparent bleeding, compromise or signs of infection. Skin:     General: Skin is warm and dry. Neurological:      Mental Status: She is alert and oriented to person, place, and time. Psychiatric:         Behavior: Behavior normal.         Thought Content: Thought content normal.         Judgment: Judgment normal.         Assessment:       Diagnosis Orders   1. Postop check     2. S/P robot-assisted surgical procedure  CBC Auto Differential    COMPREHENSIVE METABOLIC PANEL    Culture, Urine    URINALYSIS WITH MICROSCOPIC    Hemoglobin A1C    oxyCODONE (ROXICODONE) 5 MG immediate release tablet   3. Fatigue, unspecified type  CBC Auto Differential    COMPREHENSIVE METABOLIC PANEL    Culture, Urine    URINALYSIS WITH MICROSCOPIC    Hemoglobin A1C   4. Prediabetes   Hemoglobin A1C   5. Morbid obesity ([address]     6. Essential hypertension     7.  Generalized anxiety disorder             Plan:      Orders Placed This Encounter Procedures    Culture, Urine    CBC Auto Differential    COMPREHENSIVE METABOLIC PANEL    URINALYSIS WITH MICROSCOPIC    Hemoglobin A1C     Await test results  Consider CT scan abdomen/pelvis if symptoms persist.   Follow up prn test results.             Carmine Del Valle DO

## 2021-11-13 LAB
A/G RATIO: 1.3 (ref 1.1–2.2)
ALBUMIN SERPL-MCNC: 3.9 G/DL (ref 3.4–5)
ALP BLD-CCNC: 101 U/L (ref 40–129)
ALT SERPL-CCNC: 41 U/L (ref 10–40)
ANION GAP SERPL CALCULATED.3IONS-SCNC: 15 MMOL/L (ref 3–16)
AST SERPL-CCNC: 32 U/L (ref 15–37)
BACTERIA: ABNORMAL /HPF
BILIRUB SERPL-MCNC: 0.3 MG/DL (ref 0–1)
BILIRUBIN URINE: NEGATIVE
BLOOD, URINE: ABNORMAL
BUN BLDV-MCNC: 9 MG/DL (ref 7–20)
CALCIUM SERPL-MCNC: 9.4 MG/DL (ref 8.3–10.6)
CHLORIDE BLD-SCNC: 102 MMOL/L (ref 99–110)
CLARITY: ABNORMAL
CO2: 21 MMOL/L (ref 21–32)
COLOR: YELLOW
CREAT SERPL-MCNC: 0.8 MG/DL (ref 0.6–1.1)
EPITHELIAL CELLS, UA: 4 /HPF (ref 0–5)
ESTIMATED AVERAGE GLUCOSE: 122.6 MG/DL
GFR AFRICAN AMERICAN: >60
GFR NON-AFRICAN AMERICAN: >60
GLUCOSE BLD-MCNC: 104 MG/DL (ref 70–99)
GLUCOSE URINE: NEGATIVE MG/DL
HBA1C MFR BLD: 5.9 %
HYALINE CASTS: 5 /LPF (ref 0–8)
KETONES, URINE: NEGATIVE MG/DL
LEUKOCYTE ESTERASE, URINE: ABNORMAL
MICROSCOPIC EXAMINATION: YES
MUCUS: ABNORMAL /LPF
NITRITE, URINE: POSITIVE
PH UA: 6 (ref 5–8)
POTASSIUM SERPL-SCNC: 4.4 MMOL/L (ref 3.5–5.1)
PROTEIN UA: ABNORMAL MG/DL
RBC UA: ABNORMAL /HPF (ref 0–4)
SODIUM BLD-SCNC: 138 MMOL/L (ref 136–145)
SPECIFIC GRAVITY UA: 1.02 (ref 1–1.03)
TOTAL PROTEIN: 7 G/DL (ref 6.4–8.2)
URINE TYPE: ABNORMAL
UROBILINOGEN, URINE: 0.2 E.U./DL
WBC UA: 41 /HPF (ref 0–5)

## 2021-11-13 RX ORDER — SULFAMETHOXAZOLE AND TRIMETHOPRIM 800; 160 MG/1; MG/1
1 TABLET ORAL 2 TIMES DAILY
Qty: 20 TABLET | Refills: 0 | Status: SHIPPED | OUTPATIENT
Start: 2021-11-13 | End: 2021-11-23

## 2021-11-17 ENCOUNTER — OFFICE VISIT (OUTPATIENT)
Dept: FAMILY MEDICINE CLINIC | Age: 56
End: 2021-11-17
Payer: MEDICARE

## 2021-11-17 VITALS
SYSTOLIC BLOOD PRESSURE: 136 MMHG | HEIGHT: 66 IN | RESPIRATION RATE: 14 BRPM | OXYGEN SATURATION: 95 % | TEMPERATURE: 97.3 F | DIASTOLIC BLOOD PRESSURE: 78 MMHG | WEIGHT: 289 LBS | BODY MASS INDEX: 46.45 KG/M2 | HEART RATE: 53 BPM

## 2021-11-17 DIAGNOSIS — Z90.710 STATUS POST HYSTERECTOMY: ICD-10-CM

## 2021-11-17 DIAGNOSIS — N39.0 URINARY TRACT INFECTION WITHOUT HEMATURIA, SITE UNSPECIFIED: Primary | ICD-10-CM

## 2021-11-17 DIAGNOSIS — E66.01 CLASS 3 SEVERE OBESITY DUE TO EXCESS CALORIES WITH SERIOUS COMORBIDITY AND BODY MASS INDEX (BMI) OF 45.0 TO 49.9 IN ADULT (HCC): ICD-10-CM

## 2021-11-17 LAB
ORGANISM: ABNORMAL
URINE CULTURE, ROUTINE: ABNORMAL

## 2021-11-17 PROCEDURE — G8417 CALC BMI ABV UP PARAM F/U: HCPCS | Performed by: FAMILY MEDICINE

## 2021-11-17 PROCEDURE — 1111F DSCHRG MED/CURRENT MED MERGE: CPT | Performed by: FAMILY MEDICINE

## 2021-11-17 PROCEDURE — 99213 OFFICE O/P EST LOW 20 MIN: CPT | Performed by: FAMILY MEDICINE

## 2021-11-17 PROCEDURE — 1036F TOBACCO NON-USER: CPT | Performed by: FAMILY MEDICINE

## 2021-11-17 PROCEDURE — G8427 DOCREV CUR MEDS BY ELIG CLIN: HCPCS | Performed by: FAMILY MEDICINE

## 2021-11-17 PROCEDURE — 3017F COLORECTAL CA SCREEN DOC REV: CPT | Performed by: FAMILY MEDICINE

## 2021-11-17 PROCEDURE — G8482 FLU IMMUNIZE ORDER/ADMIN: HCPCS | Performed by: FAMILY MEDICINE

## 2021-11-17 RX ORDER — NITROFURANTOIN 25; 75 MG/1; MG/1
100 CAPSULE ORAL 2 TIMES DAILY
Qty: 14 CAPSULE | Refills: 0 | Status: SHIPPED | OUTPATIENT
Start: 2021-11-17 | End: 2021-12-05

## 2021-11-17 RX ORDER — ALPRAZOLAM 0.5 MG/1
0.5 TABLET ORAL NIGHTLY PRN
COMMUNITY
End: 2021-12-06 | Stop reason: DRUGHIGH

## 2021-11-17 ASSESSMENT — ENCOUNTER SYMPTOMS
ABDOMINAL PAIN: 0
COUGH: 0
NAUSEA: 0
SHORTNESS OF BREATH: 0

## 2021-11-17 ASSESSMENT — PATIENT HEALTH QUESTIONNAIRE - PHQ9
1. LITTLE INTEREST OR PLEASURE IN DOING THINGS: 0
SUM OF ALL RESPONSES TO PHQ QUESTIONS 1-9: 0
SUM OF ALL RESPONSES TO PHQ QUESTIONS 1-9: 0
SUM OF ALL RESPONSES TO PHQ9 QUESTIONS 1 & 2: 0
SUM OF ALL RESPONSES TO PHQ QUESTIONS 1-9: 0
2. FEELING DOWN, DEPRESSED OR HOPELESS: 0

## 2021-11-17 NOTE — PATIENT INSTRUCTIONS
Continue the antibiotics    Add the New urine antibiotic. Follow up with Dr Joaquina Salazar as instructed.     Advise me first of next week how you are doing

## 2021-11-17 NOTE — PROGRESS NOTES
Subjective:      Patient ID: Pradeep Ferrell is a 64 y.o. y.o. female. Here for general follow up. Had hyst and post op fell and had a seizure. Was cleared and no seizure meds. Needed    After d/cdeveloped UTI. On meds for that and still sx  HPI      No chief complaint on file.       Allergies   Allergen Reactions    Toradol [Ketorolac Tromethamine] Other (See Comments)     \"out of it\"  \"felt like sleep paralysis\"    Haldol [Haloperidol Lactate] Other (See Comments)     \"felt out of it, similar to the toradol\"       Past Medical History:   Diagnosis Date    Anxiety     Depression     Endometriosis     GERD (gastroesophageal reflux disease)     Hypertension     Osteoarthritis     Restless leg syndrome        Past Surgical History:   Procedure Laterality Date    ANUS SURGERY  2018    fissure     SECTION      x3    COLONOSCOPY      DILATION AND CURETTAGE OF UTERUS N/A 6/3/2021    VIDEO HYSTEROSCOPY DILATATION AND CURETTAGE, POSSIBLE MYOSURE, POSSIBLE POLYPECTOMY performed by Fátima Storm DO at [facility]      right wrist    HERNIA REPAIR  2018    LAPAROSCOPIC PARAESOPHAGEAL HERNIA REPAIR WITH MESH    HYSTERECTOMY N/A 10/21/2021    ROBOTIC ASSISTED LAPAROSCOPIC HYSTERECTOMY WITH RIGHT SALPINGECTOMY, RIGHT OOPHORECTOMY, CYSTOSCOPY, LYSIS OF ADHESIONS  CPT CODE - 38367 performed by Mee Barger DO at [address] [place] [address] ARTHROSCOPY Left 11/15/12    ARTHROSCOPY LEFT KNEE WITH SYOVECTOMY AND CHONDROPLASTY    OVARY REMOVAL Right     ROTATOR CUFF REPAIR Right 2020    EXAM UNDER ANESTHESIA VIDEO ARTHROSCOPY RIGHT SHOULDER, MINI- OPEN ROTATOR CUFF REPAIR, NEER ACROMIOPLASTY, LENO PROCEDURE WITH EXPAREL performed by Latisha Ly MD at [place] [address] ARTHROSCOPY Right 2020    VIDEO ARTHROSCOPY RIGHT SHOULDER, OPEN ROTATOR CUFF REPAIR, BICEPS TENOTOMY -BLOCK- performed by Sherie Sommers MD at [place] [address] ARTHROSCOPY Right 2/24/2021    RIGHT SHOULDER ARTHROSCOPIC INCISION AND DRAINAGE performed by Davida Varela MD at [place] [address] ARTHROSCOPY Right 4/28/2021    VIDEO ARTHROSCOPY RIGHT SHOULDER, ROTATOR CUFF REPAIR, DEBRIDEMENT performed by Davida Varela MD at [address] [place] [address] ENDOSCOPY  2011    UPPER GASTROINTESTINAL ENDOSCOPY  2015    [address] stretch       Social History     Socioeconomic History    Marital status:      Spouse name: Not on file    Number of children: 3    Years of education: Not on file    Highest education level: Not on file   Occupational History    Occupation:    Tobacco Use    Smoking status: Never Smoker    Smokeless tobacco: Never Used   Vaping Use    Vaping Use: Never used   Substance and Sexual Activity    Alcohol use: Yes     Comment: 1 -2 drinks per month    Drug use: Not Currently     Types: Marijuana Constanza Kos)     Comment: quit 2016    Sexual activity: Never   Other Topics Concern    Not on file   Social History Narrative    Not on file     Social Determinants of Health     Financial Resource Strain: Low Risk     Difficulty of Paying Living Expenses: Not hard at all   Food Insecurity: No Food Insecurity    Worried About [facility] in the Last Year: Never true    [facility] in the Last Year: Never true   Transportation Needs:     Lack of Transportation (Medical): Not on file    Lack of Transportation (Non-Medical):  Not on file   Physical Activity:     Days of Exercise per Week: Not on file    Minutes of Exercise per Session: Not on file   Stress:     Feeling of Stress : Not on file   Social Connections:     Frequency of Communication with Friends and Family: Not on file    Frequency of Social Gatherings with Friends and Family: Not on file    Attends Rastafari Services: Not on file    Active Member of Clubs or Organizations: Not on file    Attends Club or Organization Meetings: Not on file    Marital Status: Not on file   Intimate Partner Violence:     Fear of Current or Ex-Partner: Not on file    Emotionally Abused: Not on file    Physically Abused: Not on file    Sexually Abused: Not on file   Housing Stability:     Unable to Pay for Housing in the Last Year: Not on file    Number of [place] in the Last Year: Not on file    Unstable Housing in the Last Year: Not on file       Family History   Problem Relation Age of Onset    Arthritis Mother     Obesity Mother    [place] Anemia Mother    [place] Other Mother         pulmonary embolism    Arthritis Father     Arrhythmia Father     Diabetes Other     Diabetes Paternal Grandfather     Cancer Neg Hx     Breast Cancer Neg Hx     Colon Cancer Neg Hx        Vitals:    11/17/21 1407   BP: 136/78   Pulse: 53   Resp: 14   Temp: 97.3 °F (36.3 °C)   SpO2: 95%       Wt Readings from Last 3 Encounters:   11/17/21 289 lb (131.1 kg)   11/12/21 293 lb (132.9 kg)   11/02/21 296 lb (134.3 kg)       Review of Systems   Constitutional: Negative for chills and fever. Respiratory: Negative for cough and shortness of breath. Cardiovascular: Negative for chest pain. Gastrointestinal: Negative for abdominal pain and nausea. Loose stool   Genitourinary: Positive for dysuria. Negative for hematuria. Voiding frequency  Back ache.  frequent feeling.having to void     Neurological: Negative. Psychiatric/Behavioral: Negative for self-injury and suicidal ideas. Objective:   Physical Exam  Vitals reviewed. Constitutional:       Appearance: She is obese. Pulmonary:      Effort: Pulmonary effort is normal.      Breath sounds: Normal breath sounds. Abdominal:      General: There is no distension. Palpations: Abdomen is soft. Tenderness: There is no abdominal tenderness. Musculoskeletal:      Right lower leg: No edema. Left lower leg: No edema. Skin:     General: Skin is dry.    Neurological: TIMES A DAY AS NEEDED FOR NECK PAIN 90 tablet 3    diclofenac (VOLTAREN) 50 MG EC tablet Take 1 tablet by mouth 2 times daily (with meals) 60 tablet 3    Acetaminophen (TYLENOL PO) Take 650 mg by mouth daily as needed       MELATONIN PO Take 20 mg by mouth nightly as needed       ibuprofen (ADVIL;MOTRIN) 600 MG tablet TAKE 1 TABLET BY MOUTH EVERY 8 HOURS AS NEEDED FOR PAIN 40 tablet 1    furosemide (LASIX) 20 MG tablet TAKE 1 TABLET BY MOUTH DAILY AS NEEDED (FOR SWELLING) (Patient not taking: Reported on 11/17/2021) 30 tablet 1     No current facility-administered medications for this visit.

## 2021-11-18 ENCOUNTER — TELEPHONE (OUTPATIENT)
Dept: FAMILY MEDICINE CLINIC | Age: 56
End: 2021-11-18

## 2021-11-18 DIAGNOSIS — B96.29 URINARY TRACT INFECTION DUE TO EXTENDED-SPECTRUM BETA LACTAMASE (ESBL) PRODUCING ESCHERICHIA COLI: ICD-10-CM

## 2021-11-18 DIAGNOSIS — N39.0 URINARY TRACT INFECTION DUE TO EXTENDED-SPECTRUM BETA LACTAMASE (ESBL) PRODUCING ESCHERICHIA COLI: ICD-10-CM

## 2021-11-18 DIAGNOSIS — Z16.12 URINARY TRACT INFECTION DUE TO EXTENDED-SPECTRUM BETA LACTAMASE (ESBL) PRODUCING ESCHERICHIA COLI: ICD-10-CM

## 2021-11-18 NOTE — TELEPHONE ENCOUNTER
Patient wanted to let you know that Dr Marcella Knox called patient and stated if this antibiotic does not work she is going to have to come in 1 time a day for a week to get IV therapy.  She wanted to let you know

## 2021-11-19 ENCOUNTER — HOSPITAL ENCOUNTER (OUTPATIENT)
Dept: NURSING | Age: 56
Setting detail: INFUSION SERIES
Discharge: HOME | End: 2021-11-19
Payer: MEDICARE

## 2021-11-19 VITALS
SYSTOLIC BLOOD PRESSURE: 130 MMHG | DIASTOLIC BLOOD PRESSURE: 82 MMHG | HEIGHT: 66 IN | WEIGHT: 260 LBS | HEART RATE: 61 BPM | RESPIRATION RATE: 16 BRPM | OXYGEN SATURATION: 97 % | BODY MASS INDEX: 41.78 KG/M2 | TEMPERATURE: 97.2 F

## 2021-11-19 DIAGNOSIS — B96.29 URINARY TRACT INFECTION DUE TO EXTENDED-SPECTRUM BETA LACTAMASE (ESBL) PRODUCING ESCHERICHIA COLI: Primary | ICD-10-CM

## 2021-11-19 DIAGNOSIS — Z16.12 URINARY TRACT INFECTION DUE TO EXTENDED-SPECTRUM BETA LACTAMASE (ESBL) PRODUCING ESCHERICHIA COLI: Primary | ICD-10-CM

## 2021-11-19 DIAGNOSIS — N39.0 URINARY TRACT INFECTION WITHOUT HEMATURIA, SITE UNSPECIFIED: Primary | ICD-10-CM

## 2021-11-19 DIAGNOSIS — N39.0 URINARY TRACT INFECTION DUE TO EXTENDED-SPECTRUM BETA LACTAMASE (ESBL) PRODUCING ESCHERICHIA COLI: Primary | ICD-10-CM

## 2021-11-19 PROCEDURE — 6360000002 HC RX W HCPCS: Performed by: OBSTETRICS & GYNECOLOGY

## 2021-11-19 PROCEDURE — 99211 OFF/OP EST MAY X REQ PHY/QHP: CPT

## 2021-11-19 PROCEDURE — 2580000003 HC RX 258: Performed by: OBSTETRICS & GYNECOLOGY

## 2021-11-19 PROCEDURE — 96365 THER/PROPH/DIAG IV INF INIT: CPT

## 2021-11-19 RX ADMIN — ERTAPENEM SODIUM 1000 MG: 1 INJECTION, POWDER, LYOPHILIZED, FOR SOLUTION INTRAMUSCULAR; INTRAVENOUS at 13:15

## 2021-11-19 ASSESSMENT — PAIN - FUNCTIONAL ASSESSMENT: PAIN_FUNCTIONAL_ASSESSMENT: 0-10

## 2021-11-19 ASSESSMENT — PAIN DESCRIPTION - DESCRIPTORS: DESCRIPTORS: DULL;ACHING

## 2021-11-19 NOTE — PROGRESS NOTES
Pt tolerates infusion well discharge instrcutions given and verbalizes understanding discharged with instructions to go to  over weekend for infusions and return to [facility] on Monday.  Discharged to home in stable condition

## 2021-11-19 NOTE — TELEPHONE ENCOUNTER
Please tell the patient that I hope this works. We should probably get a urine culture done on Monday next week so we can see if it is working. I will put the order in and she can come into the lab on Monday, without an appointment and we can see how she is doing.

## 2021-11-20 ENCOUNTER — HOSPITAL ENCOUNTER (OUTPATIENT)
Age: 56
Discharge: HOME | End: 2021-11-20
Attending: OBSTETRICS & GYNECOLOGY | Admitting: OBSTETRICS & GYNECOLOGY
Payer: MEDICARE

## 2021-11-20 VITALS
HEART RATE: 58 BPM | WEIGHT: 250 LBS | SYSTOLIC BLOOD PRESSURE: 156 MMHG | OXYGEN SATURATION: 96 % | DIASTOLIC BLOOD PRESSURE: 88 MMHG | RESPIRATION RATE: 18 BRPM | HEIGHT: 66 IN | TEMPERATURE: 97.7 F | BODY MASS INDEX: 40.18 KG/M2

## 2021-11-20 DIAGNOSIS — Z16.12 URINARY TRACT INFECTION DUE TO EXTENDED-SPECTRUM BETA LACTAMASE (ESBL) PRODUCING ESCHERICHIA COLI: ICD-10-CM

## 2021-11-20 DIAGNOSIS — N39.0 URINARY TRACT INFECTION DUE TO EXTENDED-SPECTRUM BETA LACTAMASE (ESBL) PRODUCING ESCHERICHIA COLI: ICD-10-CM

## 2021-11-20 DIAGNOSIS — B96.29 URINARY TRACT INFECTION DUE TO EXTENDED-SPECTRUM BETA LACTAMASE (ESBL) PRODUCING ESCHERICHIA COLI: ICD-10-CM

## 2021-11-20 PROCEDURE — 2580000003 HC RX 258: Performed by: OBSTETRICS & GYNECOLOGY

## 2021-11-20 PROCEDURE — 6360000002 HC RX W HCPCS: Performed by: OBSTETRICS & GYNECOLOGY

## 2021-11-20 RX ADMIN — ERTAPENEM SODIUM 1000 MG: 1 INJECTION, POWDER, LYOPHILIZED, FOR SOLUTION INTRAMUSCULAR; INTRAVENOUS at 11:37

## 2021-11-21 ENCOUNTER — HOSPITAL ENCOUNTER (OUTPATIENT)
Age: 56
Discharge: HOME | End: 2021-11-21
Attending: OBSTETRICS & GYNECOLOGY | Admitting: OBSTETRICS & GYNECOLOGY
Payer: MEDICARE

## 2021-11-21 VITALS
DIASTOLIC BLOOD PRESSURE: 84 MMHG | RESPIRATION RATE: 16 BRPM | HEART RATE: 63 BPM | TEMPERATURE: 97.8 F | SYSTOLIC BLOOD PRESSURE: 144 MMHG

## 2021-11-21 DIAGNOSIS — B96.29 URINARY TRACT INFECTION DUE TO EXTENDED-SPECTRUM BETA LACTAMASE (ESBL) PRODUCING ESCHERICHIA COLI: ICD-10-CM

## 2021-11-21 DIAGNOSIS — N39.0 URINARY TRACT INFECTION DUE TO EXTENDED-SPECTRUM BETA LACTAMASE (ESBL) PRODUCING ESCHERICHIA COLI: ICD-10-CM

## 2021-11-21 DIAGNOSIS — Z16.12 URINARY TRACT INFECTION DUE TO EXTENDED-SPECTRUM BETA LACTAMASE (ESBL) PRODUCING ESCHERICHIA COLI: ICD-10-CM

## 2021-11-21 PROCEDURE — 2580000003 HC RX 258: Performed by: OBSTETRICS & GYNECOLOGY

## 2021-11-21 PROCEDURE — 6360000002 HC RX W HCPCS: Performed by: OBSTETRICS & GYNECOLOGY

## 2021-11-21 RX ADMIN — ERTAPENEM SODIUM 1000 MG: 1 INJECTION, POWDER, LYOPHILIZED, FOR SOLUTION INTRAMUSCULAR; INTRAVENOUS at 13:03

## 2021-11-22 ENCOUNTER — TELEPHONE (OUTPATIENT)
Dept: OBGYN CLINIC | Age: 56
End: 2021-11-22

## 2021-11-22 ENCOUNTER — HOSPITAL ENCOUNTER (OUTPATIENT)
Dept: NURSING | Age: 56
Setting detail: INFUSION SERIES
Discharge: HOME | End: 2021-11-22
Payer: MEDICARE

## 2021-11-22 ENCOUNTER — CARE COORDINATION (OUTPATIENT)
Dept: CASE MANAGEMENT | Age: 56
End: 2021-11-22

## 2021-11-22 VITALS
SYSTOLIC BLOOD PRESSURE: 125 MMHG | WEIGHT: 250 LBS | HEIGHT: 66 IN | BODY MASS INDEX: 40.18 KG/M2 | DIASTOLIC BLOOD PRESSURE: 79 MMHG | TEMPERATURE: 96.5 F | RESPIRATION RATE: 18 BRPM | HEART RATE: 53 BPM | OXYGEN SATURATION: 97 %

## 2021-11-22 DIAGNOSIS — B96.29 URINARY TRACT INFECTION DUE TO EXTENDED-SPECTRUM BETA LACTAMASE (ESBL) PRODUCING ESCHERICHIA COLI: Primary | ICD-10-CM

## 2021-11-22 DIAGNOSIS — Z16.12 URINARY TRACT INFECTION DUE TO EXTENDED-SPECTRUM BETA LACTAMASE (ESBL) PRODUCING ESCHERICHIA COLI: Primary | ICD-10-CM

## 2021-11-22 DIAGNOSIS — N39.0 URINARY TRACT INFECTION DUE TO EXTENDED-SPECTRUM BETA LACTAMASE (ESBL) PRODUCING ESCHERICHIA COLI: Primary | ICD-10-CM

## 2021-11-22 PROCEDURE — 2580000003 HC RX 258: Performed by: OBSTETRICS & GYNECOLOGY

## 2021-11-22 PROCEDURE — 99211 OFF/OP EST MAY X REQ PHY/QHP: CPT

## 2021-11-22 PROCEDURE — 6360000002 HC RX W HCPCS: Performed by: OBSTETRICS & GYNECOLOGY

## 2021-11-22 PROCEDURE — 96365 THER/PROPH/DIAG IV INF INIT: CPT

## 2021-11-22 RX ADMIN — ERTAPENEM SODIUM 1000 MG: 1 INJECTION, POWDER, LYOPHILIZED, FOR SOLUTION INTRAMUSCULAR; INTRAVENOUS at 09:14

## 2021-11-22 ASSESSMENT — PAIN - FUNCTIONAL ASSESSMENT: PAIN_FUNCTIONAL_ASSESSMENT: 0-10

## 2021-11-22 NOTE — PROGRESS NOTES
Pt tolerated antibiotic infusion well with no noted side effects. IV removed and pt discharged ambulatory in baseline condition.

## 2021-11-22 NOTE — TELEPHONE ENCOUNTER
Received call from [facility] regarding PA for Tramadol. Clinical questions answered and ICD code given. PA will be processed. No prior notes found on PA start.  [facility]

## 2021-11-23 ENCOUNTER — HOSPITAL ENCOUNTER (OUTPATIENT)
Dept: NURSING | Age: 56
Setting detail: INFUSION SERIES
Discharge: HOME | End: 2021-11-23
Payer: MEDICARE

## 2021-11-23 VITALS
TEMPERATURE: 97.1 F | RESPIRATION RATE: 16 BRPM | DIASTOLIC BLOOD PRESSURE: 71 MMHG | SYSTOLIC BLOOD PRESSURE: 142 MMHG | HEART RATE: 53 BPM

## 2021-11-23 DIAGNOSIS — N39.0 URINARY TRACT INFECTION DUE TO EXTENDED-SPECTRUM BETA LACTAMASE (ESBL) PRODUCING ESCHERICHIA COLI: Primary | ICD-10-CM

## 2021-11-23 DIAGNOSIS — Z16.12 URINARY TRACT INFECTION DUE TO EXTENDED-SPECTRUM BETA LACTAMASE (ESBL) PRODUCING ESCHERICHIA COLI: Primary | ICD-10-CM

## 2021-11-23 DIAGNOSIS — B96.29 URINARY TRACT INFECTION DUE TO EXTENDED-SPECTRUM BETA LACTAMASE (ESBL) PRODUCING ESCHERICHIA COLI: Primary | ICD-10-CM

## 2021-11-23 PROCEDURE — 6360000002 HC RX W HCPCS: Performed by: OBSTETRICS & GYNECOLOGY

## 2021-11-23 PROCEDURE — 99211 OFF/OP EST MAY X REQ PHY/QHP: CPT

## 2021-11-23 PROCEDURE — 96365 THER/PROPH/DIAG IV INF INIT: CPT

## 2021-11-23 PROCEDURE — 2580000003 HC RX 258: Performed by: OBSTETRICS & GYNECOLOGY

## 2021-11-23 RX ORDER — LIDOCAINE AND PRILOCAINE 25; 25 MG/G; MG/G
CREAM TOPICAL
Qty: 30 G | Refills: 3 | Status: SHIPPED | OUTPATIENT
Start: 2021-11-23 | End: 2022-05-09

## 2021-11-23 RX ADMIN — ERTAPENEM SODIUM 1000 MG: 1 INJECTION, POWDER, LYOPHILIZED, FOR SOLUTION INTRAMUSCULAR; INTRAVENOUS at 09:57

## 2021-11-23 ASSESSMENT — PAIN SCALES - GENERAL: PAINLEVEL_OUTOF10: 0

## 2021-11-24 ENCOUNTER — TELEPHONE (OUTPATIENT)
Dept: FAMILY MEDICINE CLINIC | Age: 56
End: 2021-11-24

## 2021-11-24 ENCOUNTER — HOSPITAL ENCOUNTER (OUTPATIENT)
Dept: NURSING | Age: 56
Setting detail: INFUSION SERIES
End: 2021-11-24

## 2021-11-24 NOTE — TELEPHONE ENCOUNTER
Started on antibiotics IV doing ok She got Covid booster and shingrix vaccine at same time and has been ill since.  Has one more IV treatment to go wait ing til she gets feeling better from vaccines

## 2021-11-30 RX ORDER — METHOCARBAMOL 500 MG/1
TABLET, FILM COATED ORAL
Qty: 90 TABLET | Refills: 3 | Status: SHIPPED | OUTPATIENT
Start: 2021-11-30 | End: 2022-03-28

## 2021-12-01 ENCOUNTER — OFFICE VISIT (OUTPATIENT)
Dept: OBGYN CLINIC | Age: 56
End: 2021-12-01
Payer: MEDICARE

## 2021-12-01 VITALS
TEMPERATURE: 97.1 F | WEIGHT: 289.2 LBS | SYSTOLIC BLOOD PRESSURE: 144 MMHG | BODY MASS INDEX: 46.68 KG/M2 | HEART RATE: 58 BPM | DIASTOLIC BLOOD PRESSURE: 82 MMHG

## 2021-12-01 DIAGNOSIS — N39.0 URINARY TRACT INFECTION WITHOUT HEMATURIA, SITE UNSPECIFIED: ICD-10-CM

## 2021-12-01 DIAGNOSIS — E66.01 MORBID OBESITY (HCC): ICD-10-CM

## 2021-12-01 DIAGNOSIS — N61.1 BREAST ABSCESS: Primary | ICD-10-CM

## 2021-12-01 DIAGNOSIS — Z98.890 S/P ROBOT-ASSISTED SURGICAL PROCEDURE: ICD-10-CM

## 2021-12-01 PROCEDURE — 10060 I&D ABSCESS SIMPLE/SINGLE: CPT | Performed by: OBSTETRICS & GYNECOLOGY

## 2021-12-01 RX ORDER — CEPHALEXIN 500 MG/1
500 CAPSULE ORAL 3 TIMES DAILY
Qty: 30 CAPSULE | Refills: 0 | Status: SHIPPED | OUTPATIENT
Start: 2021-12-01 | End: 2021-12-11

## 2021-12-02 ENCOUNTER — TELEPHONE (OUTPATIENT)
Dept: ORTHOPEDIC SURGERY | Age: 56
End: 2021-12-02

## 2021-12-02 LAB
BILIRUBIN URINE: ABNORMAL
BLOOD, URINE: NEGATIVE
CLARITY: ABNORMAL
COLOR: ABNORMAL
COMMENT UA: ABNORMAL
CRYSTALS, UA: ABNORMAL /HPF
EPITHELIAL CELLS, UA: 10 /HPF (ref 0–5)
GLUCOSE URINE: NEGATIVE MG/DL
KETONES, URINE: NEGATIVE MG/DL
LEUKOCYTE ESTERASE, URINE: NEGATIVE
MICROSCOPIC EXAMINATION: YES
MUCUS: ABNORMAL /LPF
NITRITE, URINE: NEGATIVE
PH UA: 5.5 (ref 5–8)
PROTEIN UA: 30 MG/DL
RBC UA: 1 /HPF (ref 0–4)
SPECIFIC GRAVITY UA: >1.03 (ref 1–1.03)
URINE CULTURE, ROUTINE: NORMAL
URINE TYPE: ABNORMAL
UROBILINOGEN, URINE: 0.2 E.U./DL
WBC UA: 3 /HPF (ref 0–5)

## 2021-12-02 NOTE — TELEPHONE ENCOUNTER
Online scheduling  This patient scheduled her 12/9/21 3:30pm-- There's currently 2 np appt's in that slot. Is it ok to leave her there or should she be rescheduled?

## 2021-12-04 LAB
GRAM STAIN RESULT: ABNORMAL
ORGANISM: ABNORMAL
WOUND/ABSCESS: ABNORMAL

## 2021-12-05 ASSESSMENT — ENCOUNTER SYMPTOMS
COLOR CHANGE: 1
ABDOMINAL PAIN: 0
SHORTNESS OF BREATH: 0
NAUSEA: 0
VOMITING: 0
ABDOMINAL DISTENTION: 0
DIARRHEA: 0

## 2021-12-05 NOTE — PROGRESS NOTES
Subjective:      Patient ID: Radha Cervantes is a 64 y.o. female. HPI  63 y/o  female presents for post-operative visit and evaluation secondary to breast pain/abscess. Patient is 6 weeks s/p Davinci robotic total laparoscopic hysterectomy, bilateral salpingo-oophorectomy, diagnostic cystoscopy, and lysis of adhesions secondary to postmenopausal bleeding, proliferative endometrial glands, and thickened endometrium. Surgery was complicated by BMI and patient history of  x 3. Postop course has been complicated by fatigue, shortness of breath, fever, headache, cramping, low back pain and spotting/brown vaginal discharge. Since last visit, patient treated for  ESBL E coli with IV treatments with ertapenem. Patient has noted significant improvement in symptoms since treatment. Patient presents today with new problem--breast abscess right breast.  Patient notes similar issues in the past.    Menopause occurred 5 years ago. History is positive for right oophorectomy 10 years ago secondary to ovarian torsion. Medical history is significant for hypertension, restless legs, anxiety, depression and several orthopedic concerns. Surgical history is positive for  x 3, D&C, and right oophorectomy. Last pap smear was 2021--endometrial cells in a woman >39years of age. Review of Systems   Constitutional: Negative. Negative for activity change, appetite change, chills, fatigue, fever and unexpected weight change. Respiratory: Negative for shortness of breath. Cardiovascular: Negative for chest pain. Gastrointestinal: Negative for abdominal distention, abdominal pain, diarrhea, nausea and vomiting. Genitourinary: Negative for difficulty urinating, dysuria, hematuria, pelvic pain, vaginal bleeding, vaginal discharge and vaginal pain. Skin: Positive for color change. Psychiatric/Behavioral: Negative.         Objective:   Physical Exam  Vitals and nursing note reviewed. Exam conducted with a chaperone present. Constitutional:       General: She is not in acute distress. Appearance: She is well-developed. She is not toxic-appearing or diaphoretic. Pulmonary:      Effort: Pulmonary effort is normal.   Chest:   Breasts:      Breasts are asymmetrical.      Right: Swelling, skin change and tenderness present. No bleeding, inverted nipple, mass or nipple discharge. Left: No swelling, bleeding, inverted nipple, mass, nipple discharge, skin change or tenderness. Comments: Breast abscess noted--2 cm diameter. Erythema noted surrounding area. INCISION & DRAINAGE OF ABSCESS    PHYSICIAN: DANIEL Del Valle D.O.    LOCATION:  Right breast 5 o'clock    EBL: minimal    ANESTHETIC: 3 cc 1% lidocaine  Risks and benefits of the procedure were discussed at length. Written and informed consent obtained. The area was identified and cleansed with Betadinex3. The area was then infiltrated with 5 cc of 1% lidocaine without epinephrine. An incision was then performed utilizing a 15 scalpel. The abscess was then drained and irrigated. Loculations were then broken down and further irrigation performed. Excellent hemostasis noted. The area was dressed with a sterile bandage. Abdominal:      General: There is no distension. Palpations: Abdomen is soft. Tenderness: There is no abdominal tenderness. There is no guarding. Comments: Incisions clean dry intact. No apparent signs of infection or compromise. Skin:     General: Skin is warm and dry. Neurological:      Mental Status: She is alert and oriented to person, place, and time. Psychiatric:         Behavior: Behavior normal.         Thought Content: Thought content normal.         Judgment: Judgment normal.         Assessment:       Diagnosis Orders   1. Breast abscess  91463 - [place] DRAIN SKIN ABSCESS SIMPLE   2.  Urinary tract infection without hematuria, site unspecified  Culture, Urine URINALYSIS WITH MICROSCOPIC   3. S/P robot-assisted surgical procedure     4. Morbid obesity ([address]             Plan:      Orders Placed This Encounter   Procedures    Culture, Urine    Culture, Wound    URINALYSIS WITH MICROSCOPIC    66407 - [place] DRAIN SKIN ABSCESS SIMPLE     Rx Keflex  Follow up prn and 1 week for re-evaluation of breast abscess.            Chaparro Del Valle, DO

## 2021-12-06 ASSESSMENT — ENCOUNTER SYMPTOMS
CONSTIPATION: 0
VOMITING: 0
DIARRHEA: 0
ABDOMINAL DISTENTION: 0
NAUSEA: 0
ABDOMINAL PAIN: 0
SHORTNESS OF BREATH: 0

## 2021-12-06 NOTE — PROGRESS NOTES
Subjective:      Patient ID: Annie Lofton is a 64 y.o. female. HPI  63 y/o Chloe James female presents for post-operative visit. Patient is 2 weeks s/p Davinci robotic total laparoscopic hysterectomy, right salpingo-oophorectomy, lysis of adhesions, and diagnostic cystoscopy. Patient is established patient of Dr. Rema Wallace. Surgery was performed secondary to proliferative endometrial glands on D&C and postmenopausal bleeding. Since surgery, patient has noted low grade temperature/fever (99.3 degrees F), frequent urination, some spotting, pelvic cramping (no pain), shortness of breath with increased activity, and frequent BMs. Menopause occurred 5 years ago. History is positive for right oophorectomy 10 years ago secondary to ovarian torsion. Medical history is significant for hypertension, restless legs, anxiety, depression and several orthopedic concerns. Surgical history is positive for  x 3, D&C, and right oophorectomy. Last pap smear was 2021--endometrial cells in a woman >39years of age. Review of Systems   Constitutional: Positive for fatigue and fever. Negative for activity change, appetite change and chills. Respiratory: Negative for shortness of breath. Cardiovascular: Negative for chest pain. Gastrointestinal: Negative for abdominal distention, abdominal pain, constipation, diarrhea (frequent BMs), nausea and vomiting. Genitourinary: Positive for difficulty urinating and frequency. Negative for dysuria, hematuria, urgency, vaginal bleeding, vaginal discharge and vaginal pain. Decreased urine volume: a little spotting. Pelvic pain: cramping. Psychiatric/Behavioral: Negative. Objective:   Physical Exam  Vitals and nursing note reviewed. Constitutional:       General: She is not in acute distress. Appearance: She is well-developed. She is not ill-appearing, toxic-appearing or diaphoretic.    Cardiovascular:      Rate and Rhythm: Normal rate and regular rhythm. Pulmonary:      Effort: Pulmonary effort is normal. No respiratory distress. Breath sounds: No wheezing or rales. Abdominal:      General: There is no distension. Palpations: Abdomen is soft. Tenderness: There is no abdominal tenderness. There is no guarding. Comments: Incisions clean dry intact. No apparent signs of infection or compromise. Skin:     General: Skin is warm and dry. Neurological:      Mental Status: She is alert and oriented to person, place, and time. Psychiatric:         Behavior: Behavior normal.         Thought Content: Thought content normal.         Judgment: Judgment normal.         Assessment:       Diagnosis Orders   1. Postop check     2. Pelvic pain in female  URINALYSIS WITH MICROSCOPIC    Culture, Urine   3. S/P robot-assisted surgical procedure     4. Morbid obesity ([address]             Plan:      Orders Placed This Encounter   Procedures    Culture, Urine    URINALYSIS WITH MICROSCOPIC     Postop care, restrictions reviewed. Follow up prn and 4 weeks for post op visit.            Tonya Del Valle DO

## 2021-12-14 ENCOUNTER — OFFICE VISIT (OUTPATIENT)
Dept: OBGYN CLINIC | Age: 56
End: 2021-12-14

## 2021-12-14 VITALS
SYSTOLIC BLOOD PRESSURE: 110 MMHG | OXYGEN SATURATION: 97 % | HEART RATE: 51 BPM | BODY MASS INDEX: 47.13 KG/M2 | WEIGHT: 292 LBS | DIASTOLIC BLOOD PRESSURE: 60 MMHG | TEMPERATURE: 97.7 F

## 2021-12-14 DIAGNOSIS — Z48.89 ENCOUNTER FOR POSTOPERATIVE CARE: Primary | ICD-10-CM

## 2021-12-14 DIAGNOSIS — E66.01 MORBID OBESITY (HCC): ICD-10-CM

## 2021-12-14 DIAGNOSIS — Z98.890 S/P ROBOT-ASSISTED SURGICAL PROCEDURE: ICD-10-CM

## 2021-12-14 PROCEDURE — 99024 POSTOP FOLLOW-UP VISIT: CPT | Performed by: OBSTETRICS & GYNECOLOGY

## 2021-12-16 ENCOUNTER — OFFICE VISIT (OUTPATIENT)
Dept: ORTHOPEDIC SURGERY | Age: 56
End: 2021-12-16
Payer: MEDICARE

## 2021-12-16 VITALS — WEIGHT: 292 LBS | BODY MASS INDEX: 46.93 KG/M2 | HEIGHT: 66 IN

## 2021-12-16 DIAGNOSIS — E66.01 CLASS 3 SEVERE OBESITY DUE TO EXCESS CALORIES WITH SERIOUS COMORBIDITY AND BODY MASS INDEX (BMI) OF 45.0 TO 49.9 IN ADULT (HCC): ICD-10-CM

## 2021-12-16 DIAGNOSIS — M25.562 LEFT KNEE PAIN, UNSPECIFIED CHRONICITY: Primary | ICD-10-CM

## 2021-12-16 DIAGNOSIS — M17.12 PRIMARY OSTEOARTHRITIS OF LEFT KNEE: ICD-10-CM

## 2021-12-16 PROCEDURE — 1036F TOBACCO NON-USER: CPT | Performed by: ORTHOPAEDIC SURGERY

## 2021-12-16 PROCEDURE — G8482 FLU IMMUNIZE ORDER/ADMIN: HCPCS | Performed by: ORTHOPAEDIC SURGERY

## 2021-12-16 PROCEDURE — G8427 DOCREV CUR MEDS BY ELIG CLIN: HCPCS | Performed by: ORTHOPAEDIC SURGERY

## 2021-12-16 PROCEDURE — 99214 OFFICE O/P EST MOD 30 MIN: CPT | Performed by: ORTHOPAEDIC SURGERY

## 2021-12-16 PROCEDURE — 20611 DRAIN/INJ JOINT/BURSA W/US: CPT | Performed by: ORTHOPAEDIC SURGERY

## 2021-12-16 PROCEDURE — 3017F COLORECTAL CA SCREEN DOC REV: CPT | Performed by: ORTHOPAEDIC SURGERY

## 2021-12-16 PROCEDURE — G8417 CALC BMI ABV UP PARAM F/U: HCPCS | Performed by: ORTHOPAEDIC SURGERY

## 2021-12-16 RX ORDER — TRIAMCINOLONE ACETONIDE 40 MG/ML
80 INJECTION, SUSPENSION INTRA-ARTICULAR; INTRAMUSCULAR ONCE
Status: COMPLETED | OUTPATIENT
Start: 2021-12-16 | End: 2021-12-16

## 2021-12-16 RX ORDER — BUPIVACAINE HYDROCHLORIDE 2.5 MG/ML
10 INJECTION, SOLUTION INFILTRATION; PERINEURAL ONCE
Status: COMPLETED | OUTPATIENT
Start: 2021-12-16 | End: 2021-12-16

## 2021-12-16 RX ORDER — LIDOCAINE HYDROCHLORIDE 10 MG/ML
40 INJECTION, SOLUTION INFILTRATION; PERINEURAL ONCE
Status: COMPLETED | OUTPATIENT
Start: 2021-12-16 | End: 2021-12-16

## 2021-12-16 RX ADMIN — TRIAMCINOLONE ACETONIDE 80 MG: 40 INJECTION, SUSPENSION INTRA-ARTICULAR; INTRAMUSCULAR at 13:38

## 2021-12-16 RX ADMIN — BUPIVACAINE HYDROCHLORIDE 10 MG: 2.5 INJECTION, SOLUTION INFILTRATION; PERINEURAL at 13:38

## 2021-12-16 RX ADMIN — LIDOCAINE HYDROCHLORIDE 40 MG: 10 INJECTION, SOLUTION INFILTRATION; PERINEURAL at 13:39

## 2021-12-16 SDOH — HEALTH STABILITY: PHYSICAL HEALTH: ON AVERAGE, HOW MANY MINUTES DO YOU ENGAGE IN EXERCISE AT THIS LEVEL?: 0 MIN

## 2021-12-16 SDOH — HEALTH STABILITY: PHYSICAL HEALTH: ON AVERAGE, HOW MANY DAYS PER WEEK DO YOU ENGAGE IN MODERATE TO STRENUOUS EXERCISE (LIKE A BRISK WALK)?: 0 DAYS

## 2021-12-16 NOTE — PROGRESS NOTES
Dr Adrianne Lisa      Date /Time 2021       1:21 PM EST  Name Kerrie Gifford             1965   Location  [facility]  MRN [id number]                Chief Complaint   Patient presents with    Knee Pain     LEFT KNEE         History of Present Illness  Kerrie Gifford is a 64 y.o. female who presents with  left knee pain. Sent in consultation by Kate Cain DO,. Occupation: Disability from shoulder surgery  Injury Mechanism:  none. Worker's Comp. & legal issues:   none. Previous Treatments: Ice, Heat and NSAIDs    Patient presents to the office today for a new problem. Patient here with a chief complaint of left knee pain. Patient's left knee has been painful off and on for years. She has previously seen Dr. Breanna Morse. The cortisone injection has worked but is worn off.   She is here with pain mostly concentrated medial.      Past History  Past Medical History:   Diagnosis Date    Anxiety     Depression     Endometriosis     GERD (gastroesophageal reflux disease)     Hypertension     Osteoarthritis     Restless leg syndrome      Past Surgical History:   Procedure Laterality Date    ANUS SURGERY  2018    fissure     SECTION      x3    COLONOSCOPY      DILATION AND CURETTAGE OF UTERUS N/A 6/3/2021    VIDEO HYSTEROSCOPY DILATATION AND CURETTAGE, POSSIBLE MYOSURE, POSSIBLE POLYPECTOMY performed by Samy Bello DO at [address]       right wrist    HERNIA REPAIR  2018    LAPAROSCOPIC PARAESOPHAGEAL HERNIA REPAIR WITH MESH    HYSTERECTOMY N/A 10/21/2021    ROBOTIC ASSISTED LAPAROSCOPIC HYSTERECTOMY WITH RIGHT SALPINGECTOMY, RIGHT OOPHORECTOMY, CYSTOSCOPY, LYSIS OF ADHESIONS  CPT CODE - 43920 performed by Naresh Ward DO at [address] ARTHROSCOPY Left 11/15/12    ARTHROSCOPY LEFT KNEE WITH SYOVECTOMY AND CHONDROPLASTY    OVARY REMOVAL Right     ROTATOR CUFF REPAIR Right 2020    EXAM UNDER ANESTHESIA VIDEO ARTHROSCOPY RIGHT SHOULDER, MINI- OPEN ROTATOR CUFF REPAIR, NEER ACROMIOPLASTY, LENO PROCEDURE WITH EXPAREL performed by Dre Hughes MD at [place] [address] ARTHROSCOPY Right 11/25/2020    VIDEO ARTHROSCOPY RIGHT SHOULDER, OPEN ROTATOR CUFF REPAIR, BICEPS TENOTOMY -BLOCK- performed by Daniela Carrillo MD at [place] [address] ARTHROSCOPY Right 2/24/2021    RIGHT SHOULDER ARTHROSCOPIC INCISION AND DRAINAGE performed by Daniela Carrillo MD at [place] [address] ARTHROSCOPY Right 4/28/2021    VIDEO ARTHROSCOPY RIGHT SHOULDER, ROTATOR CUFF REPAIR, DEBRIDEMENT performed by Daniela Carrillo MD at [address] [facility] ENDOSCOPY  2011    UPPER GASTROINTESTINAL ENDOSCOPY  2015    esophageasl stretch     Social History     Tobacco Use    Smoking status: Never Smoker    Smokeless tobacco: Never Used   Substance Use Topics    Alcohol use: Yes     Comment: 1 -2 drinks per month      Current Outpatient Medications on File Prior to Visit   Medication Sig Dispense Refill    UNABLE TO FIND daily CBD Oil, use in mornings for knee.  diclofenac (VOLTAREN) 50 MG EC tablet TAKE 1 TABLET BY MOUTH TWICE A DAY WITH MEALS 60 tablet 3    ALPRAZolam (XANAX) 1 MG tablet TAKE 1 TABLET BY MOUTH THREE TIMES A DAY AS NEEDED FOR ANXIETY 90 tablet 1    methocarbamol (ROBAXIN) 500 MG tablet TAKE 1 TABLET BY MOUTH 3 TIMES A DAY AS NEEDED FOR NECK PAIN 90 tablet 3    lidocaine-prilocaine (EMLA) 2.5-2.5 % cream Apply topically as needed. tid 30 g 3    rOPINIRole (REQUIP) 2 MG tablet TAKE 2 TABLETS AT NIGHT AND ONE IN THE MORNING FOR LEGS 90 tablet 2    ibuprofen (ADVIL;MOTRIN) 600 MG tablet TAKE 1 TABLET BY MOUTH EVERY 8 HOURS AS NEEDED FOR PAIN 40 tablet 1    DULoxetine (CYMBALTA) 60 MG extended release capsule TAKE 1 CAPSULE BY MOUTH EVERY DAY (Patient taking differently: Take 60 mg by mouth daily ) 30 capsule 5    atenolol (TENORMIN) 25 MG tablet TAKE 1 TABLET BY MOUTH EVERY DAY (Patient taking differently: Take 25 mg by mouth daily ) 30 tablet 5    albuterol sulfate HFA (PROVENTIL HFA) 108 (90 Base) MCG/ACT inhaler Inhale 2 puffs into the lungs every 6 hours as needed for Wheezing or Shortness of Breath (Space out to every 6 hours as symptoms improve) Space out to every 6 hours as symptoms improve. 18 g 0    lidocaine (XYLOCAINE) 5 % ointment APPLY TOPICALLY AS NEEDED TO AREA OF TENDERNESS UNDER ARM. (Patient not taking: Reported on 12/14/2021) 70.88 g 1    omeprazole (PRILOSEC) 40 MG delayed release capsule TAKE 1 CAPSULE BY MOUTH EVERY DAY (Patient taking differently: Take 40 mg by mouth nightly ) 30 capsule 6    Acetaminophen (TYLENOL PO) Take 650 mg by mouth daily as needed       MELATONIN PO Take 20 mg by mouth nightly as needed        No current facility-administered medications on file prior to visit. ASCVD 10-YEAR RISK SCORE  The 10-year ASCVD risk score (Braxton Castillo, et al., 2013) is: 2.7%    Values used to calculate the score:      Age: 64 years      Sex: Female      Is Non- : No      Diabetic: No      Tobacco smoker: No      Systolic Blood Pressure: 798 mmHg      Is BP treated: Yes      HDL Cholesterol: 29 mg/dL      Total Cholesterol: 137 mg/dL     Review of Systems  10-point ROS is negative other than HPI. Physical Exam  Based off 1997 Exam Criteria  Ht 5' 6\" (1.676 m)   Wt 292 lb (132.5 kg)   LMP 09/20/2017   BMI 47.13 kg/m²      Constitutional:       General: He is not in acute distress. Appearance: Normal appearance. Cardiovascular:      Rate and Rhythm: Normal rate and regular rhythm. Pulses: Normal pulses. Pulmonary:      Effort: Pulmonary effort is normal. No respiratory distress. Neurological:      Mental Status: He is alert and oriented to person, place, and time. Mental status is at baseline. Musculoskeletal:  Gait:  antalgic  Lumbar spine: There is no swelling, warmth, or erythema.  Range of motion is within normal limits. There is no paraspinal or spinous process tenderness. . The distal neurovascular exam is grossly intact and symmetric. Huber Hip: Examination of the right and left hip reveals intact skin. The patient demonstrates full painless range of motion with regards to flexion, abduction, internal and external rotation. There is no tenderness about the greater trochanter. L Knee: Examination of the left knee reveals intact skin. Tenderness over the medial joint line. Painful range of motion 5-100. Imaging  Left Knee: [employer]  Radiographs: X-rays were obtained and reviewed of the left knee. 4 views. AP standing, AP flexed standing, lateral, and skyline views. They demonstrate advanced osteoarthritis medially. Procedure:  Orders Placed This Encounter   Procedures    XR KNEE LEFT (MIN 4 VIEWS)     Standing Status:   Future     Number of Occurrences:   1     Standing Expiration Date:   12/16/2022     Order Specific Question:   Reason for exam:     Answer:   PAIN       Assessment and Plan  Terence Segovia was seen today for knee pain. Diagnoses and all orders for this visit:    Left knee pain, unspecified chronicity  -     XR KNEE LEFT (MIN 4 VIEWS); Future  -     US ARTHR/ASP/INJ MAJOR JNT/BURSA LEFT; Future    Primary osteoarthritis of left knee  -     US ARTHR/ASP/INJ MAJOR JNT/BURSA LEFT; Future    Class 3 severe obesity due to excess calories with serious comorbidity and body mass index (BMI) of 45.0 to 49.9 in [facility])    Other orders  -     bupivacaine (MARCAINE) 0.25 % injection 10 mg  -     lidocaine 1 % injection 40 mg  -     triamcinolone acetonide (KENALOG-40) injection 80 mg        Patient is suffering from left knee osteoarthritis. She will be given a left knee cortisone injection today and will follow up in 1 month to start viscosupplementation injections.     I discussed with Daniella Florez that her history, symptoms, signs and imaging are most consistent with knee arthritis. We reviewed the natural history of these conditions and treatment options ranging from conservative measures (rest, icing, activity modification, physical therapy, pain meds, cortisone injection) to surgical options. In terms of treatment, I recommended continuing with rest, icing, avoidance of painful activities, NSAIDs or pain meds as tolerated, and physical therapy. If these are not effective, cortisone injection can be considered. We discussed surgical options as well, should conservative measures fail. Electronically signed by Eric Carballo MD on 12/16/2021 at 1:21 PM  This dictation was generated by voice recognition computer software. Although all attempts are made to edit the dictation for accuracy, there may be errors in the transcription that are not intended.

## 2021-12-27 ASSESSMENT — PATIENT HEALTH QUESTIONNAIRE - PHQ9
SUM OF ALL RESPONSES TO PHQ QUESTIONS 1-9: 1
1. LITTLE INTEREST OR PLEASURE IN DOING THINGS: 0
SUM OF ALL RESPONSES TO PHQ QUESTIONS 1-9: 1
SUM OF ALL RESPONSES TO PHQ QUESTIONS 1-9: 1
SUM OF ALL RESPONSES TO PHQ9 QUESTIONS 1 & 2: 1
2. FEELING DOWN, DEPRESSED OR HOPELESS: 1

## 2021-12-28 ENCOUNTER — VIRTUAL VISIT (OUTPATIENT)
Dept: FAMILY MEDICINE CLINIC | Age: 56
End: 2021-12-28
Payer: MEDICARE

## 2021-12-28 DIAGNOSIS — J32.9 SINOBRONCHITIS: Primary | ICD-10-CM

## 2021-12-28 DIAGNOSIS — J40 SINOBRONCHITIS: Primary | ICD-10-CM

## 2021-12-28 PROCEDURE — 99213 OFFICE O/P EST LOW 20 MIN: CPT | Performed by: FAMILY MEDICINE

## 2021-12-28 PROCEDURE — G8427 DOCREV CUR MEDS BY ELIG CLIN: HCPCS | Performed by: FAMILY MEDICINE

## 2021-12-28 PROCEDURE — 3017F COLORECTAL CA SCREEN DOC REV: CPT | Performed by: FAMILY MEDICINE

## 2021-12-28 RX ORDER — FUROSEMIDE 20 MG/1
20 TABLET ORAL DAILY PRN
Qty: 30 TABLET | Refills: 1 | Status: SHIPPED | OUTPATIENT
Start: 2021-12-28 | End: 2022-03-16 | Stop reason: ALTCHOICE

## 2021-12-28 RX ORDER — DOXYCYCLINE HYCLATE 100 MG
100 TABLET ORAL 2 TIMES DAILY
Qty: 20 TABLET | Refills: 0 | Status: SHIPPED | OUTPATIENT
Start: 2021-12-28 | End: 2022-03-16 | Stop reason: ALTCHOICE

## 2021-12-28 RX ORDER — DEXTROMETHORPHAN HYDROBROMIDE AND PROMETHAZINE HYDROCHLORIDE 15; 6.25 MG/5ML; MG/5ML
5 SYRUP ORAL 4 TIMES DAILY PRN
Qty: 180 ML | Refills: 2 | Status: SHIPPED | OUTPATIENT
Start: 2021-12-28 | End: 2022-03-16 | Stop reason: ALTCHOICE

## 2021-12-28 ASSESSMENT — ENCOUNTER SYMPTOMS
DIARRHEA: 1
COUGH: 1
BLOOD IN STOOL: 0
SHORTNESS OF BREATH: 0
ABDOMINAL PAIN: 0
SORE THROAT: 1
RHINORRHEA: 1
SHORTNESS OF BREATH: 0
DIARRHEA: 1
CONSTIPATION: 0
VOMITING: 0
VOMITING: 0
VOICE CHANGE: 1
ABDOMINAL PAIN: 0
NAUSEA: 0

## 2021-12-28 NOTE — PROGRESS NOTES
2021    TELEHEALTH EVALUATION -- Audio/Visual (During HWWMV-09 public health emergency)    HPI:    Svetlana Baker (:  1965) has requested an audio/video evaluation for the following concern(s):    Sinus and cough    3rd day,  Congested and cough    ST / hoarse. No fever, no chills  Achy some and tired. Review of Systems   Constitutional: Positive for appetite change and fatigue. Appetite poor. Staying hydrated    HENT: Positive for congestion, rhinorrhea, sore throat and voice change. Respiratory: Positive for cough. Negative for shortness of breath. Starting to get some mucous   Gastrointestinal: Positive for diarrhea. Negative for abdominal pain, blood in stool and vomiting. Neurological: Negative for dizziness, facial asymmetry and speech difficulty. Prior to Visit Medications    Medication Sig Taking? Authorizing Provider   UNABLE TO FIND daily CBD Oil, use in mornings for knee. Yes Historical Provider, MD   diclofenac (VOLTAREN) 50 MG EC tablet TAKE 1 TABLET BY MOUTH TWICE A DAY WITH MEALS Yes Aubrey Echeverria, DO   ALPRAZolam (XANAX) 1 MG tablet TAKE 1 TABLET BY MOUTH THREE TIMES A DAY AS NEEDED FOR ANXIETY Yes Niya Antoine DO   methocarbamol (ROBAXIN) 500 MG tablet TAKE 1 TABLET BY MOUTH 3 TIMES A DAY AS NEEDED FOR NECK PAIN Yes Niya Antoine DO   lidocaine-prilocaine (EMLA) 2.5-2.5 % cream Apply topically as needed. tid Yes Niya Antoine DO   rOPINIRole (REQUIP) 2 MG tablet TAKE 2 TABLETS AT NIGHT AND ONE IN THE MORNING FOR LEGS Yes Niya Antoine DO   ibuprofen (ADVIL;MOTRIN) 600 MG tablet TAKE 1 TABLET BY MOUTH EVERY 8 HOURS AS NEEDED FOR PAIN Yes Niya Antoine DO   DULoxetine (CYMBALTA) 60 MG extended release capsule TAKE 1 CAPSULE BY MOUTH EVERY DAY  Patient taking differently: Take 60 mg by mouth daily  Yes Nyia Antoine DO   atenolol (TENORMIN) 25 MG tablet TAKE 1 TABLET BY MOUTH EVERY DAY  Patient taking differently: Take 25 mg by mouth daily  Yes Jessika Chauhan DO   albuterol sulfate HFA (PROVENTIL HFA) 108 (90 Base) MCG/ACT inhaler Inhale 2 puffs into the lungs every 6 hours as needed for Wheezing or Shortness of Breath (Space out to every 6 hours as symptoms improve) Space out to every 6 hours as symptoms improve. Yes Amy Berger MD   omeprazole (PRILOSEC) 40 MG delayed release capsule TAKE 1 CAPSULE BY MOUTH EVERY DAY  Patient taking differently: Take 40 mg by mouth nightly  Yes Jessika Chauhan DO   Acetaminophen (TYLENOL PO) Take 650 mg by mouth daily as needed  Yes Historical Provider, MD   MELATONIN PO Take 20 mg by mouth nightly as needed  Yes Historical Provider, MD       Social History     Tobacco Use    Smoking status: Never Smoker    Smokeless tobacco: Never Used   Vaping Use    Vaping Use: Never used   Substance Use Topics    Alcohol use: Yes     Comment: 1 -2 drinks per month    Drug use: Not Currently     Types: Marijuana Cain Keila)     Comment: quit 2016            PHYSICAL EXAMINATION:  [ INSTRUCTIONS:  \"[x]\" Indicates a positive item  \"[]\" Indicates a negative item  -- DELETE ALL ITEMS NOT EXAMINED]  Vital Signs: (As obtained by patient/caregiver or practitioner observation)    Blood pressure-  Heart rate-    Respiratory rate-    Temperature-  Pulse oximetry-     Constitutional: [x] Appears well-developed and well-nourished [] No apparent distress      [] Abnormal-nasal and hoarse voice. Nasal congestion and sniffles and some coughing. Mental status    [x] Alert and awake  [x] Oriented to person/place/time [x]Able to follow commands      Eyes:  EOM    []  Normal  [] Abnormal-  Sclera  []  Normal  [] Abnormal -         Discharge []  None visible  [] Abnormal -    HENT:   [x] Normocephalic, atraumatic.   [] Abnormal   [] Mouth/Throat: Mucous membranes are moist.     External Ears [] Normal  [] Abnormal-     Neck: [] No visualized mass     Pulmonary/Chest: [] Respiratory effort normal.  [] No visualized signs of difficulty breathing or respiratory distress        [x] Abnormal-there is some active cough. It sounds upper tracheobronchial.  No respiratory distress. Musculoskeletal:   [] Normal gait with no signs of ataxia         [] Normal range of motion of neck        [] Abnormal-       Neurological:        [x] No Facial Asymmetry (Cranial nerve 7 motor function) (limited exam to video visit)          [x] No gaze palsy        [] Abnormal-         Skin:        [x] No significant exanthematous lesions or discoloration noted on facial skin         [] Abnormal-            Psychiatric:       [x] Normal Affect [x] No Hallucinations        [] Abnormal-     Other pertinent observable physical exam findings-     ASSESSMENT/PLAN:    Sinobronchitis    Symptoms discussed. May be viral,  Is immunized, but with overall medical status will treat, with antibiotics. Patient instructed regarding the medication use. General supportive measures for febrile and respiratory illness discussed. Follow-up in the next 48-72 hours if not improving or if worse.'    Jackie Goldberg, was evaluated through a synchronous (real-time) audio-video encounter. The patient (or guardian if applicable) is aware that this is a billable service. Verbal consent to proceed has been obtained within the past 12 months. The visit was conducted pursuant to the emergency declaration under the [facility], [address] [place] [address] authority and the [employer] Act. Patient identification was verified, and a caregiver was present when appropriate. The patient was located in a state where the provider was credentialed to provide care. Total time spent on this encounter: Not billed by time    --Conrad Rosenberg DO on 12/28/2021 at 8:03 AM    An electronic signature was used to authenticate this note.

## 2021-12-29 NOTE — PROGRESS NOTES
Subjective:      Patient ID: Emmie Phelan is a 64 y.o. female. HPI  63 y/o  female presents for post-operative visit. Patient is 8 weeks s/p Davinci robotic total laparoscopic hysterectomy, bilateral salpingo-oophorectomy, diagnostic cystoscopy, and lysis of adhesions secondary to postmenopausal bleeding, proliferative endometrial glands, and thickened endometrium. Surgery was complicated by BMI and patient history of  x 3. Postop course has been complicated by fatigue, shortness of breath, fever, headache, cramping, low back pain and spotting/brown vaginal discharge. Patient treated for ESBL E coli with IV treatments--ertapenem. Patient has noted significant improvement in symptoms since treatment. Right breast abscess treated at last visit--now resolved. Menopause occurred 5 years ago. History is positive for right oophorectomy 10 years ago secondary to ovarian torsion. Medical history is significant for hypertension, restless legs, anxiety, depression and several orthopedic concerns. Surgical history is positive for  x 3, D&C, and right oophorectomy. Last pap smear was 2021--endometrial cells in a woman >39years of age. Review of Systems   Constitutional: Negative. Negative for activity change, appetite change, chills, fatigue, fever and unexpected weight change. Respiratory: Negative for shortness of breath. Cardiovascular: Negative for chest pain. Gastrointestinal: Positive for diarrhea (experienced diarrhea over the weekend--resolving). Negative for abdominal pain, constipation, nausea and vomiting. Genitourinary: Negative for difficulty urinating, dysuria, hematuria, pelvic pain, vaginal bleeding, vaginal discharge and vaginal pain. Psychiatric/Behavioral: Negative. Objective:   Physical Exam  Vitals and nursing note reviewed. Constitutional:       General: She is not in acute distress. Appearance: Normal appearance.  She is well-developed. She is not ill-appearing, toxic-appearing or diaphoretic. HENT:      Head: Normocephalic and atraumatic. Pulmonary:      Effort: Pulmonary effort is normal.   Chest:   Breasts:      Right: No swelling, bleeding, inverted nipple, mass, nipple discharge, skin change or tenderness. Abdominal:      General: There is no distension. Palpations: Abdomen is soft. Tenderness: There is no abdominal tenderness. There is no guarding. Comments: Incisions clean dry intact. Well healed. Skin:     General: Skin is warm and dry. Neurological:      Mental Status: She is alert and oriented to person, place, and time. Psychiatric:         Behavior: Behavior normal.         Thought Content: Thought content normal.         Judgment: Judgment normal.         Assessment:       Diagnosis Orders   1. Encounter for postoperative care     2. S/P robot-assisted surgical procedure     3. Morbid obesity ([facility] [address]             Plan:      Follow up prn and 1 year for well woman examination.            Mariano Del Valle,

## 2021-12-30 ENCOUNTER — TELEPHONE (OUTPATIENT)
Dept: FAMILY MEDICINE CLINIC | Age: 56
End: 2021-12-30

## 2021-12-30 RX ORDER — PREDNISONE 10 MG/1
TABLET ORAL
Qty: 20 TABLET | Refills: 0 | Status: SHIPPED | OUTPATIENT
Start: 2021-12-30 | End: 2022-03-16 | Stop reason: ALTCHOICE

## 2021-12-30 NOTE — TELEPHONE ENCOUNTER
Timmy Walter reports that she does not feel better since 12/28/21 appointment. She took Covid test yesterday and it was negative. Timmy Walter has cough producing a little bit of yellow mucus, headache, sore throat, body aches, fatigue and sweats at night. Also patient has diarrhea. Patient has no appetite. Patient has been taking doxycycline hyclate 100 mg and promethazine-dextromethorphan 6.25-15 mg/ml syrup as directed. Medications are not helping with symptoms. Pharmacy is [address] [place] [address].

## 2021-12-30 NOTE — TELEPHONE ENCOUNTER
Called the patient. Left a voicemail message I would add a medication. We will give her a burst dose steroid.

## 2021-12-30 NOTE — TELEPHONE ENCOUNTER
Patient called back. Told her that you called in prednisone. She wants to know if she should continue antibiotic from 12/28/21? ?

## 2022-01-13 ENCOUNTER — OFFICE VISIT (OUTPATIENT)
Dept: ORTHOPEDIC SURGERY | Age: 57
End: 2022-01-13
Payer: MEDICARE

## 2022-01-13 DIAGNOSIS — M17.12 PRIMARY OSTEOARTHRITIS OF LEFT KNEE: Primary | ICD-10-CM

## 2022-01-13 PROCEDURE — 20611 DRAIN/INJ JOINT/BURSA W/US: CPT | Performed by: ORTHOPAEDIC SURGERY

## 2022-01-13 NOTE — PROGRESS NOTES
Diagnosis: Left knee osteoarthritis    Procedure: Left knee viscosupplementation #1    The patient is symptomatic from osteoarthritis of the left knee joint with documented radiological signs of arthritis. The patient has also failed 3 months of conservative treatment including home exercise, education, Tylenol and/or NSAIDs use. The patient was offered a Visco supplementation today. Risks, benefits, and alternatives to the injections were discussed in detail with the patient. The risks discussed included but are not limited to infection, skin reactions, hot swollen joints, and anaphylaxis. The patient gave verbal informed consent for the injection. The patient's skin was prepped with  3 sterile gauze  pads soaked with alcohol solution and the knee joint was injected with 2 mL of Euflexxa intra-articularly under sterile conditions. Technique: Under sterile conditions a [employer] ultrasound unit with a variable frequency (6.0-15.0 MHz) linear transducer was used to localize the placement of a 22-gauge needle into the knee joint. Findings: Successful needle placement for intra-articular Visco supplementation injection. Final images were taken and saved for permanent record. The patient tolerated the injection reasonably well. The patient was given instructions to ice the kne and avoid strenuous activities for 24-48 hours. The patient was instructed to call the office immediately if there is increased pain, redness, warmth, fever, or chills. We will see the patient back in one week for their second injection. Detail Level: Simple Size Of Lesion: 4x3mm Size Of Lesion: 10x5mm Size Of Lesion: 3x2mm Size Of Lesion: 4x2.5mm Morphology Per Location (Optional): Vdbr, ovoid X Size Of Lesion In Cm (Optional): 0 Morphology Per Location (Optional): Jagged dark brown macule Morphology Per Location (Optional): Rough sara shaped Size Of Lesion: 3+x3+mm Size Of Lesion: 5x4mm

## 2022-01-20 ENCOUNTER — OFFICE VISIT (OUTPATIENT)
Dept: ORTHOPEDIC SURGERY | Age: 57
End: 2022-01-20
Payer: MEDICARE

## 2022-01-20 DIAGNOSIS — M17.12 PRIMARY OSTEOARTHRITIS OF LEFT KNEE: Primary | ICD-10-CM

## 2022-01-20 PROCEDURE — 20611 DRAIN/INJ JOINT/BURSA W/US: CPT | Performed by: ORTHOPAEDIC SURGERY

## 2022-01-20 NOTE — PROGRESS NOTES
Diagnosis: Left knee osteoarthritis    Procedure: Left knee viscosupplementation #2    The patient returns today for their second Orthovisc injection in the left knee. The risks, benefits, and complications of the injections were again discussed in detail with the patient. The risks discussed included but are not limited to infection, skin reactions, hot swollen joints, and anaphylaxis. The patient gave verbal informed consent for the injection. The patient skin was prepped with 3 sterile gauze pads soaked with alcohol solution and the knee joint was injected with 2 mL of Orthovisc intra-articularly under sterile conditions . Technique: Under sterile conditions a [employer] ultrasound unit with a variable frequency (6.0-15.0 MHz) linear transducer was used to localize the placement of a 22-gauge needle into the edwin joint. Findings: Successful needle placement for intra-articular Visco supplementation injection. Final images were taken and saved for permanent record. The patient tolerated the injection reasonably well. The patient was given instructions to ice the the knee and avoid strenuous activities for 24-48 hours. The patient was instructed to call the office immediately if there is increased pain, redness, warmth, fever, or chills. We will see the patient back in one week for the third injection.

## 2022-01-27 ENCOUNTER — NURSE ONLY (OUTPATIENT)
Dept: ORTHOPEDIC SURGERY | Age: 57
End: 2022-01-27
Payer: MEDICARE

## 2022-01-27 DIAGNOSIS — M17.12 PRIMARY OSTEOARTHRITIS OF LEFT KNEE: Primary | ICD-10-CM

## 2022-01-27 PROCEDURE — 20611 DRAIN/INJ JOINT/BURSA W/US: CPT | Performed by: PHYSICIAN ASSISTANT

## 2022-01-27 NOTE — PROGRESS NOTES
Diagnosis: Left knee osteoarthritis    Procedure: Left knee viscosupplementation #3    The patient returns today for their third and final Orthovisc injection in the left knee. The risks, benefits, and complications of the injections were again discussed in detail with the patient. The risks discussed include but are not limited to infection, skin reactions, hot swollen joints, and anaphylaxis. The patient gave verbal informed consent for the injection. The patient's skin was prepped with 3 sterile gauze pads soaked with alcohol solution and the knee joint was injected with 2 mL of Orthovisc intra-articularly under sterile conditions. Technique: Under sterile conditions a [employer] ultrasound unit with a variable frequency (6.0-15.0 MHz) linear transducer was used to localize the placement of a 22-gauge needle into the knee joint. Findings: Successful needle placement for intra-articular Visco supplementation injection. Final images were taken and saved for permanent record. The patient tolerated the injection reasonably well. The patient was given instructions to ice of the knee and avoid strenuous activity for 24-48 hours. The patient was instructed to call the office immediately if there is increased pain, redness, warmth, fever, or chills. We will see the patient back on an as-needed basis  from this point.

## 2022-01-31 ENCOUNTER — OFFICE VISIT (OUTPATIENT)
Dept: ORTHOPEDIC SURGERY | Age: 57
End: 2022-01-31
Payer: MEDICARE

## 2022-01-31 ENCOUNTER — TELEPHONE (OUTPATIENT)
Dept: ORTHOPEDIC SURGERY | Age: 57
End: 2022-01-31

## 2022-01-31 VITALS — BODY MASS INDEX: 46.93 KG/M2 | HEIGHT: 66 IN | WEIGHT: 292 LBS

## 2022-01-31 DIAGNOSIS — M75.111 INCOMPLETE TEAR OF RIGHT ROTATOR CUFF, UNSPECIFIED WHETHER TRAUMATIC: ICD-10-CM

## 2022-01-31 DIAGNOSIS — M54.12 CERVICAL RADICULOPATHY: ICD-10-CM

## 2022-01-31 DIAGNOSIS — Z98.890 S/P ROTATOR CUFF REPAIR: ICD-10-CM

## 2022-01-31 DIAGNOSIS — M25.511 RIGHT SHOULDER PAIN, UNSPECIFIED CHRONICITY: Primary | ICD-10-CM

## 2022-01-31 PROCEDURE — G8482 FLU IMMUNIZE ORDER/ADMIN: HCPCS | Performed by: PHYSICIAN ASSISTANT

## 2022-01-31 PROCEDURE — G8417 CALC BMI ABV UP PARAM F/U: HCPCS | Performed by: PHYSICIAN ASSISTANT

## 2022-01-31 PROCEDURE — G8427 DOCREV CUR MEDS BY ELIG CLIN: HCPCS | Performed by: PHYSICIAN ASSISTANT

## 2022-01-31 PROCEDURE — 1036F TOBACCO NON-USER: CPT | Performed by: PHYSICIAN ASSISTANT

## 2022-01-31 PROCEDURE — 3017F COLORECTAL CA SCREEN DOC REV: CPT | Performed by: PHYSICIAN ASSISTANT

## 2022-01-31 PROCEDURE — 99214 OFFICE O/P EST MOD 30 MIN: CPT | Performed by: PHYSICIAN ASSISTANT

## 2022-01-31 NOTE — PROGRESS NOTES
Dr Kimberlee Cole      Date /Time 2022             11:46 AM EST  Name Raphael Salas             1965   Location  [address] [facility]  MRN [id number]                Chief Complaint   Patient presents with    Shoulder Pain     Right        History of Present Illness    Raphael Salas is a 64 y.o. female who presents with  right Shoulder pain. Sent in consultation by Kristopher Platt DO, . Musa Singh Injury Mechanism:  none. Worker's Comp. & legal issues:   none. Previous Treatments: Ice, Heat and NSAIDs    Patient presents to the office today for a new problem. Patient is here with a chief complaint of right shoulder pain. Patient's right shoulder has been painful off and on since . Patient had a rotator cuff repair done by Dr. Nani Tuttle 2020. Patient had another rotator cuff repair done on 2020 by Dr. Cy Corrigan. Patient subsequently had irrigation and debridement 2021 by Dr. Cy Corrigan for infection. Last surgery was performed on 2021 by Dr. Cy Corrigan with recurrent rotator cuff repair. She is here complaining of continued pain symptoms and dysfunction. Pain concentrated over lateral shoulder. She also has posterior shoulder pain and numbness and tingling that radiate down her arm and into her hand. She did had ORIF right wrist done by Dr. Juice Sarabia in 2019.     Past Medical History  Past Medical History:   Diagnosis Date    Anxiety     Depression     Endometriosis     GERD (gastroesophageal reflux disease)     Hypertension     Osteoarthritis     Restless leg syndrome      Past Surgical History:   Procedure Laterality Date    ANUS SURGERY  2018    fissure     SECTION      x3    COLONOSCOPY      DILATION AND CURETTAGE OF UTERUS N/A 6/3/2021    VIDEO HYSTEROSCOPY DILATATION AND CURETTAGE, POSSIBLE MYOSURE, POSSIBLE POLYPECTOMY performed by An Chou DO at [address]       right wrist    HERNIA REPAIR  2018 LAPAROSCOPIC PARAESOPHAGEAL HERNIA REPAIR WITH MESH    HYSTERECTOMY N/A 10/21/2021    ROBOTIC ASSISTED LAPAROSCOPIC HYSTERECTOMY WITH RIGHT SALPINGECTOMY, RIGHT OOPHORECTOMY, CYSTOSCOPY, LYSIS OF ADHESIONS  CPT CODE - 76380 performed by Magalie Gallo DO at [address] [place] [address] ARTHROSCOPY Left 11/15/12    ARTHROSCOPY LEFT KNEE WITH SYOVECTOMY AND CHONDROPLASTY    OVARY REMOVAL Right 2010    ROTATOR CUFF REPAIR Right 6/23/2020    EXAM UNDER ANESTHESIA VIDEO ARTHROSCOPY RIGHT SHOULDER, MINI- OPEN ROTATOR CUFF REPAIR, NEER ACROMIOPLASTY, LENO PROCEDURE WITH EXPAREL performed by Johanna Richards MD at [place] [address] ARTHROSCOPY Right 11/25/2020    VIDEO ARTHROSCOPY RIGHT SHOULDER, OPEN ROTATOR CUFF REPAIR, BICEPS TENOTOMY -BLOCK- performed by Feliz Foster MD at [place] [address] ARTHROSCOPY Right 2/24/2021    RIGHT SHOULDER ARTHROSCOPIC INCISION AND DRAINAGE performed by Feliz Foster MD at [place] [address] ARTHROSCOPY Right 4/28/2021    VIDEO ARTHROSCOPY RIGHT SHOULDER, ROTATOR CUFF REPAIR, DEBRIDEMENT performed by Feliz Foster MD at [address] [place] [address]  2011    UPPER GASTROINTESTINAL ENDOSCOPY  2015    esophageasl stretch     Social History     Tobacco Use    Smoking status: Never Smoker    Smokeless tobacco: Never Used   Substance Use Topics    Alcohol use: Yes     Comment: 1 -2 drinks per month      Current Outpatient Medications on File Prior to Visit   Medication Sig Dispense Refill    rOPINIRole (REQUIP) 2 MG tablet TAKE 2 TABLETS AT NIGHT AND ONE IN THE MORNING FOR LEGS 90 tablet 3    predniSONE (DELTASONE) 10 MG tablet 4 a day x 2 days then 3 a day x 2 days then 2 a day x 2 days then 1 a day x 2 days 20 tablet 0    furosemide (LASIX) 20 MG tablet TAKE 1 TABLET BY MOUTH DAILY AS NEEDED (FOR SWELLING) 30 tablet 1    promethazine-dextromethorphan (PROMETHAZINE-DM) 6.25-15 MG/5ML syrup Take 5 mLs by mouth 4 times daily as needed for Cough (coughing. .caution drowsiness) 180 mL 2    doxycycline hyclate (VIBRA-TABS) 100 MG tablet Take 1 tablet by mouth 2 times daily Take with food / snack 20 tablet 0    UNABLE TO FIND daily CBD Oil, use in mornings for knee.  diclofenac (VOLTAREN) 50 MG EC tablet TAKE 1 TABLET BY MOUTH TWICE A DAY WITH MEALS 60 tablet 3    methocarbamol (ROBAXIN) 500 MG tablet TAKE 1 TABLET BY MOUTH 3 TIMES A DAY AS NEEDED FOR NECK PAIN 90 tablet 3    lidocaine-prilocaine (EMLA) 2.5-2.5 % cream Apply topically as needed. tid 30 g 3    ibuprofen (ADVIL;MOTRIN) 600 MG tablet TAKE 1 TABLET BY MOUTH EVERY 8 HOURS AS NEEDED FOR PAIN 40 tablet 1    DULoxetine (CYMBALTA) 60 MG extended release capsule TAKE 1 CAPSULE BY MOUTH EVERY DAY (Patient taking differently: Take 60 mg by mouth daily ) 30 capsule 5    atenolol (TENORMIN) 25 MG tablet TAKE 1 TABLET BY MOUTH EVERY DAY (Patient taking differently: Take 25 mg by mouth daily ) 30 tablet 5    albuterol sulfate HFA (PROVENTIL HFA) 108 (90 Base) MCG/ACT inhaler Inhale 2 puffs into the lungs every 6 hours as needed for Wheezing or Shortness of Breath (Space out to every 6 hours as symptoms improve) Space out to every 6 hours as symptoms improve. 18 g 0    omeprazole (PRILOSEC) 40 MG delayed release capsule TAKE 1 CAPSULE BY MOUTH EVERY DAY (Patient taking differently: Take 40 mg by mouth nightly ) 30 capsule 6    Acetaminophen (TYLENOL PO) Take 650 mg by mouth daily as needed       MELATONIN PO Take 20 mg by mouth nightly as needed        No current facility-administered medications on file prior to visit.         ASCVD 10-YEAR RISK SCORE  The 10-year ASCVD risk score (Yi [address]., et al., 2013) is: 2.7%    Values used to calculate the score:      Age: 64 years      Sex: Female      Is Non- : No      Diabetic: No      Tobacco smoker: No      Systolic Blood Pressure: 612 mmHg      Is BP treated: Yes      HDL Cholesterol: 29 mg/dL Total Cholesterol: 137 mg/dL     Review of Systems  10-point ROS is negative other than HPI. Physical Exam  Based off 1997 Exam Criteria  Ht 5' 6\" (1.676 m)   Wt 292 lb (132.5 kg)   LMP 09/20/2017   BMI 47.13 kg/m²      Constitutional:       General: He is not in acute distress. Appearance: Normal appearance. Cardiovascular:      Rate and Rhythm: Normal rate and regular rhythm. Pulses: Normal pulses. Pulmonary:      Effort: Pulmonary effort is normal. No respiratory distress. Neurological:      Mental Status: He is alert and oriented to person, place, and time. Mental status is at baseline. Musculoskeletal:  Gait:  antalgic    Cervical Spine / Shoulder:      RIGHT  LEFT    Cervical Spine Exam  [] All Neg    [x] All Neg     Spurling's  [x]  []Not tested   []  []Not tested    Whittington's  []  []Not tested   []  []Not tested    Pain with rotation  [x]  []Not tested   []  []Not tested    Pain with lateral bending  [x]  []Not tested   []  []Not tested    Paraspinal muscle tenderness  [] Paraspinal  []Midline   [] Paraspinal  []Not tested    Sensation RIGHT  LEFT    Axillary  [x] Normal []Decreased    [x] Normal []Decreased   Musculocutaneous  [x] Normal  []Decreased   [x] Normal []Decreased   Median  [x] Normal []Decreased   [x] Normal []Decreased   Radial  [x] Normal  []Decreased   [x] Normal []Decreased   Ulnar  [x] Normal  []Decreased   [x] Normal []Decreased   Scapula       Position  [x]Nml  []low  [] lateral  [x]Nml  []low  [] lateral   Dyskinesia  []+ []Abn. Shrug   []+ []Abn. Shrug                     Winging     [x]None   []Med  []Lat   []Worse w/FE  []Med  []Lat  []Worse w/FE   Scapulothoracic Compress.    []Impr Pain  []Impr Motion  []Impr Pain []Impr Motion    Range of Motion Active Passive Active Passive   Forward Elevation 90  170    Abduction 60  100    External Rotation @ side 30  60    External Rotation @ 90 abd 40  90    Internal Rotation @ 90 abd 10  40    Internal Rotation Sacrum Normal    End range of motion  [] Pain  [] Pain  [] Pain  [] Pain   Strength RIGHT /5 LEFT /5   Abduction 4  5    External Rotation 4  5    Internal Rotation 4  5    Provocative Signs/Tests  [] All Neg   [x] +      [] -  [] All Neg   [x] +      [] -   Rotator Cuff Signs  [x] All Neg  [] Not tested   [x] All Neg  [] Not tested    Neer  [x]  []Not tested   []  []Not tested    Zuleima Darling  [x]  []Not tested   []  []Not tested    Painful arc  [x]  []Not tested   []  []Not tested    Greater tuberosity tenderness  []  []Not tested   []  []Not tested    Drop arm  []  []Not tested  []  []Not tested   Superior Escape  []  []Not tested   []  []Not tested    ER Lag  []  []Not tested   []  []Not tested    Belly press  []  []Not tested   []  []Not tested    Lift-off  []  []Not tested   []  []Not tested    Bear hug  []  []Not tested   []  []Not tested    Biceps/Labral Signs  [] All Neg  [] Not tested   [x] All Neg  [] Not tested    Jordan's  [x]  []Not tested   []  []Not tested    Speed's  [x]  []Not tested   []  []Not tested    Dynamic Load Shift/Shear  []  []Not tested   []  []Not tested    Clicking/Popping  []  []Not tested  []  []Not tested   Bicipital groove tenderness  []  []Not tested   []  []Not tested    Young  []  []Not tested   []  []Not tested    [facility] Joint Signs  [x] All Neg  [] Not tested   [x] All Neg  [] Not tested    [facility] joint tenderness  []  []Not tested   []  []Not tested    Cross-arm adduction pain  []  []Not tested   []  []Not tested    Instability Signs  [] All Neg  [] Not tested  [] All Neg  [] Not tested   General laxity (thumb/elbow)  []  []Not tested   []  []Not tested    Hyperabduction  []  []Not tested   []  []Not tested    Sulcus []Side   []ER    []Side   []ER       Anterior apprehension  []  []Not tested   []  []Not tested    Relocation  []   []Not tested  []  []Not tested     Imaging  Right Shoulder: [facility]  Radiographs: X-rays were ordered today reviewed of the right shoulder. 3 views. icing, activity modification, physical therapy, pain meds, cortisone injection) to surgical options. Based on her symptoms I recommended the following imaging studies: MRI. Script was provided. After imaging is completed, patient will make a follow up appointment to review imaging. In terms of treatment, I recommended continuing with rest, icing, avoidance of painful activities, NSAIDs or pain meds as tolerated, and physical therapy. If these are not effective, cortisone injection can be considered. We discussed surgical options as well, should conservative measures fail. Electronically signed by Corrinne Jesus, PA-C on 1/31/2022 at 11:46 AM  This dictation was generated by voice recognition computer software. Although all attempts are made to edit the dictation for accuracy, there may be errors in the transcription that are not intended.

## 2022-01-31 NOTE — TELEPHONE ENCOUNTER
SPOKE WITH PATIENT, WILL CALL TO SCHEDULE MRI AND EMG WITH [facility]. WILL CALL TO SCHEDULE FOLLOW UP APPT FOR RESULTS.

## 2022-02-07 ENCOUNTER — PATIENT MESSAGE (OUTPATIENT)
Dept: ORTHOPEDIC SURGERY | Age: 57
End: 2022-02-07

## 2022-02-07 ENCOUNTER — TELEPHONE (OUTPATIENT)
Dept: ORTHOPEDIC SURGERY | Age: 57
End: 2022-02-07

## 2022-02-09 NOTE — TELEPHONE ENCOUNTER
From: Poppy Kelly  Sent: 2/8/2022 10:35 PM EST  To: [facility] Clinical Staff  Subject: MRI and EMG    I got both tests scheduled for March 2nd. The MRI is at 130 and the EMG is at 300. I will call and get an appointment with Dr. Ajit Weems to go over the results. Also I was wondering about the medication ask about.  Thank you Ascencion Pallas

## 2022-02-09 NOTE — TELEPHONE ENCOUNTER
I am sorry you are having pain but we do not use narcotic pain medication will controlled substances without surgery or fracture.       Thank you Maxim/alyssa

## 2022-02-11 RX ORDER — MELOXICAM 15 MG/1
15 TABLET ORAL DAILY PRN
Qty: 90 TABLET | Refills: 0 | Status: SHIPPED | OUTPATIENT
Start: 2022-02-11 | End: 2022-03-16 | Stop reason: ALTCHOICE

## 2022-03-02 ENCOUNTER — HOSPITAL ENCOUNTER (OUTPATIENT)
Dept: NEUROLOGY | Age: 57
Discharge: HOME | End: 2022-03-02
Payer: MEDICARE

## 2022-03-02 ENCOUNTER — HOSPITAL ENCOUNTER (OUTPATIENT)
Dept: MRI IMAGING | Age: 57
Discharge: HOME | End: 2022-03-02
Payer: MEDICARE

## 2022-03-02 DIAGNOSIS — Z98.890 S/P ROTATOR CUFF REPAIR: ICD-10-CM

## 2022-03-02 DIAGNOSIS — M25.511 RIGHT SHOULDER PAIN, UNSPECIFIED CHRONICITY: ICD-10-CM

## 2022-03-02 DIAGNOSIS — M54.12 CERVICAL RADICULOPATHY: ICD-10-CM

## 2022-03-02 PROCEDURE — 95885 MUSC TST DONE W/NERV TST LIM: CPT | Performed by: PHYSICAL MEDICINE & REHABILITATION

## 2022-03-02 PROCEDURE — 95908 NRV CNDJ TST 3-4 STUDIES: CPT | Performed by: PHYSICAL MEDICINE & REHABILITATION

## 2022-03-02 PROCEDURE — 73221 MRI JOINT UPR EXTREM W/O DYE: CPT

## 2022-03-02 NOTE — PROCEDURES
Electromyography     Side Muscle Nerve Root Ins Act Fibs Psw Amp Dur Poly Recrt Int Marionette Mace Comment   Right Deltoid Axillary C5-C6 Nml Nml Nml Nml Nml 0 Nml Nml    Right Biceps Musculocut C5-C6 Nml Nml Nml Nml Nml 0 Nml Nml    Right Triceps Radial C6-C8 Nml Nml Nml Nml Nml 0 Nml Nml    Right Brachiorad Radial C5-C6 Nml Nml Nml Nml Nml 0 Nml Nml    Right Pronator Teres Median C6-C7 Nml Nml Nml Nml Nml 0 Nml Nml    Right EIP Post Interosseous,  R... C7-C8 Nml Nml Nml Nml Nml 0 Nml Nml    Right APB Median C8-T1 Nml Nml Nml Nml Nml 0 Nml Nml    Right FDI Ulnar C8-T1 Nml Nml Nml Nml Nml 0 Nml Nml    Right Cervical Paraspinal (Uppe. .. Rami C1-C3 Nml Nml Nml         Right Cervical Paraspinal (Mid) Rami C4-C6 Nml Nml Nml         Right Cervical Paraspinal (Junnie Limon. ..  Rami C7-C8 Nml Nml Nml           Electronically signed by Darrick Kaye DO on 3/2/2022 at 2:36 PM

## 2022-03-03 ENCOUNTER — TELEPHONE (OUTPATIENT)
Dept: FAMILY MEDICINE CLINIC | Age: 57
End: 2022-03-03

## 2022-03-03 NOTE — TELEPHONE ENCOUNTER
Patient scheduled for Welcome to [employer] AW on 3/16/22. Please advise if patient needs labs prior to the appointment.

## 2022-03-04 DIAGNOSIS — I10 ESSENTIAL HYPERTENSION: Primary | ICD-10-CM

## 2022-03-07 ENCOUNTER — TELEPHONE (OUTPATIENT)
Dept: FAMILY MEDICINE CLINIC | Age: 57
End: 2022-03-07

## 2022-03-07 NOTE — TELEPHONE ENCOUNTER
----- Message from [facility] sent at 3/7/2022 12:14 PM EST -----  Subject: Message to Provider    QUESTIONS  Information for Provider? Pt returned the missed call she had from the   office. At the time of reaching the practice the office was having   internet issues and requested a message be sent. Can you please call pt   back?  ---------------------------------------------------------------------------  --------------  CALL BACK INFO  What is the best way for the office to contact you? OK to leave message on   voicemail  Preferred Call Back Phone Number? [phone]  ---------------------------------------------------------------------------  --------------  SCRIPT ANSWERS  Relationship to Patient?  Self

## 2022-03-08 ENCOUNTER — OFFICE VISIT (OUTPATIENT)
Dept: ORTHOPEDIC SURGERY | Age: 57
End: 2022-03-08
Payer: MEDICARE

## 2022-03-08 VITALS — WEIGHT: 292 LBS | HEIGHT: 66 IN | BODY MASS INDEX: 46.93 KG/M2

## 2022-03-08 DIAGNOSIS — M75.81 ROTATOR CUFF TENDINITIS, RIGHT: Primary | ICD-10-CM

## 2022-03-08 DIAGNOSIS — G56.01 CARPAL TUNNEL SYNDROME OF RIGHT WRIST: ICD-10-CM

## 2022-03-08 DIAGNOSIS — E66.01 CLASS 3 SEVERE OBESITY DUE TO EXCESS CALORIES WITH SERIOUS COMORBIDITY AND BODY MASS INDEX (BMI) OF 45.0 TO 49.9 IN ADULT (HCC): ICD-10-CM

## 2022-03-08 DIAGNOSIS — M12.819 ROTATOR CUFF ARTHROPATHY, UNSPECIFIED LATERALITY: ICD-10-CM

## 2022-03-08 PROCEDURE — 99214 OFFICE O/P EST MOD 30 MIN: CPT | Performed by: ORTHOPAEDIC SURGERY

## 2022-03-08 PROCEDURE — 3017F COLORECTAL CA SCREEN DOC REV: CPT | Performed by: ORTHOPAEDIC SURGERY

## 2022-03-08 PROCEDURE — 1036F TOBACCO NON-USER: CPT | Performed by: ORTHOPAEDIC SURGERY

## 2022-03-08 PROCEDURE — G8482 FLU IMMUNIZE ORDER/ADMIN: HCPCS | Performed by: ORTHOPAEDIC SURGERY

## 2022-03-08 PROCEDURE — G8417 CALC BMI ABV UP PARAM F/U: HCPCS | Performed by: ORTHOPAEDIC SURGERY

## 2022-03-08 PROCEDURE — G8427 DOCREV CUR MEDS BY ELIG CLIN: HCPCS | Performed by: ORTHOPAEDIC SURGERY

## 2022-03-08 NOTE — PROGRESS NOTES
Dr Brayden Mahajan      Date /Time 3/8/2022             11:46 AM EST  Name Tete Harris             1965   Location  [facility]  MRN [id number]                Chief Complaint   Patient presents with    Results     TR RIGHT SHOULDER [address]        History of Present Illness    Tete Harris is a 62 y.o. female who presents with  right Shoulder pain. .  Injury Mechanism:  none. Worker's Comp. & legal issues:   none. Previous Treatments: Ice, Heat and NSAIDs        Patient presents the office today for follow-up visit. Patient here for EMG and MRI results. She continues to complain of continued pain involving her right shoulder. Pain is concentrated laterally. She does have numbness and tingling in her hand and has had a previous ORIF wrist fracture in 2019. She continues to complain of difficulties involving her right shoulder especially with any overhead activities. Previous history: Patient presents to the office today for a new problem. Patient is here with a chief complaint of right shoulder pain. Patient's right shoulder has been painful off and on since 2020. Patient had a rotator cuff repair done by Dr. Andrew Albert June 2020. Patient had another rotator cuff repair done on November 25, 2020 by Dr. Aury Mcbride. Patient subsequently had irrigation and debridement February 24, 2021 by Dr. Aury Mcbride for infection. Last surgery was performed on 4/28/2021 by Dr. Aury Mcbride with recurrent rotator cuff repair. She is here complaining of continued pain symptoms and dysfunction. Pain concentrated over lateral shoulder. She also has posterior shoulder pain and numbness and tingling that radiate down her arm and into her hand. She did had ORIF right wrist done by Dr. Esau Dalton in 2019.     Past Medical History  Past Medical History:   Diagnosis Date    Anxiety     Depression     Endometriosis     GERD (gastroesophageal reflux disease)     Hypertension     Osteoarthritis     Restless leg syndrome      Past Surgical History:   Procedure Laterality Date    ANUS SURGERY  2018    fissure     SECTION      x3    COLONOSCOPY      DILATION AND CURETTAGE OF UTERUS N/A 6/3/2021    VIDEO HYSTEROSCOPY DILATATION AND CURETTAGE, POSSIBLE MYOSURE, POSSIBLE POLYPECTOMY performed by Terra Jackson DO at [address]       right wrist    HERNIA REPAIR  2018    LAPAROSCOPIC PARAESOPHAGEAL HERNIA REPAIR WITH MESH    HYSTERECTOMY N/A 10/21/2021    ROBOTIC ASSISTED LAPAROSCOPIC HYSTERECTOMY WITH RIGHT SALPINGECTOMY, RIGHT OOPHORECTOMY, CYSTOSCOPY, LYSIS OF ADHESIONS  CPT CODE - 29942 performed by Arely Gonzalez DO at [facility] [[email or web address]] ARTHROSCOPY Left 11/15/12    ARTHROSCOPY LEFT KNEE WITH SYOVECTOMY AND CHONDROPLASTY    OVARY REMOVAL Right     ROTATOR CUFF REPAIR Right 2020    EXAM UNDER ANESTHESIA VIDEO ARTHROSCOPY RIGHT SHOULDER, MINI- OPEN ROTATOR CUFF REPAIR, NEER ACROMIOPLASTY, LENO PROCEDURE WITH EXPAREL performed by Leslie Salmeron MD at [place] [address] ARTHROSCOPY Right 2020    VIDEO ARTHROSCOPY RIGHT SHOULDER, OPEN ROTATOR CUFF REPAIR, BICEPS TENOTOMY -BLOCK- performed by Carol Shankar MD at [place] [address] ARTHROSCOPY Right 2021    RIGHT SHOULDER ARTHROSCOPIC INCISION AND DRAINAGE performed by Carol Shankar MD at [place] [address] ARTHROSCOPY Right 2021    VIDEO ARTHROSCOPY RIGHT SHOULDER, ROTATOR CUFF REPAIR, DEBRIDEMENT performed by Carol Shankar MD at [address] [place] [address]      UPPER GASTROINTESTINAL ENDOSCOPY  2015    esophageasl stretch     Social History     Tobacco Use    Smoking status: Never Smoker    Smokeless tobacco: Never Used   Substance Use Topics    Alcohol use: Yes     Comment: 1 -2 drinks per month      Current Outpatient Medications on File Prior to Visit   Medication Sig Dispense Refill    meloxicam (MOBIC) 15 MG tablet Take 1 tablet by mouth daily as needed for Pain 90 tablet 0    rOPINIRole (REQUIP) 2 MG tablet TAKE 2 TABLETS AT NIGHT AND ONE IN THE MORNING FOR LEGS 90 tablet 3    predniSONE (DELTASONE) 10 MG tablet 4 a day x 2 days then 3 a day x 2 days then 2 a day x 2 days then 1 a day x 2 days 20 tablet 0    furosemide (LASIX) 20 MG tablet TAKE 1 TABLET BY MOUTH DAILY AS NEEDED (FOR SWELLING) 30 tablet 1    promethazine-dextromethorphan (PROMETHAZINE-DM) 6.25-15 MG/5ML syrup Take 5 mLs by mouth 4 times daily as needed for Cough (coughing. .caution drowsiness) 180 mL 2    doxycycline hyclate (VIBRA-TABS) 100 MG tablet Take 1 tablet by mouth 2 times daily Take with food / snack 20 tablet 0    UNABLE TO FIND daily CBD Oil, use in mornings for knee.  diclofenac (VOLTAREN) 50 MG EC tablet TAKE 1 TABLET BY MOUTH TWICE A DAY WITH MEALS 60 tablet 3    methocarbamol (ROBAXIN) 500 MG tablet TAKE 1 TABLET BY MOUTH 3 TIMES A DAY AS NEEDED FOR NECK PAIN 90 tablet 3    lidocaine-prilocaine (EMLA) 2.5-2.5 % cream Apply topically as needed. tid 30 g 3    ibuprofen (ADVIL;MOTRIN) 600 MG tablet TAKE 1 TABLET BY MOUTH EVERY 8 HOURS AS NEEDED FOR PAIN 40 tablet 1    DULoxetine (CYMBALTA) 60 MG extended release capsule TAKE 1 CAPSULE BY MOUTH EVERY DAY (Patient taking differently: Take 60 mg by mouth daily ) 30 capsule 5    atenolol (TENORMIN) 25 MG tablet TAKE 1 TABLET BY MOUTH EVERY DAY (Patient taking differently: Take 25 mg by mouth daily ) 30 tablet 5    albuterol sulfate HFA (PROVENTIL HFA) 108 (90 Base) MCG/ACT inhaler Inhale 2 puffs into the lungs every 6 hours as needed for Wheezing or Shortness of Breath (Space out to every 6 hours as symptoms improve) Space out to every 6 hours as symptoms improve.  18 g 0    omeprazole (PRILOSEC) 40 MG delayed release capsule TAKE 1 CAPSULE BY MOUTH EVERY DAY (Patient taking differently: Take 40 mg by mouth nightly ) 30 capsule 6    Acetaminophen (TYLENOL PO) Take 650 mg by mouth daily as needed       MELATONIN PO Take 20 mg by mouth nightly as needed        No current facility-administered medications on file prior to visit. ASCVD 10-YEAR RISK SCORE  The 10-year ASCVD risk score (Juliet Guzman, et al., 2013) is: 2.9%    Values used to calculate the score:      Age: 62 years      Sex: Female      Is Non- : No      Diabetic: No      Tobacco smoker: No      Systolic Blood Pressure: 220 mmHg      Is BP treated: Yes      HDL Cholesterol: 29 mg/dL      Total Cholesterol: 137 mg/dL     Review of Systems  10-point ROS is negative other than HPI. Physical Exam  Based off 1997 Exam Criteria  Ht 5' 6\" (1.676 m)   Wt 292 lb (132.5 kg)   LMP 09/20/2017   BMI 47.13 kg/m²      Constitutional:       General: He is not in acute distress. Appearance: Normal appearance. Cardiovascular:      Rate and Rhythm: Normal rate and regular rhythm. Pulses: Normal pulses. Pulmonary:      Effort: Pulmonary effort is normal. No respiratory distress. Neurological:      Mental Status: He is alert and oriented to person, place, and time. Mental status is at baseline.      Musculoskeletal:  Gait:  antalgic    Cervical Spine / Shoulder:      RIGHT  LEFT    Cervical Spine Exam  [] All Neg    [x] All Neg     Spurling's  [x]  []Not tested   []  []Not tested    Whittington's  []  []Not tested   []  []Not tested    Pain with rotation  [x]  []Not tested   []  []Not tested    Pain with lateral bending  [x]  []Not tested   []  []Not tested    Paraspinal muscle tenderness  [] Paraspinal  []Midline   [] Paraspinal  []Not tested    Sensation RIGHT  LEFT    Axillary  [x] Normal []Decreased    [x] Normal []Decreased   Musculocutaneous  [x] Normal  []Decreased   [x] Normal []Decreased   Median  [x] Normal []Decreased   [x] Normal []Decreased   Radial  [x] Normal  []Decreased   [x] Normal []Decreased   Ulnar  [x] Normal  []Decreased   [x] Normal []Decreased   Scapula       Position  [x]Nml  []low  [] lateral  [x]Nml  []low  [] lateral   Dyskinesia  []+ []Abn. Shrug   []+ []Abn. Shrug                     Winging     [x]None   []Med  []Lat   []Worse w/FE  []Med  []Lat  []Worse w/FE   Scapulothoracic Compress.    []Impr Pain  []Impr Motion  []Impr Pain []Impr Motion    Range of Motion Active Passive Active Passive   Forward Elevation 90  170    Abduction 60  100    External Rotation @ side 30  60    External Rotation @ 90 abd 40  90    Internal Rotation @ 90 abd 10  40    Internal Rotation Sacrum  Normal    End range of motion  [] Pain  [] Pain  [] Pain  [] Pain   Strength RIGHT /5 LEFT /5   Abduction 4  5    External Rotation 4  5    Internal Rotation 4  5    Provocative Signs/Tests  [] All Neg   [x] +      [] -  [] All Neg   [x] +      [] -   Rotator Cuff Signs  [x] All Neg  [] Not tested   [x] All Neg  [] Not tested    Neer  [x]  []Not tested   []  []Not tested    Eben Bolster  [x]  []Not tested   []  []Not tested    Painful arc  [x]  []Not tested   []  []Not tested    Greater tuberosity tenderness  []  []Not tested   []  []Not tested    Drop arm  []  []Not tested  []  []Not tested   Superior Escape  []  []Not tested   []  []Not tested    ER Lag  []  []Not tested   []  []Not tested    Belly press  []  []Not tested   []  []Not tested    Lift-off  []  []Not tested   []  []Not tested    Bear hug  []  []Not tested   []  []Not tested    Biceps/Labral Signs  [] All Neg  [] Not tested   [x] All Neg  [] Not tested    Jordan's  [x]  []Not tested   []  []Not tested    Speed's  [x]  []Not tested   []  []Not tested    Dynamic Load Shift/Shear  []  []Not tested   []  []Not tested    Clicking/Popping  []  []Not tested  []  []Not tested   Bicipital groove tenderness  []  []Not tested   []  []Not tested    Young  []  []Not tested   []  []Not tested    [facility] Joint Signs  [x] All Neg  [] Not tested   [x] All Neg  [] Not tested    TRISTAR [facility] joint tenderness  []  []Not tested   []  []Not tested    Cross-arm adduction pain  []  []Not tested   [] paralabral cyst.       GLENOHUMERAL JOINT: Narrowing of the acromial humeral interval related to   rotator cuff re-tear.  Mild glenohumeral osteoarthritis.  Small to moderate   effusion and synovitis.       AC JOINT AND ACROMIOCLAVICULAR ARCH: Postoperative changes of the   acromioclavicular joint.  Fluid present within the subacromial/subdeltoid   bursa related to rotator cuff tear.       BONE MARROW:  Bone marrow is normal in signal without evidence of fracture,   marrow contusion or marrow occupying lesion.       OUTLET SPACES: The suprascapular notch and quadrilateral space are without   obstructing or space occupying lesions.           Impression   1. Prior rotator cuff repair with interval complete re-tear of the   supraspinatus. 2. Full-thickness tearing of the anterior fibers of the infraspinatus. Intact posterior fibers present. 3. Interstitial tearing of the subscapularis. 4. Tendinosis of the biceps tendon. 5. Small to moderate glenohumeral effusion and synovitis. EMG results  Impression:  Study is consistent with right carpal tunnel syndrome, moderate severity. No evidence of an acute radiculopathy or other entrapment neuropathy.        Procedure:  No orders of the defined types were placed in this encounter. Assessment and Plan  Flo Heaton was seen today for results. Diagnoses and all orders for this visit:    Rotator cuff tendinitis, right    Carpal tunnel syndrome of right wrist    Class 3 severe obesity due to excess calories with serious comorbidity and body mass index (BMI) of 45.0 to 49.9 in [facility])    Rotator cuff arthropathy, unspecified laterality        Patient does have a large chronic rotator cuff tear. She has severe atrophy. She has had 4 previous rotator cuff repairs and 1 arthroscopic irrigation and debridement for infection. I do not feel her rotator cuff is fixable at this time.   She may be a candidate for a reverse total shoulder arthroplasty at some point in the future but does need medical optimization before proceeding. Her current BMI is 47.13 and needs to be less than 40 to minimize postoperative risk profile. Her BMI is extremely important especially with her history of infection in his shoulder. Also with her history of infection I would hold off on a cortisone injection at this time. I will order acute phase reactants to evaluate for infection. I will also order an aspiration done with interventional radiology. Lastly I will refer her to hand for consultation concerning carpal tunnel syndrome with previous ORIF. I discussed with Naa Kenny that her history, symptoms, signs and imaging are most consistent with rotator cuff partial tears. We reviewed the natural history of these conditions and treatment options ranging from conservative measures (rest, icing, activity modification, physical therapy, pain meds, cortisone injection) to surgical options. In terms of treatment, I recommended continuing with rest, icing, avoidance of painful activities, NSAIDs or pain meds as tolerated, and physical therapy. If these are not effective, cortisone injection can be considered. We discussed surgical options as well, should conservative measures fail. Electronically signed by Helio Silva PA-C on 3/8/2022 at 10:46 AM  This dictation was generated by voice recognition computer software. Although all attempts are made to edit the dictation for accuracy, there may be errors in the transcription that are not intended.

## 2022-03-10 NOTE — TELEPHONE ENCOUNTER
Spoke to the patient. Is continuing with Ortho for her shoulder and other issues. Has an appointment with me for Medicare wellness in a couple of weeks.

## 2022-03-11 ENCOUNTER — HOSPITAL ENCOUNTER (OUTPATIENT)
Age: 57
Discharge: HOME | End: 2022-03-11
Payer: MEDICARE

## 2022-03-11 ENCOUNTER — HOSPITAL ENCOUNTER (OUTPATIENT)
Dept: INTERVENTIONAL RADIOLOGY/VASCULAR | Age: 57
Discharge: HOME | End: 2022-03-11
Payer: MEDICARE

## 2022-03-11 VITALS
OXYGEN SATURATION: 97 % | HEART RATE: 60 BPM | SYSTOLIC BLOOD PRESSURE: 122 MMHG | RESPIRATION RATE: 20 BRPM | DIASTOLIC BLOOD PRESSURE: 85 MMHG

## 2022-03-11 DIAGNOSIS — M75.81 ROTATOR CUFF TENDINITIS, RIGHT: ICD-10-CM

## 2022-03-11 DIAGNOSIS — M12.819 ROTATOR CUFF ARTHROPATHY, UNSPECIFIED LATERALITY: ICD-10-CM

## 2022-03-11 DIAGNOSIS — E66.01 CLASS 3 SEVERE OBESITY DUE TO EXCESS CALORIES WITH SERIOUS COMORBIDITY AND BODY MASS INDEX (BMI) OF 45.0 TO 49.9 IN ADULT (HCC): ICD-10-CM

## 2022-03-11 LAB
APPEARANCE FLUID: NORMAL
BASOPHILS ABSOLUTE: 0 K/UL (ref 0–0.2)
BASOPHILS RELATIVE PERCENT: 0.5 %
C-REACTIVE PROTEIN: 46.7 MG/L (ref 0–5.1)
CELL COUNT FLUID TYPE: NORMAL
CLOT EVALUATION: NORMAL
COLOR FLUID: NORMAL
CRYSTALS, FLUID: NORMAL
EOSINOPHILS ABSOLUTE: 0.1 K/UL (ref 0–0.6)
EOSINOPHILS RELATIVE PERCENT: 1.6 %
FLUID PATH CONSULT: YES
HCT VFR BLD CALC: 41.4 % (ref 36–48)
HEMOGLOBIN: 13.6 G/DL (ref 12–16)
LYMPHOCYTES ABSOLUTE: 1.3 K/UL (ref 1–5.1)
LYMPHOCYTES RELATIVE PERCENT: 16.9 %
LYMPHOCYTES, BODY FLUID: 64 %
MACROPHAGE FLUID: 26 %
MCH RBC QN AUTO: 30.8 PG (ref 26–34)
MCHC RBC AUTO-ENTMCNC: 32.8 G/DL (ref 31–36)
MCV RBC AUTO: 93.7 FL (ref 80–100)
MONOCYTES ABSOLUTE: 0.6 K/UL (ref 0–1.3)
MONOCYTES RELATIVE PERCENT: 7.2 %
MONONUCLEAR UNIDENTIFIED CELLS FLUID: 9 %
NEUTROPHIL, FLUID: 1 %
NEUTROPHILS ABSOLUTE: 5.8 K/UL (ref 1.7–7.7)
NEUTROPHILS RELATIVE PERCENT: 73.8 %
NUCLEATED CELLS FLUID: 1861 /CUMM
NUMBER OF CELLS COUNTED FLUID: 100
PATH CONSULT FLUID: NORMAL
PATH CONSULT FLUID: NORMAL
PATH CONSULT FLUID: YES
PDW BLD-RTO: 15.8 % (ref 12.4–15.4)
PLATELET # BLD: 172 K/UL (ref 135–450)
PMV BLD AUTO: 9.9 FL (ref 5–10.5)
RBC # BLD: 4.41 M/UL (ref 4–5.2)
RBC FLUID: NORMAL /CUMM
SEDIMENTATION RATE, ERYTHROCYTE: 21 MM/HR (ref 0–30)
SOURCE BODY FLUID: NORMAL
WBC # BLD: 7.9 K/UL (ref 4–11)

## 2022-03-11 PROCEDURE — 36415 COLL VENOUS BLD VENIPUNCTURE: CPT

## 2022-03-11 PROCEDURE — 6360000004 HC RX CONTRAST MEDICATION: Performed by: RADIOLOGY

## 2022-03-11 PROCEDURE — 85652 RBC SED RATE AUTOMATED: CPT

## 2022-03-11 PROCEDURE — 89051 BODY FLUID CELL COUNT: CPT

## 2022-03-11 PROCEDURE — 87070 CULTURE OTHR SPECIMN AEROBIC: CPT

## 2022-03-11 PROCEDURE — 77002 NEEDLE LOCALIZATION BY XRAY: CPT

## 2022-03-11 PROCEDURE — 87205 SMEAR GRAM STAIN: CPT

## 2022-03-11 PROCEDURE — 87075 CULTR BACTERIA EXCEPT BLOOD: CPT

## 2022-03-11 PROCEDURE — 85025 COMPLETE CBC W/AUTO DIFF WBC: CPT

## 2022-03-11 PROCEDURE — 86140 C-REACTIVE PROTEIN: CPT

## 2022-03-11 PROCEDURE — 20610 DRAIN/INJ JOINT/BURSA W/O US: CPT

## 2022-03-11 PROCEDURE — 89060 EXAM SYNOVIAL FLUID CRYSTALS: CPT

## 2022-03-11 RX ADMIN — IOVERSOL 5 ML: 741 INJECTION INTRA-ARTERIAL; INTRAVENOUS at 09:41

## 2022-03-11 NOTE — PROGRESS NOTES
Pt felt dizzy when she sat up from laying down on the table. Pt denies any chest pain/sob. States that she is feeling better. Offered to take her to the ED pt didn't feel like she needed to go, VSS. Vitals:    03/11/22 0950   BP: 122/85   Pulse: 60   Resp: 20   SpO2: 97%     Pt was wheeled down to the lobby in a wheelchair. Pt was able to walk from the bed to the wheelchair without any issues. States that she feels better. Pt was given a emely rale.  No distress noted

## 2022-03-11 NOTE — PROGRESS NOTES
Brief Postoperative Note    Adriel Rob  YOB: 1965  [id number]    Pre-operative Diagnosis: R shoulder synovitis    Post-operative Diagnosis: Same    Procedure: Fluoroscopically guided shoulder aspiration    Anesthesia: Local    Surgeons/Assistants: Chas Bridges MD    Estimated Blood Loss: less than 5    Complications: None    Specimens: Was Obtained: 5 cc clear yellow joint fluid    Findings: Needle within R glenohumeral joint space.     Electronically signed by Chas Bridges MD on 3/11/2022 at 9:44 AM

## 2022-03-11 NOTE — PROGRESS NOTES
Pt arrived for image guided right shoulder aspiration. Procedure explained including the risk and benefits of the procedure. All questions answered. Pt verbalizes understanding of the procedure and states no more questions. Consent confirmed. Time out completed prior to procedure. Pt was place supine on the IR table.       Allergies  Toradol [ketorolac tromethamine] and Haldol [haloperidol lactate] (allergies)

## 2022-03-12 ENCOUNTER — PATIENT MESSAGE (OUTPATIENT)
Dept: ORTHOPEDIC SURGERY | Age: 57
End: 2022-03-12

## 2022-03-13 ENCOUNTER — TELEPHONE (OUTPATIENT)
Dept: ORTHOPEDIC SURGERY | Age: 57
End: 2022-03-13

## 2022-03-13 DIAGNOSIS — M25.519 ACUTE SHOULDER PAIN, UNSPECIFIED LATERALITY: Primary | ICD-10-CM

## 2022-03-13 RX ORDER — TRAMADOL HYDROCHLORIDE 50 MG/1
50 TABLET ORAL EVERY 6 HOURS PRN
Qty: 12 TABLET | Refills: 0 | Status: SHIPPED | OUTPATIENT
Start: 2022-03-13 | End: 2022-03-16

## 2022-03-13 SDOH — HEALTH STABILITY: PHYSICAL HEALTH: ON AVERAGE, HOW MANY MINUTES DO YOU ENGAGE IN EXERCISE AT THIS LEVEL?: 0 MIN

## 2022-03-13 SDOH — HEALTH STABILITY: PHYSICAL HEALTH: ON AVERAGE, HOW MANY DAYS PER WEEK DO YOU ENGAGE IN MODERATE TO STRENUOUS EXERCISE (LIKE A BRISK WALK)?: 0 DAYS

## 2022-03-13 ASSESSMENT — PATIENT HEALTH QUESTIONNAIRE - PHQ9
SUM OF ALL RESPONSES TO PHQ QUESTIONS 1-9: 2
SUM OF ALL RESPONSES TO PHQ QUESTIONS 1-9: 2
2. FEELING DOWN, DEPRESSED OR HOPELESS: 1
SUM OF ALL RESPONSES TO PHQ9 QUESTIONS 1 & 2: 2
SUM OF ALL RESPONSES TO PHQ QUESTIONS 1-9: 2
1. LITTLE INTEREST OR PLEASURE IN DOING THINGS: 1
SUM OF ALL RESPONSES TO PHQ QUESTIONS 1-9: 2

## 2022-03-13 ASSESSMENT — LIFESTYLE VARIABLES
HAVE YOU OR SOMEONE ELSE BEEN INJURED AS A RESULT OF YOUR DRINKING: NO
HOW OFTEN DURING THE LAST YEAR HAVE YOU NEEDED AN ALCOHOLIC DRINK FIRST THING IN THE MORNING TO GET YOURSELF GOING AFTER A NIGHT OF HEAVY DRINKING: 0
HAS A RELATIVE, FRIEND, DOCTOR, OR ANOTHER HEALTH PROFESSIONAL EXPRESSED CONCERN ABOUT YOUR DRINKING OR SUGGESTED YOU CUT DOWN: NO
HOW OFTEN DURING THE LAST YEAR HAVE YOU FAILED TO DO WHAT WAS NORMALLY EXPECTED FROM YOU BECAUSE OF DRINKING: 0
HOW OFTEN DURING THE LAST YEAR HAVE YOU BEEN UNABLE TO REMEMBER WHAT HAPPENED THE NIGHT BEFORE BECAUSE YOU HAD BEEN DRINKING: NEVER
HOW OFTEN DURING THE LAST YEAR HAVE YOU HAD A FEELING OF GUILT OR REMORSE AFTER DRINKING: NEVER
HAS A RELATIVE, FRIEND, DOCTOR, OR ANOTHER HEALTH PROFESSIONAL EXPRESSED CONCERN ABOUT YOUR DRINKING OR SUGGESTED YOU CUT DOWN: 0
HOW OFTEN DURING THE LAST YEAR HAVE YOU BEEN UNABLE TO REMEMBER WHAT HAPPENED THE NIGHT BEFORE BECAUSE YOU HAD BEEN DRINKING: 0
HAVE YOU OR SOMEONE ELSE BEEN INJURED AS A RESULT OF YOUR DRINKING: 0
HOW MANY STANDARD DRINKS CONTAINING ALCOHOL DO YOU HAVE ON A TYPICAL DAY: 3 OR 4
HOW OFTEN DURING THE LAST YEAR HAVE YOU NEEDED AN ALCOHOLIC DRINK FIRST THING IN THE MORNING TO GET YOURSELF GOING AFTER A NIGHT OF HEAVY DRINKING: NEVER
HOW OFTEN DO YOU HAVE A DRINK CONTAINING ALCOHOL: 2-4 TIMES A MONTH
HOW OFTEN DO YOU HAVE A DRINK CONTAINING ALCOHOL: 3
HOW MANY STANDARD DRINKS CONTAINING ALCOHOL DO YOU HAVE ON A TYPICAL DAY: 2
HOW OFTEN DO YOU HAVE SIX OR MORE DRINKS ON ONE OCCASION: 2
HOW OFTEN DURING THE LAST YEAR HAVE YOU HAD A FEELING OF GUILT OR REMORSE AFTER DRINKING: 0
HOW OFTEN DURING THE LAST YEAR HAVE YOU FOUND THAT YOU WERE NOT ABLE TO STOP DRINKING ONCE YOU HAD STARTED: NEVER
HOW OFTEN DURING THE LAST YEAR HAVE YOU FAILED TO DO WHAT WAS NORMALLY EXPECTED FROM YOU BECAUSE OF DRINKING: NEVER
HOW OFTEN DURING THE LAST YEAR HAVE YOU FOUND THAT YOU WERE NOT ABLE TO STOP DRINKING ONCE YOU HAD STARTED: 0

## 2022-03-13 NOTE — TELEPHONE ENCOUNTER
Received a call that the patient had worsening shoulder pain after aspiration on Friday. She has not been able to sleep and it feels sharp. Initial inspection of the patient's aspiration results are encouraging, no culture findings and normal nucleated cell counts. Plan:  - Short course of tramadol sent to patient's pharmacy to help with acute pain, she is already taking diclofenac, tylenol, and occasional ibuprofen.   - Maintain follow up with Dr. Favio Shankar DO  03/13/22 2:34 PM

## 2022-03-14 ENCOUNTER — TELEPHONE (OUTPATIENT)
Dept: ORTHOPEDIC SURGERY | Age: 57
End: 2022-03-14

## 2022-03-14 RX ORDER — IBUPROFEN 600 MG/1
TABLET ORAL
Qty: 40 TABLET | Refills: 1 | Status: SHIPPED | OUTPATIENT
Start: 2022-03-14 | End: 2022-09-06

## 2022-03-14 NOTE — TELEPHONE ENCOUNTER
General Question     Subject: PT SPOKE WITH ON CALL  YESTERDAY AND WAS TOLD IT LOOKS LIKE THERE IS NO INFECTION.   SHE WANTS TO KNOW IF SHE CAN NOW GET A CORTISONE INJECTION  Patient and /or Facility Request: Zacarias Muñoz  Contact Number: [phone]

## 2022-03-15 ENCOUNTER — NURSE ONLY (OUTPATIENT)
Dept: ORTHOPEDIC SURGERY | Age: 57
End: 2022-03-15
Payer: MEDICARE

## 2022-03-15 ENCOUNTER — TELEPHONE (OUTPATIENT)
Dept: BARIATRICS/WEIGHT MGMT | Age: 57
End: 2022-03-15

## 2022-03-15 VITALS — WEIGHT: 292 LBS | BODY MASS INDEX: 46.93 KG/M2 | HEIGHT: 66 IN

## 2022-03-15 DIAGNOSIS — M75.111 INCOMPLETE TEAR OF RIGHT ROTATOR CUFF, UNSPECIFIED WHETHER TRAUMATIC: ICD-10-CM

## 2022-03-15 DIAGNOSIS — M25.519 ACUTE SHOULDER PAIN, UNSPECIFIED LATERALITY: ICD-10-CM

## 2022-03-15 DIAGNOSIS — M75.81 ROTATOR CUFF TENDINITIS, RIGHT: Primary | ICD-10-CM

## 2022-03-15 DIAGNOSIS — M19.90 INFLAMMATORY OSTEOARTHRITIS: ICD-10-CM

## 2022-03-15 PROCEDURE — 20610 DRAIN/INJ JOINT/BURSA W/O US: CPT | Performed by: PHYSICIAN ASSISTANT

## 2022-03-15 PROCEDURE — 99214 OFFICE O/P EST MOD 30 MIN: CPT | Performed by: PHYSICIAN ASSISTANT

## 2022-03-15 RX ORDER — BUPIVACAINE HYDROCHLORIDE 2.5 MG/ML
10 INJECTION, SOLUTION INFILTRATION; PERINEURAL ONCE
Status: COMPLETED | OUTPATIENT
Start: 2022-03-15 | End: 2022-03-15

## 2022-03-15 RX ORDER — TRIAMCINOLONE ACETONIDE 40 MG/ML
80 INJECTION, SUSPENSION INTRA-ARTICULAR; INTRAMUSCULAR ONCE
Status: COMPLETED | OUTPATIENT
Start: 2022-03-15 | End: 2022-03-15

## 2022-03-15 RX ORDER — LIDOCAINE HYDROCHLORIDE 10 MG/ML
40 INJECTION, SOLUTION INFILTRATION; PERINEURAL ONCE
Status: COMPLETED | OUTPATIENT
Start: 2022-03-15 | End: 2022-03-15

## 2022-03-15 RX ADMIN — BUPIVACAINE HYDROCHLORIDE 10 MG: 2.5 INJECTION, SOLUTION INFILTRATION; PERINEURAL at 15:23

## 2022-03-15 RX ADMIN — TRIAMCINOLONE ACETONIDE 80 MG: 40 INJECTION, SUSPENSION INTRA-ARTICULAR; INTRAMUSCULAR at 15:23

## 2022-03-15 RX ADMIN — LIDOCAINE HYDROCHLORIDE 40 MG: 10 INJECTION, SOLUTION INFILTRATION; PERINEURAL at 15:23

## 2022-03-15 NOTE — TELEPHONE ENCOUNTER
Pt calling in,  Reviewed the seminar. Pt is unable to get her insurance card pics to upload to email. Review pt insurance that is on file, pt stated that those was correct. I did let her know, that we can pull her insurance cards from her chart. Pt understood.

## 2022-03-15 NOTE — PROGRESS NOTES
Dr Kimberlee Cole      Date /Time 3/15/2022             11:46 AM EST  Name Raphael Salas             1965   [id number]                Chief Complaint   Patient presents with    Injections     CORTISONE INJ RIGHT SHOULDER         History of Present Illness    Raphael Salas is a 62 y.o. female who presents with  right Shoulder pain. .  Injury Mechanism:  none. Worker's Comp. & legal issues:   none. Previous Treatments: Ice, Heat and NSAIDs    At patient's last visit we did order her acute phase reactants and also an aspiration of the shoulder with interventional radiology. She is here for results. Patient is complaining of multiple joint pain. No history of rheumatoid arthritis or other inflammatory type arthritis in her family. Previous History: Patient presents the office today for follow-up visit. Patient here for EMG and MRI results. She continues to complain of continued pain involving her right shoulder. Pain is concentrated laterally. She does have numbness and tingling in her hand and has had a previous ORIF wrist fracture in 2019. She continues to complain of difficulties involving her right shoulder especially with any overhead activities. Previous history: Patient presents to the office today for a new problem. Patient is here with a chief complaint of right shoulder pain. Patient's right shoulder has been painful off and on since 2020. Patient had a rotator cuff repair done by Dr. Nani Tuttle June 2020. Patient had another rotator cuff repair done on November 25, 2020 by Dr. Cy Corrigan. Patient subsequently had irrigation and debridement February 24, 2021 by Dr. Cy Corrigan for infection. Last surgery was performed on 4/28/2021 by Dr. Cy Corrigan with recurrent rotator cuff repair. She is here complaining of continued pain symptoms and dysfunction. Pain concentrated over lateral shoulder.   She also has posterior shoulder pain and numbness and tingling that radiate down her arm and into her hand. She did had ORIF right wrist done by Dr. Dixie Jung in 2019.     Past Medical History  Past Medical History:   Diagnosis Date    Anxiety     Depression     Endometriosis     GERD (gastroesophageal reflux disease)     Hypertension     Osteoarthritis     Restless leg syndrome      Past Surgical History:   Procedure Laterality Date    ANUS SURGERY  2018    fissure     SECTION      x3    COLONOSCOPY      DILATION AND CURETTAGE OF UTERUS N/A 6/3/2021    VIDEO HYSTEROSCOPY DILATATION AND CURETTAGE, POSSIBLE MYOSURE, POSSIBLE POLYPECTOMY performed by Arlena Kanner, DO at [address]       right wrist    HERNIA REPAIR  2018    LAPAROSCOPIC PARAESOPHAGEAL HERNIA REPAIR WITH MESH    HYSTERECTOMY N/A 10/21/2021    ROBOTIC ASSISTED LAPAROSCOPIC HYSTERECTOMY WITH RIGHT SALPINGECTOMY, RIGHT OOPHORECTOMY, CYSTOSCOPY, LYSIS OF ADHESIONS  CPT CODE - 11201 performed by Jaimie Bach DO at [address] [place] [address] ARTHROSCOPY Left 11/15/12    ARTHROSCOPY LEFT KNEE WITH SYOVECTOMY AND CHONDROPLASTY    OVARY REMOVAL Right     ROTATOR CUFF REPAIR Right 2020    EXAM UNDER ANESTHESIA VIDEO ARTHROSCOPY RIGHT SHOULDER, MINI- OPEN ROTATOR CUFF REPAIR, NEER ACROMIOPLASTY, LENO PROCEDURE WITH EXPAREL performed by Rocio Rodrigues MD at [place] [address] ARTHROSCOPY Right 2020    VIDEO ARTHROSCOPY RIGHT SHOULDER, OPEN ROTATOR CUFF REPAIR, BICEPS TENOTOMY -BLOCK- performed by Amando Mir MD at [place] [address] ARTHROSCOPY Right 2021    RIGHT SHOULDER ARTHROSCOPIC INCISION AND DRAINAGE performed by Amando Mir MD at [place] [address] ARTHROSCOPY Right 2021    VIDEO ARTHROSCOPY RIGHT SHOULDER, ROTATOR CUFF REPAIR, DEBRIDEMENT performed by Amando Mir MD at [address] [place] [address] ENDOSCOPY   esophageasl stretch     Social History     Tobacco Use    Smoking status: Never Smoker    Smokeless tobacco: Never Used   Substance Use Topics    Alcohol use: Yes     Comment: 1 -2 drinks per month      Current Outpatient Medications on File Prior to Visit   Medication Sig Dispense Refill    ibuprofen (ADVIL;MOTRIN) 600 MG tablet TAKE 1 TABLET BY MOUTH EVERY 8 HOURS AS NEEDED FOR PAIN 40 tablet 1    traMADol (ULTRAM) 50 MG tablet Take 1 tablet by mouth every 6 hours as needed for Pain for up to 3 days. Intended supply: 7 days. Take lowest dose possible to manage pain 12 tablet 0    meloxicam (MOBIC) 15 MG tablet Take 1 tablet by mouth daily as needed for Pain 90 tablet 0    rOPINIRole (REQUIP) 2 MG tablet TAKE 2 TABLETS AT NIGHT AND ONE IN THE MORNING FOR LEGS 90 tablet 3    predniSONE (DELTASONE) 10 MG tablet 4 a day x 2 days then 3 a day x 2 days then 2 a day x 2 days then 1 a day x 2 days 20 tablet 0    furosemide (LASIX) 20 MG tablet TAKE 1 TABLET BY MOUTH DAILY AS NEEDED (FOR SWELLING) 30 tablet 1    promethazine-dextromethorphan (PROMETHAZINE-DM) 6.25-15 MG/5ML syrup Take 5 mLs by mouth 4 times daily as needed for Cough (coughing. .caution drowsiness) 180 mL 2    doxycycline hyclate (VIBRA-TABS) 100 MG tablet Take 1 tablet by mouth 2 times daily Take with food / snack 20 tablet 0    UNABLE TO FIND daily CBD Oil, use in mornings for knee.  diclofenac (VOLTAREN) 50 MG EC tablet TAKE 1 TABLET BY MOUTH TWICE A DAY WITH MEALS 60 tablet 3    methocarbamol (ROBAXIN) 500 MG tablet TAKE 1 TABLET BY MOUTH 3 TIMES A DAY AS NEEDED FOR NECK PAIN 90 tablet 3    lidocaine-prilocaine (EMLA) 2.5-2.5 % cream Apply topically as needed. tid 30 g 3    DULoxetine (CYMBALTA) 60 MG extended release capsule TAKE 1 CAPSULE BY MOUTH EVERY DAY (Patient taking differently: Take 60 mg by mouth daily ) 30 capsule 5    atenolol (TENORMIN) 25 MG tablet TAKE 1 TABLET BY MOUTH EVERY DAY (Patient taking differently: Take 25 mg by mouth daily ) 30 tablet 5    albuterol sulfate HFA (PROVENTIL HFA) 108 (90 Base) MCG/ACT inhaler Inhale 2 puffs into the lungs every 6 hours as needed for Wheezing or Shortness of Breath (Space out to every 6 hours as symptoms improve) Space out to every 6 hours as symptoms improve. 18 g 0    omeprazole (PRILOSEC) 40 MG delayed release capsule TAKE 1 CAPSULE BY MOUTH EVERY DAY (Patient taking differently: Take 40 mg by mouth nightly ) 30 capsule 6    Acetaminophen (TYLENOL PO) Take 650 mg by mouth daily as needed       MELATONIN PO Take 20 mg by mouth nightly as needed        No current facility-administered medications on file prior to visit. ASCVD 10-YEAR RISK SCORE  The 10-year ASCVD risk score (Nicole Pennington., et al., 2013) is: 3.6%    Values used to calculate the score:      Age: 62 years      Sex: Female      Is Non- : No      Diabetic: No      Tobacco smoker: No      Systolic Blood Pressure: 516 mmHg      Is BP treated: Yes      HDL Cholesterol: 29 mg/dL      Total Cholesterol: 137 mg/dL     Review of Systems  10-point ROS is negative other than HPI. Physical Exam  Based off 1997 Exam Criteria  Ht 5' 6\" (1.676 m)   Wt 292 lb (132.5 kg)   LMP 09/20/2017   BMI 47.13 kg/m²      Constitutional:       General: He is not in acute distress. Appearance: Normal appearance. Cardiovascular:      Rate and Rhythm: Normal rate and regular rhythm. Pulses: Normal pulses. Pulmonary:      Effort: Pulmonary effort is normal. No respiratory distress. Neurological:      Mental Status: He is alert and oriented to person, place, and time. Mental status is at baseline.      Musculoskeletal:  Gait:  antalgic    Cervical Spine / Shoulder:      RIGHT  LEFT    Cervical Spine Exam  [] All Neg    [x] All Neg     Spurling's  [x]  []Not tested   []  []Not tested    Whittington's  []  []Not tested   []  []Not tested    Pain with rotation  [x]  []Not tested   []  []Not tested    Pain with lateral bending  [x]  []Not tested   []  []Not tested    Paraspinal muscle tenderness  [] Paraspinal  []Midline   [] Paraspinal  []Not tested    Sensation RIGHT  LEFT    Axillary  [x] Normal []Decreased    [x] Normal []Decreased   Musculocutaneous  [x] Normal  []Decreased   [x] Normal []Decreased   Median  [x] Normal []Decreased   [x] Normal []Decreased   Radial  [x] Normal  []Decreased   [x] Normal []Decreased   Ulnar  [x] Normal  []Decreased   [x] Normal []Decreased   Scapula       Position  [x]Nml  []low  [] lateral  [x]Nml  []low  [] lateral   Dyskinesia  []+ []Abn. Shrug   []+ []Abn. Shrug                     Winging     [x]None   []Med  []Lat   []Worse w/FE  []Med  []Lat  []Worse w/FE   Scapulothoracic Compress.    []Impr Pain  []Impr Motion  []Impr Pain []Impr Motion    Range of Motion Active Passive Active Passive   Forward Elevation 90  170    Abduction 60  100    External Rotation @ side 30  60    External Rotation @ 90 abd 40  90    Internal Rotation @ 90 abd 10  40    Internal Rotation Sacrum  Normal    End range of motion  [] Pain  [] Pain  [] Pain  [] Pain   Strength RIGHT /5 LEFT /5   Abduction 4  5    External Rotation 4  5    Internal Rotation 4  5    Provocative Signs/Tests  [] All Neg   [x] +      [] -  [] All Neg   [x] +      [] -   Rotator Cuff Signs  [x] All Neg  [] Not tested   [x] All Neg  [] Not tested    Neer  [x]  []Not tested   []  []Not tested    Augusta Peeling  [x]  []Not tested   []  []Not tested    Painful arc  [x]  []Not tested   []  []Not tested    Greater tuberosity tenderness  []  []Not tested   []  []Not tested    Drop arm  []  []Not tested  []  []Not tested   Superior Escape  []  []Not tested   []  []Not tested    ER Lag  []  []Not tested   []  []Not tested    Belly press  []  []Not tested   []  []Not tested    Lift-off  []  []Not tested   []  []Not tested    Bear hug  []  []Not tested   []  []Not tested    Biceps/Labral Signs  [] All Neg  [] Not tested   [x] All Neg  [] Not tested Jordan's  [x]  []Not tested   []  []Not tested    Speed's  [x]  []Not tested   []  []Not tested    Dynamic Load Shift/Shear  []  []Not tested   []  []Not tested    Clicking/Popping  []  []Not tested  []  []Not tested   Bicipital groove tenderness  []  []Not tested   []  []Not tested    Young  []  []Not tested   []  []Not tested    [facility] Joint Signs  [x] All Neg  [] Not tested   [x] All Neg  [] Not tested    [facility] joint tenderness  []  []Not tested   []  []Not tested    Cross-arm adduction pain  []  []Not tested   []  []Not tested    Instability Signs  [] All Neg  [] Not tested  [] All Neg  [] Not tested   General laxity (thumb/elbow)  []  []Not tested   []  []Not tested    Hyperabduction  []  []Not tested   []  []Not tested    Sulcus []Side   []ER    []Side   []ER       Anterior apprehension  []  []Not tested   []  []Not tested    Relocation  []   []Not tested  []  []Not tested     Imaging  Right Shoulder: [address] [facility] [facility]  Previous Radiographs: X-rays were ordered today reviewed of the right shoulder. 3 views. AP, scapular Y, and axillary views. They demonstrate no evidence of fractures or dislocations. Mild to moderate arthritic changes present. EXAMINATION:   MRI OF THE RIGHT SHOULDER WITHOUT CONTRAST   3/2/2022 1:32 pm       TECHNIQUE:   Multiplanar multisequence MRI of the right shoulder was performed without the   administration of intravenous contrast.       COMPARISON:   Right shoulder radiograph January 31, 2022;  MRI right shoulder Hannah 3, 2020       HISTORY:   ORDERING SYSTEM PROVIDED HISTORY: Right shoulder pain, unspecified chronicity   TECHNOLOGIST PROVIDED HISTORY:   Reason for exam:->r/o RTC tear   Reason for Exam: shoulder pain, several surgeries with comlications, limited   ROM       FINDINGS:   ROTATOR CUFF: Postoperative changes of rotator cuff repair.  Complete   full-thickness re-tear of the supraspinatus tendon.  Full-thickness tearing   along the anterior fibers of the infraspinatus.  The posterior fibers remain   intact.  Chronic interstitial tearing of the subscapularis.  The teres minor   remains grossly intact.  Severe atrophy of the supraspinatus and mild atrophy   of the infraspinatus, subscapularis, and teres minor.       BICEPS TENDON: Long head biceps tendon remains grossly intact.  Tendinosis of   the intra-articular portion of the tendon.       LABRUM: To the extent that the labrum is visualized on the current exam,   there is labral degeneration most pronounced superiorly.  No paralabral cyst.       GLENOHUMERAL JOINT: Narrowing of the acromial humeral interval related to   rotator cuff re-tear.  Mild glenohumeral osteoarthritis.  Small to moderate   effusion and synovitis.       AC JOINT AND ACROMIOCLAVICULAR ARCH: Postoperative changes of the   acromioclavicular joint.  Fluid present within the subacromial/subdeltoid   bursa related to rotator cuff tear.       BONE MARROW:  Bone marrow is normal in signal without evidence of fracture,   marrow contusion or marrow occupying lesion.       OUTLET SPACES: The suprascapular notch and quadrilateral space are without   obstructing or space occupying lesions.           Impression   1. Prior rotator cuff repair with interval complete re-tear of the   supraspinatus. 2. Full-thickness tearing of the anterior fibers of the infraspinatus. Intact posterior fibers present. 3. Interstitial tearing of the subscapularis. 4. Tendinosis of the biceps tendon. 5. Small to moderate glenohumeral effusion and synovitis. EMG results  Impression:  Study is consistent with right carpal tunnel syndrome, moderate severity. No evidence of an acute radiculopathy or other entrapment neuropathy. Results for Ivan Middleton (MRN [id number]) as of 3/15/2022 15:56   Ref.  Range 3/11/2022 09:38 3/11/2022 09:38 3/11/2022 09:38   Source + CELL CT/DIFF-BF Unknown Shoulder  Right     Appearance, Fluid Unknown Hazy     Color, Fluid Unknown Pale Yellow     RBC, Fluid Latest Units: /cumm 20,083     Lymphocytes, Body Fluid Latest Units: % 64     Neutrophil Count, Fluid Latest Units: % 1     Nucl Cell, Fluid Latest Units: /cumm 1,861     Crystals, Fluid Unknown None Seen     Clot Eval. Unknown see below     Number of Cells Counted Fluid Unknown 100     Path Consult Fluid Unknown Yes Reviewed Reviewed        Component 3/11/22 1005 Resulting Agency   Gram Stain Result No organisms seen   1+ WBC's (Polymorphonuclear)  [facility]   WOUND/ABSCESS No growth to date   No growth 36 to 48 hours   No Aerobic or Anaerobic growth   Holding for 3 weeks. P    [facility]   Anaerobic Culture Anaerobic culture further report to follow   No anaerobes isolated so far, Further report to follow  [address] [facility]         Procedure:  Orders Placed This Encounter   Procedures    IL ARTHROCENTESIS ASPIR&/INJ MAJOR JT/BURSA W/O US     Right Shoulder Cortisone Injection: Glenohumeral CPT 28995   Consent was obtained after discussion of the risks, benefits, alternatives, including, but not limited to bleeding, pain, infection, skin disruption or discoloration. Laterality was confirmed (timeout). The shoulder was prepped with alcohol.  A formulation of 2cc of 40mg/ml Kenalog, 4cc of 1% lidocaine, 4cc of 0.25% marcaine was injected into the glenohumeral joint space with a 25 gauge needle without difficulty. The site was cleaned and dressed with a band aid.  She tolerated this well and there were no complications. Assessment and Plan  Roberts Figures was seen today for injections.     Diagnoses and all orders for this visit:    Rotator cuff tendinitis, right  -     IL ARTHROCENTESIS ASPIR&/INJ MAJOR JT/BURSA W/O US  -     bupivacaine (MARCAINE) 0.25 % injection 10 mg  -     lidocaine 1 % injection 40 mg  -     triamcinolone acetonide (KENALOG-40) injection 80 mg    Acute shoulder pain, unspecified laterality  -     IL ARTHROCENTESIS ASPIR&/INJ MAJOR JT/BURSA W/O US  -     bupivacaine (MARCAINE) 0.25 % injection 10 mg  -     lidocaine 1 % injection 40 mg  -     triamcinolone acetonide (KENALOG-40) injection 80 mg    Incomplete tear of right rotator cuff, unspecified whether traumatic  -     SC ARTHROCENTESIS ASPIR&/INJ MAJOR JT/BURSA W/O US  -     bupivacaine (MARCAINE) 0.25 % injection 10 mg  -     lidocaine 1 % injection 40 mg  -     triamcinolone acetonide (KENALOG-40) injection 80 mg        Patient does have a large chronic rotator cuff tear. She has severe atrophy. She has had 4 previous rotator cuff repairs and 1 arthroscopic irrigation and debridement for infection. I do not feel her rotator cuff is fixable at this time. She may be a candidate for a reverse total shoulder arthroplasty at some point in the future but does need medical optimization before proceeding. Her current BMI is 47.13 and needs to be less than 40 to minimize postoperative risk profile. Her BMI is extremely important especially with her history of infection in his shoulder. The most recent lab work for acute phase reactants are questionable. Her CRP is elevated but her sed rate and white blood cell count are normal.  I have reviewed her aspiration which the cell count does not suggest infection. We will proceed with an intra-articular cortisone injection. We will give her referral to rheumatology to evaluate her elevated CRP and possible inflammatory type arthritis. She does have an appointment with her primary care physician Dr. Ariella Bah tomorrow. She should discuss rheumatology consult with him before scheduling. I discussed with Tiffany Watson that her history, symptoms, signs and imaging are most consistent with rotator cuff partial tears.     We reviewed the natural history of these conditions and treatment options ranging from conservative measures (rest, icing, activity modification, physical therapy, pain meds, cortisone injection) to surgical options. In terms of treatment, I recommended continuing with rest, icing, avoidance of painful activities, NSAIDs or pain meds as tolerated, and physical therapy. If these are not effective, cortisone injection can be considered. We discussed surgical options as well, should conservative measures fail. Electronically signed by Dedra Segal PA-C on 3/15/2022 at 3:42 PM  This dictation was generated by voice recognition computer software. Although all attempts are made to edit the dictation for accuracy, there may be errors in the transcription that are not intended.

## 2022-03-16 ENCOUNTER — OFFICE VISIT (OUTPATIENT)
Dept: FAMILY MEDICINE CLINIC | Age: 57
End: 2022-03-16
Payer: MEDICARE

## 2022-03-16 ENCOUNTER — TELEPHONE (OUTPATIENT)
Dept: BARIATRICS/WEIGHT MGMT | Age: 57
End: 2022-03-16

## 2022-03-16 VITALS
HEIGHT: 66 IN | OXYGEN SATURATION: 93 % | SYSTOLIC BLOOD PRESSURE: 118 MMHG | DIASTOLIC BLOOD PRESSURE: 76 MMHG | HEART RATE: 65 BPM | WEIGHT: 285 LBS | BODY MASS INDEX: 45.8 KG/M2

## 2022-03-16 DIAGNOSIS — F41.1 GAD (GENERALIZED ANXIETY DISORDER): ICD-10-CM

## 2022-03-16 DIAGNOSIS — I10 ESSENTIAL HYPERTENSION: ICD-10-CM

## 2022-03-16 DIAGNOSIS — E66.01 CLASS 3 SEVERE OBESITY DUE TO EXCESS CALORIES WITH SERIOUS COMORBIDITY AND BODY MASS INDEX (BMI) OF 45.0 TO 49.9 IN ADULT (HCC): ICD-10-CM

## 2022-03-16 DIAGNOSIS — Z00.00 ANNUAL PHYSICAL EXAM: ICD-10-CM

## 2022-03-16 DIAGNOSIS — Z00.00 INITIAL MEDICARE ANNUAL WELLNESS VISIT: Primary | ICD-10-CM

## 2022-03-16 DIAGNOSIS — F32.5 MAJOR DEPRESSIVE DISORDER WITH SINGLE EPISODE, IN FULL REMISSION (HCC): ICD-10-CM

## 2022-03-16 PROCEDURE — G8482 FLU IMMUNIZE ORDER/ADMIN: HCPCS | Performed by: FAMILY MEDICINE

## 2022-03-16 PROCEDURE — 3017F COLORECTAL CA SCREEN DOC REV: CPT | Performed by: FAMILY MEDICINE

## 2022-03-16 PROCEDURE — G8427 DOCREV CUR MEDS BY ELIG CLIN: HCPCS | Performed by: FAMILY MEDICINE

## 2022-03-16 PROCEDURE — G0438 PPPS, INITIAL VISIT: HCPCS | Performed by: FAMILY MEDICINE

## 2022-03-16 PROCEDURE — 1036F TOBACCO NON-USER: CPT | Performed by: FAMILY MEDICINE

## 2022-03-16 PROCEDURE — G8417 CALC BMI ABV UP PARAM F/U: HCPCS | Performed by: FAMILY MEDICINE

## 2022-03-16 PROCEDURE — 99213 OFFICE O/P EST LOW 20 MIN: CPT | Performed by: FAMILY MEDICINE

## 2022-03-16 NOTE — TELEPHONE ENCOUNTER
Patient was sent Dr. Kailee Sidhu digital bariatric seminar. Patient DOES have BWLS coverage with [employer] (No Req diet) Bariatric benefit form scanned in media. *Spoke with pt, Info given.   No HX of WLS, BMI > 35  NP pk mailed

## 2022-03-16 NOTE — PROGRESS NOTES
Referral to [facility] ,  Dr Sepideh Gibson. April 14. Discussed agree with weight loss referral.      Right foot / ankle swells some    Right shoulder ,  Had rotator  Cuff surgery and has had a lot of issues, recent aspiration-   Not infection  Gave her a cortisone shot yesterday. Still some sore. Will see if helps    Dr Cally Horton -ortho- rec rheumatology eval as she does not seem to be responding    Question heart evaluation d/t her age and general condition    lost a child and is in a support group and feels it is helpful. Living will discussed,  Pastoral care referral.  Medicare Annual Wellness Visit    Jennifer Tello is here for Medicare AWV, Weight Loss (needs weight loss surgery ), Discuss Labs, and Leg Swelling    Assessment & Plan    Recommendations for Preventive Services Due: see orders and patient instructions/AVS.  Recommended screening schedule for the next 5-10 years is provided to the patient in written form: see Patient Instructions/AVS.     Call for bariatric consultation  Continue with specialist and routine medicines     Subjective : Obesity, chronic shoulder pain, anxiety depression}    Patient's complete Health Risk Assessment and screening values have been reviewed and are found in Flowsheets. The following problems were reviewed today and where indicated follow up appointments were made and/or referrals ordered. Positive Risk Factor Screenings with Interventions:    Fall Risk:  Do you feel unsteady or are you worried about falling? : (!) yes  2 or more falls in past year?: no  Fall with injury in past year?: no     Fall Risk Interventions:    · Discussed fall risk reduction. Strategies in and around the home and with ambulation. Patient is generally pretty steady on her feet but discussed awareness and safety factors going forward. Alcohol Screening:  AUDIT Total Score: 4    A score of 8 or more is associated with harmful or hazardous drinking.  A score of 13 or more in women, and 15 or more in men, is likely to indicate alcohol dependence. Substance Abuse - Alcohol Interventions:  Patient not use drugs and does not abuse alcohol. General conditions and strategies discussed. General Health and ACP:  General  In general, how would you say your health is?: (!) Poor  In the past 7 days, have you experienced any of the following: New or Increased Pain, New or Increased Fatigue, Loneliness, Social Isolation, Stress or Anger?: (!) Yes  Select all that apply: (!) New or Increased Pain,New or Increased Fatigue,Stress  Do you get the social and emotional support that you need?: Yes  Do you have a Living Will?: (!) No    Advance Directives     Power of  Living Will ACP-Advance Directive ACP-Power of     Not on File Filed on 06/14/17 Filed Not on File      General Health Risk Interventions:  · Concept of a living will/advanced directive discussed. Feels she might benefit from a referral to pastoral care for understanding and completion of a living well. Health Habits/Nutrition:     Physical Activity: Inactive    Days of Exercise per Week: 0 days    Minutes of Exercise per Session: 0 min     Have you lost any weight without trying in the past 3 months?: No  Body mass index: (!) 46  Have you seen the dentist within the past year?: Yes    Health Habits/Nutrition Interventions:  · Discussed recommendations for routine dental/oral exam.    Hearing/Vision:  Do you or your family notice any trouble with your hearing that hasn't been managed with hearing aids?: (!) Yes  Do you have difficulty driving, watching TV, or doing any of your daily activities because of your eyesight?: No  Have you had an eye exam within the past year?: Yes  No exam data present    Hearing/Vision Interventions:  · Discussed recommendation for yearly eye exam and hearing evaluation if there appears to be issues identified.             Objective   Vitals:    03/16/22 1512   BP: 118/76 Site: Left Upper Arm   Position: Sitting   Cuff Size: Medium Adult   Pulse: 65   SpO2: 93%   Weight: 285 lb (129.3 kg)   Height: 5' 6\" (1.676 m)      Body mass index is 46 kg/m². Allergies   Allergen Reactions    Toradol [Ketorolac Tromethamine] Other (See Comments)     \"out of it\"  \"felt like sleep paralysis\"    Haldol [Haloperidol Lactate] Other (See Comments)     \"felt out of it, similar to the toradol\"     Prior to Visit Medications    Medication Sig Taking? Authorizing Provider   ibuprofen (ADVIL;MOTRIN) 600 MG tablet TAKE 1 TABLET BY MOUTH EVERY 8 HOURS AS NEEDED FOR PAIN Yes Feliz Berry, DO   rOPINIRole (REQUIP) 2 MG tablet TAKE 2 TABLETS AT NIGHT AND ONE IN THE MORNING FOR LEGS Yes Feliz Berry, DO   UNABLE TO FIND daily CBD Oil, use in mornings for knee. Yes Historical Provider, MD   diclofenac (VOLTAREN) 50 MG EC tablet TAKE 1 TABLET BY MOUTH TWICE A DAY WITH MEALS Yes Radha Jasmine, DO   methocarbamol (ROBAXIN) 500 MG tablet TAKE 1 TABLET BY MOUTH 3 TIMES A DAY AS NEEDED FOR NECK PAIN Yes Feliz Berry, DO   lidocaine-prilocaine (EMLA) 2.5-2.5 % cream Apply topically as needed. tid Yes Feliz Berry, DO   DULoxetine (CYMBALTA) 60 MG extended release capsule TAKE 1 CAPSULE BY MOUTH EVERY DAY  Patient taking differently: Take 60 mg by mouth daily  Yes Feliz Berry, DO   atenolol (TENORMIN) 25 MG tablet TAKE 1 TABLET BY MOUTH EVERY DAY  Patient taking differently: Take 25 mg by mouth daily  Yes Feliz Berry, DO   albuterol sulfate HFA (PROVENTIL HFA) 108 (90 Base) MCG/ACT inhaler Inhale 2 puffs into the lungs every 6 hours as needed for Wheezing or Shortness of Breath (Space out to every 6 hours as symptoms improve) Space out to every 6 hours as symptoms improve.  Yes Donna Caputo MD   omeprazole (PRILOSEC) 40 MG delayed release capsule TAKE 1 CAPSULE BY MOUTH EVERY DAY  Patient taking differently: Take 40 mg by mouth nightly  Yes Feliz Berry, DO   Acetaminophen (TYLENOL PO) Take 650 mg by mouth daily as needed  Yes Historical Provider, MD   MELATONIN PO Take 20 mg by mouth nightly as needed  Yes Historical Provider, MD       CareTeam (Including outside providers/suppliers regularly involved in providing care):   Patient Care Team:  Oliva Viera DO as PCP - General (Family Medicine)  Oliva Viera DO as PCP - [facility] Empaneled Provider  America Macias MD as Consulting Physician (Otolaryngology)  Abiodun Dorsey MD as Surgeon (General Surgery)  Humberto Hayes PA-C as Physician Assistant (Physician Assistant Surgical)  Cipriano Clemente MD (Orthopedic Surgery)    Reviewed and updated this visit:  Tobacco  Allergies  Meds  Med Hx  Surg Hx  Soc Hx  Fam Hx

## 2022-03-22 ENCOUNTER — TELEPHONE (OUTPATIENT)
Dept: FAMILY MEDICINE CLINIC | Age: 57
End: 2022-03-22

## 2022-03-22 RX ORDER — FUROSEMIDE 20 MG/1
20 TABLET ORAL DAILY PRN
Qty: 30 TABLET | Refills: 1 | Status: SHIPPED | OUTPATIENT
Start: 2022-03-22 | End: 2022-04-27 | Stop reason: ALTCHOICE

## 2022-03-22 NOTE — TELEPHONE ENCOUNTER
Refill request-    furosemide (LASIX) 20 MG tablet    Pharmacy    [facility]/pharmacy #[id number] - [address] [place] [address]          Last appt.   3/16/2022    Future Appointments   Date Time Provider Diann Sotelo   4/14/2022  1:30 PM MD Shaun Fiore [facility]

## 2022-03-23 ENCOUNTER — TELEPHONE (OUTPATIENT)
Dept: FAMILY MEDICINE CLINIC | Age: 57
End: 2022-03-23

## 2022-03-23 ENCOUNTER — TELEMEDICINE (OUTPATIENT)
Dept: FAMILY MEDICINE CLINIC | Age: 57
End: 2022-03-23
Payer: MEDICARE

## 2022-03-23 ENCOUNTER — HOSPITAL ENCOUNTER (OUTPATIENT)
Dept: GENERAL RADIOLOGY | Age: 57
Discharge: HOME | End: 2022-03-23
Payer: MEDICARE

## 2022-03-23 DIAGNOSIS — R05.9 COUGH: ICD-10-CM

## 2022-03-23 DIAGNOSIS — J02.9 SORE THROAT: ICD-10-CM

## 2022-03-23 DIAGNOSIS — R52 BODY ACHES: ICD-10-CM

## 2022-03-23 DIAGNOSIS — R06.02 SHORTNESS OF BREATH: ICD-10-CM

## 2022-03-23 DIAGNOSIS — R09.81 NASAL CONGESTION: ICD-10-CM

## 2022-03-23 DIAGNOSIS — R68.83 CHILLS (WITHOUT FEVER): ICD-10-CM

## 2022-03-23 DIAGNOSIS — R05.9 COUGH: Primary | ICD-10-CM

## 2022-03-23 PROCEDURE — 99213 OFFICE O/P EST LOW 20 MIN: CPT | Performed by: FAMILY MEDICINE

## 2022-03-23 PROCEDURE — G8427 DOCREV CUR MEDS BY ELIG CLIN: HCPCS | Performed by: FAMILY MEDICINE

## 2022-03-23 PROCEDURE — 3017F COLORECTAL CA SCREEN DOC REV: CPT | Performed by: FAMILY MEDICINE

## 2022-03-23 PROCEDURE — 71046 X-RAY EXAM CHEST 2 VIEWS: CPT

## 2022-03-23 RX ORDER — METHYLPREDNISOLONE 4 MG/1
TABLET ORAL
Qty: 1 KIT | Refills: 0 | Status: SHIPPED | OUTPATIENT
Start: 2022-03-23 | End: 2022-03-29

## 2022-03-23 RX ORDER — BENZONATATE 100 MG/1
100 CAPSULE ORAL 3 TIMES DAILY PRN
Qty: 30 CAPSULE | Refills: 1 | Status: SHIPPED | OUTPATIENT
Start: 2022-03-23 | End: 2022-04-02

## 2022-03-23 RX ORDER — FLUTICASONE PROPIONATE 50 MCG
1 SPRAY, SUSPENSION (ML) NASAL DAILY
Qty: 1 EACH | Refills: 0 | Status: SHIPPED | OUTPATIENT
Start: 2022-03-23 | End: 2022-04-19

## 2022-03-23 RX ORDER — AZITHROMYCIN 250 MG/1
250 TABLET, FILM COATED ORAL DAILY
Qty: 1 PACKET | Refills: 0 | Status: SHIPPED | OUTPATIENT
Start: 2022-03-23 | End: 2022-03-31 | Stop reason: ALTCHOICE

## 2022-03-23 ASSESSMENT — PATIENT HEALTH QUESTIONNAIRE - PHQ9
SUM OF ALL RESPONSES TO PHQ QUESTIONS 1-9: 2
SUM OF ALL RESPONSES TO PHQ QUESTIONS 1-9: 2
1. LITTLE INTEREST OR PLEASURE IN DOING THINGS: 0
2. FEELING DOWN, DEPRESSED OR HOPELESS: 2
SUM OF ALL RESPONSES TO PHQ9 QUESTIONS 1 & 2: 2
SUM OF ALL RESPONSES TO PHQ QUESTIONS 1-9: 2
SUM OF ALL RESPONSES TO PHQ QUESTIONS 1-9: 2

## 2022-03-23 ASSESSMENT — ENCOUNTER SYMPTOMS
SORE THROAT: 1
COUGH: 1

## 2022-03-23 NOTE — TELEPHONE ENCOUNTER
----- Message from Levora Ankushch sent at 3/23/2022 11:16 AM EDT -----  Subject: Appointment Request    Reason for Call: Urgent Cough Cold    QUESTIONS  Type of Appointment? Established Patient  Reason for appointment request? No appointments available during search  Additional Information for Provider? Patient is experiencing congestion,   cough, sore throat, achy since yesterday. Patient would like to be seen. Please call with appt.  ---------------------------------------------------------------------------  --------------  CALL BACK INFO  What is the best way for the office to contact you? OK to leave message on   voicemail  Preferred Call Back Phone Number? [phone]  ---------------------------------------------------------------------------  --------------  SCRIPT ANSWERS  Relationship to Patient? Self  Are you currently unable to finish sentences due to any difficulty   breathing? No  Are you unable to swallow liquids? No  Are you having fevers (100.4 or greater), chills, or sweats? Yes   Have you been diagnosed with, awaiting test results for, or told that you   are suspected of having COVID-19 (Coronavirus)? (If patient has tested   negative or was tested as a requirement for work, school, or travel and   not based on symptoms, answer no)? No  Within the past 10 days have you developed any of the following symptoms   (answer no if symptoms have been present longer than 10 days or began   more than 10 days ago)? Fever or Chills, Cough, Shortness of breath or   difficulty breathing, Loss of taste or smell, Sore throat, Nasal   congestion, Sneezing or runny nose, Fatigue or generalized body aches   (answer no if pain is specific to a body part e.g. back pain), Diarrhea,   Headache?  Yes

## 2022-03-23 NOTE — TELEPHONE ENCOUNTER
Future Appointments   Date Time Provider Diann Sotelo   3/23/2022  1:20 PM DO TREVA Pineda [place] - [facility]   4/14/2022  1:30 PM MD Luis Gutierrez [facility]

## 2022-03-23 NOTE — PROGRESS NOTES
3/23/2022    TELEHEALTH EVALUATION -- Audio/Visual (During [id number] public health emergency)    HPI:    Jacky Medina (:  1965) has requested an audio/video evaluation for the following concern(s):    Chief Complaint   Patient presents with    Cough     started yesterday    Pharyngitis    Nasal Congestion    Generalized Body Aches    Joint Swelling     bilat swollen ankles started last week - mentioned this to Dr. Audra Jacques     Was prescribed Lasix 20 mg to be taken daily as needed for swelling by Dr. Regis So on 2022. States that someone will pick it up today, still has 3-4 from last prescription, swelling goes down a little if she props legs up. admits to excess gas and belching, usually gets sick around this time of year, trying not to use salt recently, has been eating a lot more restaurant food recently. Denies sick contacts, Cough is dry, throat is scratchy, nasal discharge is a little yellow, admits to anosmia but thinks it is from congestion, admits to SOB, feels dizzy and LH when she walks, SpO2 is 95-96%. /78 P 47    Pt did home COVID test during call and but was unable to obtain a result. Review of Systems   Constitutional:        Positive for BA's   HENT: Positive for congestion and sore throat. Respiratory: Positive for cough. Cardiovascular: Positive for leg swelling. Prior to Visit Medications    Medication Sig Taking? Authorizing Provider   azithromycin (ZITHROMAX Z-JORGE) 250 MG tablet Take 1 tablet by mouth daily As directed on pack Yes Norman Johnson DO   fluticasone (FLONASE) 50 MCG/ACT nasal spray 1 spray by Nasal route daily for 7 days Yes Norman Johnson DO   benzonatate (TESSALON PERLES) 100 MG capsule Take 1 capsule by mouth 3 times daily as needed for Cough Yes Norman Johnson DO   methylPREDNISolone (MEDROL DOSEPACK) 4 MG tablet Take by mouth.  Yes Norman Johnson DO   furosemide (LASIX) 20 MG tablet Take 1 tablet by mouth daily as needed (for swelling) Yes Donny Valdovinos DO   ibuprofen (ADVIL;MOTRIN) 600 MG tablet TAKE 1 TABLET BY MOUTH EVERY 8 HOURS AS NEEDED FOR PAIN Yes Donny Valdovinos DO   rOPINIRole (REQUIP) 2 MG tablet TAKE 2 TABLETS AT NIGHT AND ONE IN THE MORNING FOR LEGS Yes Donny Valdovinos DO   UNABLE TO FIND daily CBD Oil, use in mornings for knee. Yes Historical Provider, MD   diclofenac (VOLTAREN) 50 MG EC tablet TAKE 1 TABLET BY MOUTH TWICE A DAY WITH MEALS Yes Alicia Felix,    methocarbamol (ROBAXIN) 500 MG tablet TAKE 1 TABLET BY MOUTH 3 TIMES A DAY AS NEEDED FOR NECK PAIN Yes Donny Valdovinos DO   lidocaine-prilocaine (EMLA) 2.5-2.5 % cream Apply topically as needed. tid Yes Donny Valdovinos DO   DULoxetine (CYMBALTA) 60 MG extended release capsule TAKE 1 CAPSULE BY MOUTH EVERY DAY  Patient taking differently: Take 60 mg by mouth daily  Yes Donny Valdovinos DO   atenolol (TENORMIN) 25 MG tablet TAKE 1 TABLET BY MOUTH EVERY DAY  Patient taking differently: Take 25 mg by mouth daily  Yes Donny Valdovinos DO   albuterol sulfate HFA (PROVENTIL HFA) 108 (90 Base) MCG/ACT inhaler Inhale 2 puffs into the lungs every 6 hours as needed for Wheezing or Shortness of Breath (Space out to every 6 hours as symptoms improve) Space out to every 6 hours as symptoms improve.  Yes Franci Pena MD   omeprazole (PRILOSEC) 40 MG delayed release capsule TAKE 1 CAPSULE BY MOUTH EVERY DAY  Patient taking differently: Take 40 mg by mouth nightly  Yes Donny Valdovinos DO   Acetaminophen (TYLENOL PO) Take 650 mg by mouth daily as needed  Yes Historical Provider, MD   MELATONIN PO Take 20 mg by mouth nightly as needed  Yes Historical Provider, MD       Social History     Tobacco Use    Smoking status: Never Smoker    Smokeless tobacco: Never Used   Vaping Use    Vaping Use: Never used   Substance Use Topics    Alcohol use: Yes     Comment: 1 -2 drinks per month    Drug use: Not Currently     Types: Marijuana Yefri Butts     Comment: quit 2016 Allergies   Allergen Reactions    Toradol [Ketorolac Tromethamine] Other (See Comments)     \"out of it\"  \"felt like sleep paralysis\"    Haldol [Haloperidol Lactate] Other (See Comments)     \"felt out of it, similar to the toradol\"   ,   Past Medical History:   Diagnosis Date    Anxiety     Depression     Endometriosis     GERD (gastroesophageal reflux disease)     Hypertension     Osteoarthritis     Restless leg syndrome    ,   Past Surgical History:   Procedure Laterality Date    ANUS SURGERY  2018    fissure     SECTION      x3    COLONOSCOPY      DILATION AND CURETTAGE OF UTERUS N/A 6/3/2021    VIDEO HYSTEROSCOPY DILATATION AND CURETTAGE, POSSIBLE MYOSURE, POSSIBLE POLYPECTOMY performed by Kimmy Guido DO at [address]      right wrist   [address]  2018    LAPAROSCOPIC PARAESOPHAGEAL HERNIA REPAIR WITH MESH    HYSTERECTOMY N/A 10/21/2021    ROBOTIC ASSISTED LAPAROSCOPIC HYSTERECTOMY WITH RIGHT SALPINGECTOMY, RIGHT OOPHORECTOMY, CYSTOSCOPY, LYSIS OF ADHESIONS  CPT CODE - 66975 performed by Tej Vergara DO at [address] [facility] ARTHROSCOPY Left 11/15/12    ARTHROSCOPY LEFT KNEE WITH SYOVECTOMY AND CHONDROPLASTY    OVARY REMOVAL Right     ROTATOR CUFF REPAIR Right 2020    EXAM UNDER ANESTHESIA VIDEO ARTHROSCOPY RIGHT SHOULDER, MINI- OPEN ROTATOR CUFF REPAIR, NEER ACROMIOPLASTY, LENO PROCEDURE WITH EXPAREL performed by Kevin Jacob MD at [place] [address] ARTHROSCOPY Right 2020    VIDEO ARTHROSCOPY RIGHT SHOULDER, OPEN ROTATOR CUFF REPAIR, BICEPS TENOTOMY -BLOCK- performed by Justin Benavidez MD at [place] [address] ARTHROSCOPY Right 2021    RIGHT SHOULDER ARTHROSCOPIC INCISION AND DRAINAGE performed by Justin Benavidez MD at [place] [address] ARTHROSCOPY Right 2021    VIDEO ARTHROSCOPY RIGHT SHOULDER, ROTATOR CUFF REPAIR, DEBRIDEMENT performed by Justin Benavidez MD at [address] [place] [address] TONSILLECTOMY  1981    UPPER GASTROINTESTINAL ENDOSCOPY  2011    UPPER GASTROINTESTINAL ENDOSCOPY  2015    esophageasl stretch       PHYSICAL EXAMINATION:  [ INSTRUCTIONS:  \"[x]\" Indicates a positive item  \"[]\" Indicates a negative item  -- DELETE ALL ITEMS NOT EXAMINED]  Vital Signs: (As obtained by patient/caregiver or practitioner observation)    Height  -   5' 6\"                Constitutional: [x] Appears well-developed and well-nourished [] No apparent distress      [x] Abnormal-  Ill appearing  Mental status  [x] Alert and awake  [x] Oriented to person/place/time [x]Able to follow commands      Eyes:  EOM    [x]  Normal  [] Abnormal-  Sclera  []  Normal  [] Abnormal -         Discharge []  None visible  [] Abnormal -    HENT:   [x] Normocephalic, atraumatic. [] Abnormal   [] Mouth/Throat: Mucous membranes are moist.     External Ears [x] Normal  [] Abnormal-     Neck: [x] No visualized mass     Pulmonary/Chest: [x] Respiratory effort normal.  [x] No visualized signs of difficulty breathing or respiratory distress        [] Abnormal-      Musculoskeletal:   [] Normal gait with no signs of ataxia         [x] Normal range of motion of neck        [] Abnormal-       Neurological:        [x] No Facial Asymmetry (Cranial nerve 7 motor function) (limited exam to video visit)          [x] No gaze palsy        [] Abnormal-         Skin:        [x] No significant exanthematous lesions or discoloration noted on facial skin         [] Abnormal-            Psychiatric:       [x] Normal Affect [] No Hallucinations        [] Abnormal-     Other pertinent observable physical exam findings-     ASSESSMENT/PLAN:  1. Cough  - azithromycin (ZITHROMAX Z-JORGE) 250 MG tablet; Take 1 tablet by mouth daily As directed on pack  Dispense: 1 packet; Refill: 0  - benzonatate (TESSALON PERLES) 100 MG capsule; Take 1 capsule by mouth 3 times daily as needed for Cough  Dispense: 30 capsule; Refill: 1  - XR CHEST STANDARD (2 VW);  Future  - COVID-19; Future    2. Sore throat  - azithromycin (ZITHROMAX Z-JORGE) 250 MG tablet; Take 1 tablet by mouth daily As directed on pack  Dispense: 1 packet; Refill: 0  - XR CHEST STANDARD (2 VW); Future  - COVID-19; Future    3. Body aches  - COVID-19; Future    4. Chills (without fever)  - COVID-19; Future    5. Nasal congestion  - azithromycin (ZITHROMAX Z-JORGE) 250 MG tablet; Take 1 tablet by mouth daily As directed on pack  Dispense: 1 packet; Refill: 0  - fluticasone (FLONASE) 50 MCG/ACT nasal spray; 1 spray by Nasal route daily for 7 days  Dispense: 1 each; Refill: 0  - COVID-19; Future    6. Shortness of breath  - azithromycin (ZITHROMAX Z-JROGE) 250 MG tablet; Take 1 tablet by mouth daily As directed on pack  Dispense: 1 packet; Refill: 0  - methylPREDNISolone (MEDROL DOSEPACK) 4 MG tablet; Take by mouth. Dispense: 1 kit; Refill: 0  - COVID-19; Future    Instructed pt to go to the ED with worsening SOB or SpO2 < 90%. Return if symptoms worsen or fail to improve. Norma Long, was evaluated through a synchronous (real-time) audio-video encounter. The patient (or guardian if applicable) is aware that this is a billable service. Verbal consent to proceed has been obtained within the past 12 months. The visit was conducted pursuant to the emergency declaration under the [address] [place] [address] authority and the [employer] General Act. Patient identification was verified, and a caregiver was present when appropriate. The patient was located in a state where the provider was credentialed to provide care. Total time spent on this encounter: Not billed by time    --Roney Mcrae DO on 3/23/2022 at 1:45 PM    An electronic signature was used to authenticate this note.

## 2022-03-24 LAB — SARS-COV-2: NOT DETECTED

## 2022-03-25 ENCOUNTER — TELEPHONE (OUTPATIENT)
Dept: FAMILY MEDICINE CLINIC | Age: 57
End: 2022-03-25

## 2022-03-25 NOTE — TELEPHONE ENCOUNTER
Pt called in stating she isn't feeling much better, BP and oxygen OK, has diarrhea. Cough pearls not helping, she had a refill on cough medication that Dr. Leland Cain prescribed, so she had that filled.         Dipika Samaniego- [phone]

## 2022-03-25 NOTE — TELEPHONE ENCOUNTER
I want patient to go to the ER for evaluation as she appeared ill when I saw her on Wednesday and her symptoms have not responded to the prescribed medications. Thank you.

## 2022-03-25 NOTE — TELEPHONE ENCOUNTER
I spoke to patient and let her know that  You wanted her to get to ER so patient was advised of this    3/23/2022        Future Appointments   Date Time Provider Diann Sotelo   4/14/2022  1:30 PM MD Karely Palacios

## 2022-03-28 RX ORDER — OMEPRAZOLE 40 MG/1
CAPSULE, DELAYED RELEASE ORAL
Qty: 30 CAPSULE | Refills: 6 | Status: SHIPPED | OUTPATIENT
Start: 2022-03-28 | End: 2022-10-24

## 2022-03-28 RX ORDER — METHOCARBAMOL 500 MG/1
TABLET, FILM COATED ORAL
Qty: 90 TABLET | Refills: 3 | Status: SHIPPED | OUTPATIENT
Start: 2022-03-28 | End: 2022-07-22

## 2022-03-28 RX ORDER — ATENOLOL 25 MG/1
TABLET ORAL
Qty: 30 TABLET | Refills: 5 | Status: SHIPPED | OUTPATIENT
Start: 2022-03-28 | End: 2022-09-19

## 2022-03-28 NOTE — TELEPHONE ENCOUNTER
Future Appointments   Date Time Provider Diann Sotelo   4/14/2022  1:30 PM Kyleigh Lynne MD [facility]     LOV 3/23/2022

## 2022-03-30 ENCOUNTER — TELEPHONE (OUTPATIENT)
Dept: FAMILY MEDICINE CLINIC | Age: 57
End: 2022-03-30

## 2022-03-30 ENCOUNTER — NURSE TRIAGE (OUTPATIENT)
Dept: OTHER | Facility: CLINIC | Age: 57
End: 2022-03-30

## 2022-03-30 NOTE — TELEPHONE ENCOUNTER
Received call from Harley Jung at [facility] with Red Flag Complaint. Subjective: Caller states \"Last week I saw a doctor for a cough and swelling in my ankles. He put me on a z pack and a steroid. I finished all the antibiotics and steroids and was feeling better Monday. Now it seems like it is coming back. \"     Current Symptoms: bilateral ankle and feet swelling, nasal congestion, headache (forehead)    Denies - pain to ankles or feet / red streaking / discoloration      Onset: 1 week ago        Pain Severity: 6/10; headache    Temperature: denies      What has been tried: Elevation, furosemide, flonase    Recommended disposition: See PCP within 3 Days Triage RN advised patient that if they cannot be seen in the office within 3 days to go to an [address] [place], [place] [address]e. Care advice provided, patient verbalizes understanding; denies any other questions or concerns; instructed to call back for any new or worsening symptoms. Patient/Caller agrees with recommended disposition; writer provided warm transfer to Yu Bobby at [facility] for appointment scheduling     Attention Provider: Thank you for allowing me to participate in the care of your patient. The patient was connected to triage in response to information provided to the ECC/PSC. Please do not respond through this encounter as the response is not directed to a shared pool.             Reason for Disposition   MILD swelling of both ankles (i.e., pedal edema) AND new-onset or worsening    Protocols used: LEG SWELLING AND EDEMA-ADULT-[place]

## 2022-03-30 NOTE — TELEPHONE ENCOUNTER
----- Message from Cathryn Rai sent at 3/30/2022 10:57 AM EDT -----  Subject: Appointment Request    Reason for Call: Semi-Urgent Return from RN Triage    QUESTIONS  Type of Appointment? Established Patient  Reason for appointment request? Available appointments did not meet   patient need  Additional Information for Provider? Patient would like an in person visit   return from [place], congestion, feet and ankle swelling, have finished all of   the anti biotics and steroids from last week.  ---------------------------------------------------------------------------  --------------  CALL BACK INFO  What is the best way for the office to contact you? OK to leave message on   voicemail  Preferred Call Back Phone Number? [phone]  ---------------------------------------------------------------------------  --------------  SCRIPT ANSWERS  Patient needs to be seen within 3 days? Yes   Nurse Name? Jarrell  Have you been diagnosed with, awaiting test results for, or told that you   are suspected of having COVID-19 (Coronavirus)? (If patient has tested   negative or was tested as a requirement for work, school, or travel and   not based on symptoms, answer no)? No  Within the past 10 days have you developed any of the following symptoms   (answer no if symptoms have been present longer than 10 days or began   more than 10 days ago)? Fever or Chills, Cough, Shortness of breath or   difficulty breathing, Loss of taste or smell, Sore throat, Nasal   congestion, Sneezing or runny nose, Fatigue or generalized body aches   (answer no if pain is specific to a body part e.g. back pain), Diarrhea,   Headache?  Yes

## 2022-03-31 ENCOUNTER — OFFICE VISIT (OUTPATIENT)
Dept: FAMILY MEDICINE CLINIC | Age: 57
End: 2022-03-31
Payer: MEDICARE

## 2022-03-31 VITALS
OXYGEN SATURATION: 94 % | SYSTOLIC BLOOD PRESSURE: 124 MMHG | BODY MASS INDEX: 45.96 KG/M2 | DIASTOLIC BLOOD PRESSURE: 86 MMHG | TEMPERATURE: 96 F | HEART RATE: 50 BPM | HEIGHT: 66 IN | WEIGHT: 286 LBS

## 2022-03-31 DIAGNOSIS — R60.0 PEDAL EDEMA: Primary | ICD-10-CM

## 2022-03-31 DIAGNOSIS — R09.81 NASAL CONGESTION: ICD-10-CM

## 2022-03-31 DIAGNOSIS — F41.1 GENERALIZED ANXIETY DISORDER: ICD-10-CM

## 2022-03-31 PROCEDURE — 1036F TOBACCO NON-USER: CPT | Performed by: FAMILY MEDICINE

## 2022-03-31 PROCEDURE — G8482 FLU IMMUNIZE ORDER/ADMIN: HCPCS | Performed by: FAMILY MEDICINE

## 2022-03-31 PROCEDURE — 3017F COLORECTAL CA SCREEN DOC REV: CPT | Performed by: FAMILY MEDICINE

## 2022-03-31 PROCEDURE — G8427 DOCREV CUR MEDS BY ELIG CLIN: HCPCS | Performed by: FAMILY MEDICINE

## 2022-03-31 PROCEDURE — 99213 OFFICE O/P EST LOW 20 MIN: CPT | Performed by: FAMILY MEDICINE

## 2022-03-31 PROCEDURE — G8417 CALC BMI ABV UP PARAM F/U: HCPCS | Performed by: FAMILY MEDICINE

## 2022-03-31 RX ORDER — DICYCLOMINE HCL 20 MG
20 TABLET ORAL
Qty: 60 TABLET | Refills: 2 | Status: ON HOLD | OUTPATIENT
Start: 2022-03-31 | End: 2022-09-24 | Stop reason: HOSPADM

## 2022-03-31 RX ORDER — ALPRAZOLAM 1 MG/1
TABLET ORAL
Qty: 90 TABLET | Refills: 1 | Status: SHIPPED | OUTPATIENT
Start: 2022-03-31 | End: 2022-05-25

## 2022-03-31 RX ORDER — METOLAZONE 5 MG/1
5 TABLET ORAL DAILY
Qty: 30 TABLET | Refills: 1 | Status: SHIPPED | OUTPATIENT
Start: 2022-03-31 | End: 2022-05-18

## 2022-03-31 ASSESSMENT — ENCOUNTER SYMPTOMS: COUGH: 1

## 2022-03-31 NOTE — PROGRESS NOTES
RIGHT SHOULDER, OPEN ROTATOR CUFF REPAIR, BICEPS TENOTOMY -BLOCK- performed by Queta Niño MD at [place] [address] ARTHROSCOPY Right 2/24/2021    RIGHT SHOULDER ARTHROSCOPIC INCISION AND DRAINAGE performed by Queta Niño MD at [place] [address] ARTHROSCOPY Right 4/28/2021    VIDEO ARTHROSCOPY RIGHT SHOULDER, ROTATOR CUFF REPAIR, DEBRIDEMENT performed by Queta Niño MD at [address] [place] [address] ENDOSCOPY  2011    UPPER GASTROINTESTINAL ENDOSCOPY  2015    esophageasl stretch       Social History     Socioeconomic History    Marital status:      Spouse name: Not on file    Number of children: 3    Years of education: Not on file    Highest education level: Not on file   Occupational History    Occupation:    Tobacco Use    Smoking status: Never Smoker    Smokeless tobacco: Never Used   Vaping Use    Vaping Use: Never used   Substance and Sexual Activity    Alcohol use: Yes     Comment: 1 -2 drinks per month    Drug use: Not Currently     Types: Marijuana [place]     Comment: quit 2016    Sexual activity: Never   Other Topics Concern    Not on file   Social History Narrative    Not on file     Social Determinants of Health     Financial Resource Strain: Low Risk     Difficulty of Paying Living Expenses: Not hard at all   Food Insecurity: No Food Insecurity    Worried About [facility] in the Last Year: Never true    [facility] in the Last Year: Never true   Transportation Needs:     Lack of Transportation (Medical): Not on file    Lack of Transportation (Non-Medical):  Not on file   Physical Activity: Inactive    Days of Exercise per Week: 0 days    Minutes of Exercise per Session: 0 min   Stress:     Feeling of Stress : Not on file   Social Connections:     Frequency of Communication with Friends and Family: Not on file    Frequency of Social Gatherings with Friends and Family: Not on file    Attends Bahai Services: Not on file    Active Member of Clubs or Organizations: Not on file    Attends Club or Organization Meetings: Not on file    Marital Status: Not on file   Intimate Partner Violence: Not At Risk    Fear of Current or Ex-Partner: No    Emotionally Abused: No    Physically Abused: No    Sexually Abused: No   Housing Stability:     Unable to Pay for Housing in the Last Year: Not on file    Number of [place] in the Last Year: Not on file    Unstable Housing in the Last Year: Not on file       Family History   Problem Relation Age of Onset    Arthritis Mother     Obesity Mother    [employer] Anemia Mother    [employer] Other Mother         pulmonary embolism    Arthritis Father     Arrhythmia Father     Diabetes Other     Diabetes Paternal Grandfather     Cancer Neg Hx     Breast Cancer Neg Hx     Colon Cancer Neg Hx        Vitals:    03/31/22 0940   BP: 124/86   Pulse: 50   Temp: 96 °F (35.6 °C)   SpO2: 94%       Wt Readings from Last 3 Encounters:   03/31/22 286 lb (129.7 kg)   03/16/22 285 lb (129.3 kg)   03/15/22 292 lb (132.5 kg)       Review of Systems   Respiratory: Positive for cough. Cardiovascular: Positive for leg swelling. Gastrointestinal:        Some loose stool     Genitourinary: Negative for dysuria and frequency. Objective:   Physical Exam  Vitals reviewed. Constitutional:       Appearance: She is obese. HENT:      Right Ear: Tympanic membrane normal.      Left Ear: Tympanic membrane normal.      Nose: Congestion present. Cardiovascular:      Rate and Rhythm: Normal rate and regular rhythm. Pulmonary:      Comments: Scattered rhonchi. No wheeze / dullness  Some harsh upper airway sounds  Musculoskeletal:      Comments: 1 +  Ankle edema ( 10 am )   Lymphadenopathy:      Cervical: No cervical adenopathy. Neurological:      Mental Status: She is alert and oriented to person, place, and time.          Assessment:      Peripheral edema  Sinonasal allergies Plan:     Pedal edema  Will add zaroxolyn    Using flonase and OTC sinus meds. Continue for the next 3-5 days  Has used dicyclomine for gut-  Needs more    Current Outpatient Medications   Medication Sig Dispense Refill    omeprazole (PRILOSEC) 40 MG delayed release capsule TAKE 1 CAPSULE BY MOUTH EVERY DAY 30 capsule 6    atenolol (TENORMIN) 25 MG tablet TAKE 1 TABLET BY MOUTH EVERY DAY 30 tablet 5    methocarbamol (ROBAXIN) 500 MG tablet TAKE 1 TABLET BY MOUTH 3 TIMES A DAY AS NEEDED FOR NECK PAIN 90 tablet 3    fluticasone (FLONASE) 50 MCG/ACT nasal spray 1 spray by Nasal route daily for 7 days 1 each 0    benzonatate (TESSALON PERLES) 100 MG capsule Take 1 capsule by mouth 3 times daily as needed for Cough 30 capsule 1    furosemide (LASIX) 20 MG tablet Take 1 tablet by mouth daily as needed (for swelling) 30 tablet 1    ibuprofen (ADVIL;MOTRIN) 600 MG tablet TAKE 1 TABLET BY MOUTH EVERY 8 HOURS AS NEEDED FOR PAIN 40 tablet 1    rOPINIRole (REQUIP) 2 MG tablet TAKE 2 TABLETS AT NIGHT AND ONE IN THE MORNING FOR LEGS 90 tablet 3    UNABLE TO FIND daily CBD Oil, use in mornings for knee.  diclofenac (VOLTAREN) 50 MG EC tablet TAKE 1 TABLET BY MOUTH TWICE A DAY WITH MEALS 60 tablet 3    lidocaine-prilocaine (EMLA) 2.5-2.5 % cream Apply topically as needed. tid 30 g 3    DULoxetine (CYMBALTA) 60 MG extended release capsule TAKE 1 CAPSULE BY MOUTH EVERY DAY (Patient taking differently: Take 60 mg by mouth daily ) 30 capsule 5    albuterol sulfate HFA (PROVENTIL HFA) 108 (90 Base) MCG/ACT inhaler Inhale 2 puffs into the lungs every 6 hours as needed for Wheezing or Shortness of Breath (Space out to every 6 hours as symptoms improve) Space out to every 6 hours as symptoms improve. 18 g 0    Acetaminophen (TYLENOL PO) Take 650 mg by mouth daily as needed       MELATONIN PO Take 20 mg by mouth nightly as needed        No current facility-administered medications for this visit.

## 2022-03-31 NOTE — TELEPHONE ENCOUNTER
Future Appointments   Date Time Provider Diann Sotelo   4/14/2022  1:30 PM Syeda Lawrence MD [facility]     LOV 3/31/2022

## 2022-03-31 NOTE — PATIENT INSTRUCTIONS
Continue the flonase and other sinus meds as needed    Start the new water pill- once a day    Advise me Monday of the response

## 2022-04-04 LAB
ANAEROBIC CULTURE: NORMAL
GRAM STAIN RESULT: NORMAL
WOUND/ABSCESS: NORMAL

## 2022-04-12 ENCOUNTER — TELEPHONE (OUTPATIENT)
Dept: BARIATRICS/WEIGHT MGMT | Age: 57
End: 2022-04-12

## 2022-04-12 NOTE — TELEPHONE ENCOUNTER
Called as a new pt courtesy call - spoke w patient. Did receive paperwork - told patient to have new pt paperwork completely filled out, insurance card, and id and to arrive at appt time. If they didn't have the paperwork filled out and arrive on time may be rescheduled. Told to arrive @ 1 pm @ [facility] [place].

## 2022-04-14 ENCOUNTER — OFFICE VISIT (OUTPATIENT)
Dept: BARIATRICS/WEIGHT MGMT | Age: 57
End: 2022-04-14
Payer: MEDICARE

## 2022-04-14 VITALS
WEIGHT: 280 LBS | DIASTOLIC BLOOD PRESSURE: 61 MMHG | HEART RATE: 63 BPM | SYSTOLIC BLOOD PRESSURE: 96 MMHG | BODY MASS INDEX: 45 KG/M2 | HEIGHT: 66 IN

## 2022-04-14 DIAGNOSIS — Z01.818 PREOPERATIVE CLEARANCE: ICD-10-CM

## 2022-04-14 DIAGNOSIS — K44.9 HIATAL HERNIA: ICD-10-CM

## 2022-04-14 DIAGNOSIS — I10 ESSENTIAL HYPERTENSION: ICD-10-CM

## 2022-04-14 DIAGNOSIS — E66.01 MORBID OBESITY WITH BMI OF 45.0-49.9, ADULT (HCC): Primary | ICD-10-CM

## 2022-04-14 PROCEDURE — 3017F COLORECTAL CA SCREEN DOC REV: CPT | Performed by: SURGERY

## 2022-04-14 PROCEDURE — 99204 OFFICE O/P NEW MOD 45 MIN: CPT | Performed by: SURGERY

## 2022-04-14 PROCEDURE — G8417 CALC BMI ABV UP PARAM F/U: HCPCS | Performed by: SURGERY

## 2022-04-14 PROCEDURE — G8427 DOCREV CUR MEDS BY ELIG CLIN: HCPCS | Performed by: SURGERY

## 2022-04-14 PROCEDURE — 1036F TOBACCO NON-USER: CPT | Performed by: SURGERY

## 2022-04-14 NOTE — Clinical Note
Greatly appreciate the referral.  Excellent candidate for weight loss. We will keep you posted on Magdaleno Booze progress.

## 2022-04-14 NOTE — Clinical Note
Greatly appreciate the referral.  Excellent candidate for weight loss. We will keep you posted on [facility] quesada.

## 2022-04-14 NOTE — Clinical Note
Very pleasant 63-year-old female coming for weight loss due to orthopedic planned procedure down the road after she loses the weight. Unfortunately she had paraesophageal hiatal hernia repair with mesh and fundoplication. Surgery is not an option.   She will start seeing you for medical weight loss

## 2022-04-14 NOTE — PROGRESS NOTES
[facility]) Physicians   [facility]  Ashley Gillette MD, [address] [facility], [address] [facility]    [place] [address] . Phone: [phone]  Fax: [phone]       Medical Weight Loss Consultation         Chief Complaint   Patient presents with    Bariatric, Initial Visit     np tamanna         HPI:    Jordan Padilla is a very pleasant 62 y.o. obese female ,   Body mass index is 45.19 kg/m². And multiple medical problems who is presenting via telemedicine for weight loss evaluation and consultation by Dr. Feliz Berry. Patient has been struggling for several years now with obesity. Patient feels the weight is an obstacle to achieve and perform things in  daily living and is posing risk on health. Tries to diet, and exercise but can't keep the weight off. Patient tried few regimens, nothing formal, but with no sustainable weight loss. Patient  is very determined to lose weight and be healthy, and is interested in joining our weight loss program to achieve this goal.    Otherwise patient denies any nausea, vomiting, fevers, chills, shortness of breath, chest pain, constipation or urinary symptoms.       Pain Assessment   Denies any abdominal pain     Past Medical History:   Diagnosis Date    Anxiety     Depression     Endometriosis     GERD (gastroesophageal reflux disease)     Hypertension     Osteoarthritis     Restless leg syndrome      Past Surgical History:   Procedure Laterality Date    ANUS SURGERY  2018    fissure     SECTION      x3    COLONOSCOPY      DILATION AND CURETTAGE OF UTERUS N/A 6/3/2021    VIDEO HYSTEROSCOPY DILATATION AND CURETTAGE, POSSIBLE MYOSURE, POSSIBLE POLYPECTOMY performed by Deny Gomez DO at [address]       right wrist    HERNIA REPAIR  2018    LAPAROSCOPIC PARAESOPHAGEAL HERNIA REPAIR WITH MESH    HYSTERECTOMY N/A 10/21/2021    ROBOTIC ASSISTED LAPAROSCOPIC HYSTERECTOMY WITH RIGHT SALPINGECTOMY, RIGHT OOPHORECTOMY, CYSTOSCOPY, LYSIS OF ADHESIONS  CPT CODE - 33003 performed by Padmini Alvarado DO at [address] ARTHROSCOPY Left 11/15/12    ARTHROSCOPY LEFT KNEE WITH SYOVECTOMY AND CHONDROPLASTY    OVARY REMOVAL Right 2010    ROTATOR CUFF REPAIR Right 6/23/2020    EXAM UNDER ANESTHESIA VIDEO ARTHROSCOPY RIGHT SHOULDER, MINI- OPEN ROTATOR CUFF REPAIR, NEER ACROMIOPLASTY, LENO PROCEDURE WITH EXPAREL performed by Jessica Bernal MD at [place] [address] ARTHROSCOPY Right 11/25/2020    VIDEO ARTHROSCOPY RIGHT SHOULDER, OPEN ROTATOR CUFF REPAIR, BICEPS TENOTOMY -BLOCK- performed by Yan Clements MD at [place] [address] ARTHROSCOPY Right 2/24/2021    RIGHT SHOULDER ARTHROSCOPIC INCISION AND DRAINAGE performed by Yan Clements MD at [place] [address] ARTHROSCOPY Right 4/28/2021    VIDEO ARTHROSCOPY RIGHT SHOULDER, ROTATOR CUFF REPAIR, DEBRIDEMENT performed by Yan Clements MD at [address] [place] [address] ENDOSCOPY  2011    UPPER GASTROINTESTINAL ENDOSCOPY  2015    esophageasl stretch     Family History   Problem Relation Age of Onset    Arthritis Mother     Obesity Mother    [place] Anemia Mother     Other Mother         pulmonary embolism    Arthritis Father     Arrhythmia Father     Diabetes Other     Diabetes Paternal Grandfather     Cancer Neg Hx     Breast Cancer Neg Hx     Colon Cancer Neg Hx      Social History     Tobacco Use    Smoking status: Never Smoker    Smokeless tobacco: Never Used   Substance Use Topics    Alcohol use: Yes     Comment: 1 -2 drinks per month     I counseled the patient on the importance of not smoking and risks of ETOH.    Allergies   Allergen Reactions    Toradol [Ketorolac Tromethamine] Other (See Comments)     \"out of it\"  \"felt like sleep paralysis\"    Haldol [Haloperidol Lactate] Other (See Comments)     \"felt out of it, similar to the toradol\"     Vitals:    04/14/22 1334   BP: 96/61   Pulse: 63   Weight: 280 lb (127 kg)   Height: 5' 6\" (1.676 m)       Body mass index is 45.19 kg/m². Current Outpatient Medications:     dicyclomine (BENTYL) 20 MG tablet, Take 1 tablet by mouth 4 times daily (after meals and at bedtime) For cramps and gas, Disp: 60 tablet, Rfl: 2    metOLazone (ZAROXOLYN) 5 MG tablet, Take 1 tablet by mouth daily For swelling / water, Disp: 30 tablet, Rfl: 1    ALPRAZolam (XANAX) 1 MG tablet, TAKE 1 TABLET BY MOUTH THREE TIMES A DAY AS NEEDED FOR ANXIETY, Disp: 90 tablet, Rfl: 1    omeprazole (PRILOSEC) 40 MG delayed release capsule, TAKE 1 CAPSULE BY MOUTH EVERY DAY, Disp: 30 capsule, Rfl: 6    atenolol (TENORMIN) 25 MG tablet, TAKE 1 TABLET BY MOUTH EVERY DAY, Disp: 30 tablet, Rfl: 5    methocarbamol (ROBAXIN) 500 MG tablet, TAKE 1 TABLET BY MOUTH 3 TIMES A DAY AS NEEDED FOR NECK PAIN, Disp: 90 tablet, Rfl: 3    furosemide (LASIX) 20 MG tablet, Take 1 tablet by mouth daily as needed (for swelling), Disp: 30 tablet, Rfl: 1    ibuprofen (ADVIL;MOTRIN) 600 MG tablet, TAKE 1 TABLET BY MOUTH EVERY 8 HOURS AS NEEDED FOR PAIN, Disp: 40 tablet, Rfl: 1    rOPINIRole (REQUIP) 2 MG tablet, TAKE 2 TABLETS AT NIGHT AND ONE IN THE MORNING FOR LEGS, Disp: 90 tablet, Rfl: 3    UNABLE TO FIND, daily CBD Oil, use in mornings for knee., Disp: , Rfl:     diclofenac (VOLTAREN) 50 MG EC tablet, TAKE 1 TABLET BY MOUTH TWICE A DAY WITH MEALS, Disp: 60 tablet, Rfl: 3    lidocaine-prilocaine (EMLA) 2.5-2.5 % cream, Apply topically as needed. tid, Disp: 30 g, Rfl: 3    DULoxetine (CYMBALTA) 60 MG extended release capsule, TAKE 1 CAPSULE BY MOUTH EVERY DAY (Patient taking differently: Take 60 mg by mouth daily ), Disp: 30 capsule, Rfl: 5    albuterol sulfate HFA (PROVENTIL HFA) 108 (90 Base) MCG/ACT inhaler, Inhale 2 puffs into the lungs every 6 hours as needed for Wheezing or Shortness of Breath (Space out to every 6 hours as symptoms improve) Space out to every 6 hours as symptoms improve., Disp: 18 g, Rfl: 0    Acetaminophen (TYLENOL PO), Take 650 mg by mouth daily as needed , Disp: , Rfl:     MELATONIN PO, Take 20 mg by mouth nightly as needed , Disp: , Rfl:     fluticasone (FLONASE) 50 MCG/ACT nasal spray, 1 spray by Nasal route daily for 7 days, Disp: 1 each, Rfl: 0      Review of Systems - History obtained from the patient  General ROS: negative  Psychological ROS: negative  Ophthalmic ROS: negative  Neurological ROS: negative  ENT ROS: negative  Allergy and Immunology ROS: negative  Hematological and Lymphatic ROS: negative  Endocrine ROS: negative  Breast ROS: negative  Respiratory ROS: negative  Cardiovascular ROS: negative  Gastrointestinal ROS:negative  Genito-Urinary ROS: negative  Musculoskeletal ROS: joint pain  Skin ROS: negative      Physical Exam   Constitutional: Patient is oriented to person, place, and time. Vital signs are normal. Patient  appears well-developed and well-nourished. Patient  is active and cooperative. Non-toxic appearance. No distress. HENT:   Head: Normocephalic and atraumatic. Head is without laceration. Right Ear: External ear normal. No lacerations. No drainage, swelling or tenderness. Left Ear: External ear normal. No lacerations. No drainage, swelling or tenderness. Nose, Mouth/Throat: Patient is wearing mask due to Covid-19 pandemic precautions, following CDC and health authorities guidelines. Eyes: Conjunctivae, EOM and lids are normal. Pupils are equal, round, and reactive to light. Right eye exhibits no discharge. No foreign body present in the right eye. Left eye exhibits no discharge. No foreign body present in the left eye. No scleral icterus. Neck: Trachea normal and normal range of motion. Neck supple. No JVD present. No tracheal tenderness present. Carotid bruit is not present. No rigidity. No tracheal deviation and no edema present. No thyromegaly present.    Cardiovascular: Normal rate, regular rhythm, normal heart sounds, intact distal pulses and normal pulses. Pulmonary/Chest: Effort normal and breath sounds normal. No stridor. No respiratory distress. Patient  has no wheezes. Patient has no rales. Patient exhibits no tenderness and no crepitus. Abdominal: Soft. Normal appearance and bowel sounds are normal. Patient exhibits no distension, no abdominal bruit, no ascites and no mass. There is no hepatosplenomegaly. There is no tenderness. There is no rigidity, no rebound, no guarding and no CVA tenderness. No hernia. Hernia confirmed negative in the ventral area. Musculoskeletal: Normal range of motion. Patient exhibits no edema or tenderness. Lymphadenopathy:        Head (right side): No submental, no submandibular, no preauricular, no posterior auricular and no occipital adenopathy present. Head (left side): No submental, no submandibular, no preauricular, no posterior auricular and no occipital adenopathy present. Patient  has no cervical adenopathy. Right: No supraclavicular adenopathy present. Left: No supraclavicular adenopathy present. Neurological: Patient is alert and oriented to person, place, and time. Patient has normal strength. Coordination and gait normal. GCS eye subscore is 4. GCS verbal subscore is 5. GCS motor subscore is 6. Skin: Skin is warm and dry. No abrasion and no rash noted. Patient  is not diaphoretic. No cyanosis or erythema. Psychiatric: Patient has a normal mood and affect. speech is normal and behavior is normal. Cognition and memory are normal.       David was seen today for bariatric, initial visit. Diagnoses and all orders for this visit:    Morbid obesity with BMI of 45.0-49.9, adult ([facility]  -     CBC with Auto Differential; Future  -     Comprehensive Metabolic Panel; Future  -     Hemoglobin A1C; Future  -     Iron and TIBC; Future  -     Lipid Panel; Future  -     TSH with Reflex; Future  -     Vitamin A; Future  -     Vitamin B1, Whole Blood;  Future  - Vitamin B12 & Folate; Future  -     Vitamin D 25 Hydroxy; Future  -     Vitamin E; Future  -     Protime-INR; Future  -     EKG 12 Lead; Future  -     Ambulatory Referral to Bariatric Surgery    Preoperative clearance  -     CBC with Auto Differential; Future  -     Comprehensive Metabolic Panel; Future  -     Hemoglobin A1C; Future  -     Iron and TIBC; Future  -     Lipid Panel; Future  -     TSH with Reflex; Future  -     Vitamin A; Future  -     Vitamin B1, Whole Blood; Future  -     Vitamin B12 & Folate; Future  -     Vitamin D 25 Hydroxy; Future  -     Vitamin E; Future  -     Protime-INR; Future  -     EKG 12 Lead; Future  -     Ambulatory Referral to Bariatric Surgery    Essential hypertension  -     CBC with Auto Differential; Future  -     Comprehensive Metabolic Panel; Future  -     Hemoglobin A1C; Future  -     Iron and TIBC; Future  -     Lipid Panel; Future  -     TSH with Reflex; Future  -     Vitamin A; Future  -     Vitamin B1, Whole Blood; Future  -     Vitamin B12 & Folate; Future  -     Vitamin D 25 Hydroxy; Future  -     Vitamin E; Future  -     Protime-INR; Future  -     EKG 12 Lead; Future  -     Ambulatory Referral to Bariatric Surgery    Hiatal hernia  -     CBC with Auto Differential; Future  -     Comprehensive Metabolic Panel; Future  -     Hemoglobin A1C; Future  -     Iron and TIBC; Future  -     Lipid Panel; Future  -     TSH with Reflex; Future  -     Vitamin A; Future  -     Vitamin B1, Whole Blood; Future  -     Vitamin B12 & Folate; Future  -     Vitamin D 25 Hydroxy; Future  -     Vitamin E; Future  -     Protime-INR; Future  -     EKG 12 Lead; Future  -     Ambulatory Referral to Bariatric Surgery          A/P    Jose Richardson is a very pleasant 62 y.o. female with Obesity,  Body mass index is 45.19 kg/m². and multiple obesity related co-morbidities. Jose Richardson is very motivated to lose weight and being more healthy. The patient underwent extensive dietary counseling. I have reviewed, discussed and agree with the dietary plan and coordinated treatment plan with dietitian. Co-morbidities include but not limited to,  specific to the patient obesity; Patient Active Problem List   Diagnosis    Depression    Knee pain    Chondromalacia of left knee    Synovitis of knee    Bilateral carpal tunnel syndrome    Lumbar strain    Anxiety    Restless leg syndrome    De Quervain's disease (radial styloid tenosynovitis)    Osteoarthritis of carpometacarpal joint of thumb    Hemorrhoid prolapse    Dyspnea on exertion    Sciatica    Intractable vomiting with nausea    Hiatal hernia    Elevated blood pressure reading    Major depressive disorder, recurrent, moderate (HCC)    Generalized anxiety disorder    Essential hypertension    Closed displaced fracture of middle phalanx of right ring finger    Anal lesion    Adjustment disorder with depressed mood    Neck pain    Morbid obesity with BMI of 45.0-49.9, adult ([facility]    S/P robot-assisted surgical procedure    Spell of abnormal behavior    Urinary tract infection due to extended-spectrum beta lactamase (ESBL) producing Escherichia coli    Preoperative clearance         I believe patient is an excellent candidate for weight loss, and doing so will help the patient with avoiding / improving obesity related comorbid conditions. Patient was advised on healthy diet habits and regular exercise. Medical weight loss and different surgical options were discussed in details with patient (Body mass index is 45.19 kg/m². ). Patient is interested in medical weight loss. Medical weight loss options include but not limited to ; Anti-Obesity Medications (AOM) for weight loss. If, following the complete review of the patient's medical history and current medications, the patient is not considered an appropriate candidate for AOM then they may be a candidate for our [facility] program based on their lab and EKG results.  I have explained the above treatment plan to the patient, who verbalized agreement with plan. Patient received dietary handouts and education material. Also discussed in details the importance of follow up, as well following the recommendations and completing the whole program to improve outcomes when it comes to healthier lifestyle as well weight loss. Patient also advised about risks and benefits being on a strict dietary regimen as well using supplements. Also discussed in details the different medication options for weight loss possible side effects and interaction with other medications. Patient questions answered. Patient agrees and wants to proceed with weight loss planning. Obesity as a disease is considered a high risk to patients overall health and should therefore be considered a high risk disease state. Now with Covid-19 pandemic, CDC and health authorities does classify obese patients as vulnerable and high risk as well. Which makes weight loss a priority for improvement of their wellbeing and overall health. [facility] has issued the following statement as far Obese patients being at Increased Risk of being critically ill from SARS-Cov-2  \"Severe obesity increases the risk of a serious breathing problem called acute respiratory distress syndrome (ARDS), which is a major complication of BXHMR-56 and can cause difficulties with a doctors ability to provide respiratory support for seriously ill patients. People living with severe obesity can have multiple serious chronic diseases and underlying health conditions that can increase the risk of severe illness from COVID-19. \"           1- Medical Weight Loss. 2- Meal Plan provided. 3- Labs  4- EKG  5- Behavioral therapy &/or support groups  6- RTC in 1 month      Please note that some or all of this report was generated using voice recognition software.  Please notify me in case of any questions about the content of this document, as some errors in transcription may have occurred .

## 2022-04-14 NOTE — PROGRESS NOTES
Aminta Bautista is a 62 y.o. female with a date of birth of 1965. Vitals:    04/14/22 1334   BP: 96/61   Pulse: 63   BMI: Body mass index is 45.19 kg/m². Obesity Classification: Class III    Weight History: Wt Readings from Last 3 Encounters:   04/14/22 280 lb (127 kg)   03/31/22 286 lb (129.7 kg)   03/16/22 285 lb (129.3 kg)     Patient's lowest adult weight was 100 lbs at age 25. Patient's highest adult weight was 290 lbs at age 62. Patient has participated in the following weight loss programs: none. Patient has not participated in meal replacement/liquid diets. Patient has not participated in weight loss medications. Patient is not lactose intolerant. Patient does not have Buddhism/cultural food concerns. Patient does not have food allergies. Patient does tolerate artificial sweeteners. 24 hour recall/food frequency chart:  Pt lost her  at 25 which has created depression & lost her son 4 years ago. Breakfast: yes. toast  Snack: no.   Lunch: yes. sandwich  Snack: no.   Dinner: yes. parmesan chicken w/ stuffing  Snack: yes. 2-4 pkts crackers  Drinks throughout the day: water / occasional coke   Do you drink alcohol? Yes. - maybe a couple mixed drinking every 1-2 weeks     Patient does not meet the criteria for binge eating disorder. Patient does not have grazing. Patient does not have night eating. Patient does have a history of emotional eating or eating out of boredom. Surgery  Patient does feel confident in her ability to make these changes. The patient's expectations of post-surgical eating habits - voices understanding. Patient states she does understand the consequences of not complying with post-op food guidelines. Patient states she does understands the long term changes in food intake that will be necessary for all occasions after surgery for the rest of her life. Patient is deemed nutritionally appropriate to proceed.     Goals  Weight: goal to get BMI <40 / pt would like to be 190-200 lb  Health Improvement: needs shoulder & knee replacement / overall look better / qol    Assessment  Nutritional Needs: RMR=(9.99 x 127) + (6.25 x 168) - (4.92 x 62 y.o.) -161 = 1878 kcal x 1.3 (sedentary activity factor)= 2441 kcal - 1000 (for 2 lb weight loss/week)= 1441 kcal.    Plan  Plan/Recommendations: Start presurgical guidelines. Goals:   -Eat 4-5 times daily  -Avoid high fat and high sugar foods  -Include protein with all meals and snacks  -Avoid carbonation and caffeine  -Avoid calorie containing beverages  -Increase physical activity as tolerated    PES Statement:  Overweight/Obesity related to lack of exercise, sedentary lifestyle, unhealthy eating habits, and unsuccessful diet attempts as evidenced by BMI. Body mass index is 45.19 kg/m². Will follow up as necessary.     Nolvia James, RD, LD

## 2022-04-19 DIAGNOSIS — R09.81 NASAL CONGESTION: ICD-10-CM

## 2022-04-19 RX ORDER — FLUTICASONE PROPIONATE 50 MCG
1 SPRAY, SUSPENSION (ML) NASAL DAILY
Qty: 1 EACH | Refills: 2 | Status: SHIPPED | OUTPATIENT
Start: 2022-04-19 | End: 2022-05-18

## 2022-04-19 NOTE — TELEPHONE ENCOUNTER
LOV 3/31/2022    Future Appointments   Date Time Provider Diann Sotelo   5/12/2022  3:15 PM Kayley Guevara MD [facility]

## 2022-04-26 DIAGNOSIS — R13.10 DYSPHAGIA, UNSPECIFIED TYPE: Primary | ICD-10-CM

## 2022-04-27 ENCOUNTER — TELEMEDICINE (OUTPATIENT)
Dept: FAMILY MEDICINE CLINIC | Age: 57
End: 2022-04-27
Payer: MEDICARE

## 2022-04-27 ENCOUNTER — TELEPHONE (OUTPATIENT)
Dept: FAMILY MEDICINE CLINIC | Age: 57
End: 2022-04-27

## 2022-04-27 DIAGNOSIS — L02.91 ABSCESS: Primary | ICD-10-CM

## 2022-04-27 PROCEDURE — G8427 DOCREV CUR MEDS BY ELIG CLIN: HCPCS | Performed by: FAMILY MEDICINE

## 2022-04-27 PROCEDURE — 99213 OFFICE O/P EST LOW 20 MIN: CPT | Performed by: FAMILY MEDICINE

## 2022-04-27 PROCEDURE — 3017F COLORECTAL CA SCREEN DOC REV: CPT | Performed by: FAMILY MEDICINE

## 2022-04-27 RX ORDER — MUPIROCIN CALCIUM 20 MG/G
CREAM TOPICAL
Qty: 1 EACH | Refills: 1 | Status: SHIPPED | OUTPATIENT
Start: 2022-04-27 | End: 2022-05-09 | Stop reason: ALTCHOICE

## 2022-04-27 RX ORDER — SULFAMETHOXAZOLE AND TRIMETHOPRIM 800; 160 MG/1; MG/1
1 TABLET ORAL 2 TIMES DAILY
Qty: 20 TABLET | Refills: 0 | Status: SHIPPED | OUTPATIENT
Start: 2022-04-27 | End: 2022-05-07

## 2022-04-27 ASSESSMENT — PATIENT HEALTH QUESTIONNAIRE - PHQ9
SUM OF ALL RESPONSES TO PHQ9 QUESTIONS 1 & 2: 2
SUM OF ALL RESPONSES TO PHQ QUESTIONS 1-9: 2
2. FEELING DOWN, DEPRESSED OR HOPELESS: 2
1. LITTLE INTEREST OR PLEASURE IN DOING THINGS: 0
SUM OF ALL RESPONSES TO PHQ QUESTIONS 1-9: 2

## 2022-04-27 NOTE — PROGRESS NOTES
2022    TELEHEALTH EVALUATION -- Audio/Visual (During PCMPI-56 public health emergency)    HPI:    Nicola Crews (:  1965) has requested an audio/video evaluation for the following concern(s):    Chief Complaint   Patient presents with    Mass     boil developing under R side of breast & armpit x 1 1/2 wks - this is 4th or 5th one she has had - Dr. London Palacio lanced the last one       States that last episode in 2021 after a hysterectomy, drained by Dr. London Palacio. Current boil is dime sized and starting form, no head on it. Denies fever/chills, current boil is on the lateral side of the right breast close to right axilla . Review of Systems   Skin:        Boils b/t right breast and right axilla, about the size of a dime. Prior to Visit Medications    Medication Sig Taking? Authorizing Provider   sulfamethoxazole-trimethoprim (BACTRIM DS) 800-160 MG per tablet Take 1 tablet by mouth 2 times daily for 10 days Yes Rex Hernadez DO   mupirocin (BACTROBAN) 2 % cream Apply 3 times daily.  Yes Rex Hernadez DO   diclofenac (VOLTAREN) 50 MG EC tablet TAKE 1 TABLET BY MOUTH TWICE A DAY WITH MEALS Yes Marcell Anderson DO   dicyclomine (BENTYL) 20 MG tablet Take 1 tablet by mouth 4 times daily (after meals and at bedtime) For cramps and gas Yes Rubia Ward DO   metOLazone (ZAROXOLYN) 5 MG tablet Take 1 tablet by mouth daily For swelling / water Yes Rubia Ward DO   ALPRAZolam (XANAX) 1 MG tablet TAKE 1 TABLET BY MOUTH THREE TIMES A DAY AS NEEDED FOR ANXIETY Yes Rubia Ward DO   omeprazole (PRILOSEC) 40 MG delayed release capsule TAKE 1 CAPSULE BY MOUTH EVERY DAY Yes Rubia Ward DO   atenolol (TENORMIN) 25 MG tablet TAKE 1 TABLET BY MOUTH EVERY DAY Yes Rubia Ward DO   methocarbamol (ROBAXIN) 500 MG tablet TAKE 1 TABLET BY MOUTH 3 TIMES A DAY AS NEEDED FOR NECK PAIN Yes Rubia Ward DO   ibuprofen (ADVIL;MOTRIN) 600 MG tablet TAKE 1 TABLET BY MOUTH EVERY 8 HOURS AS NEEDED FOR PAIN Yes Erendira Weir DO   rOPINIRole (REQUIP) 2 MG tablet TAKE 2 TABLETS AT NIGHT AND ONE IN THE MORNING FOR LEGS Yes Erendira Weir DO   lidocaine-prilocaine (EMLA) 2.5-2.5 % cream Apply topically as needed. tid Yes Erendira Weir DO   DULoxetine (CYMBALTA) 60 MG extended release capsule TAKE 1 CAPSULE BY MOUTH EVERY DAY  Patient taking differently: Take 60 mg by mouth daily  Yes Erendira Weir DO   Acetaminophen (TYLENOL PO) Take 650 mg by mouth daily as needed  Yes Historical Provider, MD   MELATONIN PO Take 20 mg by mouth nightly as needed  Yes Historical Provider, MD   fluticasone (FLONASE) 50 MCG/ACT nasal spray 1 spray by Nasal route daily  Patient not taking: Reported on 4/27/2022  Erendira Weir DO   UNABLE TO FIND daily CBD Oil, use in mornings for knee. Patient not taking: Reported on 4/27/2022  Historical Provider, MD   albuterol sulfate HFA (PROVENTIL HFA) 108 (90 Base) MCG/ACT inhaler Inhale 2 puffs into the lungs every 6 hours as needed for Wheezing or Shortness of Breath (Space out to every 6 hours as symptoms improve) Space out to every 6 hours as symptoms improve.   Patient not taking: Reported on 4/27/2022  Pia Scott MD       Social History     Tobacco Use    Smoking status: Never Smoker    Smokeless tobacco: Never Used   Vaping Use    Vaping Use: Never used   Substance Use Topics    Alcohol use: Yes     Comment: 1 -2 drinks per month    Drug use: Not Currently     Types: Marijuana Paullette Nim)     Comment: quit 2016        Allergies   Allergen Reactions    Toradol [Ketorolac Tromethamine] Other (See Comments)     \"out of it\"  \"felt like sleep paralysis\"    Haldol [Haloperidol Lactate] Other (See Comments)     \"felt out of it, similar to the toradol\"   ,   Past Medical History:   Diagnosis Date    Anxiety     Depression     Endometriosis     GERD (gastroesophageal reflux disease)     Hypertension     Osteoarthritis     Restless leg syndrome    ,   Past Surgical History:   Procedure Laterality Date    ANUS SURGERY  2018    fissure     SECTION      x3    COLONOSCOPY      DILATION AND CURETTAGE OF UTERUS N/A 6/3/2021    VIDEO HYSTEROSCOPY DILATATION AND CURETTAGE, POSSIBLE MYOSURE, POSSIBLE POLYPECTOMY performed by Della Yoon DO at [address]       right wrist    HERNIA REPAIR  2018    LAPAROSCOPIC PARAESOPHAGEAL HERNIA REPAIR WITH MESH    HYSTERECTOMY N/A 10/21/2021    ROBOTIC ASSISTED LAPAROSCOPIC HYSTERECTOMY WITH RIGHT SALPINGECTOMY, RIGHT OOPHORECTOMY, CYSTOSCOPY, LYSIS OF ADHESIONS  CPT CODE - 08232 performed by Cintia Stroud DO at [address] [facility] ARTHROSCOPY Left 11/15/12    ARTHROSCOPY LEFT KNEE WITH SYOVECTOMY AND CHONDROPLASTY    OVARY REMOVAL Right     ROTATOR CUFF REPAIR Right 2020    EXAM UNDER ANESTHESIA VIDEO ARTHROSCOPY RIGHT SHOULDER, MINI- OPEN ROTATOR CUFF REPAIR, NEER ACROMIOPLASTY, LENO PROCEDURE WITH EXPAREL performed by Delon Schmid MD at [place] [address] ARTHROSCOPY Right 2020    VIDEO ARTHROSCOPY RIGHT SHOULDER, OPEN ROTATOR CUFF REPAIR, BICEPS TENOTOMY -BLOCK- performed by Thien Hicks MD at [place] [address] ARTHROSCOPY Right 2021    RIGHT SHOULDER ARTHROSCOPIC INCISION AND DRAINAGE performed by Thien Hicks MD at [place] [address] ARTHROSCOPY Right 2021    VIDEO ARTHROSCOPY RIGHT SHOULDER, ROTATOR CUFF REPAIR, DEBRIDEMENT performed by Thien Hicks MD at [address] [place] [address] ENDOSCOPY      UPPER GASTROINTESTINAL ENDOSCOPY      esophageasl stretch       PHYSICAL EXAMINATION:  [ INSTRUCTIONS:  \"[x]\" Indicates a positive item  \"[]\" Indicates a negative item  -- DELETE ALL ITEMS NOT EXAMINED]  Vital Signs: (As obtained by patient/caregiver or practitioner observation)    Height  - 5' 6\"            Weight -    275 lb          Blood pressure- 122/71       Heart rate-  68 Temperature- 97.6 F     Constitutional: [x] Appears well-developed and well-nourished [x] No apparent distress      [] Abnormal-   Mental status  [x] Alert and awake  [x] Oriented to person/place/time [x]Able to follow commands      Eyes:  EOM    [x]  Normal  [] Abnormal-  Sclera  []  Normal  [] Abnormal -         Discharge []  None visible  [] Abnormal -    HENT:   [x] Normocephalic, atraumatic. [] Abnormal   [] Mouth/Throat: Mucous membranes are moist.     External Ears [x] Normal  [] Abnormal-     Neck: [x] No visualized mass     Pulmonary/Chest: [x] Respiratory effort normal.  [x] No visualized signs of difficulty breathing or respiratory distress        [] Abnormal-      Musculoskeletal:   [] Normal gait with no signs of ataxia         [x] Normal range of motion of neck        [] Abnormal-       Neurological:        [x] No Facial Asymmetry (Cranial nerve 7 motor function) (limited exam to video visit)          [x] No gaze palsy        [] Abnormal-         Skin:        [x] No significant exanthematous lesions or discoloration noted on facial skin         [] Abnormal-            Psychiatric:       [x] Normal Affect [] No Hallucinations        [] Abnormal-     Other pertinent observable physical exam findings-     ASSESSMENT/PLAN:  1. Abscess  - sulfamethoxazole-trimethoprim (BACTRIM DS) 800-160 MG per tablet; Take 1 tablet by mouth 2 times daily for 10 days  Dispense: 20 tablet; Refill: 0  - mupirocin (BACTROBAN) 2 % cream; Apply 3 times daily. Dispense: 1 each; Refill: 1  - pt will call the office in 6 days with a status       Wilshayy Chi, was evaluated through a synchronous (real-time) audio-video encounter. The patient (or guardian if applicable) is aware that this is a billable service. Verbal consent to proceed has been obtained within the past 12 months.  The visit was conducted pursuant to the emergency declaration under the [facility], [facility]5 waiver authority and the Coronavirus Preparedness and Response Supplemental Appropriations Act. Patient identification was verified, and a caregiver was present when appropriate. The patient was located in a state where the provider was credentialed to provide care. Total time spent on this encounter: Not billed by time    --Cristian Gallagher DO on 4/27/2022 at 2:29 PM    An electronic signature was used to authenticate this note.

## 2022-04-27 NOTE — TELEPHONE ENCOUNTER
[facility] called because the Bactroban cream is not covered by the patients insurance. They are asking that it be changed to the ointment. Spoke with Dr Juli Rodriguez and he okayed the change. Pharmacy advise.

## 2022-04-29 ENCOUNTER — TELEPHONE (OUTPATIENT)
Dept: FAMILY MEDICINE CLINIC | Age: 57
End: 2022-04-29

## 2022-04-29 NOTE — TELEPHONE ENCOUNTER
Pt called to let Dr Katheryn Whitaker know that she got her endoscopy scheduled for 5/3 at 145 pm Dr Blayne Kuhn

## 2022-05-02 ENCOUNTER — TELEPHONE (OUTPATIENT)
Dept: FAMILY MEDICINE CLINIC | Age: 57
End: 2022-05-02

## 2022-05-02 NOTE — TELEPHONE ENCOUNTER
Patient called to follow up from her video visit with you 4/27/2022  The boil under her arm seems to be getting a little better with the antibiotic. It has not come to a head.   She will continue to monitor and let us now if there are any worsening changes

## 2022-05-04 NOTE — TELEPHONE ENCOUNTER
----- Message from Enrique Richmond sent at 5/3/2022  6:04 PM EDT -----  Subject: Message to Provider    QUESTIONS  Information for Provider? pt states she was scheduled for an endoscopy and   when they put her to sleep she threw up and now she is in the ER. pt   states ER said everything she vomited went into her lungs. they were   unable to do the endoscopy. states she is waiting on chest x-ray results. ---------------------------------------------------------------------------  --------------  Nichole Carrier INFO  What is the best way for the office to contact you? OK to leave message on   voicemail  Preferred Call Back Phone Number? [phone]  ---------------------------------------------------------------------------  --------------  SCRIPT ANSWERS  Relationship to Patient?  Self

## 2022-05-09 ENCOUNTER — OFFICE VISIT (OUTPATIENT)
Dept: FAMILY MEDICINE CLINIC | Age: 57
End: 2022-05-09
Payer: MEDICARE

## 2022-05-09 VITALS
TEMPERATURE: 96.9 F | HEART RATE: 61 BPM | OXYGEN SATURATION: 95 % | RESPIRATION RATE: 16 BRPM | SYSTOLIC BLOOD PRESSURE: 120 MMHG | WEIGHT: 289 LBS | BODY MASS INDEX: 46.65 KG/M2 | DIASTOLIC BLOOD PRESSURE: 86 MMHG

## 2022-05-09 DIAGNOSIS — R13.12 OROPHARYNGEAL DYSPHAGIA: Primary | ICD-10-CM

## 2022-05-09 PROCEDURE — 99213 OFFICE O/P EST LOW 20 MIN: CPT | Performed by: FAMILY MEDICINE

## 2022-05-09 PROCEDURE — G8427 DOCREV CUR MEDS BY ELIG CLIN: HCPCS | Performed by: FAMILY MEDICINE

## 2022-05-09 PROCEDURE — 1036F TOBACCO NON-USER: CPT | Performed by: FAMILY MEDICINE

## 2022-05-09 PROCEDURE — 3017F COLORECTAL CA SCREEN DOC REV: CPT | Performed by: FAMILY MEDICINE

## 2022-05-09 PROCEDURE — G8417 CALC BMI ABV UP PARAM F/U: HCPCS | Performed by: FAMILY MEDICINE

## 2022-05-09 RX ORDER — ALPRAZOLAM 0.5 MG/1
0.5 TABLET ORAL NIGHTLY PRN
COMMUNITY
End: 2022-05-25 | Stop reason: SDUPTHER

## 2022-05-09 RX ORDER — ONDANSETRON 4 MG/1
4 TABLET, ORALLY DISINTEGRATING ORAL EVERY 8 HOURS PRN
Qty: 15 TABLET | Refills: 1 | Status: SHIPPED | OUTPATIENT
Start: 2022-05-09 | End: 2022-09-21

## 2022-05-09 ASSESSMENT — PATIENT HEALTH QUESTIONNAIRE - PHQ9
2. FEELING DOWN, DEPRESSED OR HOPELESS: 1
1. LITTLE INTEREST OR PLEASURE IN DOING THINGS: 0
SUM OF ALL RESPONSES TO PHQ QUESTIONS 1-9: 1
SUM OF ALL RESPONSES TO PHQ9 QUESTIONS 1 & 2: 1
SUM OF ALL RESPONSES TO PHQ QUESTIONS 1-9: 1

## 2022-05-09 ASSESSMENT — ENCOUNTER SYMPTOMS
DIARRHEA: 1
SHORTNESS OF BREATH: 0
CHOKING: 1

## 2022-05-09 NOTE — PROGRESS NOTES
Subjective:      Patient ID: Philip Jean is a 62 y.o. y.o. female. Here to follow up from endoscopy and aspiration and ER visit.   Having trouble with swallowing,  Was sedated for scope and emesis    Nausea  Still dysphagia  Scope rescheduled for the ,  HPI     Dysphagia feeling precipitated the referral to GI      Chief Complaint   Patient presents with    Follow-Up from ARIEL TORRES     5/3/22 [employer]       Allergies   Allergen Reactions    Toradol [Ketorolac Tromethamine] Other (See Comments)     \"out of it\"  \"felt like sleep paralysis\"    Haldol [Haloperidol Lactate] Other (See Comments)     \"felt out of it, similar to the toradol\"       Past Medical History:   Diagnosis Date    Anxiety     Depression     Endometriosis     GERD (gastroesophageal reflux disease)     Hypertension     Osteoarthritis     Restless leg syndrome        Past Surgical History:   Procedure Laterality Date    ANUS SURGERY  2018    fissure     SECTION      x3    COLONOSCOPY      DILATION AND CURETTAGE OF UTERUS N/A 6/3/2021    VIDEO HYSTEROSCOPY DILATATION AND CURETTAGE, POSSIBLE MYOSURE, POSSIBLE POLYPECTOMY performed by Alexandr Mac DO at [address]       right wrist   [address] [facility]  2018    LAPAROSCOPIC PARAESOPHAGEAL HERNIA REPAIR WITH MESH    HYSTERECTOMY N/A 10/21/2021    ROBOTIC ASSISTED LAPAROSCOPIC HYSTERECTOMY WITH RIGHT SALPINGECTOMY, RIGHT OOPHORECTOMY, CYSTOSCOPY, LYSIS OF ADHESIONS  CPT CODE - 23867 performed by Betsey Shipley DO at [facility] ARTHROSCOPY Left 11/15/12    ARTHROSCOPY LEFT KNEE WITH SYOVECTOMY AND CHONDROPLASTY    OVARY REMOVAL Right     ROTATOR CUFF REPAIR Right 2020    EXAM UNDER ANESTHESIA VIDEO ARTHROSCOPY RIGHT SHOULDER, MINI- OPEN ROTATOR CUFF REPAIR, NEER ACROMIOPLASTY, LENO PROCEDURE WITH EXPAREL performed by George Castle MD at [place] [address] ARTHROSCOPY Right 2020    VIDEO ARTHROSCOPY RIGHT SHOULDER, OPEN ROTATOR CUFF REPAIR, BICEPS TENOTOMY -BLOCK- performed by Javon Bundy MD at [place] [address] ARTHROSCOPY Right 2/24/2021    RIGHT SHOULDER ARTHROSCOPIC INCISION AND DRAINAGE performed by Javon Bundy MD at [place] [address] ARTHROSCOPY Right 4/28/2021    VIDEO ARTHROSCOPY RIGHT SHOULDER, ROTATOR CUFF REPAIR, DEBRIDEMENT performed by Javon Bundy MD at [address] [place] [address] ENDOSCOPY  2011    UPPER GASTROINTESTINAL ENDOSCOPY  2015    esophageasl stretch       Social History     Socioeconomic History    Marital status:      Spouse name: Not on file    Number of children: 3    Years of education: Not on file    Highest education level: Not on file   Occupational History    Occupation:    Tobacco Use    Smoking status: Never Smoker    Smokeless tobacco: Never Used   Vaping Use    Vaping Use: Never used   Substance and Sexual Activity    Alcohol use: Yes     Comment: 1 -2 drinks per month    Drug use: Not Currently     Types: Marijuana David November)     Comment: quit 2016    Sexual activity: Never   Other Topics Concern    Not on file   Social History Narrative    Not on file     Social Determinants of Health     Financial Resource Strain: Low Risk     Difficulty of Paying Living Expenses: Not hard at all   Food Insecurity: No Food Insecurity    [place] About [facility] [facility] the Last Year: Never true    [facility] in the Last Year: Never true   Transportation Needs:     Lack of Transportation (Medical): Not on file    Lack of Transportation (Non-Medical):  Not on file   Physical Activity: Inactive    Days of Exercise per Week: 0 days    Minutes of Exercise per Session: 0 min   Stress:     Feeling of Stress : Not on file   Social Connections:     Frequency of Communication with Friends and Family: Not on file    Frequency of Social Gatherings with Friends and Family: Not on file  Attends Jainism Services: Not on file    Active Member of Clubs or Organizations: Not on file    Attends Club or Organization Meetings: Not on file    Marital Status: Not on file   Intimate Partner Violence: Not At Risk    Fear of Current or Ex-Partner: No    Emotionally Abused: No    Physically Abused: No    Sexually Abused: No   Housing Stability:     Unable to Pay for Housing in the Last Year: Not on file    Number of [place] in the Last Year: Not on file    Unstable Housing in the Last Year: Not on file       Family History   Problem Relation Age of Onset    Arthritis Mother     Obesity Mother    [place] Anemia Mother    [place] Other Mother         pulmonary embolism    Arthritis Father     Arrhythmia Father     Diabetes Other     Diabetes Paternal Grandfather     Cancer Neg Hx     Breast Cancer Neg Hx     Colon Cancer Neg Hx        Vitals:    05/09/22 1621   BP: 120/86   Pulse: 61   Resp: 16   Temp: 96.9 °F (36.1 °C)   SpO2: 95%       Wt Readings from Last 3 Encounters:   05/09/22 289 lb (131.1 kg)   04/14/22 280 lb (127 kg)   03/31/22 286 lb (129.7 kg)       Review of Systems   Respiratory: Positive for choking. Negative for shortness of breath. Gastrointestinal: Positive for diarrhea. Swallowing issues         Objective:   Physical Exam  Neck:      Comments: Not definite mass. Large neck  Supple      Pulmonary:      Comments: Grossly clear chest    Abdominal:      Comments: Rotund abdomen  Soft  Minimal non-specific touchiness lower abd         Assessment:      GERD  Dysphagia        Plan:     Discussed the symptoms. Has a fullness to her neck and it is hard to palpate the thyroid.   Will image to exclude a goiter, that might be posterior facing.        +zofran sx wise    Thyroid / neck US  Follow after the ultrasound  Continue the current medication profile and follow-up with the gastroenterologist      Current Outpatient Medications   Medication Sig Dispense Refill    ALPRAZolam (XANAX) 0.5 MG tablet Take 0.5 mg by mouth nightly as needed for Sleep.  diclofenac (VOLTAREN) 50 MG EC tablet TAKE 1 TABLET BY MOUTH TWICE A DAY WITH MEALS 60 tablet 3    dicyclomine (BENTYL) 20 MG tablet Take 1 tablet by mouth 4 times daily (after meals and at bedtime) For cramps and gas 60 tablet 2    metOLazone (ZAROXOLYN) 5 MG tablet Take 1 tablet by mouth daily For swelling / water 30 tablet 1    omeprazole (PRILOSEC) 40 MG delayed release capsule TAKE 1 CAPSULE BY MOUTH EVERY DAY (Patient taking differently: in the morning and at bedtime Dr. Willie Hernández increased to BID) 30 capsule 6    atenolol (TENORMIN) 25 MG tablet TAKE 1 TABLET BY MOUTH EVERY DAY 30 tablet 5    methocarbamol (ROBAXIN) 500 MG tablet TAKE 1 TABLET BY MOUTH 3 TIMES A DAY AS NEEDED FOR NECK PAIN 90 tablet 3    ibuprofen (ADVIL;MOTRIN) 600 MG tablet TAKE 1 TABLET BY MOUTH EVERY 8 HOURS AS NEEDED FOR PAIN 40 tablet 1    rOPINIRole (REQUIP) 2 MG tablet TAKE 2 TABLETS AT NIGHT AND ONE IN THE MORNING FOR LEGS 90 tablet 3    DULoxetine (CYMBALTA) 60 MG extended release capsule TAKE 1 CAPSULE BY MOUTH EVERY DAY (Patient taking differently: Take 60 mg by mouth daily ) 30 capsule 5    Acetaminophen (TYLENOL PO) Take 650 mg by mouth daily as needed       MELATONIN PO Take 20 mg by mouth nightly as needed       fluticasone (FLONASE) 50 MCG/ACT nasal spray 1 spray by Nasal route daily (Patient not taking: Reported on 5/9/2022) 1 each 2    albuterol sulfate HFA (PROVENTIL HFA) 108 (90 Base) MCG/ACT inhaler Inhale 2 puffs into the lungs every 6 hours as needed for Wheezing or Shortness of Breath (Space out to every 6 hours as symptoms improve) Space out to every 6 hours as symptoms improve. (Patient not taking: Reported on 4/27/2022) 18 g 0     No current facility-administered medications for this visit.

## 2022-05-12 ENCOUNTER — TELEPHONE (OUTPATIENT)
Dept: BARIATRICS/WEIGHT MGMT | Age: 57
End: 2022-05-12

## 2022-05-12 ENCOUNTER — HOSPITAL ENCOUNTER (OUTPATIENT)
Dept: ULTRASOUND IMAGING | Age: 57
Discharge: HOME | End: 2022-05-12
Payer: MEDICARE

## 2022-05-12 ENCOUNTER — TELEMEDICINE (OUTPATIENT)
Dept: BARIATRICS/WEIGHT MGMT | Age: 57
End: 2022-05-12
Payer: MEDICARE

## 2022-05-12 DIAGNOSIS — R13.12 OROPHARYNGEAL DYSPHAGIA: ICD-10-CM

## 2022-05-12 DIAGNOSIS — Z71.3 DIETARY COUNSELING AND SURVEILLANCE: ICD-10-CM

## 2022-05-12 DIAGNOSIS — E66.01 MORBID OBESITY WITH BMI OF 45.0-49.9, ADULT (HCC): Primary | ICD-10-CM

## 2022-05-12 PROCEDURE — 3017F COLORECTAL CA SCREEN DOC REV: CPT | Performed by: FAMILY MEDICINE

## 2022-05-12 PROCEDURE — G8427 DOCREV CUR MEDS BY ELIG CLIN: HCPCS | Performed by: FAMILY MEDICINE

## 2022-05-12 PROCEDURE — 76536 US EXAM OF HEAD AND NECK: CPT

## 2022-05-12 PROCEDURE — 99203 OFFICE O/P NEW LOW 30 MIN: CPT | Performed by: FAMILY MEDICINE

## 2022-05-12 RX ORDER — MELOXICAM 15 MG/1
TABLET ORAL
Qty: 90 TABLET | Refills: 0 | Status: SHIPPED | OUTPATIENT
Start: 2022-05-12 | End: 2022-05-18

## 2022-05-12 ASSESSMENT — ENCOUNTER SYMPTOMS
BLOOD IN STOOL: 0
COUGH: 0
CHOKING: 0
DIARRHEA: 0
SHORTNESS OF BREATH: 0
ABDOMINAL PAIN: 0
VOMITING: 0
NAUSEA: 0
CONSTIPATION: 0
PHOTOPHOBIA: 0
APNEA: 0
WHEEZING: 0
ABDOMINAL DISTENTION: 0
EYE PAIN: 0
CHEST TIGHTNESS: 0

## 2022-05-12 NOTE — TELEPHONE ENCOUNTER
Called and spoke to pt briefly. She was unable to talk long. Request RD call Friday 5/13/22. 1500 alec LCMP & low carb frozen meal list was emailed. RD to f/u tomorrow.

## 2022-05-12 NOTE — PROGRESS NOTES
Patient: Aminta Bautista     Encounter Date: 5/12/2022    YOB: 1965               Age: 62 y.o. Patient identification was verified at the start of the visit. Patient-Reported Vitals 5/11/2022   Patient-Reported Weight 275   Patient-Reported Height 5'6\"   Patient-Reported Systolic 800   Patient-Reported Diastolic 86   Patient-Reported Pulse 61   Patient-Reported Temperature -   Patient-Reported SpO2 95         BP Readings from Last 1 Encounters:   05/24/22 105/72       BMI Readings from Last 1 Encounters:   05/18/22 44.39 kg/m²       Pulse Readings from Last 1 Encounters:   05/24/22 56                                                  Chief Complaint   Patient presents with    Bariatric, Initial Visit     MWDANIEL- NP       HPI:    62 y.o. female presents to establish care via video visit. She was referred by Dr. Mustapha Mcfarlane for medical weight management. The patient's medical history is significant for class III obesity. The patient has a long-standing history of obesity which started gradually. The problem is severe. The patient has been gaining weight. Risk factors include annual weight gain of >2 lbs (1 kg)/ year and sedentary lifestyle. Aggravating factors include poor diet and lack of physical activity. The patient has tried various diet/exercise plans which have been ineffective in the long-run. When did you become overweight? [] Childhood   [] Teens   [x] Adulthood   [] Pregnancy   [] Menopause    Highest adult weight: 290 pounds at age 62    Triggers for weight gain? [x] Stress   [] Illness   [] Medications   [] Travel  []Injury     [] Nightshift work   [] Insomnia  [] No specific triggers   [] Other    Food triggers:   [x] Stress   [x] Boredom   [] Fast food   [] Eating out   [] Seeking reward   [] Social     Have you ever taken medications to help you lose weight?    [] Yes  [x] No    Have you ever been on a meal replacement program?  [] Yes  [x] No      Dietitian's assessment reviewed and addressed with patient. Reviewed:  [x] Nutrition and the importance of regular protein intake  [x] Hidden CHO/carbohydrate sources  [x] Alcohol use  [x] Tobacco use  [x] Drug use- Denies   [x] Importance of exercise and reducing sedentary time        Controlled Substance Monitoring:     RX Monitoring 2/3/2022   Attestation -   Acute Pain Prescriptions -   Periodic Controlled Substance Monitoring No signs of potential drug abuse or diversion identified.         Allergies   Allergen Reactions    Toradol [Ketorolac Tromethamine] Other (See Comments)     \"out of it\"  \"felt like sleep paralysis\"    Haldol [Haloperidol Lactate] Other (See Comments)     \"felt out of it, similar to the toradol\"         Current Outpatient Medications:     ALPRAZolam (XANAX) 1 MG tablet, TAKE 1 TABLET BY MOUTH THREE TIMES A DAY AS NEEDED FOR ANXIETY, Disp: 90 tablet, Rfl: 2    rOPINIRole (REQUIP) 2 MG tablet, TAKE 2 TABLETS AT NIGHT AND ONE IN THE MORNING FOR LEGS, Disp: 90 tablet, Rfl: 3    furosemide (LASIX) 20 MG tablet, TAKE 1 TABLET BY MOUTH DAILY AS NEEDED (FOR SWELLING), Disp: 30 tablet, Rfl: 3    metOLazone (ZAROXOLYN) 5 MG tablet, TAKE 1 TABLET BY MOUTH DAILY FOR SWELLING / WATER, Disp: 30 tablet, Rfl: 3    ondansetron (ZOFRAN ODT) 4 MG disintegrating tablet, Take 1 tablet by mouth every 8 hours as needed for Nausea or Vomiting, Disp: 15 tablet, Rfl: 1    diclofenac (VOLTAREN) 50 MG EC tablet, TAKE 1 TABLET BY MOUTH TWICE A DAY WITH MEALS, Disp: 60 tablet, Rfl: 3    dicyclomine (BENTYL) 20 MG tablet, Take 1 tablet by mouth 4 times daily (after meals and at bedtime) For cramps and gas, Disp: 60 tablet, Rfl: 2    omeprazole (PRILOSEC) 40 MG delayed release capsule, TAKE 1 CAPSULE BY MOUTH EVERY DAY (Patient taking differently: in the morning and at bedtime Dr. Leigh Charlton increased to BID), Disp: 30 capsule, Rfl: 6    atenolol (TENORMIN) 25 MG tablet, TAKE 1 TABLET BY MOUTH EVERY DAY, Disp: 30 tablet, Rfl: 5   methocarbamol (ROBAXIN) 500 MG tablet, TAKE 1 TABLET BY MOUTH 3 TIMES A DAY AS NEEDED FOR NECK PAIN, Disp: 90 tablet, Rfl: 3    ibuprofen (ADVIL;MOTRIN) 600 MG tablet, TAKE 1 TABLET BY MOUTH EVERY 8 HOURS AS NEEDED FOR PAIN, Disp: 40 tablet, Rfl: 1    DULoxetine (CYMBALTA) 60 MG extended release capsule, TAKE 1 CAPSULE BY MOUTH EVERY DAY (Patient taking differently: Take 60 mg by mouth daily ), Disp: 30 capsule, Rfl: 5    Acetaminophen (TYLENOL PO), Take 650 mg by mouth daily as needed , Disp: , Rfl:     MELATONIN PO, Take 20 mg by mouth nightly as needed , Disp: , Rfl:     Patient Active Problem List   Diagnosis    Depression    Knee pain    Chondromalacia of left knee    Synovitis of knee    Bilateral carpal tunnel syndrome    Lumbar strain    Anxiety    Restless leg syndrome    De Quervain's disease (radial styloid tenosynovitis)    Osteoarthritis of carpometacarpal joint of thumb    Hemorrhoid prolapse    Dyspnea on exertion    Sciatica    Intractable vomiting with nausea    Hiatal hernia    Elevated blood pressure reading    Major depressive disorder, recurrent, moderate (HCC)    Generalized anxiety disorder    Essential hypertension    Closed displaced fracture of middle phalanx of right ring finger    Anal lesion    Adjustment disorder with depressed mood    Neck pain    Morbid obesity with BMI of 45.0-49.9, adult ([facility])    S/P robot-assisted surgical procedure    Spell of abnormal behavior    Urinary tract infection due to extended-spectrum beta lactamase (ESBL) producing Escherichia coli       Past Medical History:   Diagnosis Date    Anxiety     Depression     Endometriosis     GERD (gastroesophageal reflux disease)     Hypertension     Osteoarthritis     PONV (postoperative nausea and vomiting)     Restless leg syndrome        Past Surgical History:   Procedure Laterality Date    ANUS SURGERY  2018    fissure     SECTION      x3    COLONOSCOPY      DILATION AND CURETTAGE OF UTERUS N/A 6/3/2021    VIDEO HYSTEROSCOPY DILATATION AND CURETTAGE, POSSIBLE MYOSURE, POSSIBLE POLYPECTOMY performed by Madyson Card DO at [address]       right wrist    HERNIA REPAIR  03/06/2018    LAPAROSCOPIC PARAESOPHAGEAL HERNIA REPAIR WITH MESH    HYSTERECTOMY N/A 10/21/2021    ROBOTIC ASSISTED LAPAROSCOPIC HYSTERECTOMY WITH RIGHT SALPINGECTOMY, RIGHT OOPHORECTOMY, CYSTOSCOPY, LYSIS OF ADHESIONS  CPT CODE - 54445 performed by Jennifer Arzola DO at [address] [facility] ARTHROSCOPY Left 11/15/12    ARTHROSCOPY LEFT KNEE WITH SYOVECTOMY AND CHONDROPLASTY    OVARY REMOVAL Right 2010    ROTATOR CUFF REPAIR Right 6/23/2020    EXAM UNDER ANESTHESIA VIDEO ARTHROSCOPY RIGHT SHOULDER, MINI- OPEN ROTATOR CUFF REPAIR, NEER ACROMIOPLASTY, LENO PROCEDURE WITH EXPAREL performed by Kaleb Schrader MD at [place] [address] ARTHROSCOPY Right 11/25/2020    VIDEO ARTHROSCOPY RIGHT SHOULDER, OPEN ROTATOR CUFF REPAIR, BICEPS TENOTOMY -BLOCK- performed by Nick Overton MD at [place] [address] ARTHROSCOPY Right 2/24/2021    RIGHT SHOULDER ARTHROSCOPIC INCISION AND DRAINAGE performed by Nick Overton MD at [place] [address] ARTHROSCOPY Right 4/28/2021    VIDEO ARTHROSCOPY RIGHT SHOULDER, ROTATOR CUFF REPAIR, DEBRIDEMENT performed by Nick Overton MD at [facility]  2011    UPPER GASTROINTESTINAL ENDOSCOPY  2015    esophageasl stretch    UPPER GASTROINTESTINAL ENDOSCOPY      ATTEMPTED BUT PATIENT ASPIRATED    UPPER GASTROINTESTINAL ENDOSCOPY N/A 5/24/2022    EGD W/ANES.  (11:00) performed by Melissa Son DO at [address] [facility]       Family History   Problem Relation Age of Onset    Arthritis Mother     Obesity Mother    [place] Anemia Mother     Other Mother         pulmonary embolism    Arthritis Father     Arrhythmia Father     Diabetes Other     Diabetes Paternal Grandfather  Cancer Neg Hx     Breast Cancer Neg Hx     Colon Cancer Neg Hx        Review of Systems   Constitutional: Negative for fatigue. Eyes: Negative for photophobia, pain and visual disturbance. Respiratory: Negative for apnea, cough, choking, chest tightness, shortness of breath and wheezing. Cardiovascular: Negative for chest pain, palpitations and leg swelling. Gastrointestinal: Negative for abdominal distention, abdominal pain, blood in stool, constipation, diarrhea, nausea and vomiting. Endocrine: Negative for cold intolerance and heat intolerance. Musculoskeletal: Negative for arthralgias and myalgias. Skin: Negative for rash. Neurological: Negative for dizziness, tremors, syncope, weakness, numbness and headaches. Psychiatric/Behavioral: Negative for agitation, confusion, decreased concentration, dysphoric mood, hallucinations, sleep disturbance and suicidal ideas. The patient is not nervous/anxious and is not hyperactive. Physical Exam  Constitutional:       Appearance: She is well-developed. HENT:      Head: Normocephalic. Eyes:      Conjunctiva/sclera: Conjunctivae normal.   Abdominal:      General: Abdomen is protuberant. Musculoskeletal:         General: No swelling. Neurological:      Mental Status: She is alert and oriented to person, place, and time. Psychiatric:         Mood and Affect: Mood normal.         Behavior: Behavior normal.         Thought Content: Thought content normal.         Judgment: Judgment normal.         Telemedicine on 03/23/2022   Component Date Value Ref Range Status    SARS-CoV-2 03/23/2022 Not Detected  Not detected Final    Comment: This test has been authorized by FDA under an  Emergency Use Authorization (EUA).   This test is only authorized for the duration of the  time of declaration that circumstances exist justifying the  authorization of the emergency use of in vitro diagnostic  testing for detection of the SARS-CoV-2 virus  and/or diagnosis of COVID-19 infection under  section 564 (b)(1) of the Act, 21 U. S.C. 102UUZ-3 (b) (1),  unless the authorization is terminated or revoked sooner. Patient Fact [employer]  Provider Fact [employer].    METHODOLOGY: RT PCR           Assessment and Plan:    1. Morbid obesity with BMI of 45.0-49.9, adult ([facility] [address]  Heavily counseled on the importance of therapeutic lifestyle changes through diet and exercise. The patient understands that the goal of treatment is to reach and stay at a healthy weight. The initial treatment goal is to lose at least 5-10% of her body weight in 12 weeks. This will require changes in eating habits, increased physical activity, and behavior changes. Counseled on low carb/willy diet. Patient handouts and education material provided and reviewed in detail with the patient. All questions answered. Encouraged patient to keep a food journal and to bring it to her next visit. Discussed available treatment options in addition to lifestyle changes including medications or OPTIFAST. Explained that medications/meal replacments are most effective as part of a comprehensive treatment plan that includes nutrition, physical activity, and behavior modification. The patient also understands that she will need close follow-ups every 2-4 weeks if she starts treatment. At this time, she would like to focus on lifestyle management only. Will schedule another 1:1 with the dietitian. F/u 6-8 weeks. 2. Dietary counseling and surveillance  1500-Willy/low carb meal plan.             Nutrition:  [] LCHF/Ketogenic [x] Low carb/low-calorie diet [] Low-calorie diet     []Maintenance        FITTE:   [] Cardio [] Resistance/stength exercises   [x] ACSM recommendations (150 minutes/week)- as able/tolerated            Behavior:   [x] Motivational interviewing performed    [] Referral for counseling  [x] Discussed strategies to overcome habits/challenges for focus      [x] Stress management   [x] Stimulus control  [x] Sleep hygiene      No orders of the defined types were placed in this encounter. No follow-ups on file. John Baltazar is a 62 y.o. female being evaluated by a Virtual Visit (video visit) encounter to address concerns as mentioned above. A caregiver was present when appropriate. Due to this being a TeleHealth encounter (During QIUXI-56 public health emergency), evaluation of the following organ systems was limited: Vitals/Constitutional/EENT/Resp/CV/GI//MS/Neuro/Skin/Heme-Lymph-Imm. Pursuant to the emergency declaration under the [address] [place] [address] authority and the [employer] and [employer] Act, this Virtual Visit was conducted with patient's (and/or legal guardian's) consent, to reduce the patient's risk of exposure to COVID-19 and provide necessary medical care. The patient (and/or legal guardian) has also been advised to contact this office for worsening conditions or problems, and seek emergency medical treatment and/or call 911 if deemed necessary. Services were provided through a video synchronous discussion virtually to substitute for in-person clinic visit. Patient and provider were located at their individual homes. --Silvio Duncan MD on 6/3/2022 at 5:03 PM    An electronic signature was used to authenticate this note.

## 2022-05-12 NOTE — TELEPHONE ENCOUNTER
----- Message from Eugenio Prince MD sent at 2022  3:20 PM EDT -----  Regardin-Willy/low carb meal plan  Please call and email pt the 1500-Willy/low carb meal plan.  Thank you

## 2022-05-13 ENCOUNTER — TELEPHONE (OUTPATIENT)
Dept: FAMILY MEDICINE CLINIC | Age: 57
End: 2022-05-13

## 2022-05-13 NOTE — TELEPHONE ENCOUNTER
Called pt to discuss meal plan thoroughly. She has started making changes including increased water intake, switching regular to low carb bread, and eating more frequently through the day instead of one big meal in the evening. She was thankful for suggestions and had no further questions at this time.

## 2022-05-14 PROBLEM — Z01.818 PREOPERATIVE CLEARANCE: Status: RESOLVED | Noted: 2022-04-14 | Resolved: 2022-05-14

## 2022-05-16 ENCOUNTER — ANESTHESIA EVENT (OUTPATIENT)
Dept: ENDOSCOPY | Age: 57
End: 2022-05-16
Payer: MEDICARE

## 2022-05-18 NOTE — PROGRESS NOTES
PAT completed with patient orders placed per MD, patient states her mother Oma Rodrigez will be her  and caregiver after procedure. Katya Howard RN

## 2022-05-18 NOTE — PROGRESS NOTES
..1.  Do not eat or drink anything after 12 midnight prior to surgery. This includes no water, chewing gum or mints, except for bowel prep complete per MD.  2.  Take the following pills with a small sip of water on the morning of surgery 05/24/2022.  3.  Aspirin, Ibuprofen, Advil, Naproxen, Vitamin E and other Anti-inflammatory products should be stopped for 5 days before surgery or as directed by your physician. 4.  Check with your doctor regarding stopping Plavix, Coumadin, Lovenox, Fragmin or other blood thinners. 5.  Do not smoke and do not drink alcoholic beverages 24 hours prior to surgery. This includes NA Beer. 6.  You may brush your teeth and gargle the morning of surgery. DO NOT SWALLOW WATER.  7.  You MUST make arrangements for a responsible adult to take you home after your surgery. You will not be allowed to leave alone or drive yourself home. It is strongly suggested someone stay with you the first 24 hours. Your surgery will be cancelled if you do not have a ride home. 8.  A parent/legal guardian must accompany a child scheduled for surgery and plan to stay at the hospital until the child is discharged. Please do not bring other children with you. 9.  Please wear simple, loose fitting clothing to the hospital.  Lakisha Bandar not bring valuables ( money, credit cards, checkbooks, etc.)  Do not wear any makeup (including no eye makeup) or nail polish on your fingers or toes. 10.  Do not wear any jewelry or piercing on the day of surgery. All body piercing jewelry must be removed. 11.  If you have dentures, they will be removed before going to the OR; we will provide you a container. If you wear contact lenses or glasses, they will be removed; please bring a case for them.   15.  Notify your Surgeon if you develop any illness between now and surgery time; cough, cold, fever, sore throat, nausea, vomiting, etc.  Please notify your surgeon if you experience dizziness, shortness of breath or blurred vision between now and the time of your surgery. To provide excellent care, visitors will be limited to two in a room at any given time. Please no children under the age of 15 in the surgical department.

## 2022-05-24 ENCOUNTER — HOSPITAL ENCOUNTER (OUTPATIENT)
Age: 57
Setting detail: OUTPATIENT SURGERY
Discharge: HOME | End: 2022-05-24
Attending: INTERNAL MEDICINE | Admitting: INTERNAL MEDICINE
Payer: MEDICARE

## 2022-05-24 ENCOUNTER — PATIENT MESSAGE (OUTPATIENT)
Dept: FAMILY MEDICINE CLINIC | Age: 57
End: 2022-05-24

## 2022-05-24 ENCOUNTER — ANESTHESIA (OUTPATIENT)
Dept: ENDOSCOPY | Age: 57
End: 2022-05-24
Payer: MEDICARE

## 2022-05-24 VITALS
HEART RATE: 56 BPM | DIASTOLIC BLOOD PRESSURE: 72 MMHG | TEMPERATURE: 97.3 F | OXYGEN SATURATION: 92 % | WEIGHT: 275 LBS | HEIGHT: 66 IN | SYSTOLIC BLOOD PRESSURE: 105 MMHG | RESPIRATION RATE: 18 BRPM | BODY MASS INDEX: 44.2 KG/M2

## 2022-05-24 LAB
EKG ATRIAL RATE: 57 BPM
EKG DIAGNOSIS: NORMAL
EKG P AXIS: 56 DEGREES
EKG P-R INTERVAL: 118 MS
EKG Q-T INTERVAL: 478 MS
EKG QRS DURATION: 88 MS
EKG QTC CALCULATION (BAZETT): 465 MS
EKG R AXIS: 9 DEGREES
EKG T AXIS: 32 DEGREES
EKG VENTRICULAR RATE: 57 BPM

## 2022-05-24 PROCEDURE — 3700000000 HC ANESTHESIA ATTENDED CARE: Performed by: INTERNAL MEDICINE

## 2022-05-24 PROCEDURE — 7100000000 HC PACU RECOVERY - FIRST 15 MIN: Performed by: INTERNAL MEDICINE

## 2022-05-24 PROCEDURE — 7100000011 HC PHASE II RECOVERY - ADDTL 15 MIN: Performed by: INTERNAL MEDICINE

## 2022-05-24 PROCEDURE — 3700000001 HC ADD 15 MINUTES (ANESTHESIA): Performed by: INTERNAL MEDICINE

## 2022-05-24 PROCEDURE — 7100000001 HC PACU RECOVERY - ADDTL 15 MIN: Performed by: INTERNAL MEDICINE

## 2022-05-24 PROCEDURE — 7100000010 HC PHASE II RECOVERY - FIRST 15 MIN: Performed by: INTERNAL MEDICINE

## 2022-05-24 PROCEDURE — 6370000000 HC RX 637 (ALT 250 FOR IP)

## 2022-05-24 PROCEDURE — 2500000003 HC RX 250 WO HCPCS

## 2022-05-24 PROCEDURE — 2500000003 HC RX 250 WO HCPCS: Performed by: ANESTHESIOLOGY

## 2022-05-24 PROCEDURE — 2580000003 HC RX 258

## 2022-05-24 PROCEDURE — 2709999900 HC NON-CHARGEABLE SUPPLY: Performed by: INTERNAL MEDICINE

## 2022-05-24 PROCEDURE — 93005 ELECTROCARDIOGRAM TRACING: CPT

## 2022-05-24 PROCEDURE — 3609017100 HC EGD: Performed by: INTERNAL MEDICINE

## 2022-05-24 PROCEDURE — 6360000002 HC RX W HCPCS

## 2022-05-24 RX ORDER — SODIUM CHLORIDE 9 MG/ML
INJECTION, SOLUTION INTRAVENOUS PRN
Status: DISCONTINUED | OUTPATIENT
Start: 2022-05-24 | End: 2022-05-24 | Stop reason: HOSPADM

## 2022-05-24 RX ORDER — SODIUM CHLORIDE 0.9 % (FLUSH) 0.9 %
5-40 SYRINGE (ML) INJECTION EVERY 12 HOURS SCHEDULED
Status: DISCONTINUED | OUTPATIENT
Start: 2022-05-24 | End: 2022-05-24 | Stop reason: HOSPADM

## 2022-05-24 RX ORDER — METOLAZONE 5 MG/1
5 TABLET ORAL DAILY
Qty: 30 TABLET | Refills: 3 | Status: SHIPPED | OUTPATIENT
Start: 2022-05-24 | End: 2022-09-13 | Stop reason: ALTCHOICE

## 2022-05-24 RX ORDER — ACETAMINOPHEN 325 MG/1
TABLET ORAL
Status: COMPLETED
Start: 2022-05-24 | End: 2022-05-24

## 2022-05-24 RX ORDER — ONDANSETRON 2 MG/ML
4 INJECTION INTRAMUSCULAR; INTRAVENOUS
Status: DISCONTINUED | OUTPATIENT
Start: 2022-05-24 | End: 2022-05-24 | Stop reason: HOSPADM

## 2022-05-24 RX ORDER — FUROSEMIDE 20 MG/1
20 TABLET ORAL DAILY PRN
Qty: 30 TABLET | Refills: 3 | Status: SHIPPED | OUTPATIENT
Start: 2022-05-24 | End: 2022-08-08

## 2022-05-24 RX ORDER — LIDOCAINE HYDROCHLORIDE 20 MG/ML
INJECTION, SOLUTION INFILTRATION; PERINEURAL PRN
Status: DISCONTINUED | OUTPATIENT
Start: 2022-05-24 | End: 2022-05-24 | Stop reason: SDUPTHER

## 2022-05-24 RX ORDER — SUCCINYLCHOLINE CHLORIDE 20 MG/ML
INJECTION INTRAMUSCULAR; INTRAVENOUS PRN
Status: DISCONTINUED | OUTPATIENT
Start: 2022-05-24 | End: 2022-05-24 | Stop reason: SDUPTHER

## 2022-05-24 RX ORDER — ONDANSETRON 2 MG/ML
INJECTION INTRAMUSCULAR; INTRAVENOUS PRN
Status: DISCONTINUED | OUTPATIENT
Start: 2022-05-24 | End: 2022-05-24 | Stop reason: SDUPTHER

## 2022-05-24 RX ORDER — MEPERIDINE HYDROCHLORIDE 25 MG/ML
12.5 INJECTION INTRAMUSCULAR; INTRAVENOUS; SUBCUTANEOUS EVERY 5 MIN PRN
Status: DISCONTINUED | OUTPATIENT
Start: 2022-05-24 | End: 2022-05-24 | Stop reason: HOSPADM

## 2022-05-24 RX ORDER — ACETAMINOPHEN 325 MG/1
650 TABLET ORAL ONCE
Status: CANCELLED | OUTPATIENT
Start: 2022-05-24

## 2022-05-24 RX ORDER — FAMOTIDINE 10 MG/ML
20 INJECTION, SOLUTION INTRAVENOUS ONCE
Status: COMPLETED | OUTPATIENT
Start: 2022-05-24 | End: 2022-05-24

## 2022-05-24 RX ORDER — SODIUM CHLORIDE, SODIUM LACTATE, POTASSIUM CHLORIDE, CALCIUM CHLORIDE 600; 310; 30; 20 MG/100ML; MG/100ML; MG/100ML; MG/100ML
INJECTION, SOLUTION INTRAVENOUS CONTINUOUS PRN
Status: DISCONTINUED | OUTPATIENT
Start: 2022-05-24 | End: 2022-05-24 | Stop reason: SDUPTHER

## 2022-05-24 RX ORDER — SODIUM CHLORIDE 9 MG/ML
25 INJECTION, SOLUTION INTRAVENOUS PRN
Status: DISCONTINUED | OUTPATIENT
Start: 2022-05-24 | End: 2022-05-24 | Stop reason: HOSPADM

## 2022-05-24 RX ORDER — DEXAMETHASONE SODIUM PHOSPHATE 4 MG/ML
INJECTION, SOLUTION INTRA-ARTICULAR; INTRALESIONAL; INTRAMUSCULAR; INTRAVENOUS; SOFT TISSUE PRN
Status: DISCONTINUED | OUTPATIENT
Start: 2022-05-24 | End: 2022-05-24 | Stop reason: SDUPTHER

## 2022-05-24 RX ORDER — SODIUM CHLORIDE, SODIUM LACTATE, POTASSIUM CHLORIDE, CALCIUM CHLORIDE 600; 310; 30; 20 MG/100ML; MG/100ML; MG/100ML; MG/100ML
INJECTION, SOLUTION INTRAVENOUS CONTINUOUS
Status: DISCONTINUED | OUTPATIENT
Start: 2022-05-24 | End: 2022-05-24 | Stop reason: SDUPTHER

## 2022-05-24 RX ORDER — PROPOFOL 10 MG/ML
INJECTION, EMULSION INTRAVENOUS PRN
Status: DISCONTINUED | OUTPATIENT
Start: 2022-05-24 | End: 2022-05-24 | Stop reason: SDUPTHER

## 2022-05-24 RX ORDER — OXYCODONE HYDROCHLORIDE 5 MG/1
10 TABLET ORAL PRN
Status: DISCONTINUED | OUTPATIENT
Start: 2022-05-24 | End: 2022-05-24 | Stop reason: HOSPADM

## 2022-05-24 RX ORDER — SODIUM CHLORIDE 0.9 % (FLUSH) 0.9 %
5-40 SYRINGE (ML) INJECTION PRN
Status: DISCONTINUED | OUTPATIENT
Start: 2022-05-24 | End: 2022-05-24 | Stop reason: HOSPADM

## 2022-05-24 RX ORDER — OXYCODONE HYDROCHLORIDE 5 MG/1
5 TABLET ORAL PRN
Status: DISCONTINUED | OUTPATIENT
Start: 2022-05-24 | End: 2022-05-24 | Stop reason: HOSPADM

## 2022-05-24 RX ORDER — SODIUM CHLORIDE, SODIUM LACTATE, POTASSIUM CHLORIDE, CALCIUM CHLORIDE 600; 310; 30; 20 MG/100ML; MG/100ML; MG/100ML; MG/100ML
INJECTION, SOLUTION INTRAVENOUS CONTINUOUS
Status: DISCONTINUED | OUTPATIENT
Start: 2022-05-24 | End: 2022-05-24 | Stop reason: HOSPADM

## 2022-05-24 RX ORDER — ROPINIROLE 2 MG/1
TABLET, FILM COATED ORAL
Qty: 90 TABLET | Refills: 3 | Status: SHIPPED | OUTPATIENT
Start: 2022-05-24 | End: 2022-09-19

## 2022-05-24 RX ADMIN — ONDANSETRON HYDROCHLORIDE 4 MG: 2 INJECTION, SOLUTION INTRAMUSCULAR; INTRAVENOUS at 11:20

## 2022-05-24 RX ADMIN — FAMOTIDINE 20 MG: 10 INJECTION, SOLUTION INTRAVENOUS at 10:46

## 2022-05-24 RX ADMIN — PROPOFOL 160 MG: 10 INJECTION, EMULSION INTRAVENOUS at 11:12

## 2022-05-24 RX ADMIN — LIDOCAINE HYDROCHLORIDE 40 MG: 20 INJECTION, SOLUTION INFILTRATION; PERINEURAL at 11:12

## 2022-05-24 RX ADMIN — ACETAMINOPHEN 650 MG: 325 TABLET ORAL at 12:17

## 2022-05-24 RX ADMIN — SUCCINYLCHOLINE CHLORIDE 120 MG: 20 INJECTION, SOLUTION INTRAMUSCULAR; INTRAVENOUS at 11:12

## 2022-05-24 RX ADMIN — DEXAMETHASONE SODIUM PHOSPHATE 4 MG: 4 INJECTION, SOLUTION INTRAMUSCULAR; INTRAVENOUS at 11:20

## 2022-05-24 RX ADMIN — SODIUM CHLORIDE, POTASSIUM CHLORIDE, SODIUM LACTATE AND CALCIUM CHLORIDE: 600; 310; 30; 20 INJECTION, SOLUTION INTRAVENOUS at 10:19

## 2022-05-24 ASSESSMENT — ENCOUNTER SYMPTOMS: SHORTNESS OF BREATH: 1

## 2022-05-24 ASSESSMENT — PAIN SCALES - GENERAL: PAINLEVEL_OUTOF10: 5

## 2022-05-24 ASSESSMENT — PAIN DESCRIPTION - DESCRIPTORS: DESCRIPTORS: ACHING

## 2022-05-24 ASSESSMENT — PAIN DESCRIPTION - LOCATION: LOCATION: HEAD

## 2022-05-24 ASSESSMENT — LIFESTYLE VARIABLES: SMOKING_STATUS: 0

## 2022-05-24 NOTE — TELEPHONE ENCOUNTER
5/9/2022    Future Appointments   Date Time Provider Diann Sotelo   6/15/2022 11:45 AM Bhargav Arizmendi MD [facility]

## 2022-05-24 NOTE — TELEPHONE ENCOUNTER
5/9/2022    Future Appointments   Date Time Provider Diann Sotleo   6/15/2022 11:45 AM Eloina Coats MD [facility]

## 2022-05-24 NOTE — PROGRESS NOTES
Received report from Faxon, CRNA and Chito Loaiza RN. VSS with Sp02 96% on r/a. Pt awake, oriented, and denies pain except sore throat. Denies nausea. Will continue to monitor.

## 2022-05-24 NOTE — ANESTHESIA PRE PROCEDURE
Department of Anesthesiology  Preprocedure Note       Name:  Philip Jean   Age:  62 y.o.  :  1965                                          MRN:  [id number]         Date:  2022      Surgeon: Cherelle Lynn):  Patrizia Berry DO    Procedure: Procedure(s):  EGD W/ANES. (11:00)    Medications prior to admission:   Prior to Admission medications    Medication Sig Start Date End Date Taking? Authorizing Provider   rOPINIRole (REQUIP) 2 MG tablet TAKE 2 TABLETS AT NIGHT AND ONE IN THE MORNING FOR LEGS 22   [facility], DO   furosemide (LASIX) 20 MG tablet TAKE 1 TABLET BY MOUTH DAILY AS NEEDED (FOR SWELLING) 22   [facility], DO   metOLazone (ZAROXOLYN) 5 MG tablet TAKE 1 TABLET BY MOUTH DAILY FOR SWELLING / WATER 22   [facility], DO   ALPRAZolam [place]) 0.5 MG tablet Take 0.5 mg by mouth nightly as needed for Sleep.     Historical Provider, MD   ondansetron (ZOFRAN ODT) 4 MG disintegrating tablet Take 1 tablet by mouth every 8 hours as needed for Nausea or Vomiting 22   [facility], DO   diclofenac (VOLTAREN) 50 MG EC tablet TAKE 1 TABLET BY MOUTH TWICE A DAY WITH MEALS 22   Domenica River, DO   dicyclomine (BENTYL) 20 MG tablet Take 1 tablet by mouth 4 times daily (after meals and at bedtime) For cramps and gas 3/31/22   [facility], DO   omeprazole (PRILOSEC) 40 MG delayed release capsule TAKE 1 CAPSULE BY MOUTH EVERY DAY  Patient taking differently: in the morning and at bedtime Dr. eVe Truong increased to BID 3/28/22   [facility], DO   atenolol (TENORMIN) 25 MG tablet TAKE 1 TABLET BY MOUTH EVERY DAY 3/28/22   [facility], DO   methocarbamol (ROBAXIN) 500 MG tablet TAKE 1 TABLET BY MOUTH 3 TIMES A DAY AS NEEDED FOR NECK PAIN 3/28/22   [facility], DO   ibuprofen (ADVIL;MOTRIN) 600 MG tablet TAKE 1 TABLET BY MOUTH EVERY 8 HOURS AS NEEDED FOR PAIN 3/14/22   [facility], DO   DULoxetine (CYMBALTA) 60 MG extended release capsule TAKE 1 CAPSULE BY MOUTH EVERY DAY  Patient taking differently: Take 60 mg by mouth daily  10/4/21   Shawanda Sue DO   Acetaminophen (TYLENOL PO) Take 650 mg by mouth daily as needed     Historical Provider, MD   MELATONIN PO Take 20 mg by mouth nightly as needed     Historical Provider, MD       Current medications:    Current Facility-Administered Medications   Medication Dose Route Frequency Provider Last Rate Last Admin    famotidine (PEPCID) injection 20 mg  20 mg IntraVENous Once Blanca Medrano MD        lactated ringers infusion   IntraVENous Continuous Blanca Medrano MD        sodium chloride flush 0.9 % injection 5-40 mL  5-40 mL IntraVENous 2 times per day Blanca Medrano MD        sodium chloride flush 0.9 % injection 5-40 mL  5-40 mL IntraVENous PRN Blanca Medrano MD        0.9 % sodium chloride infusion   IntraVENous PRN Blanca Medrano MD           Allergies: Allergies   Allergen Reactions    Toradol [Ketorolac Tromethamine] Other (See Comments)     \"out of it\"  \"felt like sleep paralysis\"    Haldol [Haloperidol Lactate] Other (See Comments)     \"felt out of it, similar to the toradol\"       Problem List:    Patient Active Problem List   Diagnosis Code    Depression F32. A    Knee pain M25.569    Chondromalacia of left knee M94.262    Synovitis of knee M65.9    Bilateral carpal tunnel syndrome G56.03    Lumbar strain S39.012A    Anxiety F41.9    Restless leg syndrome G25.81    De Quervain's disease (radial styloid tenosynovitis) M65.4    Osteoarthritis of carpometacarpal joint of thumb M18.9    Hemorrhoid prolapse K64.8    Dyspnea on exertion R06.00    Sciatica M54.30    Intractable vomiting with nausea R11.2    Hiatal hernia K44.9    Elevated blood pressure reading R03.0    Major depressive disorder, recurrent, moderate (HCC) F33.1    Generalized anxiety disorder F41.1    Essential hypertension I10    Closed displaced fracture of middle phalanx of right ring finger S62.624A    Anal lesion K62.9    Adjustment disorder with depressed mood F43.21    Neck pain M54.2    Morbid obesity with BMI of 45.0-49.9, adult (HCC) E66.01, Z68.42    S/P robot-assisted surgical procedure Z98.890    Spell of abnormal behavior R46.89    Urinary tract infection due to extended-spectrum beta lactamase (ESBL) producing Escherichia coli N39.0, B96.29, Z16.12       Past Medical History:        Diagnosis Date    Anxiety     Depression     Endometriosis     GERD (gastroesophageal reflux disease)     Hypertension     Osteoarthritis     PONV (postoperative nausea and vomiting)     Restless leg syndrome        Past Surgical History:        Procedure Laterality Date    ANUS SURGERY  2018    fissure     SECTION      x3    COLONOSCOPY      DILATION AND CURETTAGE OF UTERUS N/A 6/3/2021    VIDEO HYSTEROSCOPY DILATATION AND CURETTAGE, POSSIBLE MYOSURE, POSSIBLE POLYPECTOMY performed by Gurdeep Fernandez DO at [address] [facility]      right wrist    HERNIA REPAIR  2018    LAPAROSCOPIC PARAESOPHAGEAL HERNIA REPAIR WITH MESH    HYSTERECTOMY N/A 10/21/2021    ROBOTIC ASSISTED LAPAROSCOPIC HYSTERECTOMY WITH RIGHT SALPINGECTOMY, RIGHT OOPHORECTOMY, CYSTOSCOPY, LYSIS OF ADHESIONS  CPT CODE - 79574 performed by Fay Castle DO at [facility] ARTHROSCOPY Left 11/15/12    ARTHROSCOPY LEFT KNEE WITH SYOVECTOMY AND CHONDROPLASTY    OVARY REMOVAL Right     ROTATOR CUFF REPAIR Right 2020    EXAM UNDER ANESTHESIA VIDEO ARTHROSCOPY RIGHT SHOULDER, MINI- OPEN ROTATOR CUFF REPAIR, NEER ACROMIOPLASTY, LENO PROCEDURE WITH EXPAREL performed by Tashia Goyal MD at [place] [address] ARTHROSCOPY Right 2020    VIDEO ARTHROSCOPY RIGHT SHOULDER, OPEN ROTATOR CUFF REPAIR, BICEPS TENOTOMY -BLOCK- performed by Queta Niño MD at [place] [address] ARTHROSCOPY Right 2021    RIGHT SHOULDER ARTHROSCOPIC INCISION AND DRAINAGE performed by Inez Salas MD at [place] [address] ARTHROSCOPY Right 4/28/2021    VIDEO ARTHROSCOPY RIGHT SHOULDER, ROTATOR CUFF REPAIR, DEBRIDEMENT performed by Inez Salas MD at [address] [place] [address]  2011    UPPER GASTROINTESTINAL ENDOSCOPY  2015    esophageasl stretch    UPPER GASTROINTESTINAL ENDOSCOPY      ATTEMPTED BUT PATIENT ASPIRATED       Social History:    Social History     Tobacco Use    Smoking status: Never Smoker    Smokeless tobacco: Never Used   Substance Use Topics    Alcohol use: Yes     Comment: 1 -2 drinks per month                                Counseling given: Not Answered      Vital Signs (Current):   Vitals:    05/18/22 1620 05/24/22 1017   Temp:  98 °F (36.7 °C)   TempSrc:  Temporal   Weight: 275 lb (124.7 kg)    Height: 5' 6\" (1.676 m)                                               BP Readings from Last 3 Encounters:   05/09/22 120/86   04/14/22 96/61   03/31/22 124/86       NPO Status: Time of last liquid consumption: 2145                        Time of last solid consumption: 1830                        Date of last liquid consumption: 05/23/22                        Date of last solid food consumption: 05/23/22    BMI:   Wt Readings from Last 3 Encounters:   05/18/22 275 lb (124.7 kg)   05/09/22 289 lb (131.1 kg)   04/14/22 280 lb (127 kg)     Body mass index is 44.39 kg/m².     CBC:   Lab Results   Component Value Date    WBC 7.9 03/11/2022    RBC 4.41 03/11/2022    HGB 13.6 03/11/2022    HCT 41.4 03/11/2022    MCV 93.7 03/11/2022    RDW 15.8 03/11/2022     03/11/2022       CMP:   Lab Results   Component Value Date     11/12/2021    K 4.4 11/12/2021    K 4.0 09/26/2021     11/12/2021    CO2 21 11/12/2021    BUN 9 11/12/2021    CREATININE 0.8 11/12/2021    GFRAA >60 11/12/2021    GFRAA >60 02/01/2012    AGRATIO 1.3 11/12/2021    LABGLOM >60 11/12/2021    GLUCOSE 104 11/12/2021    PROT 7.0 11/12/2021 PROT 7.9 02/02/2012    CALCIUM 9.4 11/12/2021    BILITOT 0.3 11/12/2021    ALKPHOS 101 11/12/2021    AST 32 11/12/2021    ALT 41 11/12/2021       POC Tests: No results for input(s): POCGLU, POCNA, POCK, POCCL, POCBUN, POCHEMO, POCHCT in the last 72 hours. Coags:   Lab Results   Component Value Date    PROTIME 12.0 10/16/2013    INR 1.07 10/16/2013       HCG (If Applicable):   Lab Results   Component Value Date    PREGTESTUR Neg 11/15/2012        ABGs: No results found for: PHART, PO2ART, MSH1OVE, OKC4CRE, BEART, D5XZTHZK     Type & Screen (If Applicable):  No results found for: LABABO, LABRH    Drug/Infectious Status (If Applicable):  No results found for: HIV, HEPCAB    COVID-19 Screening (If Applicable):   Lab Results   Component Value Date    COVID19 Not Detected 03/23/2022    COVID19 NOT DETECTED 01/06/2021           Anesthesia Evaluation  Patient summary reviewed history of anesthetic complications (ASPIRATION DURING RECENT EGD ATTEMPT):   Airway: Mallampati: III  TM distance: <3 FB   Neck ROM: limited  Mouth opening: > = 3 FB   Dental:    (+) upper dentures and lower dentures      Pulmonary: breath sounds clear to auscultation  (+) shortness of breath (EXERT):      (-) COPD, asthma, recent URI, sleep apnea and not a current smoker          Patient did not smoke on day of surgery. Cardiovascular:    (+) hypertension:, FAULKNER:,     (-) past MI, CAD, CABG/stent, dysrhythmias,  angina and  CHF    ECG reviewed  Rhythm: regular  Rate: normal  Echocardiogram reviewed         Beta Blocker:  Dose within 24 Hrs         Neuro/Psych:   (+) neuromuscular disease (RUE \"NUMBNESS\" POST MULT SHOULDER SURGS / SCIATICA):, depression/anxiety    (-) seizures, TIA and CVA           GI/Hepatic/Renal:   (+) hiatal hernia, GERD (W/ DYSPHAGIA): well controlled, morbid obesity     (-) liver disease, no renal disease and bowel prep       Endo/Other:    (+) : arthritis:., no malignancy/cancer.     (-) diabetes mellitus, hypothyroidism, hyperthyroidism, blood dyscrasia, no malignancy/cancer               Abdominal:   (+) obese,     Abdomen: soft. Vascular: negative vascular ROS. Other Findings:           Anesthesia Plan      general     ASA 3       Induction: rapid sequence and intravenous. MIPS: Prophylactic antiemetics administered. Anesthetic plan and risks discussed with patient. Plan discussed with CRNA. This pre-anesthesia assessment may be used as a history and physical.    DOS STAFF ADDENDUM:    Pt seen and examined, chart reviewed (including anesthesia, drug and allergy history). No interval changes to history and physical examination. Anesthetic plan, risks, benefits, alternatives, and personnel involved discussed with patient. Patient verbalized an understanding and agrees to proceed.       Giulia Bennett MD  May 24, 2022  10:43 AM      Giulia Bennett MD   5/24/2022

## 2022-05-24 NOTE — TELEPHONE ENCOUNTER
From: Oni Newberry  To: Dr. Chel Gallagher: 5/24/2022 1:54 PM EDT  Subject: My endoscopy    I need to speak to Dr. Laura De. I had my endoscopy on 5/24/22 and they didn't find anything. They said I had alot of food still in my stomach. I hadn't eaten anything after 9 pm last night and had the procedure done at 11 am today. He wants to see me in a month. Is it dangerous for food to be in my stomach. I'm scared because they aren't finding anything. My neck is still swollen and still things are getting stuck.  Thanks Eleazar Lincoln

## 2022-05-24 NOTE — ANESTHESIA POSTPROCEDURE EVALUATION
Department of Anesthesiology  Postprocedure Note    Patient: Cyndie Lyn  MRN: [id number]  YOB: 1965  Date of evaluation: 5/24/2022  Time:  12:46 PM     Procedure Summary     Date: 05/24/22 Room / Location: [address]    Anesthesia Start: 1106 Anesthesia Stop: 6107    Procedure: EGD W/ANES. (11:00) (N/A ) Diagnosis: (DYSPHAGIA)    Surgeons: Amna Jimenez DO Responsible Provider: Cathryn Kendrick MD    Anesthesia Type: general ASA Status: 3          Anesthesia Type: No value filed. Jodi Phase I: Jodi Score: 10    Jodi Phase II: Jodi Score: 10    Last vitals: Reviewed and per EMR flowsheets.    Vitals:    05/24/22 1017 05/24/22 1140 05/24/22 1144 05/24/22 1202   BP:  97/77     Pulse:  53 53 55   Resp:  14     Temp: 98 °F (36.7 °C) 97.3 °F (36.3 °C)     TempSrc: Temporal Temporal     SpO2:  94%     Weight:       Height:           Anesthesia Post Evaluation    Patient location during evaluation: bedside  Patient participation: complete - patient participated  Level of consciousness: awake and alert  Airway patency: patent  Nausea & Vomiting: no nausea  Complications: no  Cardiovascular status: hemodynamically stable  Respiratory status: acceptable  Hydration status: euvolemic

## 2022-05-24 NOTE — H&P
[facility] ENDOSCOPY  Outpatient Procedure H&P    Patient: Jose Richardson MRN: [id number]     YOB: 1965  Age: 62 y.o. Sex: female    Unit: [facility] ENDOSCOPY Room/Bed: ENDO/NONE Location: [address]     Procedure: Procedure(s):  EGD W/ANES. (11:00)    Indication:Dysphagia  Hx of aspiration  Referring  Physician:        Brief history:Hx of reflux    Nurses past medical history notes reviewed and agreed. Medications reviewed. Allergies:  Toradol [ketorolac tromethamine] and Haldol [haloperidol lactate]     Allergies noted: Yes     Past Medical History:   Past Medical History:   Diagnosis Date    Anxiety     Depression     Endometriosis     GERD (gastroesophageal reflux disease)     Hypertension     Osteoarthritis     PONV (postoperative nausea and vomiting)     Restless leg syndrome        Past Surgical History:   Past Surgical History:   Procedure Laterality Date    ANUS SURGERY  2018    fissure     SECTION      x3    COLONOSCOPY      DILATION AND CURETTAGE OF UTERUS N/A 6/3/2021    VIDEO HYSTEROSCOPY DILATATION AND CURETTAGE, POSSIBLE MYOSURE, POSSIBLE POLYPECTOMY performed by Veronica Jasmine DO at [address]       right wrist    HERNIA REPAIR  2018    LAPAROSCOPIC PARAESOPHAGEAL HERNIA REPAIR WITH MESH    HYSTERECTOMY N/A 10/21/2021    ROBOTIC ASSISTED LAPAROSCOPIC HYSTERECTOMY WITH RIGHT SALPINGECTOMY, RIGHT OOPHORECTOMY, CYSTOSCOPY, LYSIS OF ADHESIONS  CPT CODE - 83773 performed by Lauren Dick DO at [address] [facility] ARTHROSCOPY Left 11/15/12    ARTHROSCOPY LEFT KNEE WITH SYOVECTOMY AND CHONDROPLASTY    OVARY REMOVAL Right     ROTATOR CUFF REPAIR Right 2020    EXAM UNDER ANESTHESIA VIDEO ARTHROSCOPY RIGHT SHOULDER, MINI- OPEN ROTATOR CUFF REPAIR, NEER ACROMIOPLASTY, LENO PROCEDURE WITH EXPAREL performed by Jeremy Rodríguez MD at [place] [address] ARTHROSCOPY Right 2020    VIDEO ARTHROSCOPY RIGHT SHOULDER, OPEN ROTATOR CUFF REPAIR, BICEPS TENOTOMY -BLOCK- performed by Irene Hardy MD at [place] [address] ARTHROSCOPY Right 2/24/2021    RIGHT SHOULDER ARTHROSCOPIC INCISION AND DRAINAGE performed by Irene Hardy MD at [place] [address] ARTHROSCOPY Right 4/28/2021    VIDEO ARTHROSCOPY RIGHT SHOULDER, ROTATOR CUFF REPAIR, DEBRIDEMENT performed by Irene Hardy MD at [address] [place] [address] ENDOSCOPY  2011    UPPER GASTROINTESTINAL ENDOSCOPY  2015    esophageasl stretch    UPPER GASTROINTESTINAL ENDOSCOPY      ATTEMPTED BUT PATIENT ASPIRATED       Social History:   Social History     Socioeconomic History    Marital status:      Spouse name: Not on file    Number of children: 3    Years of education: Not on file    Highest education level: Not on file   Occupational History    Occupation:    Tobacco Use    Smoking status: Never Smoker    Smokeless tobacco: Never Used   Vaping Use    Vaping Use: Never used   Substance and Sexual Activity    Alcohol use: Yes     Comment: 1 -2 drinks per month    Drug use: Not Currently     Types: Marijuana Curlie Opal)     Comment: last used 2 weeks ago    Sexual activity: Never   Other Topics Concern    Not on file   Social History Narrative    Not on file     Social Determinants of Health     Financial Resource Strain: Low Risk     Difficulty of Paying Living Expenses: Not hard at all   Food Insecurity: No Food Insecurity    Worried About [facility] in the Last Year: Never true    [facility] in the Last Year: Never true   Transportation Needs:     Lack of Transportation (Medical): Not on file    Lack of Transportation (Non-Medical):  Not on file   Physical Activity: Inactive    Days of Exercise per Week: 0 days    Minutes of Exercise per Session: 0 min   Stress:     Feeling of Stress : Not on file   Social Connections:     Frequency of Communication with Friends and Family: Not on file    Frequency of Social Gatherings with Friends and Family: Not on file    Attends Episcopal Services: Not on file    Active Member of Clubs or Organizations: Not on file    Attends Club or Organization Meetings: Not on file    Marital Status: Not on file   Intimate Partner Violence: Not At Risk    Fear of Current or Ex-Partner: No    Emotionally Abused: No    Physically Abused: No    Sexually Abused: No   Housing Stability:     Unable to Pay for Housing in the Last Year: Not on file    Number of [place] in the Last Year: Not on file    Unstable Housing in the Last Year: Not on file       Family History:   Family History   Problem Relation Age of Onset    Arthritis Mother     Obesity Mother     Anemia Mother     Other Mother         pulmonary embolism    Arthritis Father     Arrhythmia Father     Diabetes Other     Diabetes Paternal Grandfather     Cancer Neg Hx     Breast Cancer Neg Hx     Colon Cancer Neg Hx        Home Medications:   Prior to Admission medications    Medication Sig Start Date End Date Taking? Authorizing Provider   rOPINIRole (REQUIP) 2 MG tablet TAKE 2 TABLETS AT NIGHT AND ONE IN THE MORNING FOR LEGS 5/24/22   Edita Edouard, DO   furosemide (LASIX) 20 MG tablet TAKE 1 TABLET BY MOUTH DAILY AS NEEDED (FOR SWELLING) 5/24/22   Edita Javan, DO   metOLazone (ZAROXOLYN) 5 MG tablet TAKE 1 TABLET BY MOUTH DAILY FOR SWELLING / WATER 5/24/22   Edita Javan, DO   AL[facility]AZolam [facility]) 0.5 MG tablet Take 0.5 mg by mouth nightly as needed for Sleep.     Historical Provider, MD   ondansetron (ZOFRAN ODT) 4 MG disintegrating tablet Take 1 tablet by mouth every 8 hours as needed for Nausea or Vomiting 5/9/22   Edita Edouard, DO   diclofenac (VOLTAREN) 50 MG EC tablet TAKE 1 TABLET BY MOUTH TWICE A DAY WITH MEALS 4/26/22   Richard Saenz DO   dicyclomine (BENTYL) 20 MG tablet Take 1 tablet by mouth 4 times daily (after meals and at bedtime) For cramps and gas 3/31/22 Edita Javan, DO   omeprazole (PRILOSEC) 40 MG delayed release capsule TAKE 1 CAPSULE BY MOUTH EVERY DAY  Patient taking differently: in the morning and at bedtime Dr. Ava Hyman increased to BID 3/28/22   Editastephenie Edouard, DO   atenolol (TENORMIN) 25 MG tablet TAKE 1 TABLET BY MOUTH EVERY DAY 3/28/22   Edita Toanang, DO   methocarbamol (ROBAXIN) 500 MG tablet TAKE 1 TABLET BY MOUTH 3 TIMES A DAY AS NEEDED FOR NECK PAIN 3/28/22   Edita Sprang, DO   ibuprofen (ADVIL;MOTRIN) 600 MG tablet TAKE 1 TABLET BY MOUTH EVERY 8 HOURS AS NEEDED FOR PAIN 3/14/22   Edita Sprang, DO   DULoxetine (CYMBALTA) 60 MG extended release capsule TAKE 1 CAPSULE BY MOUTH EVERY DAY  Patient taking differently: Take 60 mg by mouth daily  10/4/21   Edita Toanang, DO   Acetaminophen (TYLENOL PO) Take 650 mg by mouth daily as needed     Historical Provider, MD   MELATONIN PO Take 20 mg by mouth nightly as needed     Historical Provider, MD       Review of Systems:  Weight Loss: No  Dysphagia: No  Dyspepsia: No    Physical Exam:   Vital Signs: Temp 98 °F (36.7 °C) (Temporal)   Ht 5' 6\" (1.676 m)   Wt 275 lb (124.7 kg)   LMP 09/20/2017   BMI 44.39 kg/m²   Vital signs reviewed:Yes    HEENT:Normal  Cardiac:Normal  Chest:Normal  Abdomen:Normal  Exts: Normal  Neuro:Normal    Labs:  Admission on 05/24/2022   Component Date Value Ref Range Status    Ventricular Rate 05/24/2022 57  BPM Preliminary    Atrial Rate 05/24/2022 57  BPM Preliminary    P-R Interval 05/24/2022 118  ms Preliminary    QRS Duration 05/24/2022 88  ms Preliminary    Q-T Interval 05/24/2022 478  ms Preliminary    QTc Calculation (Bazett) 05/24/2022 465  ms Preliminary    P Axis 05/24/2022 56  degrees Preliminary    R Axis 05/24/2022 9  degrees Preliminary    T Axis 05/24/2022 32  degrees Preliminary    Diagnosis 05/24/2022 Sinus bradycardiaLow voltage QRSBorderline ECGWhen compared with ECG of 26-SEP-2021 14:13,Previous ECG has undetermined rhythm, needs review   Preliminary Telemedicine on 03/23/2022   Component Date Value Ref Range Status    SARS-CoV-2 03/23/2022 Not Detected  Not detected Final   Hospital Outpatient Visit on 03/11/2022   Component Date Value Ref Range Status    Sed Rate 03/11/2022 21  0 - 30 mm/Hr Final    CRP 03/11/2022 46.7* 0.0 - 5.1 mg/L Final    WBC 03/11/2022 7.9  4.0 - 11.0 K/uL Final    RBC 03/11/2022 4.41  4.00 - 5.20 M/uL Final    Hemoglobin 03/11/2022 13.6  12.0 - 16.0 g/dL Final    Hematocrit 03/11/2022 41.4  36.0 - 48.0 % Final    MCV 03/11/2022 93.7  80.0 - 100.0 fL Final    MCH 03/11/2022 30.8  26.0 - 34.0 pg Final    MCHC 03/11/2022 32.8  31.0 - 36.0 g/dL Final    RDW 03/11/2022 15.8* 12.4 - 15.4 % Final    Platelets 03/39/5129 172  135 - 450 K/uL Final    MPV 03/11/2022 9.9  5.0 - 10.5 fL Final    Neutrophils % 03/11/2022 73.8  % Final    Lymphocytes % 03/11/2022 16.9  % Final    Monocytes % 03/11/2022 7.2  % Final    Eosinophils % 03/11/2022 1.6  % Final    Basophils % 03/11/2022 0.5  % Final    Neutrophils Absolute 03/11/2022 5.8  1.7 - 7.7 K/uL Final    Lymphocytes Absolute 03/11/2022 1.3  1.0 - 5.1 K/uL Final    Monocytes Absolute 03/11/2022 0.6  0.0 - 1.3 K/uL Final    Eosinophils Absolute 03/11/2022 0.1  0.0 - 0.6 K/uL Final    Basophils Absolute 03/11/2022 0.0  0.0 - 0.2 K/uL Final   Hospital Outpatient Visit on 03/11/2022   Component Date Value Ref Range Status    Cell Count Fluid Type 03/11/2022 Shoulder  Right   Final    Color, Fluid 03/11/2022 Pale Yellow   Final    Appearance, Fluid 03/11/2022 Hazy   Final    Clot Eval. 03/11/2022 see below   Final    Nucl Cell, Fluid 03/11/2022 1,861  /cumm Final    RBC, Fluid 03/11/2022 20,083  /cumm Final    Neutrophil Count, Fluid 03/11/2022 1  % Final    Lymphocytes, Body Fluid 03/11/2022 64  % Final    Macrophages 03/11/2022 26  % Final    Unid. Mononu 03/11/2022 9  % Final    Number of Cells Counted Fluid 03/11/2022 100   Final    Path Consult + CELL CT/DIFF-BF 03/11/2022 Yes   Final    Gram Stain Result 03/11/2022    Final                    Value:No organisms seen  1+ WBC's (Polymorphonuclear)      WOUND/ABSCESS 03/11/2022 No Aerobic or Anaerobic growth after 3 weeks. Final    Anaerobic Culture 03/11/2022 No anaerobes isolated   Final    Source Body Fluid 03/11/2022 Shoulder   Righ   Final    Crystals, Fluid 03/11/2022 None Seen   Final    Path Consult Fluid 03/11/2022 Yes   Final    Path Consult Fluid 03/11/2022 Reviewed   Final    Path Consult Fluid 03/11/2022 Reviewed   Final        Imaging:  No orders to display       ASA:3    Mallampati Score: III    Sedation planned:General    Patient in acceptable condition for procedure:Yes    11:05 AM 5/24/2022    Saad Loaiza, DO      Please note that some or all of this record was generated using voice recognition software. If there are any questions about the content of this document, please contact the author as some errors in transcription may have occurred. The patient and I discussed that this is an elective procedure/surgery. We discussed the risks of the procedure/surgery, including but not limited to what is outlined in the signed informed consent. We also discussed the risk of mando COVID 19 while in the facility. We discussed the increased risk of a bad outcome should the patient contract COVID 19 during the post-procedural/post-operative period, given the patients current health condition, chronic conditions, and the added risk of COVID 19 in light of these conditions. The patient and I also discussed the risk of further postponing the procedure/surgery and other treatment alternatives, including non-procedural/surgical treatments. Understanding all of the risks, benefits, and alternatives, the patient made an informed decision to proceed with the procedure/surgery.

## 2022-05-24 NOTE — PROCEDURES
EGD PROCEDURE NOTE         Esophagogastroduodenoscopy Procedure Note     Patient: Nahid Petit MRN: [id number]   YOB: 1965 Age: 62 y.o. Sex: female   Unit: [facility] ENDOSCOPY Room/Bed: ENDO/NONE Location: [address]    Admitting Physician: Ricardo Augustin     Primary Care Physician: Adam Mccoy DO      DATE OF PROCEDURE: 5/24/2022  PROCEDURE: Esophagogastroduodenoscopy  INDICATION: This is a 62y.o. year old female who presents today dysphagia  ENDOSCOPIST: Devante Bradley DO    POSTOPERATIVE DIAGNOSIS: Gastric beazor    PLAN: Low residue, low fat diet  Gastroparesis likely secondary to previous fundoplication however will check labs to rule out other etiologies. INFORMED CONSENT:  Informed consent for esophagogastoduodenoscopy was obtained. The benefits and risks including adverse medicine reaction have been explained. The patient's questions were answered and the patient agreed to proceed. ASA:  ASA 3 - Patient with moderate systemic disease with functional limitations    SEDATION: MAC     The patient's vital signs, cardiac status, pulmonary status, abdominal status and mental status were stable for the procedure. The patient's vitals signs and respiratory function as monitored by oxygen saturation were stable throughout    Procedure Details: The Olympus videoendoscope was inserted into the mouth and carefully passed into the esophagus, through the stomach and to the distal duodenum. Antegrade and retrograde examination of the upper gi tract was carefully performed. Findings: The esophagus is normal.  The z-line is distinct without lesions or irregularities. There is no evidence of García's esophagus. The scope easily passed into the stomach. The mucosa of the cardia, fundus, body and antrum of the stomach was poorly visualized secondary to retained food. .  The pylorus is patent and of normal contour.   The scope easily advanced into the duodenal bulb and down to the distal duodenum. Ante-and retrograde exam of the duodenum is normal, there was noted to be a duodenal diverticulum. .    Gastric or Duodenal ulcer present: No    Estimated Blood Loss: None      Signed By: Nano Leon DO

## 2022-05-25 DIAGNOSIS — F41.1 GENERALIZED ANXIETY DISORDER: ICD-10-CM

## 2022-05-25 RX ORDER — ALPRAZOLAM 1 MG/1
TABLET ORAL
Qty: 90 TABLET | Refills: 2 | Status: SHIPPED | OUTPATIENT
Start: 2022-05-25 | End: 2022-08-24

## 2022-05-25 NOTE — TELEPHONE ENCOUNTER
5/9/2022    Future Appointments   Date Time Provider Diann Sotelo   6/15/2022 11:45 AM Eloina Coats MD [facility]

## 2022-06-09 ENCOUNTER — OFFICE VISIT (OUTPATIENT)
Dept: ORTHOPEDIC SURGERY | Age: 57
End: 2022-06-09
Payer: MEDICARE

## 2022-06-09 VITALS — WEIGHT: 275 LBS | BODY MASS INDEX: 44.2 KG/M2 | HEIGHT: 66 IN

## 2022-06-09 DIAGNOSIS — M17.12 PRIMARY OSTEOARTHRITIS OF LEFT KNEE: ICD-10-CM

## 2022-06-09 DIAGNOSIS — E66.01 CLASS 3 SEVERE OBESITY DUE TO EXCESS CALORIES WITH SERIOUS COMORBIDITY AND BODY MASS INDEX (BMI) OF 45.0 TO 49.9 IN ADULT (HCC): ICD-10-CM

## 2022-06-09 DIAGNOSIS — R52 PAIN: Primary | ICD-10-CM

## 2022-06-09 PROCEDURE — G8417 CALC BMI ABV UP PARAM F/U: HCPCS | Performed by: PHYSICIAN ASSISTANT

## 2022-06-09 PROCEDURE — 99213 OFFICE O/P EST LOW 20 MIN: CPT | Performed by: PHYSICIAN ASSISTANT

## 2022-06-09 PROCEDURE — 3017F COLORECTAL CA SCREEN DOC REV: CPT | Performed by: PHYSICIAN ASSISTANT

## 2022-06-09 PROCEDURE — G8427 DOCREV CUR MEDS BY ELIG CLIN: HCPCS | Performed by: PHYSICIAN ASSISTANT

## 2022-06-09 PROCEDURE — 1036F TOBACCO NON-USER: CPT | Performed by: PHYSICIAN ASSISTANT

## 2022-06-09 PROCEDURE — 20611 DRAIN/INJ JOINT/BURSA W/US: CPT | Performed by: PHYSICIAN ASSISTANT

## 2022-06-09 RX ORDER — LIDOCAINE HYDROCHLORIDE 10 MG/ML
40 INJECTION, SOLUTION INFILTRATION; PERINEURAL ONCE
Status: COMPLETED | OUTPATIENT
Start: 2022-06-09 | End: 2022-06-09

## 2022-06-09 RX ORDER — TRIAMCINOLONE ACETONIDE 40 MG/ML
80 INJECTION, SUSPENSION INTRA-ARTICULAR; INTRAMUSCULAR ONCE
Status: COMPLETED | OUTPATIENT
Start: 2022-06-09 | End: 2022-06-09

## 2022-06-09 RX ORDER — BUPIVACAINE HYDROCHLORIDE 2.5 MG/ML
10 INJECTION, SOLUTION INFILTRATION; PERINEURAL ONCE
Status: COMPLETED | OUTPATIENT
Start: 2022-06-09 | End: 2022-06-09

## 2022-06-09 RX ADMIN — LIDOCAINE HYDROCHLORIDE 40 MG: 10 INJECTION, SOLUTION INFILTRATION; PERINEURAL at 13:13

## 2022-06-09 RX ADMIN — TRIAMCINOLONE ACETONIDE 80 MG: 40 INJECTION, SUSPENSION INTRA-ARTICULAR; INTRAMUSCULAR at 13:13

## 2022-06-09 RX ADMIN — BUPIVACAINE HYDROCHLORIDE 10 MG: 2.5 INJECTION, SOLUTION INFILTRATION; PERINEURAL at 13:13

## 2022-06-09 SDOH — HEALTH STABILITY: PHYSICAL HEALTH
ON AVERAGE, HOW MANY DAYS PER WEEK DO YOU ENGAGE IN MODERATE TO STRENUOUS EXERCISE (LIKE A BRISK WALK)?: PATIENT DECLINED

## 2022-06-09 ASSESSMENT — SOCIAL DETERMINANTS OF HEALTH (SDOH)
WITHIN THE LAST YEAR, HAVE YOU BEEN AFRAID OF YOUR PARTNER OR EX-PARTNER?: NO
WITHIN THE LAST YEAR, HAVE YOU BEEN KICKED, HIT, SLAPPED, OR OTHERWISE PHYSICALLY HURT BY YOUR PARTNER OR EX-PARTNER?: NO
WITHIN THE LAST YEAR, HAVE YOU BEEN HUMILIATED OR EMOTIONALLY ABUSED IN OTHER WAYS BY YOUR PARTNER OR EX-PARTNER?: NO
WITHIN THE LAST YEAR, HAVE TO BEEN RAPED OR FORCED TO HAVE ANY KIND OF SEXUAL ACTIVITY BY YOUR PARTNER OR EX-PARTNER?: NO

## 2022-06-09 NOTE — PROGRESS NOTES
Dr Chente Culver      Date /Time 2022       1:00 PM EDT  Name Ngozi Sun             1965   Location  [facility]  MRN [id number]                Chief Complaint   Patient presents with    Knee Pain     Right        History of Present Illness  Ngozi Sun is a 62 y.o. female who presents with  right knee pain,. Sent in consultation by Nafisa Blair DO. Athletic/exercise activity: no sports. Injury Mechanism:  none. Worker's Comp. & legal issues:   none. Previous Treatments: Ice, Heat and NSAIDs    Patient presents to the office today for follow-up visit. Patient here with a chief complaint of left knee pain. Patient has been treated in the past for her left knee and has osteoarthritis. It has become increasingly painful without injury or trauma. Symptoms are mostly concentrated over the medial aspect of the knee. Oral NSAIDs, activity modifications, and ice have not been terribly effective. We have performed both cortisone and viscosupplementation injections in the past with good but temporary improvement.     Past History  Past Medical History:   Diagnosis Date    Anxiety     Depression     Endometriosis     GERD (gastroesophageal reflux disease)     Hypertension     Osteoarthritis     PONV (postoperative nausea and vomiting)     Restless leg syndrome      Past Surgical History:   Procedure Laterality Date    ANUS SURGERY  2018    fissure     SECTION      x3    COLONOSCOPY      DILATION AND CURETTAGE OF UTERUS N/A 6/3/2021    VIDEO HYSTEROSCOPY DILATATION AND CURETTAGE, POSSIBLE MYOSURE, POSSIBLE POLYPECTOMY performed by Sanchez Magdaleno DO at [address]       right wrist    HERNIA REPAIR  2018    LAPAROSCOPIC PARAESOPHAGEAL HERNIA REPAIR WITH MESH    HYSTERECTOMY (CERVIX STATUS UNKNOWN) N/A 10/21/2021    ROBOTIC ASSISTED LAPAROSCOPIC HYSTERECTOMY WITH RIGHT SALPINGECTOMY, RIGHT OOPHORECTOMY, CYSTOSCOPY, LYSIS OF ADHESIONS  CPT CODE - 78364 performed by Bar Rodriguez DO at [address] [facility] [address] ARTHROSCOPY Left 11/15/12    ARTHROSCOPY LEFT KNEE WITH SYOVECTOMY AND CHONDROPLASTY    OVARY REMOVAL Right 2010    ROTATOR CUFF REPAIR Right 6/23/2020    EXAM UNDER ANESTHESIA VIDEO ARTHROSCOPY RIGHT SHOULDER, MINI- OPEN ROTATOR CUFF REPAIR, NEER ACROMIOPLASTY, LENO PROCEDURE WITH EXPAREL performed by Freda Mustafa MD at [place] [address] ARTHROSCOPY Right 11/25/2020    VIDEO ARTHROSCOPY RIGHT SHOULDER, OPEN ROTATOR CUFF REPAIR, BICEPS TENOTOMY -BLOCK- performed by Inez Salas MD at [place] [address] ARTHROSCOPY Right 2/24/2021    RIGHT SHOULDER ARTHROSCOPIC INCISION AND DRAINAGE performed by Inez Salas MD at [place] [address] ARTHROSCOPY Right 4/28/2021    VIDEO ARTHROSCOPY RIGHT SHOULDER, ROTATOR CUFF REPAIR, DEBRIDEMENT performed by Inez Salas MD at [address] [place] [address]  2011    UPPER GASTROINTESTINAL ENDOSCOPY  2015    esophageasl stretch    UPPER GASTROINTESTINAL ENDOSCOPY      ATTEMPTED BUT PATIENT ASPIRATED    UPPER GASTROINTESTINAL ENDOSCOPY N/A 5/24/2022    EGD W/ANES.  (11:00) performed by Meet Sanz DO at [place] [address]. History     Tobacco Use    Smoking status: Never Smoker    Smokeless tobacco: Never Used   Substance Use Topics    Alcohol use: Yes     Comment: 1 -2 drinks per month      Current Outpatient Medications on File Prior to Visit   Medication Sig Dispense Refill    ALPRAZolam (XANAX) 1 MG tablet TAKE 1 TABLET BY MOUTH THREE TIMES A DAY AS NEEDED FOR ANXIETY 90 tablet 2    rOPINIRole (REQUIP) 2 MG tablet TAKE 2 TABLETS AT NIGHT AND ONE IN THE MORNING FOR LEGS 90 tablet 3    furosemide (LASIX) 20 MG tablet TAKE 1 TABLET BY MOUTH DAILY AS NEEDED (FOR SWELLING) 30 tablet 3    metOLazone (ZAROXOLYN) 5 MG tablet TAKE 1 TABLET BY MOUTH DAILY FOR SWELLING / WATER 30 tablet 3    ondansetron (ZOFRAN ODT) 4 MG disintegrating tablet Take 1 tablet by mouth every 8 hours as needed for Nausea or Vomiting 15 tablet 1    diclofenac (VOLTAREN) 50 MG EC tablet TAKE 1 TABLET BY MOUTH TWICE A DAY WITH MEALS 60 tablet 3    dicyclomine (BENTYL) 20 MG tablet Take 1 tablet by mouth 4 times daily (after meals and at bedtime) For cramps and gas 60 tablet 2    omeprazole (PRILOSEC) 40 MG delayed release capsule TAKE 1 CAPSULE BY MOUTH EVERY DAY (Patient taking differently: in the morning and at bedtime Dr. Pappas Sensing increased to BID) 30 capsule 6    atenolol (TENORMIN) 25 MG tablet TAKE 1 TABLET BY MOUTH EVERY DAY 30 tablet 5    methocarbamol (ROBAXIN) 500 MG tablet TAKE 1 TABLET BY MOUTH 3 TIMES A DAY AS NEEDED FOR NECK PAIN 90 tablet 3    ibuprofen (ADVIL;MOTRIN) 600 MG tablet TAKE 1 TABLET BY MOUTH EVERY 8 HOURS AS NEEDED FOR PAIN 40 tablet 1    DULoxetine (CYMBALTA) 60 MG extended release capsule TAKE 1 CAPSULE BY MOUTH EVERY DAY (Patient taking differently: Take 60 mg by mouth daily ) 30 capsule 5    Acetaminophen (TYLENOL PO) Take 650 mg by mouth daily as needed       MELATONIN PO Take 20 mg by mouth nightly as needed        No current facility-administered medications on file prior to visit. ASCVD 10-YEAR RISK SCORE  The 10-year ASCVD risk score (Ruy Moy., et al., 2013) is: 2.6%    Values used to calculate the score:      Age: 62 years      Sex: Female      Is Non- : No      Diabetic: No      Tobacco smoker: No      Systolic Blood Pressure: 731 mmHg      Is BP treated: Yes      HDL Cholesterol: 29 mg/dL      Total Cholesterol: 137 mg/dL     Review of Systems  10-point ROS is negative other than HPI. Physical Exam  Based off 1997 Exam Criteria  Ht 5' 6\" (1.676 m)   Wt 275 lb (124.7 kg)   LMP 09/20/2017   BMI 44.39 kg/m²      Constitutional:       General: He is not in acute distress. Appearance: Normal appearance.    Cardiovascular:      Rate and Rhythm: Normal rate and regular rhythm. Pulses: Normal pulses. Pulmonary:      Effort: Pulmonary effort is normal. No respiratory distress. Neurological:      Mental Status: He is alert and oriented to person, place, and time. Mental status is at baseline. Musculoskeletal:  Gait:  antalgic  Lumbar spine: There is no swelling, warmth, or erythema. Range of motion is within normal limits. There is no paraspinal or spinous process tenderness. . The distal neurovascular exam is grossly intact and symmetric. Huber Hip: Examination of the right and left hip reveals intact skin. The patient demonstrates full painless range of motion with regards to flexion, abduction, internal and external rotation. There is no tenderness about the greater trochanter. L Knee: Physical exam of the knee demonstrates painful range of motion 5-110. Tenderness over the medial joint line. No gross instability to either varus or valgus stress test.      Imaging  Left Knee: [address] [facility]  Radiographs: X-rays were ordered today and reviewed of the left knee. 3 views. Standing AP, standing AP flexed, lateral, and skyline views. They demonstrate advanced left knee osteoarthritis medial compartment. No evidence of fractures or dislocations. Procedure:  Orders Placed This Encounter   Procedures    XR KNEE LEFT (MIN 4 VIEWS)     Standing Status:   Future     Number of Occurrences:   1     Standing Expiration Date:   6/9/2023     I discussed in detail the risks, benefits and complications of aninjection which included but are not limited to infection, skin reactions, hot swollen joint, and anaphylaxis with the patient. The patient verbalized understanding and gave informed consent for the left knee injection. The patient is placed supine on table with a bolster underneath knee. Knee prepped with Betadine/Sterile alcohol solution.  A sterile 22-gauge needle was inserted into the knee and the mixture of 2ml knealog 40mg/cc, 4 mL of 1% plain lidocaine, and 4 cc .25% bupivacaine was injected under sterile technique. The needle was withdrawn and the puncture site sealed with a Band-Aid. Technique: Under sterile conditions a [employer] ultrasound unit with a variable frequency (6.0-15.0 MHz) linear transducer was used to localize the placement of a 22-gauge needle into the knee joint. Findings: Successful needle placement for knee injection. Final images were taken and saved for permanent record. The patient tolerated the injection well. The patient was instructed to call the office immediately if there is any pain, redness, warmth, fever, or chills. Assessment and Plan  Scot Kuhn was seen today for follow-up. Diagnoses and all orders for this visit:    Pain  -     Cancel: XR KNEE RIGHT (MIN 4 VIEWS); Future  -     XR KNEE LEFT (MIN 4 VIEWS); Future    Primary osteoarthritis of left knee  -     US ARTHR/ASP/INJ MAJOR JNT/BURSA LEFT; Future    Class 3 severe obesity due to excess calories with serious comorbidity and body mass index (BMI) of 45.0 to 49.9 in [facility])    Other orders  -     bupivacaine (MARCAINE) 0.25 % injection 10 mg  -     lidocaine 1 % injection 40 mg  -     triamcinolone acetonide (KENALOG-40) injection 80 mg        Patient does have advanced left knee osteoarthritis. She also suffers from obesity. We again have discussed the postoperative and perioperative risk profile associated with a BMI above 40. She will continue with efforts for weight loss. Today we will perform a left knee cortisone injection. Patient will follow-up in 3 months or sooner if problems arise. This case was discussed with Dr. Ida Allen.  He is in agreement with diagnosis and treatment plan    I discussed with Nicola Crews that her history, symptoms, signs and imaging are most consistent with knee arthritis.     We reviewed the natural history of these conditions and treatment options ranging from conservative measures (rest, icing, activity modification, physical therapy, pain meds, cortisone injection) to surgical options. In terms of treatment, I recommended continuing with rest, icing, avoidance of painful activities, NSAIDs or pain meds as tolerated, and physical therapy. If these are not effective, cortisone injection can be considered. We discussed surgical options as well, should conservative measures fail. Electronically signed by Eric Brown PA-C on 6/9/2022 at 1:00 PM  This dictation was generated by voice recognition computer software. Although all attempts are made to edit the dictation for accuracy, there may be errors in the transcription that are not intended.

## 2022-06-21 ENCOUNTER — HOSPITAL ENCOUNTER (OUTPATIENT)
Age: 57
Discharge: HOME | End: 2022-06-21
Payer: MEDICARE

## 2022-06-21 ENCOUNTER — HOSPITAL ENCOUNTER (OUTPATIENT)
Dept: NUCLEAR MEDICINE | Age: 57
Discharge: HOME | End: 2022-06-21
Payer: MEDICARE

## 2022-06-21 DIAGNOSIS — Z01.818 PREOPERATIVE CLEARANCE: ICD-10-CM

## 2022-06-21 DIAGNOSIS — I10 ESSENTIAL HYPERTENSION: ICD-10-CM

## 2022-06-21 DIAGNOSIS — E66.01 MORBID OBESITY WITH BMI OF 45.0-49.9, ADULT (HCC): ICD-10-CM

## 2022-06-21 DIAGNOSIS — K31.84 GASTROPARESIS: ICD-10-CM

## 2022-06-21 DIAGNOSIS — K44.9 HIATAL HERNIA: ICD-10-CM

## 2022-06-21 LAB
A/G RATIO: 1.6 (ref 1.1–2.2)
ALBUMIN SERPL-MCNC: 4.1 G/DL (ref 3.4–5)
ALP BLD-CCNC: 87 U/L (ref 40–129)
ALT SERPL-CCNC: 24 U/L (ref 10–40)
ANION GAP SERPL CALCULATED.3IONS-SCNC: 15 MMOL/L (ref 3–16)
AST SERPL-CCNC: 17 U/L (ref 15–37)
BASOPHILS ABSOLUTE: 0 K/UL (ref 0–0.2)
BASOPHILS RELATIVE PERCENT: 0.5 %
BILIRUB SERPL-MCNC: 0.5 MG/DL (ref 0–1)
BUN BLDV-MCNC: 17 MG/DL (ref 7–20)
CALCIUM SERPL-MCNC: 9.3 MG/DL (ref 8.3–10.6)
CHLORIDE BLD-SCNC: 108 MMOL/L (ref 99–110)
CHOLESTEROL, TOTAL: 168 MG/DL (ref 0–199)
CO2: 19 MMOL/L (ref 21–32)
CREAT SERPL-MCNC: 0.9 MG/DL (ref 0.6–1.1)
EOSINOPHILS ABSOLUTE: 0.1 K/UL (ref 0–0.6)
EOSINOPHILS RELATIVE PERCENT: 0.9 %
FOLATE: 10.86 NG/ML (ref 4.78–24.2)
GFR AFRICAN AMERICAN: >60
GFR NON-AFRICAN AMERICAN: >60
GLUCOSE BLD-MCNC: 105 MG/DL (ref 70–99)
HCT VFR BLD CALC: 38.9 % (ref 36–48)
HDLC SERPL-MCNC: 36 MG/DL (ref 40–60)
HEMOGLOBIN: 13.1 G/DL (ref 12–16)
INR BLD: 1.08 (ref 0.87–1.14)
IRON SATURATION: 27 % (ref 15–50)
IRON: 91 UG/DL (ref 37–145)
LDL CHOLESTEROL CALCULATED: 106 MG/DL
LYMPHOCYTES ABSOLUTE: 1.6 K/UL (ref 1–5.1)
LYMPHOCYTES RELATIVE PERCENT: 21.7 %
MCH RBC QN AUTO: 31.3 PG (ref 26–34)
MCHC RBC AUTO-ENTMCNC: 33.7 G/DL (ref 31–36)
MCV RBC AUTO: 92.8 FL (ref 80–100)
MONOCYTES ABSOLUTE: 0.5 K/UL (ref 0–1.3)
MONOCYTES RELATIVE PERCENT: 7 %
NEUTROPHILS ABSOLUTE: 5.2 K/UL (ref 1.7–7.7)
NEUTROPHILS RELATIVE PERCENT: 69.9 %
PDW BLD-RTO: 15.2 % (ref 12.4–15.4)
PLATELET # BLD: 189 K/UL (ref 135–450)
PMV BLD AUTO: 9.9 FL (ref 5–10.5)
POTASSIUM SERPL-SCNC: 4.4 MMOL/L (ref 3.5–5.1)
PROTHROMBIN TIME: 13.8 SEC (ref 11.7–14.5)
RBC # BLD: 4.2 M/UL (ref 4–5.2)
SODIUM BLD-SCNC: 142 MMOL/L (ref 136–145)
TOTAL IRON BINDING CAPACITY: 334 UG/DL (ref 260–445)
TOTAL PROTEIN: 6.6 G/DL (ref 6.4–8.2)
TRIGL SERPL-MCNC: 128 MG/DL (ref 0–150)
TSH REFLEX: 2.91 UIU/ML (ref 0.27–4.2)
VITAMIN B-12: 278 PG/ML (ref 211–911)
VITAMIN D 25-HYDROXY: 12 NG/ML
VLDLC SERPL CALC-MCNC: 26 MG/DL
WBC # BLD: 7.4 K/UL (ref 4–11)

## 2022-06-21 PROCEDURE — 82607 VITAMIN B-12: CPT

## 2022-06-21 PROCEDURE — A9541 TC99M SULFUR COLLOID: HCPCS | Performed by: INTERNAL MEDICINE

## 2022-06-21 PROCEDURE — 78264 GASTRIC EMPTYING IMG STUDY: CPT

## 2022-06-21 PROCEDURE — 83550 IRON BINDING TEST: CPT

## 2022-06-21 PROCEDURE — 84443 ASSAY THYROID STIM HORMONE: CPT

## 2022-06-21 PROCEDURE — 82306 VITAMIN D 25 HYDROXY: CPT

## 2022-06-21 PROCEDURE — 84446 ASSAY OF VITAMIN E: CPT

## 2022-06-21 PROCEDURE — 83036 HEMOGLOBIN GLYCOSYLATED A1C: CPT

## 2022-06-21 PROCEDURE — 84425 ASSAY OF VITAMIN B-1: CPT

## 2022-06-21 PROCEDURE — 3430000000 HC RX DIAGNOSTIC RADIOPHARMACEUTICAL: Performed by: INTERNAL MEDICINE

## 2022-06-21 PROCEDURE — 85610 PROTHROMBIN TIME: CPT

## 2022-06-21 PROCEDURE — 85025 COMPLETE CBC W/AUTO DIFF WBC: CPT

## 2022-06-21 PROCEDURE — 83540 ASSAY OF IRON: CPT

## 2022-06-21 PROCEDURE — 36415 COLL VENOUS BLD VENIPUNCTURE: CPT

## 2022-06-21 PROCEDURE — 80053 COMPREHEN METABOLIC PANEL: CPT

## 2022-06-21 PROCEDURE — 84590 ASSAY OF VITAMIN A: CPT

## 2022-06-21 PROCEDURE — 82746 ASSAY OF FOLIC ACID SERUM: CPT

## 2022-06-21 PROCEDURE — 80061 LIPID PANEL: CPT

## 2022-06-21 RX ADMIN — Medication 1.1 MILLICURIE: at 11:07

## 2022-06-22 LAB
ESTIMATED AVERAGE GLUCOSE: 134.1 MG/DL
HBA1C MFR BLD: 6.3 %

## 2022-06-23 ENCOUNTER — TELEPHONE (OUTPATIENT)
Dept: FAMILY MEDICINE CLINIC | Age: 57
End: 2022-06-23

## 2022-06-23 LAB
ALPHA-TOCOPHEROL: 8.8 MG/L (ref 5.5–18)
GAMMA-TOCOPHEROL: 2.6 MG/L (ref 0–6)
RETINYL PALMITATE: <0.02 MG/L (ref 0–0.1)
VITAMIN A LEVEL: 0.49 MG/L (ref 0.3–1.2)
VITAMIN A, INTERP: NORMAL

## 2022-06-23 NOTE — TELEPHONE ENCOUNTER
Patient is requesting that blood work done on 6/21/22 ordered by Dr. Elvin Pollard be reviewed by Dr. Nidhi Car. NM Gastric Emptying test ordered by Dr. Wm Meredith be reviewed by Dr. Nidhi Car. She would like to know what Dr. Nidhi Car thinks.

## 2022-06-24 LAB — VITAMIN B1 WHOLE BLOOD: 99 NMOL/L (ref 70–180)

## 2022-06-25 ENCOUNTER — PATIENT MESSAGE (OUTPATIENT)
Dept: BARIATRICS/WEIGHT MGMT | Age: 57
End: 2022-06-25

## 2022-06-25 DIAGNOSIS — E55.9 VITAMIN D DEFICIENCY: Primary | ICD-10-CM

## 2022-06-25 RX ORDER — ERGOCALCIFEROL 1.25 MG/1
50000 CAPSULE ORAL WEEKLY
Qty: 4 CAPSULE | Refills: 2 | Status: SHIPPED | OUTPATIENT
Start: 2022-06-25

## 2022-06-27 NOTE — TELEPHONE ENCOUNTER
From: Minerva Araujo  To: Lamberto Laws  Sent: 6/25/2022 10:41 AM EDT  Subject: Lab Results    Rodrigo Scales,  Dr. Giorgio Combs has forwarded your lab results to me. Your Vitamin A (normal), Vitamin B1 (normal), Vitamin B12 (normal), Vitamin D (Was low at 12 with normal being >30. Will send prescription for Vitamin D 50,000 IU to take weekly for 12 weeks.), Vitamin E (normal), Folate (normal), CMP (Kidney and liver function looked good), Lipid Panel (Cholesterol and triglycerides were normal. LDL is slightly elevated at 106 with normal being < 100. HDL is low at 36 with normal being between 40-60. Increased activity can help with this.), Iron studies (normal), TSH (Thyroid function normal), CBC (Blood counts normal), PT/INR (Normal) and HgbA1c (Was elevated at 6.3 which puts you in the prediabetes range of 5.7-6.4. Less than 5.7 is normal and greater than 6.4 is considered diabetic.).    Thank you,  WEN Mckenzie
Please advise
Adult

## 2022-06-28 NOTE — TELEPHONE ENCOUNTER
Spoke to the pt. Imaging and her labs reviewed. Was started on Vitamin D-  agree    Was advised by GI about meal planning and eating. Hx HH surgery  about 5 years ago  With the current program less nausea and abd pain. Is working on weight loss.     Discussed in general

## 2022-07-06 NOTE — PROGRESS NOTES
Pt infusion complete, pt tolerated infusion, pt IV left in and wrapped will access it tomorrow's infusion. Pt discharged to lobby to private car. denies dv

## 2022-07-13 ENCOUNTER — OFFICE VISIT (OUTPATIENT)
Dept: ORTHOPEDIC SURGERY | Age: 57
End: 2022-07-13
Payer: MEDICARE

## 2022-07-13 VITALS — BODY MASS INDEX: 44.2 KG/M2 | WEIGHT: 275 LBS | HEIGHT: 66 IN

## 2022-07-13 DIAGNOSIS — M75.111 INCOMPLETE TEAR OF RIGHT ROTATOR CUFF, UNSPECIFIED WHETHER TRAUMATIC: ICD-10-CM

## 2022-07-13 DIAGNOSIS — M75.81 ROTATOR CUFF TENDINITIS, RIGHT: Primary | ICD-10-CM

## 2022-07-13 PROCEDURE — 20610 DRAIN/INJ JOINT/BURSA W/O US: CPT | Performed by: PHYSICIAN ASSISTANT

## 2022-07-13 RX ORDER — BUPIVACAINE HYDROCHLORIDE 2.5 MG/ML
10 INJECTION, SOLUTION INFILTRATION; PERINEURAL ONCE
Status: COMPLETED | OUTPATIENT
Start: 2022-07-13 | End: 2022-07-13

## 2022-07-13 RX ORDER — LIDOCAINE HYDROCHLORIDE 10 MG/ML
40 INJECTION, SOLUTION INFILTRATION; PERINEURAL ONCE
Status: COMPLETED | OUTPATIENT
Start: 2022-07-13 | End: 2022-07-13

## 2022-07-13 RX ORDER — TRIAMCINOLONE ACETONIDE 40 MG/ML
80 INJECTION, SUSPENSION INTRA-ARTICULAR; INTRAMUSCULAR ONCE
Status: COMPLETED | OUTPATIENT
Start: 2022-07-13 | End: 2022-07-13

## 2022-07-13 RX ADMIN — TRIAMCINOLONE ACETONIDE 80 MG: 40 INJECTION, SUSPENSION INTRA-ARTICULAR; INTRAMUSCULAR at 11:28

## 2022-07-13 RX ADMIN — BUPIVACAINE HYDROCHLORIDE 10 MG: 2.5 INJECTION, SOLUTION INFILTRATION; PERINEURAL at 11:28

## 2022-07-13 RX ADMIN — LIDOCAINE HYDROCHLORIDE 40 MG: 10 INJECTION, SOLUTION INFILTRATION; PERINEURAL at 11:28

## 2022-07-13 SDOH — HEALTH STABILITY: PHYSICAL HEALTH: ON AVERAGE, HOW MANY DAYS PER WEEK DO YOU ENGAGE IN MODERATE TO STRENUOUS EXERCISE (LIKE A BRISK WALK)?: 2 DAYS

## 2022-07-13 SDOH — HEALTH STABILITY: PHYSICAL HEALTH: ON AVERAGE, HOW MANY MINUTES DO YOU ENGAGE IN EXERCISE AT THIS LEVEL?: 20 MIN

## 2022-07-13 ASSESSMENT — SOCIAL DETERMINANTS OF HEALTH (SDOH)
WITHIN THE LAST YEAR, HAVE YOU BEEN HUMILIATED OR EMOTIONALLY ABUSED IN OTHER WAYS BY YOUR PARTNER OR EX-PARTNER?: NO
WITHIN THE LAST YEAR, HAVE YOU BEEN AFRAID OF YOUR PARTNER OR EX-PARTNER?: NO
WITHIN THE LAST YEAR, HAVE TO BEEN RAPED OR FORCED TO HAVE ANY KIND OF SEXUAL ACTIVITY BY YOUR PARTNER OR EX-PARTNER?: NO
WITHIN THE LAST YEAR, HAVE YOU BEEN KICKED, HIT, SLAPPED, OR OTHERWISE PHYSICALLY HURT BY YOUR PARTNER OR EX-PARTNER?: NO

## 2022-07-13 NOTE — PROGRESS NOTES
Dr Yannick Tran      Date /Time 7/13/2022             11:46 AM EST  Name Paola Mcconnell             1965   Location  [facility]  MRN [id number]                Chief Complaint   Patient presents with    Shoulder Pain     Right Shoulder - Multiple surgeries 2020 - 2021 Fredi/ Veldon Severs         History of Present Illness    Paola Mcconnell is a 62 y.o. female who presents with  right Shoulder pain. .  Injury Mechanism:  none. Worker's Comp. & legal issues:   none. Previous Treatments: Ice, Heat and NSAIDs    Patient presents to the office today for follow-up visit concerning her right shoulder. She is being treated for a large a repairable rotator cuff tear and osteoarthritis of her shoulder. She has had a previous shoulder aspiration March 2022 which was negative for infection. Her current BMI is 44.39 and will need that less than 40 before proceeding with reverse shoulder replacement especially with her history of infection. No new injury or trauma    Previous history: Patient presents the office today for follow-up visit. Patient here for EMG and MRI results. She continues to complain of continued pain involving her right shoulder. Pain is concentrated laterally. She does have numbness and tingling in her hand and has had a previous ORIF wrist fracture in 2019. She continues to complain of difficulties involving her right shoulder especially with any overhead activities. Previous history: Patient presents to the office today for a new problem. Patient is here with a chief complaint of right shoulder pain. Patient's right shoulder has been painful off and on since 2020. Patient had a rotator cuff repair done by Dr. Rodriguez Steph June 2020. Patient had another rotator cuff repair done on November 25, 2020 by Dr. Veldon Severs. Patient subsequently had irrigation and debridement February 24, 2021 by Dr. Veldon Severs for infection.   Last surgery was performed on 4/28/2021 by Dr. Veldon Severs with recurrent rotator cuff repair. She is here complaining of continued pain symptoms and dysfunction. Pain concentrated over lateral shoulder. She also has posterior shoulder pain and numbness and tingling that radiate down her arm and into her hand. She did had ORIF right wrist done by Dr. Carl Cowden in 2019.     Past Medical History  Past Medical History:   Diagnosis Date    Anxiety     Depression     Endometriosis     GERD (gastroesophageal reflux disease)     Hypertension     Osteoarthritis     PONV (postoperative nausea and vomiting)     Restless leg syndrome      Past Surgical History:   Procedure Laterality Date    ANUS SURGERY  2018    fissure     SECTION      x3    COLONOSCOPY      DILATION AND CURETTAGE OF UTERUS N/A 6/3/2021    VIDEO HYSTEROSCOPY DILATATION AND CURETTAGE, POSSIBLE MYOSURE, POSSIBLE POLYPECTOMY performed by Dipika Ag DO at [address]       right wrist    HERNIA REPAIR  2018    LAPAROSCOPIC PARAESOPHAGEAL HERNIA REPAIR WITH MESH    HYSTERECTOMY (CERVIX STATUS UNKNOWN) N/A 10/21/2021    ROBOTIC ASSISTED LAPAROSCOPIC HYSTERECTOMY WITH RIGHT SALPINGECTOMY, RIGHT OOPHORECTOMY, CYSTOSCOPY, LYSIS OF ADHESIONS  CPT CODE - 54915 performed by Kermit Perry DO at [address] [place] [place] [address] ARTHROSCOPY Left 11/15/12    ARTHROSCOPY LEFT KNEE WITH SYOVECTOMY AND CHONDROPLASTY    OVARY REMOVAL Right     ROTATOR CUFF REPAIR Right 2020    EXAM UNDER ANESTHESIA VIDEO ARTHROSCOPY RIGHT SHOULDER, MINI- OPEN ROTATOR CUFF REPAIR, NEER ACROMIOPLASTY, LENO PROCEDURE WITH EXPAREL performed by Cresencio Soto MD at [place] [address] ARTHROSCOPY Right 2020    VIDEO ARTHROSCOPY RIGHT SHOULDER, OPEN ROTATOR CUFF REPAIR, BICEPS TENOTOMY -BLOCK- performed by Robert Longo MD at [place] [address] ARTHROSCOPY Right 2021    RIGHT SHOULDER ARTHROSCOPIC INCISION AND DRAINAGE performed by Robert Longo MD at [address] [place] [address] SHOULDER ARTHROSCOPY Right 4/28/2021    VIDEO ARTHROSCOPY RIGHT SHOULDER, ROTATOR CUFF REPAIR, DEBRIDEMENT performed by Kirsten Oconnor MD at [facility]  2011    UPPER GASTROINTESTINAL ENDOSCOPY  2015    esophageasl stretch    UPPER GASTROINTESTINAL ENDOSCOPY      ATTEMPTED BUT PATIENT ASPIRATED    UPPER GASTROINTESTINAL ENDOSCOPY N/A 5/24/2022    EGD W/SERGEY.  (11:00) performed by Ysabel Price DO at [place] [address]. History     Tobacco Use    Smoking status: Never Smoker    Smokeless tobacco: Never Used   Substance Use Topics    Alcohol use: Yes     Comment: 1 -2 drinks per month      Current Outpatient Medications on File Prior to Visit   Medication Sig Dispense Refill    vitamin D (ERGOCALCIFEROL) 1.25 MG (43292 UT) CAPS capsule Take 1 capsule by mouth once a week 4 capsule 2    ALPRAZolam (XANAX) 1 MG tablet TAKE 1 TABLET BY MOUTH THREE TIMES A DAY AS NEEDED FOR ANXIETY 90 tablet 2    rOPINIRole (REQUIP) 2 MG tablet TAKE 2 TABLETS AT NIGHT AND ONE IN THE MORNING FOR LEGS 90 tablet 3    furosemide (LASIX) 20 MG tablet TAKE 1 TABLET BY MOUTH DAILY AS NEEDED (FOR SWELLING) 30 tablet 3    metOLazone (ZAROXOLYN) 5 MG tablet TAKE 1 TABLET BY MOUTH DAILY FOR SWELLING / WATER 30 tablet 3    ondansetron (ZOFRAN ODT) 4 MG disintegrating tablet Take 1 tablet by mouth every 8 hours as needed for Nausea or Vomiting 15 tablet 1    diclofenac (VOLTAREN) 50 MG EC tablet TAKE 1 TABLET BY MOUTH TWICE A DAY WITH MEALS 60 tablet 3    dicyclomine (BENTYL) 20 MG tablet Take 1 tablet by mouth 4 times daily (after meals and at bedtime) For cramps and gas 60 tablet 2    omeprazole (PRILOSEC) 40 MG delayed release capsule TAKE 1 CAPSULE BY MOUTH EVERY DAY (Patient taking differently: in the morning and at bedtime Dr. Colin Drop increased to BID) 30 capsule 6    atenolol (TENORMIN) 25 MG tablet TAKE 1 TABLET BY MOUTH EVERY DAY 30 tablet 5    methocarbamol (ROBAXIN) 500 MG tablet TAKE 1 TABLET BY MOUTH 3 TIMES A DAY AS NEEDED FOR NECK PAIN 90 tablet 3    ibuprofen (ADVIL;MOTRIN) 600 MG tablet TAKE 1 TABLET BY MOUTH EVERY 8 HOURS AS NEEDED FOR PAIN 40 tablet 1    DULoxetine (CYMBALTA) 60 MG extended release capsule TAKE 1 CAPSULE BY MOUTH EVERY DAY (Patient taking differently: Take 60 mg by mouth daily ) 30 capsule 5    Acetaminophen (TYLENOL PO) Take 650 mg by mouth daily as needed       MELATONIN PO Take 20 mg by mouth nightly as needed        No current facility-administered medications on file prior to visit. ASCVD 10-YEAR RISK SCORE  The 10-year ASCVD risk score (Lars Palma, et al., 2013) is: 2.7%    Values used to calculate the score:      Age: 62 years      Sex: Female      Is Non- : No      Diabetic: No      Tobacco smoker: No      Systolic Blood Pressure: 401 mmHg      Is BP treated: Yes      HDL Cholesterol: 36 mg/dL      Total Cholesterol: 168 mg/dL     Review of Systems  10-point ROS is negative other than HPI. Physical Exam  Based off 1997 Exam Criteria  Ht 5' 6\" (1.676 m)   Wt 275 lb (124.7 kg)   LMP 09/20/2017   BMI 44.39 kg/m²      Constitutional:       General: He is not in acute distress. Appearance: Normal appearance. Cardiovascular:      Rate and Rhythm: Normal rate and regular rhythm. Pulses: Normal pulses. Pulmonary:      Effort: Pulmonary effort is normal. No respiratory distress. Neurological:      Mental Status: He is alert and oriented to person, place, and time. Mental status is at baseline.      Musculoskeletal:  Gait:  antalgic    Cervical Spine / Shoulder:      RIGHT  LEFT    Cervical Spine Exam  [] All Neg    [x] All Neg     Spurling's  [x]  []Not tested   []  []Not tested    Whittington's  []  []Not tested   []  []Not tested    Pain with rotation  [x]  []Not tested   []  []Not tested    Pain with lateral bending  [x]  []Not tested   []  []Not tested    Paraspinal muscle tenderness  [] Paraspinal  []Midline   [] Paraspinal  []Not tested    Sensation RIGHT  LEFT    Axillary  [x] Normal []Decreased    [x] Normal []Decreased   Musculocutaneous  [x] Normal  []Decreased   [x] Normal []Decreased   Median  [x] Normal []Decreased   [x] Normal []Decreased   Radial  [x] Normal  []Decreased   [x] Normal []Decreased   Ulnar  [x] Normal  []Decreased   [x] Normal []Decreased   Scapula       Position  [x]Nml  []low  [] lateral  [x]Nml  []low  [] lateral   Dyskinesia  []+ []Abn. Shrug   []+ []Abn. Shrug                     Winging     [x]None   []Med  []Lat   []Worse w/FE  []Med  []Lat  []Worse w/FE   Scapulothoracic Compress.    []Impr Pain  []Impr Motion  []Impr Pain []Impr Motion    Range of Motion Active Passive Active Passive   Forward Elevation 90  170    Abduction 60  100    External Rotation @ side 30  60    External Rotation @ 90 abd 40  90    Internal Rotation @ 90 abd 10  40    Internal Rotation Sacrum  Normal    End range of motion  [] Pain  [] Pain  [] Pain  [] Pain   Strength RIGHT /5 LEFT /5   Abduction 4  5    External Rotation 4  5    Internal Rotation 4  5    Provocative Signs/Tests  [] All Neg   [x] +      [] -  [] All Neg   [x] +      [] -   Rotator Cuff Signs  [x] All Neg  [] Not tested   [x] All Neg  [] Not tested    Neer  [x]  []Not tested   []  []Not tested    Bob Chávez  [x]  []Not tested   []  []Not tested    Painful arc  [x]  []Not tested   []  []Not tested    Greater tuberosity tenderness  []  []Not tested   []  []Not tested    Drop arm  []  []Not tested  []  []Not tested   Superior Escape  []  []Not tested   []  []Not tested    ER Lag  []  []Not tested   []  []Not tested    Belly press  []  []Not tested   []  []Not tested    Lift-off  []  []Not tested   []  []Not tested    Bear hug  []  []Not tested   []  []Not tested    Biceps/Labral Signs  [] All Neg  [] Not tested   [x] All Neg  [] Not tested    Jordan's  [x]  []Not tested   []  []Not tested    Speed's  [x]  []Not tested []  []Not tested    Dynamic Load Shift/Shear  []  []Not tested   []  []Not tested    Clicking/Popping  []  []Not tested  []  []Not tested   Bicipital groove tenderness  []  []Not tested   []  []Not tested    Young  []  []Not tested   []  []Not tested    [facility] Joint Signs  [x] All Neg  [] Not tested   [x] All Neg  [] Not tested    [facility] joint tenderness  []  []Not tested   []  []Not tested    Cross-arm adduction pain  []  []Not tested   []  []Not tested    Instability Signs  [] All Neg  [] Not tested  [] All Neg  [] Not tested   General laxity (thumb/elbow)  []  []Not tested   []  []Not tested    Hyperabduction  []  []Not tested   []  []Not tested    Sulcus []Side   []ER    []Side   []ER       Anterior apprehension  []  []Not tested   []  []Not tested    Relocation  []   []Not tested  []  []Not tested     Imaging  Right Shoulder: [address] [facility]  Previous Radiographs: X-rays were ordered today reviewed of the right shoulder. 3 views. AP, scapular Y, and axillary views. They demonstrate no evidence of fractures or dislocations. Mild to moderate arthritic changes present. EXAMINATION:   MRI OF THE RIGHT SHOULDER WITHOUT CONTRAST   3/2/2022 1:32 pm       TECHNIQUE:   Multiplanar multisequence MRI of the right shoulder was performed without the   administration of intravenous contrast.       COMPARISON:   Right shoulder radiograph January 31, 2022;  MRI right shoulder Hannah 3, 2020       HISTORY:   ORDERING SYSTEM PROVIDED HISTORY: Right shoulder pain, unspecified chronicity   TECHNOLOGIST PROVIDED HISTORY:   Reason for exam:->r/o RTC tear   Reason for Exam: shoulder pain, several surgeries with comlications, limited   ROM       FINDINGS:   ROTATOR CUFF: Postoperative changes of rotator cuff repair.  Complete   full-thickness re-tear of the supraspinatus tendon.  Full-thickness tearing   along the anterior fibers of the infraspinatus.  The posterior fibers remain   intact.  Chronic interstitial tearing of the subscapularis.  The teres minor   remains grossly intact.  Severe atrophy of the supraspinatus and mild atrophy   of the infraspinatus, subscapularis, and teres minor.       BICEPS TENDON: Long head biceps tendon remains grossly intact.  Tendinosis of   the intra-articular portion of the tendon.       LABRUM: To the extent that the labrum is visualized on the current exam,   there is labral degeneration most pronounced superiorly.  No paralabral cyst.       GLENOHUMERAL JOINT: Narrowing of the acromial humeral interval related to   rotator cuff re-tear.  Mild glenohumeral osteoarthritis.  Small to moderate   effusion and synovitis.       AC JOINT AND ACROMIOCLAVICULAR ARCH: Postoperative changes of the   acromioclavicular joint.  Fluid present within the subacromial/subdeltoid   bursa related to rotator cuff tear.       BONE MARROW:  Bone marrow is normal in signal without evidence of fracture,   marrow contusion or marrow occupying lesion.       OUTLET SPACES: The suprascapular notch and quadrilateral space are without   obstructing or space occupying lesions.           Impression   1. Prior rotator cuff repair with interval complete re-tear of the   supraspinatus. 2. Full-thickness tearing of the anterior fibers of the infraspinatus. Intact posterior fibers present. 3. Interstitial tearing of the subscapularis. 4. Tendinosis of the biceps tendon. 5. Small to moderate glenohumeral effusion and synovitis. EMG results  Impression:  Study is consistent with right carpal tunnel syndrome, moderate severity.  No evidence of an acute radiculopathy or other entrapment neuropathy.        Procedure:  Orders Placed This Encounter   Procedures    XR SHOULDER RIGHT (MIN 2 VIEWS)     Standing Status:   Future     Number of Occurrences:   1     Standing Expiration Date:   7/11/2023     Order Specific Question:   Reason for exam:     Answer:   pain    [place] ARTHROCENTESIS ASPIR&/INJ MAJOR JT/BURSA W/O US Right Shoulder Cortisone Injection: Glenohumeral CPT 04465   Consent was obtained after discussion of the risks, benefits, alternatives, including, but not limited to bleeding, pain, infection, skin disruption or discoloration. Laterality was confirmed (timeout). The shoulder was prepped with alcohol.  A formulation of 2cc of 40mg/ml Kenalog, 4cc of 1% lidocaine, 4cc of 0.25% marcaine was injected into the glenohumeral joint space with a 25 gauge needle without difficulty. The site was cleaned and dressed with a band aid.  She tolerated this well and there were no complications. Assessment and Plan  Madiha Mccarty was seen today for shoulder pain. Diagnoses and all orders for this visit:    Rotator cuff tendinitis, right  -     XR SHOULDER RIGHT (MIN 2 VIEWS); Future  -     [place] ARTHROCENTESIS ASPIR&/INJ MAJOR JT/BURSA W/O US    Incomplete tear of right rotator cuff, unspecified whether traumatic  -     XR SHOULDER RIGHT (MIN 2 VIEWS); Future  -     [place] ARTHROCENTESIS ASPIR&/INJ MAJOR JT/BURSA W/O US    Other orders  -     bupivacaine (MARCAINE) 0.25 % injection 10 mg  -     lidocaine 1 % injection 40 mg  -     triamcinolone acetonide (KENALOG-40) injection 80 mg        Patient does have a large chronic rotator cuff tear. She has severe atrophy. She has had 4 previous rotator cuff repairs and 1 arthroscopic irrigation and debridement for infection. I do not feel her rotator cuff is fixable at this time. She may be a candidate for a reverse total shoulder arthroplasty at some point in the future but does need medical optimization before proceeding. Her current BMI is 44.39 and needs to be less than 40 to minimize postoperative risk profile. Her BMI is extremely important especially with her history of infection in his shoulder. With the negative aspiration for infection we will proceed with an intra-articular cortisone injection today.   She is aware of the increased risk of infection and no surgery for 3 months. Lastly I will refer her to hand for consultation concerning carpal tunnel syndrome with previous ORIF. I discussed with Pradeep Ferrell that her history, symptoms, signs and imaging are most consistent with rotator cuff partial tears. We reviewed the natural history of these conditions and treatment options ranging from conservative measures (rest, icing, activity modification, physical therapy, pain meds, cortisone injection) to surgical options. In terms of treatment, I recommended continuing with rest, icing, avoidance of painful activities, NSAIDs or pain meds as tolerated, and physical therapy. If these are not effective, cortisone injection can be considered. We discussed surgical options as well, should conservative measures fail. Electronically signed by Sania Owusu PA-C on 7/13/2022 at 11:28 AM  This dictation was generated by voice recognition computer software. Although all attempts are made to edit the dictation for accuracy, there may be errors in the transcription that are not intended.

## 2022-07-15 ENCOUNTER — TELEPHONE (OUTPATIENT)
Dept: ORTHOPEDIC SURGERY | Age: 57
End: 2022-07-15

## 2022-07-15 DIAGNOSIS — M17.12 PRIMARY OSTEOARTHRITIS OF LEFT KNEE: ICD-10-CM

## 2022-07-15 DIAGNOSIS — M75.81 ROTATOR CUFF TENDINITIS, RIGHT: Primary | ICD-10-CM

## 2022-07-15 DIAGNOSIS — M75.111 INCOMPLETE TEAR OF RIGHT ROTATOR CUFF, UNSPECIFIED WHETHER TRAUMATIC: ICD-10-CM

## 2022-07-15 RX ORDER — METHYLPREDNISOLONE 4 MG/1
TABLET ORAL
Qty: 1 KIT | Refills: 0 | Status: SHIPPED | OUTPATIENT
Start: 2022-07-15 | End: 2022-08-08 | Stop reason: ALTCHOICE

## 2022-07-15 NOTE — TELEPHONE ENCOUNTER
General Question     Subject: PATIENT STATES SHE RECEIVED A SHOT IN HER R SHOULDER AND THAT THERE IS SWELLING AND CAN HARDLY MOVE IT. PLEASE ADVISE.    Patient: Gume Suarez  Contact Number: [phone]

## 2022-07-22 RX ORDER — METHOCARBAMOL 500 MG/1
TABLET, FILM COATED ORAL
Qty: 90 TABLET | Refills: 3 | Status: SHIPPED | OUTPATIENT
Start: 2022-07-22

## 2022-07-22 NOTE — TELEPHONE ENCOUNTER
LOV 5/9/2022    Future Appointments   Date Time Provider Diann Sotelo   9/14/2022 11:15 AM Clarence Wilson PA-C [facility]

## 2022-08-08 ENCOUNTER — OFFICE VISIT (OUTPATIENT)
Dept: FAMILY MEDICINE CLINIC | Age: 57
End: 2022-08-08
Payer: MEDICARE

## 2022-08-08 VITALS
WEIGHT: 273 LBS | SYSTOLIC BLOOD PRESSURE: 104 MMHG | HEIGHT: 66 IN | DIASTOLIC BLOOD PRESSURE: 72 MMHG | HEART RATE: 55 BPM | OXYGEN SATURATION: 96 % | BODY MASS INDEX: 43.87 KG/M2

## 2022-08-08 DIAGNOSIS — K52.9 ENTERITIS: Primary | ICD-10-CM

## 2022-08-08 PROCEDURE — 1036F TOBACCO NON-USER: CPT | Performed by: FAMILY MEDICINE

## 2022-08-08 PROCEDURE — G8427 DOCREV CUR MEDS BY ELIG CLIN: HCPCS | Performed by: FAMILY MEDICINE

## 2022-08-08 PROCEDURE — 99213 OFFICE O/P EST LOW 20 MIN: CPT | Performed by: FAMILY MEDICINE

## 2022-08-08 PROCEDURE — 3017F COLORECTAL CA SCREEN DOC REV: CPT | Performed by: FAMILY MEDICINE

## 2022-08-08 PROCEDURE — G8417 CALC BMI ABV UP PARAM F/U: HCPCS | Performed by: FAMILY MEDICINE

## 2022-08-08 RX ORDER — LOPERAMIDE HYDROCHLORIDE 2 MG/1
2 CAPSULE ORAL 4 TIMES DAILY PRN
Qty: 20 CAPSULE | Refills: 0 | Status: SHIPPED | OUTPATIENT
Start: 2022-08-08 | End: 2022-08-18

## 2022-08-08 RX ORDER — COLD-HOT PACK
EACH MISCELLANEOUS
Qty: 30 CAPSULE | Refills: 1 | Status: SHIPPED | OUTPATIENT
Start: 2022-08-08

## 2022-08-08 ASSESSMENT — ENCOUNTER SYMPTOMS: DIARRHEA: 1

## 2022-08-08 NOTE — PROGRESS NOTES
BICEPS TENOTOMY -BLOCK- performed by Sandra Singh MD at [address] [place] ARTHROSCOPY Right 2/24/2021    RIGHT SHOULDER ARTHROSCOPIC INCISION AND DRAINAGE performed by Sandra Singh MD at [address] ARTHROSCOPY Right 4/28/2021    VIDEO ARTHROSCOPY RIGHT SHOULDER, ROTATOR CUFF REPAIR, DEBRIDEMENT performed by Sandra Singh MD at [address]    UPPER GASTROINTESTINAL ENDOSCOPY  2015    esophageasl stretch    UPPER GASTROINTESTINAL ENDOSCOPY      ATTEMPTED BUT PATIENT ASPIRATED    UPPER GASTROINTESTINAL ENDOSCOPY N/A 5/24/2022    EGD W/ANES. (11:00) performed by Matty Rivas DO at [facility]. [facility] History     Socioeconomic History    Marital status:      Spouse name: Not on file    Number of children: 4    Years of education: Not on file    Highest education level: Not on file   Occupational History    Occupation:    Tobacco Use    Smoking status: Never    Smokeless tobacco: Never   Vaping Use    Vaping Use: Never used   Substance and Sexual Activity    Alcohol use: Yes     Comment: 1 -2 drinks per month    Drug use: Not Currently     Types: Marijuana Lexa Jack)     Comment: last used 2 weeks ago    Sexual activity: Never   Other Topics Concern    Not on file   Social History Narrative    Not on file     Social Determinants of Health     Financial Resource Strain: Low Risk     Difficulty of Paying Living Expenses: Not hard at all   Food Insecurity: No Food Insecurity    Worried About Running Out of Food in the Last Year: Never true    [address] in the Last Year: Never true   Transportation Needs: Not on file   Physical Activity: Insufficiently Active    Days of Exercise per Week: 2 days    Minutes of Exercise per Session: 20 min   Stress: Not on file   Social Connections: Not on file   Intimate Partner Violence: Not At Risk    Fear of Current or Ex-Partner: No    Emotionally Abused:  No Physically Abused: No    Sexually Abused: No   Housing Stability: Not on file       Family History   Problem Relation Age of Onset    Arthritis Mother     Obesity Mother     Anemia Mother     Other Mother         pulmonary embolism    Arthritis Father     Arrhythmia Father     Diabetes Other     Diabetes Paternal Grandfather     Cancer Neg Hx     Breast Cancer Neg Hx     Colon Cancer Neg Hx        Vitals:    08/08/22 1343   BP: 104/72   Pulse: 55   SpO2: 96%       Wt Readings from Last 3 Encounters:   08/08/22 273 lb (123.8 kg)   07/13/22 275 lb (124.7 kg)   06/09/22 275 lb (124.7 kg)       Review of Systems   Constitutional:  Positive for fatigue. Negative for chills and fever. Gastrointestinal:  Positive for diarrhea. No recent antibiotic. No pain-  uncomfortable . No blood or pus in stool   Genitourinary:  Negative for dysuria and urgency. Musculoskeletal:         Going to need a shoulder replacement ,  right   Trying to lose weight before   Psychiatric/Behavioral:          Not sleeping real well. Objective:   Physical Exam  Constitutional:       Appearance: She is obese. She is not ill-appearing. Pulmonary:      Effort: Pulmonary effort is normal.      Breath sounds: Normal breath sounds. Abdominal:      General: There is no distension. Palpations: Abdomen is soft. There is no mass. Tenderness: There is no abdominal tenderness. Comments: Active bowel sounds . Soft  No guarding / rebound. Skin:     General: Skin is warm and dry. Neurological:      Mental Status: She is alert and oriented to person, place, and time. Psychiatric:         Mood and Affect: Mood normal.         Behavior: Behavior normal.         Thought Content: Thought content normal.       Assessment:    Enteritis            Plan:   Treat as a viral enteritis.   Diet  Fiber food  Probiotic  Short course anti-spasmodic    Follow 4-5 days        Current Outpatient Medications   Medication Sig Dispense Refill    methocarbamol (ROBAXIN) 500 MG tablet TAKE 1 TABLET BY MOUTH 3 TIMES A DAY AS NEEDED FOR NECK PAIN 90 tablet 3    vitamin D (ERGOCALCIFEROL) 1.25 MG (78729 UT) CAPS capsule Take 1 capsule by mouth once a week 4 capsule 2    ALPRAZolam (XANAX) 1 MG tablet TAKE 1 TABLET BY MOUTH THREE TIMES A DAY AS NEEDED FOR ANXIETY 90 tablet 2    rOPINIRole (REQUIP) 2 MG tablet TAKE 2 TABLETS AT NIGHT AND ONE IN THE MORNING FOR LEGS 90 tablet 3    metOLazone (ZAROXOLYN) 5 MG tablet TAKE 1 TABLET BY MOUTH DAILY FOR SWELLING / WATER 30 tablet 3    ondansetron (ZOFRAN ODT) 4 MG disintegrating tablet Take 1 tablet by mouth every 8 hours as needed for Nausea or Vomiting 15 tablet 1    diclofenac (VOLTAREN) 50 MG EC tablet TAKE 1 TABLET BY MOUTH TWICE A DAY WITH MEALS 60 tablet 3    dicyclomine (BENTYL) 20 MG tablet Take 1 tablet by mouth 4 times daily (after meals and at bedtime) For cramps and gas 60 tablet 2    omeprazole (PRILOSEC) 40 MG delayed release capsule TAKE 1 CAPSULE BY MOUTH EVERY DAY (Patient taking differently: in the morning and at bedtime Dr. Isabell Bowling increased to BID) 30 capsule 6    atenolol (TENORMIN) 25 MG tablet TAKE 1 TABLET BY MOUTH EVERY DAY 30 tablet 5    ibuprofen (ADVIL;MOTRIN) 600 MG tablet TAKE 1 TABLET BY MOUTH EVERY 8 HOURS AS NEEDED FOR PAIN 40 tablet 1    DULoxetine (CYMBALTA) 60 MG extended release capsule TAKE 1 CAPSULE BY MOUTH EVERY DAY (Patient taking differently: Take 60 mg by mouth in the morning.) 30 capsule 5    Acetaminophen (TYLENOL PO) Take 650 mg by mouth daily as needed       MELATONIN PO Take 20 mg by mouth nightly as needed        No current facility-administered medications for this visit.

## 2022-08-08 NOTE — PATIENT INSTRUCTIONS
Classic diet is BRAT-   bananas, rice, applesauce, toast     Progress gradually  Smaller meals      Advise me 3-4 days if not some improved

## 2022-08-16 RX ORDER — MELOXICAM 15 MG/1
TABLET ORAL
Qty: 90 TABLET | Refills: 0 | Status: SHIPPED | OUTPATIENT
Start: 2022-08-16 | End: 2022-08-23 | Stop reason: ALTCHOICE

## 2022-08-17 ENCOUNTER — NURSE TRIAGE (OUTPATIENT)
Dept: OTHER | Facility: CLINIC | Age: 57
End: 2022-08-17

## 2022-08-18 ENCOUNTER — OFFICE VISIT (OUTPATIENT)
Dept: FAMILY MEDICINE CLINIC | Age: 57
End: 2022-08-18
Payer: MEDICARE

## 2022-08-18 VITALS
WEIGHT: 270 LBS | DIASTOLIC BLOOD PRESSURE: 64 MMHG | TEMPERATURE: 97.3 F | RESPIRATION RATE: 16 BRPM | SYSTOLIC BLOOD PRESSURE: 92 MMHG | OXYGEN SATURATION: 98 % | BODY MASS INDEX: 43.39 KG/M2 | HEART RATE: 58 BPM | HEIGHT: 66 IN

## 2022-08-18 DIAGNOSIS — R19.7 DIARRHEA, UNSPECIFIED TYPE: ICD-10-CM

## 2022-08-18 DIAGNOSIS — R42 DIZZINESS: ICD-10-CM

## 2022-08-18 DIAGNOSIS — I95.1 ORTHOSTATIC HYPOTENSION: ICD-10-CM

## 2022-08-18 DIAGNOSIS — R53.83 FATIGUE, UNSPECIFIED TYPE: ICD-10-CM

## 2022-08-18 DIAGNOSIS — R19.7 DIARRHEA, UNSPECIFIED TYPE: Primary | ICD-10-CM

## 2022-08-18 LAB
ANION GAP SERPL CALCULATED.3IONS-SCNC: 10 MMOL/L (ref 3–16)
BASOPHILS ABSOLUTE: 0 K/UL (ref 0–0.2)
BASOPHILS RELATIVE PERCENT: 0.6 %
BUN BLDV-MCNC: 11 MG/DL (ref 7–20)
CALCIUM SERPL-MCNC: 9.2 MG/DL (ref 8.3–10.6)
CHLORIDE BLD-SCNC: 101 MMOL/L (ref 99–110)
CO2: 32 MMOL/L (ref 21–32)
CREAT SERPL-MCNC: 0.9 MG/DL (ref 0.6–1.1)
EOSINOPHILS ABSOLUTE: 0.1 K/UL (ref 0–0.6)
EOSINOPHILS RELATIVE PERCENT: 1.5 %
GFR AFRICAN AMERICAN: >60
GFR NON-AFRICAN AMERICAN: >60
GLUCOSE BLD-MCNC: 108 MG/DL (ref 70–99)
HCT VFR BLD CALC: 38.7 % (ref 36–48)
HEMOGLOBIN: 13.2 G/DL (ref 12–16)
LYMPHOCYTES ABSOLUTE: 1.6 K/UL (ref 1–5.1)
LYMPHOCYTES RELATIVE PERCENT: 20 %
MCH RBC QN AUTO: 31.3 PG (ref 26–34)
MCHC RBC AUTO-ENTMCNC: 34.1 G/DL (ref 31–36)
MCV RBC AUTO: 92 FL (ref 80–100)
MONOCYTES ABSOLUTE: 0.5 K/UL (ref 0–1.3)
MONOCYTES RELATIVE PERCENT: 6.4 %
NEUTROPHILS ABSOLUTE: 5.6 K/UL (ref 1.7–7.7)
NEUTROPHILS RELATIVE PERCENT: 71.5 %
PDW BLD-RTO: 15.4 % (ref 12.4–15.4)
PLATELET # BLD: 173 K/UL (ref 135–450)
PMV BLD AUTO: 9.6 FL (ref 5–10.5)
POTASSIUM SERPL-SCNC: 3 MMOL/L (ref 3.5–5.1)
RBC # BLD: 4.21 M/UL (ref 4–5.2)
SODIUM BLD-SCNC: 143 MMOL/L (ref 136–145)
TSH REFLEX: 1.78 UIU/ML (ref 0.27–4.2)
WBC # BLD: 7.8 K/UL (ref 4–11)

## 2022-08-18 PROCEDURE — G8427 DOCREV CUR MEDS BY ELIG CLIN: HCPCS | Performed by: NURSE PRACTITIONER

## 2022-08-18 PROCEDURE — 99214 OFFICE O/P EST MOD 30 MIN: CPT | Performed by: NURSE PRACTITIONER

## 2022-08-18 PROCEDURE — 1036F TOBACCO NON-USER: CPT | Performed by: NURSE PRACTITIONER

## 2022-08-18 PROCEDURE — G8417 CALC BMI ABV UP PARAM F/U: HCPCS | Performed by: NURSE PRACTITIONER

## 2022-08-18 PROCEDURE — 3017F COLORECTAL CA SCREEN DOC REV: CPT | Performed by: NURSE PRACTITIONER

## 2022-08-18 ASSESSMENT — PATIENT HEALTH QUESTIONNAIRE - PHQ9
SUM OF ALL RESPONSES TO PHQ QUESTIONS 1-9: 2
SUM OF ALL RESPONSES TO PHQ QUESTIONS 1-9: 2
2. FEELING DOWN, DEPRESSED OR HOPELESS: 1
SUM OF ALL RESPONSES TO PHQ QUESTIONS 1-9: 2
SUM OF ALL RESPONSES TO PHQ9 QUESTIONS 1 & 2: 2
1. LITTLE INTEREST OR PLEASURE IN DOING THINGS: 1
DEPRESSION UNABLE TO ASSESS: FUNCTIONAL CAPACITY MOTIVATION LIMITS ACCURACY
SUM OF ALL RESPONSES TO PHQ QUESTIONS 1-9: 2

## 2022-08-18 ASSESSMENT — ANXIETY QUESTIONNAIRES
3. WORRYING TOO MUCH ABOUT DIFFERENT THINGS: 0
GAD7 TOTAL SCORE: 2
6. BECOMING EASILY ANNOYED OR IRRITABLE: 0
4. TROUBLE RELAXING: 0
7. FEELING AFRAID AS IF SOMETHING AWFUL MIGHT HAPPEN: 0
IF YOU CHECKED OFF ANY PROBLEMS ON THIS QUESTIONNAIRE, HOW DIFFICULT HAVE THESE PROBLEMS MADE IT FOR YOU TO DO YOUR WORK, TAKE CARE OF THINGS AT HOME, OR GET ALONG WITH OTHER PEOPLE: NOT DIFFICULT AT ALL
5. BEING SO RESTLESS THAT IT IS HARD TO SIT STILL: 0
2. NOT BEING ABLE TO STOP OR CONTROL WORRYING: 1
1. FEELING NERVOUS, ANXIOUS, OR ON EDGE: 1

## 2022-08-18 ASSESSMENT — ENCOUNTER SYMPTOMS
SHORTNESS OF BREATH: 0
DIARRHEA: 1
VOMITING: 0
COUGH: 0
NAUSEA: 0

## 2022-08-18 NOTE — PROGRESS NOTES
2022     Chief Complaint   Patient presents with    Diarrhea     For the past month     Dizziness     For about couple of weeks     Other     Knot in throat area and stated that it is growing stated that nurse triage advised her to get thyroid checked        Svetlana Baker (:  1965) is a 62 y.o. female, here for evaluation of the following medical concerns:    HPI  Diarrhea x 4 weeks. Saw Dr. Servin Done on 22 given probiotic and imodium- symptoms resolved for a few days and then returned. She had loose stool last episode was two days ago in the early morning hours, she has taken a few doses of Imodium over the last two days but no diarrhea or bm for that matter for the last two days. She is having mild cramping for which she take Bentyl. No blood in the stool or fever. Has been staying away from spicy foods and eating a pretty bland diet. Drinks plenty of water but feels that mouth is always dry. Complains of feeling fatigue and dizzy upon standing this has been on going for a few weeks. Feels imbalanced when she first stands up and takes a few minutes to get her bearings. Continues to have issues with dysphagia and globus sensation. Has fullness in the anterior neck, had negative US of neck/thyroid. Review of Systems   Constitutional:  Positive for fatigue. Negative for chills and fever. Respiratory:  Negative for cough and shortness of breath. Cardiovascular:  Negative for chest pain and leg swelling. Gastrointestinal:  Positive for diarrhea. Negative for nausea and vomiting. Neurological:  Positive for dizziness. Negative for headaches. All other systems reviewed and are negative. Prior to Visit Medications    Medication Sig Taking?  Authorizing Provider   meloxicam (MOBIC) 15 MG tablet TAKE 1 TABLET BY MOUTH EVERY DAY AS NEEDED FOR PAIN Yes Rachael Oleary PA-C   Saccharomyces boulardii (DIGESTIVE PROBIOTIC) 250 MG CAPS Take one dose BID x 2 weeks Yes Niya Antoine DO loperamide (IMODIUM) 2 MG capsule Take 1 capsule by mouth 4 times daily as needed for Diarrhea Taper down as improves Yes Samara Acosta DO   methocarbamol (ROBAXIN) 500 MG tablet TAKE 1 TABLET BY MOUTH 3 TIMES A DAY AS NEEDED FOR NECK PAIN Yes Samara Acosta DO   vitamin D (ERGOCALCIFEROL) 1.25 MG (29859 UT) CAPS capsule Take 1 capsule by mouth once a week Yes Crissy Ferreira APRN - CNP   ALPRAZolam (XANAX) 1 MG tablet TAKE 1 TABLET BY MOUTH THREE TIMES A DAY AS NEEDED FOR ANXIETY Yes Samara Acosta DO   rOPINIRole (REQUIP) 2 MG tablet TAKE 2 TABLETS AT NIGHT AND ONE IN THE MORNING FOR LEGS Yes Samara Acosta DO   metOLazone (ZAROXOLYN) 5 MG tablet TAKE 1 TABLET BY MOUTH DAILY FOR SWELLING / WATER Yes Samara Acosta DO   ondansetron (ZOFRAN ODT) 4 MG disintegrating tablet Take 1 tablet by mouth every 8 hours as needed for Nausea or Vomiting Yes Samara Acosta DO   diclofenac (VOLTAREN) 50 MG EC tablet TAKE 1 TABLET BY MOUTH TWICE A DAY WITH MEALS Yes Carlene Ortiz DO   dicyclomine (BENTYL) 20 MG tablet Take 1 tablet by mouth 4 times daily (after meals and at bedtime) For cramps and gas Yes Samara Acosta DO   omeprazole (PRILOSEC) 40 MG delayed release capsule TAKE 1 CAPSULE BY MOUTH EVERY DAY  Patient taking differently: in the morning and at bedtime Dr. Bj Mccormick increased to BID Yes Samara Acosta DO   atenolol (TENORMIN) 25 MG tablet TAKE 1 TABLET BY MOUTH EVERY DAY Yes Samara Acosta DO   ibuprofen (ADVIL;MOTRIN) 600 MG tablet TAKE 1 TABLET BY MOUTH EVERY 8 HOURS AS NEEDED FOR PAIN Yes Samara Acosta DO   DULoxetine (CYMBALTA) 60 MG extended release capsule TAKE 1 CAPSULE BY MOUTH EVERY DAY  Patient taking differently: Take 60 mg by mouth daily Yes Samara Acosta DO   Acetaminophen (TYLENOL PO) Take 650 mg by mouth daily as needed  Yes Historical Provider, MD   MELATONIN PO Take 20 mg by mouth nightly as needed  Yes Historical Provider, MD        Social History     Tobacco Use    Smoking status: Never Smokeless tobacco: Never   Substance Use Topics    Alcohol use: Yes     Comment: 1 -2 drinks per month        Vitals:    08/18/22 0927 08/18/22 0946   BP: 102/68 92/64   Site: Left Upper Arm Left Upper Arm   Position: Sitting Standing   Pulse: 56 58   Resp: 16    Temp: 97.3 °F (36.3 °C)    TempSrc: Temporal    SpO2: 98%    Weight: 270 lb (122.5 kg)    Height: 5' 6\" (1.676 m)      Estimated body mass index is 43.58 kg/m² as calculated from the following:    Height as of this encounter: 5' 6\" (1.676 m). Weight as of this encounter: 270 lb (122.5 kg). Physical Exam  Vitals and nursing note reviewed. Constitutional:       General: She is not in acute distress. Appearance: Normal appearance. She is well-developed. She is obese. She is not ill-appearing, toxic-appearing or diaphoretic. HENT:      Head: Normocephalic and atraumatic. Eyes:      Extraocular Movements: Extraocular movements intact. Conjunctiva/sclera: Conjunctivae normal.      Pupils: Pupils are equal, round, and reactive to light. Cardiovascular:      Rate and Rhythm: Normal rate and regular rhythm. Heart sounds: Normal heart sounds, S1 normal and S2 normal. No murmur heard. No friction rub. No gallop. Pulmonary:      Effort: Pulmonary effort is normal. No respiratory distress. Breath sounds: Normal breath sounds. No stridor. No wheezing, rhonchi or rales. Abdominal:      General: Bowel sounds are normal. There is no distension. Palpations: Abdomen is soft. There is no mass. Tenderness: There is no abdominal tenderness. There is no guarding or rebound. Hernia: No hernia is present. Skin:     General: Skin is warm. Neurological:      General: No focal deficit present. Mental Status: She is alert and oriented to person, place, and time. Mental status is at baseline. Cranial Nerves: No cranial nerve deficit. Psychiatric:         Speech: Speech normal.       ASSESSMENT/PLAN:  1.  Diarrhea, unspecified type  - Basic Metabolic Panel; Future  - CBC with Auto Differential; Future  - TSH with Reflex; Future    2. Dizziness  - Basic Metabolic Panel; Future  - CBC with Auto Differential; Future  - TSH with Reflex; Future    3. Fatigue, unspecified type  - Basic Metabolic Panel; Future  - CBC with Auto Differential; Future  - TSH with Reflex; Future    4. Orthostatic hypotension    Stop the Imodium for now- no BM in two days, last one was watery  Will try adding on Metamucil in the morning to help bulk up stool and keep regular movements  Continue bland diet  BP low upon standing, she will stop the atenolol for now and check BP at home daily  Labs today as above  Report on symptoms 1 week or sooner as needed      An electronic signature was used to authenticate this note.     --AVERY Wilks - CNP on 8/18/2022 at 10:15 AM

## 2022-08-18 NOTE — PATIENT INSTRUCTIONS
Stop the imodium for now since now diarrhea  Take a capful of Metamucil once/day to try to build up stools and help getting them moving regularly  Good water intake  Continue bland diet  Stop the atenolol for now because your blood pressure is low today and got lower when you stand up in the office. Keep and eye on BP and HR at home.

## 2022-08-19 DIAGNOSIS — E87.6 HYPOKALEMIA: Primary | ICD-10-CM

## 2022-08-19 RX ORDER — POTASSIUM CHLORIDE 20 MEQ/1
20 TABLET, EXTENDED RELEASE ORAL DAILY
Qty: 3 TABLET | Refills: 0 | Status: SHIPPED | OUTPATIENT
Start: 2022-08-19 | End: 2022-08-23 | Stop reason: ALTCHOICE

## 2022-08-22 DIAGNOSIS — E87.6 HYPOKALEMIA: ICD-10-CM

## 2022-08-22 LAB
ANION GAP SERPL CALCULATED.3IONS-SCNC: 13 MMOL/L (ref 3–16)
BUN BLDV-MCNC: 9 MG/DL (ref 7–20)
CALCIUM SERPL-MCNC: 9 MG/DL (ref 8.3–10.6)
CHLORIDE BLD-SCNC: 102 MMOL/L (ref 99–110)
CO2: 28 MMOL/L (ref 21–32)
CREAT SERPL-MCNC: 0.8 MG/DL (ref 0.6–1.1)
GFR AFRICAN AMERICAN: >60
GFR NON-AFRICAN AMERICAN: >60
GLUCOSE BLD-MCNC: 115 MG/DL (ref 70–99)
POTASSIUM SERPL-SCNC: 3.4 MMOL/L (ref 3.5–5.1)
SODIUM BLD-SCNC: 143 MMOL/L (ref 136–145)

## 2022-08-23 ENCOUNTER — OFFICE VISIT (OUTPATIENT)
Dept: FAMILY MEDICINE CLINIC | Age: 57
End: 2022-08-23
Payer: MEDICARE

## 2022-08-23 VITALS
RESPIRATION RATE: 16 BRPM | HEART RATE: 78 BPM | WEIGHT: 268 LBS | OXYGEN SATURATION: 95 % | TEMPERATURE: 96.9 F | DIASTOLIC BLOOD PRESSURE: 70 MMHG | BODY MASS INDEX: 43.26 KG/M2 | SYSTOLIC BLOOD PRESSURE: 110 MMHG

## 2022-08-23 DIAGNOSIS — R42 DIZZINESS: Primary | ICD-10-CM

## 2022-08-23 DIAGNOSIS — I10 ESSENTIAL HYPERTENSION: ICD-10-CM

## 2022-08-23 DIAGNOSIS — E87.6 HYPOKALEMIA: ICD-10-CM

## 2022-08-23 DIAGNOSIS — R19.7 DIARRHEA, UNSPECIFIED TYPE: ICD-10-CM

## 2022-08-23 LAB
BILIRUBIN, POC: NORMAL
BLOOD URINE, POC: NORMAL
CLARITY, POC: NORMAL
COLOR, POC: NORMAL
GLUCOSE URINE, POC: NORMAL
KETONES, POC: NORMAL
LEUKOCYTE EST, POC: NORMAL
NITRITE, POC: NORMAL
PH, POC: 6.5
PROTEIN, POC: NORMAL
SPECIFIC GRAVITY, POC: 1.01
UROBILINOGEN, POC: NORMAL

## 2022-08-23 PROCEDURE — 3017F COLORECTAL CA SCREEN DOC REV: CPT | Performed by: FAMILY MEDICINE

## 2022-08-23 PROCEDURE — 81003 URINALYSIS AUTO W/O SCOPE: CPT | Performed by: FAMILY MEDICINE

## 2022-08-23 PROCEDURE — G8417 CALC BMI ABV UP PARAM F/U: HCPCS | Performed by: FAMILY MEDICINE

## 2022-08-23 PROCEDURE — 1036F TOBACCO NON-USER: CPT | Performed by: FAMILY MEDICINE

## 2022-08-23 PROCEDURE — G8427 DOCREV CUR MEDS BY ELIG CLIN: HCPCS | Performed by: FAMILY MEDICINE

## 2022-08-23 PROCEDURE — 99213 OFFICE O/P EST LOW 20 MIN: CPT | Performed by: FAMILY MEDICINE

## 2022-08-23 RX ORDER — POTASSIUM CHLORIDE 20 MEQ/1
20 TABLET, EXTENDED RELEASE ORAL DAILY
Qty: 30 TABLET | Refills: 0 | Status: SHIPPED | OUTPATIENT
Start: 2022-08-23 | End: 2022-09-19

## 2022-08-23 ASSESSMENT — PATIENT HEALTH QUESTIONNAIRE - PHQ9
SUM OF ALL RESPONSES TO PHQ QUESTIONS 1-9: 1
SUM OF ALL RESPONSES TO PHQ QUESTIONS 1-9: 4
7. TROUBLE CONCENTRATING ON THINGS, SUCH AS READING THE NEWSPAPER OR WATCHING TELEVISION: 0
1. LITTLE INTEREST OR PLEASURE IN DOING THINGS: 0
SUM OF ALL RESPONSES TO PHQ QUESTIONS 1-9: 1
8. MOVING OR SPEAKING SO SLOWLY THAT OTHER PEOPLE COULD HAVE NOTICED. OR THE OPPOSITE, BEING SO FIGETY OR RESTLESS THAT YOU HAVE BEEN MOVING AROUND A LOT MORE THAN USUAL: 0
5. POOR APPETITE OR OVEREATING: 1
2. FEELING DOWN, DEPRESSED OR HOPELESS: 1
SUM OF ALL RESPONSES TO PHQ QUESTIONS 1-9: 1
6. FEELING BAD ABOUT YOURSELF - OR THAT YOU ARE A FAILURE OR HAVE LET YOURSELF OR YOUR FAMILY DOWN: 0
SUM OF ALL RESPONSES TO PHQ QUESTIONS 1-9: 4
2. FEELING DOWN, DEPRESSED OR HOPELESS: 1
SUM OF ALL RESPONSES TO PHQ9 QUESTIONS 1 & 2: 1
SUM OF ALL RESPONSES TO PHQ QUESTIONS 1-9: 1
4. FEELING TIRED OR HAVING LITTLE ENERGY: 0
1. LITTLE INTEREST OR PLEASURE IN DOING THINGS: 0
3. TROUBLE FALLING OR STAYING ASLEEP: 2
SUM OF ALL RESPONSES TO PHQ9 QUESTIONS 1 & 2: 1
10. IF YOU CHECKED OFF ANY PROBLEMS, HOW DIFFICULT HAVE THESE PROBLEMS MADE IT FOR YOU TO DO YOUR WORK, TAKE CARE OF THINGS AT HOME, OR GET ALONG WITH OTHER PEOPLE: 0
SUM OF ALL RESPONSES TO PHQ QUESTIONS 1-9: 4
SUM OF ALL RESPONSES TO PHQ QUESTIONS 1-9: 4
9. THOUGHTS THAT YOU WOULD BE BETTER OFF DEAD, OR OF HURTING YOURSELF: 0

## 2022-08-23 ASSESSMENT — ENCOUNTER SYMPTOMS
DIARRHEA: 1
BLOOD IN STOOL: 0

## 2022-08-23 NOTE — PROGRESS NOTES
Subjective:      Patient ID: Nicholas Reyes is a 62 y.o. y.o. female. Here to follow swelling and weakness. Labs and saw NP last week. K was low, some improved , but still low. side    Feels tense  Not frequent BM's but loose    BP was low so she stopped the atenolol d/t BPbeing low,  has not felt a difference yet    Meds and chart reviewed.   HPI      Chief Complaint   Patient presents with    Leg Swelling     Not as bad as they were    Diarrhea     For weeks now    Other     Faint and unsteady for over a month, no energy, almost feels angry - not normal for you    Dizziness     When stands up       Allergies   Allergen Reactions    Toradol [Ketorolac Tromethamine] Other (See Comments)     \"out of it\"  \"felt like sleep paralysis\"    Haldol [Haloperidol Lactate] Other (See Comments)     \"felt out of it, similar to the toradol\"       Past Medical History:   Diagnosis Date    Anxiety     Depression     Endometriosis     GERD (gastroesophageal reflux disease)     Hypertension     Osteoarthritis     PONV (postoperative nausea and vomiting)     Restless leg syndrome        Past Surgical History:   Procedure Laterality Date    ANUS SURGERY  2018    fissure     SECTION      x3    COLONOSCOPY      DILATION AND CURETTAGE OF UTERUS N/A 6/3/2021    VIDEO HYSTEROSCOPY DILATATION AND CURETTAGE, POSSIBLE MYOSURE, POSSIBLE POLYPECTOMY performed by Narendra Molina DO at [address] [place] [address]      right wrist    HERNIA REPAIR  2018    LAPAROSCOPIC PARAESOPHAGEAL HERNIA REPAIR WITH MESH    HYSTERECTOMY (CERVIX STATUS UNKNOWN) N/A 10/21/2021    ROBOTIC ASSISTED LAPAROSCOPIC HYSTERECTOMY WITH RIGHT SALPINGECTOMY, RIGHT OOPHORECTOMY, CYSTOSCOPY, LYSIS OF ADHESIONS  CPT CODE - 13605 performed by Jordan Tineo DO at [address] [place] [address] ARTHROSCOPY Left 11/15/12    ARTHROSCOPY LEFT KNEE WITH SYOVECTOMY AND CHONDROPLASTY    OVARY REMOVAL Right     ROTATOR CUFF REPAIR Right 2020    EXAM UNDER ANESTHESIA VIDEO ARTHROSCOPY RIGHT SHOULDER, MINI- OPEN ROTATOR CUFF REPAIR, NEER ACROMIOPLASTY, LENO PROCEDURE WITH EXPAREL performed by Marco A Burns MD at [facility] Right 11/25/2020    VIDEO ARTHROSCOPY RIGHT SHOULDER, OPEN ROTATOR CUFF REPAIR, BICEPS TENOTOMY -BLOCK- performed by Berna Scanlon MD at [address] ARTHROSCOPY Right 2/24/2021    RIGHT SHOULDER ARTHROSCOPIC INCISION AND DRAINAGE performed by Berna Scanlon MD at [address] ARTHROSCOPY Right 4/28/2021    VIDEO ARTHROSCOPY RIGHT SHOULDER, ROTATOR CUFF REPAIR, DEBRIDEMENT performed by Berna Scanlon MD at [address]    UPPER GASTROINTESTINAL ENDOSCOPY  2015    esophageasl stretch    UPPER GASTROINTESTINAL ENDOSCOPY      ATTEMPTED BUT PATIENT ASPIRATED    UPPER GASTROINTESTINAL ENDOSCOPY N/A 5/24/2022    EGD W/ANES. (11:00) performed by Brennan Oreilly DO at [address] History     Socioeconomic History    Marital status:       Spouse name: Not on file    Number of children: 4    Years of education: Not on file    Highest education level: Not on file   Occupational History    Occupation:    Tobacco Use    Smoking status: Never    Smokeless tobacco: Never   Vaping Use    Vaping Use: Never used   Substance and Sexual Activity    Alcohol use: Yes     Comment: 1 -2 drinks per month    Drug use: Not Currently     Types: Marijuana Candiss Littletri)     Comment: last used 2 weeks ago    Sexual activity: Never   Other Topics Concern    Not on file   Social History Narrative    Not on file     Social Determinants of Health     Financial Resource Strain: Low Risk     Difficulty of Paying Living Expenses: Not hard at all   Food Insecurity: No Food Insecurity    Worried About Running Out of Food in the Last Year: Never true    Ran Out of Food in the Last Year: Never true   Transportation Needs: Not on file   Physical Activity: Insufficiently Active    Days of Exercise per Week: 2 days    Minutes of Exercise per Session: 20 min   Stress: Not on file   Social Connections: Not on file   Intimate Partner Violence: Not At Risk    Fear of Current or Ex-Partner: No    Emotionally Abused: No    Physically Abused: No    Sexually Abused: No   Housing Stability: Not on file       Family History   Problem Relation Age of Onset    Arthritis Mother     Obesity Mother     Anemia Mother     Other Mother         pulmonary embolism    Arthritis Father     Arrhythmia Father     Diabetes Other     Diabetes Paternal Grandfather     Cancer Neg Hx     Breast Cancer Neg Hx     Colon Cancer Neg Hx        Vitals:    08/23/22 1355   BP: 110/70   Pulse: 78   Resp: 16   Temp: 96.9 °F (36.1 °C)   SpO2: 95%       Wt Readings from Last 3 Encounters:   08/23/22 268 lb (121.6 kg)   08/18/22 270 lb (122.5 kg)   08/08/22 273 lb (123.8 kg)       Review of Systems   Constitutional:  Positive for activity change and fatigue. Cardiovascular:         Swelling in the legs decreased. Gastrointestinal:  Positive for diarrhea. Negative for blood in stool. Is taking dicyclomine-  for cramps    Just recently started the probiotic. Musculoskeletal:         Taking diclofenac,  more effective than meloxicam   Neurological:  Negative for seizures, facial asymmetry and speech difficulty. Objective:   Physical Exam  Constitutional:       Appearance: She is obese. Comments: Looks worn out. Cardiovascular:      Rate and Rhythm: Normal rate and regular rhythm. Pulmonary:      Effort: Pulmonary effort is normal.      Breath sounds: Normal breath sounds. Comments: Grossly clear chest  Abdominal:      Palpations: Abdomen is soft. There is no mass. Tenderness: There is no abdominal tenderness. Musculoskeletal:      Right lower leg: No edema. Left lower leg: No edema. Comments: Edema pretty much resolved.      Lymphadenopathy: Cervical: No cervical adenopathy. Skin:     General: Skin is warm and dry. Neurological:      Mental Status: She is alert and oriented to person, place, and time. Psychiatric:      Comments: Seems a bit stressed/ emotional         Assessment:      Fatigue  Hypokalemia  Peripheral edema        Plan:     UA-  does not look infected    Will cut back the dose of Metalazone  Will continue K replacement and then match the K to the Metalozone      Cut back on the Metalozone ( water pill),  take it every other or every third day as needed if the swelling starts back    Take a potassium pill once a day for 3 more days, then take one whenever you take the metalozone. Take the immodium for 3-4 days and get the diarrhea slowed    Advise me on Friday.             Current Outpatient Medications   Medication Sig Dispense Refill    diclofenac (VOLTAREN) 50 MG EC tablet TAKE 1 TABLET BY MOUTH TWICE A DAY WITH MEALS 60 tablet 3    meloxicam (MOBIC) 15 MG tablet TAKE 1 TABLET BY MOUTH EVERY DAY AS NEEDED FOR PAIN 90 tablet 0    Saccharomyces boulardii (DIGESTIVE PROBIOTIC) 250 MG CAPS Take one dose BID x 2 weeks 30 capsule 1    methocarbamol (ROBAXIN) 500 MG tablet TAKE 1 TABLET BY MOUTH 3 TIMES A DAY AS NEEDED FOR NECK PAIN 90 tablet 3    vitamin D (ERGOCALCIFEROL) 1.25 MG (98287 UT) CAPS capsule Take 1 capsule by mouth once a week 4 capsule 2    ALPRAZolam (XANAX) 1 MG tablet TAKE 1 TABLET BY MOUTH THREE TIMES A DAY AS NEEDED FOR ANXIETY 90 tablet 2    rOPINIRole (REQUIP) 2 MG tablet TAKE 2 TABLETS AT NIGHT AND ONE IN THE MORNING FOR LEGS 90 tablet 3    metOLazone (ZAROXOLYN) 5 MG tablet TAKE 1 TABLET BY MOUTH DAILY FOR SWELLING / WATER 30 tablet 3    ondansetron (ZOFRAN ODT) 4 MG disintegrating tablet Take 1 tablet by mouth every 8 hours as needed for Nausea or Vomiting 15 tablet 1    dicyclomine (BENTYL) 20 MG tablet Take 1 tablet by mouth 4 times daily (after meals and at bedtime) For cramps and gas 60 tablet 2 omeprazole (PRILOSEC) 40 MG delayed release capsule TAKE 1 CAPSULE BY MOUTH EVERY DAY (Patient taking differently: in the morning and at bedtime Dr. Montgomery Poag increased to BID) 30 capsule 6    ibuprofen (ADVIL;MOTRIN) 600 MG tablet TAKE 1 TABLET BY MOUTH EVERY 8 HOURS AS NEEDED FOR PAIN 40 tablet 1    DULoxetine (CYMBALTA) 60 MG extended release capsule TAKE 1 CAPSULE BY MOUTH EVERY DAY (Patient taking differently: Take 60 mg by mouth daily) 30 capsule 5    Acetaminophen (TYLENOL PO) Take 650 mg by mouth daily as needed       MELATONIN PO Take 20 mg by mouth nightly as needed       atenolol (TENORMIN) 25 MG tablet TAKE 1 TABLET BY MOUTH EVERY DAY (Patient not taking: Reported on 8/23/2022) 30 tablet 5     No current facility-administered medications for this visit.

## 2022-08-23 NOTE — PATIENT INSTRUCTIONS
Cut back on the Metalozone ( water pill),  take it every other or every third day as needed if the swelling starts back    Take a potassium pill once a day for 3 more days, then take one whenever you take the metalozone. Take the immodium for 3-4 days and get the diarrhea slowed    Advise me on Friday.

## 2022-08-24 DIAGNOSIS — F41.1 GENERALIZED ANXIETY DISORDER: ICD-10-CM

## 2022-08-24 RX ORDER — ALPRAZOLAM 1 MG/1
TABLET ORAL
Qty: 90 TABLET | Refills: 2 | Status: ON HOLD | OUTPATIENT
Start: 2022-08-24 | End: 2022-09-24 | Stop reason: HOSPADM

## 2022-08-24 NOTE — TELEPHONE ENCOUNTER
LOV 8/23/2022  Future Appointments   Date Time Provider Diann Sotelo   9/14/2022 11:15 AM Mustapha Harrell PA-C [facility]

## 2022-08-25 ENCOUNTER — TELEPHONE (OUTPATIENT)
Dept: FAMILY MEDICINE CLINIC | Age: 57
End: 2022-08-25

## 2022-08-25 DIAGNOSIS — E87.6 HYPOKALEMIA: Primary | ICD-10-CM

## 2022-08-25 NOTE — TELEPHONE ENCOUNTER
Patient calling to give update since visit 08/23/22 , patient reports she has had elizabeth horses over last few nights that have been very painful , also has had few spells of vomiting . Patient states she has been really tired and woozy and no energy .

## 2022-08-25 NOTE — TELEPHONE ENCOUNTER
SPoke to the patient. Her mother's home nurse is there and blood pressure is 140/90. Was having a lot of cramps last night. Will get recheck potassium in 24 hours or so and assess that level now that she has been on replacement for a bit.

## 2022-08-26 ENCOUNTER — NURSE ONLY (OUTPATIENT)
Dept: FAMILY MEDICINE CLINIC | Age: 57
End: 2022-08-26

## 2022-08-26 VITALS
SYSTOLIC BLOOD PRESSURE: 119 MMHG | HEART RATE: 85 BPM | DIASTOLIC BLOOD PRESSURE: 87 MMHG | RESPIRATION RATE: 14 BRPM | OXYGEN SATURATION: 96 %

## 2022-08-26 DIAGNOSIS — E87.6 HYPOKALEMIA: ICD-10-CM

## 2022-08-26 NOTE — PROGRESS NOTES
Spoke w/ Eben Hunter CNP about pt's symptoms. [facility] examined pt before pt left.

## 2022-08-26 NOTE — PROGRESS NOTES
Evaluated pt in office, asked by medical assistant to do so. She is feeling about the same as when she saw Dr. Riri Jauregui. BP better. Had labs drawn already. Continue same plan and follow up per Dr. Frank Scott instructions.

## 2022-08-27 LAB
MAGNESIUM: 2 MG/DL (ref 1.8–2.4)
POTASSIUM SERPL-SCNC: 3.3 MMOL/L (ref 3.5–5.1)

## 2022-08-28 ENCOUNTER — TELEPHONE (OUTPATIENT)
Dept: FAMILY MEDICINE CLINIC | Age: 57
End: 2022-08-28

## 2022-08-29 ENCOUNTER — TELEPHONE (OUTPATIENT)
Dept: FAMILY MEDICINE CLINIC | Age: 57
End: 2022-08-29

## 2022-08-29 NOTE — TELEPHONE ENCOUNTER
Patient called to follow up with provider since increase in potassium states she feels better still a little weak and still has diarrhea .   Patient would like to know if she needs another blood draw

## 2022-08-29 NOTE — TELEPHONE ENCOUNTER
Spoke to the patient yesterday. Her K is still marginal.  Asked her to cut her metolazone dose in half and to increase her potassium to 2 doses a day. Yesterday contact me in 3 days and we will follow-up daily and her clinical condition.

## 2022-08-30 DIAGNOSIS — E87.6 HYPOKALEMIA: Primary | ICD-10-CM

## 2022-08-30 DIAGNOSIS — I10 ESSENTIAL HYPERTENSION: ICD-10-CM

## 2022-09-02 ENCOUNTER — TELEPHONE (OUTPATIENT)
Dept: FAMILY MEDICINE CLINIC | Age: 57
End: 2022-09-02

## 2022-09-02 DIAGNOSIS — E87.6 HYPOKALEMIA: ICD-10-CM

## 2022-09-02 DIAGNOSIS — I10 ESSENTIAL HYPERTENSION: ICD-10-CM

## 2022-09-02 LAB
ANION GAP SERPL CALCULATED.3IONS-SCNC: 14 MMOL/L (ref 3–16)
BUN BLDV-MCNC: 11 MG/DL (ref 7–20)
CALCIUM SERPL-MCNC: 9.3 MG/DL (ref 8.3–10.6)
CHLORIDE BLD-SCNC: 96 MMOL/L (ref 99–110)
CO2: 29 MMOL/L (ref 21–32)
CREAT SERPL-MCNC: 1 MG/DL (ref 0.6–1.1)
GFR AFRICAN AMERICAN: >60
GFR NON-AFRICAN AMERICAN: 57
GLUCOSE BLD-MCNC: 116 MG/DL (ref 70–99)
POTASSIUM SERPL-SCNC: 3.4 MMOL/L (ref 3.5–5.1)
SODIUM BLD-SCNC: 139 MMOL/L (ref 136–145)

## 2022-09-02 NOTE — TELEPHONE ENCOUNTER
Noni Ames stopped by office. Patient wanted Dr. Wilson Avila know she had labs done at [facility].

## 2022-09-06 RX ORDER — IBUPROFEN 600 MG/1
TABLET ORAL
Qty: 40 TABLET | Refills: 1 | Status: ON HOLD | OUTPATIENT
Start: 2022-09-06 | End: 2022-09-26 | Stop reason: HOSPADM

## 2022-09-06 NOTE — TELEPHONE ENCOUNTER
Future Appointments   Date Time Provider Diann Sotelo   9/14/2022 11:15 AM Yair Sumner PA-C [facility] 8/23/2022

## 2022-09-08 DIAGNOSIS — I10 ESSENTIAL HYPERTENSION: Primary | ICD-10-CM

## 2022-09-12 ENCOUNTER — TELEPHONE (OUTPATIENT)
Dept: FAMILY MEDICINE CLINIC | Age: 57
End: 2022-09-12

## 2022-09-12 DIAGNOSIS — I10 ESSENTIAL HYPERTENSION: ICD-10-CM

## 2022-09-12 LAB
ANION GAP SERPL CALCULATED.3IONS-SCNC: 13 MMOL/L (ref 3–16)
BUN BLDV-MCNC: 13 MG/DL (ref 7–20)
CALCIUM SERPL-MCNC: 9.5 MG/DL (ref 8.3–10.6)
CHLORIDE BLD-SCNC: 96 MMOL/L (ref 99–110)
CO2: 28 MMOL/L (ref 21–32)
CREAT SERPL-MCNC: 0.9 MG/DL (ref 0.6–1.1)
GFR AFRICAN AMERICAN: >60
GFR NON-AFRICAN AMERICAN: >60
GLUCOSE BLD-MCNC: 105 MG/DL (ref 70–99)
POTASSIUM SERPL-SCNC: 3.1 MMOL/L (ref 3.5–5.1)
SODIUM BLD-SCNC: 137 MMOL/L (ref 136–145)

## 2022-09-12 NOTE — TELEPHONE ENCOUNTER
Patient stopped in to let Dr Jeremy Walter know that she got her blood drawn today as requested. Additionally, her diarrhea started again this weekend, she has been really tired, and lightheaded as well.  She scheduled an appt for this week, but wanted to notify Dr Jeremy Walter of what's going on.    8/23/2022 last ov    Future Appointments   Date Time Provider Diann Sotelo   9/14/2022 11:15 AM Rosendo Hanley PA-C [facility]   9/15/2022  4:00 PM Renata Colin DO [place] [address]

## 2022-09-14 RX ORDER — FUROSEMIDE 20 MG/1
20 TABLET ORAL DAILY PRN
Qty: 30 TABLET | Refills: 3 | Status: SHIPPED | OUTPATIENT
Start: 2022-09-14 | End: 2022-09-15 | Stop reason: ALTCHOICE

## 2022-09-15 ENCOUNTER — OFFICE VISIT (OUTPATIENT)
Dept: FAMILY MEDICINE CLINIC | Age: 57
End: 2022-09-15
Payer: MEDICARE

## 2022-09-15 VITALS
TEMPERATURE: 97.3 F | RESPIRATION RATE: 16 BRPM | HEART RATE: 75 BPM | WEIGHT: 259 LBS | BODY MASS INDEX: 41.8 KG/M2 | DIASTOLIC BLOOD PRESSURE: 64 MMHG | OXYGEN SATURATION: 96 % | SYSTOLIC BLOOD PRESSURE: 100 MMHG

## 2022-09-15 DIAGNOSIS — I10 ESSENTIAL HYPERTENSION: ICD-10-CM

## 2022-09-15 DIAGNOSIS — E87.6 HYPOKALEMIA: ICD-10-CM

## 2022-09-15 DIAGNOSIS — R53.83 FATIGUE, UNSPECIFIED TYPE: ICD-10-CM

## 2022-09-15 DIAGNOSIS — R19.7 DIARRHEA, UNSPECIFIED TYPE: Primary | ICD-10-CM

## 2022-09-15 PROCEDURE — 1036F TOBACCO NON-USER: CPT | Performed by: FAMILY MEDICINE

## 2022-09-15 PROCEDURE — G8427 DOCREV CUR MEDS BY ELIG CLIN: HCPCS | Performed by: FAMILY MEDICINE

## 2022-09-15 PROCEDURE — 3017F COLORECTAL CA SCREEN DOC REV: CPT | Performed by: FAMILY MEDICINE

## 2022-09-15 PROCEDURE — 99213 OFFICE O/P EST LOW 20 MIN: CPT | Performed by: FAMILY MEDICINE

## 2022-09-15 PROCEDURE — G8417 CALC BMI ABV UP PARAM F/U: HCPCS | Performed by: FAMILY MEDICINE

## 2022-09-15 SDOH — ECONOMIC STABILITY: INCOME INSECURITY: IN THE LAST 12 MONTHS, WAS THERE A TIME WHEN YOU WERE NOT ABLE TO PAY THE MORTGAGE OR RENT ON TIME?: NO

## 2022-09-15 SDOH — ECONOMIC STABILITY: TRANSPORTATION INSECURITY
IN THE PAST 12 MONTHS, HAS THE LACK OF TRANSPORTATION KEPT YOU FROM MEDICAL APPOINTMENTS OR FROM GETTING MEDICATIONS?: NO

## 2022-09-15 SDOH — ECONOMIC STABILITY: HOUSING INSECURITY
IN THE LAST 12 MONTHS, WAS THERE A TIME WHEN YOU DID NOT HAVE A STEADY PLACE TO SLEEP OR SLEPT IN A SHELTER (INCLUDING NOW)?: NO

## 2022-09-15 SDOH — ECONOMIC STABILITY: FOOD INSECURITY: WITHIN THE PAST 12 MONTHS, THE FOOD YOU BOUGHT JUST DIDN'T LAST AND YOU DIDN'T HAVE MONEY TO GET MORE.: NEVER TRUE

## 2022-09-15 SDOH — ECONOMIC STABILITY: FOOD INSECURITY: WITHIN THE PAST 12 MONTHS, YOU WORRIED THAT YOUR FOOD WOULD RUN OUT BEFORE YOU GOT MONEY TO BUY MORE.: NEVER TRUE

## 2022-09-15 SDOH — ECONOMIC STABILITY: TRANSPORTATION INSECURITY
IN THE PAST 12 MONTHS, HAS LACK OF TRANSPORTATION KEPT YOU FROM MEETINGS, WORK, OR FROM GETTING THINGS NEEDED FOR DAILY LIVING?: NO

## 2022-09-15 ASSESSMENT — PATIENT HEALTH QUESTIONNAIRE - PHQ9
SUM OF ALL RESPONSES TO PHQ9 QUESTIONS 1 & 2: 1
SUM OF ALL RESPONSES TO PHQ QUESTIONS 1-9: 5
6. FEELING BAD ABOUT YOURSELF - OR THAT YOU ARE A FAILURE OR HAVE LET YOURSELF OR YOUR FAMILY DOWN: 0
4. FEELING TIRED OR HAVING LITTLE ENERGY: 1
SUM OF ALL RESPONSES TO PHQ9 QUESTIONS 1 & 2: 1
SUM OF ALL RESPONSES TO PHQ QUESTIONS 1-9: 5
SUM OF ALL RESPONSES TO PHQ QUESTIONS 1-9: 1
9. THOUGHTS THAT YOU WOULD BE BETTER OFF DEAD, OR OF HURTING YOURSELF: 0
SUM OF ALL RESPONSES TO PHQ QUESTIONS 1-9: 1
8. MOVING OR SPEAKING SO SLOWLY THAT OTHER PEOPLE COULD HAVE NOTICED. OR THE OPPOSITE, BEING SO FIGETY OR RESTLESS THAT YOU HAVE BEEN MOVING AROUND A LOT MORE THAN USUAL: 0
SUM OF ALL RESPONSES TO PHQ QUESTIONS 1-9: 1
7. TROUBLE CONCENTRATING ON THINGS, SUCH AS READING THE NEWSPAPER OR WATCHING TELEVISION: 0
SUM OF ALL RESPONSES TO PHQ QUESTIONS 1-9: 5
1. LITTLE INTEREST OR PLEASURE IN DOING THINGS: 0
2. FEELING DOWN, DEPRESSED OR HOPELESS: 1
10. IF YOU CHECKED OFF ANY PROBLEMS, HOW DIFFICULT HAVE THESE PROBLEMS MADE IT FOR YOU TO DO YOUR WORK, TAKE CARE OF THINGS AT HOME, OR GET ALONG WITH OTHER PEOPLE: 1
SUM OF ALL RESPONSES TO PHQ QUESTIONS 1-9: 1
5. POOR APPETITE OR OVEREATING: 3
1. LITTLE INTEREST OR PLEASURE IN DOING THINGS: 0
SUM OF ALL RESPONSES TO PHQ QUESTIONS 1-9: 5
3. TROUBLE FALLING OR STAYING ASLEEP: 0
2. FEELING DOWN, DEPRESSED OR HOPELESS: 1

## 2022-09-15 ASSESSMENT — ENCOUNTER SYMPTOMS: DIARRHEA: 1

## 2022-09-15 ASSESSMENT — SOCIAL DETERMINANTS OF HEALTH (SDOH): HOW HARD IS IT FOR YOU TO PAY FOR THE VERY BASICS LIKE FOOD, HOUSING, MEDICAL CARE, AND HEATING?: NOT HARD AT ALL

## 2022-09-15 NOTE — PROGRESS NOTES
Subjective:      Patient ID: Layla Bonner is a 62 y.o. y.o. female. Here to follow up her diarrhea. Has recurred    Have stopped the metalazone. Diarrhea     Fatigue  Associated symptoms include fatigue. Dizziness  Associated symptoms include fatigue.        Chief Complaint   Patient presents with    Diarrhea     Started again 9/10/22 - off & on since 22    Fatigue    Dizziness     lightheaded       Allergies   Allergen Reactions    Toradol [Ketorolac Tromethamine] Other (See Comments)     \"out of it\"  \"felt like sleep paralysis\"    Haldol [Haloperidol Lactate] Other (See Comments)     \"felt out of it, similar to the toradol\"       Past Medical History:   Diagnosis Date    Anxiety     Depression     Endometriosis     GERD (gastroesophageal reflux disease)     Hypertension     Osteoarthritis     PONV (postoperative nausea and vomiting)     Restless leg syndrome        Past Surgical History:   Procedure Laterality Date    ANUS SURGERY  2018    fissure     SECTION      x3    COLONOSCOPY      DILATION AND CURETTAGE OF UTERUS N/A 6/3/2021    VIDEO HYSTEROSCOPY DILATATION AND CURETTAGE, POSSIBLE MYOSURE, POSSIBLE POLYPECTOMY performed by Kalie Chase DO at [address] [place] [address]      right wrist    HERNIA REPAIR  2018    LAPAROSCOPIC PARAESOPHAGEAL HERNIA REPAIR WITH MESH    HYSTERECTOMY (CERVIX STATUS UNKNOWN) N/A 10/21/2021    ROBOTIC ASSISTED LAPAROSCOPIC HYSTERECTOMY WITH RIGHT SALPINGECTOMY, RIGHT OOPHORECTOMY, CYSTOSCOPY, LYSIS OF ADHESIONS  CPT CODE - 79929 performed by [address] [place] [address]  at [address] [place] [address] ARTHROSCOPY Left 11/15/12    ARTHROSCOPY LEFT KNEE WITH SYOVECTOMY AND CHONDROPLASTY    OVARY REMOVAL Right     ROTATOR CUFF REPAIR Right 2020    EXAM UNDER ANESTHESIA VIDEO ARTHROSCOPY RIGHT SHOULDER, MINI- OPEN ROTATOR CUFF REPAIR, NEER ACROMIOPLASTY, LENO PROCEDURE WITH EXPAREL performed by Faustina Roman MD at [address] [place] [address] Bernardo Foley ARTHROSCOPY Right 11/25/2020    VIDEO ARTHROSCOPY RIGHT SHOULDER, OPEN ROTATOR CUFF REPAIR, BICEPS TENOTOMY -BLOCK- performed by Davida Varela MD at [address] [facility] Right 2/24/2021    RIGHT SHOULDER ARTHROSCOPIC INCISION AND DRAINAGE performed by Davida Varela MD at [address] ARTHROSCOPY Right 4/28/2021    VIDEO ARTHROSCOPY RIGHT SHOULDER, ROTATOR CUFF REPAIR, DEBRIDEMENT performed by Davida Varela MD at [address]    UPPER GASTROINTESTINAL ENDOSCOPY  2015    esophageasl stretch    UPPER GASTROINTESTINAL ENDOSCOPY      ATTEMPTED BUT PATIENT ASPIRATED    UPPER GASTROINTESTINAL ENDOSCOPY N/A 5/24/2022    EGD W/ANES. (11:00) performed [facility] Cel[facility] History     Socioeconomic History    Marital status:      Spouse name: Not on file    Number of children: 4    Years of education: Not on file    Highest education level: Not on file   Occupational History    Occupation:    Tobacco Use    Smoking status: Never    Smokeless tobacco: Never   Vaping Use    Vaping Use: Never used   Substance and Sexual Activity    Alcohol use: Yes     Comment: 1 -2 drinks per month    Drug use: Not Currently     Types: Marijuana Constanza Kos)     Comment: last used 2 weeks ago    Sexual activity: Never   Other Topics Concern    Not on file   Social History Narrative    Not on file     Social Determinants of Health     Financial Resource Strain: Low Risk     Difficulty of Paying Living Expenses: Not hard at all   Food Insecurity: No Food Insecurity    Worried About Running Out of Food in the Last Year: Never true    [address] in the Last Year: Never true   Transportation Needs: No Transportation Needs    Lack of Transportation (Medical): No    Lack of Transportation (Non-Medical):  No   Physical Activity: Insufficiently Active    Days of Exercise per Week: 2 days    Minutes of Exercise per Session: 20 min   Stress: Not on file   Social Connections: Not on file   Intimate Partner Violence: Not At Risk    Fear of Current or Ex-Partner: No    Emotionally Abused: No    Physically Abused: No    Sexually Abused: No   Housing Stability: Unknown    Unable to Pay for Housing in the Last Year: No    Number of Places Lived in the Last Year: Not on file    Unstable Housing in the Last Year: No       Family History   Problem Relation Age of Onset    Arthritis Mother     Obesity Mother     Anemia Mother     Other Mother         pulmonary embolism    Arthritis Father     Arrhythmia Father     Diabetes Other     Diabetes Paternal Grandfather     Cancer Neg Hx     Breast Cancer Neg Hx     Colon Cancer Neg Hx        Vitals:    09/15/22 1554   BP: 100/64   Pulse: 75   Resp: 16   Temp: 97.3 °F (36.3 °C)   SpO2: 96%       Wt Readings from Last 3 Encounters:   09/15/22 259 lb (117.5 kg)   08/23/22 268 lb (121.6 kg)   08/18/22 270 lb (122.5 kg)       Review of Systems   Constitutional:  Positive for fatigue. Gastrointestinal:  Positive for diarrhea. Neurological:  Positive for dizziness. Objective:   Physical Exam  Abdominal:      General: There is no distension. Palpations: Abdomen is soft. Tenderness: There is no abdominal tenderness. Musculoskeletal:      Right lower leg: No edema. Left lower leg: No edema. Neurological:      Mental Status: She is alert. Assessment:      Diarrhea  Hypokalemia  weakness        Plan:   Stool cultures    Meds discussed. Will follow labs in 5 days. GI consult ?           Current Outpatient Medications   Medication Sig Dispense Refill    ibuprofen (ADVIL;MOTRIN) 600 MG tablet TAKE 1 TABLET BY MOUTH EVERY 8 HOURS AS NEEDED FOR PAIN 40 tablet 1    DULoxetine (CYMBALTA) 60 MG extended release capsule Take 1 capsule by mouth 2 times daily 60 capsule 3    ALPRAZolam (XANAX) 1 MG tablet TAKE 1 TABLET BY MOUTH THREE TIMES A DAY AS NEEDED FOR ANXIETY 90 tablet 2    potassium chloride (KLOR-CON M) 20 MEQ extended release tablet Take 1 tablet by mouth daily Take one daily as directed with the diuretic 30 tablet 0    diclofenac (VOLTAREN) 50 MG EC tablet TAKE 1 TABLET BY MOUTH TWICE A DAY WITH MEALS 60 tablet 3    Saccharomyces boulardii (DIGESTIVE PROBIOTIC) 250 MG CAPS Take one dose BID x 2 weeks 30 capsule 1    methocarbamol (ROBAXIN) 500 MG tablet TAKE 1 TABLET BY MOUTH 3 TIMES A DAY AS NEEDED FOR NECK PAIN 90 tablet 3    vitamin D (ERGOCALCIFEROL) 1.25 MG (20430 UT) CAPS capsule Take 1 capsule by mouth once a week 4 capsule 2    rOPINIRole (REQUIP) 2 MG tablet TAKE 2 TABLETS AT NIGHT AND ONE IN THE MORNING FOR LEGS 90 tablet 3    ondansetron (ZOFRAN ODT) 4 MG disintegrating tablet Take 1 tablet by mouth every 8 hours as needed for Nausea or Vomiting 15 tablet 1    dicyclomine (BENTYL) 20 MG tablet Take 1 tablet by mouth 4 times daily (after meals and at bedtime) For cramps and gas 60 tablet 2    omeprazole (PRILOSEC) 40 MG delayed release capsule TAKE 1 CAPSULE BY MOUTH EVERY DAY (Patient taking differently: in the morning and at bedtime Dr. Montgomery Poag increased to BID) 30 capsule 6    atenolol (TENORMIN) 25 MG tablet TAKE 1 TABLET BY MOUTH EVERY DAY 30 tablet 5    Acetaminophen (TYLENOL PO) Take 650 mg by mouth daily as needed       MELATONIN PO Take 20 mg by mouth nightly as needed        No current facility-administered medications for this visit.

## 2022-09-16 DIAGNOSIS — R19.7 DIARRHEA, UNSPECIFIED TYPE: ICD-10-CM

## 2022-09-16 LAB
C DIFF TOXIN/ANTIGEN: NORMAL
GI BACTERIAL PATHOGENS BY PCR: NORMAL

## 2022-09-17 LAB
CRYPTOSPORIDIUM ANTIGEN STOOL: NORMAL
E HISTOLYTICA ANTIGEN STOOL: NORMAL
GIARDIA ANTIGEN STOOL: NORMAL

## 2022-09-19 ENCOUNTER — TELEPHONE (OUTPATIENT)
Dept: FAMILY MEDICINE CLINIC | Age: 57
End: 2022-09-19

## 2022-09-19 DIAGNOSIS — R19.7 DIARRHEA, UNSPECIFIED TYPE: ICD-10-CM

## 2022-09-19 DIAGNOSIS — E87.6 HYPOKALEMIA: Primary | ICD-10-CM

## 2022-09-19 DIAGNOSIS — R53.83 FATIGUE, UNSPECIFIED TYPE: ICD-10-CM

## 2022-09-19 RX ORDER — POTASSIUM CHLORIDE 20 MEQ/1
TABLET, EXTENDED RELEASE ORAL
Qty: 60 TABLET | Refills: 3 | Status: SHIPPED | OUTPATIENT
Start: 2022-09-19

## 2022-09-19 RX ORDER — ATENOLOL 25 MG/1
TABLET ORAL
Qty: 30 TABLET | Refills: 5 | Status: ON HOLD | OUTPATIENT
Start: 2022-09-19 | End: 2022-09-22

## 2022-09-19 RX ORDER — ROPINIROLE 2 MG/1
TABLET, FILM COATED ORAL
Qty: 90 TABLET | Refills: 3 | Status: SHIPPED | OUTPATIENT
Start: 2022-09-19

## 2022-09-19 NOTE — TELEPHONE ENCOUNTER
9/15/2022    Future Appointments   Date Time Provider Diann Sotelo   10/12/2022  1:15 PM Lb Sterling PA-C [facility]

## 2022-09-19 NOTE — TELEPHONE ENCOUNTER
9/15/2022          Future Appointments   Date Time Provider Diann Sotelo   10/12/2022  1:15 PM Kiara Cid PA-C [facility]

## 2022-09-19 NOTE — TELEPHONE ENCOUNTER
Spoke to the patient. Advised her labs are in. She has not had recent colonoscopy.     Discussed referral.

## 2022-09-19 NOTE — TELEPHONE ENCOUNTER
Derek Barnett is having severe bilateral leg pain that begins in the back of thighs to knees. Patient said she is \"really tired\" and is having Parth horse. It is difficult for to get up it hurts so much. Patient had has low potassium and said she needs to get lab done tomorrow. I did not see lab ordered for this. Please advise. Future Appointments   Date Time Provider Diann Sotelo   10/12/2022  1:15 PM Charly Muhammad PA-C [facility]   Past appointment was 9/15/22.

## 2022-09-20 ENCOUNTER — NURSE ONLY (OUTPATIENT)
Dept: FAMILY MEDICINE CLINIC | Age: 57
End: 2022-09-20

## 2022-09-20 VITALS
WEIGHT: 268 LBS | HEIGHT: 66 IN | BODY MASS INDEX: 43.07 KG/M2 | RESPIRATION RATE: 16 BRPM | HEART RATE: 70 BPM | DIASTOLIC BLOOD PRESSURE: 74 MMHG | SYSTOLIC BLOOD PRESSURE: 114 MMHG | OXYGEN SATURATION: 97 %

## 2022-09-20 DIAGNOSIS — R53.83 FATIGUE, UNSPECIFIED TYPE: ICD-10-CM

## 2022-09-20 DIAGNOSIS — E87.6 HYPOKALEMIA: ICD-10-CM

## 2022-09-20 DIAGNOSIS — R19.7 DIARRHEA, UNSPECIFIED TYPE: ICD-10-CM

## 2022-09-20 LAB
A/G RATIO: 1.7 (ref 1.1–2.2)
ALBUMIN SERPL-MCNC: 3.8 G/DL (ref 3.4–5)
ALP BLD-CCNC: 82 U/L (ref 40–129)
ALT SERPL-CCNC: 22 U/L (ref 10–40)
ANION GAP SERPL CALCULATED.3IONS-SCNC: 15 MMOL/L (ref 3–16)
AST SERPL-CCNC: 17 U/L (ref 15–37)
BASOPHILS ABSOLUTE: 0 K/UL (ref 0–0.2)
BASOPHILS RELATIVE PERCENT: 0.2 %
BILIRUB SERPL-MCNC: <0.2 MG/DL (ref 0–1)
BUN BLDV-MCNC: 14 MG/DL (ref 7–20)
CALCIUM SERPL-MCNC: 9.1 MG/DL (ref 8.3–10.6)
CHLORIDE BLD-SCNC: 106 MMOL/L (ref 99–110)
CO2: 22 MMOL/L (ref 21–32)
CREAT SERPL-MCNC: 1.1 MG/DL (ref 0.6–1.1)
EOSINOPHILS ABSOLUTE: 0.1 K/UL (ref 0–0.6)
EOSINOPHILS RELATIVE PERCENT: 1.8 %
GFR AFRICAN AMERICAN: >60
GFR NON-AFRICAN AMERICAN: 51
GLUCOSE BLD-MCNC: 112 MG/DL (ref 70–99)
HCT VFR BLD CALC: 41.1 % (ref 36–48)
HEMOGLOBIN: 13.6 G/DL (ref 12–16)
LYMPHOCYTES ABSOLUTE: 1.3 K/UL (ref 1–5.1)
LYMPHOCYTES RELATIVE PERCENT: 15.1 %
MAGNESIUM: 1.9 MG/DL (ref 1.8–2.4)
MCH RBC QN AUTO: 31.4 PG (ref 26–34)
MCHC RBC AUTO-ENTMCNC: 33.2 G/DL (ref 31–36)
MCV RBC AUTO: 94.7 FL (ref 80–100)
MONOCYTES ABSOLUTE: 0.5 K/UL (ref 0–1.3)
MONOCYTES RELATIVE PERCENT: 5.4 %
NEUTROPHILS ABSOLUTE: 6.6 K/UL (ref 1.7–7.7)
NEUTROPHILS RELATIVE PERCENT: 77.5 %
PDW BLD-RTO: 15.1 % (ref 12.4–15.4)
PLATELET # BLD: 179 K/UL (ref 135–450)
PMV BLD AUTO: 10 FL (ref 5–10.5)
POTASSIUM SERPL-SCNC: 3.3 MMOL/L (ref 3.5–5.1)
RBC # BLD: 4.34 M/UL (ref 4–5.2)
SODIUM BLD-SCNC: 143 MMOL/L (ref 136–145)
TOTAL PROTEIN: 6.1 G/DL (ref 6.4–8.2)
WBC # BLD: 8.5 K/UL (ref 4–11)

## 2022-09-21 ENCOUNTER — APPOINTMENT (OUTPATIENT)
Dept: GENERAL RADIOLOGY | Age: 57
DRG: 551 | End: 2022-09-21
Payer: MEDICARE

## 2022-09-21 ENCOUNTER — APPOINTMENT (OUTPATIENT)
Dept: CT IMAGING | Age: 57
DRG: 551 | End: 2022-09-21
Payer: MEDICARE

## 2022-09-21 ENCOUNTER — TELEPHONE (OUTPATIENT)
Dept: FAMILY MEDICINE CLINIC | Age: 57
End: 2022-09-21

## 2022-09-21 ENCOUNTER — HOSPITAL ENCOUNTER (INPATIENT)
Age: 57
LOS: 5 days | Discharge: HOME HEALTH | DRG: 551 | End: 2022-09-26
Attending: STUDENT IN AN ORGANIZED HEALTH CARE EDUCATION/TRAINING PROGRAM | Admitting: INTERNAL MEDICINE
Payer: MEDICARE

## 2022-09-21 DIAGNOSIS — E87.6 HYPOKALEMIA: ICD-10-CM

## 2022-09-21 DIAGNOSIS — M54.42 ACUTE BILATERAL LOW BACK PAIN WITH LEFT-SIDED SCIATICA: ICD-10-CM

## 2022-09-21 DIAGNOSIS — N12 PYELONEPHRITIS: Primary | ICD-10-CM

## 2022-09-21 DIAGNOSIS — M54.30 SCIATICA, UNSPECIFIED LATERALITY: ICD-10-CM

## 2022-09-21 LAB
A/G RATIO: 1.4 (ref 1.1–2.2)
ALBUMIN SERPL-MCNC: 3.8 G/DL (ref 3.4–5)
ALP BLD-CCNC: 89 U/L (ref 40–129)
ALT SERPL-CCNC: 23 U/L (ref 10–40)
ANION GAP SERPL CALCULATED.3IONS-SCNC: 13 MMOL/L (ref 3–16)
AST SERPL-CCNC: 19 U/L (ref 15–37)
BACTERIA: ABNORMAL /HPF
BASOPHILS ABSOLUTE: 0.1 K/UL (ref 0–0.2)
BASOPHILS RELATIVE PERCENT: 0.5 %
BILIRUB SERPL-MCNC: 0.4 MG/DL (ref 0–1)
BILIRUBIN URINE: NEGATIVE
BLOOD, URINE: ABNORMAL
BUN BLDV-MCNC: 13 MG/DL (ref 7–20)
CALCIUM SERPL-MCNC: 9 MG/DL (ref 8.3–10.6)
CHLORIDE BLD-SCNC: 103 MMOL/L (ref 99–110)
CLARITY: ABNORMAL
CO2: 21 MMOL/L (ref 21–32)
COLOR: YELLOW
CREAT SERPL-MCNC: 0.7 MG/DL (ref 0.6–1.1)
EOSINOPHILS ABSOLUTE: 0.1 K/UL (ref 0–0.6)
EOSINOPHILS RELATIVE PERCENT: 1.4 %
EPITHELIAL CELLS, UA: ABNORMAL /HPF (ref 0–5)
GFR AFRICAN AMERICAN: >60
GFR NON-AFRICAN AMERICAN: >60
GLUCOSE BLD-MCNC: 100 MG/DL (ref 70–99)
GLUCOSE BLD-MCNC: 91 MG/DL (ref 70–99)
GLUCOSE URINE: NEGATIVE MG/DL
HCT VFR BLD CALC: 42 % (ref 36–48)
HEMOGLOBIN: 14.1 G/DL (ref 12–16)
HYALINE CASTS: ABNORMAL /LPF (ref 0–2)
KETONES, URINE: NEGATIVE MG/DL
LEUKOCYTE ESTERASE, URINE: ABNORMAL
LYMPHOCYTES ABSOLUTE: 1.5 K/UL (ref 1–5.1)
LYMPHOCYTES RELATIVE PERCENT: 15.1 %
MAGNESIUM: 1.9 MG/DL (ref 1.8–2.4)
MCH RBC QN AUTO: 31.1 PG (ref 26–34)
MCHC RBC AUTO-ENTMCNC: 33.5 G/DL (ref 31–36)
MCV RBC AUTO: 93 FL (ref 80–100)
MICROSCOPIC EXAMINATION: YES
MONOCYTES ABSOLUTE: 0.6 K/UL (ref 0–1.3)
MONOCYTES RELATIVE PERCENT: 5.5 %
MUCUS: ABNORMAL /LPF
NEUTROPHILS ABSOLUTE: 7.9 K/UL (ref 1.7–7.7)
NEUTROPHILS RELATIVE PERCENT: 77.5 %
NITRITE, URINE: POSITIVE
PDW BLD-RTO: 15.1 % (ref 12.4–15.4)
PERFORMED ON: NORMAL
PH UA: 6 (ref 5–8)
PLATELET # BLD: 196 K/UL (ref 135–450)
PMV BLD AUTO: 10 FL (ref 5–10.5)
POTASSIUM REFLEX MAGNESIUM: 3.3 MMOL/L (ref 3.5–5.1)
PROTEIN UA: ABNORMAL MG/DL
RBC # BLD: 4.52 M/UL (ref 4–5.2)
RBC UA: ABNORMAL /HPF (ref 0–4)
RENAL EPITHELIAL, UA: ABNORMAL /HPF (ref 0–1)
SODIUM BLD-SCNC: 137 MMOL/L (ref 136–145)
SPECIFIC GRAVITY UA: >=1.03 (ref 1–1.03)
TOTAL PROTEIN: 6.5 G/DL (ref 6.4–8.2)
URINE REFLEX TO CULTURE: YES
URINE TYPE: ABNORMAL
UROBILINOGEN, URINE: 0.2 E.U./DL
WBC # BLD: 10.2 K/UL (ref 4–11)
WBC UA: >100 /HPF (ref 0–5)

## 2022-09-21 PROCEDURE — 99285 EMERGENCY DEPT VISIT HI MDM: CPT

## 2022-09-21 PROCEDURE — 6360000002 HC RX W HCPCS: Performed by: EMERGENCY MEDICINE

## 2022-09-21 PROCEDURE — 83735 ASSAY OF MAGNESIUM: CPT

## 2022-09-21 PROCEDURE — 2580000003 HC RX 258: Performed by: EMERGENCY MEDICINE

## 2022-09-21 PROCEDURE — 2580000003 HC RX 258: Performed by: INTERNAL MEDICINE

## 2022-09-21 PROCEDURE — 72100 X-RAY EXAM L-S SPINE 2/3 VWS: CPT

## 2022-09-21 PROCEDURE — 96375 TX/PRO/DX INJ NEW DRUG ADDON: CPT

## 2022-09-21 PROCEDURE — 96365 THER/PROPH/DIAG IV INF INIT: CPT

## 2022-09-21 PROCEDURE — 6360000004 HC RX CONTRAST MEDICATION: Performed by: EMERGENCY MEDICINE

## 2022-09-21 PROCEDURE — 87086 URINE CULTURE/COLONY COUNT: CPT

## 2022-09-21 PROCEDURE — 6360000002 HC RX W HCPCS: Performed by: INTERNAL MEDICINE

## 2022-09-21 PROCEDURE — 85025 COMPLETE CBC W/AUTO DIFF WBC: CPT

## 2022-09-21 PROCEDURE — 1200000000 HC SEMI PRIVATE

## 2022-09-21 PROCEDURE — 80053 COMPREHEN METABOLIC PANEL: CPT

## 2022-09-21 PROCEDURE — 81001 URINALYSIS AUTO W/SCOPE: CPT

## 2022-09-21 PROCEDURE — 87186 SC STD MICRODIL/AGAR DIL: CPT

## 2022-09-21 PROCEDURE — 6370000000 HC RX 637 (ALT 250 FOR IP): Performed by: STUDENT IN AN ORGANIZED HEALTH CARE EDUCATION/TRAINING PROGRAM

## 2022-09-21 PROCEDURE — 74177 CT ABD & PELVIS W/CONTRAST: CPT

## 2022-09-21 PROCEDURE — 87088 URINE BACTERIA CULTURE: CPT

## 2022-09-21 RX ORDER — NAPROXEN 500 MG/1
500 TABLET ORAL ONCE
Status: COMPLETED | OUTPATIENT
Start: 2022-09-21 | End: 2022-09-21

## 2022-09-21 RX ORDER — METHOCARBAMOL 500 MG/1
1000 TABLET, FILM COATED ORAL ONCE
Status: COMPLETED | OUTPATIENT
Start: 2022-09-21 | End: 2022-09-21

## 2022-09-21 RX ORDER — ONDANSETRON 4 MG/1
8 TABLET, ORALLY DISINTEGRATING ORAL EVERY 8 HOURS PRN
Qty: 20 TABLET | Refills: 0 | Status: SHIPPED | OUTPATIENT
Start: 2022-09-21

## 2022-09-21 RX ORDER — ACETAMINOPHEN 650 MG/1
650 SUPPOSITORY RECTAL EVERY 6 HOURS PRN
Status: DISCONTINUED | OUTPATIENT
Start: 2022-09-21 | End: 2022-09-22

## 2022-09-21 RX ORDER — LIDOCAINE 4 G/G
1 PATCH TOPICAL DAILY
Status: DISCONTINUED | OUTPATIENT
Start: 2022-09-21 | End: 2022-09-26 | Stop reason: HOSPADM

## 2022-09-21 RX ORDER — DROPERIDOL 2.5 MG/ML
1.25 INJECTION, SOLUTION INTRAMUSCULAR; INTRAVENOUS EVERY 6 HOURS PRN
Status: DISCONTINUED | OUTPATIENT
Start: 2022-09-21 | End: 2022-09-22 | Stop reason: ALTCHOICE

## 2022-09-21 RX ORDER — KETOROLAC TROMETHAMINE 30 MG/ML
15 INJECTION, SOLUTION INTRAMUSCULAR; INTRAVENOUS EVERY 6 HOURS PRN
Status: DISCONTINUED | OUTPATIENT
Start: 2022-09-21 | End: 2022-09-22

## 2022-09-21 RX ORDER — CEFDINIR 300 MG/1
300 CAPSULE ORAL 2 TIMES DAILY
Qty: 20 CAPSULE | Refills: 0 | Status: SHIPPED | OUTPATIENT
Start: 2022-09-21 | End: 2022-09-24 | Stop reason: HOSPADM

## 2022-09-21 RX ORDER — POTASSIUM CHLORIDE 20 MEQ/1
40 TABLET, EXTENDED RELEASE ORAL ONCE
Status: COMPLETED | OUTPATIENT
Start: 2022-09-21 | End: 2022-09-21

## 2022-09-21 RX ORDER — SODIUM CHLORIDE 9 MG/ML
INJECTION, SOLUTION INTRAVENOUS PRN
Status: DISCONTINUED | OUTPATIENT
Start: 2022-09-21 | End: 2022-09-26 | Stop reason: HOSPADM

## 2022-09-21 RX ORDER — ACETAMINOPHEN 325 MG/1
650 TABLET ORAL EVERY 6 HOURS PRN
Status: DISCONTINUED | OUTPATIENT
Start: 2022-09-21 | End: 2022-09-22

## 2022-09-21 RX ORDER — POLYETHYLENE GLYCOL 3350 17 G/17G
17 POWDER, FOR SOLUTION ORAL DAILY PRN
Status: DISCONTINUED | OUTPATIENT
Start: 2022-09-21 | End: 2022-09-22

## 2022-09-21 RX ORDER — SODIUM CHLORIDE 0.9 % (FLUSH) 0.9 %
5-40 SYRINGE (ML) INJECTION EVERY 12 HOURS SCHEDULED
Status: DISCONTINUED | OUTPATIENT
Start: 2022-09-21 | End: 2022-09-26 | Stop reason: HOSPADM

## 2022-09-21 RX ORDER — PROMETHAZINE HYDROCHLORIDE 25 MG/1
12.5 TABLET ORAL EVERY 6 HOURS PRN
Status: DISCONTINUED | OUTPATIENT
Start: 2022-09-21 | End: 2022-09-26 | Stop reason: HOSPADM

## 2022-09-21 RX ORDER — SODIUM CHLORIDE 0.9 % (FLUSH) 0.9 %
5-40 SYRINGE (ML) INJECTION PRN
Status: DISCONTINUED | OUTPATIENT
Start: 2022-09-21 | End: 2022-09-26 | Stop reason: HOSPADM

## 2022-09-21 RX ORDER — ONDANSETRON 2 MG/ML
4 INJECTION INTRAMUSCULAR; INTRAVENOUS EVERY 6 HOURS PRN
Status: DISCONTINUED | OUTPATIENT
Start: 2022-09-21 | End: 2022-09-26 | Stop reason: HOSPADM

## 2022-09-21 RX ORDER — SODIUM CHLORIDE, SODIUM LACTATE, POTASSIUM CHLORIDE, AND CALCIUM CHLORIDE .6; .31; .03; .02 G/100ML; G/100ML; G/100ML; G/100ML
1000 INJECTION, SOLUTION INTRAVENOUS ONCE
Status: DISCONTINUED | OUTPATIENT
Start: 2022-09-21 | End: 2022-09-21

## 2022-09-21 RX ORDER — ENOXAPARIN SODIUM 100 MG/ML
30 INJECTION SUBCUTANEOUS 2 TIMES DAILY
Status: DISCONTINUED | OUTPATIENT
Start: 2022-09-21 | End: 2022-09-26 | Stop reason: HOSPADM

## 2022-09-21 RX ADMIN — NAPROXEN 500 MG: 500 TABLET ORAL at 16:23

## 2022-09-21 RX ADMIN — DROPERIDOL 1.25 MG: 2.5 INJECTION, SOLUTION INTRAMUSCULAR; INTRAVENOUS at 19:19

## 2022-09-21 RX ADMIN — METHOCARBAMOL TABLETS 1000 MG: 500 TABLET, COATED ORAL at 16:23

## 2022-09-21 RX ADMIN — POTASSIUM CHLORIDE 40 MEQ: 1500 TABLET, EXTENDED RELEASE ORAL at 16:23

## 2022-09-21 RX ADMIN — SODIUM CHLORIDE, PRESERVATIVE FREE 10 ML: 5 INJECTION INTRAVENOUS at 22:48

## 2022-09-21 RX ADMIN — IOPAMIDOL 80 ML: 755 INJECTION, SOLUTION INTRAVENOUS at 23:31

## 2022-09-21 RX ADMIN — MEROPENEM 1000 MG: 1 INJECTION, POWDER, FOR SOLUTION INTRAVENOUS at 22:46

## 2022-09-21 RX ADMIN — ENOXAPARIN SODIUM 30 MG: 100 INJECTION SUBCUTANEOUS at 23:52

## 2022-09-21 RX ADMIN — CEFTRIAXONE 2000 MG: 2 INJECTION, POWDER, FOR SOLUTION INTRAMUSCULAR; INTRAVENOUS at 19:22

## 2022-09-21 ASSESSMENT — ENCOUNTER SYMPTOMS
NAUSEA: 0
SORE THROAT: 0
CHEST TIGHTNESS: 0
BACK PAIN: 1
VOMITING: 0
SHORTNESS OF BREATH: 0
DIARRHEA: 1
ABDOMINAL PAIN: 0

## 2022-09-21 ASSESSMENT — PAIN DESCRIPTION - LOCATION
LOCATION: BACK
LOCATION: BACK

## 2022-09-21 ASSESSMENT — PAIN SCALES - GENERAL
PAINLEVEL_OUTOF10: 9
PAINLEVEL_OUTOF10: 7
PAINLEVEL_OUTOF10: 10
PAINLEVEL_OUTOF10: 3

## 2022-09-21 ASSESSMENT — PAIN DESCRIPTION - PAIN TYPE: TYPE: ACUTE PAIN

## 2022-09-21 ASSESSMENT — PAIN - FUNCTIONAL ASSESSMENT
PAIN_FUNCTIONAL_ASSESSMENT: 0-10
PAIN_FUNCTIONAL_ASSESSMENT: PREVENTS OR INTERFERES WITH ALL ACTIVE AND SOME PASSIVE ACTIVITIES

## 2022-09-21 ASSESSMENT — PAIN DESCRIPTION - ORIENTATION
ORIENTATION: LOWER
ORIENTATION: LOWER

## 2022-09-21 ASSESSMENT — PAIN DESCRIPTION - FREQUENCY: FREQUENCY: CONTINUOUS

## 2022-09-21 ASSESSMENT — PAIN DESCRIPTION - DESCRIPTORS: DESCRIPTORS: SHARP

## 2022-09-21 ASSESSMENT — PAIN DESCRIPTION - ONSET: ONSET: ON-GOING

## 2022-09-21 NOTE — TELEPHONE ENCOUNTER
Spoke to the patient. K is a bit better,  CBC ok  Says having a lot more lower bad and referred pain to her back. Last night considered calling the squad and going to ER.   Says she will go to the ER at [facility] this morning after has her mother at the doctors office,

## 2022-09-21 NOTE — ED NOTES
Pt ambulated to and from restroom with steady gait noted. Urine sample collected and sent to lab via tube station.      Anjali Trejo RN  09/21/22 3280

## 2022-09-21 NOTE — ED PROVIDER NOTES
[address] [facility] ENCOUNTER          ATTENDING PHYSICIAN NOTE       Date of evaluation: 9/21/2022    ADDENDUM:      Care of this patient was assumed from [facility]. The patient was seen for Abnormal Lab (Low potassium for weeks; also c/o lower back and leg pain)  . The patient's initial evaluation and plan have been discussed with the prior provider who initially evaluated the patient. Nursing Notes, Past Medical Hx, Past Surgical Hx, Social Hx, Allergies, and Family Hx were all reviewed. Diagnostic Results     EKG       RADIOLOGY:  XR LUMBAR SPINE (2-3 VIEWS)   Final Result      No acute fracture seen.               LABS:   Results for orders placed or performed during the hospital encounter of 09/21/22   CBC with Auto Differential   Result Value Ref Range    WBC 10.2 4.0 - 11.0 K/uL    RBC 4.52 4.00 - 5.20 M/uL    Hemoglobin 14.1 12.0 - 16.0 g/dL    Hematocrit 42.0 36.0 - 48.0 %    MCV 93.0 80.0 - 100.0 fL    MCH 31.1 26.0 - 34.0 pg    MCHC 33.5 31.0 - 36.0 g/dL    RDW 15.1 12.4 - 15.4 %    Platelets 452 252 - 596 K/uL    MPV 10.0 5.0 - 10.5 fL    Neutrophils % 77.5 %    Lymphocytes % 15.1 %    Monocytes % 5.5 %    Eosinophils % 1.4 %    Basophils % 0.5 %    Neutrophils Absolute 7.9 (H) 1.7 - 7.7 K/uL    Lymphocytes Absolute 1.5 1.0 - 5.1 K/uL    Monocytes Absolute 0.6 0.0 - 1.3 K/uL    Eosinophils Absolute 0.1 0.0 - 0.6 K/uL    Basophils Absolute 0.1 0.0 - 0.2 K/uL   Comprehensive Metabolic Panel w/ Reflex to MG   Result Value Ref Range    Sodium 137 136 - 145 mmol/L    Potassium reflex Magnesium 3.3 (L) 3.5 - 5.1 mmol/L    Chloride 103 99 - 110 mmol/L    CO2 21 21 - 32 mmol/L    Anion Gap 13 3 - 16    Glucose 100 (H) 70 - 99 mg/dL    BUN 13 7 - 20 mg/dL    Creatinine 0.7 0.6 - 1.1 mg/dL    GFR Non-African American >60 >60    GFR African American >60 >60    Calcium 9.0 8.3 - 10.6 mg/dL    Total Protein 6.5 6.4 - 8.2 g/dL    Albumin 3.8 3.4 - 5.0 g/dL    Albumin/Globulin Ratio 1.4 1.1 - 2.2    Total Bilirubin 0.4 0.0 - 1.0 mg/dL    Alkaline Phosphatase 89 40 - 129 U/L    ALT 23 10 - 40 U/L    AST 19 15 - 37 U/L   Magnesium   Result Value Ref Range    Magnesium 1.90 1.80 - 2.40 mg/dL   Urinalysis with Reflex to Culture    Specimen: Urine   Result Value Ref Range    Color, UA Yellow Straw/Yellow    Clarity, UA SL CLOUDY (A) Clear    Glucose, Ur Negative Negative mg/dL    Bilirubin Urine Negative Negative    Ketones, Urine Negative Negative mg/dL    Specific Gravity, UA >=1.030 1.005 - 1.030    Blood, Urine TRACE-INTACT (A) Negative    pH, UA 6.0 5.0 - 8.0    Protein, UA TRACE (A) Negative mg/dL    Urobilinogen, Urine 0.2 <2.0 E.U./dL    Nitrite, Urine POSITIVE (A) Negative    Leukocyte Esterase, Urine MODERATE (A) Negative    Microscopic Examination YES     Urine Type Voided     Urine Reflex to Culture Yes    Microscopic Urinalysis   Result Value Ref Range    Hyaline Casts, UA 0-2 0 - 2 /LPF    Mucus, UA 3+ (A) None Seen /LPF    WBC, UA >100 (A) 0 - 5 /HPF    RBC, UA 21-50 (A) 0 - 4 /HPF    Epithelial Cells, UA 6-10 (A) 0 - 5 /HPF    Renal Epithelial, UA 2-5 (A) 0 - 1 /HPF    Bacteria, UA 4+ (A) None Seen /HPF       RECENT VITALS:  BP: 117/74, Temp: 97.7 °F (36.5 °C), Heart Rate: 60, Resp: 20, SpO2: 98 %     Procedures         ED Course          The patient was given the following medications:  Orders Placed This Encounter   Medications    potassium chloride (KLOR-CON M) extended release tablet 40 mEq    methocarbamol (ROBAXIN) tablet 1,000 mg    naproxen (NAPROSYN) tablet 500 mg    lidocaine 4 % external patch 1 patch    DISCONTD: lactated ringers bolus    cefTRIAXone (ROCEPHIN) 2,000 mg in dextrose 5 % 50 mL IVPB mini-bag     Order Specific Question:   Antimicrobial Indications     Answer:   Urinary Tract Infection    droperidol (INAPSINE) injection 1.25 mg       CONSULTS:  None    MEDICAL DECISION MAKING / ASSESSMENT / PLAN     Allen Booker is a 62 y.o. female who was initially seen by the previous physician, please see his documentation for full history and physical exam.    Briefly this is a woman who is presenting with low back pain and hypokalemia on outside labs. She has been having diarrhea in the outpatient setting as well with unremarkable stool studies. She was signed out pending lumbar imaging and follow-up laboratory testing. Laboratory testing ordered by the previous physician as well as lumbar imaging is all without acute concerns. Patient continues to report back discomfort and on my exam has left-sided CVA tenderness. Further questioning about increased urinary frequency yields her concerned about foul-smelling urine. Will obtain urinalysis for evaluation of urinary tract infection. Urinalysis is consistent with likely pyelonephritis given the UA and CVA tenderness. No history of nephrolithiasis per her report. She has had refractory pain and is unable to tolerate p.o. We will plan on CT imaging for evaluation of perinephric abscess versus concurrent nephrolithiasis and plan for admission to hospitalist.    Patient was discussed with the hospitalist who accepted the patient onto her service. Clinical Impression     1. Pyelonephritis    2. Acute bilateral low back pain with left-sided sciatica    3. Hypokalemia        Disposition     PATIENT REFERRED TO:  No follow-up provider specified.     DISCHARGE MEDICATIONS:  New Prescriptions    No medications on file       DISPOSITION            Nelly Plaza MD  09/27/22 2286

## 2022-09-21 NOTE — ED PROVIDER NOTES
[address] [facility]          ATTENDING PHYSICIAN NOTE       Date of evaluation: 9/21/2022    Chief Complaint     Abnormal Lab (Low potassium for weeks; also c/o lower back and leg pain)      History of Present Illness     Lauren Sheldon is a 62 y.o. female endometriosis, gastroparesis who presents to the hospital today for evaluation of hypokalemia. She states that she has been having diarrhea for weeks and is followed by primary care provider and have done multiple testing and treatment options but that has not been improving. She is currently taking potassium supplements at home. She states that she had been having some pain in her left leg but now she is got pain in her lower back bilaterally radiating down her legs at times. Is primarily worse on the left side. She denies any direct trauma. She denies any loss of bowel bladder control. She denies any fevers or chills. She has been ambulating and states that walking makes the pain worse. Patient denies any new chest pain or shortness of breath. She denies any lightheadedness or syncope. Review of Systems     Review of Systems   Constitutional:  Negative for chills and fever. HENT:  Negative for congestion and sore throat. Respiratory:  Negative for chest tightness and shortness of breath. Cardiovascular:  Negative for chest pain and leg swelling. Gastrointestinal:  Positive for diarrhea. Negative for abdominal pain, nausea and vomiting. Genitourinary:  Negative for difficulty urinating, flank pain and pelvic pain. Musculoskeletal:  Positive for back pain. Negative for gait problem and neck pain. Neurological:  Negative for dizziness, syncope, weakness and light-headedness. Hematological:  Negative for adenopathy. Psychiatric/Behavioral:  Negative for confusion.       Past Medical, Surgical, Family, and Social History     She has a past medical history of Anxiety, Depression, Endometriosis, GERD by mouth nightly as needed     METHOCARBAMOL (ROBAXIN) 500 MG TABLET    TAKE 1 TABLET BY MOUTH 3 TIMES A DAY AS NEEDED FOR NECK PAIN    OMEPRAZOLE (PRILOSEC) 40 MG DELAYED RELEASE CAPSULE    TAKE 1 CAPSULE BY MOUTH EVERY DAY    ONDANSETRON (ZOFRAN ODT) 4 MG DISINTEGRATING TABLET    Take 1 tablet by mouth every 8 hours as needed for Nausea or Vomiting    POTASSIUM CHLORIDE (KLOR-CON M20) 20 MEQ EXTENDED RELEASE TABLET    Take one or two daily as directed for potassium    ROPINIROLE (REQUIP) 2 MG TABLET    TAKE 2 TABLETS AT NIGHT AND ONE IN THE MORNING FOR LEGS    SACCHAROMYCES BOULARDII (DIGESTIVE PROBIOTIC) 250 MG CAPS    Take one dose BID x 2 weeks    VITAMIN D (ERGOCALCIFEROL) 1.25 MG (41815 UT) CAPS CAPSULE    Take 1 capsule by mouth once a week       Allergies     She is allergic to haldol [haloperidol lactate] and toradol [ketorolac tromethamine]. Physical Exam     INITIAL VITALS: BP: 123/89, Temp: 97.7 °F (36.5 °C), Heart Rate: 67, Resp: 18, SpO2: 98 %   Physical Exam  Vitals and nursing note reviewed. Constitutional:       General: She is not in acute distress. Appearance: Normal appearance. She is well-developed. She is obese. She is not ill-appearing, toxic-appearing or diaphoretic. HENT:      Head: Normocephalic and atraumatic. Cardiovascular:      Rate and Rhythm: Normal rate and regular rhythm. Pulmonary:      Effort: Pulmonary effort is normal.      Breath sounds: Normal breath sounds. Abdominal:      Palpations: Abdomen is soft. Musculoskeletal:         General: Tenderness (to palpation in the bilateral lumbar spine regions) present. No deformity. Normal range of motion. Cervical back: Neck supple. Skin:     General: Skin is warm and dry. Findings: No erythema. Neurological:      General: No focal deficit present. Mental Status: She is alert and oriented to person, place, and time. Cranial Nerves: No cranial nerve deficit. Sensory: No sensory deficit. Motor: No weakness. Coordination: Coordination normal.   Psychiatric:         Mood and Affect: Mood normal.       Diagnostic Results       RADIOLOGY:  No orders to display       LABS:   Results for orders placed or performed during the hospital encounter of 09/21/22   CBC with Auto Differential   Result Value Ref Range    WBC 10.2 4.0 - 11.0 K/uL    RBC 4.52 4.00 - 5.20 M/uL    Hemoglobin 14.1 12.0 - 16.0 g/dL    Hematocrit 42.0 36.0 - 48.0 %    MCV 93.0 80.0 - 100.0 fL    MCH 31.1 26.0 - 34.0 pg    MCHC 33.5 31.0 - 36.0 g/dL    RDW 15.1 12.4 - 15.4 %    Platelets 104 955 - 243 K/uL    MPV 10.0 5.0 - 10.5 fL    Neutrophils % 77.5 %    Lymphocytes % 15.1 %    Monocytes % 5.5 %    Eosinophils % 1.4 %    Basophils % 0.5 %    Neutrophils Absolute 7.9 (H) 1.7 - 7.7 K/uL    Lymphocytes Absolute 1.5 1.0 - 5.1 K/uL    Monocytes Absolute 0.6 0.0 - 1.3 K/uL    Eosinophils Absolute 0.1 0.0 - 0.6 K/uL    Basophils Absolute 0.1 0.0 - 0.2 K/uL   Comprehensive Metabolic Panel w/ Reflex to MG   Result Value Ref Range    Sodium 137 136 - 145 mmol/L    Potassium reflex Magnesium 3.3 (L) 3.5 - 5.1 mmol/L    Chloride 103 99 - 110 mmol/L    CO2 21 21 - 32 mmol/L    Anion Gap 13 3 - 16    Glucose 100 (H) 70 - 99 mg/dL    BUN 13 7 - 20 mg/dL    Creatinine 0.7 0.6 - 1.1 mg/dL    GFR Non-African American >60 >60    GFR African American >60 >60    Calcium 9.0 8.3 - 10.6 mg/dL    Total Protein 6.5 6.4 - 8.2 g/dL    Albumin 3.8 3.4 - 5.0 g/dL    Albumin/Globulin Ratio 1.4 1.1 - 2.2    Total Bilirubin 0.4 0.0 - 1.0 mg/dL    Alkaline Phosphatase 89 40 - 129 U/L    ALT 23 10 - 40 U/L    AST 19 15 - 37 U/L   Magnesium   Result Value Ref Range    Magnesium 1.90 1.80 - 2.40 mg/dL       ED BEDSIDE ULTRASOUND:  No results found.     RECENT VITALS:  BP: (!) 141/86,Temp: 97.7 °F (36.5 °C), Heart Rate: 68, Resp: 18, SpO2: 100 %     Procedures         ED Course     Nursing Notes, Past Medical Hx, Past Surgical Hx, Social Hx,Allergies, and Family Hx were reviewed. patient was given the following medications:  Orders Placed This Encounter   Medications    potassium chloride (KLOR-CON M) extended release tablet 40 mEq    methocarbamol (ROBAXIN) tablet 1,000 mg    naproxen (NAPROSYN) tablet 500 mg    lidocaine 4 % external patch 1 patch       CONSULTS:  None    MEDICAL DECISIONMAKING / ASSESSMENT / PLAN     Svetlana Baker is a 62 y.o. female who is presenting for evaluation of hypokalemia as well as low back pain. The patient has had an extensive work-up for her diarrhea including stool studies which have all unremarkably came back negative. She is on potassium supplements and at this time her potassium is 3.3. She has been having progressive pain in her lower back radiating down her legs. This has not been in the setting of trauma. She has no focal neurological deficits appreciated on her examination and she does not have any loss of bowel bladder control. Given the worsening pain and her age at this time studies were obtained to assess for any lumbar spine pathology. Pt symptoms at this time treated with multimodal pain medications and potassium supplemented orally. I will sign the patient out to my collegue Dr. Mikal Conklin who will follow up with plain films. Clinical Impression     1. Acute bilateral low back pain with left-sided sciatica    2. Hypokalemia        Disposition     PATIENT REFERRED TO:  No follow-up provider specified.     DISCHARGE MEDICATIONS:  New Prescriptions    No medications on file       Carletta Kayser, MD  09/21/22 6894

## 2022-09-22 ENCOUNTER — APPOINTMENT (OUTPATIENT)
Dept: MRI IMAGING | Age: 57
DRG: 551 | End: 2022-09-22
Payer: MEDICARE

## 2022-09-22 PROBLEM — E87.6 HYPOKALEMIA: Status: ACTIVE | Noted: 2022-09-22

## 2022-09-22 LAB
ANION GAP SERPL CALCULATED.3IONS-SCNC: 9 MMOL/L (ref 3–16)
BASOPHILS ABSOLUTE: 0.1 K/UL (ref 0–0.2)
BASOPHILS RELATIVE PERCENT: 0.7 %
BUN BLDV-MCNC: 10 MG/DL (ref 7–20)
CALCIUM SERPL-MCNC: 9.4 MG/DL (ref 8.3–10.6)
CHLORIDE BLD-SCNC: 107 MMOL/L (ref 99–110)
CO2: 23 MMOL/L (ref 21–32)
CREAT SERPL-MCNC: 0.7 MG/DL (ref 0.6–1.1)
EOSINOPHILS ABSOLUTE: 0.1 K/UL (ref 0–0.6)
EOSINOPHILS RELATIVE PERCENT: 1.1 %
GFR AFRICAN AMERICAN: >60
GFR NON-AFRICAN AMERICAN: >60
GLUCOSE BLD-MCNC: 103 MG/DL (ref 70–99)
GLUCOSE BLD-MCNC: 105 MG/DL (ref 70–99)
GLUCOSE BLD-MCNC: 109 MG/DL (ref 70–99)
GLUCOSE BLD-MCNC: 93 MG/DL (ref 70–99)
GLUCOSE BLD-MCNC: 94 MG/DL (ref 70–99)
HCT VFR BLD CALC: 40.9 % (ref 36–48)
HEMOGLOBIN: 13.6 G/DL (ref 12–16)
LYMPHOCYTES ABSOLUTE: 1.4 K/UL (ref 1–5.1)
LYMPHOCYTES RELATIVE PERCENT: 16.8 %
MCH RBC QN AUTO: 31.3 PG (ref 26–34)
MCHC RBC AUTO-ENTMCNC: 33.3 G/DL (ref 31–36)
MCV RBC AUTO: 94.1 FL (ref 80–100)
MONOCYTES ABSOLUTE: 0.6 K/UL (ref 0–1.3)
MONOCYTES RELATIVE PERCENT: 6.4 %
NEUTROPHILS ABSOLUTE: 6.5 K/UL (ref 1.7–7.7)
NEUTROPHILS RELATIVE PERCENT: 75 %
PDW BLD-RTO: 14.7 % (ref 12.4–15.4)
PERFORMED ON: ABNORMAL
PERFORMED ON: ABNORMAL
PERFORMED ON: NORMAL
PERFORMED ON: NORMAL
PLATELET # BLD: 181 K/UL (ref 135–450)
PMV BLD AUTO: 9.9 FL (ref 5–10.5)
POTASSIUM REFLEX MAGNESIUM: 3.9 MMOL/L (ref 3.5–5.1)
RBC # BLD: 4.34 M/UL (ref 4–5.2)
SODIUM BLD-SCNC: 139 MMOL/L (ref 136–145)
WBC # BLD: 8.6 K/UL (ref 4–11)

## 2022-09-22 PROCEDURE — 85025 COMPLETE CBC W/AUTO DIFF WBC: CPT

## 2022-09-22 PROCEDURE — 72148 MRI LUMBAR SPINE W/O DYE: CPT

## 2022-09-22 PROCEDURE — 93005 ELECTROCARDIOGRAM TRACING: CPT

## 2022-09-22 PROCEDURE — 97162 PT EVAL MOD COMPLEX 30 MIN: CPT

## 2022-09-22 PROCEDURE — 97530 THERAPEUTIC ACTIVITIES: CPT

## 2022-09-22 PROCEDURE — 2580000003 HC RX 258: Performed by: INTERNAL MEDICINE

## 2022-09-22 PROCEDURE — 6370000000 HC RX 637 (ALT 250 FOR IP): Performed by: STUDENT IN AN ORGANIZED HEALTH CARE EDUCATION/TRAINING PROGRAM

## 2022-09-22 PROCEDURE — 97166 OT EVAL MOD COMPLEX 45 MIN: CPT

## 2022-09-22 PROCEDURE — 97535 SELF CARE MNGMENT TRAINING: CPT

## 2022-09-22 PROCEDURE — 6360000002 HC RX W HCPCS: Performed by: INTERNAL MEDICINE

## 2022-09-22 PROCEDURE — 97116 GAIT TRAINING THERAPY: CPT

## 2022-09-22 PROCEDURE — 6370000000 HC RX 637 (ALT 250 FOR IP): Performed by: INTERNAL MEDICINE

## 2022-09-22 PROCEDURE — 80048 BASIC METABOLIC PNL TOTAL CA: CPT

## 2022-09-22 PROCEDURE — 1200000000 HC SEMI PRIVATE

## 2022-09-22 PROCEDURE — 36415 COLL VENOUS BLD VENIPUNCTURE: CPT

## 2022-09-22 RX ORDER — DICYCLOMINE HCL 20 MG
20 TABLET ORAL
Status: DISCONTINUED | OUTPATIENT
Start: 2022-09-22 | End: 2022-09-22

## 2022-09-22 RX ORDER — ERGOCALCIFEROL 1.25 MG/1
50000 CAPSULE ORAL WEEKLY
Status: DISCONTINUED | OUTPATIENT
Start: 2022-09-22 | End: 2022-09-26 | Stop reason: HOSPADM

## 2022-09-22 RX ORDER — MORPHINE SULFATE 2 MG/ML
2 INJECTION, SOLUTION INTRAMUSCULAR; INTRAVENOUS EVERY 6 HOURS PRN
Status: ACTIVE | OUTPATIENT
Start: 2022-09-22 | End: 2022-09-24

## 2022-09-22 RX ORDER — IBUPROFEN 400 MG/1
400 TABLET ORAL EVERY 8 HOURS PRN
Status: DISCONTINUED | OUTPATIENT
Start: 2022-09-22 | End: 2022-09-26 | Stop reason: HOSPADM

## 2022-09-22 RX ORDER — ALPRAZOLAM 0.5 MG/1
1 TABLET ORAL 2 TIMES DAILY PRN
Status: DISCONTINUED | OUTPATIENT
Start: 2022-09-22 | End: 2022-09-22

## 2022-09-22 RX ORDER — MECOBALAMIN 5000 MCG
10 TABLET,DISINTEGRATING ORAL NIGHTLY PRN
Status: DISCONTINUED | OUTPATIENT
Start: 2022-09-22 | End: 2022-09-26 | Stop reason: HOSPADM

## 2022-09-22 RX ORDER — ALPRAZOLAM 0.5 MG/1
0.5 TABLET ORAL 2 TIMES DAILY PRN
Status: DISCONTINUED | OUTPATIENT
Start: 2022-09-22 | End: 2022-09-26 | Stop reason: HOSPADM

## 2022-09-22 RX ORDER — NALOXONE HYDROCHLORIDE 0.4 MG/ML
0.4 INJECTION, SOLUTION INTRAMUSCULAR; INTRAVENOUS; SUBCUTANEOUS PRN
Status: DISCONTINUED | OUTPATIENT
Start: 2022-09-22 | End: 2022-09-26 | Stop reason: HOSPADM

## 2022-09-22 RX ORDER — ACETAMINOPHEN 325 MG/1
650 TABLET ORAL EVERY 8 HOURS SCHEDULED
Status: COMPLETED | OUTPATIENT
Start: 2022-09-22 | End: 2022-09-25

## 2022-09-22 RX ORDER — SACCHAROMYCES BOULARDII 250 MG
CAPSULE ORAL 2 TIMES DAILY
Status: DISCONTINUED | OUTPATIENT
Start: 2022-09-22 | End: 2022-09-26 | Stop reason: HOSPADM

## 2022-09-22 RX ORDER — METHOCARBAMOL 500 MG/1
500 TABLET, FILM COATED ORAL 3 TIMES DAILY
Status: DISCONTINUED | OUTPATIENT
Start: 2022-09-22 | End: 2022-09-26 | Stop reason: HOSPADM

## 2022-09-22 RX ORDER — ROPINIROLE 2 MG/1
2 TABLET, FILM COATED ORAL DAILY
Status: DISCONTINUED | OUTPATIENT
Start: 2022-09-22 | End: 2022-09-26 | Stop reason: HOSPADM

## 2022-09-22 RX ORDER — OXYCODONE HYDROCHLORIDE 5 MG/1
5 TABLET ORAL EVERY 4 HOURS PRN
Status: DISCONTINUED | OUTPATIENT
Start: 2022-09-22 | End: 2022-09-26 | Stop reason: HOSPADM

## 2022-09-22 RX ORDER — MECOBALAMIN 5000 MCG
20 TABLET,DISINTEGRATING ORAL NIGHTLY PRN
Status: DISCONTINUED | OUTPATIENT
Start: 2022-09-22 | End: 2022-09-22

## 2022-09-22 RX ORDER — DULOXETIN HYDROCHLORIDE 60 MG/1
60 CAPSULE, DELAYED RELEASE ORAL 2 TIMES DAILY
Status: DISCONTINUED | OUTPATIENT
Start: 2022-09-22 | End: 2022-09-26 | Stop reason: HOSPADM

## 2022-09-22 RX ORDER — PANTOPRAZOLE SODIUM 40 MG/1
40 TABLET, DELAYED RELEASE ORAL
Refills: 6 | Status: DISCONTINUED | OUTPATIENT
Start: 2022-09-22 | End: 2022-09-26 | Stop reason: HOSPADM

## 2022-09-22 RX ORDER — IBUPROFEN 400 MG/1
400 TABLET ORAL
Refills: 3 | Status: DISCONTINUED | OUTPATIENT
Start: 2022-09-22 | End: 2022-09-22

## 2022-09-22 RX ORDER — ATENOLOL 25 MG/1
25 TABLET ORAL DAILY
Status: DISCONTINUED | OUTPATIENT
Start: 2022-09-22 | End: 2022-09-24

## 2022-09-22 RX ORDER — ROPINIROLE 2 MG/1
4 TABLET, FILM COATED ORAL NIGHTLY
Status: DISCONTINUED | OUTPATIENT
Start: 2022-09-22 | End: 2022-09-26 | Stop reason: HOSPADM

## 2022-09-22 RX ADMIN — IBUPROFEN 400 MG: 400 TABLET, FILM COATED ORAL at 16:40

## 2022-09-22 RX ADMIN — ALPRAZOLAM 0.5 MG: 0.5 TABLET ORAL at 21:26

## 2022-09-22 RX ADMIN — SODIUM CHLORIDE, PRESERVATIVE FREE 10 ML: 5 INJECTION INTRAVENOUS at 09:39

## 2022-09-22 RX ADMIN — ROPINIROLE HYDROCHLORIDE 4 MG: 2 TABLET, FILM COATED ORAL at 03:16

## 2022-09-22 RX ADMIN — DULOXETINE HYDROCHLORIDE 60 MG: 60 CAPSULE, DELAYED RELEASE ORAL at 21:26

## 2022-09-22 RX ADMIN — ACETAMINOPHEN 650 MG: 325 TABLET, FILM COATED ORAL at 21:30

## 2022-09-22 RX ADMIN — ONDANSETRON 4 MG: 2 INJECTION INTRAMUSCULAR; INTRAVENOUS at 09:38

## 2022-09-22 RX ADMIN — METHOCARBAMOL TABLETS 500 MG: 500 TABLET, COATED ORAL at 12:43

## 2022-09-22 RX ADMIN — IBUPROFEN 400 MG: 400 TABLET, FILM COATED ORAL at 09:33

## 2022-09-22 RX ADMIN — ERGOCALCIFEROL 50000 UNITS: 1.25 CAPSULE ORAL at 09:33

## 2022-09-22 RX ADMIN — ACETAMINOPHEN 650 MG: 325 TABLET, FILM COATED ORAL at 12:43

## 2022-09-22 RX ADMIN — METHOCARBAMOL TABLETS 500 MG: 500 TABLET, COATED ORAL at 21:26

## 2022-09-22 RX ADMIN — DULOXETINE HYDROCHLORIDE 60 MG: 60 CAPSULE, DELAYED RELEASE ORAL at 03:16

## 2022-09-22 RX ADMIN — IBUPROFEN 400 MG: 400 TABLET, FILM COATED ORAL at 12:43

## 2022-09-22 RX ADMIN — Medication 250 MG: at 21:26

## 2022-09-22 RX ADMIN — Medication 250 MG: at 09:33

## 2022-09-22 RX ADMIN — ROPINIROLE HYDROCHLORIDE 2 MG: 2 TABLET, FILM COATED ORAL at 09:33

## 2022-09-22 RX ADMIN — DULOXETINE HYDROCHLORIDE 60 MG: 60 CAPSULE, DELAYED RELEASE ORAL at 09:33

## 2022-09-22 RX ADMIN — PANTOPRAZOLE SODIUM 40 MG: 40 TABLET, DELAYED RELEASE ORAL at 06:48

## 2022-09-22 RX ADMIN — ENOXAPARIN SODIUM 30 MG: 100 INJECTION SUBCUTANEOUS at 21:26

## 2022-09-22 RX ADMIN — ROPINIROLE HYDROCHLORIDE 4 MG: 2 TABLET, FILM COATED ORAL at 21:26

## 2022-09-22 RX ADMIN — ENOXAPARIN SODIUM 30 MG: 100 INJECTION SUBCUTANEOUS at 09:33

## 2022-09-22 RX ADMIN — SODIUM CHLORIDE: 9 INJECTION, SOLUTION INTRAVENOUS at 06:49

## 2022-09-22 RX ADMIN — MEROPENEM 1000 MG: 1 INJECTION, POWDER, FOR SOLUTION INTRAVENOUS at 13:58

## 2022-09-22 RX ADMIN — MEROPENEM 1000 MG: 1 INJECTION, POWDER, FOR SOLUTION INTRAVENOUS at 06:49

## 2022-09-22 ASSESSMENT — PAIN SCALES - GENERAL
PAINLEVEL_OUTOF10: 8
PAINLEVEL_OUTOF10: 0
PAINLEVEL_OUTOF10: 8
PAINLEVEL_OUTOF10: 7
PAINLEVEL_OUTOF10: 0
PAINLEVEL_OUTOF10: 8
PAINLEVEL_OUTOF10: 6
PAINLEVEL_OUTOF10: 6
PAINLEVEL_OUTOF10: 8
PAINLEVEL_OUTOF10: 0

## 2022-09-22 ASSESSMENT — PAIN - FUNCTIONAL ASSESSMENT
PAIN_FUNCTIONAL_ASSESSMENT: PREVENTS OR INTERFERES SOME ACTIVE ACTIVITIES AND ADLS
PAIN_FUNCTIONAL_ASSESSMENT: PREVENTS OR INTERFERES SOME ACTIVE ACTIVITIES AND ADLS

## 2022-09-22 ASSESSMENT — PAIN DESCRIPTION - DESCRIPTORS
DESCRIPTORS: ACHING

## 2022-09-22 ASSESSMENT — PAIN DESCRIPTION - LOCATION
LOCATION: BACK

## 2022-09-22 ASSESSMENT — PAIN DESCRIPTION - ORIENTATION
ORIENTATION: LOWER
ORIENTATION: LOWER
ORIENTATION: LOWER;RIGHT;LEFT

## 2022-09-22 ASSESSMENT — PAIN DESCRIPTION - PAIN TYPE: TYPE: ACUTE PAIN;CHRONIC PAIN

## 2022-09-22 ASSESSMENT — PAIN DESCRIPTION - FREQUENCY: FREQUENCY: INTERMITTENT

## 2022-09-22 ASSESSMENT — PAIN DESCRIPTION - ONSET: ONSET: ON-GOING

## 2022-09-22 NOTE — ED NOTES
Pt called out needing to use the restroom. Purewick catheter placed at this time. Pt still drowsy, but more awake and able to hold conversation at this time.      Lilian Kumar RN  09/21/22 0383

## 2022-09-22 NOTE — SIGNIFICANT EVENT
Rapid was called for room 6306 at 12:47    On arrival we were informed by the nurse that patient had an episode in the restroom where she suddenly slumped over the toilet. Initial assessment   Subjective: She had been admitted for observation for her UTI and on going drowsiness as per the nurse. The patient said she was feeling lightheaded but denied any racing of heart, weakness, numbness or tingling. The entire episode had lasted for 5 minutes and there was no LOC, head trauma, seizure like activity reported. Objective:   Vitals: BP:126/78, HR 88, SPO2 92%, Temp: 98.4 and RR: 22.  PE: Pt was AO x4, Breath sounds were clear b/l, S1 and S2 were heard, Motor strength, sensations all over were intact. Labs: From earlier showed k:3.3( was replaced appropriately) POC glucose 94    Interventions:  Oxygen: Not required   IV Access: Peripheral IV was achieved  Fluids: None   Labs: None  Imaging: None  Medications: None  Others: None    Reevaluation:  Subjective: Pt said here light headedness had improved. Objective:   Vitals: Vital WNL. PE: Pt was AO x4, Breath sounds were clear b/l, S1 and S2 were heard, Motor strength, sensations all over were intact.   Labs: NA    Assessment:  -Vasovagal Syncope  Lightheadedness, possible dehydration, incidence while standing   -No interventions needed at this time, patient is stable and at baseline    Plan:  Observation, can consider orthostatic vital in the AM    Dale Roy MD, PGY-1  09/22/22  1:10 AM

## 2022-09-22 NOTE — ED NOTES
Pt transferred to [place] [address] via stretcher in stable condition. Pt much more alert - able to scoot herself from stretcher to room bed. Pt up to [facility] upon arrival without issue. RN notified of pt arrival. Care transferred.      Cyndi Danielle RN  09/22/22 7808

## 2022-09-22 NOTE — PROGRESS NOTES
Comprehensive Nutrition Assessment    Type and Reason for Visit:  Initial, Positive Nutrition Screen    Nutrition Recommendations/Plan:   Continue Regular; low fat/low chol/ high fiber/ 2 gm Na diet  Add Ensure HP [id number]  Monitor nutrition adequacy, pertinent labs, bowel habits, wt changes, and clinical progress     Malnutrition Assessment:  Malnutrition Status: At risk for malnutrition (Comment) (09/22/22 1548)    Context:  Acute Illness     Findings of the 6 clinical characteristics of malnutrition:  Energy Intake:  75% or less of estimated energy requirements for 7 or more days  Weight Loss:  No significant weight loss     Body Fat Loss:  No significant body fat loss     Muscle Mass Loss:  No significant muscle mass loss    Fluid Accumulation:  No significant fluid accumulation        Nutrition Assessment:    Positive nutrition screen: Pt admitted w/ pyelonephritis. Pt is on Regular; low fat/low chol/high fiber/2 gm Na diet, she had consumed ~75% of breakfast during assessment. Pt reports that for the past 5-6 weeks she has had decreased intakes (50-75% of normal intakes) d/t abdominal pain, which is followed by diarrhea. 6% wt loss in past 6 months. She stated that her outpatient GI MD just told her that she has gastroparesis, which is why she has been following a bland diet. After discussing foods that pt eats at home, determined that pt does not eat a bland diet, and consumes foods high in fat. Counseled pt on low fat foods. Pt drinks Premiere Protein at home, will add Ensure HP [id number]. Will continue to monitor. Nutrition Related Findings:    . A1c 6.3. Wound Type: None       Current Nutrition Intake & Therapies:    Average Meal Intake: %  Average Supplements Intake: None Ordered  ADULT DIET;  Regular; Low Fat/Low Chol/High Fiber/2 gm Na    Anthropometric Measures:  Height: 5' 5.98\" (167.6 cm)  Ideal Body Weight (IBW): 130 lbs (59 kg)       Current Body Weight: 275 lb 2.2 oz (124.8 kg), 211.6 % IBW. Weight Source: Bed Scale  Current BMI (kg/m2): 44.4        Weight Adjustment For: No Adjustment                 BMI Categories: Obese Class 3 (BMI 40.0 or greater)    Nutrition Diagnosis:   Inadequate oral intake related to inadequate protein-energy intake as evidenced by poor intake prior to admission    Nutrition Interventions:   Food and/or Nutrient Delivery: Continue Current Diet, Start Oral Nutrition Supplement  Nutrition Education/Counseling: Counseling needed (If gastroparesis, outpatient RD on low fat diet is needed)  Coordination of Nutrition Care: Continue to monitor while inpatient  Plan of Care discussed with: Pt    Goals:  Previous Goal Met: Progressing toward Goal(s)  Goals: Meet at least 75% of estimated needs, prior to discharge       Nutrition Monitoring and Evaluation:   Behavioral-Environmental Outcomes: Knowledge or Skill  Food/Nutrient Intake Outcomes: Food and Nutrient Intake, Supplement Intake  Physical Signs/Symptoms Outcomes: Biochemical Data, Nutrition Focused Physical Findings, Weight, Nausea or Vomiting, Diarrhea    Discharge Planning:    Continue Oral Nutrition Supplement, Continue current diet, Recommend pursue outpatient nutrition counseling     Gaye Roberson, [address] [place] [address],   Contact: [phone]

## 2022-09-22 NOTE — H&P
Hospital Medicine History & Physical      PCP: Lucero Harding DO    Date of Admission: 2022    Date of Service: Pt seen/examined on 2022 and Admitted to Inpatient with expected LOS greater than two midnights due to medical therapy. Chief Complaint: Back pain      History Of Present Illness:     62 y.o. morbidly obese female who presented to [facility] ED with mid back pain worse on the left for several days. She denies any dysuria, frequency, urgency, fevers, chills, nausea or vomiting. She does report diarrhea, however, for a couple of months which usually follows any oral intake and the bowel movement is preceded by cramping abdominal pain. She denies any melena, hematochezia. The stool consistency is mostly liquid. She also reports fatigue.     Past Medical History:          Diagnosis Date    Anxiety     Depression     Endometriosis     GERD (gastroesophageal reflux disease)     Hypertension     Osteoarthritis     PONV (postoperative nausea and vomiting)     Restless leg syndrome        Past Surgical History:          Procedure Laterality Date    ANUS SURGERY  2018    fissure     SECTION      x3    COLONOSCOPY      DILATION AND CURETTAGE OF UTERUS N/A 6/3/2021    VIDEO HYSTEROSCOPY DILATATION AND CURETTAGE, POSSIBLE MYOSURE, POSSIBLE POLYPECTOMY performed by Efrem Merritt DO at [address] [place] [address]      right wrist    HERNIA REPAIR  2018    LAPAROSCOPIC PARAESOPHAGEAL HERNIA REPAIR WITH MESH    HYSTERECTOMY (CERVIX STATUS UNKNOWN) N/A 10/21/2021    ROBOTIC ASSISTED LAPAROSCOPIC HYSTERECTOMY WITH RIGHT SALPINGECTOMY, RIGHT OOPHORECTOMY, CYSTOSCOPY, LYSIS OF ADHESIONS  CPT CODE - 55487 performed by Andreea Rust DO at [address] [place] [address] ARTHROSCOPY Left 11/15/12    ARTHROSCOPY LEFT KNEE WITH SYOVECTOMY AND CHONDROPLASTY    OVARY REMOVAL Right     ROTATOR CUFF REPAIR Right 2020    EXAM UNDER ANESTHESIA VIDEO ARTHROSCOPY RIGHT SHOULDER, MINI- OPEN ROTATOR CUFF REPAIR, NEER ACROMIOPLASTY, [place] PROCEDURE WITH EXPAREL performed by Sandee Esquivel MD at [address] [place] ARTHROSCOPY Right 11/25/2020    VIDEO ARTHROSCOPY RIGHT SHOULDER, OPEN ROTATOR CUFF REPAIR, BICEPS TENOTOMY -BLOCK- performed by Wilfredo Burton MD at [address] [place] ARTHROSCOPY Right 2/24/2021    RIGHT SHOULDER ARTHROSCOPIC INCISION AND DRAINAGE performed by Wilfredo Burton MD at [address] [place] ARTHROSCOPY Right 4/28/2021    VIDEO ARTHROSCOPY RIGHT SHOULDER, ROTATOR CUFF REPAIR, DEBRIDEMENT performed by Wilfredo Burton MD at [address] [employer]    UPPER GASTROINTESTINAL ENDOSCOPY  2015    esophageasl stretch    UPPER GASTROINTESTINAL ENDOSCOPY      ATTEMPTED BUT PATIENT ASPIRATED    UPPER GASTROINTESTINAL ENDOSCOPY N/A 5/24/2022    EGD W/ANES. (11:00) performed by Emilie Crystal DO at [address] [facility]       Medications Prior to Admission:      Prior to Admission medications    Medication Sig Start Date End Date Taking?  Authorizing Provider   cefdinir (OMNICEF) 300 MG capsule Take 1 capsule by mouth 2 times daily for 10 days 9/21/22 10/1/22 Yes Fauzia Morin MD   ondansetron (ZOFRAN ODT) 4 MG disintegrating tablet Take 2 tablets by mouth every 8 hours as needed for Nausea 9/21/22  Yes Fauzia Morin MD   atenolol (TENORMIN) 25 MG tablet TAKE 1 TABLET BY MOUTH EVERY DAY  Patient not taking: Reported on 9/22/2022 9/19/22   Alondra Hodgson DO   rOPINIRole (REQUIP) 2 MG tablet TAKE 2 TABLETS AT NIGHT AND ONE IN THE MORNING FOR LEGS 9/19/22   Alondra Hodgson DO   potassium chloride (KLOR-CON M20) 20 MEQ extended release tablet Take one or two daily as directed for potassium 9/19/22   Alondra Hodgson DO   ibuprofen (ADVIL;MOTRIN) 600 MG tablet TAKE 1 TABLET BY MOUTH EVERY 8 HOURS AS NEEDED FOR PAIN 9/6/22   Alondra Hodgson DO   DULoxetine (CYMBALTA) 60 MG extended release capsule Take 1 capsule by mouth 2 times Hx     Breast Cancer Neg Hx     Colon Cancer Neg Hx        REVIEW OF SYSTEMS COMPLETED:   Pertinent positives as noted in the HPI. All other systems reviewed and negative. PHYSICAL EXAM PERFORMED:    /83   Pulse 72   Temp 97.7 °F (36.5 °C) (Axillary)   Resp 18   Ht 5' 6\" (1.676 m)   Wt 275 lb 3.2 oz (124.8 kg)   LMP 09/20/2017   SpO2 97%   BMI 44.42 kg/m²     General appearance:  Obese, acutely ill in no apparent distress, appears stated age and cooperative. HEENT:  Normal cephalic, atraumatic without obvious deformity. Pupils equal, round, and reactive to light. Extra ocular muscles intact. Conjunctivae/corneas clear. Dry oral mucosa. Neck: Short/thick, with full range of motion. Trachea midline. Respiratory:  Normal respiratory effort. Clear to auscultation, bilaterally without Rales/Wheezes/Rhonchi. Cardiovascular:  Regular rate and rhythm with normal S1/S2 without murmurs, rubs or gallops. Abdomen: Soft, obese, non-tender, non-distended with normal bowel sounds. Musculoskeletal:  No clubbing, cyanosis or edema bilaterally. Full range of motion without deformity. + L CVAT  Skin: Skin color, texture, turgor normal.  No rashes or lesions. Neurologic:  Neurovascularly intact without any focal sensory/motor deficits. Cranial nerves: II-XII intact, grossly non-focal.  Psychiatric:  Alert and oriented, thought content appropriate, normal insight  Capillary Refill: Brisk,3 seconds, normal  Peripheral Pulses: +2 palpable, equal bilaterally       Labs:     Recent Labs     09/20/22  1009 09/21/22  1348   WBC 8.5 10.2   HGB 13.6 14.1   HCT 41.1 42.0    196     Recent Labs     09/20/22  1009 09/21/22  1348    137   K 3.3* 3.3*    103   CO2 22 21   BUN 14 13   CREATININE 1.1 0.7   CALCIUM 9.1 9.0     Recent Labs     09/20/22  1009 09/21/22  1348   AST 17 19   ALT 22 23   BILITOT <0.2 0.4   ALKPHOS 82 89     No results for input(s): INR in the last 72 hours.   No results for MD    Thank you Mehul Bailey DO for the opportunity to be involved in this patient's care. If you have any questions or concerns please feel free to contact me at [phone].

## 2022-09-22 NOTE — ED NOTES
Attempted to call pts Mom to update on POC at this time. No answer.      Kang Steinberg RN  09/22/22 5234

## 2022-09-22 NOTE — PROGRESS NOTES
Acute back pain: midline low back pain, exam limited by obesity. CT A/P:  L5-S1 severe disc space narrowing: POA  - MRI to better evaluate: Report noted  - Pain control, muscle relaxers    Complicated UTI with pyelonephritis: E coli  -Was started on Empiric meropenem given her history of ESBL E. Coli  - Culture results: E coli. Sens. pending. Postprandial diarrhea  -Check stool studies, if diarrhea persists    Hyperglycemia  -Check glycosylated hemoglobin     Chronic anxiety: POA  - Benzodiazepine dependent  - Limit psychoactive meds kaveh with episode of \"slumped over in the toilet\"    Hypokalemia  -Replace potassium and monitor      Morbid obesity Body mass index is 44.44 kg/m². - Complicating assessment and treatment. Placing patient at risk for multiple co-morbidities as well as early death and contributing to the patient's presentation.  on weight loss          DVT Prophylaxis: Lovenox  Diet: ADULT DIET;  Regular; Low Fat/Low Chol/High Fiber/2 gm Na  Code Status: Full Code    PT/OT Eval Status: n/a    Dispo -Pending progress  PT/OT eval  noted  Possible DC tomorrow  Id ESBL: call in PICC Team on Call for PICC for [place]; [place] [address]  Discussed with case Alonso Glez MD

## 2022-09-22 NOTE — ED NOTES
Pt lethargic, responds to sternal rub at this time. Dr. Johnny Newton called to bedside. States to monitor pt for now. VSS, room air, pt placed on bedside telemetry.      Shayan Ricks RN  09/21/22 2011

## 2022-09-22 NOTE — PROGRESS NOTES
Physical Therapy  Facility/Department: [address] [place] [address]  Physical Therapy Initial Assessment/Treatment    Name: Greg Augustine  : 1965  MRN: [id number]  Date of Service: 2022    Discharge Recommendations: Greg Augustine scored a 20/24 on the AM-PAC short mobility form. Current research shows that an AM-PAC score of 18 or greater is typically associated with a discharge to the patient's home setting. If patient discharges prior to next session this note will serve as a discharge summary. Please see below for the latest assessment towards goals. PT Equipment Recommendations  Equipment Needed: No      Patient Diagnosis(es): The primary encounter diagnosis was Pyelonephritis. Diagnoses of Acute bilateral low back pain with left-sided sciatica and Hypokalemia were also pertinent to this visit. Past Medical History:  has a past medical history of Anxiety, Depression, Endometriosis, GERD (gastroesophageal reflux disease), Hypertension, Osteoarthritis, PONV (postoperative nausea and vomiting), and Restless leg syndrome. Past Surgical History:  has a past surgical history that includes Tonsillectomy ();  section; Ovary removal (Right, ); Upper gastrointestinal endoscopy (); Knee arthroscopy (Left, 11/15/12); Upper gastrointestinal endoscopy (); hernia repair (2018); Anus surgery (2018); fracture surgery; Rotator cuff repair (Right, 2020); Shoulder arthroscopy (Right, 2020); Colonoscopy; Shoulder arthroscopy (Right, 2021); Shoulder arthroscopy (Right, 2021); Dilation and curettage of uterus (N/A, 6/3/2021); Hysterectomy (N/A, 10/21/2021); Upper gastrointestinal endoscopy; and Upper gastrointestinal endoscopy (N/A, 2022). Assessment   Body Structures, Functions, Activity Limitations Requiring Skilled Therapeutic Intervention: Decreased functional mobility ; Decreased endurance  Assessment: 62year old adm with UTI, hypokalemia.  Otilio very slow, cautious gait but was steady. Endurance limited & could only walk short distances. Encouraged to try walking with nursing throughout day as well as therapy to increase her endurance. No equipment needs at dc & do not feel pt will need further PT at [place]. Will follow while in hospital.  Treatment Diagnosis: low endurance  Therapy Prognosis: Good  Decision Making: Medium Complexity  Requires PT Follow-Up: Yes  Activity Tolerance  Activity Tolerance: Patient limited by endurance  Activity Tolerance Comments: O2 sat 94% on RA after walking; BP  125/89 sitting, 111/76 standing, 133/98 after walking     Plan   Plan  Plan:  (2-5)  Current Treatment Recommendations: Functional mobility training, Transfer training, Gait training, Patient/Caregiver education & training, Stair training, Balance training  Safety Devices  Type of Devices: Call light within reach, Chair alarm in place, Left in chair, Nurse notified     Restrictions  Position Activity Restriction  Other position/activity restrictions: up with A prn     Subjective   General  Chart Reviewed: Yes  Additional Pertinent Hx: 62 y.o. female who is presenting for evaluation of hypokalemia as well as low back pain. CT abd/pelvis:1. No acute intra-abdominopelvic abnormality. 2. Mild atelectasis in the posterior lung bases. 3. L5-S1 severe disc space narrowing. 4. Stable small adrenal nodules consistent with benign nodules. Lumbar spine Xrays (-). Rapid response 9/21 for decreased responsiveness while on toilet. Family / Caregiver Present: No  Referring Practitioner: Will Perez MD  Diagnosis: hypokalemia  Follows Commands: Within Functional Limits  Subjective  Subjective: Found in bed, agreeable to PT. Reports still having back pain from UTI but it has improved some.          Social/Functional History  Social/Functional History  Lives With: Family (Mom)  Type of Home: House (duplex, pt lives in basement of unit and mother lives upstairs)  Home Layout: One level (bed and bath in basement, mother lives in upstairs level)  Home Access: Stairs to enter with rails (then down a flight to get to basement B HR)  Entrance Stairs - Rails: Left  Bathroom Shower/Tub: Tub/Shower unit  Bathroom Toilet: Standard  Bathroom Equipment: Grab bars in shower, Shower chair  Home Equipment: Lift chair  Has the patient had two or more falls in the past year or any fall with injury in the past year?: No  ADL Assistance: [address] [place] [address] Avenue: Independent (pt does her own cleaning and laundry)  Ambulation Assistance: Independent  Transfer Assistance: Independent  Active : Yes       Cognition - WFL        Objective           AROM RLE (degrees)  RLE AROM: WFL  AROM LLE (degrees)  LLE AROM : WFL  Strength RLE  Strength RLE: WFL  Strength LLE  Strength LLE: WFL           Bed mobility  Supine to Sit: Independent  Transfers  Sit to Stand: Stand by assistance  Stand to sit: Stand by assistance  Ambulation  Surface: level tile  Device: No Device  Assistance: Stand by assistance  Quality of Gait: slow, cautious gait but no loss of balance, occasionally reaching lightly for furniture in room to touch  Gait Deviations: Decreased step length;Decreased step height  Distance: 10' x 2, 45'     Balance  Sitting - Static: Good  Standing - Static: Good   Treatment included gait/transfer training, pt education.                                                     AM-PAC Score  AM-PAC Inpatient Mobility Raw Score : 20 (09/22/22 1316)  AM-PAC Inpatient T-Scale Score : 47.67 (09/22/22 1316)  Mobility Inpatient CMS 0-100% Score: 35.83 (09/22/22 1316)  Mobility Inpatient CMS G-Code Modifier : CJ (09/22/22 1316)          Tinneti Score       Goals  Short Term Goals  Time Frame for Short term goals: dc  Short term goal 1: Sit<>stand supervision  Short term goal 2: Ambulate 100' supervision  Short term goal 3: up/down flight of steps with rails SBA  Patient Goals   Patient goals : home at NH       Education  Patient Education  Education Given To: Patient  Education Provided: Role of Therapy;Plan of Care  Education Provided Comments: need for OOB/ambulation  Education Method: Verbal  Education Outcome: Verbalized understanding      Therapy Time   Individual Concurrent Group Co-treatment   Time In 1101         Time Out 1139         Minutes 38          Timed Code Treatment Minutes:   25    Total Treatment Minutes:  Fredy [address] [facility]

## 2022-09-22 NOTE — PROGRESS NOTES
Pt more alert when first up to the floor from ER. Pt assisted to bedside commode, after sitting on commode patient became more somnolent. Rapid response called. Pt transferred back to bed. Vital signs stable, pt able to answer some questions with short answers, but remained very lethargic. New orders placed by resident physicians, see orders.

## 2022-09-22 NOTE — PROGRESS NOTES
Pt A&Ox4, calling out to staff, able to hold conversation and ask questions. Pt's supplemental oxygen removed with SpO2 remaining above 94%. Pt able to swallow pills and water without difficulty. Will continue to closely monitor pt's level of consciousness.

## 2022-09-22 NOTE — PROGRESS NOTES
Pharmacy Note - Extended Infusion Beta-Lactam Adjustment    Meropenem ordered for treatment of UTI. Per [facility] Extended Infusion Beta-Lactam Policy, Meropenem will be changed to 1000 mg LD followed by 1000mg IV q8hr. Estimated Creatinine Clearance: Estimated Creatinine Clearance: 120 mL/min (based on SCr of 0.7 mg/dL). Dialysis Status, [place], CKD: None  BMI: Body mass index is 44.42 kg/m². Rationale for Adjustment: Agent is renally eliminated and demonstrates time-dependent effect on bacterial eradication. Extended-infusion dosing strategy aims to enhance microbiologic and clinical efficacy. Pharmacy will continue to monitor renal function, cultures and sensitivities (where available) and adjust dose as necessary. Please call with any questions.     Amilcar Mccarty, PharmD  Main Pharmacy: [id number]  9/21/2022 10:39 PM

## 2022-09-22 NOTE — ED NOTES
Pt states she is feeling better - pain 3/10. IV Rocephin complete. Notified Dr. Erica Aguiar. States will discharge pt. PIV removed.      Susie Vasquez RN  09/21/22 2010       Susie Vasquez RN  09/21/22 2010

## 2022-09-22 NOTE — PROGRESS NOTES
Occupational Therapy  Facility/Department: [address]  Occupational Therapy Initial Assessment/Treatment    Name: Tommy Anaya  : 1965  MRN: [id number]  Date of Service: 2022    Discharge Recommendations:   Tommy Anaya scored a 21/24 on the AM-PAC ADL Inpatient form. Current research shows that an AM-PAC score of 18 or greater is typically associated with a discharge to the patient's home setting. Please see assessment section for further patient specific details. If patient discharges prior to next session this note will serve as a discharge summary. Please see below for the latest assessment towards goals. OT Equipment Recommendations  Equipment Needed: No       Patient Diagnosis(es): The primary encounter diagnosis was Pyelonephritis. Diagnoses of Acute bilateral low back pain with left-sided sciatica and Hypokalemia were also pertinent to this visit. Past Medical History:  has a past medical history of Anxiety, Depression, Endometriosis, GERD (gastroesophageal reflux disease), Hypertension, Osteoarthritis, PONV (postoperative nausea and vomiting), and Restless leg syndrome. Past Surgical History:  has a past surgical history that includes Tonsillectomy ();  section; Ovary removal (Right, ); Upper gastrointestinal endoscopy (); Knee arthroscopy (Left, 11/15/12); Upper gastrointestinal endoscopy (); hernia repair (2018); Anus surgery (2018); fracture surgery; Rotator cuff repair (Right, 2020); Shoulder arthroscopy (Right, 2020); Colonoscopy; Shoulder arthroscopy (Right, 2021); Shoulder arthroscopy (Right, 2021); Dilation and curettage of uterus (N/A, 6/3/2021); Hysterectomy (N/A, 10/21/2021); Upper gastrointestinal endoscopy; and Upper gastrointestinal endoscopy (N/A, 2022). Treatment Diagnosis: Impaired ADLs and activity tolerance.       Assessment   Performance deficits / Impairments: Decreased functional mobility ; Decreased ADL status; Decreased endurance;Decreased strength    Assessment: Pt is a 63 y/o admitted with UTI, hypokalemia. Pt states she lives with her mother and was independent with ADLs, IADLs, and mobility PTA. She currently requires SBA with toileting and functional transfers and mobility without use of AD. She requried increased time and rest breaks throughout session due to decreased activity tolerance. Pt plans to return home upon d/c. Do not anticipate pt to require continued OT at d/c. OT will continue to follow while in acute setting. Treatment Diagnosis: Impaired ADLs and activity tolerance. Prognosis: Good  Decision Making: Medium Complexity  REQUIRES OT FOLLOW-UP: Yes  Activity Tolerance  Activity Tolerance: Patient Tolerated treatment well  Activity Tolerance Comments: Pt with c/o fatigue and mild dizziness following functional mobility around room and hallway. BP monitored. 125/89 seated EOB following toileting activity, 111/76 in stance following seated rest break EOB, and 133/78 seated in recliner following functional mobility in hallway. Plan   Plan  Times per Week: 2-5  Times per Day: Daily  Current Treatment Recommendations: Strengthening, Balance training, Functional mobility training, Endurance training, Safety education & training, Patient/Caregiver education & training, Self-Care / ADL     Restrictions  Position Activity Restriction  Other position/activity restrictions: up with A prn    Subjective   General  Chart Reviewed: Yes  Patient assessed for rehabilitation services?: Yes  Family / Caregiver Present: No  Referring Practitioner: Adrienne oRse MD  Subjective  Subjective: Pt received supine in bed and agreeable to OT eval.  General Comment  Comments: No c/o pain.      Social/Functional History  Social/Functional History  Lives With: Family (Mom)  Type of Home: House (duplex, pt lives in basement of unit and mother lives upstairs)  Home Layout: One level (bed and Training;Equipment;Plan of Care  Education Method: Verbal  Barriers to Learning: None  Education Outcome: Verbalized understanding                   AM-PAC Score        AM-PAC Inpatient Daily Activity Raw Score: 21 (09/22/22 1334)  AM-PAC Inpatient ADL T-Scale Score : 44.27 (09/22/22 1334)  ADL Inpatient CMS 0-100% Score: 32.79 (09/22/22 1334)  ADL Inpatient CMS G-Code Modifier : CJ (09/22/22 1334)    Goals  Short Term Goals  Time Frame for Short term goals: D/C  Short Term Goal 1: Pt to perform functional transfers with mod I. Short Term Goal 2: Pt to perform toileting with mod I. Short Term Goal 3: Pt to tolerate 5 min standing activity to complete self care tasks with supervision. Patient Goals   Patient goals :  To return home       Therapy Time   Individual Concurrent Group Co-treatment   Time In 1101         Time Out 1139         Minutes 38         Timed Code Treatment Minutes: 23 Minutes     Total Minutes: [address] [place], [place]

## 2022-09-22 NOTE — TELEPHONE ENCOUNTER
Patient called to update provider she was admitted to [facility]. yesterday with severe UTI . Patient states she was given a pain medication in same class she has had reactions to previously . states she had another reaction said her eyes swelled she was unable to talk or anything she could hear what was going on around her but not able to respond in any way .   Patient states blood work taken and potassium is now at 3.2

## 2022-09-22 NOTE — PLAN OF CARE
Problem: ABCDS Injury Assessment  Goal: Absence of physical injury  Outcome: Progressing     Problem: Infection - Adult  Goal: Absence of infection at discharge  Outcome: Progressing  Goal: Absence of infection during hospitalization  Outcome: Progressing     Problem: Safety - Adult  Goal: Free from fall injury  Outcome: Progressing     Problem: Discharge Planning  Goal: Discharge to home or other facility with appropriate resources  Outcome: Progressing     Problem: Pain  Goal: Verbalizes/displays adequate comfort level or baseline comfort level  Outcome: Progressing     Problem: Nutrition Deficit:  Goal: Optimize nutritional status  Outcome: Progressing

## 2022-09-22 NOTE — PROGRESS NOTES
Pt able to answer admission questions but with delayed responses. Pt lethargic. Unable to complete home med reconciliation. Pt unable to provide names/dosages of medications at this time.

## 2022-09-22 NOTE — ED NOTES
Dr. Shirley Marie to bedside for reassessment of pt. Pt still very lethargic. Responds to voice and pain at this time. MD states will admit pt. Pt remains on bedside monitor, Lev Colbert MD aware.      Khalif Tucker, RN  09/21/22 Joel. Karo Rodrigues RN  09/21/22 0521

## 2022-09-22 NOTE — ED NOTES
[facility]phone [facility]      Carey Quach RN  09/22/22 5713

## 2022-09-22 NOTE — CARE COORDINATION
Case Management Assessment           Initial Evaluation                Date / Time of Evaluation: 9/22/2022 11:28 AM                 Assessment Completed by: Debbie Ruiz RN    Patient Name: Nicholas Reyes     YOB: 1965  Diagnosis: Hypokalemia [E87.6]  Pyelonephritis [N12]  Acute bilateral low back pain with left-sided sciatica [M54.42]     Date / Time: 9/21/2022  3:59 PM    Patient Admission Status: Inpatient    If patient is discharged prior to next notation, then this note serves as note for discharge by case management. Current PCP: Brianna Corral DO  Clinic Patient: No    Chart Reviewed: Yes  Patient/ Family Interviewed: Yes    Initial assessment completed at bedside with: pt  at  bedside     Hospitalization in the last 30 days: No    Emergency Contacts:  Extended Emergency Contact Information  Primary Emergency Contact: Nayeli Iverson  Address: [address] Phone: [phone]  Mobile Phone: [phone]  Relation: Parent  Secondary Emergency Contact: [address] Phone: [phone]  Mobile Phone: [phone]  Relation: Other    Advance Directives:   Code Status: Full Code    Financial  Payor: Norman Eller / Plan: [facility] PLUS HMO / Product Type: *No Product type* /     Pre-cert required for SNF: Yes    [facility] [address] [facility]  Phone: [phone] Fax: [phone]      Potential assistance Purchasing Medications:    Does Patient want to participate in local refill/ meds to beds program?:      Meds To Beds General Rules:  1. Can ONLY be done Monday- Friday between 8:30am-5pm  2. Prescription(s) must be in pharmacy by 3pm to be filled same day  3. Copy of patient's insurance/ prescription drug card and patient face sheet must be sent along with the prescription(s)  4.  Cost of Rx cannot be added to hospital bill. If financial assistance is needed, please contact unit  or ;  or  CANNOT provide pharmacy voucher for patients co-pays  5.  Patients can then  the prescription on their way out of the hospital at discharge, or pharmacy can deliver to the bedside if staff is available. (payment due at time of pick-up or delivery - cash, check, or card accepted)     Able to afford home medications/ co-pay costs: Yes    ADLS  Support Systems: Parent    PT AM-PAC:   /24  OT AM-PAC:   /24    HOUSING  Home Environment: ***  Steps: ***    Plans to RETURN to current housing: {Yes/No:19726}  Barriers to RETURNING to current housing: ***    Home Care Information  Currently ACTIVE with [address]: {Yes/No:19726}  Home Care Agency: [facility] [address] Providers:31933}    Currently ACTIVE with [place] on Aging: {Yes/No:19726}  Passport/ Waiver: {Yes/No:19726}  Passport/ Waiver Services: {[facility] Services:05833}    : *** Phone: ***      Durable Medical Equipment  DME Provider: ***  Equipment: {EQUIPMENT:915282391}    Home Oxygen and Respiratory Equipment  Has HOME OXYGEN prior to admission: {Yes/No:19726}  [address]: { [employer]:36883}  Other Respiratory Equipment: ***    Informed of need to bring portable home O2 tank on day of DISCHARGE for nursing to connect prior to leaving: {Yes/No:19726}  Verbalized agreement/Understanding: {Yes/No:19726}  Person to bring portable tank at discharge: ***    Dialysis  Active with HD/PD prior to admission: {Yes/No:19726}  Nephrologist: ***    HD Center:  { HD Facilities:61306}    DISCHARGE PLAN:  Disposition: { DISPOSITION:65286}    Transportation PLAN for discharge: {DID:79109}     Factors facilitating achievement of predicted outcomes: {Patient Strengths:335029948}    Barriers to discharge: {Barriers:657298912}    Additional Case Management Notes: ***    The Plan for Transition of Care is related to the following treatment goals of Hypokalemia [E87.6]  Pyelonephritis [N12]  Acute bilateral low back pain with left-sided sciatica [M54.42]    The Patient and/or patient representative Corine Mccullough and her family were provided with a choice of provider and agrees with the discharge plan Yes    Freedom of choice list was provided with basic dialogue that supports the patient's individualized plan of care/goals and shares the quality data associated with the providers.  Yes    Care Transition patient: No    Carolyn Sullivan RN  [facility]  Case Management Department  Ph: [phone]

## 2022-09-23 LAB
ANION GAP SERPL CALCULATED.3IONS-SCNC: 12 MMOL/L (ref 3–16)
BASOPHILS ABSOLUTE: 0 K/UL (ref 0–0.2)
BASOPHILS RELATIVE PERCENT: 0.6 %
BUN BLDV-MCNC: 10 MG/DL (ref 7–20)
CALCIUM SERPL-MCNC: 9.1 MG/DL (ref 8.3–10.6)
CHLORIDE BLD-SCNC: 103 MMOL/L (ref 99–110)
CO2: 23 MMOL/L (ref 21–32)
CREAT SERPL-MCNC: 0.6 MG/DL (ref 0.6–1.1)
EOSINOPHILS ABSOLUTE: 0.1 K/UL (ref 0–0.6)
EOSINOPHILS RELATIVE PERCENT: 1.9 %
GFR AFRICAN AMERICAN: >60
GFR NON-AFRICAN AMERICAN: >60
GLUCOSE BLD-MCNC: 102 MG/DL (ref 70–99)
HCT VFR BLD CALC: 39.7 % (ref 36–48)
HEMOGLOBIN: 13 G/DL (ref 12–16)
LYMPHOCYTES ABSOLUTE: 1.2 K/UL (ref 1–5.1)
LYMPHOCYTES RELATIVE PERCENT: 19.2 %
MCH RBC QN AUTO: 31.1 PG (ref 26–34)
MCHC RBC AUTO-ENTMCNC: 32.8 G/DL (ref 31–36)
MCV RBC AUTO: 94.9 FL (ref 80–100)
MONOCYTES ABSOLUTE: 0.5 K/UL (ref 0–1.3)
MONOCYTES RELATIVE PERCENT: 7 %
NEUTROPHILS ABSOLUTE: 4.6 K/UL (ref 1.7–7.7)
NEUTROPHILS RELATIVE PERCENT: 71.3 %
PDW BLD-RTO: 15 % (ref 12.4–15.4)
PLATELET # BLD: 164 K/UL (ref 135–450)
PMV BLD AUTO: 9.8 FL (ref 5–10.5)
POTASSIUM SERPL-SCNC: 3.7 MMOL/L (ref 3.5–5.1)
RBC # BLD: 4.18 M/UL (ref 4–5.2)
SODIUM BLD-SCNC: 138 MMOL/L (ref 136–145)
WBC # BLD: 6.4 K/UL (ref 4–11)

## 2022-09-23 PROCEDURE — 6370000000 HC RX 637 (ALT 250 FOR IP): Performed by: INTERNAL MEDICINE

## 2022-09-23 PROCEDURE — 85025 COMPLETE CBC W/AUTO DIFF WBC: CPT

## 2022-09-23 PROCEDURE — 6360000002 HC RX W HCPCS: Performed by: INTERNAL MEDICINE

## 2022-09-23 PROCEDURE — 97530 THERAPEUTIC ACTIVITIES: CPT

## 2022-09-23 PROCEDURE — 80048 BASIC METABOLIC PNL TOTAL CA: CPT

## 2022-09-23 PROCEDURE — 6370000000 HC RX 637 (ALT 250 FOR IP): Performed by: STUDENT IN AN ORGANIZED HEALTH CARE EDUCATION/TRAINING PROGRAM

## 2022-09-23 PROCEDURE — 97116 GAIT TRAINING THERAPY: CPT

## 2022-09-23 PROCEDURE — 2580000003 HC RX 258: Performed by: INTERNAL MEDICINE

## 2022-09-23 PROCEDURE — 1200000000 HC SEMI PRIVATE

## 2022-09-23 PROCEDURE — 36415 COLL VENOUS BLD VENIPUNCTURE: CPT

## 2022-09-23 RX ADMIN — Medication 250 MG: at 09:32

## 2022-09-23 RX ADMIN — ROPINIROLE HYDROCHLORIDE 4 MG: 2 TABLET, FILM COATED ORAL at 21:34

## 2022-09-23 RX ADMIN — METHOCARBAMOL TABLETS 500 MG: 500 TABLET, COATED ORAL at 14:22

## 2022-09-23 RX ADMIN — DULOXETINE HYDROCHLORIDE 60 MG: 60 CAPSULE, DELAYED RELEASE ORAL at 21:33

## 2022-09-23 RX ADMIN — Medication 10 MG: at 21:33

## 2022-09-23 RX ADMIN — PANTOPRAZOLE SODIUM 40 MG: 40 TABLET, DELAYED RELEASE ORAL at 06:02

## 2022-09-23 RX ADMIN — MEROPENEM 1000 MG: 1 INJECTION, POWDER, FOR SOLUTION INTRAVENOUS at 23:54

## 2022-09-23 RX ADMIN — IBUPROFEN 400 MG: 400 TABLET, FILM COATED ORAL at 09:38

## 2022-09-23 RX ADMIN — IBUPROFEN 400 MG: 400 TABLET, FILM COATED ORAL at 01:12

## 2022-09-23 RX ADMIN — DULOXETINE HYDROCHLORIDE 60 MG: 60 CAPSULE, DELAYED RELEASE ORAL at 09:32

## 2022-09-23 RX ADMIN — Medication 10 MG: at 00:52

## 2022-09-23 RX ADMIN — MEROPENEM 1000 MG: 1 INJECTION, POWDER, FOR SOLUTION INTRAVENOUS at 00:51

## 2022-09-23 RX ADMIN — MEROPENEM 1000 MG: 1 INJECTION, POWDER, FOR SOLUTION INTRAVENOUS at 07:27

## 2022-09-23 RX ADMIN — METHOCARBAMOL TABLETS 500 MG: 500 TABLET, COATED ORAL at 21:35

## 2022-09-23 RX ADMIN — METHOCARBAMOL TABLETS 500 MG: 500 TABLET, COATED ORAL at 09:32

## 2022-09-23 RX ADMIN — ACETAMINOPHEN 650 MG: 325 TABLET, FILM COATED ORAL at 14:22

## 2022-09-23 RX ADMIN — MEROPENEM 1000 MG: 1 INJECTION, POWDER, FOR SOLUTION INTRAVENOUS at 14:24

## 2022-09-23 RX ADMIN — ACETAMINOPHEN 650 MG: 325 TABLET, FILM COATED ORAL at 21:34

## 2022-09-23 RX ADMIN — ONDANSETRON 4 MG: 2 INJECTION INTRAMUSCULAR; INTRAVENOUS at 00:47

## 2022-09-23 RX ADMIN — ATENOLOL 25 MG: 25 TABLET ORAL at 09:32

## 2022-09-23 RX ADMIN — IBUPROFEN 400 MG: 400 TABLET, FILM COATED ORAL at 21:33

## 2022-09-23 RX ADMIN — ACETAMINOPHEN 650 MG: 325 TABLET, FILM COATED ORAL at 06:02

## 2022-09-23 RX ADMIN — ROPINIROLE HYDROCHLORIDE 2 MG: 2 TABLET, FILM COATED ORAL at 09:32

## 2022-09-23 RX ADMIN — ENOXAPARIN SODIUM 30 MG: 100 INJECTION SUBCUTANEOUS at 21:33

## 2022-09-23 RX ADMIN — SODIUM CHLORIDE, PRESERVATIVE FREE 10 ML: 5 INJECTION INTRAVENOUS at 12:40

## 2022-09-23 RX ADMIN — ENOXAPARIN SODIUM 30 MG: 100 INJECTION SUBCUTANEOUS at 09:32

## 2022-09-23 RX ADMIN — Medication 250 MG: at 21:34

## 2022-09-23 RX ADMIN — ONDANSETRON 4 MG: 2 INJECTION INTRAMUSCULAR; INTRAVENOUS at 15:32

## 2022-09-23 ASSESSMENT — PAIN DESCRIPTION - LOCATION
LOCATION: HEAD
LOCATION: BACK
LOCATION: BACK
LOCATION: HEAD
LOCATION: HEAD

## 2022-09-23 ASSESSMENT — PAIN DESCRIPTION - ONSET
ONSET: AWAKENED FROM SLEEP
ONSET: ON-GOING
ONSET: AWAKENED FROM SLEEP

## 2022-09-23 ASSESSMENT — PAIN DESCRIPTION - PAIN TYPE
TYPE: ACUTE PAIN

## 2022-09-23 ASSESSMENT — PAIN SCALES - GENERAL
PAINLEVEL_OUTOF10: 0
PAINLEVEL_OUTOF10: 6
PAINLEVEL_OUTOF10: 0
PAINLEVEL_OUTOF10: 0
PAINLEVEL_OUTOF10: 7
PAINLEVEL_OUTOF10: 7
PAINLEVEL_OUTOF10: 0
PAINLEVEL_OUTOF10: 6
PAINLEVEL_OUTOF10: 0
PAINLEVEL_OUTOF10: 5
PAINLEVEL_OUTOF10: 6
PAINLEVEL_OUTOF10: 6
PAINLEVEL_OUTOF10: 7

## 2022-09-23 ASSESSMENT — PAIN - FUNCTIONAL ASSESSMENT
PAIN_FUNCTIONAL_ASSESSMENT: PREVENTS OR INTERFERES SOME ACTIVE ACTIVITIES AND ADLS
PAIN_FUNCTIONAL_ASSESSMENT: PREVENTS OR INTERFERES SOME ACTIVE ACTIVITIES AND ADLS
PAIN_FUNCTIONAL_ASSESSMENT: ACTIVITIES ARE NOT PREVENTED
PAIN_FUNCTIONAL_ASSESSMENT: ACTIVITIES ARE NOT PREVENTED
PAIN_FUNCTIONAL_ASSESSMENT: PREVENTS OR INTERFERES SOME ACTIVE ACTIVITIES AND ADLS

## 2022-09-23 ASSESSMENT — PAIN DESCRIPTION - DESCRIPTORS
DESCRIPTORS: ACHING;POUNDING
DESCRIPTORS: ACHING

## 2022-09-23 ASSESSMENT — PAIN DESCRIPTION - ORIENTATION
ORIENTATION: LOWER
ORIENTATION: LOWER

## 2022-09-23 ASSESSMENT — PAIN DESCRIPTION - FREQUENCY
FREQUENCY: CONTINUOUS

## 2022-09-23 NOTE — PROGRESS NOTES
09/23/22 1528   Encounter Summary   Encounter Overview/Reason  Initial Encounter  (Reviewed chart.)   Service Provided For: Patient   Referral/Consult From:   (Spiritual consult for spiritual distress/emotional distress.)   Support System Children;Family members;Friends/neighbors   Complexity of Encounter Moderate   Begin Time 1450   End Time  1530   Total Time Calculated 40 min   Encounter    Type Initial Screen/Assessment   Spiritual/Emotional needs   Type Spiritual Support;Emotional Distress   Grief, Loss, and Adjustments   Type Life Adjustments  (The patient is trying to adjust to the lose of her son.)   Assessment/Intervention/Outcome   Assessment Sad;Hopeful;Complicated grieving   Intervention Active listening;Discussed meaning/purpose;Explored/Affirmed feelings, thoughts, concerns;Nurtured Hope;Prayer (assurance of)/[place];Read/Provided Scripture;Sustaining Presence/Ministry of presence   Plan and Referrals   Plan/Referrals Continue to visit, (comment)

## 2022-09-23 NOTE — CARE COORDINATION
Case Management Assessment           Initial Evaluation                Date / Time of Evaluation: 9/23/2022 1:00 PM                 Assessment Completed by: Shahram Murphy RN    Patient Name: Rick Ellis     YOB: 1965  Diagnosis: Hypokalemia [E87.6]  Pyelonephritis [N12]  Acute bilateral low back pain with left-sided sciatica [M54.42]     Date / Time: 9/21/2022  3:59 PM    Patient Admission Status: Inpatient    If patient is discharged prior to next notation, then this note serves as note for discharge by case management. Current PCP: Jill Astorga DO  Clinic Patient: No    Chart Reviewed: Yes  Patient/ Family Interviewed: Yes    Initial assessment completed at bedside with: pt     Hospitalization in the last 30 days: No    Emergency Contacts:  Extended Emergency Contact Information  Primary Emergency Contact: Augusta Negrete  [facility] Phone: [phone]  Mobile Phone: [phone]  Relation: Parent  Secondary Emergency Contact: [address] Phone: [phone]  Mobile Phone: [phone]  Relation: Other    Advance Directives:   Code Status: Full [address]: No  Financial  Payor: Ton Clayton / Plan: [employer] HMO / Product Type: *No Product type* /     Pre-cert required for SNF: Yes    [facility] [address] [address] [address] [place] [address]  Phone: [phone] Fax: [phone]      Potential assistance Purchasing Medications:    Does Patient want to participate in local refill/ meds to beds program?:      Meds To Beds General Rules:  1. Can ONLY be done Monday- Friday between 8:30am-5pm  2. Prescription(s) must be in pharmacy by 3pm to be filled same day  3. Copy of patient's insurance/ prescription drug card and patient face sheet must be sent along with the prescription(s)  4. Cost of Rx cannot be added to hospital bill. If financial assistance is needed, please contact unit  or ;  or  CANNOT provide pharmacy voucher for patients co-pays  5. Patients can then  the prescription on their way out of the hospital at discharge, or pharmacy can deliver to the bedside if staff is available. (payment due at time of pick-up or delivery - cash, check, or card accepted)     Able to afford home medications/ co-pay costs: Yes    ADLS  Support Systems: Parent    PT AM-PAC: 20 /24  OT AM-PAC: 21 /24    [place]: home w mother  Steps: Entrance Stairs - Rails: Left    Plans to RETURN to current housing: Yes  Barriers to RETURNING to current housing: NA    Home Care Information  Currently ACTIVE with [address]: No  Home Care Agency: Not Applicable    Currently ACTIVE with [place] on Aging: No  Passport/ Waiver: No  Passport/ Waiver Services: Not Applicable    Durable Medical Equipment  DME Provider: NA  Equipment: Tub/Shower unit  Bathroom Toilet: Standard  Bathroom Equipment: Grab bars in shower, Shower chair  Home Equipment: Lift chair    Home Oxygen and [address] [place] prior to admission: No  [address]: Not Applicable  Other Respiratory Equipment: NA    Dialysis  Active with HD/PD prior to admission: No  Nephrologist: VEENA    HD Center:  Not Applicable    DISCHARGE PLAN:  Disposition: Home- No Services Needed    Transportation PLAN for discharge: family     Factors facilitating achievement of predicted outcomes: Family support, Cooperative, and Pleasant    Barriers to discharge: Medical clearance    Additional Case Management Notes:     CM  following for  d/c planning:    Patient states she lives at home with her  mother  an plans to return  once medically cleared  . PTOT  noted  and    No equipment needs at dc & do not feel pt will need further PT OT at [place].      Admitted  w/  Back pain Complicated  UTI w/Pyelonephritis :  IV atbx, Diarrhea  Stool  cultures  pending . If  Cx comes abk  ESBL she will need  orders  faxed to Alexandra Hernandez  following :  and  Can call Kylah with [employer]  she is aware of  poss IV atbx  needed  , and  PICC team  will be  called in to place  PICC. Dispo -Pending progress      The Plan for Transition of Care is related to the following treatment goals of Hypokalemia [E87.6]  Pyelonephritis [N12]  Acute bilateral low back pain with left-sided sciatica [M54.42]    The Patient and/or patient representative Elvin Torres and her family were provided with a choice of provider and agrees with the discharge plan Yes    Freedom of choice list was provided with basic dialogue that supports the patient's individualized plan of care/goals and shares the quality data associated with the providers.  Yes    Care Transition patient: No    Emilie Horton RN  [facility]  Case Management Department  Ph: [phone] patient

## 2022-09-23 NOTE — PROGRESS NOTES
Physical Therapy  Facility/Department: [address] [place] [address]  Physical Therapy Treatment    Name: Layla Bonner  : 1965  MRN: [id number]  Date of Service: 2022    Discharge Recommendations: Layla Bonner scored a 21/24 on the AM-PAC short mobility form. Current research shows that an AM-PAC score of 18 or greater is typically associated with a discharge to the patient's home setting. If patient discharges prior to next session this note will serve as a discharge summary. Please see below for the latest assessment towards goals. PT Equipment Recommendations  Equipment Needed: No      Patient Diagnosis(es): The primary encounter diagnosis was Pyelonephritis. Diagnoses of Acute bilateral low back pain with left-sided sciatica and Hypokalemia were also pertinent to this visit. Past Medical History:  has a past medical history of Anxiety, Depression, Endometriosis, GERD (gastroesophageal reflux disease), Hypertension, Osteoarthritis, PONV (postoperative nausea and vomiting), and Restless leg syndrome. Past Surgical History:  has a past surgical history that includes Tonsillectomy ();  section; Ovary removal (Right, ); Upper gastrointestinal endoscopy (); Knee arthroscopy (Left, 11/15/12); Upper gastrointestinal endoscopy (); hernia repair (2018); Anus surgery (2018); fracture surgery; Rotator cuff repair (Right, 2020); Shoulder arthroscopy (Right, 2020); Colonoscopy; Shoulder arthroscopy (Right, 2021); Shoulder arthroscopy (Right, 2021); Dilation and curettage of uterus (N/A, 6/3/2021); Hysterectomy (N/A, 10/21/2021); Upper gastrointestinal endoscopy; and Upper gastrointestinal endoscopy (N/A, 2022). Assessment   Assessment: Demo increased gait distance from yesterday & appears steady on feet. No equipment needs for home at this time & do not feel pt will need further PT at [place].   Encouraged continued ambulation with nursing staff throughout day. Will follow. Treatment Diagnosis: low endurance  Therapy Prognosis: Good  Requires PT Follow-Up: Yes  Activity Tolerance  Activity Tolerance: Patient limited by endurance  Activity Tolerance Comments: rest breaks needed due to fatigue; O2 sats 94% on RA after walking in lin     Plan   Plan  Plan:  (2-5)  Current Treatment Recommendations: Functional mobility training, Transfer training, Gait training, Patient/Caregiver education & training, Stair training, Balance training  Safety Devices  Type of Devices: Bed alarm in place, Call light within reach, Left in bed, Nurse notified     Restrictions  Position Activity Restriction  Other position/activity restrictions: up with A prn     Subjective   General  Chart Reviewed: Yes  Additional Pertinent Hx: 62 y.o. female who is presenting for evaluation of hypokalemia as well as low back pain. CT abd/pelvis:1. No acute intra-abdominopelvic abnormality. 2. Mild atelectasis in the posterior lung bases. 3. L5-S1 severe disc space narrowing. 4. Stable small adrenal nodules consistent with benign nodules. Lumbar spine Xrays (-). Rapid response 9/21 for decreased responsiveness while on toilet. Family / Caregiver Present: No  Referring Practitioner: Bandar Rendon MD  Diagnosis: hypokalemia  Follows Commands: Within Functional Limits  Subjective  Subjective: Found in bed, agreeable to PT. Reports having a bad night & had headache earlier. \"It took a hour & half for them to put me back in bed yesterday. \"  Declined sitting up in chair today after session.          Social/Functional History  Social/Functional History  Lives With: Family (Mom)  Type of Home: House (duplex, pt lives in basement of unit and mother lives upstairs)  Home Layout: One level (bed and bath in basement, mother lives in upstairs level)  Home Access: Stairs to enter with rails (then down a flight to get to basement B HR)  Entrance Stairs - Rails: Left  Bathroom Shower/Tub: Tub/Shower unit  Bathroom Toilet: Standard  Bathroom Equipment: Grab bars in shower, Shower chair  Home Equipment: Lift chair  Has the patient had two or more falls in the past year or any fall with injury in the past year?: No  ADL Assistance: [address]: Independent (pt does her own cleaning and laundry)  Ambulation Assistance: Independent  Transfer Assistance: Independent  Active : Yes       Cognition - WFL        Objective             Bed mobility  Supine to Sit: Independent  Sit to Supine: Independent  Transfers  Sit to Stand: Supervision  Stand to sit: Supervision  Ambulation  Device: No Device  Assistance: Stand by assistance  Quality of Gait: slow but steady gait without LOB  Gait Deviations: Decreased step length;Decreased step height  Distance: 100', 40', 10'  Stairs/Curb  Stairs?: Yes (up/down 3 steps with B rails SBA, taking steps non-reciprocally.   Could have tolerated more stairs but limited by short IV line.)     Balance  Sitting - Static: Good  Standing - Static: Good                                                  AM-PAC Score  AM-PAC Inpatient Mobility Raw Score : 21 (09/23/22 1346)  AM-PAC Inpatient T-Scale Score : 50.25 (09/23/22 1346)  Mobility Inpatient CMS 0-100% Score: 28.97 (09/23/22 1346)  Mobility Inpatient CMS G-Code Modifier : CJ (09/23/22 1346)         Goals  Short Term Goals  Time Frame for Short term goals: dc  Short term goal 1: Sit<>stand supervision- MET 9/23  Short term goal 2: Ambulate 100' supervision  Short term goal 3: up/down flight of steps with rails SBA  Patient Goals   Patient goals : [facility]       Education  Patient Education  Education Given To: Patient  Education Provided: Role of Therapy;Plan of Care  Education Provided Comments: need for OOB/ambulation with A only  Education Method: Verbal  Education Outcome: Verbalized understanding      Therapy Time   Individual Concurrent Group Co-treatment   Time In 1313         Time Out [id number] Minutes 25          Timed Code Treatment Minutes:   25    Total Treatment Minutes:  [address], [address] [place] [address]

## 2022-09-23 NOTE — PLAN OF CARE
Problem: Infection - Adult  Goal: Absence of infection at discharge  Note: She is receiving IV antibiotics for pyelonephritis. No fever. WBC wnl. Problem: Safety - Adult  Goal: Free from fall injury  Note: She is a fall risk. Door open, and bed alarm on. Problem: Discharge Planning  Goal: Discharge to home or other facility with appropriate resources  Note: She plans to return home at discharge. Problem: Pain  Goal: Verbalizes/displays adequate comfort level or baseline comfort level  Note: Medicated for headache with advil. She receives tylenol around the clock and lidocaine patches to her lower back. Problem: Nutrition Deficit:  Goal: Optimize nutritional status  Note: Medicated for nausea x 1. Appetite is poor to fair.

## 2022-09-23 NOTE — PROGRESS NOTES
D: HR dropping to 28-40's while sleeping. EKG obtained showed Sydney Deep 57. Applied continuous pulse ox to monitor for possible MERI d/t body size for possible causes of low HR.  A: Cont to monitor during hourly rounds  0126 R: noted sat 89% and HR 38 on telemetry dropped. Applied O2 @ 2lpnc for sleep. Changed telemetry patches, leads, and monitor, but still having same issues with HR. Cont to monitor during hourly rounds    0500R; No more issues with low HR after wearing O2 @ 2lpnc.  Cont to monitor during hourly rounds

## 2022-09-24 LAB
ORGANISM: ABNORMAL
URINE CULTURE, ROUTINE: ABNORMAL

## 2022-09-24 PROCEDURE — 6360000002 HC RX W HCPCS: Performed by: INTERNAL MEDICINE

## 2022-09-24 PROCEDURE — 6370000000 HC RX 637 (ALT 250 FOR IP): Performed by: INTERNAL MEDICINE

## 2022-09-24 PROCEDURE — 1200000000 HC SEMI PRIVATE

## 2022-09-24 PROCEDURE — 6370000000 HC RX 637 (ALT 250 FOR IP): Performed by: STUDENT IN AN ORGANIZED HEALTH CARE EDUCATION/TRAINING PROGRAM

## 2022-09-24 PROCEDURE — 2580000003 HC RX 258: Performed by: INTERNAL MEDICINE

## 2022-09-24 RX ORDER — DIMETHICONE, CAMPHOR (SYNTHETIC), MENTHOL, AND PHENOL 1.1; .5; .625; .5 G/100G; G/100G; G/100G; G/100G
OINTMENT TOPICAL PRN
Status: DISCONTINUED | OUTPATIENT
Start: 2022-09-24 | End: 2022-09-26 | Stop reason: HOSPADM

## 2022-09-24 RX ORDER — OXYCODONE HYDROCHLORIDE 5 MG/1
5 TABLET ORAL EVERY 8 HOURS PRN
Qty: 9 TABLET | Refills: 0 | Status: SHIPPED | OUTPATIENT
Start: 2022-09-24 | End: 2022-09-27

## 2022-09-24 RX ORDER — AMLODIPINE BESYLATE 5 MG/1
5 TABLET ORAL DAILY
Status: DISCONTINUED | OUTPATIENT
Start: 2022-09-25 | End: 2022-09-25

## 2022-09-24 RX ORDER — LIDOCAINE 4 G/G
1 PATCH TOPICAL DAILY
Qty: 14 EACH | Refills: 2 | Status: SHIPPED | OUTPATIENT
Start: 2022-09-25

## 2022-09-24 RX ADMIN — METHOCARBAMOL TABLETS 500 MG: 500 TABLET, COATED ORAL at 19:50

## 2022-09-24 RX ADMIN — ENOXAPARIN SODIUM 30 MG: 100 INJECTION SUBCUTANEOUS at 09:00

## 2022-09-24 RX ADMIN — SODIUM CHLORIDE, PRESERVATIVE FREE 10 ML: 5 INJECTION INTRAVENOUS at 10:17

## 2022-09-24 RX ADMIN — ACETAMINOPHEN 650 MG: 325 TABLET, FILM COATED ORAL at 06:27

## 2022-09-24 RX ADMIN — Medication 7.5 G: at 05:41

## 2022-09-24 RX ADMIN — ACETAMINOPHEN 650 MG: 325 TABLET, FILM COATED ORAL at 21:39

## 2022-09-24 RX ADMIN — MEROPENEM 1000 MG: 1 INJECTION, POWDER, FOR SOLUTION INTRAVENOUS at 21:40

## 2022-09-24 RX ADMIN — METHOCARBAMOL TABLETS 500 MG: 500 TABLET, COATED ORAL at 14:56

## 2022-09-24 RX ADMIN — ROPINIROLE HYDROCHLORIDE 4 MG: 2 TABLET, FILM COATED ORAL at 19:50

## 2022-09-24 RX ADMIN — DULOXETINE HYDROCHLORIDE 60 MG: 60 CAPSULE, DELAYED RELEASE ORAL at 10:04

## 2022-09-24 RX ADMIN — MEROPENEM 1000 MG: 1 INJECTION, POWDER, FOR SOLUTION INTRAVENOUS at 15:02

## 2022-09-24 RX ADMIN — ATENOLOL 25 MG: 25 TABLET ORAL at 10:05

## 2022-09-24 RX ADMIN — OXYCODONE 5 MG: 5 TABLET ORAL at 17:23

## 2022-09-24 RX ADMIN — SODIUM CHLORIDE, PRESERVATIVE FREE 10 ML: 5 INJECTION INTRAVENOUS at 19:50

## 2022-09-24 RX ADMIN — DULOXETINE HYDROCHLORIDE 60 MG: 60 CAPSULE, DELAYED RELEASE ORAL at 19:50

## 2022-09-24 RX ADMIN — MEROPENEM 1000 MG: 1 INJECTION, POWDER, FOR SOLUTION INTRAVENOUS at 06:26

## 2022-09-24 RX ADMIN — Medication 250 MG: at 10:05

## 2022-09-24 RX ADMIN — METHOCARBAMOL TABLETS 500 MG: 500 TABLET, COATED ORAL at 10:05

## 2022-09-24 RX ADMIN — ROPINIROLE HYDROCHLORIDE 2 MG: 2 TABLET, FILM COATED ORAL at 09:00

## 2022-09-24 RX ADMIN — IBUPROFEN 400 MG: 400 TABLET, FILM COATED ORAL at 10:10

## 2022-09-24 RX ADMIN — ACETAMINOPHEN 650 MG: 325 TABLET, FILM COATED ORAL at 14:56

## 2022-09-24 RX ADMIN — ENOXAPARIN SODIUM 30 MG: 100 INJECTION SUBCUTANEOUS at 19:51

## 2022-09-24 RX ADMIN — PANTOPRAZOLE SODIUM 40 MG: 40 TABLET, DELAYED RELEASE ORAL at 06:27

## 2022-09-24 RX ADMIN — Medication 250 MG: at 19:51

## 2022-09-24 ASSESSMENT — PAIN DESCRIPTION - ORIENTATION
ORIENTATION: RIGHT;LEFT
ORIENTATION: LOWER

## 2022-09-24 ASSESSMENT — PAIN DESCRIPTION - DESCRIPTORS: DESCRIPTORS: ACHING

## 2022-09-24 ASSESSMENT — PAIN SCALES - GENERAL
PAINLEVEL_OUTOF10: 3
PAINLEVEL_OUTOF10: 5
PAINLEVEL_OUTOF10: 6
PAINLEVEL_OUTOF10: 6
PAINLEVEL_OUTOF10: 5
PAINLEVEL_OUTOF10: 5
PAINLEVEL_OUTOF10: 6
PAINLEVEL_OUTOF10: 0
PAINLEVEL_OUTOF10: 0

## 2022-09-24 ASSESSMENT — PAIN DESCRIPTION - PAIN TYPE: TYPE: ACUTE PAIN

## 2022-09-24 ASSESSMENT — PAIN DESCRIPTION - FREQUENCY: FREQUENCY: INTERMITTENT

## 2022-09-24 ASSESSMENT — PAIN DESCRIPTION - LOCATION
LOCATION: BACK;HEAD
LOCATION: BACK
LOCATION: BACK;HEAD
LOCATION: HEAD
LOCATION: HEAD;BACK
LOCATION: BACK

## 2022-09-24 ASSESSMENT — PAIN - FUNCTIONAL ASSESSMENT: PAIN_FUNCTIONAL_ASSESSMENT: PREVENTS OR INTERFERES SOME ACTIVE ACTIVITIES AND ADLS

## 2022-09-24 NOTE — PLAN OF CARE
D: Slept some overnight, but pain has decreased to lower back see pain assessment. C/o H/a that was resolved with Tylenol and ice pack see pain assessment. Amb to BR with stand by assist without any c/o pain.   A: Cont to monitor during hourly rounds      Problem: Infection - Adult  Goal: Absence of infection during hospitalization  Outcome: Progressing

## 2022-09-24 NOTE — PROGRESS NOTES
Hospital Medicine Progress Note    PCP: Sofia Benavides DO    Date of Admission: 9/21/2022      Chief Complaint: Back pain      History Of Present Illness:     62 y.o. morbidly obese female who presented to [facility] ED with mid back pain worse on the left for several days. She denies any dysuria, frequency, urgency, fevers, chills, nausea or vomiting. She does report diarrhea, however, for a couple of months which usually follows any oral intake and the bowel movement is preceded by cramping abdominal pain. She denies any melena, hematochezia. The stool consistency is mostly liquid. She also reports fatigue. Interval HPI  No new complaints    No chest pain    No dyspnea    Noted overnight with nocturnal hypoxia, and has had episodes of bradycardia, HR in the low 40s. Pxt on atenolol. Medications: Reviewed        Allergies:  Droperidol, Haldol [haloperidol lactate], and Toradol [ketorolac tromethamine]      REVIEW OF SYSTEMS COMPLETED:   Pertinent positives as noted in the HPI. All other systems reviewed and negative. PHYSICAL EXAM PERFORMED:    /82   Pulse 56   Temp 98 °F (36.7 °C) (Oral)   Resp 16   Ht 5' 5.98\" (1.676 m)   Wt 272 lb 11.3 oz (123.7 kg)   LMP 09/20/2017   SpO2 96%   BMI 44.04 kg/m²     General appearance:  Obese, acutely ill in no apparent distress  HEENT:  Normal cephalic, atraumatic without obvious deformity. Neck: Short/thick, with full range of motion. Respiratory:  Normal respiratory effort. Clear to auscultation, bilaterally   Cardiovascular:  Regular rate and rhythm with normal S1/S2 without murmurs, rubs or gallops. Abdomen: Soft, obese, non-tender, non-distended with normal bowel sounds. Musculoskeletal:  Midline Cert tenderness L/S area. No clubbing, cyanosis or edema bilaterally. Full range of motion without deformity. Skin: Skin color, texture, turgor normal.  No rashes or lesions.   Neurologic: grossly non-focal.  Psychiatric:  Alert and oriented, thought content appropriate, normal insight  Capillary Refill: Brisk,3 seconds, normal  Peripheral Pulses: +2 palpable, equal bilaterally       Labs:     Recent Labs     09/21/22  1348 09/22/22  0648 09/23/22  0712   WBC 10.2 8.6 6.4   HGB 14.1 13.6 13.0   HCT 42.0 40.9 39.7    181 164       Recent Labs     09/21/22  1348 09/22/22  0648 09/23/22  0712    139 138   K 3.3* 3.9 3.7    107 103   CO2 21 23 23   BUN 13 10 10   CREATININE 0.7 0.7 0.6   CALCIUM 9.0 9.4 9.1       Recent Labs     09/21/22  1348   AST 19   ALT 23   BILITOT 0.4   ALKPHOS 89       No results for input(s): INR in the last 72 hours. No results for input(s): Magdalene Fernandez in the last 72 hours. Urinalysis:      Lab Results   Component Value Date/Time    NITRU POSITIVE 09/21/2022 05:51 PM    WBCUA >100 09/21/2022 05:51 PM    BACTERIA 4+ 09/21/2022 05:51 PM    RBCUA 21-50 09/21/2022 05:51 PM    BLOODU TRACE-INTACT 09/21/2022 05:51 PM    SPECGRAV >=1.030 09/21/2022 05:51 PM    GLUCOSEU Negative 09/21/2022 05:51 PM       Radiology:     CXR: I have reviewed the CXR with the following interpretation: Not done. EKG:  I have reviewed the EKG with the following interpretation: Not done. MRI LUMBAR SPINE WO CONTRAST   Final Result   1. Lumbar degenerative disc disease without significant central canal stenosis. 2. Focal degenerative disc disease at L5-S1 with circumferential disc osteophyte complex and bilateral facet arthropathy resulting in moderate bilateral foraminal narrowing. CT ABDOMEN PELVIS W IV CONTRAST Additional Contrast? None   Final Result      1. No acute intra-abdominopelvic abnormality. 2. Mild atelectasis in the posterior lung bases. 3. L5-S1 severe disc space narrowing. 4. Stable small adrenal nodules consistent with benign nodules. XR LUMBAR SPINE (2-3 VIEWS)   Final Result      No acute fracture seen.               Consults:    IP CONSULT TO HOSPITALIST  IP CONSULT TO SPIRITUAL SERVICES        ASSESSMENT      Acute back pain: midline low back pain, exam limited by obesity. CT A/P:  L5-S1 severe disc space narrowing: POA  - MRI to better evaluate: Report noted  - Multi-modal Pain control, muscle relaxers    Complicated UTI with pyelonephritis: ESBL E coli  -Was started on Empiric meropenem given her history of ESBL E. Coli  - Meropenem--> ertapenem at discharge (first dose in the Hospital)  - F/u with Gyn/urology      Nocturnal hypoxemia, sinus bradycardia  - Prob. MERI  - Noted overnight with nocturnal hypoxia (sats reportedly dropped to 89), and has had episodes of bradycardia, HR in the low 40s. -   - Mum has MERI, but pxt has never been tested  - Pxt on atenolol. Will DC and watch HR off AVN agents  - Will need Sleep Study  - Nocturnal oxymetry to see if will qualify for Home O2 at night as a bridge  - Discussed with RN/RT      Postprandial diarrhea  -Check stool studies, if diarrhea persists  - No further diarrhea      Hyperglycemia  -Last A1C 6.3    Chronic anxiety: POA  - Benzodiazepine dependent  - Limit psychoactive meds kaveh with episode of \"slumped over in the toilet\"  - Not recurrent  - Limit psychoactive meds as able      Hypokalemia  -Replace potassium and monitor      Morbid obesity Body mass index is 44.04 kg/m². - Complicating assessment and treatment. Placing patient at risk for multiple co-morbidities as well as early death and contributing to the patient's presentation.  on weight loss          DVT Prophylaxis: Lovenox  Diet: ADULT DIET; Regular; Low Fat/Low Chol/High Fiber/2 gm Na  ADULT ORAL NUTRITION SUPPLEMENT; Lunch;  Low Calorie/High Protein Oral Supplement  Code Status: Full Code    PT/OT Eval Status: n/a    Disposition  PT/OT eval  noted  - Nocturnal oxymetry to see if will qualify for Home O2 at night as a bridge  - DC in 1-2 days pending progress  - Discussed with , RN     Axel Olvera MD

## 2022-09-24 NOTE — DISCHARGE INSTR - COC
Continuity of Care Form    Patient Name: Jet Rivera   :  1965  MRN:  [id number]    Admit date:  2022  Discharge date:  ***    Code Status Order: Full Code   Advance Directives:     Admitting Physician:  Coleen Hendricks MD  PCP: Khushi Cespedes DO    Discharging Nurse: [facility] Unit/Room#: 6025/1951-71  Discharging Unit Phone Number: ***    Emergency Contact:   Extended Emergency Contact Information  Primary Emergency Contact: Shiloh Hiral  Address: [address] Phone: [phone]  Mobile Phone: [phone]  Relation: Parent  Secondary Emergency Contact: Ciara AdamesSwati Phone: [phone]  Mobile Phone: [phone]  Relation: Other    Past Surgical History:  Past Surgical History:   Procedure Laterality Date    ANUS SURGERY  2018    fissure     SECTION      x3    COLONOSCOPY      DILATION AND CURETTAGE OF UTERUS N/A 6/3/2021    VIDEO HYSTEROSCOPY DILATATION AND CURETTAGE, POSSIBLE MYOSURE, POSSIBLE POLYPECTOMY performed by Graciela Galicia DO at [address] [place] [address]      right wrist    HERNIA REPAIR  2018    LAPAROSCOPIC PARAESOPHAGEAL HERNIA REPAIR WITH MESH    HYSTERECTOMY (CERVIX STATUS UNKNOWN) N/A 10/21/2021    ROBOTIC ASSISTED LAPAROSCOPIC HYSTERECTOMY WITH RIGHT SALPINGECTOMY, RIGHT OOPHORECTOMY, CYSTOSCOPY, LYSIS OF ADHESIONS  CPT CODE - 34976 performed by Daysi Lopez DO at [address] [place] [address] ARTHROSCOPY Left 11/15/12    ARTHROSCOPY LEFT KNEE WITH SYOVECTOMY AND CHONDROPLASTY    OVARY REMOVAL Right     ROTATOR CUFF REPAIR Right 2020    EXAM UNDER ANESTHESIA VIDEO ARTHROSCOPY RIGHT SHOULDER, MINI- OPEN ROTATOR CUFF REPAIR, NEER ACROMIOPLASTY, LENO PROCEDURE WITH EXPAREL performed by Salvador Noriega MD at [address] [place] [address] ARTHROSCOPY Right 2020    VIDEO ARTHROSCOPY RIGHT SHOULDER, OPEN ROTATOR CUFF REPAIR, BICEPS TENOTOMY -BLOCK- performed by Dennise Cordero MD at [address] ARTHROSCOPY Right 2/24/2021    RIGHT SHOULDER ARTHROSCOPIC INCISION AND DRAINAGE performed by Dennise Cordero MD at [address] ARTHROSCOPY Right 4/28/2021    VIDEO ARTHROSCOPY RIGHT SHOULDER, ROTATOR CUFF REPAIR, DEBRIDEMENT performed by Dennise Cordero MD at [address]    UPPER GASTROINTESTINAL ENDOSCOPY  2015    esophageasl stretch    UPPER GASTROINTESTINAL ENDOSCOPY      ATTEMPTED BUT PATIENT ASPIRATED    UPPER GASTROINTESTINAL ENDOSCOPY N/A 5/24/2022    EGD W/ANES.  (11:00) performed by Donovan Morales DO at [address] [facility]       Immunization History:   Immunization History   Administered Date(s) Administered    COVID-19, PFIZER PURPLE top, DILUTE for use, (age 15 y+), 30mcg/0.3mL 03/25/2021, 04/20/2021, 11/22/2021    Influenza Virus Vaccine 02/02/2012    Influenza, AFLURIA (age 1 yrs+), FLUZONE, (age 10 mo+), MDV, 0.5mL 11/12/2018    Influenza, FLUARIX, FLULAVAL, FLUZONE (age 10 mo+) AND AFLURIA, (age 1 y+), PF, 0.5mL 10/03/2019, 10/02/2020    Influenza, FLUCELVAX, (age 10 mo+), MDCK, PF, 0.5mL 12/26/2017, 10/07/2021    Tdap (Boostrix, Adacel) 02/11/2016    Zoster Recombinant (Shingrix) 11/22/2021       Active Problems:  Patient Active Problem List   Diagnosis Code    Mixed anxiety and depressive disorder F41.8    Knee pain M25.569    Chondromalacia of left knee M94.262    Synovitis of knee M65.9    Bilateral carpal tunnel syndrome G56.03    Lumbar strain S39.012A    Anxiety F41.9    Restless leg syndrome G25.81    De Quervain's disease (radial styloid tenosynovitis) M65.4    Osteoarthritis of carpometacarpal joint of thumb M18.9    Hemorrhoid prolapse K64.8    Dyspnea on exertion R06.00    Sciatica M54.30    Intractable vomiting with nausea R11.2    Hiatal hernia K44.9    Elevated blood pressure reading R03.0    Major depressive disorder, recurrent, moderate (HCC) F33.1    Generalized anxiety disorder F41.1    Essential hypertension I10    Closed displaced fracture of middle phalanx of right ring finger S62.624A    Anal lesion K62.9    Adjustment disorder with depressed mood F43.21    Neck pain M54.2    Morbid obesity with BMI of 45.0-49.9, adult (HCC) E66.01, Z68.42    S/P robot-assisted surgical procedure Z98.890    Spell of abnormal behavior R46.89    Urinary tract infection due to extended-spectrum beta lactamase (ESBL) producing Escherichia coli N39.0, B96.29, Z16.12    Vitamin D deficiency E55.9    Pyelonephritis N12    Hypokalemia E87.6       Isolation/Infection:   Isolation            Contact          Patient Infection Status       Infection Onset Added Last Indicated Last Indicated By Review Planned Expiration Resolved Resolved By    ESBL (Extended Spectrum Beta Lactamase) 11/12/21 11/17/21 09/21/22 Culture, Urine        Resolved    C-diff Rule Out 09/22/22 09/22/22 09/22/22 GI Bacterial Pathogens By PCR (Ordered)   09/22/22 Demi Reyes    C-diff Rule Out 09/16/22 09/16/22 09/16/22 GI Bacterial Pathogens By PCR (Ordered)   09/16/22 Rule-Out Test Resulted    COVID-19 (Rule Out) 03/23/22 03/23/22 03/23/22 COVID-19 (Ordered)   03/24/22 Rule-Out Test Resulted    COVID-19 (Rule Out) 10/18/21 10/18/21 10/18/21 COVID-19 (Ordered)   10/18/21 Rule-Out Test Resulted    COVID-19 (Rule Out) 09/26/21 09/27/21 09/26/21 COVID-19 (Ordered)   09/27/21 Rule-Out Test Resulted    COVID-19 (Rule Out) 09/26/21 09/26/21 09/26/21 COVID-19 (Ordered)   09/26/21 Rule-Out Test Resulted    COVID-19 (Rule Out) 02/24/21 02/24/21 02/24/21 COVID-19, Rapid (Ordered)   02/24/21 Rule-Out Test Resulted    C-diff Rule Out 07/21/20 07/21/20 07/21/20 C DIFF TOXIN/ANTIGEN (Ordered)   07/22/20 Rule-Out Test Resulted            Nurse Assessment:  Last Vital Signs: /82   Pulse 56   Temp 98 °F (36.7 °C) (Oral)   Resp 16   Ht 5' 5.98\" (1.676 m)   Wt 272 lb 11.3 oz (123.7 kg)   LMP 09/20/2017   SpO2 96%   BMI 44.04 kg/m²     Last documented pain score (0-10 scale): Pain Level: 6  Last Weight:   Wt Readings from Last 1 Encounters:   22 272 lb 11.3 oz (123.7 kg)     Mental Status:  {IP PT MENTAL STATUS:}    IV Access:  { JEANMARIE IV ACCESS:144212469}    Nursing Mobility/ADLs:  Walking   {CHP DME OJXY:526490623}  Transfer  {CHP DME NHPC:793577895}  Bathing  {CHP DME HQUL:207485352}  Dressing  {CHP DME RPWV:071824768}  Toileting  {CHP DME WNCE:434055978}  Feeding  {CHP DME MLXP:241220057}  Med Admin  {P DME KBH}  Med Delivery   { JEANMARIE MED Delivery:853252018}    Wound Care Documentation and Therapy:  Incision 10/21/21 Abdomen (Active)   Number of days: 338        Elimination:  Continence: Bowel: {YES / HA:94693}  Bladder: {YES / FS:98375}  Urinary Catheter: {Urinary Catheter:103439700}   Colostomy/Ileostomy/Ileal Conduit: {YES / QC:55112}       Date of Last BM: ***    Intake/Output Summary (Last 24 hours) at 2022 1226  Last data filed at 2022 0537  Gross per 24 hour   Intake 1747.39 ml   Output 1 ml   Net 1746.39 ml     I/O last 3 completed shifts:   In: 2934.4 [P.O.:1440; I.V.:1035.5; IV Piggyback:458.9]  Out: 201 [Urine:201]    Safety Concerns:     [address] Safety Concerns:014604992}    Impairments/Disabilities:      [address] Impairments/Disabilities:517359761}    Nutrition Therapy:  Current Nutrition Therapy:   [address] Diet List:246962849}    Routes of Feeding: {CHP DME Other Feedings:237793622}  Liquids: {Slp liquid thickness:31267}  Daily Fluid Restriction: {CHP DME Yes amt example:619058707}  Last Modified Barium Swallow with Video (Video Swallowing Test): {Done Not Done MZMI:810931821}    Treatments at the Time of Hospital Discharge:   Respiratory Treatments: ***  Oxygen Therapy:  {Therapy; copd oxygen:78324}  Ventilator:    {MARINE PRADO Vent HGAC:610889448}    Rehab Therapies: {THERAPEUTIC INTERVENTION:6774306774}  Weight Bearing Status/Restrictions: {MARINE PRADO Weight Bearin}  Other Medical Equipment (for information only, NOT a DME order):  {EQUIPMENT:847464859}  Other Treatments: ***    Patient's personal belongings (please select all that are sent with patient):  {CHP DME Belongings:737872339}    RN SIGNATURE:  {Esignature:583608699}    CASE MANAGEMENT/SOCIAL WORK SECTION    Inpatient Status Date: 09/21/2022    Readmission Risk Assessment Score:  Readmission Risk              Risk of Unplanned Readmission:  9           Discharging to Facility/ [address] [place] [address],[address] Floor health care service in [facility]  Address: [address]    Phone: [phone]    [facility]    Address: [address] [place] [address], [facility]. [address]    Phone: [phone]      Dialysis Facility (if applicable)   Name:  Address:  Dialysis Schedule:  Phone:  Fax:    / signature: Electronically signed by Ulises Parsons RN on 9/26/22 at 12:31 PM EDT        PHYSICIAN SECTION    Prognosis: Fair    Condition at Discharge: Stable    Rehab Potential (if transferring to Rehab): Home with [address] or Other Treatments After Discharge:       Home IV antibiotics  IV ertapenem 1g IV daily for 5 days   CBC and BMP in 3-5 days  Routine PICC   DC PICC line if/when no longer needed for IV antibiotics    Follow up with PCP    Follow up with GYN      Physician Certification: I certify the above information and transfer of Tommy Anaya  is necessary for the continuing treatment of the diagnosis listed and that she requires Home Care for less 30 days.      Update Admission H&P: No change in H&P    PHYSICIAN SIGNATURE:  Electronically signed by Dalene Pallas, MD on 9/24/22 at 12:29 PM EDT

## 2022-09-24 NOTE — CONSULTS
Pharmacy consulted to order one dose of ertapenem prior to discharge per Dr Claus Del Valle. Indication: E. Coli ESBL UTI   Estimated discharge 9/25 per [facility]. One time order of ertapenem 1g IV placed for 9/25 @ 1200 to be administered prior to discharge. NURSING: Please contact pharmacy when discharge date/time finalized so ertapenem order can be re-timed.      Thanks -   Apolinar Esquivel, PharmD, BCPS  9/24/2022 1:15 PM

## 2022-09-25 PROCEDURE — 1200000000 HC SEMI PRIVATE

## 2022-09-25 PROCEDURE — 6360000002 HC RX W HCPCS: Performed by: INTERNAL MEDICINE

## 2022-09-25 PROCEDURE — 94762 N-INVAS EAR/PLS OXIMTRY CONT: CPT

## 2022-09-25 PROCEDURE — 2580000003 HC RX 258: Performed by: INTERNAL MEDICINE

## 2022-09-25 PROCEDURE — 6370000000 HC RX 637 (ALT 250 FOR IP): Performed by: INTERNAL MEDICINE

## 2022-09-25 RX ORDER — LIDOCAINE HYDROCHLORIDE 10 MG/ML
5 INJECTION, SOLUTION EPIDURAL; INFILTRATION; INTRACAUDAL; PERINEURAL ONCE
Status: COMPLETED | OUTPATIENT
Start: 2022-09-25 | End: 2022-09-26

## 2022-09-25 RX ORDER — SODIUM CHLORIDE 9 MG/ML
25 INJECTION, SOLUTION INTRAVENOUS PRN
Status: DISCONTINUED | OUTPATIENT
Start: 2022-09-25 | End: 2022-09-26 | Stop reason: HOSPADM

## 2022-09-25 RX ORDER — SODIUM CHLORIDE 0.9 % (FLUSH) 0.9 %
5-40 SYRINGE (ML) INJECTION PRN
Status: DISCONTINUED | OUTPATIENT
Start: 2022-09-25 | End: 2022-09-26 | Stop reason: HOSPADM

## 2022-09-25 RX ORDER — SODIUM CHLORIDE 0.9 % (FLUSH) 0.9 %
5-40 SYRINGE (ML) INJECTION EVERY 12 HOURS SCHEDULED
Status: DISCONTINUED | OUTPATIENT
Start: 2022-09-25 | End: 2022-09-26 | Stop reason: HOSPADM

## 2022-09-25 RX ADMIN — SODIUM CHLORIDE, PRESERVATIVE FREE 10 ML: 5 INJECTION INTRAVENOUS at 09:49

## 2022-09-25 RX ADMIN — DULOXETINE HYDROCHLORIDE 60 MG: 60 CAPSULE, DELAYED RELEASE ORAL at 20:33

## 2022-09-25 RX ADMIN — IBUPROFEN 400 MG: 400 TABLET, FILM COATED ORAL at 23:47

## 2022-09-25 RX ADMIN — Medication 250 MG: at 20:33

## 2022-09-25 RX ADMIN — MEROPENEM 1000 MG: 1 INJECTION, POWDER, FOR SOLUTION INTRAVENOUS at 15:46

## 2022-09-25 RX ADMIN — DULOXETINE HYDROCHLORIDE 60 MG: 60 CAPSULE, DELAYED RELEASE ORAL at 09:49

## 2022-09-25 RX ADMIN — Medication 250 MG: at 09:49

## 2022-09-25 RX ADMIN — SODIUM CHLORIDE, PRESERVATIVE FREE 10 ML: 5 INJECTION INTRAVENOUS at 20:33

## 2022-09-25 RX ADMIN — PANTOPRAZOLE SODIUM 40 MG: 40 TABLET, DELAYED RELEASE ORAL at 06:07

## 2022-09-25 RX ADMIN — METHOCARBAMOL TABLETS 500 MG: 500 TABLET, COATED ORAL at 20:33

## 2022-09-25 RX ADMIN — ENOXAPARIN SODIUM 30 MG: 100 INJECTION SUBCUTANEOUS at 09:48

## 2022-09-25 RX ADMIN — ENOXAPARIN SODIUM 30 MG: 100 INJECTION SUBCUTANEOUS at 20:32

## 2022-09-25 RX ADMIN — SODIUM CHLORIDE, PRESERVATIVE FREE 10 ML: 5 INJECTION INTRAVENOUS at 20:34

## 2022-09-25 RX ADMIN — ROPINIROLE HYDROCHLORIDE 2 MG: 2 TABLET, FILM COATED ORAL at 09:49

## 2022-09-25 RX ADMIN — ACETAMINOPHEN 650 MG: 325 TABLET, FILM COATED ORAL at 15:41

## 2022-09-25 RX ADMIN — ROPINIROLE HYDROCHLORIDE 4 MG: 2 TABLET, FILM COATED ORAL at 20:33

## 2022-09-25 RX ADMIN — OXYCODONE 5 MG: 5 TABLET ORAL at 01:48

## 2022-09-25 RX ADMIN — METHOCARBAMOL TABLETS 500 MG: 500 TABLET, COATED ORAL at 15:41

## 2022-09-25 RX ADMIN — MEROPENEM 1000 MG: 1 INJECTION, POWDER, FOR SOLUTION INTRAVENOUS at 05:36

## 2022-09-25 RX ADMIN — METHOCARBAMOL TABLETS 500 MG: 500 TABLET, COATED ORAL at 09:49

## 2022-09-25 RX ADMIN — ACETAMINOPHEN 650 MG: 325 TABLET, FILM COATED ORAL at 06:07

## 2022-09-25 ASSESSMENT — PAIN DESCRIPTION - LOCATION
LOCATION: HEAD
LOCATION: HEAD

## 2022-09-25 ASSESSMENT — PAIN SCALES - GENERAL
PAINLEVEL_OUTOF10: 2
PAINLEVEL_OUTOF10: 8
PAINLEVEL_OUTOF10: 5

## 2022-09-25 ASSESSMENT — PAIN - FUNCTIONAL ASSESSMENT: PAIN_FUNCTIONAL_ASSESSMENT: ACTIVITIES ARE NOT PREVENTED

## 2022-09-25 ASSESSMENT — PAIN DESCRIPTION - DESCRIPTORS: DESCRIPTORS: ACHING

## 2022-09-25 NOTE — PLAN OF CARE
Problem: Safety - Adult  Goal: Free from fall injury  9/25/2022 1627 by Nory Hogue RN  Outcome: Progressing  Note: Discussed with pt importance to keep bed low and locked with alarm activated, nonskid socks on when out of bed, call light and belongings within reach- will monitor. Problem: Pain  Goal: Verbalizes/displays adequate comfort level or baseline comfort level  Outcome: Progressing  Note: Patient complains of pain at level 5/10. Patient describes pain as ache. Patient requests pain medication. Patient medicated with tylenol and robaxin . Will continue to monitor.

## 2022-09-25 NOTE — PLAN OF CARE
Problem: ABCDS Injury Assessment  Goal: Absence of physical injury  9/25/2022 0450 by Darrick Dial RN  Outcome: Progressing  9/25/2022 0450 by Darrick Dial RN  Outcome: Progressing  9/24/2022 1802 by Allen Schilling RN  Outcome: Progressing     Problem: Infection - Adult  Goal: Absence of infection at discharge  9/25/2022 0450 by Darrick Dial RN  Outcome: Progressing  9/25/2022 0450 by Darrick Dial RN  Outcome: Progressing  9/24/2022 1802 by Allen Schilling RN  Outcome: Progressing  Goal: Absence of infection during hospitalization  9/25/2022 0450 by Darrick Dial RN  Outcome: Progressing  9/24/2022 1802 by Allen Schilling RN  Outcome: Progressing     Problem: Safety - Adult  Goal: Free from fall injury  9/25/2022 0450 by Darrick Dial RN  Outcome: Progressing  9/24/2022 1802 by Allen Schilling RN  Outcome: Progressing     Problem: Pain  Goal: Verbalizes/displays adequate comfort level or baseline comfort level  9/24/2022 1802 by Allen Schilling RN  Outcome: Progressing     Problem: Nutrition Deficit:  Goal: Optimize nutritional status  9/24/2022 1802 by Allen Schilling RN  Outcome: Progressing

## 2022-09-25 NOTE — PROGRESS NOTES
Hospital Medicine Progress Note    PCP: Martha Johnson DO    Date of Admission: 9/21/2022      Chief Complaint: Back pain      History Of Present Illness:     62 y.o. morbidly obese female who presented to [facility] ED with mid back pain worse on the left for several days. She denies any dysuria, frequency, urgency, fevers, chills, nausea or vomiting. She does report diarrhea, however, for a couple of months which usually follows any oral intake and the bowel movement is preceded by cramping abdominal pain. She denies any melena, hematochezia. The stool consistency is mostly liquid. She also reports fatigue. Interval HPI  No new complaints    No chest pain    No dyspnea    Noted overnight with nocturnal hypoxia, and has had episodes of bradycardia, HR in the low 40s. Pxt on atenolol. Medications: Reviewed        Allergies:  Droperidol, Haldol [haloperidol lactate], and Toradol [ketorolac tromethamine]      REVIEW OF SYSTEMS COMPLETED:   Pertinent positives as noted in the HPI. All other systems reviewed and negative. PHYSICAL EXAM PERFORMED:    BP 96/64   Pulse 57   Temp 97.7 °F (36.5 °C) (Oral)   Resp 18   Ht 5' 5.98\" (1.676 m)   Wt 272 lb 11.3 oz (123.7 kg)   LMP 09/20/2017   SpO2 93%   BMI 44.04 kg/m²     General appearance:  Obese, acutely ill in no apparent distress  HEENT:  Normal cephalic, atraumatic without obvious deformity. Neck: Short/thick, with full range of motion. Respiratory:  Normal respiratory effort. Clear to auscultation, bilaterally   Cardiovascular:  Regular rate and rhythm with normal S1/S2 without murmurs, rubs or gallops. Abdomen: Soft, obese, non-tender, non-distended with normal bowel sounds. Musculoskeletal:  Midline Cert tenderness L/S area. No clubbing, cyanosis or edema bilaterally. Full range of motion without deformity. Skin: Skin color, texture, turgor normal.  No rashes or lesions.   Neurologic: grossly non-focal.  Psychiatric:  Alert and oriented, thought content appropriate, normal insight  Capillary Refill: Brisk,3 seconds, normal  Peripheral Pulses: +2 palpable, equal bilaterally       Labs:     Recent Labs     09/23/22  0712   WBC 6.4   HGB 13.0   HCT 39.7          Recent Labs     09/23/22  0712      K 3.7      CO2 23   BUN 10   CREATININE 0.6   CALCIUM 9.1       No results for input(s): AST, ALT, BILIDIR, BILITOT, ALKPHOS in the last 72 hours. No results for input(s): INR in the last 72 hours. No results for input(s): Ryanne Aury in the last 72 hours. Urinalysis:      Lab Results   Component Value Date/Time    NITRU POSITIVE 09/21/2022 05:51 PM    WBCUA >100 09/21/2022 05:51 PM    BACTERIA 4+ 09/21/2022 05:51 PM    RBCUA 21-50 09/21/2022 05:51 PM    BLOODU TRACE-INTACT 09/21/2022 05:51 PM    SPECGRAV >=1.030 09/21/2022 05:51 PM    GLUCOSEU Negative 09/21/2022 05:51 PM       Radiology:     CXR: I have reviewed the CXR with the following interpretation: Not done. EKG:  I have reviewed the EKG with the following interpretation: Not done. MRI LUMBAR SPINE WO CONTRAST   Final Result   1. Lumbar degenerative disc disease without significant central canal stenosis. 2. Focal degenerative disc disease at L5-S1 with circumferential disc osteophyte complex and bilateral facet arthropathy resulting in moderate bilateral foraminal narrowing. CT ABDOMEN PELVIS W IV CONTRAST Additional Contrast? None   Final Result      1. No acute intra-abdominopelvic abnormality. 2. Mild atelectasis in the posterior lung bases. 3. L5-S1 severe disc space narrowing. 4. Stable small adrenal nodules consistent with benign nodules. XR LUMBAR SPINE (2-3 VIEWS)   Final Result      No acute fracture seen. Consults:    IP CONSULT TO HOSPITALIST  IP CONSULT TO SPIRITUAL SERVICES  IP CONSULT TO PHARMACY        ASSESSMENT      Acute back pain: midline low back pain, exam limited by obesity.  CT A/P:  L5-S1 severe disc space narrowing: POA  - MRI to better evaluate: Report noted  - Multi-modal Pain control, muscle relaxers      Complicated UTI with pyelonephritis: ESBL E coli  -Was started on Empiric meropenem given her history of ESBL E. Coli  - Meropenem--> ertapenem at discharge (first dose in the Hospital)  - F/u with Gyn/urology      Acute encephalopathy, possibly toxic encephalopathy: POA  - Pt was noted with lethargy, to respond only to sternal rub in the ED and on the floor on night of presentation  - Appears to have happened after droperidol; she has listed intolerance to haldol.   - Appears to not have been recurrent; and possibly sec to side effect/intolerance to droperidol: listed as intolerance by RN  - Limit narcotics: pxt also on benzos and narcotics   - Continue to monitor      Nocturnal hypoxemia, sinus bradycardia  - Prob. MERI  - Noted overnight with nocturnal hypoxia (sats reportedly dropped to 89), and has had episodes of bradycardia, HR in the low 40s. -   - Mum has MERI, but pxt has never been tested  - Pxt on atenolol. Will DC and watch HR off AVN agents  - Will need Sleep Study as o/p  - Nocturnal oxymetry to see if will qualify for Home O2 at night as a bridge: appears will qualify for Home O2 at night      Postprandial diarrhea  - No further diarrhea      Hyperglycemia  -Last A1C 6.3    Chronic anxiety: POA  - Benzodiazepine dependent  - Limit psychoactive meds kaveh with episode of \"slumped over in the toilet\"  - Not recurrent  - Limit psychoactive meds as able      Hypokalemia  -Replace potassium and monitor      Morbid obesity Body mass index is 44.04 kg/m². - Complicating assessment and treatment. Placing patient at risk for multiple co-morbidities as well as early death and contributing to the patient's presentation.  on weight loss          DVT Prophylaxis: Lovenox  Diet: ADULT DIET; Regular; Low Fat/Low Chol/High Fiber/2 gm Na  ADULT ORAL NUTRITION SUPPLEMENT; Lunch;  Low Calorie/High Protein Oral Supplement  Code Status: Full Code    PT/OT Eval Status: n/a    Disposition  - PT/OT eval noted: home at DC with [facility]  - Unable to get PICC OTW: PICC tomorrow for home ertapenem  - Will need Home O2 at hs pending O/P Sleep study  - Likely DC tomorrow with PICC for Home antibx     Nicolás Hughes MD

## 2022-09-26 VITALS
WEIGHT: 272.71 LBS | HEART RATE: 66 BPM | SYSTOLIC BLOOD PRESSURE: 135 MMHG | OXYGEN SATURATION: 95 % | RESPIRATION RATE: 19 BRPM | DIASTOLIC BLOOD PRESSURE: 87 MMHG | BODY MASS INDEX: 43.83 KG/M2 | TEMPERATURE: 97.7 F | HEIGHT: 66 IN

## 2022-09-26 DIAGNOSIS — F41.1 GENERALIZED ANXIETY DISORDER: ICD-10-CM

## 2022-09-26 DIAGNOSIS — F41.9 ANXIETY: Primary | ICD-10-CM

## 2022-09-26 LAB
ANION GAP SERPL CALCULATED.3IONS-SCNC: 12 MMOL/L (ref 3–16)
BASOPHILS ABSOLUTE: 0 K/UL (ref 0–0.2)
BASOPHILS RELATIVE PERCENT: 0.4 %
BUN BLDV-MCNC: 10 MG/DL (ref 7–20)
CALCIUM SERPL-MCNC: 9.4 MG/DL (ref 8.3–10.6)
CHLORIDE BLD-SCNC: 102 MMOL/L (ref 99–110)
CO2: 28 MMOL/L (ref 21–32)
CREAT SERPL-MCNC: 0.6 MG/DL (ref 0.6–1.1)
EKG ATRIAL RATE: 57 BPM
EKG DIAGNOSIS: NORMAL
EKG P AXIS: 60 DEGREES
EKG P-R INTERVAL: 110 MS
EKG Q-T INTERVAL: 468 MS
EKG QRS DURATION: 98 MS
EKG QTC CALCULATION (BAZETT): 455 MS
EKG R AXIS: 5 DEGREES
EKG T AXIS: 25 DEGREES
EKG VENTRICULAR RATE: 57 BPM
EOSINOPHILS ABSOLUTE: 0.1 K/UL (ref 0–0.6)
EOSINOPHILS RELATIVE PERCENT: 1.3 %
GFR AFRICAN AMERICAN: >60
GFR NON-AFRICAN AMERICAN: >60
GLUCOSE BLD-MCNC: 99 MG/DL (ref 70–99)
HCT VFR BLD CALC: 41.9 % (ref 36–48)
HEMOGLOBIN: 14.2 G/DL (ref 12–16)
LYMPHOCYTES ABSOLUTE: 1.4 K/UL (ref 1–5.1)
LYMPHOCYTES RELATIVE PERCENT: 21.8 %
MAGNESIUM: 1.8 MG/DL (ref 1.8–2.4)
MCH RBC QN AUTO: 31.5 PG (ref 26–34)
MCHC RBC AUTO-ENTMCNC: 34 G/DL (ref 31–36)
MCV RBC AUTO: 92.7 FL (ref 80–100)
MONOCYTES ABSOLUTE: 0.5 K/UL (ref 0–1.3)
MONOCYTES RELATIVE PERCENT: 7.4 %
NEUTROPHILS ABSOLUTE: 4.5 K/UL (ref 1.7–7.7)
NEUTROPHILS RELATIVE PERCENT: 69.1 %
PDW BLD-RTO: 14.3 % (ref 12.4–15.4)
PLATELET # BLD: 158 K/UL (ref 135–450)
PMV BLD AUTO: 9.7 FL (ref 5–10.5)
POTASSIUM SERPL-SCNC: 3.3 MMOL/L (ref 3.5–5.1)
RBC # BLD: 4.52 M/UL (ref 4–5.2)
SODIUM BLD-SCNC: 142 MMOL/L (ref 136–145)
WBC # BLD: 6.5 K/UL (ref 4–11)

## 2022-09-26 PROCEDURE — 80048 BASIC METABOLIC PNL TOTAL CA: CPT

## 2022-09-26 PROCEDURE — 85025 COMPLETE CBC W/AUTO DIFF WBC: CPT

## 2022-09-26 PROCEDURE — 36569 INSJ PICC 5 YR+ W/O IMAGING: CPT

## 2022-09-26 PROCEDURE — 02HV33Z INSERTION OF INFUSION DEVICE INTO SUPERIOR VENA CAVA, PERCUTANEOUS APPROACH: ICD-10-PCS | Performed by: INTERNAL MEDICINE

## 2022-09-26 PROCEDURE — 6360000002 HC RX W HCPCS: Performed by: INTERNAL MEDICINE

## 2022-09-26 PROCEDURE — 36415 COLL VENOUS BLD VENIPUNCTURE: CPT

## 2022-09-26 PROCEDURE — 83735 ASSAY OF MAGNESIUM: CPT

## 2022-09-26 PROCEDURE — 2580000003 HC RX 258: Performed by: INTERNAL MEDICINE

## 2022-09-26 PROCEDURE — 6370000000 HC RX 637 (ALT 250 FOR IP): Performed by: INTERNAL MEDICINE

## 2022-09-26 PROCEDURE — 2500000003 HC RX 250 WO HCPCS: Performed by: INTERNAL MEDICINE

## 2022-09-26 PROCEDURE — C1751 CATH, INF, PER/CENT/MIDLINE: HCPCS

## 2022-09-26 PROCEDURE — 6370000000 HC RX 637 (ALT 250 FOR IP): Performed by: STUDENT IN AN ORGANIZED HEALTH CARE EDUCATION/TRAINING PROGRAM

## 2022-09-26 RX ORDER — POTASSIUM CHLORIDE 20 MEQ/1
40 TABLET, EXTENDED RELEASE ORAL ONCE
Status: COMPLETED | OUTPATIENT
Start: 2022-09-26 | End: 2022-09-26

## 2022-09-26 RX ORDER — ALPRAZOLAM 0.5 MG/1
0.5 TABLET ORAL 3 TIMES DAILY PRN
Qty: 90 TABLET | Refills: 1 | Status: SHIPPED | OUTPATIENT
Start: 2022-09-26 | End: 2022-11-25

## 2022-09-26 RX ORDER — ALPRAZOLAM 1 MG/1
TABLET ORAL
Qty: 90 TABLET | Refills: 2 | OUTPATIENT
Start: 2022-09-26 | End: 2022-10-26

## 2022-09-26 RX ADMIN — IBUPROFEN 400 MG: 400 TABLET, FILM COATED ORAL at 08:36

## 2022-09-26 RX ADMIN — METHOCARBAMOL TABLETS 500 MG: 500 TABLET, COATED ORAL at 08:35

## 2022-09-26 RX ADMIN — ROPINIROLE HYDROCHLORIDE 2 MG: 2 TABLET, FILM COATED ORAL at 08:35

## 2022-09-26 RX ADMIN — POTASSIUM CHLORIDE 40 MEQ: 1500 TABLET, EXTENDED RELEASE ORAL at 13:28

## 2022-09-26 RX ADMIN — ENOXAPARIN SODIUM 30 MG: 100 INJECTION SUBCUTANEOUS at 08:35

## 2022-09-26 RX ADMIN — ERTAPENEM 1000 MG: 1 INJECTION INTRAMUSCULAR; INTRAVENOUS at 13:17

## 2022-09-26 RX ADMIN — LIDOCAINE HYDROCHLORIDE 5 ML: 10 INJECTION, SOLUTION EPIDURAL; INFILTRATION; INTRACAUDAL; PERINEURAL at 09:48

## 2022-09-26 RX ADMIN — DULOXETINE HYDROCHLORIDE 60 MG: 60 CAPSULE, DELAYED RELEASE ORAL at 08:35

## 2022-09-26 RX ADMIN — METHOCARBAMOL TABLETS 500 MG: 500 TABLET, COATED ORAL at 13:27

## 2022-09-26 RX ADMIN — Medication 250 MG: at 08:35

## 2022-09-26 RX ADMIN — SODIUM CHLORIDE, PRESERVATIVE FREE 10 ML: 5 INJECTION INTRAVENOUS at 08:37

## 2022-09-26 RX ADMIN — MEROPENEM 1000 MG: 1 INJECTION, POWDER, FOR SOLUTION INTRAVENOUS at 06:26

## 2022-09-26 RX ADMIN — PANTOPRAZOLE SODIUM 40 MG: 40 TABLET, DELAYED RELEASE ORAL at 06:23

## 2022-09-26 RX ADMIN — MEROPENEM 1000 MG: 1 INJECTION, POWDER, FOR SOLUTION INTRAVENOUS at 00:17

## 2022-09-26 ASSESSMENT — PAIN DESCRIPTION - ONSET: ONSET: ON-GOING

## 2022-09-26 ASSESSMENT — PAIN DESCRIPTION - LOCATION: LOCATION: HEAD

## 2022-09-26 ASSESSMENT — PAIN DESCRIPTION - FREQUENCY: FREQUENCY: INTERMITTENT

## 2022-09-26 ASSESSMENT — PAIN DESCRIPTION - DESCRIPTORS: DESCRIPTORS: ACHING

## 2022-09-26 ASSESSMENT — PAIN SCALES - GENERAL: PAINLEVEL_OUTOF10: 5

## 2022-09-26 ASSESSMENT — PAIN - FUNCTIONAL ASSESSMENT: PAIN_FUNCTIONAL_ASSESSMENT: ACTIVITIES ARE NOT PREVENTED

## 2022-09-26 ASSESSMENT — PAIN DESCRIPTION - PAIN TYPE: TYPE: ACUTE PAIN

## 2022-09-26 NOTE — PLAN OF CARE
Problem: Pain  Goal: Verbalizes/displays adequate comfort level or baseline comfort level  9/26/2022 0144 by Marisela Potts RN  Outcome: Progressing   Pt complained of HA, medicated with Motrin 400 mg po as ordered, vital signs stable, afebrile. Will continue to monitor, alteration in comfort.

## 2022-09-26 NOTE — TELEPHONE ENCOUNTER
Request for alprazolam tablet 0.5 mg refill. Patient will be discharged for hospital today. FYI: Patient is getting pict line for antibiotic. Future Appointments   Date Time Provider Diann Sotelo   9/30/2022 11:20 AM Chente Sites, APRN - CNP [facility] [place] [facility]   10/12/2022  1:15 PM Julee Coe PA-C [facility]   Past appointment was 9/15/22.     Patient's pharmacy is [facility] AT [place] [id number]

## 2022-09-26 NOTE — DISCHARGE INSTRUCTIONS
Please follow up with primary care doctor for blood pressure and prediabetes. Antibiotics for 5 days to complete treatment      Caring for Your Midline      You are going home with a Midline. This small, soft tube has been placed in a vein in your arm. It is often used when treatment requires medications for short term. At home, you need to take care of your Midline to keep it working. And since a Midline line has such a high infection risk, you must take extra care washing your hands and preventing the spread of germs. This sheet will help you remember what to do to care for your Midline at home. Understanding Your Role  . A nurse or other healthcare provider will teach you and your caregivers how to care for the Midline. Before leaving the hospital, make sure that you understand what to do at home, how long you may need the MIdline, and when to have a follow up visit. . You will likely be told to flush the Midline with saline. You may also be told to change the catheters injection caps. Or, a nurse may do this for you during a follow-up visit. Only do these things if you are told to, following the instructions you were given. Protecting the Midline  If the Midline gets damaged, it wont work right and could raise you chance of infection. Call your healthcare team right away if any damage occurs. To protect your Midline at home:  . Prevent infection. Use good hand hygiene by following the guidelines on this sheet. Dont touch the catheter or the dressing unless you need to. Always clean your hands before and after you come in contact with any part of the Midline. Your caregivers, family members, any visitors should use good hand hygiene also. Purvi Eugene Keep the Midline dry. The catheter and dressing must stay dry. Dont take baths, go swimming, use a hot tub, or do other activities that could get the Midline wet.  When showering, cover the area with plastic wrap or another cover as recommended by your healthcare provider. Keep the area out of the water spray. If the dressing gets wet, Notify healthcare team right away. . Avoid damage. Dont use any sharp or pointy objects around the catheter. This includes scissors, pins, knives, razors, or anything else that could puncture or cut it. Also, dont let anything pull or rub on the catheter, such as clothing. . Watch for signs of problems. Pay attention to how much of the catheter that sticks out from your skin. If this changes at all, let your healthcare provider know. Also watch for cracks, leaks, or other damage. If the dressing becomes dirty, loose, or wet, change it (if you have been instructed to) or call your healthcare team.    Protecting Your Arm  The arm with the Midline is at risk for developing blood clots (thrombosis). This is a serious complication. To help prevent it: Purvi Eugene As much as possible, use the arm with the Midline in it for normal daily activities. Lack of movement can lead to blood clots, so its important to move your arm as you normally would. . Avoid activities or exercises that require major use of your arm, such as sports, unless your healthcare provider says its okay. Purvi Eugene Avoid activities that cause discomfort in your arm. Talk to your healthcare team if you have concerns about pain or range of motion. . Dont lift anything heavier than 10 pounds with the affected arm. . Drink plenty of water. Staying hydrated helps keep blood clots from forming. Prevent Infection with Good Hand Hygiene  A Midline can let germs into your body. This can lead to serious and sometimes deadly infections. To prevent infection, its very important that you, your caregivers and others around you use good hand hygiene. This means washing your hands well with soap and water, and cleaning them with alcohol based hand gel as directed. Never touch the Midline or dressing without first using one of the methods. To wash your hands with soap and water:  .  Wet your hands with warm water. (Avoid hot water, which can cause skin irritation when you wash your hands often). . Apply enough soap to cover the entire surface of your hands, including fingers. . Rub your hands together vigorously for at least 15 seconds. Make sure to rub the front and back of each hand up to the wrist, your fingers and fingernails, between the fingers, and each thumb. . Rinse your hands with warm water  . Dry your hands completely with a new paper towel. Dont use a cloth towel or other reusable towel. These can harbor germs. . Use the paper towel to turn off the faucet, then throw it away. If you are in a bathroom, also use a paper towel to open the door instead of touching the handle. When you dont have access to soap and water: Use alcohol-based hand gel. The gel should have at least 60% alcohol. Follow the instructions on the package. Your healthcare team can answer any questions you have about when to use hand gel, or when its better to wash with soap and water. When to Call Your Healthcare Provider  Call your provider right away if you have any of the following:  . Pain or burning in your shoulder  . Fever of 100.4 F or higher  . Chills  . Signs of infection at the catheter site (pain, redness, drainage, burning, or stinging  . Tightness in your arm, above the catheter site  . Gurgling noises coming from the catheter  .  The catheter falls out, breaks, cracks, leaks, or has other damage

## 2022-09-26 NOTE — TELEPHONE ENCOUNTER
Spoke to the patient about her Xanax refill. Discussed proceedings and her illness. She feels a lot better. Recommend lowering the dose of Xanax and trying over time to decrease and get off. Will send 0.5 mg dose. Patient expresses understanding.

## 2022-09-26 NOTE — DISCHARGE INSTR - MEDS
Your blood pressures are currently in the normal range. Check your blood pressures at home twice daily every day after at last 5 minutes at rest with feet on the ground and back supported. Bring your cuff to the doctors office to compare. Call your primary care or cardiologist tomorrow to make an appointment for hospital follow-up.

## 2022-09-26 NOTE — PROCEDURES
TRIMMABLE POWER MIDLINE PROCEDURE NOTE  Chart reviewed for allergies, diagnosis, labs, known contraindications, reason for line placement and planned length of treatment. Insertion procedure discussed with patient/family member. Informed consent not required for Midline placement. Three patient identifiers - Patient name,   and MRN -  completed &  confirmed verbally. Hat, mask and eye shield donned. Midline site scrubbed with Chloraprep  One-Step applicator  for 30 seconds x 1. Hand Hygiene  performed with 3% Chlorhexidine surgical scrub x1 min prior to  Sterile gloves, sterile gown being donned. Patient draped using maximal sterile barrier technique ( head to toe ). Midline site scrubbed a 2nd time with Chloraprep One-Step applicator x 30 sec. Vein located by [place] and site marked with sterile pen. 1% Lidocaine 5 ml injected intradermal pre-insertion. Midline inserted. Positive brisk blood return obtained Midline flushed with 10 mls  0.9% Sterile Sodium Chloride. Midline flushes easily with no resistance. Valve placed on all lumens followed by Alcohol Swab Caps on end of each. Skin prep applied to site. CHG dressing in place. Catheter secured with non-sutured locking device per hospital protocol. Sterile Tegaderm applied over Midline site. Sterile field maintained during procedure. Positioning wire accounted for post procedure. Pt. Response to procedure, tolerated well. Appearance of site: Clean dry and intact without bleeding or edema. All edges of Tegaderm occlusive. Site marked with date and initials of RN placing line. Top 2 side rails in up position, call button in reach, RN notified of all of the above. A Trimmable Power Midline 13 CM placed in the  FRANNY cephalic vein. 0 cm showing from insertion site.

## 2022-09-26 NOTE — PLAN OF CARE
Problem: ABCDS Injury Assessment  Goal: Absence of physical injury  9/26/2022 1330 by Zane Perry RN  Outcome: Completed  9/26/2022 0144 by Marisela Starks RN  Outcome: Progressing     Problem: Infection - Adult  Goal: Absence of infection at discharge  9/26/2022 1330 by Zane Perry RN  Outcome: Completed  9/26/2022 0144 by Marisela Starks RN  Outcome: Progressing  Goal: Absence of infection during hospitalization  9/26/2022 1330 by Zane Perry RN  Outcome: Completed  9/26/2022 0144 by Marisela Starks RN  Outcome: Progressing     Problem: Safety - Adult  Goal: Free from fall injury  9/26/2022 1330 by Zane Perry RN  Outcome: Completed  9/26/2022 0144 by Marisela Starks RN  Outcome: Progressing     Problem: Discharge Planning  Goal: Discharge to home or other facility with appropriate resources  9/26/2022 1330 by Zane Perry RN  Outcome: Completed  9/26/2022 0144 by Marisela Starks RN  Outcome: Progressing     Problem: Discharge Planning  Goal: Discharge to home or other facility with appropriate resources  9/26/2022 1330 by Zane Perry RN  Outcome: Completed  9/26/2022 0144 by Marisela Starks RN  Outcome: Progressing     Problem: Pain  Goal: Verbalizes/displays adequate comfort level or baseline comfort level  9/26/2022 1330 by Zane Perry RN  Outcome: Completed  9/26/2022 0144 by Marisela Starks RN  Outcome: Progressing     Problem: Nutrition Deficit:  Goal: Optimize nutritional status  9/26/2022 1330 by Zane Perry RN  Outcome: Completed  9/26/2022 0144 by Marisela Starks RN  Outcome: Progressing

## 2022-09-26 NOTE — PROGRESS NOTES
Hospital Medicine Progress Note    PCP: Geoff Sue DO    Date of Admission: 9/21/2022      Chief Complaint: Back pain      History Of Present Illness:     \"64 y.o. morbidly obese female who presented to [facility] ED with mid back pain worse on the left for several days. She denies any dysuria, frequency, urgency, fevers, chills, nausea or vomiting. She does report diarrhea, however, for a couple of months which usually follows any oral intake and the bowel movement is preceded by cramping abdominal pain. She denies any melena, hematochezia. The stool consistency is mostly liquid. She also reports fatigue. \"    Interval HPI    Feeling fine today. Hopeful to go home. Medications: Reviewed    Allergies:  Droperidol, Haldol [haloperidol lactate], and Toradol [ketorolac tromethamine]      REVIEW OF SYSTEMS COMPLETED:   Pertinent positives as noted in the HPI. All other systems reviewed and negative. PHYSICAL EXAM PERFORMED:    /86   Pulse 64   Temp 98 °F (36.7 °C) (Oral)   Resp 20   Ht 5' 5.98\" (1.676 m)   Wt 272 lb 11.3 oz (123.7 kg)   LMP 09/20/2017   SpO2 95%   BMI 44.04 kg/m²     General appearance:  Obese, acutely ill in no apparent distress  HEENT:  Normal cephalic, atraumatic without obvious deformity. Neck: Short/thick, with full range of motion. Respiratory:  Normal respiratory effort. Clear to auscultation, bilaterally   Cardiovascular:  Regular rate and rhythm with normal S1/S2 without murmurs, rubs or gallops. Abdomen: Soft, obese, non-tender, non-distended with normal bowel sounds. Musculoskeletal:  Midline Cert tenderness L/S area. No clubbing, cyanosis or edema bilaterally. Full range of motion without deformity. Skin: Skin color, texture, turgor normal.  No rashes or lesions.   Neurologic: grossly non-focal.  Psychiatric:  Alert and oriented, thought content appropriate, normal insight  Capillary Refill: Brisk,3 seconds, normal  Peripheral Pulses: +2 palpable, equal bilaterally       Labs:     Recent Labs     09/26/22  0705   WBC 6.5   HGB 14.2   HCT 41.9          Recent Labs     09/26/22  0705      K 3.3*      CO2 28   BUN 10   CREATININE 0.6   CALCIUM 9.4       No results for input(s): AST, ALT, BILIDIR, BILITOT, ALKPHOS in the last 72 hours. No results for input(s): INR in the last 72 hours. No results for input(s): Claricenicole Dumont in the last 72 hours. Urinalysis:      Lab Results   Component Value Date/Time    NITRU POSITIVE 09/21/2022 05:51 PM    WBCUA >100 09/21/2022 05:51 PM    BACTERIA 4+ 09/21/2022 05:51 PM    RBCUA 21-50 09/21/2022 05:51 PM    BLOODU TRACE-INTACT 09/21/2022 05:51 PM    SPECGRAV >=1.030 09/21/2022 05:51 PM    GLUCOSEU Negative 09/21/2022 05:51 PM       Radiology:     CXR: I have reviewed the CXR with the following interpretation: Not done. EKG:  I have reviewed the EKG with the following interpretation: Not done. MRI LUMBAR SPINE WO CONTRAST   Final Result   1. Lumbar degenerative disc disease without significant central canal stenosis. 2. Focal degenerative disc disease at L5-S1 with circumferential disc osteophyte complex and bilateral facet arthropathy resulting in moderate bilateral foraminal narrowing. CT ABDOMEN PELVIS W IV CONTRAST Additional Contrast? None   Final Result      1. No acute intra-abdominopelvic abnormality. 2. Mild atelectasis in the posterior lung bases. 3. L5-S1 severe disc space narrowing. 4. Stable small adrenal nodules consistent with benign nodules. XR LUMBAR SPINE (2-3 VIEWS)   Final Result      No acute fracture seen. Consults:    IP CONSULT TO HOSPITALIST  IP CONSULT TO SPIRITUAL SERVICES  IP CONSULT TO PHARMACY    ASSESSMENT    Acute back pain: midline low back pain, exam limited by obesity.  CT A/P:  L5-S1 severe disc space narrowing: POA  - MRI to better evaluate: Report noted  - Multi-modal Pain control, muscle relaxers    Complicated UTI 9/26/22, her antibiotics for home health have already been ordered and will be available at home  - Will need Home O2 at hs pending O/P Sleep study     [facility], DO

## 2022-09-26 NOTE — CARE COORDINATION
Case Management Assessment            Discharge Note                    Date / Time of Note: 9/26/2022 12:17 PM                  Discharge Note Completed by: Indigo Cavazos RN    Patient Name: Lauren Sheldon   YOB: 1965  Diagnosis: Hypokalemia [E87.6]  Pyelonephritis [N12]  Acute bilateral low back pain with left-sided sciatica [M54.42]   Date / Time: 9/21/2022  3:59 PM    Current PCP: Rodriguez Can DO  Clinic patient: No    Hospitalization in the last 30 days: No    Advance Directives:  Code Status: Full Code  [place] DNR form completed and on chart: No    Financial:  Payor: Cassidy Yip / Plan: [address] / Product Type: *No Product type* /      Pharmacy:    [facility] [facility], [address] [place]  [place]  Phone: [phone] Fax: [phone]      Assistance purchasing medications?:    Assistance provided by Case Management: None at this time    Does patient want to participate in local refill/ meds to beds program?:      Meds To Beds General Rules:  1. Can ONLY be done Monday- Friday between 8:30am-5pm  2. Prescription(s) must be in pharmacy by 3pm to be filled same day  3. Copy of patient's insurance/ prescription drug card and patient face sheet must be sent along with the prescription(s)  4. Cost of Rx cannot be added to hospital bill. If financial assistance is needed, please contact unit  or ;  or  CANNOT provide pharmacy voucher for patients co-pays  5.  Patients can then  the prescription on their way out of the hospital at discharge, or pharmacy can deliver to the bedside if staff is available. (payment due at time of pick-up or delivery - cash, check, or card accepted)     Able to afford home medications/ co-pay costs: Yes    ADLS:  Current PT AM-PAC Score: 21 /24  Current OT AM-PAC Score: 21 /24      DISCHARGE Disposition: Home overnight  Pulse Ox  study completed  and  Mariaelena  with Andreas  id reviewing to see if she  qualifies:        CM  called  [facility]  and spoke [facility] Pharmacist  Wei Murillo [phone]  ; to call in  IV atbx  order  : The Plan for Transition of Care is related to the following treatment goals of Hypokalemia [E87.6]  Pyelonephritis [N12]  Acute bilateral low back pain with left-sided sciatica [M54.42]    The Patient and/or patient representative Kriss Heller and her family were provided with a choice of provider and agrees with the discharge plan Yes    Freedom of choice list was provided with basic dialogue that supports the patient's individualized plan of care/goals and shares the quality data associated with the providers.  Yes    Care Transitions patient: No    Kisha Lion RN  [facility]  Case Management Department  Ph: [phone]

## 2022-09-26 NOTE — PROGRESS NOTES
Physical Therapy/Occupational Therapy  D/c    Came to see pt for PT/OT. Spoke with nursing who states pt has progressed to where she is moving independently in room and performing her ADLs in bathroom without assistance. No further skilled therapy indicated at this time. Pt plans to return home at d/c. No DME needs. D/c acute PT/OT.     [address], OTR/L, [id number]

## 2022-09-26 NOTE — DISCHARGE SUMMARY
hypoxia (sats reportedly dropped to 89), and has had episodes of bradycardia, HR in the low 40s. -   - Mum has MERI, but pxt has never been tested  - Pxt on atenolol (although has been off recently). Will DC and watch HR off AVN agents  - Will need Sleep Study as o/p  - Nocturnal oxymetry to see if will qualify for Home O2 at night as a bridge: appears will qualify for Home O2 at night     Postprandial diarrhea  - No further diarrhea     Hyperglycemia  -Last A1C 6.3, Pre-DMII     Chronic anxiety: POA  - Benzodiazepine dependent  - Limit psychoactive meds kaveh with episode of \"slumped over in the toilet\"  - Not recurrent  - Limit psychoactive meds as able    Morbid obesity Body mass index is 44.04 kg/m². - Complicating assessment and treatment. Placing patient at risk for multiple co-morbidities as well as early death and contributing to the patient's presentation.  on weight loss        Additional findings or notes to primary provider:  None at this time    Discharge Medications:   Current Discharge Medication List        START taking these medications    Details   lidocaine 4 % external patch Place 1 patch onto the skin daily  Qty: 14 each, Refills: 2      oxyCODONE (ROXICODONE) 5 MG immediate release tablet Take 1 tablet by mouth every 8 hours as needed (severe pain) for up to 3 days.   Qty: 9 tablet, Refills: 0    Comments: Reduce doses taken as pain becomes manageable  Associated Diagnoses: Sciatica, unspecified laterality           Current Discharge Medication List        CONTINUE these medications which have CHANGED    Details   ondansetron (ZOFRAN ODT) 4 MG disintegrating tablet Take 2 tablets by mouth every 8 hours as needed for Nausea  Qty: 20 tablet, Refills: 0           Current Discharge Medication List        CONTINUE these medications which have NOT CHANGED    Details   rOPINIRole (REQUIP) 2 MG tablet TAKE 2 TABLETS AT NIGHT AND ONE IN THE MORNING FOR LEGS  Qty: 90 tablet, Refills: 3      potassium midline, no anterior cervical or SC LAD  Heart[de-identified] Normal s1/s2, RRR, no murmurs, gallops, or rubs. No leg edema  Lungs:  Clear to auscultation bilaterally, no wheeze, rales or rhonchi, no use of accessory musclesNormal respiratory effort. Clear to auscultation, bilaterally without Rales/Wheezes/Rhonchi. Abdomen: Soft, non-tender, non-distended, bowel sounds present, no masses  Musculoskeletal:  No clubbing, no cyanosis, or edema  Skin: No lesion or masses  Neurologic:  Neurovascularly intact without any focal sensory/motor deficits. Cranial nerves: II-XII intact, grossly non-focal.  Psychiatric:  Alert and oriented, thought content appropriate    Labs: For convenience and continuity at follow-up the following most recent labs are provided:    Lab Results   Component Value Date/Time    WBC 6.5 09/26/2022 07:05 AM    HGB 14.2 09/26/2022 07:05 AM    HCT 41.9 09/26/2022 07:05 AM    MCV 92.7 09/26/2022 07:05 AM     09/26/2022 07:05 AM     09/26/2022 07:05 AM    K 3.3 09/26/2022 07:05 AM    K 3.9 09/22/2022 06:48 AM     09/26/2022 07:05 AM    CO2 28 09/26/2022 07:05 AM    BUN 10 09/26/2022 07:05 AM    CREATININE 0.6 09/26/2022 07:05 AM    CALCIUM 9.4 09/26/2022 07:05 AM    PHOS 3.1 03/10/2018 04:18 AM    ALKPHOS 89 09/21/2022 01:48 PM    ALT 23 09/21/2022 01:48 PM    AST 19 09/21/2022 01:48 PM    BILITOT 0.4 09/21/2022 01:48 PM    BILIDIR <0.2 12/25/2017 05:03 PM    LABALBU 3.8 09/21/2022 01:48 PM    LDLCALC 106 06/21/2022 10:36 AM    TRIG 128 06/21/2022 10:36 AM     Lab Results   Component Value Date    INR 1.08 06/21/2022    INR 1.07 10/16/2013       Radiology:  XR LUMBAR SPINE (2-3 VIEWS)    Result Date: 9/21/2022  LUMBAR SPINE SERIES  3 views HISTORY: Low back pain COMPARISON: none FINDINGS: No evidence of acute fracture or traumatic bony abnormality is seen. Degenerative changes are noted with disc height narrowing and endplate sclerosis seen at L5-S1. Disc heights are otherwise preserved.      No acute fracture seen. MRI LUMBAR SPINE WO CONTRAST    Result Date: 9/23/2022  History: Low back pain. MRI lumbar spine without contrast TECHNIQUE: Multiplanar T1 and T2-weighted images were obtained of lumbar spine without use of contrast. FINDINGS: The 5 lumbar vertebral bodies demonstrate normal height. Normal alignment. Disc space narrowing and loss of normal T2 disc signal compatible with degenerative disc disease. No paraspinal mass or lymphadenopathy. No marrow replacing lesion. The conus terminates at the level of L1. L1-L2: No disc relation, spinal stenosis, or neural foraminal narrowing. L2-L3: Moderate diffuse disc bulge. Mild facet arthropathy and posterior ligamentous hypertrophy. No significant central canal stenosis. No significant foraminal narrowing. L3-L4: Mild posterior disc protrusion without significant central canal stenosis. No significant foraminal narrowing. L4-L5: Broad-based posterior disc protrusion. Mild facet arthropathy and posterior ligamentous hypertrophy. No central canal stenosis. No significant foraminal narrowing. L5-S1: Severe disc space narrowing. Diffuse disc osteophyte complex. No central canal stenosis. Bilateral facet arthropathy and ligamentous hypertrophy. Moderate bilateral foraminal narrowing. 1. Lumbar degenerative disc disease without significant central canal stenosis. 2. Focal degenerative disc disease at L5-S1 with circumferential disc osteophyte complex and bilateral facet arthropathy resulting in moderate bilateral foraminal narrowing. CT ABDOMEN PELVIS W IV CONTRAST Additional Contrast? None    Result Date: 9/21/2022  DATE: 9/20/2022 EXAM: CT ABDOMEN PELVIS W IV CONTRAST INDICATION: eval for perinephric, Pain COMPARISON: 2020 TECHNIQUE: Axial CT imaging obtained from lung bases through pelvis. Axial images and multiplanar reformatted images are provided for review.    Individualized dose optimization technique was used in order to meet ALARA standards for radiation dose reduction. In addition to vendor specific dose reduction algorithms, the dose reduction techniques vary based on the specific scanner utilized but frequently include automated exposure control, adjustment of the mA and/or kV according to patient size, and use of iterative reconstruction technique. IV Contrast: 80 mL Isovue-370 Oral Contrast: No FINDINGS: LUNG BASES: Mild atelectasis in the lung bases. LIVER: Normal. GALLBLADDER AND BILIARY TREE: No calcified gallstones. No gallbladder distention. No intra- or extrahepatic biliary dilatation. PANCREAS: Normal. SPLEEN: Normal. ADRENAL GLANDS: Small left adrenal nodule and right adrenal nodule, unchanged. KIDNEYS AND URETERS: No hydronephrosis. Symmetrical enhancement. URINARY BLADDER: Normal. REPRODUCTIVE ORGANS: Prior hysterectomy. BOWEL: No dilatation. No bowel wall thickening. LYMPH NODES: No abnormally enlarged nodes. PERITONEUM/RETROPERITONEUM: No ascites or free air. VESSELS: Aorta is normal caliber and proximal branch vessels are patent. ABDOMINAL WALL: Normal. BONES: L5-S1 severe disc space narrowing. 1. No acute intra-abdominopelvic abnormality. 2. Mild atelectasis in the posterior lung bases. 3. L5-S1 severe disc space narrowing. 4. Stable small adrenal nodules consistent with benign nodules. The patient was seen and examined on day of discharge and this discharge summary is in conjunction with any daily progress note from day of discharge. Time Spent on discharge is more than 30 minutes in the examination, evaluation, counseling and review of medications and discharge plan. Alirio Green DO   9/26/2022      Thank you Mike Anderson DO for the opportunity to be involved in this patient's care. If you have any questions or concerns please feel free to contact me at [facility].

## 2022-09-26 NOTE — CARE COORDINATION
CTN contacted Natali with "[employer] [phone]. They have accepted this patient and will pull referral from [facility].  They will contact patient and make arrangements for [facility] by 9/27  Electronically signed by Brenden Piper LPN on 9/20/6236 at 42:73 AM

## 2022-09-27 ENCOUNTER — CARE COORDINATION (OUTPATIENT)
Dept: CASE MANAGEMENT | Age: 57
End: 2022-09-27

## 2022-09-27 ENCOUNTER — TELEPHONE (OUTPATIENT)
Dept: FAMILY MEDICINE CLINIC | Age: 57
End: 2022-09-27

## 2022-09-27 NOTE — CARE COORDINATION
[facility] Care Transitions Initial Follow Up Call    Call within 2 business days of discharge: Yes        Attempted to contact patient for initial follow up transition call. Left voicemail message to return call with an update on condition since discharge. Contact information provided. Will try again.           Care Transitions 24 Hour Call    Care Transitions Interventions         Follow Up  Future Appointments   Date Time Provider Diann Sotelo   9/30/2022 11:20 AM AVERY Arellano - CNP [facility] [place] [facility]   10/12/2022  1:15 PM Saira Morales PA-C [facility] MARTIN García LPN

## 2022-09-27 NOTE — TELEPHONE ENCOUNTER
Please call and tell him thanks for the call and okay. They will need to follow-up with the infusion or antibiotic orders with the doctor at [facility]. that ordered them. I will work with her general orders.

## 2022-09-27 NOTE — TELEPHONE ENCOUNTER
Julissa with [facility] called to let us know they are starting home care services and IV administration for patient. They will be faxing over a plan of care to sign.

## 2022-09-28 ENCOUNTER — CARE COORDINATION (OUTPATIENT)
Dept: CASE MANAGEMENT | Age: 57
End: 2022-09-28

## 2022-09-28 DIAGNOSIS — E87.6 HYPOKALEMIA: ICD-10-CM

## 2022-09-28 DIAGNOSIS — E66.01 MORBID OBESITY WITH BMI OF 45.0-49.9, ADULT (HCC): ICD-10-CM

## 2022-09-28 DIAGNOSIS — G25.81 RESTLESS LEG SYNDROME: ICD-10-CM

## 2022-09-28 DIAGNOSIS — N12 PYELONEPHRITIS: Primary | ICD-10-CM

## 2022-09-28 DIAGNOSIS — E55.9 VITAMIN D DEFICIENCY: ICD-10-CM

## 2022-09-28 DIAGNOSIS — F41.8 MIXED ANXIETY AND DEPRESSIVE DISORDER: ICD-10-CM

## 2022-09-28 PROCEDURE — 1111F DSCHRG MED/CURRENT MED MERGE: CPT | Performed by: FAMILY MEDICINE

## 2022-09-28 NOTE — CARE COORDINATION
[facility] Care Transitions Initial Follow Up Call    Call within 2 business days of discharge: Yes    LPN Care Coordinator contacted the patient by telephone to perform post hospital discharge assessment. Verified name and  with patient as identifiers. Provided introduction to self, and explanation of the LPN Care Coordinator role. Patient: Allen Booker Patient : 1965   MRN: <[id number]>  Reason for Admission: Pyelonephritis  Discharge Date: 22 RARS: Readmission Risk Score: 9.5      Last Discharge [address]       Date Complaint Diagnosis Description Type Department Provider    22 Abnormal Lab Pyelonephritis . .. ED to Hosp-Admission (Discharged) (ADMITTED) [address], DO; Nicole Roof. .. Was this an external facility discharge? No Discharge Facility: [facility] to be reviewed by the provider   Additional needs identified to be addressed with provider: No  none               Method of communication with provider: none. Patient states she ok but her legs are sore. Appetite and fluid intake is good. No urinary or bowel complaints. She denies fever, chills,nvd, abdominal pain or back pain. [facility] has been to patients home for IV antibiotic therapy. Medication reconciliation and 1111f completed. No questions or needs at this time. Patient agreeable to future calls. LPN Care Coordinator reviewed discharge instructions, medical action plan, and red flags with patient who verbalized understanding. The patient was given an opportunity to ask questions and does not have any further questions or concerns at this time. Were discharge instructions available to patient? Yes. Reviewed appropriate site of care based on symptoms and resources available to patient including: PCP  Specialist  Home health  When to call 911. The patient agrees to contact the PCP office for questions related to their healthcare. Advance Care Planning:   Does patient have an Advance Directive:  no .    Medication reconciliation was performed with patient, who verbalizes understanding of administration of home medications. Medications reviewed, 1111F entered: yes    Was patient discharged with a pulse oximeter? no    Non-face-to-face services provided:  Obtained and reviewed discharge summary and/or continuity of care documents    Offered patient enrollment in the Remote Patient Monitoring (RPM) program for in-home monitoring: Patient declined. Care Transitions 24 Hour Call    Care Transitions Interventions         Follow Up  Future Appointments   Date Time Provider Diann Sotelo   9/30/2022 11:20 AM AVERY Pimentel - [facility]   10/12/2022  1:15 PM Charly Muhammad PA-C [facility]       LPN Care Coordinator provided contact information. Plan for follow-up call in 5-7 days based on severity of symptoms and risk factors.   Plan for next call: symptom management-temp, sore legs  self management-     Pita Maurer LPN

## 2022-09-29 ENCOUNTER — TELEPHONE (OUTPATIENT)
Dept: FAMILY MEDICINE CLINIC | Age: 57
End: 2022-09-29

## 2022-09-29 NOTE — TELEPHONE ENCOUNTER
That is a question for the ordering physican,  it does not look like Dr Donavan Gibbons ordered the medications. Do not do the urine culture until she completes the invanz tomorrow. I would recommend maintaining the line until the urine culture is back and shows clearing of the infection.  If it is not clear they will have to contact the ordering physician for further orders

## 2022-09-29 NOTE — TELEPHONE ENCOUNTER
Uyen Hernandez with [facility] [phone] was informed of Asim Pires CNP response. Is patient to stop medication tomorrow?

## 2022-09-29 NOTE — TELEPHONE ENCOUNTER
Per Chika Hurtado: Will tell home health do a repeat urine culture and maintain the line until the urine culture is back and shows clearing of the infection.

## 2022-09-30 ENCOUNTER — OFFICE VISIT (OUTPATIENT)
Dept: FAMILY MEDICINE CLINIC | Age: 57
End: 2022-09-30
Payer: MEDICARE

## 2022-09-30 VITALS
RESPIRATION RATE: 16 BRPM | SYSTOLIC BLOOD PRESSURE: 116 MMHG | DIASTOLIC BLOOD PRESSURE: 82 MMHG | OXYGEN SATURATION: 98 % | WEIGHT: 267 LBS | TEMPERATURE: 97.1 F | HEART RATE: 73 BPM | BODY MASS INDEX: 43.12 KG/M2

## 2022-09-30 DIAGNOSIS — M54.41 ACUTE BILATERAL LOW BACK PAIN WITH BILATERAL SCIATICA: ICD-10-CM

## 2022-09-30 DIAGNOSIS — N12 PYELONEPHRITIS: Primary | ICD-10-CM

## 2022-09-30 DIAGNOSIS — M54.42 ACUTE BILATERAL LOW BACK PAIN WITH BILATERAL SCIATICA: ICD-10-CM

## 2022-09-30 DIAGNOSIS — G47.34 NOCTURNAL HYPOXIA: ICD-10-CM

## 2022-09-30 DIAGNOSIS — Z09 HOSPITAL DISCHARGE FOLLOW-UP: ICD-10-CM

## 2022-09-30 DIAGNOSIS — R19.7 DIARRHEA, UNSPECIFIED TYPE: ICD-10-CM

## 2022-09-30 PROCEDURE — 3017F COLORECTAL CA SCREEN DOC REV: CPT | Performed by: NURSE PRACTITIONER

## 2022-09-30 PROCEDURE — 1111F DSCHRG MED/CURRENT MED MERGE: CPT | Performed by: NURSE PRACTITIONER

## 2022-09-30 PROCEDURE — G8417 CALC BMI ABV UP PARAM F/U: HCPCS | Performed by: NURSE PRACTITIONER

## 2022-09-30 PROCEDURE — G8427 DOCREV CUR MEDS BY ELIG CLIN: HCPCS | Performed by: NURSE PRACTITIONER

## 2022-09-30 PROCEDURE — 1036F TOBACCO NON-USER: CPT | Performed by: NURSE PRACTITIONER

## 2022-09-30 PROCEDURE — 99214 OFFICE O/P EST MOD 30 MIN: CPT | Performed by: NURSE PRACTITIONER

## 2022-09-30 RX ORDER — GABAPENTIN 100 MG/1
100 CAPSULE ORAL 2 TIMES DAILY
Qty: 20 CAPSULE | Refills: 0 | Status: SHIPPED | OUTPATIENT
Start: 2022-09-30 | End: 2022-10-05

## 2022-09-30 ASSESSMENT — PATIENT HEALTH QUESTIONNAIRE - PHQ9
1. LITTLE INTEREST OR PLEASURE IN DOING THINGS: 0
SUM OF ALL RESPONSES TO PHQ QUESTIONS 1-9: 0
8. MOVING OR SPEAKING SO SLOWLY THAT OTHER PEOPLE COULD HAVE NOTICED. OR THE OPPOSITE, BEING SO FIGETY OR RESTLESS THAT YOU HAVE BEEN MOVING AROUND A LOT MORE THAN USUAL: 0
7. TROUBLE CONCENTRATING ON THINGS, SUCH AS READING THE NEWSPAPER OR WATCHING TELEVISION: 0
10. IF YOU CHECKED OFF ANY PROBLEMS, HOW DIFFICULT HAVE THESE PROBLEMS MADE IT FOR YOU TO DO YOUR WORK, TAKE CARE OF THINGS AT HOME, OR GET ALONG WITH OTHER PEOPLE: 0
4. FEELING TIRED OR HAVING LITTLE ENERGY: 0
9. THOUGHTS THAT YOU WOULD BE BETTER OFF DEAD, OR OF HURTING YOURSELF: 0
5. POOR APPETITE OR OVEREATING: 0
6. FEELING BAD ABOUT YOURSELF - OR THAT YOU ARE A FAILURE OR HAVE LET YOURSELF OR YOUR FAMILY DOWN: 0
2. FEELING DOWN, DEPRESSED OR HOPELESS: 0
DEPRESSION UNABLE TO ASSESS: FUNCTIONAL CAPACITY MOTIVATION LIMITS ACCURACY
SUM OF ALL RESPONSES TO PHQ QUESTIONS 1-9: 0
3. TROUBLE FALLING OR STAYING ASLEEP: 0
SUM OF ALL RESPONSES TO PHQ QUESTIONS 1-9: 0
SUM OF ALL RESPONSES TO PHQ QUESTIONS 1-9: 0
SUM OF ALL RESPONSES TO PHQ9 QUESTIONS 1 & 2: 0

## 2022-09-30 ASSESSMENT — ENCOUNTER SYMPTOMS
COUGH: 0
DIARRHEA: 0
VOMITING: 0
BACK PAIN: 1
SHORTNESS OF BREATH: 0
NAUSEA: 0

## 2022-09-30 ASSESSMENT — ANXIETY QUESTIONNAIRES
GAD7 TOTAL SCORE: 3
6. BECOMING EASILY ANNOYED OR IRRITABLE: 0
7. FEELING AFRAID AS IF SOMETHING AWFUL MIGHT HAPPEN: 0
IF YOU CHECKED OFF ANY PROBLEMS ON THIS QUESTIONNAIRE, HOW DIFFICULT HAVE THESE PROBLEMS MADE IT FOR YOU TO DO YOUR WORK, TAKE CARE OF THINGS AT HOME, OR GET ALONG WITH OTHER PEOPLE: NOT DIFFICULT AT ALL
5. BEING SO RESTLESS THAT IT IS HARD TO SIT STILL: 0
3. WORRYING TOO MUCH ABOUT DIFFERENT THINGS: 0
2. NOT BEING ABLE TO STOP OR CONTROL WORRYING: 1
4. TROUBLE RELAXING: 0
1. FEELING NERVOUS, ANXIOUS, OR ON EDGE: 2

## 2022-09-30 NOTE — PROGRESS NOTES
Post-Discharge Transitional Care Follow Up      Paola Mcconnell   YOB: 1965    Chief Complaint   Patient presents with    Follow-Up from Hospital     Pyelonephritis with bacteria infection. Stated that she is very tired and weak ( stated that her legs hurt so bad and that's why she went to the hospital        Date of Office Visit:  9/30/2022  Date of Hospital Admission: 9/21/22  Date of Hospital Discharge: 9/26/22  Readmission Risk Score (high >=14%. Medium >=10%):Readmission Risk Score: 9.5      Care management risk score Rising risk (score 2-5) and Complex Care (Scores >=6): No Risk Score On File     Non face to face  following discharge, date last encounter closed (first attempt may have been earlier): 09/28/2022     Call initiated 2 business days of discharge: Yes     Pyelonephritis-on Invanz, no fever. Schedule to have repeat UA/culture and potassium level checked on Monday 10/3/22. She will call to schedule follow up with urology  Acute bilateral low back pain with bilateral sciatica- will try low dose gabapentin BID, discussed side effects  -     gabapentin (NEURONTIN) 100 MG capsule; Take 1 capsule by mouth 2 times daily for 10 days. Intended supply: 30 days, Disp-20 capsule, R-0Normal  Nocturnal hypoxia- needs sleep eval for MERI  -     [facility]  [facility], [facility]  -     [facility] DISCHARGE MEDS RECONCILED W/ CURRENT OUTPATIENT MED LIST    Medical Decision Making: moderate complexity    Return in about 1 week (around 10/7/2022). On this date 9/30/2022 I have spent 30 minutes reviewing previous notes, test results and face to face with the patient discussing the diagnosis and importance of compliance with the treatment plan as well as documenting on the day of the visit. Subjective:   HPI    Inpatient course: Discharge summary reviewed- see chart.     Interval history/Current status:     Continues to complain of bilateral leg and low back pain. Low back pain is improving some. Leg pain upper out thighs and posterior thigh to knees. Quite bothersome last night. Lumbar MRI done in hospital. Feels OK. Diarrhea is getting better. No Fever. She is receiving IV Invanz through PICC line. Scheduled for that for one week and then will have repeat urine culture and blood work done on Monday. Needs to schedule f/u with urology still. Patient Active Problem List   Diagnosis    Mixed anxiety and depressive disorder    Knee pain    Chondromalacia of left knee    Synovitis of knee    Bilateral carpal tunnel syndrome    Lumbar strain    Anxiety    Restless leg syndrome    De Quervain's disease (radial styloid tenosynovitis)    Osteoarthritis of carpometacarpal joint of thumb    Hemorrhoid prolapse    Dyspnea on exertion    Sciatica    Intractable vomiting with nausea    Hiatal hernia    Elevated blood pressure reading    Major depressive disorder, recurrent, moderate (HCC)    Generalized anxiety disorder    Essential hypertension    Closed displaced fracture of middle phalanx of right ring finger    Anal lesion    Adjustment disorder with depressed mood    Neck pain    Morbid obesity with BMI of 45.0-49.9, adult ([facility] [address]    S/P robot-assisted surgical procedure    Spell of abnormal behavior    Urinary tract infection due to extended-spectrum beta lactamase (ESBL) producing Escherichia coli    Vitamin D deficiency    Pyelonephritis    Hypokalemia       Medications listed as ordered at the time of discharge from hospital     Medication List            Accurate as of September 30, 2022 11:59 AM. If you have any questions, ask your nurse or doctor. START taking these medications      gabapentin 100 MG capsule  Commonly known as: NEURONTIN  Take 1 capsule by mouth 2 times daily for 10 days.  Intended supply: 30 days  Started by: AVERY Clinton CNP            CHANGE how you take these medications      omeprazole 40 MG delayed release capsule  Commonly known as: PRILOSEC  TAKE 1 CAPSULE BY MOUTH EVERY DAY  What changed: See the new instructions. CONTINUE taking these medications      ALPRAZolam 0.5 MG tablet  Commonly known as: XANAX  Take 1 tablet by mouth 3 times daily as needed for Sleep or Anxiety for up to 60 days. Digestive Probiotic 250 MG Caps  Take one dose BID x 2 weeks     DULoxetine 60 MG extended release capsule  Commonly known as: CYMBALTA  Take 1 capsule by mouth 2 times daily     ertapenem 1 GM injection  Commonly known as: INVanz     lidocaine 4 % external patch  Place 1 patch onto the skin daily     MELATONIN PO     methocarbamol 500 MG tablet  Commonly known as: ROBAXIN  TAKE 1 TABLET BY MOUTH 3 TIMES A DAY AS NEEDED FOR NECK PAIN     ondansetron 4 MG disintegrating tablet  Commonly known as: Zofran ODT  Take 2 tablets by mouth every 8 hours as needed for Nausea     potassium chloride 20 MEQ extended release tablet  Commonly known as: Klor-Con M20  Take one or two daily as directed for potassium     rOPINIRole 2 MG tablet  Commonly known as: REQUIP  TAKE 2 TABLETS AT NIGHT AND ONE IN THE MORNING FOR LEGS     TYLENOL PO     vitamin D 1.25 MG (66931 UT) Caps capsule  Commonly known as: ERGOCALCIFEROL  Take 1 capsule by mouth once a week               Where to Get Your Medications        These medications were sent to [facility]/pharmacy [address] [address] [address]      Phone: [phone]   gabapentin 100 MG capsule          Medications marked \"taking\" at this time  Outpatient Medications Marked as Taking for the 9/30/22 encounter (Office Visit) with AVERY Calderon CNP   Medication Sig Dispense Refill    gabapentin (NEURONTIN) 100 MG capsule Take 1 capsule by mouth 2 times daily for 10 days.  Intended supply: 30 days 20 capsule 0    ertapenem (INVANZ) 1 GM injection       lidocaine 4 % external patch Place 1 patch onto the skin daily 14 each 2    ondansetron (ZOFRAN ODT) 4 MG disintegrating tablet Take 2 tablets by mouth every 8 hours as needed for Nausea 20 tablet 0    rOPINIRole (REQUIP) 2 MG tablet TAKE 2 TABLETS AT NIGHT AND ONE IN THE MORNING FOR LEGS 90 tablet 3    potassium chloride (KLOR-CON M20) 20 MEQ extended release tablet Take one or two daily as directed for potassium 60 tablet 3    DULoxetine (CYMBALTA) 60 MG extended release capsule Take 1 capsule by mouth 2 times daily 60 capsule 3    Saccharomyces boulardii (DIGESTIVE PROBIOTIC) 250 MG CAPS Take one dose BID x 2 weeks 30 capsule 1    methocarbamol (ROBAXIN) 500 MG tablet TAKE 1 TABLET BY MOUTH 3 TIMES A DAY AS NEEDED FOR NECK PAIN 90 tablet 3    vitamin D (ERGOCALCIFEROL) 1.25 MG (27807 UT) CAPS capsule Take 1 capsule by mouth once a week 4 capsule 2    omeprazole (PRILOSEC) 40 MG delayed release capsule TAKE 1 CAPSULE BY MOUTH EVERY DAY (Patient taking differently: in the morning and at bedtime Dr. Paola Mercado increased to BID) 30 capsule 6    Acetaminophen (TYLENOL PO) Take 650 mg by mouth daily as needed       MELATONIN PO Take 20 mg by mouth nightly as needed           Medications patient taking as of now reconciled against medications ordered at time of hospital discharge: Yes    Review of Systems   Constitutional:  Positive for fatigue. Negative for chills and fever. Respiratory:  Negative for cough and shortness of breath. Cardiovascular:  Negative for chest pain and leg swelling. Gastrointestinal:  Negative for diarrhea, nausea and vomiting. Musculoskeletal:  Positive for back pain (leg mariano). Neurological:  Negative for dizziness and headaches. All other systems reviewed and are negative.     Objective:    /82 (Site: Right Upper Arm, Position: Sitting)   Pulse 73   Temp 97.1 °F (36.2 °C) (Temporal)   Resp 16   Wt 267 lb (121.1 kg)   LMP 09/20/2017   SpO2 98%   BMI 43.12 kg/m²   Physical Exam  Vitals and nursing note reviewed. Constitutional:       General: She is not in acute distress. Appearance: Normal appearance. She is well-developed. She is obese. She is not ill-appearing, toxic-appearing or diaphoretic. Comments: BMI 43.12   HENT:      Head: Normocephalic and atraumatic. Eyes:      Extraocular Movements: Extraocular movements intact. Pupils: Pupils are equal, round, and reactive to light. Cardiovascular:      Rate and Rhythm: Normal rate and regular rhythm. Heart sounds: Normal heart sounds, S1 normal and S2 normal. No murmur heard. No friction rub. No gallop. Pulmonary:      Effort: Pulmonary effort is normal. No respiratory distress. Breath sounds: Normal breath sounds. No wheezing. Musculoskeletal:      Lumbar back: Tenderness present. Back:       Right upper leg: Tenderness present. Left upper leg: Tenderness present. Legs:    Neurological:      General: No focal deficit present. Mental Status: She is alert and oriented to person, place, and time. Mental status is at baseline. Cranial Nerves: No cranial nerve deficit. Psychiatric:         Speech: Speech normal.       An electronic signature was used to authenticate this note.   --AVERY Wise - CNP

## 2022-09-30 NOTE — PATIENT INSTRUCTIONS
Start gabapentin twice daily for leg and back pain, this may make you tired. Space out this medication from the Xanax.      Call next week with update on symptoms    Schedule follow up with urology

## 2022-10-03 NOTE — TELEPHONE ENCOUNTER
Called & spoke to pt. She states she is trying to get ahold of doctor from hospital - unable to reach - someone came to her house today to draw blood but thinks it was the picc line lady. She is confused & trying to figure it all out. No Home Health listed in patients chart. She states she has not given a urine since she left the hospital.        LM for Kinsley Button, [employer]  to call back.   Trying to figure out who should order the urine culture

## 2022-10-04 LAB
A/G RATIO: 1.6 (ref 1.1–2.2)
ALBUMIN SERPL-MCNC: 4.3 G/DL (ref 3.4–5)
ALP BLD-CCNC: 101 U/L (ref 40–129)
ALT SERPL-CCNC: 60 U/L (ref 10–40)
ANION GAP SERPL CALCULATED.3IONS-SCNC: 16 MMOL/L (ref 3–16)
AST SERPL-CCNC: 49 U/L (ref 15–37)
BASOPHILS ABSOLUTE: 0 K/UL (ref 0–0.2)
BASOPHILS RELATIVE PERCENT: 0.5 %
BILIRUB SERPL-MCNC: 0.7 MG/DL (ref 0–1)
BUN BLDV-MCNC: 14 MG/DL (ref 7–20)
CALCIUM SERPL-MCNC: 10.4 MG/DL (ref 8.3–10.6)
CHLORIDE BLD-SCNC: 92 MMOL/L (ref 99–110)
CO2: 28 MMOL/L (ref 21–32)
CREAT SERPL-MCNC: 1 MG/DL (ref 0.6–1.1)
EOSINOPHILS ABSOLUTE: 0.3 K/UL (ref 0–0.6)
EOSINOPHILS RELATIVE PERCENT: 3.7 %
GFR AFRICAN AMERICAN: >60
GFR NON-AFRICAN AMERICAN: 57
GLUCOSE BLD-MCNC: 107 MG/DL (ref 70–99)
HCT VFR BLD CALC: 44.6 % (ref 36–48)
HEMOGLOBIN: 14.8 G/DL (ref 12–16)
LYMPHOCYTES ABSOLUTE: 1.4 K/UL (ref 1–5.1)
LYMPHOCYTES RELATIVE PERCENT: 17.6 %
MCH RBC QN AUTO: 31.3 PG (ref 26–34)
MCHC RBC AUTO-ENTMCNC: 33.1 G/DL (ref 31–36)
MCV RBC AUTO: 94.6 FL (ref 80–100)
MONOCYTES ABSOLUTE: 0.5 K/UL (ref 0–1.3)
MONOCYTES RELATIVE PERCENT: 6.1 %
NEUTROPHILS ABSOLUTE: 5.8 K/UL (ref 1.7–7.7)
NEUTROPHILS RELATIVE PERCENT: 72.1 %
PDW BLD-RTO: 15.1 % (ref 12.4–15.4)
PLATELET # BLD: 221 K/UL (ref 135–450)
PMV BLD AUTO: 10.2 FL (ref 5–10.5)
POTASSIUM SERPL-SCNC: 3.4 MMOL/L (ref 3.5–5.1)
RBC # BLD: 4.71 M/UL (ref 4–5.2)
SODIUM BLD-SCNC: 136 MMOL/L (ref 136–145)
TOTAL PROTEIN: 7 G/DL (ref 6.4–8.2)
WBC # BLD: 8.1 K/UL (ref 4–11)

## 2022-10-04 NOTE — TELEPHONE ENCOUNTER
Denisa with [facility] returning my call. She let me know the home care co pt was using as well. Asked for the request to have urine culture still. Called & spoke to [facility] for home care  They apologized for not getting the urine culture yet but are looking to send a nurse to collect it hopefully today.

## 2022-10-05 ENCOUNTER — TELEPHONE (OUTPATIENT)
Dept: FAMILY MEDICINE CLINIC | Age: 57
End: 2022-10-05

## 2022-10-05 DIAGNOSIS — R09.81 NASAL CONGESTION: ICD-10-CM

## 2022-10-05 DIAGNOSIS — M54.41 ACUTE BILATERAL LOW BACK PAIN WITH BILATERAL SCIATICA: ICD-10-CM

## 2022-10-05 DIAGNOSIS — M54.42 ACUTE BILATERAL LOW BACK PAIN WITH BILATERAL SCIATICA: ICD-10-CM

## 2022-10-05 LAB
BACTERIA: NORMAL /HPF
BILIRUBIN URINE: NEGATIVE
BLOOD, URINE: NEGATIVE
CLARITY: CLEAR
COLOR: YELLOW
EPITHELIAL CELLS, UA: 5 /HPF (ref 0–5)
GLUCOSE URINE: NEGATIVE MG/DL
HYALINE CASTS: 0 /LPF (ref 0–8)
KETONES, URINE: NEGATIVE MG/DL
LEUKOCYTE ESTERASE, URINE: NEGATIVE
MICROSCOPIC EXAMINATION: YES
NITRITE, URINE: NEGATIVE
PH UA: 5.5 (ref 5–8)
PROTEIN UA: ABNORMAL MG/DL
RBC UA: 3 /HPF (ref 0–4)
SPECIFIC GRAVITY UA: 1.02 (ref 1–1.03)
URINE TYPE: ABNORMAL
UROBILINOGEN, URINE: 0.2 E.U./DL
WBC UA: 0 /HPF (ref 0–5)

## 2022-10-05 RX ORDER — FLUTICASONE PROPIONATE 50 MCG
SPRAY, SUSPENSION (ML) NASAL
Qty: 1 EACH | Refills: 2 | Status: SHIPPED | OUTPATIENT
Start: 2022-10-05

## 2022-10-05 RX ORDER — GABAPENTIN 100 MG/1
100 CAPSULE ORAL 3 TIMES DAILY
Qty: 90 CAPSULE | Refills: 0 | Status: SHIPPED | OUTPATIENT
Start: 2022-10-05 | End: 2022-11-04

## 2022-10-05 NOTE — TELEPHONE ENCOUNTER
Let her know she can increase the gabapentin to 1 tablet 3 times daily, I will update this prescription at the pharmacy. Her urinalysis shows no indication of infection.

## 2022-10-05 NOTE — TELEPHONE ENCOUNTER
9/15/2022    Future Appointments   Date Time Provider Diann Sotelo   10/12/2022  1:15 PM Jodi Smiley PA-C [facility]   11/9/2022  9:20 AM Karie Elmore [facility][1]

## 2022-10-05 NOTE — TELEPHONE ENCOUNTER
Patient seen CLEM Almeida on 9-30-22 and was prescribed Gabapentin 100 mg . Patient states it is working but asking for a higher dose of Gabapentin. Patient also took a urine test from home health nurse ,results in patients my chart and she is asking that CLEM Almeida take a look at the results.     9/30/2022     Future Appointments   Date Time Provider Diann Sotelo   10/12/2022  1:15 PM Rob Abbott PA-C [facility]   11/9/2022  9:20 AM Aylin Grace MD AND ORTHO [facility]

## 2022-10-05 NOTE — TELEPHONE ENCOUNTER
Called & spoke to pt  Info given. Pt scheduled apt to review what to do next.   Future Appointments   Date Time Provider Diann Sotelo   10/6/2022  3:20 PM AVERY Rodrigues - [facility]   10/12/2022  1:15 PM Pelon Aparicio PA-C [facility]   11/9/2022  9:20 AM Jarred Pettit MD AND FR[facility]CO[facility]

## 2022-10-06 ENCOUNTER — OFFICE VISIT (OUTPATIENT)
Dept: FAMILY MEDICINE CLINIC | Age: 57
End: 2022-10-06
Payer: MEDICARE

## 2022-10-06 ENCOUNTER — CARE COORDINATION (OUTPATIENT)
Dept: CASE MANAGEMENT | Age: 57
End: 2022-10-06

## 2022-10-06 VITALS
TEMPERATURE: 97.1 F | RESPIRATION RATE: 16 BRPM | DIASTOLIC BLOOD PRESSURE: 74 MMHG | HEART RATE: 84 BPM | WEIGHT: 267 LBS | OXYGEN SATURATION: 94 % | SYSTOLIC BLOOD PRESSURE: 116 MMHG | BODY MASS INDEX: 43.12 KG/M2

## 2022-10-06 DIAGNOSIS — N39.0 URINARY TRACT INFECTION WITHOUT HEMATURIA, SITE UNSPECIFIED: ICD-10-CM

## 2022-10-06 DIAGNOSIS — M54.42 ACUTE LEFT-SIDED LOW BACK PAIN WITH LEFT-SIDED SCIATICA: ICD-10-CM

## 2022-10-06 DIAGNOSIS — N12 PYELONEPHRITIS: Primary | ICD-10-CM

## 2022-10-06 DIAGNOSIS — R19.7 DIARRHEA, UNSPECIFIED TYPE: ICD-10-CM

## 2022-10-06 DIAGNOSIS — E87.6 HYPOKALEMIA: ICD-10-CM

## 2022-10-06 PROCEDURE — 99214 OFFICE O/P EST MOD 30 MIN: CPT | Performed by: NURSE PRACTITIONER

## 2022-10-06 PROCEDURE — G8427 DOCREV CUR MEDS BY ELIG CLIN: HCPCS | Performed by: NURSE PRACTITIONER

## 2022-10-06 PROCEDURE — G8417 CALC BMI ABV UP PARAM F/U: HCPCS | Performed by: NURSE PRACTITIONER

## 2022-10-06 PROCEDURE — 1111F DSCHRG MED/CURRENT MED MERGE: CPT | Performed by: NURSE PRACTITIONER

## 2022-10-06 PROCEDURE — 1036F TOBACCO NON-USER: CPT | Performed by: NURSE PRACTITIONER

## 2022-10-06 PROCEDURE — G8484 FLU IMMUNIZE NO ADMIN: HCPCS | Performed by: NURSE PRACTITIONER

## 2022-10-06 PROCEDURE — 3017F COLORECTAL CA SCREEN DOC REV: CPT | Performed by: NURSE PRACTITIONER

## 2022-10-06 ASSESSMENT — ANXIETY QUESTIONNAIRES
3. WORRYING TOO MUCH ABOUT DIFFERENT THINGS: 0
GAD7 TOTAL SCORE: 0
2. NOT BEING ABLE TO STOP OR CONTROL WORRYING: 0
1. FEELING NERVOUS, ANXIOUS, OR ON EDGE: 0
6. BECOMING EASILY ANNOYED OR IRRITABLE: 0
IF YOU CHECKED OFF ANY PROBLEMS ON THIS QUESTIONNAIRE, HOW DIFFICULT HAVE THESE PROBLEMS MADE IT FOR YOU TO DO YOUR WORK, TAKE CARE OF THINGS AT HOME, OR GET ALONG WITH OTHER PEOPLE: NOT DIFFICULT AT ALL
7. FEELING AFRAID AS IF SOMETHING AWFUL MIGHT HAPPEN: 0
4. TROUBLE RELAXING: 0
5. BEING SO RESTLESS THAT IT IS HARD TO SIT STILL: 0

## 2022-10-06 ASSESSMENT — PATIENT HEALTH QUESTIONNAIRE - PHQ9
6. FEELING BAD ABOUT YOURSELF - OR THAT YOU ARE A FAILURE OR HAVE LET YOURSELF OR YOUR FAMILY DOWN: 0
5. POOR APPETITE OR OVEREATING: 0
2. FEELING DOWN, DEPRESSED OR HOPELESS: 0
3. TROUBLE FALLING OR STAYING ASLEEP: 0
7. TROUBLE CONCENTRATING ON THINGS, SUCH AS READING THE NEWSPAPER OR WATCHING TELEVISION: 0
1. LITTLE INTEREST OR PLEASURE IN DOING THINGS: 0
SUM OF ALL RESPONSES TO PHQ QUESTIONS 1-9: 0
SUM OF ALL RESPONSES TO PHQ QUESTIONS 1-9: 0
9. THOUGHTS THAT YOU WOULD BE BETTER OFF DEAD, OR OF HURTING YOURSELF: 0
DEPRESSION UNABLE TO ASSESS: FUNCTIONAL CAPACITY MOTIVATION LIMITS ACCURACY
SUM OF ALL RESPONSES TO PHQ QUESTIONS 1-9: 0
SUM OF ALL RESPONSES TO PHQ9 QUESTIONS 1 & 2: 0
10. IF YOU CHECKED OFF ANY PROBLEMS, HOW DIFFICULT HAVE THESE PROBLEMS MADE IT FOR YOU TO DO YOUR WORK, TAKE CARE OF THINGS AT HOME, OR GET ALONG WITH OTHER PEOPLE: 0
4. FEELING TIRED OR HAVING LITTLE ENERGY: 0
8. MOVING OR SPEAKING SO SLOWLY THAT OTHER PEOPLE COULD HAVE NOTICED. OR THE OPPOSITE, BEING SO FIGETY OR RESTLESS THAT YOU HAVE BEEN MOVING AROUND A LOT MORE THAN USUAL: 0
SUM OF ALL RESPONSES TO PHQ QUESTIONS 1-9: 0

## 2022-10-06 NOTE — CARE COORDINATION
[facility] Care Transitions Follow Up Call    Care Transition Nurse contacted the patient by telephone to follow up after admission on 2022. Verified name and  with patient as identifiers. Patient: Sal Troy  Patient : 1965   MRN: [id number]   Reason for Admission: UTI, ESBL and E.COLI  Discharge Date: 22 RARS: Readmission Risk Score: 9.5      Needs to be reviewed by the provider   Additional needs identified to be addressed with provider: No  none             Method of communication with provider: none. CTN spoke with patient this afternoon for follow up CTN call. Patient states she is doing well, no reports of any fever, chills, nausea, vomiting, chest pain, SOB or cough. Midline site has no redness, pain, swelling or drainage. Patient states urine is clear, no sediment, odor or pain with urination. Patient with lot's of questions and confusion regarding the MD's listed on AVS for follow up appointments. CTN explained what each MD was consulted for and why follow up is needed. Patient has completed IV ABX's course. Addressed changes since last contact:  none  Discussed follow-up appointments. If no appointment was previously scheduled, appointment scheduling offered: Yes. Is follow up appointment scheduled within 7 days of discharge? Yes. Follow Up  Future Appointments   Date Time Provider Diann Sotelo   10/6/2022  3:20 PM AVERY Grant - CNP [facility] [place] [facility]   10/12/2022  1:15 PM Lb Sterling PA-C [facility]   2022  9:20 AM Norberto Palacio MD AND [facility]       Care Transition Nurse reviewed discharge instructions, medical action plan, and red flags with patient and discussed any barriers to care and/or understanding of plan of care after discharge. Discussed appropriate site of care based on symptoms and resources available to patient including: PCP  Specialist  Urgent care clinics  [address]  When to call 911.  The patient agrees to contact the PCP office for questions related to their healthcare. Advance Care Planning:   reviewed and current. Patients top risk factors for readmission: medical condition-UTI with ESBL and E.COLI, medication management, multiple health system providers, and polypharmacy  Interventions to address risk factors: Education of patient/family/caregiver/guardian to support self-management-Patient instructed to continue to monitor for any of the above noted s/s, as well as following up with all disciplines recommended. Offered patient enrollment in the Remote Patient Monitoring (RPM) program for in-home monitoring: Patient declined. Care Transitions Subsequent and Final Call    Schedule Follow Up Appointment with PCP: Declined  Subsequent and Final Calls  Do you have any ongoing symptoms?: No  Have your medications changed?: Yes  Patient Reports: Completed 5 days of IV ABX  Do you have any questions related to your medications?: No  Do you currently have any active services?: Yes  Are you currently active with any services?: Home Health  Do you have any needs or concerns that I can assist you with?: No  Identified Barriers: None  Care Transitions Interventions  No Identified Needs  Other Interventions:             Care Transition Nurse provided contact information for future needs. Plan for follow-up call in 5-7 days based on severity of symptoms and risk factors. Plan for next call:   Any issues or concerns, follow up that patient has scheduled follow up appointments with Pulmonologist, Urologist, and ID    Thank Alva Yu RN  Care Transition Coordinator  Contact XYGYQV:[phone]

## 2022-10-06 NOTE — PROGRESS NOTES
10/6/2022     Chief Complaint   Patient presents with    Follow-up     Needs order to have pick line removed. Nik Meza (:  1965) is a 62 y.o. female, here for evaluation of the following medical concerns:    HPI    Diarrhea resolved- getting good consistency   Feels good  Diarrhea resolved  No abdominal pain, dysuria or urgency  UA 10/4/22- negative with small protein, culture with 50,000 cfu enterococcus faceialis sensitivities are pending. Energy level is improving. Potassium level was 3.4 on 10/3  Her low back and leg pain is responding to the gabapentin, helps for a few hours  Has appointment with Dr. Suzanne Bahena scheduled for       Previous HPI 22  Continues to complain of bilateral leg and low back pain. Low back pain is improving some. Leg pain upper out thighs and posterior thigh to knees. Quite bothersome last night. Lumbar MRI done in hospital. Feels OK. Diarrhea is getting better. No Fever. She is receiving IV Invanz through PICC line. Scheduled for that for one week and then will have repeat urine culture and blood work done on Monday. Needs to schedule f/u with urology still. Review of Systems   Constitutional:  Negative for chills, fatigue and fever. Respiratory:  Negative for cough and shortness of breath. Cardiovascular:  Negative for chest pain and leg swelling. Gastrointestinal:  Negative for diarrhea, nausea and vomiting. Musculoskeletal:  Positive for back pain (leg pain). Neurological:  Negative for dizziness and headaches. All other systems reviewed and are negative. Prior to Visit Medications    Medication Sig Taking? Authorizing Provider   fluticasone (FLONASE) 50 MCG/ACT nasal spray SPRAY 1 SPRAY BY NASAL ROUTE EVERY DAY Yes Joseph Perea DO   gabapentin (NEURONTIN) 100 MG capsule Take 1 capsule by mouth 3 times daily for 30 days.  Intended supply: 30 days Yes Lanny Ba APRN - CNP   ALPRAZolam Jackeline Cowden) 0.5 MG tablet Take 1 tablet by mouth 3 times daily as needed for Sleep or Anxiety for up to 60 days.  Yes Gerry Araujo, DO   lidocaine 4 % external patch Place 1 patch onto the skin daily Yes Vince Sandy MD   ondansetron (ZOFRAN ODT) 4 MG disintegrating tablet Take 2 tablets by mouth every 8 hours as needed for Nausea Yes Rebecca Marie MD   rOPINIRole (REQUIP) 2 MG tablet TAKE 2 TABLETS AT NIGHT AND ONE IN THE MORNING FOR LEGS Yes Gerry Red, DO   potassium chloride (KLOR-CON M20) 20 MEQ extended release tablet Take one or two daily as directed for potassium Yes Gerry Red, DO   DULoxetine (CYMBALTA) 60 MG extended release capsule Take 1 capsule by mouth 2 times daily Yes Gerry Red, DO   Saccharomyces boulardii (DIGESTIVE PROBIOTIC) 250 MG CAPS Take one dose BID x 2 weeks Yes Gerry Red, DO   methocarbamol (ROBAXIN) 500 MG tablet TAKE 1 TABLET BY MOUTH 3 TIMES A DAY AS NEEDED FOR NECK PAIN Yes Gerry Red, DO   vitamin D (ERGOCALCIFEROL) 1.25 MG (13112 UT) CAPS capsule Take 1 capsule by mouth once a week Yes AVERY Emerson CNP   omeprazole (PRILOSEC) 40 MG delayed release capsule TAKE 1 CAPSULE BY MOUTH EVERY DAY  Patient taking differently: in the morning and at bedtime Dr. Mat Conner increased to BID Yes Gerry Araujo, DO   Acetaminophen (TYLENOL PO) Take 650 mg by mouth daily as needed  Yes Historical Provider, MD   MELATONIN PO Take 20 mg by mouth nightly as needed  Yes Historical Provider, MD   nitrofurantoin, macrocrystal-monohydrate, (MACROBID) 100 MG capsule Take 1 capsule by mouth 2 times daily for 7 days  AVERY Rodrigues CNP        Social History     Tobacco Use    Smoking status: Never    Smokeless tobacco: Never   Substance Use Topics    Alcohol use: Yes     Comment: 1 -2 drinks per month        Vitals:    10/06/22 1524   BP: 116/74   Site: Left Upper Arm   Position: Sitting   Pulse: 84   Resp: 16   Temp: 97.1 °F (36.2 °C)   TempSrc: Temporal   SpO2: 94%   Weight: 267 lb (121.1 kg) Estimated body mass index is 43.12 kg/m² as calculated from the following:    Height as of 9/22/22: 5' 5.98\" (1.676 m). Weight as of this encounter: 267 lb (121.1 kg). Physical Exam  Vitals and nursing note reviewed. Constitutional:       General: She is not in acute distress. Appearance: Normal appearance. She is well-developed. She is obese. She is not ill-appearing, toxic-appearing or diaphoretic. HENT:      Head: Normocephalic and atraumatic. Eyes:      Extraocular Movements: Extraocular movements intact. Pupils: Pupils are equal, round, and reactive to light. Cardiovascular:      Rate and Rhythm: Normal rate and regular rhythm. Heart sounds: Normal heart sounds, S1 normal and S2 normal. No murmur heard. No friction rub. No gallop. Pulmonary:      Effort: Pulmonary effort is normal. No respiratory distress. Breath sounds: Normal breath sounds. Neurological:      General: No focal deficit present. Mental Status: She is alert and oriented to person, place, and time. Mental status is at baseline. Cranial Nerves: No cranial nerve deficit. Psychiatric:         Speech: Speech normal.       ASSESSMENT/PLAN:  1. Pyelonephritis    2. Urinary tract infection without hematuria, site unspecified    3. Acute left-sided low back pain with left-sided sciatica    4. Hypokalemia    5. Diarrhea, unspecified type      Urine culture- ESBL resolved; enterococcus growing sensitivities pending. She is asymptomatic  Will wait for sensitivities to result on urine culture before pulling PICC  Ok to increase the gabapentin to 200 mg TID PRN  Potassium improving, still on the low side  Diarrhea resolved   Return one week       An electronic signature was used to authenticate this note.     --Joes G Ramirez, AVERY - CNP on 10/7/2022 at 6:56 AM

## 2022-10-06 NOTE — TELEPHONE ENCOUNTER
The urine culture continues to show E. coli infection. They need to contact the antibiotic ordering provider for more antibiotic orders. Those did not come from this office.

## 2022-10-07 ENCOUNTER — TELEPHONE (OUTPATIENT)
Dept: FAMILY MEDICINE CLINIC | Age: 57
End: 2022-10-07

## 2022-10-07 LAB
ORGANISM: ABNORMAL
URINE CULTURE, ROUTINE: ABNORMAL

## 2022-10-07 RX ORDER — NITROFURANTOIN 25; 75 MG/1; MG/1
100 CAPSULE ORAL 2 TIMES DAILY
Qty: 14 CAPSULE | Refills: 0 | Status: SHIPPED | OUTPATIENT
Start: 2022-10-07 | End: 2022-10-14

## 2022-10-07 ASSESSMENT — ENCOUNTER SYMPTOMS
BACK PAIN: 1
DIARRHEA: 0
COUGH: 0
SHORTNESS OF BREATH: 0
VOMITING: 0
NAUSEA: 0

## 2022-10-07 NOTE — PROGRESS NOTES
Please call pt let her know urine culture sensitivities are back and the bacteria growing is sensitive to nitrofurantoin which I sent to the pharmacy. She should take it twice daily for 7 days and then follow up with Dr. José Miguel Hein for repeat urine testing and culture.

## 2022-10-07 NOTE — TELEPHONE ENCOUNTER
Damián Knight with [facility] asking for a call at [phone]  Damián Knight needs to know if the Urine culture is back ,if further iv is needed and if home nurse can pull pic line.

## 2022-10-07 NOTE — PROGRESS NOTES
Patient stated that her pick line is hurting and is asking if they can take it out and put it back in.

## 2022-10-07 NOTE — PROGRESS NOTES
Esdras Garrison to let pt know PICC line can be removed if it is bothering her. She will follow up next week for urine recheck in office.  Reminded her to schedule f/u with urology

## 2022-10-10 ENCOUNTER — TELEPHONE (OUTPATIENT)
Dept: FAMILY MEDICINE CLINIC | Age: 57
End: 2022-10-10

## 2022-10-10 NOTE — TELEPHONE ENCOUNTER
Gabriela, CNP - this is a continuation from Patient message 10/9/22. Are you able to address while Dr. Willie Yun is out of office please?

## 2022-10-10 NOTE — TELEPHONE ENCOUNTER
Called pt   I let her know Dr. Ana Gallo is out of office until Thursday and that [facility], [address] (who has been covering) is out today. She would like to know if another covering provider is able to order labs & another urine culture so her home health can do today at 12:45pm.    Looks like she has had BMP & CBC each week except last where a CMP was drawn. Kla Mcneil, CNP - are you able to address or order what is needed while Dr. Ana Gallo is out of office please?

## 2022-10-10 NOTE — TELEPHONE ENCOUNTER
Patient had order for pic to be pulled yesterday 10/09/22 patient states she still is on oral meds and provider wants zenon labs and urine culture .  Just need to confirm that labs and urine culture needed

## 2022-10-10 NOTE — TELEPHONE ENCOUNTER
Patient called said nurse will be out around 12:45pm today 10/10/22 need to know if follow up labs need done

## 2022-10-11 ENCOUNTER — CARE COORDINATION (OUTPATIENT)
Dept: CASE MANAGEMENT | Age: 57
End: 2022-10-11

## 2022-10-11 LAB
A/G RATIO: 1.3 (ref 1–2.1)
ALBUMIN SERPL-MCNC: 3.7 G/DL (ref 3.5–5)
ALP BLD-CCNC: 104 U/L (ref 33–140)
ALT SERPL-CCNC: 57 U/L (ref 0–40)
ANION GAP SERPL CALCULATED.3IONS-SCNC: 19 MMOL/L (ref 5–13)
AST SERPL-CCNC: 45 U/L (ref 0–30)
BASOPHILS ABSOLUTE: 0.03 10*3/UL (ref 0–0.2)
BASOPHILS RELATIVE PERCENT: 0.4 %
BILIRUB SERPL-MCNC: 0.4 MG/DL (ref 0.2–1.2)
BILIRUBIN URINE: NEGATIVE
BUN / CREAT RATIO: 15
BUN BLDV-MCNC: 13 MG/DL (ref 7–25)
CALCIUM SERPL-MCNC: 9.4 MG/DL (ref 8.8–10.9)
CHLORIDE BLD-SCNC: 95 MMOL/L (ref 98–110)
CLARITY: CLEAR
CO2: 26 MMOL/L (ref 22–29)
COLOR: YELLOW
CREAT SERPL-MCNC: 0.84 MG/DL (ref 0.5–1.2)
EGFR (CKD-EPI): 81 SEE NOTE
EOSINOPHILS ABSOLUTE: 0.16 10*3/UL (ref 0–0.5)
EOSINOPHILS RELATIVE PERCENT: 1.9 %
ERYTHROCYTES URINE: NEGATIVE
GLOBULIN: 2.8 G/DL (ref 2–3.7)
GLUCOSE BLD-MCNC: 111 MG/DL (ref 71–99)
GLUCOSE URINE: NEGATIVE MG/DL
GRANULOCYTE ABSOLUTE COUNT: 6.5 10*3/UL (ref 1.5–7.8)
HCT VFR BLD CALC: 42.5 % (ref 35–46)
HEMOGLOBIN: 13.2 G/DL (ref 11.7–15.5)
IMMATURE GRANULOCYTES: 0.06 10*3/UL (ref 0–0.1)
IMMATURE GRANULOCYTES: 0.7 % (ref 0–2)
KETONES, URINE: NEGATIVE MG/DL
LEUKOCYTE ESTERASE, URINE: NEGATIVE
LYMPHOCYTES ABSOLUTE: 1.08 10*3/UL (ref 0.8–3.9)
LYMPHOCYTES RELATIVE PERCENT: 13.1 %
MCH RBC QN AUTO: 30.8 PG (ref 27–33)
MCHC RBC AUTO-ENTMCNC: 31.1 G/DL (ref 30–36)
MCV RBC AUTO: 99.1 FL (ref 80–100)
MONOCYTES ABSOLUTE: 0.39 10*3/UL (ref 0.2–0.9)
MONOCYTES RELATIVE PERCENT: 4.7 %
NITRITE, URINE: NEGATIVE
NUCLEATED RED BLOOD CELLS: 0 /100 WBC (ref 0–0)
PDW BLD-RTO: 14.1 % (ref 11–15)
PH UA: 5.5 (ref 5–8)
PLATELET # BLD: 136 10*3/UL (ref 140–400)
PMV BLD AUTO: 12.9 FL (ref 9–13)
POTASSIUM SERPL-SCNC: 3.3 MMOL/L (ref 3.5–5.1)
PROTEIN UA: NEGATIVE MG/DL
RBC # BLD: 4.29 10*6/UL (ref 3.8–5.1)
SEGMENTED NEUTROPHILS RELATIVE PERCENT: 79.2 %
SODIUM BLD-SCNC: 140 MMOL/L (ref 135–146)
SPECIFIC GRAVITY UA: 1.02 (ref 1–1.03)
TOTAL PROTEIN: 6.5 G/DL (ref 6–8)
UROBILINOGEN, URINE: <2 MG/DL
WBC # BLD: 8.22 10*3/UL (ref 3.8–10.8)

## 2022-10-11 NOTE — CARE COORDINATION
[facility] Care Transitions Follow Up Call    Care Transition Nurse contacted the patient by telephone to follow up after admission on 2022. Verified name and  with patient as identifiers. Patient: Nik Meza  Patient : 1965   MRN: [id number]   Reason for Admission: UTI w/ ESBL and E. Coli  Discharge Date: 22 RARS: Readmission Risk Score: 9.5      Needs to be reviewed by the provider   Additional needs identified to be addressed with provider: No  none             Method of communication with provider: none. CTN  spoke with patient this afternoon for follow up CTN call. Patient states she is doing well, no reports of any fever, chills, nausea, vomiting, chest pain, SOB or cough. Denies congestion, pain, difficulty emptying bladder, feeling lightheaded, dizziness, and heart palpitations. Urine is clear, no odor or sediment, does report having some BLE Edema. PICC Line was removed on yesterday with labs drawn as well. Addressed changes since last contact:  none  Discussed follow-up appointments. If no appointment was previously scheduled, appointment scheduling offered: Yes. Is follow up appointment scheduled within 7 days of discharge? Yes. Follow Up  Future Appointments   Date Time Provider Diann Sotelo   2022  9:20 AM Karie Elmore MD AND [facility]       Care Transition Nurse reviewed red flags with patient and discussed any barriers to care and/or understanding of plan of care after discharge. Discussed appropriate site of care based on symptoms and resources available to patient including: PCP  Specialist  Urgent care clinics  [address] [facility]  When to call 911. The patient agrees to contact the PCP office for questions related to their healthcare. Advance Care Planning:   reviewed and current.      Patients top risk factors for readmission: medical condition-UTI with ESBL and E.COLI, medication management, multiple health system providers, and polypharmacy  Interventions to address risk factors: Education of patient/family/caregiver/guardian to support self-management-Patient instructed to continue to monitor for any of the above noted s/s, also monitor BLE Edema for any increased swelling, reporting to MD immediately. Instructed to continue to elevate legs on pillows, while sitting or lying done, above heart level. Patient also instructed to continue to monitor urine for sediment, odor or any other signs of infection, reporting to MD immediately as well. Offered patient enrollment in the Remote Patient Monitoring (RPM) program for in-home monitoring: Patient declined. Care Transitions Subsequent and Final Call    Schedule Follow Up Appointment with PCP: Declined  Subsequent and Final Calls  Do you have any ongoing symptoms?: No  Have your medications changed?: No  Do you have any questions related to your medications?: No  Do you currently have any active services?: Yes  Are you currently active with any services?: Home Health  Do you have any needs or concerns that I can assist you with?: No  Identified Barriers: None  Care Transitions Interventions  No Identified Needs  Other Interventions:             Care Transition Nurse provided contact information for future needs. Plan for follow-up call in 5-7 days based on severity of symptoms and risk factors.   Plan for next call: symptom management-Any increased BLE Edema, any returning symptoms of UTI, PICC line site healing after removal.    Thank You,    Suyapa Dorman RN  Care Transition Coordinator  Contact Number:[phone]

## 2022-10-12 LAB — BACTERIA IDENTIFIED: NORMAL

## 2022-10-20 ENCOUNTER — CARE COORDINATION (OUTPATIENT)
Dept: CASE MANAGEMENT | Age: 57
End: 2022-10-20

## 2022-10-20 NOTE — CARE COORDINATION
[facility] Care Transitions Follow Up Call    N Care Coordinator contacted the patient by telephone to follow up after admission on -. Verified name and  with patient as identifiers. Patient: Johanne Rodriguez  Patient : 1965   MRN: [id number]  Reason for Admission: UTI  Discharge Date: 22 RARS: Readmission Risk Score: 9.5      Needs to be reviewed by the provider   Additional needs identified to be addressed with provider: No  none             Method of communication with provider: none. LPN CC spoke with patient. States she is well. Went to back doctor and will be getting injections to her back. HC active. Has some foul smelling flatus & belches that she will speak with PCP about it. Appetite good. Denies problems with bowels or bladder. Denies medication changes. Denies needs. Suly Russell  [facility]  Care Transitions  [phone]    Addressed changes since last contact:  none  Discussed follow-up appointments. If no appointment was previously scheduled, appointment scheduling offered: Yes. Is follow up appointment scheduled within 7 days of discharge? Yes. Follow Up  Future Appointments   Date Time Provider Diann Sotelo   10/21/2022 11:00 AM AVERY Huerta - MACK BARBOZA [place] [facility]   2022  9:20 AM Samantha Vyas MD AND ORTH[facility]     [facility] follow up appointment(s): VEENA DAVISONN Care Coordinator reviewed medical action plan and red flags with patient and discussed any barriers to care and/or understanding of plan of care after discharge. Discussed appropriate site of care based on symptoms and resources available to patient including: PCP  Specialist. The patient agrees to contact the PCP office for questions related to their healthcare. Advance Care Planning:   reviewed and current.      Patients top risk factors for readmission: medical condition-UTI  Interventions to address risk factors: Assessment and support for treatment adherence and medication management-denied new or changed meds    Offered patient enrollment in the Remote Patient Monitoring (RPM) program for in-home monitoring: Patient is not eligible for RPM program.     Care Transitions Subsequent and Final Call    Subsequent and Final Calls  Do you have any ongoing symptoms?: No  Have your medications changed?: No  Do you have any questions related to your medications?: No  Do you currently have any active services?: No  Are you currently active with any services?: Home Health  Do you have any needs or concerns that I can assist you with?: No  Identified Barriers: None  Care Transitions Interventions  Other Interventions:             LPN Care Coordinator provided contact information for future needs. No further follow-up call indicated based on severity of symptoms and risk factors.     Tyson Pierre LPN

## 2022-10-21 ENCOUNTER — OFFICE VISIT (OUTPATIENT)
Dept: FAMILY MEDICINE CLINIC | Age: 57
End: 2022-10-21
Payer: MEDICARE

## 2022-10-21 VITALS
RESPIRATION RATE: 16 BRPM | DIASTOLIC BLOOD PRESSURE: 82 MMHG | OXYGEN SATURATION: 97 % | TEMPERATURE: 97.1 F | SYSTOLIC BLOOD PRESSURE: 128 MMHG | WEIGHT: 269 LBS | HEART RATE: 74 BPM | BODY MASS INDEX: 43.44 KG/M2

## 2022-10-21 DIAGNOSIS — R14.3 EXCESSIVE FLATUS: Primary | ICD-10-CM

## 2022-10-21 DIAGNOSIS — E87.6 HYPOKALEMIA: ICD-10-CM

## 2022-10-21 DIAGNOSIS — M51.36 DDD (DEGENERATIVE DISC DISEASE), LUMBAR: ICD-10-CM

## 2022-10-21 DIAGNOSIS — R10.9 ABDOMINAL CRAMPING: ICD-10-CM

## 2022-10-21 LAB
ANION GAP SERPL CALCULATED.3IONS-SCNC: 14 MMOL/L (ref 3–16)
BUN BLDV-MCNC: 9 MG/DL (ref 7–20)
CALCIUM SERPL-MCNC: 9.2 MG/DL (ref 8.3–10.6)
CHLORIDE BLD-SCNC: 96 MMOL/L (ref 99–110)
CO2: 29 MMOL/L (ref 21–32)
CREAT SERPL-MCNC: 0.7 MG/DL (ref 0.6–1.1)
GFR SERPL CREATININE-BSD FRML MDRD: >60 ML/MIN/{1.73_M2}
GLUCOSE BLD-MCNC: 115 MG/DL (ref 70–99)
POTASSIUM SERPL-SCNC: 3.1 MMOL/L (ref 3.5–5.1)
SODIUM BLD-SCNC: 139 MMOL/L (ref 136–145)

## 2022-10-21 PROCEDURE — G8484 FLU IMMUNIZE NO ADMIN: HCPCS | Performed by: NURSE PRACTITIONER

## 2022-10-21 PROCEDURE — 3017F COLORECTAL CA SCREEN DOC REV: CPT | Performed by: NURSE PRACTITIONER

## 2022-10-21 PROCEDURE — G8427 DOCREV CUR MEDS BY ELIG CLIN: HCPCS | Performed by: NURSE PRACTITIONER

## 2022-10-21 PROCEDURE — 1036F TOBACCO NON-USER: CPT | Performed by: NURSE PRACTITIONER

## 2022-10-21 PROCEDURE — 99214 OFFICE O/P EST MOD 30 MIN: CPT | Performed by: NURSE PRACTITIONER

## 2022-10-21 PROCEDURE — 1111F DSCHRG MED/CURRENT MED MERGE: CPT | Performed by: NURSE PRACTITIONER

## 2022-10-21 PROCEDURE — G8417 CALC BMI ABV UP PARAM F/U: HCPCS | Performed by: NURSE PRACTITIONER

## 2022-10-21 RX ORDER — MELOXICAM 7.5 MG/1
7.5 TABLET ORAL DAILY
Qty: 90 TABLET | Refills: 1 | Status: SHIPPED | OUTPATIENT
Start: 2022-10-21

## 2022-10-21 RX ORDER — DICYCLOMINE HYDROCHLORIDE 10 MG/1
10 CAPSULE ORAL 4 TIMES DAILY PRN
Qty: 120 CAPSULE | Refills: 2 | Status: SHIPPED | OUTPATIENT
Start: 2022-10-21

## 2022-10-21 ASSESSMENT — ANXIETY QUESTIONNAIRES
4. TROUBLE RELAXING: 0
3. WORRYING TOO MUCH ABOUT DIFFERENT THINGS: 0
1. FEELING NERVOUS, ANXIOUS, OR ON EDGE: 0
7. FEELING AFRAID AS IF SOMETHING AWFUL MIGHT HAPPEN: 0
6. BECOMING EASILY ANNOYED OR IRRITABLE: 0
IF YOU CHECKED OFF ANY PROBLEMS ON THIS QUESTIONNAIRE, HOW DIFFICULT HAVE THESE PROBLEMS MADE IT FOR YOU TO DO YOUR WORK, TAKE CARE OF THINGS AT HOME, OR GET ALONG WITH OTHER PEOPLE: NOT DIFFICULT AT ALL
5. BEING SO RESTLESS THAT IT IS HARD TO SIT STILL: 0
2. NOT BEING ABLE TO STOP OR CONTROL WORRYING: 0
GAD7 TOTAL SCORE: 0

## 2022-10-21 ASSESSMENT — PATIENT HEALTH QUESTIONNAIRE - PHQ9
6. FEELING BAD ABOUT YOURSELF - OR THAT YOU ARE A FAILURE OR HAVE LET YOURSELF OR YOUR FAMILY DOWN: 0
5. POOR APPETITE OR OVEREATING: 0
7. TROUBLE CONCENTRATING ON THINGS, SUCH AS READING THE NEWSPAPER OR WATCHING TELEVISION: 0
SUM OF ALL RESPONSES TO PHQ QUESTIONS 1-9: 0
10. IF YOU CHECKED OFF ANY PROBLEMS, HOW DIFFICULT HAVE THESE PROBLEMS MADE IT FOR YOU TO DO YOUR WORK, TAKE CARE OF THINGS AT HOME, OR GET ALONG WITH OTHER PEOPLE: 0
4. FEELING TIRED OR HAVING LITTLE ENERGY: 0
SUM OF ALL RESPONSES TO PHQ QUESTIONS 1-9: 0
SUM OF ALL RESPONSES TO PHQ9 QUESTIONS 1 & 2: 0
DEPRESSION UNABLE TO ASSESS: FUNCTIONAL CAPACITY MOTIVATION LIMITS ACCURACY
8. MOVING OR SPEAKING SO SLOWLY THAT OTHER PEOPLE COULD HAVE NOTICED. OR THE OPPOSITE, BEING SO FIGETY OR RESTLESS THAT YOU HAVE BEEN MOVING AROUND A LOT MORE THAN USUAL: 0
2. FEELING DOWN, DEPRESSED OR HOPELESS: 0
1. LITTLE INTEREST OR PLEASURE IN DOING THINGS: 0
SUM OF ALL RESPONSES TO PHQ QUESTIONS 1-9: 0
3. TROUBLE FALLING OR STAYING ASLEEP: 0
SUM OF ALL RESPONSES TO PHQ QUESTIONS 1-9: 0
9. THOUGHTS THAT YOU WOULD BE BETTER OFF DEAD, OR OF HURTING YOURSELF: 0

## 2022-10-21 ASSESSMENT — ENCOUNTER SYMPTOMS
NAUSEA: 0
DIARRHEA: 0
SHORTNESS OF BREATH: 0
VOMITING: 0
COUGH: 0
BACK PAIN: 1

## 2022-10-21 NOTE — PROGRESS NOTES
10/21/2022     Chief Complaint   Patient presents with    Other     Gas in stomach , bloated      Radha Garza (:  1965) is a 62 y.o. female, here for evaluation of the following medical concerns:    HPI    Increased gas and bloating  for a few weeks. No diarrhea. Bowel are moving regular. She does have abdominal cramping and that is relieved with dicyclomine (wants refill). Has also been using OTC gasX. Did see ortho with [facility] for the back pain and will be starting PT and [facility]. Orhto requesting we manage anti-inflammatory. She is taking 200 mg gabapentin BID and that is helping with the leg pain. Review of Systems   Constitutional:  Negative for chills, fatigue and fever. Respiratory:  Negative for cough and shortness of breath. Cardiovascular:  Negative for chest pain and leg swelling. Gastrointestinal:  Negative for diarrhea, nausea and vomiting. Gas and bloating   Musculoskeletal:  Positive for back pain (leg pain). Neurological:  Negative for dizziness and headaches. All other systems reviewed and are negative. Prior to Visit Medications    Medication Sig Taking? Authorizing Provider   dicyclomine (BENTYL) 10 MG capsule Take 1 capsule by mouth 4 times daily as needed (cramping) Yes AVERY Avelar CNP   meloxicam (MOBIC) 7.5 MG tablet Take 1 tablet by mouth daily Yes AVERY Avelar CNP   fluticasone (FLONASE) 50 MCG/ACT nasal spray SPRAY 1 SPRAY BY NASAL ROUTE EVERY DAY Yes Jon Agent, DO   gabapentin (NEURONTIN) 100 MG capsule Take 1 capsule by mouth 3 times daily for 30 days. Intended supply: 30 days  Patient taking differently: Take 200 mg by mouth in the morning and at bedtime. Intended supply: 30 days Yes AVERY Avelar CNP   ALPRAZolam Gideon Davis) 0.5 MG tablet Take 1 tablet by mouth 3 times daily as needed for Sleep or Anxiety for up to 60 days.  Yes Lawson Norton,    lidocaine 4 % external patch Place 1 patch onto the skin daily Yes Tian Evans MD   ondansetron (ZOFRAN ODT) 4 MG disintegrating tablet Take 2 tablets by mouth every 8 hours as needed for Nausea Yes Mere Lu MD   rOPINIRole (REQUIP) 2 MG tablet TAKE 2 TABLETS AT NIGHT AND ONE IN THE MORNING FOR LEGS Yes Dori Biswas DO   potassium chloride (KLOR-CON M20) 20 MEQ extended release tablet Take one or two daily as directed for potassium Yes Dori Biswas DO   DULoxetine (CYMBALTA) 60 MG extended release capsule Take 1 capsule by mouth 2 times daily Yes Dori Biswas DO   Saccharomyces boulardii (DIGESTIVE PROBIOTIC) 250 MG CAPS Take one dose BID x 2 weeks Yes Dori Biswas DO   methocarbamol (ROBAXIN) 500 MG tablet TAKE 1 TABLET BY MOUTH 3 TIMES A DAY AS NEEDED FOR NECK PAIN Yes Dori Biswas DO   vitamin D (ERGOCALCIFEROL) 1.25 MG (76476 UT) CAPS capsule Take 1 capsule by mouth once a week Yes AVERY Simons CNP   omeprazole (PRILOSEC) 40 MG delayed release capsule TAKE 1 CAPSULE BY MOUTH EVERY DAY  Patient taking differently: in the morning and at bedtime Dr. Ashton Dancer increased to BID Yes Dori Biswas DO   Acetaminophen (TYLENOL PO) Take 650 mg by mouth daily as needed  Yes Historical Provider, MD   MELATONIN PO Take 20 mg by mouth nightly as needed  Yes Historical Provider, MD        Social History     Tobacco Use    Smoking status: Never    Smokeless tobacco: Never   Substance Use Topics    Alcohol use: Yes     Comment: 1 -2 drinks per month        Vitals:    10/21/22 1102   BP: 128/82   Site: Left Upper Arm   Position: Sitting   Pulse: 74   Resp: 16   Temp: 97.1 °F (36.2 °C)   TempSrc: Temporal   SpO2: 97%   Weight: 269 lb (122 kg)     Estimated body mass index is 43.44 kg/m² as calculated from the following:    Height as of 9/22/22: 5' 5.98\" (1.676 m). Weight as of this encounter: 269 lb (122 kg). Physical Exam  Vitals and nursing note reviewed. Constitutional:       General: She is not in acute distress.      Appearance: Normal appearance. She is well-developed. She is obese. She is not ill-appearing, toxic-appearing or diaphoretic. HENT:      Head: Normocephalic and atraumatic. Eyes:      Extraocular Movements: Extraocular movements intact. Pupils: Pupils are equal, round, and reactive to light. Cardiovascular:      Rate and Rhythm: Normal rate and regular rhythm. Heart sounds: Normal heart sounds, S1 normal and S2 normal. No murmur heard. No friction rub. No gallop. Pulmonary:      Effort: Pulmonary effort is normal. No respiratory distress. Breath sounds: Normal breath sounds. Abdominal:      General: Bowel sounds are normal. There is no distension. Palpations: Abdomen is soft. There is no mass. Tenderness: There is no abdominal tenderness. There is no guarding. Neurological:      General: No focal deficit present. Mental Status: She is alert and oriented to person, place, and time. Mental status is at baseline. Cranial Nerves: No cranial nerve deficit. Psychiatric:         Speech: Speech normal.       ASSESSMENT/PLAN:  1. Excessive flatus    2. Abdominal cramping  - dicyclomine (BENTYL) 10 MG capsule; Take 1 capsule by mouth 4 times daily as needed (cramping)  Dispense: 120 capsule; Refill: 2    3. Hypokalemia  - Basic Metabolic Panel; Future    4. DDD (degenerative disc disease), lumbar  - meloxicam (MOBIC) 7.5 MG tablet; Take 1 tablet by mouth daily  Dispense: 90 tablet; Refill: 1    - Bentyl refill  - Gas X PRN  - wondering if recent antibiotic use contributing to GI symptoms- will treat symptomatically for 1-2 weeks. - Mobic QD PRN for back pain  - She will continue gabapentin 200 mg BID for the sciatica pain  - recheck bmp to trend hypokalemia       An electronic signature was used to authenticate this note.     --Jayden Huerta, AVERY - CNP on 10/21/2022 at 12:26 PM

## 2022-10-24 RX ORDER — OMEPRAZOLE 40 MG/1
40 CAPSULE, DELAYED RELEASE ORAL 2 TIMES DAILY
Qty: 60 CAPSULE | Refills: 5 | Status: SHIPPED | OUTPATIENT
Start: 2022-10-24

## 2022-10-27 DIAGNOSIS — E87.6 HYPOKALEMIA: Primary | ICD-10-CM

## 2022-10-27 RX ORDER — SPIRONOLACTONE 25 MG/1
25 TABLET ORAL DAILY
Qty: 30 TABLET | Refills: 5 | Status: SHIPPED | OUTPATIENT
Start: 2022-10-27

## 2022-10-27 NOTE — PROGRESS NOTES
I reviewed the patient's potassium results with Dr. Raphael Wilde and her last potassium was 3.1 on 10/21/2022. She is having this persistent hypokalemia initially we thought it was secondary to diarrhea however that has been resolved for several weeks. She is taking 20 mEq of potassium daily. We are going to start her on spironolactone 25 mg daily and she is to continue the 20 mEq of potassium a day. She is going to recheck a BMP in 1 week. Referral to nephrology also ordered.

## 2022-11-08 ENCOUNTER — TELEPHONE (OUTPATIENT)
Dept: FAMILY MEDICINE CLINIC | Age: 57
End: 2022-11-08

## 2022-11-08 ENCOUNTER — NURSE TRIAGE (OUTPATIENT)
Dept: OTHER | Facility: CLINIC | Age: 57
End: 2022-11-08

## 2022-11-08 NOTE — TELEPHONE ENCOUNTER
----- Message from SAYRA DAVIS sent at 11/8/2022  8:55 AM EST -----  Subject: Message to Provider    QUESTIONS  Information for Provider? Patient is wanting to let Gabriela know that she   couldnt check her potassium last week because of running a fever. Patient   wanted to let her know she is going to go check it today 11/08. She does   have an appointment on 11/09. Patient also wanted to let Gabriela know she   has swelling in both her legs, ankles and feet. She noticed it late Sunday   night.  ---------------------------------------------------------------------------  --------------  Yoko SANTANA  [phone]; OK to leave message on voicemail  ---------------------------------------------------------------------------  --------------  SCRIPT ANSWERS  Relationship to Patient?  Self

## 2022-11-08 NOTE — TELEPHONE ENCOUNTER
Location of patient: Edwina Adkins call from Marlene De Los Santos at [facility] with Carena. Subjective: Caller states \"my feet and ankle swell\"     Current Symptoms: Has HTN, no CHF, kidney or liver failure. Has hypokalemia and is about to see renal specialist. Mike Barraza a few days ago her feet and ankles were swollen. No chest pain or shortness of breath. A little lightheadedness when going from sitting to standing off/on, quickly resolves after she waits a few seconds. slight redness at ankles, no break is skin    Onset: 2 days ago; gradual    Associated Symptoms: NA    Pain Severity: 0/10; N/A; none, no pain just feel tight    Temperature: no fever     What has been tried: elevation    LMP: NA Pregnant: NA    Recommended disposition: See PCP within 3 Days    Care advice provided, patient verbalizes understanding; denies any other questions or concerns; instructed to call back for any new or worsening symptoms. Already has an appointment tomorrow, will keep it    Attention Provider: Thank you for allowing me to participate in the care of your patient. The patient was connected to triage in response to information provided to the ECC/PSC. Please do not respond through this encounter as the response is not directed to a shared pool. Reason for Disposition   MILD to MODERATE ankle swelling (e.g., can't move joint normally, can't do usual activities) (Exceptions: Itchy, localized swelling; swelling is chronic.)    Protocols used:  Ankle Swelling-ADULT-[place]

## 2022-11-10 DIAGNOSIS — E87.6 HYPOKALEMIA: ICD-10-CM

## 2022-11-10 LAB
ANION GAP SERPL CALCULATED.3IONS-SCNC: 16 MMOL/L (ref 3–16)
BUN BLDV-MCNC: 12 MG/DL (ref 7–20)
CALCIUM SERPL-MCNC: 10.4 MG/DL (ref 8.3–10.6)
CHLORIDE BLD-SCNC: 92 MMOL/L (ref 99–110)
CO2: 30 MMOL/L (ref 21–32)
CREAT SERPL-MCNC: 0.9 MG/DL (ref 0.6–1.1)
GFR SERPL CREATININE-BSD FRML MDRD: >60 ML/MIN/{1.73_M2}
GLUCOSE BLD-MCNC: 117 MG/DL (ref 70–99)
POTASSIUM SERPL-SCNC: 3.8 MMOL/L (ref 3.5–5.1)
SODIUM BLD-SCNC: 138 MMOL/L (ref 136–145)

## 2022-11-11 ENCOUNTER — OFFICE VISIT (OUTPATIENT)
Dept: FAMILY MEDICINE CLINIC | Age: 57
End: 2022-11-11
Payer: MEDICARE

## 2022-11-11 VITALS
OXYGEN SATURATION: 94 % | SYSTOLIC BLOOD PRESSURE: 122 MMHG | HEART RATE: 78 BPM | RESPIRATION RATE: 16 BRPM | DIASTOLIC BLOOD PRESSURE: 82 MMHG | TEMPERATURE: 97.1 F | BODY MASS INDEX: 42.63 KG/M2 | WEIGHT: 264 LBS

## 2022-11-11 DIAGNOSIS — M51.36 DDD (DEGENERATIVE DISC DISEASE), LUMBAR: ICD-10-CM

## 2022-11-11 DIAGNOSIS — E87.6 HYPOKALEMIA: Primary | ICD-10-CM

## 2022-11-11 DIAGNOSIS — Z23 IMMUNIZATION DUE: ICD-10-CM

## 2022-11-11 DIAGNOSIS — Z12.31 ENCOUNTER FOR SCREENING MAMMOGRAM FOR MALIGNANT NEOPLASM OF BREAST: ICD-10-CM

## 2022-11-11 DIAGNOSIS — I10 PRIMARY HYPERTENSION: ICD-10-CM

## 2022-11-11 DIAGNOSIS — M54.16 LUMBAR RADICULOPATHY: ICD-10-CM

## 2022-11-11 PROCEDURE — G0008 ADMIN INFLUENZA VIRUS VAC: HCPCS | Performed by: NURSE PRACTITIONER

## 2022-11-11 PROCEDURE — G8427 DOCREV CUR MEDS BY ELIG CLIN: HCPCS | Performed by: NURSE PRACTITIONER

## 2022-11-11 PROCEDURE — 3074F SYST BP LT 130 MM HG: CPT | Performed by: NURSE PRACTITIONER

## 2022-11-11 PROCEDURE — 1036F TOBACCO NON-USER: CPT | Performed by: NURSE PRACTITIONER

## 2022-11-11 PROCEDURE — 90674 CCIIV4 VAC NO PRSV 0.5 ML IM: CPT | Performed by: NURSE PRACTITIONER

## 2022-11-11 PROCEDURE — 3017F COLORECTAL CA SCREEN DOC REV: CPT | Performed by: NURSE PRACTITIONER

## 2022-11-11 PROCEDURE — G8482 FLU IMMUNIZE ORDER/ADMIN: HCPCS | Performed by: NURSE PRACTITIONER

## 2022-11-11 PROCEDURE — G8417 CALC BMI ABV UP PARAM F/U: HCPCS | Performed by: NURSE PRACTITIONER

## 2022-11-11 PROCEDURE — 99214 OFFICE O/P EST MOD 30 MIN: CPT | Performed by: NURSE PRACTITIONER

## 2022-11-11 PROCEDURE — 3078F DIAST BP <80 MM HG: CPT | Performed by: NURSE PRACTITIONER

## 2022-11-11 RX ORDER — GABAPENTIN 100 MG/1
200 CAPSULE ORAL 2 TIMES DAILY
Qty: 120 CAPSULE | Refills: 0 | Status: SHIPPED | OUTPATIENT
Start: 2022-11-11 | End: 2022-12-11

## 2022-11-11 ASSESSMENT — ANXIETY QUESTIONNAIRES
4. TROUBLE RELAXING: 0
7. FEELING AFRAID AS IF SOMETHING AWFUL MIGHT HAPPEN: 0
2. NOT BEING ABLE TO STOP OR CONTROL WORRYING: 0
3. WORRYING TOO MUCH ABOUT DIFFERENT THINGS: 0
IF YOU CHECKED OFF ANY PROBLEMS ON THIS QUESTIONNAIRE, HOW DIFFICULT HAVE THESE PROBLEMS MADE IT FOR YOU TO DO YOUR WORK, TAKE CARE OF THINGS AT HOME, OR GET ALONG WITH OTHER PEOPLE: NOT DIFFICULT AT ALL
GAD7 TOTAL SCORE: 0
5. BEING SO RESTLESS THAT IT IS HARD TO SIT STILL: 0
6. BECOMING EASILY ANNOYED OR IRRITABLE: 0
1. FEELING NERVOUS, ANXIOUS, OR ON EDGE: 0

## 2022-11-11 ASSESSMENT — PATIENT HEALTH QUESTIONNAIRE - PHQ9
SUM OF ALL RESPONSES TO PHQ QUESTIONS 1-9: 1
5. POOR APPETITE OR OVEREATING: 0
6. FEELING BAD ABOUT YOURSELF - OR THAT YOU ARE A FAILURE OR HAVE LET YOURSELF OR YOUR FAMILY DOWN: 0
DEPRESSION UNABLE TO ASSESS: FUNCTIONAL CAPACITY MOTIVATION LIMITS ACCURACY
SUM OF ALL RESPONSES TO PHQ9 QUESTIONS 1 & 2: 1
4. FEELING TIRED OR HAVING LITTLE ENERGY: 0
10. IF YOU CHECKED OFF ANY PROBLEMS, HOW DIFFICULT HAVE THESE PROBLEMS MADE IT FOR YOU TO DO YOUR WORK, TAKE CARE OF THINGS AT HOME, OR GET ALONG WITH OTHER PEOPLE: 0
1. LITTLE INTEREST OR PLEASURE IN DOING THINGS: 0
7. TROUBLE CONCENTRATING ON THINGS, SUCH AS READING THE NEWSPAPER OR WATCHING TELEVISION: 0
SUM OF ALL RESPONSES TO PHQ QUESTIONS 1-9: 1
3. TROUBLE FALLING OR STAYING ASLEEP: 0
8. MOVING OR SPEAKING SO SLOWLY THAT OTHER PEOPLE COULD HAVE NOTICED. OR THE OPPOSITE, BEING SO FIGETY OR RESTLESS THAT YOU HAVE BEEN MOVING AROUND A LOT MORE THAN USUAL: 0
2. FEELING DOWN, DEPRESSED OR HOPELESS: 1
SUM OF ALL RESPONSES TO PHQ QUESTIONS 1-9: 1
9. THOUGHTS THAT YOU WOULD BE BETTER OFF DEAD, OR OF HURTING YOURSELF: 0
SUM OF ALL RESPONSES TO PHQ QUESTIONS 1-9: 1

## 2022-11-11 ASSESSMENT — ENCOUNTER SYMPTOMS
DIARRHEA: 0
COUGH: 0
VOMITING: 0
SHORTNESS OF BREATH: 0
COLOR CHANGE: 0
BACK PAIN: 1
NAUSEA: 0

## 2022-11-11 NOTE — PROGRESS NOTES
2022     Chief Complaint   Patient presents with    Other     Follow upon Potassium        Lorenza Root (:  1965) is a 62 y.o. female anxiety, RLS, DDD, obesity, depression, HTN, GERD, hypokalemia who is  here for evaluation of the following medical concerns:    HPI    Hypokalemia: had potassium level checked yesterday after starting spironolactone 25 mg QD. K+ 3.8. she is also taking 10 mEq potassium chloride BID. Has appointment with nephrology next week. Has occasional loose stool but this is not often. (Was having daily diarrhea a few months ago). Did have leg swelling 3 days ago, this resolved with elevation. No CP or SOB. Back pain: bilateral low back with leg pain bilateral. Gabapentin dose seem to help, she is on 200 mg BID. Will be starting PT through [facility] next week. Did see ortho (Dr. Nam Dobbins    Appointment with ortho on Monday for the knee injection    Review of Systems   Constitutional:  Negative for chills, diaphoresis, fatigue and fever. Respiratory:  Negative for cough and shortness of breath. Cardiovascular:  Positive for leg swelling (resolved today, had some 3 days ago). Negative for chest pain. Gastrointestinal:  Negative for diarrhea, nausea and vomiting. Gas and bloating   Musculoskeletal:  Positive for back pain (leg pain). Skin:  Negative for color change. Neurological:  Negative for dizziness and headaches. All other systems reviewed and are negative. Prior to Visit Medications    Medication Sig Taking? Authorizing Provider   gabapentin (NEURONTIN) 100 MG capsule Take 2 capsules by mouth in the morning and at bedtime for 30 days.  Intended supply: 30 days Yes AVERY Henriquez CNP   spironolactone (ALDACTONE) 25 MG tablet Take 1 tablet by mouth daily Yes AVERY Henriquez CNP   omeprazole (PRILOSEC) 40 MG delayed release capsule Take 1 capsule by mouth in the morning and at bedtime Dr. Cecelia Shirley increased to BID Yes Burnis Man, DO   dicyclomine (BENTYL) 10 MG capsule Take 1 capsule by mouth 4 times daily as needed (cramping) Yes Anaya Maria, APRN - CNP   meloxicam (MOBIC) 7.5 MG tablet Take 1 tablet by mouth daily Yes AVERY Burnette CNP   fluticasone (FLONASE) 50 MCG/ACT nasal spray SPRAY 1 SPRAY BY NASAL ROUTE EVERY DAY Yes Samir Polanco,    ALPRAZolam (XANAX) 0.5 MG tablet Take 1 tablet by mouth 3 times daily as needed for Sleep or Anxiety for up to 60 days.  Yes Burnis Man, DO   lidocaine 4 % external patch Place 1 patch onto the skin daily Yes Joan Monteiro MD   ondansetron (ZOFRAN ODT) 4 MG disintegrating tablet Take 2 tablets by mouth every 8 hours as needed for Nausea Yes Radha Mcfadden MD   rOPINIRole (REQUIP) 2 MG tablet TAKE 2 TABLETS AT NIGHT AND ONE IN THE MORNING FOR LEGS Yes Burnis Man, DO   potassium chloride (KLOR-CON M20) 20 MEQ extended release tablet Take one or two daily as directed for potassium Yes Burnis Man, DO   DULoxetine (CYMBALTA) 60 MG extended release capsule Take 1 capsule by mouth 2 times daily Yes Burnis Man, DO   Saccharomyces boulardii (DIGESTIVE PROBIOTIC) 250 MG CAPS Take one dose BID x 2 weeks Yes Burnis Man, DO   methocarbamol (ROBAXIN) 500 MG tablet TAKE 1 TABLET BY MOUTH 3 TIMES A DAY AS NEEDED FOR NECK PAIN Yes Burnis Man, DO   vitamin D (ERGOCALCIFEROL) 1.25 MG (01777 UT) CAPS capsule Take 1 capsule by mouth once a week Yes AVERY Huddleston CNP   Acetaminophen (TYLENOL PO) Take 650 mg by mouth daily as needed  Yes Historical Provider, MD   MELATONIN PO Take 20 mg by mouth nightly as needed  Yes Historical Provider, MD        Social History     Tobacco Use    Smoking status: Never    Smokeless tobacco: Never   Substance Use Topics    Alcohol use: Yes     Comment: 1 -2 drinks per month        Vitals:    11/11/22 0801   BP: 122/82   Site: Left Upper Arm   Position: Sitting   Pulse: 78   Resp: 16   Temp: 97.1 °F (36.2 °C)   TempSrc: Temporal   SpO2: 94%   Weight: 264 lb (119.7 kg)     Estimated body mass index is 42.63 kg/m² as calculated from the following:    Height as of 9/22/22: 5' 5.98\" (1.676 m). Weight as of this encounter: 264 lb (119.7 kg). Physical Exam  Vitals and nursing note reviewed. Constitutional:       General: She is not in acute distress. Appearance: Normal appearance. She is well-developed. She is obese. She is not ill-appearing, toxic-appearing or diaphoretic. HENT:      Head: Normocephalic and atraumatic. Eyes:      Extraocular Movements: Extraocular movements intact. Conjunctiva/sclera: Conjunctivae normal.      Pupils: Pupils are equal, round, and reactive to light. Cardiovascular:      Rate and Rhythm: Normal rate and regular rhythm. Heart sounds: Normal heart sounds, S1 normal and S2 normal. No murmur heard. No friction rub. No gallop. Pulmonary:      Effort: Pulmonary effort is normal. No respiratory distress. Breath sounds: Normal breath sounds. No stridor. No wheezing, rhonchi or rales. Musculoskeletal:      Right lower leg: No edema. Left lower leg: No edema. Neurological:      General: No focal deficit present. Mental Status: She is alert and oriented to person, place, and time. Mental status is at baseline. Cranial Nerves: No cranial nerve deficit. Psychiatric:         Speech: Speech normal.       ASSESSMENT/PLAN:  1. Hypokalemia- resolved, K 3.8 yesterday. Continue spironolactone and potassium chloride 20 mEq. Keep appointment with nephrology next week    2. Primary hypertension- controlled    3. Lumbar radiculopathy- continue gabapentin, starting PT next week; following with [facility] orthopedic surgery  - gabapentin (NEURONTIN) 100 MG capsule; Take 2 capsules by mouth in the morning and at bedtime for 30 days. Intended supply: 30 days  Dispense: 120 capsule; Refill: 0    4. DDD (degenerative disc disease), lumbar    5.  Encounter for screening mammogram for malignant neoplasm of breast- scheduled for 11/18/22  - [facility] DIGITAL SCREEN W OR WO CAD BILATERAL; Future    6. Immunization due- flu vaccine given today       Return in about 2 months (around 1/11/2023) for with Dr. Matthias Way . An electronic signature was used to authenticate this note.     --Katherne Merlin, AVERY - CNP on 11/11/2022 at 8:29 AM

## 2022-11-14 ENCOUNTER — NURSE ONLY (OUTPATIENT)
Dept: ORTHOPEDIC SURGERY | Age: 57
End: 2022-11-14
Payer: MEDICARE

## 2022-11-14 VITALS — BODY MASS INDEX: 43.99 KG/M2 | HEIGHT: 65 IN | WEIGHT: 264 LBS

## 2022-11-14 DIAGNOSIS — M17.12 PRIMARY OSTEOARTHRITIS OF LEFT KNEE: Primary | ICD-10-CM

## 2022-11-14 PROCEDURE — 20611 DRAIN/INJ JOINT/BURSA W/US: CPT | Performed by: PHYSICIAN ASSISTANT

## 2022-11-14 RX ORDER — BUPIVACAINE HYDROCHLORIDE 2.5 MG/ML
30 INJECTION, SOLUTION INFILTRATION; PERINEURAL ONCE
Status: COMPLETED | OUTPATIENT
Start: 2022-11-14 | End: 2022-11-14

## 2022-11-14 RX ORDER — LIDOCAINE HYDROCHLORIDE 10 MG/ML
20 INJECTION, SOLUTION INFILTRATION; PERINEURAL ONCE
Status: COMPLETED | OUTPATIENT
Start: 2022-11-14 | End: 2022-11-14

## 2022-11-14 RX ADMIN — LIDOCAINE HYDROCHLORIDE 20 ML: 10 INJECTION, SOLUTION INFILTRATION; PERINEURAL at 14:41

## 2022-11-14 RX ADMIN — BUPIVACAINE HYDROCHLORIDE 75 MG: 2.5 INJECTION, SOLUTION INFILTRATION; PERINEURAL at 14:40

## 2022-11-14 NOTE — PROGRESS NOTES
Dr Alcira Dorado      Date /Time 2022       1:00 PM EDT  Name Oliverio Moore             1965   Location  [facility]  MRN [id number]                Chief Complaint   Patient presents with    Follow-up     Left Knee         History of Present Illness  Oliverio Moore is a 62 y.o. female who presents with   knee pain,. Athletic/exercise activity: no sports. Injury Mechanism:  none. Worker's Comp. & legal issues:   none. Previous Treatments: Ice, Heat and NSAIDs    Patient presents the office today for a follow-up visit. Patient being treated for left knee osteoarthritis she did receive a cortisone injection approximately 5 months ago which did help for approximately 2 months and her pain is returned. Pain is concentrated over the medial greater than lateral aspect of her knee. Increased pain with activities and improvement with rest.    Previous history: Patient presents to the office today for follow-up visit. Patient here with a chief complaint of left knee pain. Patient has been treated in the past for her left knee and has osteoarthritis. It has become increasingly painful without injury or trauma. Symptoms are mostly concentrated over the medial aspect of the knee. Oral NSAIDs, activity modifications, and ice have not been terribly effective. We have performed both cortisone and viscosupplementation injections in the past with good but temporary improvement.     Past History  Past Medical History:   Diagnosis Date    Anxiety     Depression     Endometriosis     GERD (gastroesophageal reflux disease)     Hypertension     Osteoarthritis     PONV (postoperative nausea and vomiting)     Restless leg syndrome      Past Surgical History:   Procedure Laterality Date    ANUS SURGERY  2018    fissure     SECTION      x3    COLONOSCOPY      DILATION AND CURETTAGE OF UTERUS N/A 6/3/2021    VIDEO HYSTEROSCOPY DILATATION AND CURETTAGE, POSSIBLE MYOSURE, POSSIBLE POLYPECTOMY performed by Carolyn Capone DO at [facility]      right wrist    HERNIA REPAIR  03/06/2018    LAPAROSCOPIC PARAESOPHAGEAL HERNIA REPAIR WITH MESH    HYSTERECTOMY (CERVIX STATUS UNKNOWN) N/A 10/21/2021    ROBOTIC ASSISTED LAPAROSCOPIC HYSTERECTOMY WITH RIGHT SALPINGECTOMY, RIGHT OOPHORECTOMY, CYSTOSCOPY, LYSIS OF ADHESIONS  CPT CODE - 04335 performed by Rita Roblero DO at [address] [place] [address] ARTHROSCOPY Left 11/15/12    ARTHROSCOPY LEFT KNEE WITH SYOVECTOMY AND CHONDROPLASTY    OVARY REMOVAL Right 2010    ROTATOR CUFF REPAIR Right 6/23/2020    EXAM UNDER ANESTHESIA VIDEO ARTHROSCOPY RIGHT SHOULDER, MINI- OPEN ROTATOR CUFF REPAIR, NEER ACROMIOPLASTY, LENO PROCEDURE WITH EXPAREL performed by Gudelia Diop MD at [address] [place] [address] ARTHROSCOPY Right 11/25/2020    VIDEO ARTHROSCOPY RIGHT SHOULDER, OPEN ROTATOR CUFF REPAIR, BICEPS TENOTOMY -BLOCK- performed by Josiah Sánchez MD at [address] [place] [address] [facility] ARTHROSCOPY Right 2/24/2021    RIGHT SHOULDER ARTHROSCOPIC INCISION AND DRAINAGE performed by Josiah Sánchez MD at [address] [place] [address] ARTHROSCOPY Right 4/28/2021    VIDEO ARTHROSCOPY RIGHT SHOULDER, ROTATOR CUFF REPAIR, DEBRIDEMENT performed [facility]    UPPER GASTROINTESTINAL ENDOSCOPY  2015    esophageasl stretch    UPPER GASTROINTESTINAL ENDOSCOPY      ATTEMPTED BUT PATIENT ASPIRATED    UPPER GASTROINTESTINAL ENDOSCOPY N/A 5/24/2022    EGD W/ANES. (11:00) performed by Danielle Lagos DO at [facility]. [facility] History     Tobacco Use    Smoking status: Never    Smokeless tobacco: Never   Substance Use Topics    Alcohol use: Yes     Comment: 1 -2 drinks per month      Current Outpatient Medications on File Prior to Visit   Medication Sig Dispense Refill    gabapentin (NEURONTIN) 100 MG capsule Take 2 capsules by mouth in the morning and at bedtime for 30 days. Intended supply: 30 days 120 capsule 0    spironolactone (ALDACTONE) 25 MG tablet Take 1 tablet by mouth daily 30 tablet 5    omeprazole (PRILOSEC) 40 MG delayed release capsule Take 1 capsule by mouth in the morning and at bedtime Dr. Duffy Patch increased to BID 60 capsule 5    dicyclomine (BENTYL) 10 MG capsule Take 1 capsule by mouth 4 times daily as needed (cramping) 120 capsule 2    meloxicam (MOBIC) 7.5 MG tablet Take 1 tablet by mouth daily 90 tablet 1    fluticasone (FLONASE) 50 MCG/ACT nasal spray SPRAY 1 SPRAY BY NASAL ROUTE EVERY DAY 1 each 2    ALPRAZolam (XANAX) 0.5 MG tablet Take 1 tablet by mouth 3 times daily as needed for Sleep or Anxiety for up to 60 days. 90 tablet 1    lidocaine 4 % external patch Place 1 patch onto the skin daily 14 each 2    ondansetron (ZOFRAN ODT) 4 MG disintegrating tablet Take 2 tablets by mouth every 8 hours as needed for Nausea 20 tablet 0    rOPINIRole (REQUIP) 2 MG tablet TAKE 2 TABLETS AT NIGHT AND ONE IN THE MORNING FOR LEGS 90 tablet 3    potassium chloride (KLOR-CON M20) 20 MEQ extended release tablet Take one or two daily as directed for potassium 60 tablet 3    DULoxetine (CYMBALTA) 60 MG extended release capsule Take 1 capsule by mouth 2 times daily 60 capsule 3    Saccharomyces boulardii (DIGESTIVE PROBIOTIC) 250 MG CAPS Take one dose BID x 2 weeks 30 capsule 1    methocarbamol (ROBAXIN) 500 MG tablet TAKE 1 TABLET BY MOUTH 3 TIMES A DAY AS NEEDED FOR NECK PAIN 90 tablet 3    vitamin D (ERGOCALCIFEROL) 1.25 MG (25512 UT) CAPS capsule Take 1 capsule by mouth once a week 4 capsule 2    Acetaminophen (TYLENOL PO) Take 650 mg by mouth daily as needed       MELATONIN PO Take 20 mg by mouth nightly as needed        No current facility-administered medications on file prior to visit.       ASCVD 10-YEAR RISK SCORE  The 10-year ASCVD risk score (Jasmin CASTILLO, et al., 2019) is: 3.6%    Values used to calculate the score:      Age: 62 years      Sex: Female      Is Non- : No      Diabetic: No      Tobacco smoker: No      Systolic Blood Pressure: 380 mmHg      Is BP treated: Yes      HDL Cholesterol: 36 mg/dL      Total Cholesterol: 168 mg/dL     Review of Systems  10-point ROS is negative other than HPI. Physical Exam  Based off 1997 Exam Criteria  Ht 5' 5\" (1.651 m)   Wt 264 lb (119.7 kg)   LMP 09/20/2017   BMI 43.93 kg/m²      Constitutional:       General: He is not in acute distress. Appearance: Normal appearance. Cardiovascular:      Rate and Rhythm: Normal rate and regular rhythm. Pulses: Normal pulses. Pulmonary:      Effort: Pulmonary effort is normal. No respiratory distress. Neurological:      Mental Status: He is alert and oriented to person, place, and time. Mental status is at baseline. Musculoskeletal:  Gait:  antalgic  Lumbar spine: There is no swelling, warmth, or erythema. Range of motion is within normal limits. There is no paraspinal or spinous process tenderness. . The distal neurovascular exam is grossly intact and symmetric. Huber Hip: Examination of the right and left hip reveals intact skin. The patient demonstrates full painless range of motion with regards to flexion, abduction, internal and external rotation. There is no tenderness about the greater trochanter. L Knee: Physical exam of the knee demonstrates painful range of motion 5-110. Tenderness over the medial joint line. No gross instability to either varus or valgus stress test.      Imaging  Left Knee: [facility]  Previous Radiographs: X-rays were ordered today and reviewed of the left knee. 3 views. Standing AP, standing AP flexed, lateral, and skyline views. They demonstrate advanced left knee osteoarthritis medial compartment. No evidence of fractures or dislocations.       Procedure:  Orders Placed This Encounter   Procedures    US ARTHR/ASP/INJ MAJOR JNT/BURSA LEFT     Standing Status:   Future     Number of Occurrences:   1 Standing Expiration Date:   11/14/2023    ORTHOVISC INJECTION (For Auth/Precert)     One Injection once a week for 3 weeks to the Left Knee     Standing Status:   Future     Standing Expiration Date:   11/14/2023     I discussed in detail the risks, benefits and complications of aninjection which included but are not limited to infection, skin reactions, hot swollen joint, and anaphylaxis with the patient. The patient verbalized understanding and gave informed consent for the left knee injection. The patient is placed supine on table with a bolster underneath knee. Knee prepped with Betadine/Sterile alcohol solution. A sterile 22-gauge needle was inserted into the knee and the mixture of 2ml knealog 40mg/cc, 4 mL of 1% plain lidocaine, and 4 cc .25% bupivacaine was injected under sterile technique. The needle was withdrawn and the puncture site sealed with a Band-Aid. Technique: Under sterile conditions a SonOncothyreon ultrasound unit with a variable frequency (6.0-15.0 MHz) linear transducer was used to localize the placement of a 22-gauge needle into the knee joint. Findings: Successful needle placement for knee injection. Final images were taken and saved for permanent record. The patient tolerated the injection well. The patient was instructed to call the office immediately if there is any pain, redness, warmth, fever, or chills. Assessment and Plan  Janelle Gupta was seen today for follow-up. Diagnoses and all orders for this visit:    Primary osteoarthritis of left knee  -     US ARTHR/ASP/INJ MAJOR JNT/BURSA LEFT; Future  -     ORTHOVISC INJECTION (For Auth/Precert); Future    Other orders  -     lidocaine 1 % injection 20 mL  -     bupivacaine (MARCAINE) 0.25 % injection 75 mg  -     triamcinolone acetonide (KENALOG) injection 10 mg      Patient does have advanced left knee osteoarthritis. She also suffers from obesity.   We again have discussed the postoperative and perioperative risk profile associated with a BMI above 40. She will continue with efforts for weight loss. Today we will perform a left knee cortisone injection. I have also requested viscosupplementation injections. We can begin the series in approximately 3-4 weeks when her pain returns. T    I discussed with Osiris Dumont that her history, symptoms, signs and imaging are most consistent with knee arthritis. We reviewed the natural history of these conditions and treatment options ranging from conservative measures (rest, icing, activity modification, physical therapy, pain meds, cortisone injection) to surgical options. In terms of treatment, I recommended continuing with rest, icing, avoidance of painful activities, NSAIDs or pain meds as tolerated, and physical therapy. If these are not effective, cortisone injection can be considered. We discussed surgical options as well, should conservative measures fail. Electronically signed by Shona Valdez PA-C on 11/14/2022 at 2:54 PM  This dictation was generated by voice recognition computer software. Although all attempts are made to edit the dictation for accuracy, there may be errors in the transcription that are not intended.

## 2022-11-21 RX ORDER — METHOCARBAMOL 500 MG/1
TABLET, FILM COATED ORAL
Qty: 90 TABLET | Refills: 3 | Status: SHIPPED | OUTPATIENT
Start: 2022-11-21

## 2022-11-21 NOTE — TELEPHONE ENCOUNTER
Future Appointments   Date Time Provider Diann Sotelo   1/11/2023  8:00 AM DO TREVA Rosado 11/11/2022

## 2022-12-04 DIAGNOSIS — F41.9 ANXIETY: ICD-10-CM

## 2022-12-05 ENCOUNTER — TELEPHONE (OUTPATIENT)
Dept: FAMILY MEDICINE CLINIC | Age: 57
End: 2022-12-05

## 2022-12-05 DIAGNOSIS — F41.9 ANXIETY: ICD-10-CM

## 2022-12-05 RX ORDER — ALPRAZOLAM 0.5 MG/1
0.5 TABLET ORAL 3 TIMES DAILY PRN
Qty: 90 TABLET | Refills: 1 | Status: CANCELLED | OUTPATIENT
Start: 2022-12-05 | End: 2023-02-03

## 2022-12-05 NOTE — TELEPHONE ENCOUNTER
Future Appointments   Date Time Provider Diann Sotelo   12/12/2022  2:00 PM Caro Dunlap [facility]   12/19/2022  2:00 PM Yanna Bowser PA-C [facility]   12/27/2022  2:00 PM Yanna Bowser PA-C [facility]   1/11/2023  8:00 AM DO TREVA Harris - LEN QUEVEDO 11/11/2022

## 2022-12-05 NOTE — TELEPHONE ENCOUNTER
Refill  30 day  Alprazolam 0.50 mg  11/11/2022 last ov  Future Appointments   Date Time Provider [place]   12/12/2022  2:00 PM Caro Orona [facility]   12/19/2022  2:00 PM Juan Pablo Escamilla PA-C [facility]   12/27/2022  2:00 PM Juan Pablo Escamilla PA-C [facility]   1/11/2023  8:00 AM DO TREVA Schultz

## 2022-12-06 DIAGNOSIS — E87.6 HYPOKALEMIA: Primary | ICD-10-CM

## 2022-12-06 RX ORDER — ALPRAZOLAM 0.5 MG/1
TABLET ORAL
Qty: 90 TABLET | Refills: 1 | Status: SHIPPED | OUTPATIENT
Start: 2022-12-06 | End: 2023-01-05

## 2022-12-07 ENCOUNTER — TELEPHONE (OUTPATIENT)
Dept: FAMILY MEDICINE CLINIC | Age: 57
End: 2022-12-07

## 2022-12-07 DIAGNOSIS — R05.8 PRODUCTIVE COUGH: Primary | ICD-10-CM

## 2022-12-07 RX ORDER — BENZONATATE 100 MG/1
100 CAPSULE ORAL 3 TIMES DAILY PRN
Qty: 30 CAPSULE | Refills: 1 | Status: SHIPPED | OUTPATIENT
Start: 2022-12-07 | End: 2022-12-17

## 2022-12-07 RX ORDER — AMOXICILLIN AND CLAVULANATE POTASSIUM 875; 125 MG/1; MG/1
1 TABLET, FILM COATED ORAL 2 TIMES DAILY
Qty: 20 TABLET | Refills: 0 | Status: SHIPPED | OUTPATIENT
Start: 2022-12-07

## 2022-12-08 ENCOUNTER — OFFICE VISIT (OUTPATIENT)
Dept: FAMILY MEDICINE CLINIC | Age: 57
End: 2022-12-08
Payer: MEDICARE

## 2022-12-08 ENCOUNTER — HOSPITAL ENCOUNTER (OUTPATIENT)
Dept: GENERAL RADIOLOGY | Age: 57
Discharge: HOME | End: 2022-12-08
Payer: MEDICARE

## 2022-12-08 VITALS
RESPIRATION RATE: 18 BRPM | HEART RATE: 103 BPM | SYSTOLIC BLOOD PRESSURE: 109 MMHG | BODY MASS INDEX: 43.43 KG/M2 | TEMPERATURE: 97.8 F | WEIGHT: 261 LBS | OXYGEN SATURATION: 95 % | DIASTOLIC BLOOD PRESSURE: 77 MMHG

## 2022-12-08 DIAGNOSIS — J40 BRONCHITIS: Primary | ICD-10-CM

## 2022-12-08 DIAGNOSIS — R05.8 PRODUCTIVE COUGH: ICD-10-CM

## 2022-12-08 PROCEDURE — 3074F SYST BP LT 130 MM HG: CPT | Performed by: FAMILY MEDICINE

## 2022-12-08 PROCEDURE — 3078F DIAST BP <80 MM HG: CPT | Performed by: FAMILY MEDICINE

## 2022-12-08 PROCEDURE — 1036F TOBACCO NON-USER: CPT | Performed by: FAMILY MEDICINE

## 2022-12-08 PROCEDURE — 99213 OFFICE O/P EST LOW 20 MIN: CPT | Performed by: FAMILY MEDICINE

## 2022-12-08 PROCEDURE — G8427 DOCREV CUR MEDS BY ELIG CLIN: HCPCS | Performed by: FAMILY MEDICINE

## 2022-12-08 PROCEDURE — G8417 CALC BMI ABV UP PARAM F/U: HCPCS | Performed by: FAMILY MEDICINE

## 2022-12-08 PROCEDURE — 71046 X-RAY EXAM CHEST 2 VIEWS: CPT

## 2022-12-08 PROCEDURE — 3017F COLORECTAL CA SCREEN DOC REV: CPT | Performed by: FAMILY MEDICINE

## 2022-12-08 PROCEDURE — G8482 FLU IMMUNIZE ORDER/ADMIN: HCPCS | Performed by: FAMILY MEDICINE

## 2022-12-08 RX ORDER — GUAIFENESIN AND CODEINE PHOSPHATE 100; 10 MG/5ML; MG/5ML
5 SOLUTION ORAL 4 TIMES DAILY PRN
Qty: 180 ML | Refills: 1 | Status: SHIPPED | OUTPATIENT
Start: 2022-12-08 | End: 2022-12-26

## 2022-12-08 ASSESSMENT — PATIENT HEALTH QUESTIONNAIRE - PHQ9
SUM OF ALL RESPONSES TO PHQ QUESTIONS 1-9: 19
8. MOVING OR SPEAKING SO SLOWLY THAT OTHER PEOPLE COULD HAVE NOTICED. OR THE OPPOSITE, BEING SO FIGETY OR RESTLESS THAT YOU HAVE BEEN MOVING AROUND A LOT MORE THAN USUAL: 2
SUM OF ALL RESPONSES TO PHQ QUESTIONS 1-9: 19
4. FEELING TIRED OR HAVING LITTLE ENERGY: 3
SUM OF ALL RESPONSES TO PHQ9 QUESTIONS 1 & 2: 6
SUM OF ALL RESPONSES TO PHQ QUESTIONS 1-9: 19
6. FEELING BAD ABOUT YOURSELF - OR THAT YOU ARE A FAILURE OR HAVE LET YOURSELF OR YOUR FAMILY DOWN: 0
5. POOR APPETITE OR OVEREATING: 3
10. IF YOU CHECKED OFF ANY PROBLEMS, HOW DIFFICULT HAVE THESE PROBLEMS MADE IT FOR YOU TO DO YOUR WORK, TAKE CARE OF THINGS AT HOME, OR GET ALONG WITH OTHER PEOPLE: 2
3. TROUBLE FALLING OR STAYING ASLEEP: 3
1. LITTLE INTEREST OR PLEASURE IN DOING THINGS: 3
2. FEELING DOWN, DEPRESSED OR HOPELESS: 3
SUM OF ALL RESPONSES TO PHQ QUESTIONS 1-9: 19
9. THOUGHTS THAT YOU WOULD BE BETTER OFF DEAD, OR OF HURTING YOURSELF: 0
7. TROUBLE CONCENTRATING ON THINGS, SUCH AS READING THE NEWSPAPER OR WATCHING TELEVISION: 2

## 2022-12-08 ASSESSMENT — ENCOUNTER SYMPTOMS
CONSTIPATION: 0
COUGH: 1
DIARRHEA: 0

## 2022-12-08 NOTE — TELEPHONE ENCOUNTER
On Call Note: 12/07/22 @ 19:39   Received call from pt stating that she has an appt with Dr. Nathalia Slade tomorrow but thinks that she has pneumonia. Would like a CXR but unsure if our building does CXR's. Spoke with pt who states that sx began yesterday with a mild sore throat, coughing started last night with runny nose, at home COVID test negative, nasal discharge is yellow, hurts when she breaths deep,  has two spine epidurals at [facility] yesterday. Has been under a lot of family stress with her son. Prescribed Augmentin and Tessalon Perles (pt has Flonase). Also ordered a CXR for pt to do first thing in the morning before her appt with Dr. Nathalia Slade.

## 2022-12-08 NOTE — PROGRESS NOTES
Subjective:      Patient ID: Johanne Rodriguez is a 62 y.o. y.o. female. Cough and congestion    2 days. Non-productive cough. Feels rough    Covid test neg    Dr Ashley Charles talked to her last night. Ordered an x-ray and started Augmentin. Cough  Associated symptoms include a fever. Pertinent negatives include no myalgias. Fatigue  Associated symptoms include coughing, fatigue and a fever. Pertinent negatives include no myalgias. Fever   Associated symptoms include coughing. Pertinent negatives include no diarrhea.    Depression      Chief Complaint   Patient presents with    Cough     HOME Covid test yesterday- NEG  Chest XR this morning    Fatigue    Fever     Yesterday    Depression     Positive screening       Allergies   Allergen Reactions    Droperidol Other (See Comments)     unresponsive    Haldol [Haloperidol Lactate] Other (See Comments)     \"felt out of it, similar to the toradol\"    Toradol [Ketorolac Tromethamine] Other (See Comments)     \"out of it\"  \"felt like sleep paralysis\"       Past Medical History:   Diagnosis Date    Anxiety     Depression     Endometriosis     GERD (gastroesophageal reflux disease)     Hypertension     Osteoarthritis     PONV (postoperative nausea and vomiting)     Restless leg syndrome        Past Surgical History:   Procedure Laterality Date    ANUS SURGERY  2018    fissure     SECTION      x3    COLONOSCOPY      DILATION AND CURETTAGE OF UTERUS N/A 6/3/2021    VIDEO HYSTEROSCOPY DILATATION AND CURETTAGE, POSSIBLE MYOSURE, POSSIBLE POLYPECTOMY performed by Timo Prado DO at [address] [place] [address]      right wrist    HERNIA REPAIR  2018    LAPAROSCOPIC PARAESOPHAGEAL HERNIA REPAIR WITH MESH    HYSTERECTOMY (CERVIX STATUS UNKNOWN) N/A 10/21/2021    ROBOTIC ASSISTED LAPAROSCOPIC HYSTERECTOMY WITH RIGHT SALPINGECTOMY, RIGHT OOPHORECTOMY, CYSTOSCOPY, LYSIS OF ADHESIONS  CPT CODE - 60370 performed by [facility] DO Allison at [address] [place] [address] ARTHROSCOPY Left 11/15/12    ARTHROSCOPY LEFT KNEE WITH SYOVECTOMY AND CHONDROPLASTY    OVARY REMOVAL Right 2010    ROTATOR CUFF REPAIR Right 6/23/2020    EXAM UNDER ANESTHESIA VIDEO ARTHROSCOPY RIGHT SHOULDER, MINI- OPEN ROTATOR CUFF REPAIR, NEER ACROMIOPLASTY, LENO PROCEDURE WITH EXPAREL performed by Bala Celeste MD at [address] ARTHROSCOPY Right 11/25/2020    VIDEO ARTHROSCOPY RIGHT SHOULDER, OPEN ROTATOR CUFF REPAIR, BICEPS TENOTOMY -BLOCK- performed by Karoline Lopez MD at [address] ARTHROSCOPY Right 2/24/2021    RIGHT SHOULDER ARTHROSCOPIC INCISION AND DRAINAGE performed by Karoline Lopez MD at [address] ARTHROSCOPY Right 4/28/2021    VIDEO ARTHROSCOPY RIGHT SHOULDER, ROTATOR CUFF REPAIR, DEBRIDEMENT performed by Karoline Lopez MD at [address] [employer]    UPPER GASTROINTESTINAL ENDOSCOPY  2015    esophageasl stretch    UPPER GASTROINTESTINAL ENDOSCOPY      ATTEMPTED BUT PATIENT ASPIRATED    UPPER GASTROINTESTINAL ENDOSCOPY N/A 5/24/2022    EGD W/ANES. (11:00) performed by Arianne Regalado DO at [facility]. [facility] History     Socioeconomic History    Marital status:       Spouse name: Not on file    Number of children: 4    Years of education: Not on file    Highest education level: Not on file   Occupational History    Occupation:    Tobacco Use    Smoking status: Never    Smokeless tobacco: Never   Vaping Use    Vaping Use: Never used   Substance and Sexual Activity    Alcohol use: Yes     Comment: 1 -2 drinks per month    Drug use: Not Currently     Types: Marijuana Donna Forte)     Comment: last used 2 weeks ago    Sexual activity: Never   Other Topics Concern    Not on file   Social History Narrative    Not on file     Social Determinants of Health     Financial Resource Strain: Low Risk     Difficulty of Paying Living Expenses: Not hard at all   Food Insecurity: No Food Insecurity    Worried About Running Out of Food in the Last Year: Never true    [address] in the Last Year: Never true   Transportation Needs: No Transportation Needs    Lack of Transportation (Medical): No    Lack of Transportation (Non-Medical): No   Physical Activity: Insufficiently Active    Days of Exercise per Week: 2 days    Minutes of Exercise per Session: 20 min   Stress: Not on file   Social Connections: Not on file   Intimate Partner Violence: Not At Risk    Fear of Current or Ex-Partner: No    Emotionally Abused: No    Physically Abused: No    Sexually Abused: No   Housing Stability: Unknown    Unable to Pay for Housing in the Last Year: No    Number of Places Lived in the Last Year: Not on file    Unstable Housing in the Last Year: No       Family History   Problem Relation Age of Onset    Arthritis Mother     Obesity Mother     Anemia Mother     Other Mother         pulmonary embolism    Arthritis Father     Arrhythmia Father     Diabetes Other     Diabetes Paternal Grandfather     Cancer Neg Hx     Breast Cancer Neg Hx     Colon Cancer Neg Hx        Vitals:    12/08/22 1110   BP: 109/77   Pulse: (!) 103   Resp: 18   Temp: 97.8 °F (36.6 °C)   SpO2: 95%       Wt Readings from Last 3 Encounters:   12/08/22 261 lb (118.4 kg)   11/14/22 264 lb (119.7 kg)   11/11/22 264 lb (119.7 kg)       Review of Systems   Constitutional:  Positive for fatigue and fever. Respiratory:  Positive for cough. Gastrointestinal:  Negative for constipation and diarrhea. Musculoskeletal:  Negative for myalgias. Fatigued  Achy all over   Neurological:  Negative for dizziness. Psychiatric/Behavioral:  Positive for depression. Objective:   Physical Exam  Constitutional:       Appearance: She is obese. She is not ill-appearing. Comments: Pulse OX ok  Nasal sounds    Paroxysms of cough'   Cardiovascular:      Rate and Rhythm: Normal rate and regular rhythm.    Pulmonary:      Comments: Trace exp wheeze upper airway  No dullness  Diffuse rhonchi  ,a few crackles,  Musculoskeletal:      Right lower leg: No edema. Left lower leg: No edema. Neurological:      Mental Status: She is alert and oriented to person, place, and time. Assessment:      Bronchitis          Plan:     Viewed uninterpreted cxr-    Continue augmentin    Cough med / expectorant    Fluids and supportive car. Current Outpatient Medications   Medication Sig Dispense Refill    amoxicillin-clavulanate (AUGMENTIN) 875-125 MG per tablet Take 1 tablet by mouth 2 times daily 20 tablet 0    benzonatate (TESSALON PERLES) 100 MG capsule Take 1 capsule by mouth 3 times daily as needed for Cough 30 capsule 1    ALPRAZolam (XANAX) 0.5 MG tablet TAKE 1 TABLET BY MOUTH 3 TIMES DAILY AS NEEDED FOR SLEEP OR ANXIETY 90 tablet 1    methocarbamol (ROBAXIN) 500 MG tablet TAKE 1 TABLET BY MOUTH 3 TIMES A DAY AS NEEDED FOR NECK PAIN 90 tablet 3    gabapentin (NEURONTIN) 100 MG capsule Take 2 capsules by mouth in the morning and at bedtime for 30 days.  Intended supply: 30 days 120 capsule 0    spironolactone (ALDACTONE) 25 MG tablet Take 1 tablet by mouth daily 30 tablet 5    omeprazole (PRILOSEC) 40 MG delayed release capsule Take 1 capsule by mouth in the morning and at bedtime Dr. Mat Conner increased to BID 60 capsule 5    dicyclomine (BENTYL) 10 MG capsule Take 1 capsule by mouth 4 times daily as needed (cramping) 120 capsule 2    fluticasone (FLONASE) 50 MCG/ACT nasal spray SPRAY 1 SPRAY BY NASAL ROUTE EVERY DAY 1 each 2    lidocaine 4 % external patch Place 1 patch onto the skin daily 14 each 2    rOPINIRole (REQUIP) 2 MG tablet TAKE 2 TABLETS AT NIGHT AND ONE IN THE MORNING FOR LEGS 90 tablet 3    DULoxetine (CYMBALTA) 60 MG extended release capsule Take 1 capsule by mouth 2 times daily 60 capsule 3    Saccharomyces boulardii (DIGESTIVE PROBIOTIC) 250 MG CAPS Take one dose BID x 2 weeks 30 capsule 1    vitamin D (ERGOCALCIFEROL) 1.25 MG (11332 UT) CAPS capsule Take 1 capsule by mouth once a week 4 capsule 2    Acetaminophen (TYLENOL PO) Take 650 mg by mouth daily as needed       MELATONIN PO Take 20 mg by mouth nightly as needed       meloxicam (MOBIC) 7.5 MG tablet Take 1 tablet by mouth daily (Patient not taking: Reported on 12/8/2022) 90 tablet 1    ondansetron (ZOFRAN ODT) 4 MG disintegrating tablet Take 2 tablets by mouth every 8 hours as needed for Nausea (Patient not taking: Reported on 12/8/2022) 20 tablet 0    potassium chloride (KLOR-CON M20) 20 MEQ extended release tablet Take one or two daily as directed for potassium (Patient not taking: Reported on 12/8/2022) 60 tablet 3     No current facility-administered medications for this visit.

## 2022-12-12 ENCOUNTER — TELEPHONE (OUTPATIENT)
Dept: FAMILY MEDICINE CLINIC | Age: 57
End: 2022-12-12

## 2022-12-12 ENCOUNTER — HOSPITAL ENCOUNTER (OUTPATIENT)
Dept: GENERAL RADIOLOGY | Age: 57
Discharge: HOME | End: 2022-12-12
Payer: MEDICARE

## 2022-12-12 DIAGNOSIS — J40 BRONCHITIS: Primary | ICD-10-CM

## 2022-12-12 DIAGNOSIS — J40 BRONCHITIS: ICD-10-CM

## 2022-12-12 PROCEDURE — 71046 X-RAY EXAM CHEST 2 VIEWS: CPT

## 2022-12-12 RX ORDER — ALBUTEROL SULFATE 90 UG/1
2 AEROSOL, METERED RESPIRATORY (INHALATION) 4 TIMES DAILY PRN
Qty: 18 G | Refills: 1 | Status: SHIPPED | OUTPATIENT
Start: 2022-12-12

## 2022-12-12 NOTE — TELEPHONE ENCOUNTER
Spoke to the patient. Maybe does not feel quite as bad as she did, but the cough is continuing and very rhonchorous and disruptive. Will get repeat chest x-ray to see if there is any changes in the chest.  Might consider inhaled bronchodilator but would have to be cautious regarding pulse rate etc.  Get the chest x-ray we will discuss.

## 2022-12-12 NOTE — TELEPHONE ENCOUNTER
Spoke to the patient. The imaging is without significant change. Discussed with the patient adding a albuterol inhaler. Patient says she has used it in the past and we will try it at this point.

## 2022-12-12 NOTE — TELEPHONE ENCOUNTER
Addended by: Aman Hutton on: 4/10/2019 02:27 PM     Modules accepted: Orders Patient wanted to let Wendy Hensley know she had her chest x-rays today.

## 2022-12-12 NOTE — TELEPHONE ENCOUNTER
Wilbur Solis informed she was to update Dr. Lupe Enciso in regards to how she is doing. Patient has horrible cough, hard to cough up any mucus, wheezing, ears are ringing, chest and back are hurting especially when she takes a deep breath. She took Covid test and it was negative. Wilbur Solis is almost out of cough medication. Patient asked if she should get another xray. Please advise. Future Appointments   Date Time Provider Diann Sotelo   12/12/2022  2:00 PM Jp JoseingPeris [facility]   12/19/2022  2:00 PM Taylor Specking, PA-C [facility]   12/27/2022  2:00 PM Taylor Specking, PA-C [facility]   1/3/2023  1:45 PM Taylor Specking, PA-C [facility]   1/11/2023  8:00 AM DO TREVA Daniel        Past appointment was 12/8/22.     Pharmacy is [address] [facility]

## 2022-12-12 NOTE — TELEPHONE ENCOUNTER
Patient  called would like provider to look at chest xray results because they said she has bronchitis .  Patient is also out of cough medication

## 2022-12-13 ENCOUNTER — TELEPHONE (OUTPATIENT)
Dept: FAMILY MEDICINE CLINIC | Age: 57
End: 2022-12-13

## 2022-12-13 NOTE — TELEPHONE ENCOUNTER
Pt called to let Dr Renata Kolb know she is out of cough med. She called the pharmacy and was told they will not refill the Tussi-organidin NR until Thursday. The cough is getting worse and she needs something else called in for it. Had chest xray done yesterday. [facility] on file. Please advise.

## 2022-12-19 ENCOUNTER — NURSE ONLY (OUTPATIENT)
Dept: ORTHOPEDIC SURGERY | Age: 57
End: 2022-12-19
Payer: MEDICARE

## 2022-12-19 VITALS — WEIGHT: 261 LBS | BODY MASS INDEX: 43.49 KG/M2 | HEIGHT: 65 IN

## 2022-12-19 DIAGNOSIS — M17.12 PRIMARY OSTEOARTHRITIS OF LEFT KNEE: Primary | ICD-10-CM

## 2022-12-19 PROCEDURE — 20611 DRAIN/INJ JOINT/BURSA W/US: CPT | Performed by: PHYSICIAN ASSISTANT

## 2022-12-19 NOTE — PROGRESS NOTES
Diagnosis: Left knee osteoarthritis    Procedure: Left knee viscosupplementation #1    The patient is symptomatic from osteoarthritis of the left knee joint with documented radiological signs of arthritis. The patient has also failed 3 months of conservative treatment including home exercise, education, Tylenol and/or NSAIDs use. The patient was offered a Visco supplementation today. Risks, benefits, and alternatives to the injections were discussed in detail with the patient. The risks discussed included but are not limited to infection, skin reactions, hot swollen joints, and anaphylaxis. The patient gave verbal informed consent for the injection. The patient's skin was prepped with  3 sterile gauze  pads soaked with alcohol solution and the knee joint was injected with 2 mL of Orthovisc intra-articularly under sterile conditions. Technique: Under sterile conditions a [employer] ultrasound unit with a variable frequency (6.0-15.0 MHz) linear transducer was used to localize the placement of a 22-gauge needle into the knee joint. Findings: Successful needle placement for intra-articular Visco supplementation injection. Final images were taken and saved for permanent record. The patient tolerated the injection reasonably well. The patient was given instructions to ice the kne and avoid strenuous activities for 24-48 hours. The patient was instructed to call the office immediately if there is increased pain, redness, warmth, fever, or chills. We will see the patient back in one week for their second injection.

## 2022-12-23 RX ORDER — PROMETHAZINE HYDROCHLORIDE 25 MG/1
25 TABLET ORAL 4 TIMES DAILY PRN
Qty: 28 TABLET | Refills: 0 | Status: SHIPPED | OUTPATIENT
Start: 2022-12-23

## 2022-12-23 NOTE — PROGRESS NOTES
Patient paged on call. She has been having nausea and vomiting x 3 days. Using Zofran which is not helping much. This morning she is dry heaving with little vomiting. She is having some abdominal cramping but no pain. Stools are soft but not watery. Denies fever but does report hot and cold chills. She has been taking small sips of water to hydrate. Discussed symptoms with patient- sent in RX for phenergan for the nausea and vomiting. If symptoms do not improve with this then recommend ED evaluation and she agrees.

## 2022-12-29 ENCOUNTER — NURSE ONLY (OUTPATIENT)
Dept: ORTHOPEDIC SURGERY | Age: 57
End: 2022-12-29
Payer: MEDICARE

## 2022-12-29 VITALS — WEIGHT: 261 LBS | HEIGHT: 65 IN | BODY MASS INDEX: 43.49 KG/M2

## 2022-12-29 DIAGNOSIS — M17.12 PRIMARY OSTEOARTHRITIS OF LEFT KNEE: Primary | ICD-10-CM

## 2022-12-29 PROCEDURE — 99999 PR OFFICE/OUTPT VISIT,PROCEDURE ONLY: CPT | Performed by: PHYSICIAN ASSISTANT

## 2022-12-29 PROCEDURE — 20611 DRAIN/INJ JOINT/BURSA W/US: CPT | Performed by: PHYSICIAN ASSISTANT

## 2022-12-29 NOTE — PROGRESS NOTES
Diagnosis: Left knee osteoarthritis    Procedure: Left knee viscosupplementation #2    The patient returns today for their second Orthovisc injection in the left knee. The risks, benefits, and complications of the injections were again discussed in detail with the patient. The risks discussed included but are not limited to infection, skin reactions, hot swollen joints, and anaphylaxis. The patient gave verbal informed consent for the injection. The patient skin was prepped with 3 sterile gauze pads soaked with alcohol solution and the knee joint was injected with 2 mL of Orthovisc intra-articularly under sterile conditions . Technique: Under sterile conditions a [employer] ultrasound unit with a variable frequency (6.0-15.0 MHz) linear transducer was used to localize the placement of a 22-gauge needle into the edwin joint. Findings: Successful needle placement for intra-articular Visco supplementation injection. Final images were taken and saved for permanent record. The patient tolerated the injection reasonably well. The patient was given instructions to ice the the knee and avoid strenuous activities for 24-48 hours. The patient was instructed to call the office immediately if there is increased pain, redness, warmth, fever, or chills. We will see the patient back in one week for the third injection.

## 2023-01-03 ENCOUNTER — NURSE ONLY (OUTPATIENT)
Dept: ORTHOPEDIC SURGERY | Age: 58
End: 2023-01-03
Payer: MEDICARE

## 2023-01-03 ENCOUNTER — TELEPHONE (OUTPATIENT)
Dept: FAMILY MEDICINE CLINIC | Age: 58
End: 2023-01-03

## 2023-01-03 VITALS — BODY MASS INDEX: 43.49 KG/M2 | HEIGHT: 65 IN | WEIGHT: 261 LBS

## 2023-01-03 DIAGNOSIS — M17.12 PRIMARY OSTEOARTHRITIS OF LEFT KNEE: Primary | ICD-10-CM

## 2023-01-03 PROCEDURE — 20611 DRAIN/INJ JOINT/BURSA W/US: CPT | Performed by: PHYSICIAN ASSISTANT

## 2023-01-03 PROCEDURE — 99999 PR OFFICE/OUTPT VISIT,PROCEDURE ONLY: CPT | Performed by: PHYSICIAN ASSISTANT

## 2023-01-03 NOTE — PROGRESS NOTES
Diagnosis: Left knee osteoarthritis    Procedure: Left knee viscosupplementation #3    The patient returns today for their third and final Orthovisc injection in the left knee. The risks, benefits, and complications of the injections were again discussed in detail with the patient. The risks discussed include but are not limited to infection, skin reactions, hot swollen joints, and anaphylaxis. The patient gave verbal informed consent for the injection. The patient's skin was prepped with 3 sterile gauze pads soaked with alcohol solution and the knee joint was injected with 2 mL of Orthovisc intra-articularly under sterile conditions. Technique: Under sterile conditions a [employer] ultrasound unit with a variable frequency (6.0-15.0 MHz) linear transducer was used to localize the placement of a 22-gauge needle into the knee joint. Findings: Successful needle placement for intra-articular Visco supplementation injection. Final images were taken and saved for permanent record. The patient tolerated the injection reasonably well. The patient was given instructions to ice of the knee and avoid strenuous activity for 24-48 hours. The patient was instructed to call the office immediately if there is increased pain, redness, warmth, fever, or chills. We will see the patient back on an as-needed basis  from this point.

## 2023-01-03 NOTE — TELEPHONE ENCOUNTER
Pt discharged from Hospital on 12/26/22. Has appt with you on 1/11/23. Wants to know if this is okay, or should she been seen sooner.

## 2023-01-05 ENCOUNTER — PATIENT MESSAGE (OUTPATIENT)
Dept: FAMILY MEDICINE CLINIC | Age: 58
End: 2023-01-05

## 2023-01-05 NOTE — TELEPHONE ENCOUNTER
From: Dario Acuna  To: Dr. Frederic Michel  Sent: 1/5/2023 3:38 PM EST  Subject: Blood draw    Wanted to double check and make sure that I have an order for blood work? Unless you want it sooner I will get it Monday and I see you on Wednesday. I have a lot of things to talk to you about. So many issues are coming up.  Thanks Vivi Maldonado [phone]

## 2023-01-10 ENCOUNTER — TELEPHONE (OUTPATIENT)
Dept: FAMILY MEDICINE CLINIC | Age: 58
End: 2023-01-10

## 2023-01-10 NOTE — TELEPHONE ENCOUNTER
FYI: Denilson Kaur said she will get potassium blood work done by the end of week.        Future Appointments   Date Time Provider Diann Sotelo   1/16/2023  3:20 PM DO TREVA [facility] - [facility]   2/13/2023  1:00 PM Yanna Bowser PA-C [facility]

## 2023-01-12 DIAGNOSIS — M54.16 LUMBAR RADICULOPATHY: ICD-10-CM

## 2023-01-12 RX ORDER — GABAPENTIN 100 MG/1
200 CAPSULE ORAL 2 TIMES DAILY
Qty: 120 CAPSULE | Refills: 2 | Status: SHIPPED | OUTPATIENT
Start: 2023-01-12 | End: 2023-04-12

## 2023-01-12 NOTE — TELEPHONE ENCOUNTER
LOV 12/8/2022    Future Appointments   Date Time Provider Diann Sotelo   1/16/2023  3:20 PM DO TREVA [facility] - [facility]   2/13/2023  1:00 PM Nikia Todd PA-C [facility]

## 2023-01-16 ENCOUNTER — OFFICE VISIT (OUTPATIENT)
Dept: FAMILY MEDICINE CLINIC | Age: 58
End: 2023-01-16
Payer: MEDICARE

## 2023-01-16 ENCOUNTER — TELEPHONE (OUTPATIENT)
Dept: FAMILY MEDICINE CLINIC | Age: 58
End: 2023-01-16

## 2023-01-16 VITALS
TEMPERATURE: 97.3 F | DIASTOLIC BLOOD PRESSURE: 80 MMHG | RESPIRATION RATE: 16 BRPM | HEART RATE: 80 BPM | OXYGEN SATURATION: 94 % | BODY MASS INDEX: 42.93 KG/M2 | SYSTOLIC BLOOD PRESSURE: 100 MMHG | WEIGHT: 258 LBS

## 2023-01-16 DIAGNOSIS — E87.6 HYPOKALEMIA: ICD-10-CM

## 2023-01-16 DIAGNOSIS — E66.01 OBESITY, CLASS III, BMI 40-49.9 (MORBID OBESITY) (HCC): ICD-10-CM

## 2023-01-16 DIAGNOSIS — E87.6 HYPOKALEMIA: Primary | ICD-10-CM

## 2023-01-16 DIAGNOSIS — I10 ESSENTIAL HYPERTENSION: ICD-10-CM

## 2023-01-16 LAB — POTASSIUM SERPL-SCNC: 2.4 MMOL/L (ref 3.5–5.1)

## 2023-01-16 PROCEDURE — 3074F SYST BP LT 130 MM HG: CPT | Performed by: FAMILY MEDICINE

## 2023-01-16 PROCEDURE — 3079F DIAST BP 80-89 MM HG: CPT | Performed by: FAMILY MEDICINE

## 2023-01-16 PROCEDURE — G8427 DOCREV CUR MEDS BY ELIG CLIN: HCPCS | Performed by: FAMILY MEDICINE

## 2023-01-16 PROCEDURE — 1036F TOBACCO NON-USER: CPT | Performed by: FAMILY MEDICINE

## 2023-01-16 PROCEDURE — G8417 CALC BMI ABV UP PARAM F/U: HCPCS | Performed by: FAMILY MEDICINE

## 2023-01-16 PROCEDURE — 99214 OFFICE O/P EST MOD 30 MIN: CPT | Performed by: FAMILY MEDICINE

## 2023-01-16 PROCEDURE — G8482 FLU IMMUNIZE ORDER/ADMIN: HCPCS | Performed by: FAMILY MEDICINE

## 2023-01-16 PROCEDURE — 3017F COLORECTAL CA SCREEN DOC REV: CPT | Performed by: FAMILY MEDICINE

## 2023-01-16 RX ORDER — SPIRONOLACTONE 25 MG/1
25 TABLET ORAL DAILY
Qty: 30 TABLET | Refills: 3 | Status: SHIPPED | OUTPATIENT
Start: 2023-01-16

## 2023-01-16 RX ORDER — METOCLOPRAMIDE 5 MG/1
5 TABLET ORAL 3 TIMES DAILY PRN
Qty: 30 TABLET | Refills: 1 | Status: SHIPPED | OUTPATIENT
Start: 2023-01-16

## 2023-01-16 RX ORDER — ALPRAZOLAM 0.5 MG/1
TABLET ORAL
COMMUNITY
Start: 2023-01-07

## 2023-01-16 ASSESSMENT — PATIENT HEALTH QUESTIONNAIRE - PHQ9
SUM OF ALL RESPONSES TO PHQ QUESTIONS 1-9: 5
1. LITTLE INTEREST OR PLEASURE IN DOING THINGS: 1
7. TROUBLE CONCENTRATING ON THINGS, SUCH AS READING THE NEWSPAPER OR WATCHING TELEVISION: 1
8. MOVING OR SPEAKING SO SLOWLY THAT OTHER PEOPLE COULD HAVE NOTICED. OR THE OPPOSITE, BEING SO FIGETY OR RESTLESS THAT YOU HAVE BEEN MOVING AROUND A LOT MORE THAN USUAL: 1
9. THOUGHTS THAT YOU WOULD BE BETTER OFF DEAD, OR OF HURTING YOURSELF: 0
10. IF YOU CHECKED OFF ANY PROBLEMS, HOW DIFFICULT HAVE THESE PROBLEMS MADE IT FOR YOU TO DO YOUR WORK, TAKE CARE OF THINGS AT HOME, OR GET ALONG WITH OTHER PEOPLE: 0
2. FEELING DOWN, DEPRESSED OR HOPELESS: 1
4. FEELING TIRED OR HAVING LITTLE ENERGY: 0
3. TROUBLE FALLING OR STAYING ASLEEP: 1
SUM OF ALL RESPONSES TO PHQ QUESTIONS 1-9: 5
SUM OF ALL RESPONSES TO PHQ QUESTIONS 1-9: 5
SUM OF ALL RESPONSES TO PHQ9 QUESTIONS 1 & 2: 2
5. POOR APPETITE OR OVEREATING: 0
6. FEELING BAD ABOUT YOURSELF - OR THAT YOU ARE A FAILURE OR HAVE LET YOURSELF OR YOUR FAMILY DOWN: 0
SUM OF ALL RESPONSES TO PHQ QUESTIONS 1-9: 5

## 2023-01-16 ASSESSMENT — ENCOUNTER SYMPTOMS: RESPIRATORY NEGATIVE: 1

## 2023-01-16 NOTE — PATIENT INSTRUCTIONS
Continue the basic medications      Use the Reglan just if the stomach seems like it is starting up again,    Use it as needed . Call if things seems to start up.

## 2023-01-16 NOTE — PROGRESS NOTES
Subjective:      Patient ID: Glen Cedillo is a 62 y.o. y.o. female. Here to follow up hospital stay. Admitted -   d/c   Dehydration and low K and N-V  Had gotten really run down and a lot of family stress. Boni   Is better than when she went in and trying take care of herself. Had EGD-  without gross abnormality    Is working on taking care of herself and hydration. Dr Gerald Boston added Reglan-  out of it and is currently OK  HPI      Chief Complaint   Patient presents with    Follow-Up from [facility] 22 - 22 - potassium was low - nausea & vomiting - lab drawn again this morning       Allergies   Allergen Reactions    Droperidol Other (See Comments)     unresponsive    Haldol [Haloperidol Lactate] Other (See Comments)     \"felt out of it, similar to the toradol\"    Toradol [Ketorolac Tromethamine] Other (See Comments)     \"out of it\"  \"felt like sleep paralysis\"       Past Medical History:   Diagnosis Date    Anxiety     Depression     Endometriosis     GERD (gastroesophageal reflux disease)     Hypertension     Osteoarthritis     PONV (postoperative nausea and vomiting)     Restless leg syndrome        Past Surgical History:   Procedure Laterality Date    ANUS SURGERY  2018    fissure     SECTION      x3    COLONOSCOPY      DILATION AND CURETTAGE OF UTERUS N/A 6/3/2021    VIDEO HYSTEROSCOPY DILATATION AND CURETTAGE, POSSIBLE MYOSURE, POSSIBLE POLYPECTOMY performed by Ulysses Pacer, DO at [address] [place] [address]      right wrist    HERNIA REPAIR  2018    LAPAROSCOPIC PARAESOPHAGEAL HERNIA REPAIR WITH MESH    HYSTERECTOMY (CERVIX STATUS UNKNOWN) N/A 10/21/2021    ROBOTIC ASSISTED LAPAROSCOPIC HYSTERECTOMY WITH RIGHT SALPINGECTOMY, RIGHT OOPHORECTOMY, CYSTOSCOPY, LYSIS OF ADHESIONS  CPT CODE - 74583 performed by Emanuel Reddy DO at [address] [place] [address] ARTHROSCOPY Left 11/15/12    ARTHROSCOPY LEFT KNEE WITH SYOVECTOMY AND CHONDROPLASTY OVARY REMOVAL Right 2010    ROTATOR CUFF REPAIR Right 6/23/2020    EXAM UNDER ANESTHESIA VIDEO ARTHROSCOPY RIGHT SHOULDER, MINI- OPEN ROTATOR CUFF REPAIR, NEER ACROMIOPLASTY, LENO PROCEDURE WITH EXPAREL performed by Lin Florian MD at [address] [facility] Right 11/25/2020    VIDEO ARTHROSCOPY RIGHT SHOULDER, OPEN ROTATOR CUFF REPAIR, BICEPS TENOTOMY -BLOCK- performed by Delisa Tolbert MD at [address] [place] ARTHROSCOPY Right 2/24/2021    RIGHT SHOULDER ARTHROSCOPIC INCISION AND DRAINAGE performed by Delisa Tolbert MD at [address] [place] ARTHROSCOPY Right 4/28/2021    VIDEO ARTHROSCOPY RIGHT SHOULDER, ROTATOR CUFF REPAIR, DEBRIDEMENT performed by Delisa Tolbert MD at [address]    UPPER GASTROINTESTINAL ENDOSCOPY  2015    esophageasl stretch    UPPER GASTROINTESTINAL ENDOSCOPY      ATTEMPTED BUT PATIENT ASPIRATED    UPPER GASTROINTESTINAL ENDOSCOPY N/A 5/24/2022    EGD W/SERGEY. (11:00) [facility] Th[facility] History     Socioeconomic History    Marital status:       Spouse name: Not on file    Number of children: 4    Years of education: Not on file    Highest education level: Not on file   Occupational History    Occupation:    Tobacco Use    Smoking status: Never    Smokeless tobacco: Never   Vaping Use    Vaping Use: Never used   Substance and Sexual Activity    Alcohol use: Yes     Comment: 1 -2 drinks per month    Drug use: Not Currently     Types: Marijuana [employer])     Comment: last used 2 weeks ago    Sexual activity: Never   Other Topics Concern    Not on file   Social History Narrative    Not on file     Social Determinants of Health     Financial Resource Strain: Low Risk     Difficulty of Paying Living Expenses: Not hard at all   Food Insecurity: No Food Insecurity    Worried About [facility] in the Last Year: Never true    [place] of Food in the Last Year: Never true   Transportation Needs: No Transportation Needs    Lack of Transportation (Medical): No    Lack of Transportation (Non-Medical): No   Physical Activity: Insufficiently Active    Days of Exercise per Week: 2 days    Minutes of Exercise per Session: 20 min   Stress: Not on file   Social Connections: Not on file   Intimate Partner Violence: Not At Risk    Fear of Current or Ex-Partner: No    Emotionally Abused: No    Physically Abused: No    Sexually Abused: No   Housing Stability: Unknown    Unable to Pay for Housing in the Last Year: No    Number of Places Lived in the Last Year: Not on file    Unstable Housing in the Last Year: No       Family History   Problem Relation Age of Onset    Arthritis Mother     Obesity Mother     Anemia Mother     Other Mother         pulmonary embolism    Arthritis Father     Arrhythmia Father     Diabetes Other     Diabetes Paternal Grandfather     Cancer Neg Hx     Breast Cancer Neg Hx     Colon Cancer Neg Hx        Vitals:    01/16/23 1506   BP: 100/80   Pulse: 80   Resp: 16   Temp: 97.3 °F (36.3 °C)   SpO2: 94%       Wt Readings from Last 3 Encounters:   01/16/23 258 lb (117 kg)   01/03/23 261 lb (118.4 kg)   12/29/22 261 lb (118.4 kg)       Review of Systems   Respiratory: Negative. Cardiovascular: Negative. Gastrointestinal:         GI currently doing OK   Musculoskeletal:         Right shoulder    Lumbar area- orthopedic pain     Objective:   Physical Exam  Constitutional:       Appearance: She is obese. She is not ill-appearing. Cardiovascular:      Rate and Rhythm: Normal rate and regular rhythm. Heart sounds: Normal heart sounds. Pulmonary:      Effort: Pulmonary effort is normal. No respiratory distress. Breath sounds: Normal breath sounds. Abdominal:      Palpations: Abdomen is soft. There is no mass. Tenderness: There is no abdominal tenderness. There is no guarding. Comments: Obese abdomen.     rotund Musculoskeletal:      Right lower leg: No edema. Left lower leg: No edema. Neurological:      Mental Status: She is alert and oriented to person, place, and time. Psychiatric:         Mood and Affect: Mood normal.         Behavior: Behavior normal.       Assessment:   Acid reflux  Restless leg  Hypokalemia            Plan:   Out of reglan script,  will rec keeping some on hand,  If the Zofran for nausea  not effective,  use the reglan for a few days    Reglan 5 mg tid prn- inst in  use    Lab called shortly after the patient left. Her potassium was 2.4 from a draw this morning. She is currently taking one half potassium in the morning and one half potassium in the evening (20 M EQ total.). She has previously taken spironolactone without incident. We will add spironolactone 25 mg and continue the current potassium supplement. I asked her to get a recheck in 1 week. Current Outpatient Medications   Medication Sig Dispense Refill    ALPRAZolam (XANAX) 0.5 MG tablet TAKE 1 TABLET BY MOUTH 3 TIMES DAILY AS NEEDED FOR SLEEP OR ANXIETY. gabapentin (NEURONTIN) 100 MG capsule Take 2 capsules by mouth in the morning and at bedtime for 90 days.  Intended supply: 30 days 120 capsule 2    albuterol sulfate HFA (VENTOLIN HFA) 108 (90 Base) MCG/ACT inhaler Inhale 2 puffs into the lungs 4 times daily as needed for Wheezing (Patient taking differently: Inhale 2 puffs into the lungs as needed for Wheezing) 18 g 1    methocarbamol (ROBAXIN) 500 MG tablet TAKE 1 TABLET BY MOUTH 3 TIMES A DAY AS NEEDED FOR NECK PAIN 90 tablet 3    omeprazole (PRILOSEC) 40 MG delayed release capsule Take 1 capsule by mouth in the morning and at bedtime Dr. Roswell Kocher increased to BID 60 capsule 5    dicyclomine (BENTYL) 10 MG capsule Take 1 capsule by mouth 4 times daily as needed (cramping) 120 capsule 2    lidocaine 4 % external patch Place 1 patch onto the skin daily 14 each 2    ondansetron (ZOFRAN ODT) 4 MG disintegrating tablet Take 2 tablets by mouth every 8 hours as needed for Nausea 20 tablet 0    rOPINIRole (REQUIP) 2 MG tablet TAKE 2 TABLETS AT NIGHT AND ONE IN THE MORNING FOR LEGS 90 tablet 3    potassium chloride (KLOR-CON M20) 20 MEQ extended release tablet Take one or two daily as directed for potassium 60 tablet 3    DULoxetine (CYMBALTA) 60 MG extended release capsule Take 1 capsule by mouth 2 times daily 60 capsule 3    Saccharomyces boulardii (DIGESTIVE PROBIOTIC) 250 MG CAPS Take one dose BID x 2 weeks 30 capsule 1    vitamin D (ERGOCALCIFEROL) 1.25 MG (27017 UT) CAPS capsule Take 1 capsule by mouth once a week 4 capsule 2    Acetaminophen (TYLENOL PO) Take 650 mg by mouth daily as needed       MELATONIN PO Take 20 mg by mouth nightly as needed        No current facility-administered medications for this visit.

## 2023-01-24 RX ORDER — ROPINIROLE 2 MG/1
TABLET, FILM COATED ORAL
Qty: 90 TABLET | Refills: 3 | Status: SHIPPED | OUTPATIENT
Start: 2023-01-24

## 2023-01-24 RX ORDER — POTASSIUM CHLORIDE 20 MEQ/1
TABLET, EXTENDED RELEASE ORAL
Qty: 60 TABLET | Refills: 3 | Status: SHIPPED | OUTPATIENT
Start: 2023-01-24

## 2023-01-24 NOTE — TELEPHONE ENCOUNTER
LOV 1/16/2023    Future Appointments   Date Time Provider Diann Sotelo   2/13/2023  1:00 PM Sofia Carcamo PA-C [facility]

## 2023-01-24 NOTE — TELEPHONE ENCOUNTER
LOV 1/16/2023    Future Appointments   Date Time Provider Diann Sotelo   2/13/2023  1:00 PM Saira Morales PA-C [facility]

## 2023-01-27 DIAGNOSIS — E87.6 HYPOKALEMIA: ICD-10-CM

## 2023-01-28 LAB — POTASSIUM SERPL-SCNC: 3 MMOL/L (ref 3.5–5.1)

## 2023-01-31 DIAGNOSIS — E87.6 HYPOKALEMIA: Primary | ICD-10-CM

## 2023-02-01 ENCOUNTER — TELEPHONE (OUTPATIENT)
Dept: FAMILY MEDICINE CLINIC | Age: 58
End: 2023-02-01

## 2023-02-01 NOTE — TELEPHONE ENCOUNTER
Pt called stating she got a call from the office this morning about scheduling a test and while on hold she fell a sleep. Pt was calling back to schedule. Not sure what she is trying to schedule. She did say something about a test for her adrenal gland. Please advise.

## 2023-02-01 NOTE — TELEPHONE ENCOUNTER
The test is blood work, which is ordered and which she can get at her convenience in the next day or so.

## 2023-02-03 DIAGNOSIS — E87.6 HYPOKALEMIA: ICD-10-CM

## 2023-02-03 LAB
CORTISOL - AM: 14.8 UG/DL (ref 4.3–22.4)
MAGNESIUM: 2.1 MG/DL (ref 1.8–2.4)
POTASSIUM SERPL-SCNC: 3.2 MMOL/L (ref 3.5–5.1)

## 2023-02-13 ENCOUNTER — OFFICE VISIT (OUTPATIENT)
Dept: ORTHOPEDIC SURGERY | Age: 58
End: 2023-02-13
Payer: MEDICARE

## 2023-02-13 VITALS — HEIGHT: 65 IN | BODY MASS INDEX: 42.99 KG/M2 | WEIGHT: 258 LBS

## 2023-02-13 DIAGNOSIS — M12.819 ROTATOR CUFF ARTHROPATHY, UNSPECIFIED LATERALITY: ICD-10-CM

## 2023-02-13 DIAGNOSIS — M75.111 INCOMPLETE TEAR OF RIGHT ROTATOR CUFF, UNSPECIFIED WHETHER TRAUMATIC: Primary | ICD-10-CM

## 2023-02-13 DIAGNOSIS — M25.519 ACUTE SHOULDER PAIN, UNSPECIFIED LATERALITY: ICD-10-CM

## 2023-02-13 DIAGNOSIS — E66.01 CLASS 3 SEVERE OBESITY DUE TO EXCESS CALORIES WITH SERIOUS COMORBIDITY AND BODY MASS INDEX (BMI) OF 45.0 TO 49.9 IN ADULT (HCC): ICD-10-CM

## 2023-02-13 PROCEDURE — G8482 FLU IMMUNIZE ORDER/ADMIN: HCPCS | Performed by: PHYSICIAN ASSISTANT

## 2023-02-13 PROCEDURE — 20610 DRAIN/INJ JOINT/BURSA W/O US: CPT | Performed by: PHYSICIAN ASSISTANT

## 2023-02-13 PROCEDURE — 99213 OFFICE O/P EST LOW 20 MIN: CPT | Performed by: PHYSICIAN ASSISTANT

## 2023-02-13 PROCEDURE — G8427 DOCREV CUR MEDS BY ELIG CLIN: HCPCS | Performed by: PHYSICIAN ASSISTANT

## 2023-02-13 PROCEDURE — 3017F COLORECTAL CA SCREEN DOC REV: CPT | Performed by: PHYSICIAN ASSISTANT

## 2023-02-13 PROCEDURE — 1036F TOBACCO NON-USER: CPT | Performed by: PHYSICIAN ASSISTANT

## 2023-02-13 PROCEDURE — G8417 CALC BMI ABV UP PARAM F/U: HCPCS | Performed by: PHYSICIAN ASSISTANT

## 2023-02-13 RX ORDER — BUPIVACAINE HYDROCHLORIDE 2.5 MG/ML
30 INJECTION, SOLUTION INFILTRATION; PERINEURAL ONCE
Status: COMPLETED | OUTPATIENT
Start: 2023-02-13 | End: 2023-02-13

## 2023-02-13 RX ORDER — TRIAMCINOLONE ACETONIDE 40 MG/ML
40 INJECTION, SUSPENSION INTRA-ARTICULAR; INTRAMUSCULAR ONCE
Status: COMPLETED | OUTPATIENT
Start: 2023-02-13 | End: 2023-02-13

## 2023-02-13 RX ORDER — LIDOCAINE HYDROCHLORIDE 10 MG/ML
20 INJECTION, SOLUTION INFILTRATION; PERINEURAL ONCE
Status: COMPLETED | OUTPATIENT
Start: 2023-02-13 | End: 2023-02-13

## 2023-02-13 RX ADMIN — TRIAMCINOLONE ACETONIDE 40 MG: 40 INJECTION, SUSPENSION INTRA-ARTICULAR; INTRAMUSCULAR at 13:16

## 2023-02-13 RX ADMIN — BUPIVACAINE HYDROCHLORIDE 75 MG: 2.5 INJECTION, SOLUTION INFILTRATION; PERINEURAL at 13:15

## 2023-02-13 RX ADMIN — LIDOCAINE HYDROCHLORIDE 20 ML: 10 INJECTION, SOLUTION INFILTRATION; PERINEURAL at 13:17

## 2023-02-13 NOTE — PROGRESS NOTES
Dr Ralph Angulo      Date /Time 2/13/2023             11:46 AM EST  Name Annie Lofton             1965   Location  [facility]  MRN [id number]                Chief Complaint   Patient presents with    Follow-up     Right Shoulder ([place] 07/13/2022)        History of Present Illness    Annie Lofton is a 62 y.o. female who presents with  right Shoulder pain. .  Injury Mechanism:  none. Worker's Comp. & legal issues:   none. Previous Treatments: Ice, Heat and NSAIDs    Patient presents to the office today for follow-up visit. Patient being treated for right shoulder osteoarthritis and rotator cuff arthropathy. Today's visit was scheduled to discuss total shoulder arthroplasty or reverse shoulder arthroplasty. Patient has had a reoccurrence of her pain. She was participating in medical optimization and has reduced her BMI. Her BMI currently is 40.93. Further history as below    Previous history: Patient presents to the office today for follow-up visit concerning her right shoulder. She is being treated for a large a repairable rotator cuff tear and osteoarthritis of her shoulder. She has had a previous shoulder aspiration March 2022 which was negative for infection. Her current BMI is 44.39 and will need that less than 40 before proceeding with reverse shoulder replacement especially with her history of infection. No new injury or trauma    Previous history: Patient presents the office today for follow-up visit. Patient here for EMG and MRI results. She continues to complain of continued pain involving her right shoulder. Pain is concentrated laterally. She does have numbness and tingling in her hand and has had a previous ORIF wrist fracture in 2019. She continues to complain of difficulties involving her right shoulder especially with any overhead activities. Previous history: Patient presents to the office today for a new problem.   Patient is here with a chief complaint of right shoulder pain. Patient's right shoulder has been painful off and on since . Patient had a rotator cuff repair done by Dr. Karen Yi 2020. Patient had another rotator cuff repair done on 2020 by Dr. Vito Bernheim. Patient subsequently had irrigation and debridement 2021 by Dr. Vito Bernheim for infection. Last surgery was performed on 2021 by Dr. Vito Bernheim with recurrent rotator cuff repair. She is here complaining of continued pain symptoms and dysfunction. Pain concentrated over lateral shoulder. She also has posterior shoulder pain and numbness and tingling that radiate down her arm and into her hand. She did had ORIF right wrist done by Dr. Remington Martinez in 2019.     Past Medical History  Past Medical History:   Diagnosis Date    Anxiety     Depression     Endometriosis     GERD (gastroesophageal reflux disease)     Hypertension     Osteoarthritis     PONV (postoperative nausea and vomiting)     Restless leg syndrome      Past Surgical History:   Procedure Laterality Date    ANUS SURGERY  2018    fissure     SECTION      x3    COLONOSCOPY      DILATION AND CURETTAGE OF UTERUS N/A 6/3/2021    VIDEO HYSTEROSCOPY DILATATION AND CURETTAGE, POSSIBLE MYOSURE, POSSIBLE POLYPECTOMY performed by Jose Giordano DO at [address] [place] [address]      right wrist    HERNIA REPAIR  2018    LAPAROSCOPIC PARAESOPHAGEAL HERNIA REPAIR WITH MESH    HYSTERECTOMY (CERVIX STATUS UNKNOWN) N/A 10/21/2021    ROBOTIC ASSISTED LAPAROSCOPIC HYSTERECTOMY WITH RIGHT SALPINGECTOMY, RIGHT OOPHORECTOMY, CYSTOSCOPY, LYSIS OF ADHESIONS  CPT CODE - 45694 performed by Shahid Weiss DO at [address] [place] [address] ARTHROSCOPY Left 11/15/12    ARTHROSCOPY LEFT KNEE WITH SYOVECTOMY AND CHONDROPLASTY    OVARY REMOVAL Right     ROTATOR CUFF REPAIR Right 2020    EXAM UNDER ANESTHESIA VIDEO ARTHROSCOPY RIGHT SHOULDER, MINI- OPEN ROTATOR CUFF REPAIR, [facility] PROCEDURE WITH EXPAREL performed by Karan Massey MD at [address] [place] [address] Bernardo Craig ARTHROSCOPY Right 11/25/2020    VIDEO ARTHROSCOPY RIGHT SHOULDER, OPEN ROTATOR CUFF REPAIR, BICEPS TENOTOMY -BLOCK- performed by Davy Mayers MD at [address] [place] [address] Bernardo Og ARTHROSCOPY Right 2/24/2021    RIGHT SHOULDER ARTHROSCOPIC INCISION AND DRAINAGE performed by Davy Mayers MD at [address] [place] [address] Bernardo Og ARTHROSCOPY Right 4/28/2021    VIDEO ARTHROSCOPY RIGHT SHOULDER, ROTATOR CUFF REPAIR, DEBRIDEMENT performed by Davy Mayers MD at [address] [facility]    UPPER GASTROINTESTINAL ENDOSCOPY  2015    esophageasl stretch    UPPER GASTROINTESTINAL ENDOSCOPY      ATTEMPTED BUT PATIENT ASPIRATED    UPPER GASTROINTESTINAL ENDOSCOPY N/A 5/24/2022    EGD W/ANES. (11:00) performed by Celine Baron DO at [place] [address]. History     Tobacco Use    Smoking status: Never    Smokeless tobacco: Never   Substance Use Topics    Alcohol use: Yes     Comment: 1 -2 drinks per month      Current Outpatient Medications on File Prior to Visit   Medication Sig Dispense Refill    ALPRAZolam (XANAX) 0.5 MG tablet TAKE 1 TABLET BY MOUTH 3 TIMES DAILY AS NEEDED FOR SLEEP OR ANXIETY. 90 tablet 2    rOPINIRole (REQUIP) 2 MG tablet TAKE 2 TABLETS AT NIGHT AND ONE IN THE MORNING FOR LEGS 90 tablet 3    potassium chloride (KLOR-CON M20) 20 MEQ extended release tablet TAKE ONE OR TWO DAILY AS DIRECTED FOR POTASSIUM 60 tablet 3    metoclopramide (REGLAN) 5 MG tablet Take 1 tablet by mouth 3 times daily as needed (nausea) For nausea 30 tablet 1    spironolactone (ALDACTONE) 25 MG tablet Take 1 tablet by mouth daily 30 tablet 3    gabapentin (NEURONTIN) 100 MG capsule Take 2 capsules by mouth in the morning and at bedtime for 90 days.  Intended supply: 30 days 120 capsule 2    albuterol sulfate HFA (VENTOLIN HFA) 108 (90 Base) MCG/ACT inhaler Inhale 2 puffs into the lungs 4 times daily as needed for Wheezing (Patient taking differently: Inhale 2 puffs into the lungs as needed for Wheezing) 18 g 1    methocarbamol (ROBAXIN) 500 MG tablet TAKE 1 TABLET BY MOUTH 3 TIMES A DAY AS NEEDED FOR NECK PAIN 90 tablet 3    omeprazole (PRILOSEC) 40 MG delayed release capsule Take 1 capsule by mouth in the morning and at bedtime Dr. Bradley increased to BID 60 capsule 5    dicyclomine (BENTYL) 10 MG capsule Take 1 capsule by mouth 4 times daily as needed (cramping) 120 capsule 2    lidocaine 4 % external patch Place 1 patch onto the skin daily 14 each 2    ondansetron (ZOFRAN ODT) 4 MG disintegrating tablet Take 2 tablets by mouth every 8 hours as needed for Nausea 20 tablet 0    DULoxetine (CYMBALTA) 60 MG extended release capsule Take 1 capsule by mouth 2 times daily 60 capsule 3    Saccharomyces boulardii (DIGESTIVE PROBIOTIC) 250 MG CAPS Take one dose BID x 2 weeks 30 capsule 1    vitamin D (ERGOCALCIFEROL) 1.25 MG (23258 UT) CAPS capsule Take 1 capsule by mouth once a week 4 capsule 2    Acetaminophen (TYLENOL PO) Take 650 mg by mouth daily as needed       MELATONIN PO Take 20 mg by mouth nightly as needed        No current facility-administered medications on file prior to visit.        ASCVD 10-YEAR RISK SCORE  The 10-year ASCVD risk score (Jasmin DK, et al., 2019) is: 2.4%    Values used to calculate the score:      Age: 57 years      Sex: Female      Is Non- : No      Diabetic: No      Tobacco smoker: No      Systolic Blood Pressure: 100 mmHg      Is BP treated: Yes      HDL Cholesterol: 36 mg/dL      Total Cholesterol: 168 mg/dL     Review of Systems  10-point ROS is negative other than HPI.    Physical Exam  Based off 1997 Exam Criteria  Ht 5' 5\" (1.651 m)   Wt 258 lb (117 kg)   LMP 09/20/2017   BMI 42.93 kg/m²      Constitutional:       General: He is not in acute distress.     Appearance: Normal appearance.   Cardiovascular:      Rate and Rhythm: Normal rate and regular rhythm.  Pulses: Normal pulses. Pulmonary:      Effort: Pulmonary effort is normal. No respiratory distress. Neurological:      Mental Status: He is alert and oriented to person, place, and time. Mental status is at baseline. Musculoskeletal:  Gait:  antalgic    Cervical Spine / Shoulder:      RIGHT  LEFT    Cervical Spine Exam  [] All Neg    [x] All Neg     Spurling's  [x]  []Not tested   []  []Not tested    Whittington's  []  []Not tested   []  []Not tested    Pain with rotation  [x]  []Not tested   []  []Not tested    Pain with lateral bending  [x]  []Not tested   []  []Not tested    Paraspinal muscle tenderness  [] Paraspinal  []Midline   [] Paraspinal  []Not tested    Sensation RIGHT  LEFT    Axillary  [x] Normal []Decreased    [x] Normal []Decreased   Musculocutaneous  [x] Normal  []Decreased   [x] Normal []Decreased   Median  [x] Normal []Decreased   [x] Normal []Decreased   Radial  [x] Normal  []Decreased   [x] Normal []Decreased   Ulnar  [x] Normal  []Decreased   [x] Normal []Decreased   Scapula       Position  [x]Nml  []low  [] lateral  [x]Nml  []low  [] lateral   Dyskinesia  []+ []Abn. Shrug   []+ []Abn. Shrug                     Winging     [x]None   []Med  []Lat   []Worse w/FE  []Med  []Lat  []Worse w/FE   Scapulothoracic Compress.    []Impr Pain  []Impr Motion  []Impr Pain []Impr Motion    Range of Motion Active Passive Active Passive   Forward Elevation 90  170    Abduction 60  100    External Rotation @ side 30  60    External Rotation @ 90 abd 40  90    Internal Rotation @ 90 abd 10  40    Internal Rotation Sacrum  Normal    End range of motion  [] Pain  [] Pain  [] Pain  [] Pain   Strength RIGHT /5 LEFT /5   Abduction 4  5    External Rotation 4  5    Internal Rotation 4  5    Provocative Signs/Tests  [] All Neg   [x] +      [] -  [] All Neg   [x] +      [] -   Rotator Cuff Signs  [x] All Neg  [] Not tested   [x] All Neg  [] Not tested    Estephania  [x]  []Not tested   []  []Not tested    Jaci Zarco  [x] []Not tested   []  []Not tested    Painful arc  [x]  []Not tested   []  []Not tested    Greater tuberosity tenderness  []  []Not tested   []  []Not tested    Drop arm  []  []Not tested  []  []Not tested   Superior Escape  []  []Not tested   []  []Not tested    ER Lag  []  []Not tested   []  []Not tested    Belly press  []  []Not tested   []  []Not tested    Lift-off  []  []Not tested   []  []Not tested    Bear hug  []  []Not tested   []  []Not tested    Biceps/Labral Signs  [] All Neg  [] Not tested   [x] All Neg  [] Not tested    Jordan's  [x]  []Not tested   []  []Not tested    Speed's  [x]  []Not tested   []  []Not tested    Dynamic Load Shift/Shear  []  []Not tested   []  []Not tested    Clicking/Popping  []  []Not tested  []  []Not tested   Bicipital groove tenderness  []  []Not tested   []  []Not tested    Young  []  []Not tested   []  []Not tested    [facility] Joint Signs  [x] All Neg  [] Not tested   [x] All Neg  [] Not tested    [facility] joint tenderness  []  []Not tested   []  []Not tested    Cross-arm adduction pain  []  []Not tested   []  []Not tested    Instability Signs  [] All Neg  [] Not tested  [] All Neg  [] Not tested   General laxity (thumb/elbow)  []  []Not tested   []  []Not tested    Hyperabduction  []  []Not tested   []  []Not tested    Sulcus []Side   []ER    []Side   []ER       Anterior apprehension  []  []Not tested   []  []Not tested    Relocation  []   []Not tested  []  []Not tested     Imaging  Right Shoulder: Nevin 21: X-rays were ordered today reviewed of the right shoulder. 3 views. AP, scapular Y, and axillary views. They demonstrate no evidence of fractures or dislocations. Mild to moderate arthritic changes present.     EXAMINATION:   MRI OF THE RIGHT SHOULDER WITHOUT CONTRAST   3/2/2022 1:32 pm       TECHNIQUE:   Multiplanar multisequence MRI of the right shoulder was performed without the   administration of intravenous contrast.       COMPARISON:   Right shoulder radiograph January 31, 2022; MRI right shoulder Hannah 3, 2020       HISTORY:   ORDERING SYSTEM PROVIDED HISTORY: Right shoulder pain, unspecified chronicity   TECHNOLOGIST PROVIDED HISTORY:   Reason for exam:->r/o RTC tear   Reason for Exam: shoulder pain, several surgeries with comlications, limited   ROM       FINDINGS:   ROTATOR CUFF: Postoperative changes of rotator cuff repair. Complete   full-thickness re-tear of the supraspinatus tendon. Full-thickness tearing   along the anterior fibers of the infraspinatus. The posterior fibers remain   intact. Chronic interstitial tearing of the subscapularis. The teres minor   remains grossly intact. Severe atrophy of the supraspinatus and mild atrophy   of the infraspinatus, subscapularis, and teres minor. BICEPS TENDON: Long head biceps tendon remains grossly intact. Tendinosis of   the intra-articular portion of the tendon. LABRUM: To the extent that the labrum is visualized on the current exam,   there is labral degeneration most pronounced superiorly. No paralabral cyst.       GLENOHUMERAL JOINT: Narrowing of the acromial humeral interval related to   rotator cuff re-tear. Mild glenohumeral osteoarthritis. Small to moderate   effusion and synovitis. AC JOINT AND ACROMIOCLAVICULAR ARCH: Postoperative changes of the   acromioclavicular joint. Fluid present within the subacromial/subdeltoid   bursa related to rotator cuff tear. BONE MARROW:  Bone marrow is normal in signal without evidence of fracture,   marrow contusion or marrow occupying lesion. OUTLET SPACES: The suprascapular notch and quadrilateral space are without   obstructing or space occupying lesions. Impression   1. Prior rotator cuff repair with interval complete re-tear of the   supraspinatus. 2. Full-thickness tearing of the anterior fibers of the infraspinatus. Intact posterior fibers present. 3. Interstitial tearing of the subscapularis. 4. Tendinosis of the biceps tendon. 5. Small to moderate glenohumeral effusion and synovitis. EMG results  Impression:  Study is consistent with right carpal tunnel syndrome, moderate severity. No evidence of an acute radiculopathy or other entrapment neuropathy. Procedure:  Orders Placed This Encounter   Procedures    XR SHOULDER RIGHT (MIN 2 VIEWS)     Standing Status:   Future     Number of Occurrences:   1     Standing Expiration Date:   2/13/2024     Right Shoulder Cortisone Injection: Glenohumeral CPT 07035  Consent was obtained after discussion of the risks, benefits, alternatives, including, but not limited to bleeding, pain, infection, skin disruption or discoloration. Laterality was confirmed (timeout). The shoulder was prepped with alcohol. A formulation of 2cc of 40mg/ml Kenalog, 4cc of 1% lidocaine, 4cc of 0.25% marcaine was injected into the glenohumeral joint space with a 25 gauge needle without difficulty. The site was cleaned and dressed with a band aid. She tolerated this well and there were no complications. Assessment and Plan  Derek Barnett was seen today for follow-up. Diagnoses and all orders for this visit:    Incomplete tear of right rotator cuff, unspecified whether traumatic  -     XR SHOULDER RIGHT (MIN 2 VIEWS); Future    Class 3 severe obesity due to excess calories with serious comorbidity and body mass index (BMI) of 45.0 to 49.9 in [facility])    Acute shoulder pain, unspecified laterality    Rotator cuff arthropathy, unspecified laterality      Patient did have new x-rays today. She does have advanced shoulder osteoarthritis. Unfortunately we will need to change treatment plans and will not be able to proceed with reverse shoulder arthroplasty at this time. Patient's BMI still remains above 40. Patient does have a large chronic rotator cuff tear. She has severe atrophy.   She has had 4 previous rotator cuff repairs and 1 arthroscopic irrigation and debridement for infection. I do not feel her rotator cuff is fixable at this time. She may be a candidate for a reverse total shoulder arthroplasty at some point in the future but does need medical optimization before proceeding. Her current BMI is 42.93 and needs to be less than 40 to minimize postoperative risk profile. Her BMI is extremely important especially with her history of infection in his shoulder. With the negative aspiration for infection we will proceed with an intra-articular cortisone injection today. She is aware of the increased risk of infection and no surgery for 3 months. Lastly I will refer her to hand for consultation concerning carpal tunnel syndrome with previous ORIF. I discussed with Cleo Block that her history, symptoms, signs and imaging are most consistent with rotator cuff partial tears. We reviewed the natural history of these conditions and treatment options ranging from conservative measures (rest, icing, activity modification, physical therapy, pain meds, cortisone injection) to surgical options. In terms of treatment, I recommended continuing with rest, icing, avoidance of painful activities, NSAIDs or pain meds as tolerated, and physical therapy. If these are not effective, cortisone injection can be considered. We discussed surgical options as well, should conservative measures fail. Electronically signed by Letty Nolen PA-C on 2/13/2023 at 1:08 PM  This dictation was generated by voice recognition computer software. Although all attempts are made to edit the dictation for accuracy, there may be errors in the transcription that are not intended.

## 2023-02-21 DIAGNOSIS — E87.6 HYPOKALEMIA: Primary | ICD-10-CM

## 2023-02-21 DIAGNOSIS — E87.6 POTASSIUM DEFICIENCY: Primary | ICD-10-CM

## 2023-02-21 DIAGNOSIS — I10 ESSENTIAL HYPERTENSION: ICD-10-CM

## 2023-02-21 DIAGNOSIS — E87.6 POTASSIUM DEFICIENCY: ICD-10-CM

## 2023-02-21 LAB — POTASSIUM SERPL-SCNC: 3.2 MMOL/L (ref 3.5–5.1)

## 2023-02-22 ENCOUNTER — OFFICE VISIT (OUTPATIENT)
Dept: FAMILY MEDICINE CLINIC | Age: 58
End: 2023-02-22

## 2023-02-22 VITALS
WEIGHT: 257 LBS | HEART RATE: 76 BPM | TEMPERATURE: 97 F | BODY MASS INDEX: 42.77 KG/M2 | RESPIRATION RATE: 16 BRPM | SYSTOLIC BLOOD PRESSURE: 124 MMHG | OXYGEN SATURATION: 96 % | DIASTOLIC BLOOD PRESSURE: 88 MMHG

## 2023-02-22 DIAGNOSIS — F43.23 ADJUSTMENT DISORDER WITH MIXED ANXIETY AND DEPRESSED MOOD: Primary | ICD-10-CM

## 2023-02-22 DIAGNOSIS — E87.6 HYPOKALEMIA: ICD-10-CM

## 2023-02-22 DIAGNOSIS — Z86.19 FREQUENT INFECTIONS: ICD-10-CM

## 2023-02-22 DIAGNOSIS — I10 ESSENTIAL HYPERTENSION: ICD-10-CM

## 2023-02-22 DIAGNOSIS — F32.5 MAJOR DEPRESSIVE DISORDER WITH SINGLE EPISODE, IN FULL REMISSION (HCC): ICD-10-CM

## 2023-02-22 RX ORDER — SERTRALINE HYDROCHLORIDE 25 MG/1
25 TABLET, FILM COATED ORAL DAILY
Qty: 30 TABLET | Refills: 1 | Status: SHIPPED | OUTPATIENT
Start: 2023-02-22

## 2023-02-22 SDOH — ECONOMIC STABILITY: FOOD INSECURITY: WITHIN THE PAST 12 MONTHS, YOU WORRIED THAT YOUR FOOD WOULD RUN OUT BEFORE YOU GOT MONEY TO BUY MORE.: NEVER TRUE

## 2023-02-22 SDOH — ECONOMIC STABILITY: INCOME INSECURITY: HOW HARD IS IT FOR YOU TO PAY FOR THE VERY BASICS LIKE FOOD, HOUSING, MEDICAL CARE, AND HEATING?: NOT HARD AT ALL

## 2023-02-22 SDOH — ECONOMIC STABILITY: FOOD INSECURITY: WITHIN THE PAST 12 MONTHS, THE FOOD YOU BOUGHT JUST DIDN'T LAST AND YOU DIDN'T HAVE MONEY TO GET MORE.: NEVER TRUE

## 2023-02-22 ASSESSMENT — COLUMBIA-SUICIDE SEVERITY RATING SCALE - C-SSRS
1. WITHIN THE PAST MONTH, HAVE YOU WISHED YOU WERE DEAD OR WISHED YOU COULD GO TO SLEEP AND NOT WAKE UP?: NO
6. HAVE YOU EVER DONE ANYTHING, STARTED TO DO ANYTHING, OR PREPARED TO DO ANYTHING TO END YOUR LIFE?: NO
2. HAVE YOU ACTUALLY HAD ANY THOUGHTS OF KILLING YOURSELF?: NO

## 2023-02-22 ASSESSMENT — ENCOUNTER SYMPTOMS
COUGH: 0
SHORTNESS OF BREATH: 0

## 2023-02-22 ASSESSMENT — PATIENT HEALTH QUESTIONNAIRE - PHQ9
3. TROUBLE FALLING OR STAYING ASLEEP: 3
4. FEELING TIRED OR HAVING LITTLE ENERGY: 1
1. LITTLE INTEREST OR PLEASURE IN DOING THINGS: 1
7. TROUBLE CONCENTRATING ON THINGS, SUCH AS READING THE NEWSPAPER OR WATCHING TELEVISION: 1
5. POOR APPETITE OR OVEREATING: 3
8. MOVING OR SPEAKING SO SLOWLY THAT OTHER PEOPLE COULD HAVE NOTICED. OR THE OPPOSITE, BEING SO FIGETY OR RESTLESS THAT YOU HAVE BEEN MOVING AROUND A LOT MORE THAN USUAL: 3
2. FEELING DOWN, DEPRESSED OR HOPELESS: 1
6. FEELING BAD ABOUT YOURSELF - OR THAT YOU ARE A FAILURE OR HAVE LET YOURSELF OR YOUR FAMILY DOWN: 0
SUM OF ALL RESPONSES TO PHQ QUESTIONS 1-9: 13
SUM OF ALL RESPONSES TO PHQ QUESTIONS 1-9: 13
9. THOUGHTS THAT YOU WOULD BE BETTER OFF DEAD, OR OF HURTING YOURSELF: 0
SUM OF ALL RESPONSES TO PHQ9 QUESTIONS 1 & 2: 2
SUM OF ALL RESPONSES TO PHQ QUESTIONS 1-9: 13
SUM OF ALL RESPONSES TO PHQ QUESTIONS 1-9: 13
10. IF YOU CHECKED OFF ANY PROBLEMS, HOW DIFFICULT HAVE THESE PROBLEMS MADE IT FOR YOU TO DO YOUR WORK, TAKE CARE OF THINGS AT HOME, OR GET ALONG WITH OTHER PEOPLE: 2

## 2023-02-22 NOTE — PROGRESS NOTES
Subjective:      Patient ID: Valeria Watts is a 62 y.o. y.o. female. Following up -  HTN,  Potassium    Going to need right shoulder surgery this spring. Concerned about post op infection. Question ID coretta    Has a lot of family things / illness. Been a bit overwhelmed feeling. 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 Patient is not experiencing: shortness of breath and suicidal ideas. Other  Pertinent negatives include no coughing.        Chief Complaint   Patient presents with    Results     Potassium f/u - had blood work yesterday    Depression     Positive screening    Other     Discuss post-op infections - due for R shoulder in next 2 months         Allergies   Allergen Reactions    Droperidol Other (See Comments)     unresponsive    Haldol [Haloperidol Lactate] Other (See Comments)     \"felt out of it, similar to the toradol\"    Toradol [Ketorolac Tromethamine] Other (See Comments)     \"out of it\"  \"felt like sleep paralysis\"       Past Medical History:   Diagnosis Date    Anxiety     Depression     Endometriosis     GERD (gastroesophageal reflux disease)     Hypertension     Osteoarthritis     PONV (postoperative nausea and vomiting)     Restless leg syndrome        Past Surgical History:   Procedure Laterality Date    ANUS SURGERY  2018    fissure     SECTION      x3    COLONOSCOPY      DILATION AND CURETTAGE OF UTERUS N/A 6/3/2021    VIDEO HYSTEROSCOPY DILATATION AND CURETTAGE, POSSIBLE MYOSURE, POSSIBLE POLYPECTOMY performed by Mt Galan DO at [address] [place] [address]      right wrist    HERNIA REPAIR  2018    LAPAROSCOPIC PARAESOPHAGEAL HERNIA REPAIR WITH MESH    HYSTERECTOMY (CERVIX STATUS UNKNOWN) N/A 10/21/2021    ROBOTIC ASSISTED LAPAROSCOPIC HYSTERECTOMY WITH RIGHT SALPINGECTOMY, RIGHT OOPHORECTOMY, CYSTOSCOPY, LYSIS OF ADHESIONS  CPT CODE - 73589 performed by Kiko [place], DO at  [facility] ARTHROSCOPY Left 11/15/12    ARTHROSCOPY LEFT KNEE WITH SYOVECTOMY AND CHONDROPLASTY    OVARY REMOVAL Right 2010    ROTATOR CUFF REPAIR Right 6/23/2020    EXAM UNDER ANESTHESIA VIDEO ARTHROSCOPY RIGHT SHOULDER, MINI- OPEN ROTATOR CUFF REPAIR, NEER ACROMIOPLASTY, LENO PROCEDURE WITH EXPAREL performed by Roseann Segundo MD at [address] [place] [address] [place] [place] ARTHROSCOPY Right 11/25/2020    VIDEO ARTHROSCOPY RIGHT SHOULDER, OPEN ROTATOR CUFF REPAIR, BICEPS TENOTOMY -BLOCK- performed by Audra Maynard MD at [address] [facility] [place] ARTHROSCOPY Right 2/24/2021    RIGHT SHOULDER ARTHROSCOPIC INCISION AND DRAINAGE performed by Audra Maynard MD at [address] [place] [address] [place] ARTHROSCOPY Right 4/28/2021    VIDEO ARTHROSCOPY RIGHT SHOULDER, ROTATOR CUFF REPAIR, DEBRIDEMENT performed by Audra Maynard MD at [address]    UPPER GASTROINTESTINAL ENDOSCOPY  2015    esophageasl stretch    UPPER GASTROINTESTINAL ENDOSCOPY      ATTEMPTED BUT PATIENT ASPIRATED    UPPER GASTROINTESTINAL ENDOSCOPY N/A 5/24/2022    EGD W/ANES. (11:00) performed by Enrique Viera DO at [facility]. [facility] History     Socioeconomic History    Marital status:       Spouse name: Not on file    Number of children: 4    Years of education: Not on file    Highest education level: Not on file   Occupational History    Occupation:    Tobacco Use    Smoking status: Never    Smokeless tobacco: Never   Vaping Use    Vaping Use: Never used   Substance and Sexual Activity    Alcohol use: Yes     Comment: 1 -2 drinks per month    Drug use: Not Currently     Types: Marijuana [employer] [employer])     Comment: last used 2 weeks ago    Sexual activity: Never   Other Topics Concern    Not on file   Social History Narrative    Not on file     Social Determinants of Health     Financial Resource Strain: Low Risk     Difficulty of Paying Living Expenses: Not hard at all   Food Insecurity: No Food Insecurity    Worried About [employer] in the Last Year: Never true    Ran Out of Food in the Last Year: Never true   Transportation Needs: No Transportation Needs    Lack of Transportation (Medical): No    Lack of Transportation (Non-Medical): No   Physical Activity: Insufficiently Active    Days of Exercise per Week: 2 days    Minutes of Exercise per Session: 20 min   Stress: Not on file   Social Connections: Not on file   Intimate Partner Violence: Not At Risk    Fear of Current or Ex-Partner: No    Emotionally Abused: No    Physically Abused: No    Sexually Abused: No   Housing Stability: Unknown    Unable to Pay for Housing in the Last Year: No    Number of Places Lived in the Last Year: Not on file    Unstable Housing in the Last Year: No       Family History   Problem Relation Age of Onset    Arthritis Mother     Obesity Mother     Anemia Mother     Other Mother         pulmonary embolism    Arthritis Father     Arrhythmia Father     Diabetes Other     Diabetes Paternal Grandfather     Cancer Neg Hx     Breast Cancer Neg Hx     Colon Cancer Neg Hx        Vitals:    02/22/23 1053   BP: 124/88   Pulse: 76   Resp: 16   Temp: 97 °F (36.1 °C)   SpO2: 96%       Wt Readings from Last 3 Encounters:   02/22/23 257 lb (116.6 kg)   02/13/23 258 lb (117 kg)   01/16/23 258 lb (117 kg)       Review of Systems   Constitutional:  Negative for unexpected weight change. Respiratory:  Negative for cough and shortness of breath. Psychiatric/Behavioral:  Positive for depression and dysphoric mood. Negative for self-injury and suicidal ideas. The patient is nervous/anxious. Been weepy some. No dark thoughts     Objective:   Physical Exam  Constitutional:       Appearance: She is obese. She is not ill-appearing. Cardiovascular:      Rate and Rhythm: Normal rate and regular rhythm.    Pulmonary:      Effort: Pulmonary effort is normal.      Breath sounds: Normal breath sounds. Skin:     General: Skin is warm and dry. Neurological:      Mental Status: She is alert and oriented to person, place, and time. Psychiatric:      Comments: A little weepy. Speech clear. No overt thought disorder           Assessment:      Adjustment reaction  Depressive disorder  Hypokalemia          Plan:     Discussed ID consult-  assess possible recommendation re immune status    Taking cymbalta for arthritis pain-  add SSRI     Refer to counseling    Pt going to check coverage with insurance for potential long-term counseling. Current Outpatient Medications   Medication Sig Dispense Refill    ALPRAZolam (XANAX) 0.5 MG tablet TAKE 1 TABLET BY MOUTH 3 TIMES DAILY AS NEEDED FOR SLEEP OR ANXIETY. 90 tablet 2    rOPINIRole (REQUIP) 2 MG tablet TAKE 2 TABLETS AT NIGHT AND ONE IN THE MORNING FOR LEGS 90 tablet 3    potassium chloride (KLOR-CON M20) 20 MEQ extended release tablet TAKE ONE OR TWO DAILY AS DIRECTED FOR POTASSIUM 60 tablet 3    metoclopramide (REGLAN) 5 MG tablet Take 1 tablet by mouth 3 times daily as needed (nausea) For nausea 30 tablet 1    spironolactone (ALDACTONE) 25 MG tablet Take 1 tablet by mouth daily 30 tablet 3    gabapentin (NEURONTIN) 100 MG capsule Take 2 capsules by mouth in the morning and at bedtime for 90 days.  Intended supply: 30 days 120 capsule 2    albuterol sulfate HFA (VENTOLIN HFA) 108 (90 Base) MCG/ACT inhaler Inhale 2 puffs into the lungs 4 times daily as needed for Wheezing (Patient taking differently: Inhale 2 puffs into the lungs as needed for Wheezing) 18 g 1    methocarbamol (ROBAXIN) 500 MG tablet TAKE 1 TABLET BY MOUTH 3 TIMES A DAY AS NEEDED FOR NECK PAIN 90 tablet 3    omeprazole (PRILOSEC) 40 MG delayed release capsule Take 1 capsule by mouth in the morning and at bedtime Dr. Barkley Given increased to BID 60 capsule 5    dicyclomine (BENTYL) 10 MG capsule Take 1 capsule by mouth 4 times daily as needed (cramping) 120 capsule 2    lidocaine 4 % external patch Place 1 patch onto the skin daily 14 each 2    ondansetron (ZOFRAN ODT) 4 MG disintegrating tablet Take 2 tablets by mouth every 8 hours as needed for Nausea 20 tablet 0    DULoxetine (CYMBALTA) 60 MG extended release capsule Take 1 capsule by mouth 2 times daily 60 capsule 3    Saccharomyces boulardii (DIGESTIVE PROBIOTIC) 250 MG CAPS Take one dose BID x 2 weeks 30 capsule 1    vitamin D (ERGOCALCIFEROL) 1.25 MG (77677 UT) CAPS capsule Take 1 capsule by mouth once a week 4 capsule 2    Acetaminophen (TYLENOL PO) Take 650 mg by mouth daily as needed       MELATONIN PO Take 20 mg by mouth nightly as needed        No current facility-administered medications for this visit.

## 2023-02-23 ENCOUNTER — OFFICE VISIT (OUTPATIENT)
Dept: ORTHOPEDIC SURGERY | Age: 58
End: 2023-02-23

## 2023-02-23 VITALS — BODY MASS INDEX: 42.82 KG/M2 | HEIGHT: 65 IN | WEIGHT: 257 LBS

## 2023-02-23 DIAGNOSIS — M75.111 INCOMPLETE TEAR OF RIGHT ROTATOR CUFF, UNSPECIFIED WHETHER TRAUMATIC: Primary | ICD-10-CM

## 2023-02-23 RX ORDER — CELECOXIB 200 MG/1
200 CAPSULE ORAL DAILY
Qty: 60 CAPSULE | Refills: 3 | Status: SHIPPED | OUTPATIENT
Start: 2023-02-23

## 2023-02-23 NOTE — PROGRESS NOTES
Dr Sami Pimentel      Date /Time 2/23/2023             11:46 AM EST  Name Valeria Watts             1965   Location  [address] [facility]  MRN [id number]                Chief Complaint   Patient presents with    Follow-up     Left Shulder (Cortisone 02/12/23) and Fall 02/16/23        History of Present Illness    Valeria Watts is a 62 y.o. female who presents with  right Shoulder pain. .  Injury Mechanism:  none. Worker's Comp. & legal issues:   none. Previous Treatments: Ice, Heat and NSAIDs      Patient presents to the office today for a follow-up visit. She was just seen by my physician assistant on 2/13/2023. She is being treated for advanced right shoulder osteoarthritis with rotator cuff arthropathy. Unfortunately she cannot have surgery due to her BMI of 42.77 currently. She was doing well with the injection until she slipped and fell. She states that she did not fall hard but slid to the ground. She is having increased shoulder pain since her injury    Previous history: Patient presents to the office today for follow-up visit. Patient being treated for right shoulder osteoarthritis and rotator cuff arthropathy. Today's visit was scheduled to discuss total shoulder arthroplasty or reverse shoulder arthroplasty. Patient has had a reoccurrence of her pain. She was participating in medical optimization and has reduced her BMI. Her BMI currently is 40.93. Further history as below    Previous history: Patient presents to the office today for follow-up visit concerning her right shoulder. She is being treated for a large a repairable rotator cuff tear and osteoarthritis of her shoulder. She has had a previous shoulder aspiration March 2022 which was negative for infection. Her current BMI is 44.39 and will need that less than 40 before proceeding with reverse shoulder replacement especially with her history of infection.   No new injury or trauma    Previous history: Patient How Severe Is It?: moderate Is This A New Presentation, Or A Follow-Up?: Follow Up Hidradenitis Suppurativa presents the office today for follow-up visit. Patient here for EMG and MRI results. She continues to complain of continued pain involving her right shoulder. Pain is concentrated laterally. She does have numbness and tingling in her hand and has had a previous ORIF wrist fracture in 2019. She continues to complain of difficulties involving her right shoulder especially with any overhead activities. Previous history: Patient presents to the office today for a new problem. Patient is here with a chief complaint of right shoulder pain. Patient's right shoulder has been painful off and on since . Patient had a rotator cuff repair done by Dr. Anaya Shafer 2020. Patient had another rotator cuff repair done on 2020 by Dr. Evert Hu. Patient subsequently had irrigation and debridement 2021 by Dr. Evert Hu for infection. Last surgery was performed on 2021 by Dr. Evert Hu with recurrent rotator cuff repair. She is here complaining of continued pain symptoms and dysfunction. Pain concentrated over lateral shoulder. She also has posterior shoulder pain and numbness and tingling that radiate down her arm and into her hand. She did had ORIF right wrist done by Dr. Cristian Durand in .     Past Medical History  Past Medical History:   Diagnosis Date    Anxiety     Depression     Endometriosis     GERD (gastroesophageal reflux disease)     Hypertension     Osteoarthritis     PONV (postoperative nausea and vomiting)     Restless leg syndrome      Past Surgical History:   Procedure Laterality Date    ANUS SURGERY  2018    fissure     SECTION      x3    COLONOSCOPY      DILATION AND CURETTAGE OF UTERUS N/A 6/3/2021    VIDEO HYSTEROSCOPY DILATATION AND CURETTAGE, POSSIBLE MYOSURE, POSSIBLE POLYPECTOMY performed by Edilberto Bentley DO at [address] [place] [address]      right wrist    HERNIA REPAIR  2018    LAPAROSCOPIC PARAESOPHAGEAL HERNIA REPAIR WITH MESH HYSTERECTOMY (CERVIX STATUS UNKNOWN) N/A 10/21/2021    ROBOTIC ASSISTED LAPAROSCOPIC HYSTERECTOMY WITH RIGHT SALPINGECTOMY, RIGHT OOPHORECTOMY, CYSTOSCOPY, LYSIS OF ADHESIONS  CPT CODE - 89799 performed by Perez Ordonez DO at [address] [place] [address] ARTHROSCOPY Left 11/15/12    ARTHROSCOPY LEFT KNEE WITH SYOVECTOMY AND CHONDROPLASTY    OVARY REMOVAL Right 2010    ROTATOR CUFF REPAIR Right 6/23/2020    EXAM UNDER ANESTHESIA VIDEO ARTHROSCOPY RIGHT SHOULDER, MINI- OPEN ROTATOR CUFF REPAIR, NEER ACROMIOPLASTY, LENO PROCEDURE WITH EXPAREL performed by Maria De Jesus Tierney MD at [address] [place] ARTHROSCOPY Right 11/25/2020    VIDEO ARTHROSCOPY RIGHT SHOULDER, OPEN ROTATOR CUFF REPAIR, BICEPS TENOTOMY -BLOCK- performed by Sharmila Pleitez MD at [address] [place] [address] [facility] 2/24/2021    RIGHT SHOULDER ARTHROSCOPIC INCISION AND DRAINAGE performed by Sharmila Pleitez MD at [address] [place] [address] [facility] Right 4/28/2021    VIDEO ARTHROSCOPY RIGHT SHOULDER, ROTATOR CUFF REPAIR, DEBRIDEMENT [facility] [email or web address]    UPPER GASTROINTESTINAL ENDOSCOPY  2015    esophageasl stretch    UPPER GASTROINTESTINAL ENDOSCOPY      ATTEMPTED BUT PATIENT ASPIRATED    UPPER GASTROINTESTINAL ENDOSCOPY N/A 5/24/2022    EGD W/ANES. (11:00) performed by Amee Hernandez DO at [place] [address]. History     Tobacco Use    Smoking status: Never    Smokeless tobacco: Never   Substance Use Topics    Alcohol use: Yes     Comment: 1 -2 drinks per month      Current Outpatient Medications on File Prior to Visit   Medication Sig Dispense Refill    sertraline (ZOLOFT) 25 MG tablet Take 1 tablet by mouth daily For mood 30 tablet 1    ALPRAZolam (XANAX) 0.5 MG tablet TAKE 1 TABLET BY MOUTH 3 TIMES DAILY AS NEEDED FOR SLEEP OR ANXIETY.  90 tablet 2    rOPINIRole (REQUIP) 2 MG tablet TAKE 2 TABLETS AT NIGHT AND ONE IN THE MORNING FOR LEGS 90 tablet 3    potassium chloride (KLOR-CON M20) 20 MEQ extended release tablet TAKE ONE OR TWO DAILY AS DIRECTED FOR POTASSIUM 60 tablet 3    metoclopramide (REGLAN) 5 MG tablet Take 1 tablet by mouth 3 times daily as needed (nausea) For nausea 30 tablet 1    spironolactone (ALDACTONE) 25 MG tablet Take 1 tablet by mouth daily 30 tablet 3    gabapentin (NEURONTIN) 100 MG capsule Take 2 capsules by mouth in the morning and at bedtime for 90 days. Intended supply: 30 days 120 capsule 2    albuterol sulfate HFA (VENTOLIN HFA) 108 (90 Base) MCG/ACT inhaler Inhale 2 puffs into the lungs 4 times daily as needed for Wheezing (Patient taking differently: Inhale 2 puffs into the lungs as needed for Wheezing) 18 g 1    methocarbamol (ROBAXIN) 500 MG tablet TAKE 1 TABLET BY MOUTH 3 TIMES A DAY AS NEEDED FOR NECK PAIN 90 tablet 3    omeprazole (PRILOSEC) 40 MG delayed release capsule Take 1 capsule by mouth in the morning and at bedtime Dr. Toribio Limb increased to BID 60 capsule 5    dicyclomine (BENTYL) 10 MG capsule Take 1 capsule by mouth 4 times daily as needed (cramping) 120 capsule 2    lidocaine 4 % external patch Place 1 patch onto the skin daily 14 each 2    ondansetron (ZOFRAN ODT) 4 MG disintegrating tablet Take 2 tablets by mouth every 8 hours as needed for Nausea 20 tablet 0    DULoxetine (CYMBALTA) 60 MG extended release capsule Take 1 capsule by mouth 2 times daily 60 capsule 3    Saccharomyces boulardii (DIGESTIVE PROBIOTIC) 250 MG CAPS Take one dose BID x 2 weeks 30 capsule 1    vitamin D (ERGOCALCIFEROL) 1.25 MG (38832 UT) CAPS capsule Take 1 capsule by mouth once a week 4 capsule 2    Acetaminophen (TYLENOL PO) Take 650 mg by mouth daily as needed       MELATONIN PO Take 20 mg by mouth nightly as needed        No current facility-administered medications on file prior to visit.         ASCVD 10-YEAR RISK SCORE  The 10-year ASCVD risk score (Jasmin CASTILLO, et al., 2019) is: 3.7%    Values used to calculate the score: Age: 62 years      Sex: Female      Is Non- : No      Diabetic: No      Tobacco smoker: No      Systolic Blood Pressure: 026 mmHg      Is BP treated: Yes      HDL Cholesterol: 36 mg/dL      Total Cholesterol: 168 mg/dL     Review of Systems  10-point ROS is negative other than HPI. Physical Exam  Based off 1997 Exam Criteria  Ht 5' 5\" (1.651 m)   Wt 257 lb (116.6 kg)   LMP 09/20/2017   BMI 42.77 kg/m²      Constitutional:       General: He is not in acute distress. Appearance: Normal appearance. Cardiovascular:      Rate and Rhythm: Normal rate and regular rhythm. Pulses: Normal pulses. Pulmonary:      Effort: Pulmonary effort is normal. No respiratory distress. Neurological:      Mental Status: He is alert and oriented to person, place, and time. Mental status is at baseline. Musculoskeletal:  Gait:  antalgic    Cervical Spine / Shoulder:      RIGHT  LEFT    Cervical Spine Exam  [] All Neg    [x] All Neg     Spurling's  [x]  []Not tested   []  []Not tested    Whittington's  []  []Not tested   []  []Not tested    Pain with rotation  [x]  []Not tested   []  []Not tested    Pain with lateral bending  [x]  []Not tested   []  []Not tested    Paraspinal muscle tenderness  [] Paraspinal  []Midline   [] Paraspinal  []Not tested    Sensation RIGHT  LEFT    Axillary  [x] Normal []Decreased    [x] Normal []Decreased   Musculocutaneous  [x] Normal  []Decreased   [x] Normal []Decreased   Median  [x] Normal []Decreased   [x] Normal []Decreased   Radial  [x] Normal  []Decreased   [x] Normal []Decreased   Ulnar  [x] Normal  []Decreased   [x] Normal []Decreased   Scapula       Position  [x]Nml  []low  [] lateral  [x]Nml  []low  [] lateral   Dyskinesia  []+ []Abn. Shrug   []+ []Abn. Shrug                     Winging     [x]None   []Med  []Lat   []Worse w/FE  []Med  []Lat  []Worse w/FE   Scapulothoracic Compress.    []Impr Pain  []Impr Motion  []Impr Pain []Impr Motion    Range of Motion Active Passive Active Passive   Forward Elevation 90  170    Abduction 60  100    External Rotation @ side 30  60    External Rotation @ 90 abd 40  90    Internal Rotation @ 90 abd 10  40    Internal Rotation Sacrum  Normal    End range of motion  [] Pain  [] Pain  [] Pain  [] Pain   Strength RIGHT /5 LEFT /5   Abduction 4  5    External Rotation 4  5    Internal Rotation 4  5    Provocative Signs/Tests  [] All Neg   [x] +      [] -  [] All Neg   [x] +      [] -   Rotator Cuff Signs  [x] All Neg  [] Not tested   [x] All Neg  [] Not tested    Neer  [x]  []Not tested   []  []Not tested    Tati Gordon  [x]  []Not tested   []  []Not tested    Painful arc  [x]  []Not tested   []  []Not tested    Greater tuberosity tenderness  []  []Not tested   []  []Not tested    Drop arm  []  []Not tested  []  []Not tested   Superior Escape  []  []Not tested   []  []Not tested    ER Lag  []  []Not tested   []  []Not tested    Belly press  []  []Not tested   []  []Not tested    Lift-off  []  []Not tested   []  []Not tested    Bear hug  []  []Not tested   []  []Not tested    Biceps/Labral Signs  [] All Neg  [] Not tested   [x] All Neg  [] Not tested    Jordan's  [x]  []Not tested   []  []Not tested    Speed's  [x]  []Not tested   []  []Not tested    Dynamic Load Shift/Shear  []  []Not tested   []  []Not tested    Clicking/Popping  []  []Not tested  []  []Not tested   Bicipital groove tenderness  []  []Not tested   []  []Not tested    Young  []  []Not tested   []  []Not tested    [facility] [place] Joint Signs  [x] All Neg  [] Not tested   [x] All Neg  [] Not tested    [facility] [place] joint tenderness  []  []Not tested   []  []Not tested    Cross-arm adduction pain  []  []Not tested   []  []Not tested    Instability Signs  [] All Neg  [] Not tested  [] All Neg  [] Not tested   General laxity (thumb/elbow)  []  []Not tested   []  []Not tested    Hyperabduction  []  []Not tested   []  []Not tested    Sulcus []Side   []ER    []Side   []ER       Anterior apprehension  [] []Not tested   []  []Not tested    Relocation  []   []Not tested  []  []Not tested     Imaging  Right Shoulder: [facility]  Radiographs: X-rays were ordered today reviewed of the right shoulder. 3 views. AP, scapular Y, and axillary views. They demonstrate no evidence of fractures or dislocations. Mild to moderate arthritic changes present. EXAMINATION:   MRI OF THE RIGHT SHOULDER WITHOUT CONTRAST   3/2/2022 1:32 pm       TECHNIQUE:   Multiplanar multisequence MRI of the right shoulder was performed without the   administration of intravenous contrast.       COMPARISON:   Right shoulder radiograph January 31, 2022; MRI right shoulder Hannah 3, 2020       HISTORY:   ORDERING SYSTEM PROVIDED HISTORY: Right shoulder pain, unspecified chronicity   TECHNOLOGIST PROVIDED HISTORY:   Reason for exam:->r/o RTC tear   Reason for Exam: shoulder pain, several surgeries with comlications, limited   ROM       FINDINGS:   ROTATOR CUFF: Postoperative changes of rotator cuff repair. Complete   full-thickness re-tear of the supraspinatus tendon. Full-thickness tearing   along the anterior fibers of the infraspinatus. The posterior fibers remain   intact. Chronic interstitial tearing of the subscapularis. The teres minor   remains grossly intact. Severe atrophy of the supraspinatus and mild atrophy   of the infraspinatus, subscapularis, and teres minor. BICEPS TENDON: Long head biceps tendon remains grossly intact. Tendinosis of   the intra-articular portion of the tendon. LABRUM: To the extent that the labrum is visualized on the current exam,   there is labral degeneration most pronounced superiorly. No paralabral cyst.       GLENOHUMERAL JOINT: Narrowing of the acromial humeral interval related to   rotator cuff re-tear. Mild glenohumeral osteoarthritis. Small to moderate   effusion and synovitis.        AC JOINT AND ACROMIOCLAVICULAR ARCH: Postoperative changes of the   acromioclavicular joint. Fluid present within the subacromial/subdeltoid   bursa related to rotator cuff tear. BONE MARROW:  Bone marrow is normal in signal without evidence of fracture,   marrow contusion or marrow occupying lesion. OUTLET SPACES: The suprascapular notch and quadrilateral space are without   obstructing or space occupying lesions. Impression   1. Prior rotator cuff repair with interval complete re-tear of the   supraspinatus. 2. Full-thickness tearing of the anterior fibers of the infraspinatus. Intact posterior fibers present. 3. Interstitial tearing of the subscapularis. 4. Tendinosis of the biceps tendon. 5. Small to moderate glenohumeral effusion and synovitis. EMG results  Impression:  Study is consistent with right carpal tunnel syndrome, moderate severity. No evidence of an acute radiculopathy or other entrapment neuropathy.         Procedure:  Orders Placed This Encounter   Procedures    XR SHOULDER RIGHT (MIN 2 VIEWS)     Standing Status:   Future     Number of Occurrences:   1     Standing Expiration Date:   2/22/2024    [facility] ARTHR/ASP/INJ MAJOR JT/BURSA RT WO US     Cell Count/ Aerobic/ Anaerobic/ Crystals / Beau Stain     Standing Status:   Future     Standing Expiration Date:   2/23/2024     Scheduling Instructions:      [address] [place] [address]      [place] [address]            [phone]     Order Specific Question:   Reason for exam:     Answer:   IR Guided Aspiration  RIght Shoulder    Sedimentation Rate     Standing Status:   Future     Standing Expiration Date:   2/23/2024    C-Reactive Protein     Standing Status:   Future     Standing Expiration Date:   2/23/2024    CBC with Auto Differential     Standing Status:   Future     Standing Expiration Date:   2/23/2024    [facility] - Hortencia Martinez (Ortho [facility])-[facility]     Referral Priority:   Routine     Referral Type:   Eval and Treat     Referral Reason:   Specialty Services Required Requested Specialty:   Physical Therapist     Number of Visits Requested:   1         Assessment and Plan  [facility] was [facility] today for follow-up. Diagnoses and all orders for this visit:    Incomplete tear of right rotator cuff, unspecified whether traumatic  -     XR SHOULDER RIGHT (MIN 2 VIEWS); Future      Acutely we will place the patient into physical therapy and on Celebrex. I will also order her a right shoulder aspiration to evaluate for infection by interventional radiology. In addition we will order acute phase reactants. This will include CBC with differential, sed rate, CRP. We will see her back after aspiration. Further plan as below. Patient did have new x-rays today due to patient having a new injury. I do not see any fractures. .  She does have advanced shoulder osteoarthritis. Patient will need to continue working on lowering her BMI before surgery. Patient's BMI still remains above 40. Patient does have a large chronic rotator cuff tear. She has severe atrophy. She has had 4 previous rotator cuff repairs and 1 arthroscopic irrigation and debridement for infection. I do not feel her rotator cuff is fixable at this time. She may be a candidate for a reverse total shoulder arthroplasty at some point in the future but does need medical optimization before proceeding. Her current BMI is 42. 77 and needs to be less than 40 to minimize postoperative risk profile. Her BMI is extremely important especially with her history of infection in his shoulder. With the negative aspiration for infection we will proceed with an intra-articular cortisone injection today. She is aware of the increased risk of infection and no surgery for 3 months. Lastly I will refer her to hand for consultation concerning carpal tunnel syndrome with previous ORIF. I discussed with Lorenza Gentle that her history, symptoms, signs and imaging are most consistent with rotator cuff partial tears.     We reviewed the natural history of these conditions and treatment options ranging from conservative measures (rest, icing, activity modification, physical therapy, pain meds, cortisone injection) to surgical options. In terms of treatment, I recommended continuing with rest, icing, avoidance of painful activities, NSAIDs or pain meds as tolerated, and physical therapy. If these are not effective, cortisone injection can be considered. We discussed surgical options as well, should conservative measures fail. Electronically signed by Zeynep Butt MD on 2/23/2023 at 9:55 AM  This dictation was generated by voice recognition computer software. Although all attempts are made to edit the dictation for accuracy, there may be errors in the transcription that are not intended.

## 2023-02-24 ENCOUNTER — TELEPHONE (OUTPATIENT)
Dept: ORTHOPEDIC SURGERY | Age: 58
End: 2023-02-24

## 2023-02-24 NOTE — TELEPHONE ENCOUNTER
General Question     Subject: patient call and she stated she would need to get and Referral sent over to her Pain Management  Doctor the fax number is [phone]  Patient Apryl Hogue  Contact Number: [phone]

## 2023-02-27 ENCOUNTER — TELEPHONE (OUTPATIENT)
Dept: ORTHOPEDIC SURGERY | Age: 58
End: 2023-02-27

## 2023-02-27 ENCOUNTER — TELEPHONE (OUTPATIENT)
Dept: INTERVENTIONAL RADIOLOGY/VASCULAR | Age: 58
End: 2023-02-27

## 2023-02-27 NOTE — TELEPHONE ENCOUNTER
Faxed Note (letters tab) and last two office Notes to Dr Georges Taylor at patient request     [phone] Fax Dr Jazmyne Cantu

## 2023-02-27 NOTE — TELEPHONE ENCOUNTER
General Question     Subject: LAB WORK   Patient and /or Facility Request: Kia Dalton  Contact Number: [phone]    PATIENT CALLED IN TO SEE IF SHE NEED TO FAST BEFORE HER LAB WORK. ..     PLEASE ADVISE

## 2023-02-27 NOTE — TELEPHONE ENCOUNTER
Spoke to patient regarding myelogram on 2/28/23. Patient is waiting to hear from her ordering MD, she is having intolerable pain in shoulder. May go to ED. States she is not on blood thinners.

## 2023-02-28 ENCOUNTER — HOSPITAL ENCOUNTER (OUTPATIENT)
Dept: GENERAL RADIOLOGY | Age: 58
Discharge: HOME | End: 2023-02-28
Payer: MEDICARE

## 2023-02-28 DIAGNOSIS — M75.111 INCOMPLETE TEAR OF RIGHT ROTATOR CUFF, UNSPECIFIED WHETHER TRAUMATIC: ICD-10-CM

## 2023-02-28 LAB
APPEARANCE FLUID: NORMAL
CELL COUNT FLUID TYPE: NORMAL
CLOT EVALUATION: NORMAL
COLOR FLUID: YELLOW
LYMPHOCYTES, BODY FLUID: 41 %
MACROPHAGE FLUID: 6 %
MESOTHELIAL FLUID: 5 %
MONOCYTE, FLUID: 31 %
NEUTROPHIL, FLUID: 17 %
NUCLEATED CELLS FLUID: 450 /CUMM
NUMBER OF CELLS COUNTED FLUID: 100
RBC FLUID: 2500 /CUMM

## 2023-02-28 PROCEDURE — 2500000003 HC RX 250 WO HCPCS: Performed by: ORTHOPAEDIC SURGERY

## 2023-02-28 PROCEDURE — 20610 DRAIN/INJ JOINT/BURSA W/O US: CPT

## 2023-02-28 PROCEDURE — 6360000004 HC RX CONTRAST MEDICATION: Performed by: ORTHOPAEDIC SURGERY

## 2023-02-28 PROCEDURE — 89051 BODY FLUID CELL COUNT: CPT

## 2023-02-28 RX ORDER — LIDOCAINE HYDROCHLORIDE 10 MG/ML
5 INJECTION, SOLUTION EPIDURAL; INFILTRATION; INTRACAUDAL; PERINEURAL ONCE
Status: COMPLETED | OUTPATIENT
Start: 2023-02-28 | End: 2023-02-28

## 2023-02-28 RX ADMIN — LIDOCAINE HYDROCHLORIDE 5 ML: 10 INJECTION, SOLUTION EPIDURAL; INFILTRATION; INTRACAUDAL; PERINEURAL at 11:28

## 2023-02-28 RX ADMIN — IOPAMIDOL 5 ML: 612 INJECTION, SOLUTION INTRAVENOUS at 11:29

## 2023-02-28 NOTE — PROCEDURES
FLUOROSCOPIC GUIDED RIGHT SHOULDER ASPIRATION WITH ARTHROGRAM    Indication:  Severe right shoulder pain, concern for septic joint. :  DIANE Velazquez:  <5 mL    Specimen:  aspirate    Under sterile conditions fluoroscopic guided placement of 20g needle into right shoulder joint with confirmatory arthrogram.  Aspiration yielded 5 mL yellow joint fluid, translucent. Needle withdrawn and patient tolerated the procedure well. Full report of procedure dictated via radiology reporting system into EMR.

## 2023-03-01 DIAGNOSIS — M75.111 INCOMPLETE TEAR OF RIGHT ROTATOR CUFF, UNSPECIFIED WHETHER TRAUMATIC: ICD-10-CM

## 2023-03-01 LAB
BASOPHILS ABSOLUTE: 0.1 K/UL (ref 0–0.2)
BASOPHILS RELATIVE PERCENT: 0.8 %
C-REACTIVE PROTEIN: 11.3 MG/L (ref 0–5.1)
EOSINOPHILS ABSOLUTE: 0.1 K/UL (ref 0–0.6)
EOSINOPHILS RELATIVE PERCENT: 1.4 %
HCT VFR BLD CALC: 47.6 % (ref 36–48)
HEMOGLOBIN: 16.1 G/DL (ref 12–16)
LYMPHOCYTES ABSOLUTE: 2.5 K/UL (ref 1–5.1)
LYMPHOCYTES RELATIVE PERCENT: 24.7 %
MCH RBC QN AUTO: 30.7 PG (ref 26–34)
MCHC RBC AUTO-ENTMCNC: 33.7 G/DL (ref 31–36)
MCV RBC AUTO: 91.1 FL (ref 80–100)
MONOCYTES ABSOLUTE: 0.9 K/UL (ref 0–1.3)
MONOCYTES RELATIVE PERCENT: 8.7 %
NEUTROPHILS ABSOLUTE: 6.5 K/UL (ref 1.7–7.7)
NEUTROPHILS RELATIVE PERCENT: 64.4 %
PDW BLD-RTO: 14.8 % (ref 12.4–15.4)
PLATELET # BLD: 223 K/UL (ref 135–450)
PMV BLD AUTO: 10.3 FL (ref 5–10.5)
RBC # BLD: 5.22 M/UL (ref 4–5.2)
SEDIMENTATION RATE, ERYTHROCYTE: 24 MM/HR (ref 0–30)
WBC # BLD: 10 K/UL (ref 4–11)

## 2023-03-03 ENCOUNTER — TELEPHONE (OUTPATIENT)
Dept: FAMILY MEDICINE CLINIC | Age: 58
End: 2023-03-03

## 2023-03-03 NOTE — TELEPHONE ENCOUNTER
----- Message from Angelica Hussein sent at 3/3/2023  9:38 AM EST -----  Subject: Message to Provider    QUESTIONS  Information for Provider? Patient is calling because she would like to   check status from her message she sent through [employer] to Dr. Claudell Grumbling. Please contact patient to discuss test results. Please advise  ---------------------------------------------------------------------------  --------------  Park Perez INFO  [phone]; OK to leave message on voicemail  ---------------------------------------------------------------------------  --------------  SCRIPT ANSWERS  Relationship to Patient?  Self

## 2023-03-03 NOTE — TELEPHONE ENCOUNTER
March 1, 2023  Zak Fort  to [address] (fatemeh Ortiz DO)      8:23 PM  I had blood drawn this morning 3/1. Can you look at the results and tell me what they mean? Does it look like there is an infection in my shoulder? Can you call me please.  Thank you Marilou Alan cell [phone] [place] [phone]

## 2023-03-04 NOTE — TELEPHONE ENCOUNTER
Spoke to the patient. She had her shoulder aspirated. She also was sent for blood work. Reviewed her CBC and sed rate and CRP. White blood cell is okay. CRP is somewhat elevated suggesting nonspecific inflammation somewhere. Culture is not back on the fluid and the cell count on the fluid is more red blood cells than white cells. Discussed briefly from my understanding. She will be following up with her orthopod on Monday.

## 2023-03-06 ENCOUNTER — TELEPHONE (OUTPATIENT)
Dept: ORTHOPEDIC SURGERY | Age: 58
End: 2023-03-06

## 2023-03-06 NOTE — TELEPHONE ENCOUNTER
Had test done last week. Fluid drawn off shoulder/ blood work done Wednesday morning. Wants to know if you have results?

## 2023-03-07 DIAGNOSIS — M25.519 ACUTE SHOULDER PAIN, UNSPECIFIED LATERALITY: Primary | ICD-10-CM

## 2023-03-07 RX ORDER — PYRAZINAMIDE 500 MG/1
1 TABLET ORAL EVERY 6 HOURS PRN
Qty: 20 TABLET | Refills: 0 | Status: SHIPPED | OUTPATIENT
Start: 2023-03-07 | End: 2023-03-21 | Stop reason: SDUPTHER

## 2023-03-13 ENCOUNTER — TELEPHONE (OUTPATIENT)
Dept: ORTHOPEDIC SURGERY | Age: 58
End: 2023-03-13

## 2023-03-13 NOTE — TELEPHONE ENCOUNTER
Faxed all medical records (Marialuisa Mayers) to [facility].   Pushed images for right shoulder thru PACS

## 2023-03-15 ENCOUNTER — TELEPHONE (OUTPATIENT)
Dept: INFECTIOUS DISEASES | Age: 58
End: 2023-03-15

## 2023-03-15 NOTE — TELEPHONE ENCOUNTER
----- Message from Seun Diallo RN sent at 3/15/2023 12:32 PM EDT -----  Regarding: RE: New patient appt requested w/[facility] referral to dr. Jocelin Maurer to schedule if having recurring infections. Thank you    ----- Message -----  From: Shelley Kruger MA  Sent: 3/14/2023   3:26 PM EDT  To: Seun Diallo RN  Subject: FW: New patient appt requested w/[facility] refer#    Resending just in case  ----- Message -----  From: Shelley Kruger MA  Sent: 2/27/2023   3:03 PM EDT  To: Seun Diallo RN  Subject: FW: New patient appt requested w/[facility] refer#    Dx.  Frequent Infections  Referred by: Dr. Gurdeep Morgan, DO  Referral and Records in [facility] to schedule NPV?  ----- Message -----  From: Rayshawn Gonzalez  Sent: 2/27/2023  11:15 AM EST  To: Shelley Kruger MA  Subject: New patient appt requested w/[facility] referral #    New patient appt requested w/[facility] referral to dr. Soheila Witt

## 2023-03-21 ENCOUNTER — OFFICE VISIT (OUTPATIENT)
Dept: FAMILY MEDICINE CLINIC | Age: 58
End: 2023-03-21

## 2023-03-21 VITALS
HEART RATE: 77 BPM | BODY MASS INDEX: 42.6 KG/M2 | RESPIRATION RATE: 16 BRPM | WEIGHT: 256 LBS | DIASTOLIC BLOOD PRESSURE: 70 MMHG | OXYGEN SATURATION: 95 % | SYSTOLIC BLOOD PRESSURE: 102 MMHG

## 2023-03-21 DIAGNOSIS — M54.16 LUMBAR RADICULOPATHY: ICD-10-CM

## 2023-03-21 DIAGNOSIS — I10 ESSENTIAL HYPERTENSION: ICD-10-CM

## 2023-03-21 DIAGNOSIS — E87.6 HYPOKALEMIA: ICD-10-CM

## 2023-03-21 DIAGNOSIS — M19.012 PRIMARY OSTEOARTHRITIS OF LEFT SHOULDER: Primary | ICD-10-CM

## 2023-03-21 DIAGNOSIS — M25.519 ACUTE SHOULDER PAIN, UNSPECIFIED LATERALITY: ICD-10-CM

## 2023-03-21 RX ORDER — PYRAZINAMIDE 500 MG/1
1 TABLET ORAL EVERY 6 HOURS PRN
Qty: 12 TABLET | Refills: 0 | Status: SHIPPED | OUTPATIENT
Start: 2023-03-21 | End: 2023-03-24

## 2023-03-21 ASSESSMENT — ENCOUNTER SYMPTOMS
COUGH: 0
SHORTNESS OF BREATH: 0

## 2023-03-21 NOTE — PROGRESS NOTES
Subjective:      Patient ID: Raquel Guerrero is a 62 y.o. y.o. female. Following up meds    Hx reviewed    GI is better,  Nausea and diarrhea better. Still bad right shoulder pain. Got second opinion- Dr Hannah Whitfield at [facility]. Comfortable with him  Total joint discussed. Getting an MRI  to eval neck discs. Still taking care of her mother. Shoulder Pain   Pertinent negatives include no numbness.        Chief Complaint   Patient presents with    Shoulder Pain     R- worsening       Allergies   Allergen Reactions    Droperidol Other (See Comments)     unresponsive    Haldol [Haloperidol Lactate] Other (See Comments)     \"felt out of it, similar to the toradol\"    Toradol [Ketorolac Tromethamine] Other (See Comments)     \"out of it\"  \"felt like sleep paralysis\"       Past Medical History:   Diagnosis Date    Anxiety     Depression     Endometriosis     GERD (gastroesophageal reflux disease)     Hypertension     Osteoarthritis     PONV (postoperative nausea and vomiting)     Restless leg syndrome        Past Surgical History:   Procedure Laterality Date    ANUS SURGERY  2018    fissure     SECTION      x3    COLONOSCOPY      DILATION AND CURETTAGE OF UTERUS N/A 6/3/2021    VIDEO HYSTEROSCOPY DILATATION AND CURETTAGE, POSSIBLE MYOSURE, POSSIBLE POLYPECTOMY performed by Gladis Mckeon DO at [address] [place] [address]      right wrist    HERNIA REPAIR  2018    LAPAROSCOPIC PARAESOPHAGEAL HERNIA REPAIR WITH MESH    HYSTERECTOMY (CERVIX STATUS UNKNOWN) N/A 10/21/2021    ROBOTIC ASSISTED LAPAROSCOPIC HYSTERECTOMY WITH RIGHT SALPINGECTOMY, RIGHT OOPHORECTOMY, CYSTOSCOPY, LYSIS OF ADHESIONS  CPT CODE - 92831 performed by Susana Benavidez DO at [address] [place] [address] ARTHROSCOPY Left 11/15/12    ARTHROSCOPY LEFT KNEE WITH SYOVECTOMY AND CHONDROPLASTY    OVARY REMOVAL Right     ROTATOR CUFF REPAIR Right 2020    EXAM UNDER ANESTHESIA VIDEO ARTHROSCOPY RIGHT SHOULDER, MINI- OPEN

## 2023-03-27 RX ORDER — METHOCARBAMOL 500 MG/1
TABLET, FILM COATED ORAL
Qty: 90 TABLET | Refills: 3 | Status: SHIPPED | OUTPATIENT
Start: 2023-03-27

## 2023-03-27 NOTE — TELEPHONE ENCOUNTER
Future Appointments   Date Time Provider Diann Sotelo   3/28/2023 10:20 AM DO TREVA Gonzalez [place] - DYD   4/4/2023 11:30 AM Khoa Pena [facility]   4/25/2023  8:20 AM Ko Lainez MD AND INFCT MARCK QUEVEDO 3/21/2023

## 2023-03-28 ENCOUNTER — OFFICE VISIT (OUTPATIENT)
Dept: FAMILY MEDICINE CLINIC | Age: 58
End: 2023-03-28

## 2023-03-28 VITALS
HEIGHT: 65 IN | SYSTOLIC BLOOD PRESSURE: 118 MMHG | HEART RATE: 72 BPM | TEMPERATURE: 97.1 F | OXYGEN SATURATION: 94 % | RESPIRATION RATE: 16 BRPM | WEIGHT: 253 LBS | BODY MASS INDEX: 42.15 KG/M2 | DIASTOLIC BLOOD PRESSURE: 78 MMHG

## 2023-03-28 DIAGNOSIS — Z00.00 MEDICARE ANNUAL WELLNESS VISIT, SUBSEQUENT: Primary | ICD-10-CM

## 2023-03-28 DIAGNOSIS — Z23 NEED FOR ZOSTER VACCINATION: ICD-10-CM

## 2023-03-28 DIAGNOSIS — Z71.89 COUNSELING REGARDING ADVANCED DIRECTIVES: ICD-10-CM

## 2023-03-28 RX ORDER — METHYLPREDNISOLONE 4 MG/1
TABLET ORAL
COMMUNITY
Start: 2023-03-23

## 2023-03-28 SDOH — ECONOMIC STABILITY: INCOME INSECURITY: HOW HARD IS IT FOR YOU TO PAY FOR THE VERY BASICS LIKE FOOD, HOUSING, MEDICAL CARE, AND HEATING?: NOT HARD AT ALL

## 2023-03-28 SDOH — ECONOMIC STABILITY: FOOD INSECURITY: WITHIN THE PAST 12 MONTHS, YOU WORRIED THAT YOUR FOOD WOULD RUN OUT BEFORE YOU GOT MONEY TO BUY MORE.: NEVER TRUE

## 2023-03-28 SDOH — ECONOMIC STABILITY: FOOD INSECURITY: WITHIN THE PAST 12 MONTHS, THE FOOD YOU BOUGHT JUST DIDN'T LAST AND YOU DIDN'T HAVE MONEY TO GET MORE.: NEVER TRUE

## 2023-03-28 ASSESSMENT — PATIENT HEALTH QUESTIONNAIRE - PHQ9
10. IF YOU CHECKED OFF ANY PROBLEMS, HOW DIFFICULT HAVE THESE PROBLEMS MADE IT FOR YOU TO DO YOUR WORK, TAKE CARE OF THINGS AT HOME, OR GET ALONG WITH OTHER PEOPLE: 0
2. FEELING DOWN, DEPRESSED OR HOPELESS: 2
4. FEELING TIRED OR HAVING LITTLE ENERGY: 0
SUM OF ALL RESPONSES TO PHQ QUESTIONS 1-9: 8
5. POOR APPETITE OR OVEREATING: 1
6. FEELING BAD ABOUT YOURSELF - OR THAT YOU ARE A FAILURE OR HAVE LET YOURSELF OR YOUR FAMILY DOWN: 0
3. TROUBLE FALLING OR STAYING ASLEEP: 2
SUM OF ALL RESPONSES TO PHQ QUESTIONS 1-9: 8
SUM OF ALL RESPONSES TO PHQ9 QUESTIONS 1 & 2: 2
8. MOVING OR SPEAKING SO SLOWLY THAT OTHER PEOPLE COULD HAVE NOTICED. OR THE OPPOSITE, BEING SO FIGETY OR RESTLESS THAT YOU HAVE BEEN MOVING AROUND A LOT MORE THAN USUAL: 0
SUM OF ALL RESPONSES TO PHQ QUESTIONS 1-9: 8
9. THOUGHTS THAT YOU WOULD BE BETTER OFF DEAD, OR OF HURTING YOURSELF: 0
1. LITTLE INTEREST OR PLEASURE IN DOING THINGS: 0
SUM OF ALL RESPONSES TO PHQ QUESTIONS 1-9: 8
7. TROUBLE CONCENTRATING ON THINGS, SUCH AS READING THE NEWSPAPER OR WATCHING TELEVISION: 3

## 2023-03-28 NOTE — PROGRESS NOTES
Medicare Wellness     Discussed Living will- will refer to pastoral care for consult. Does not feel she would want to be kept alive with Heroic measures if no quality of life. Getting MRI this week for her shoulder    New pain doctor with [facility]. Feeling comfortable with them. Medicare Annual Wellness Visit    Clarisa Cueto is here for Medicare AWV (Not fasting today), Dizziness (Having spells off & on), and Diarrhea (Started last night - asking for something to be called in)    Assessment & Plan   Need for zoster vaccination  -     zoster recombinant adjuvanted vaccine (SHINGRIX) 50 MCG/0.5ML SUSR injection; 50 MCG IM then repeat 2-6 months., Disp-0.5 mL, R-1Print  Counseling regarding advanced directives  -     [address]    Recommendations for [employer] Due: see orders and patient instructions/AVS.  Recommended screening schedule for the next 5-10 years is provided to the patient in written form: see Patient Instructions/AVS.     No follow-ups on file. Subjective       Patient's complete Health Risk Assessment and screening values have been reviewed and are found in Flowsheets. The following problems were reviewed today and where indicated follow up appointments were made and/or referrals ordered. Positive Risk Factor Screenings with Interventions:    Fall Risk:  Do you feel unsteady or are you worried about falling? : (!) yes (dizzy spells)  2 or more falls in past year?: no  Fall with injury in past year?: no     Interventions:    Discussed strategies for fall risk reduction. Continuing follow-up with orthopedics to address medical problems. Depression:  PHQ-2 Score: 2  PHQ-9 Total Score: 8    Interpretation:   1-4 = minimal  5-9 = mild  10-14 = moderate  15-19 = moderately severe  20-27 = severe    Interventions:  Patient is on meds and has been referred to counseling.   She appears to be doing some better as some of her medical conditions are more

## 2023-03-28 NOTE — PATIENT INSTRUCTIONS
plan that includes daily menus and recipes may be best.  Ask your doctor about other health professionals who can help you achieve your weight loss goals. A dietitian can help you make healthy changes in your diet. An exercise specialist or  can help you develop a safe and effective exercise program.  A counselor or psychiatrist can help you cope with issues such as depression, anxiety, or family problems that can make it hard to focus on weight loss. Consider joining a support group for people who are trying to lose weight. Your doctor can suggest groups in your area. Where can you learn more? Go to http://www.woods.com/ and enter U357 to learn more about \"Starting a Weight Loss Plan: Care Instructions. \"  Current as of: May 9, 2022               Content Version: 13.6  © 4249-0007 [employer]. Care instructions adapted under license by [facility] in [place] [facility] ([facility]). If you have questions about a medical condition or this instruction, always ask your healthcare professional. [place] [address] any warranty or liability for your use of this information. A Healthy Heart: Care Instructions  Your Care Instructions     Coronary artery disease, also called heart disease, occurs when a substance called plaque builds up in the vessels that supply oxygen-rich blood to your heart muscle. This can narrow the blood vessels and reduce blood flow. A heart attack happens when blood flow is completely blocked. A high-fat diet, smoking, and other factors increase the risk of heart disease. Your doctor has found that you have a chance of having heart disease. You can do lots of things to keep your heart healthy. It may not be easy, but you can change your diet, exercise more, and quit smoking. These steps really work to lower your chance of heart disease. Follow-up care is a key part of your treatment and safety.  Be sure to make and go to all appointments, and call your

## 2023-04-06 ENCOUNTER — TELEPHONE (OUTPATIENT)
Dept: FAMILY MEDICINE CLINIC | Age: 58
End: 2023-04-06

## 2023-04-06 ENCOUNTER — TELEMEDICINE (OUTPATIENT)
Dept: PSYCHOLOGY | Age: 58
End: 2023-04-06

## 2023-04-06 ENCOUNTER — OFFICE VISIT (OUTPATIENT)
Dept: FAMILY MEDICINE CLINIC | Age: 58
End: 2023-04-06

## 2023-04-06 VITALS
OXYGEN SATURATION: 97 % | HEART RATE: 74 BPM | SYSTOLIC BLOOD PRESSURE: 100 MMHG | RESPIRATION RATE: 16 BRPM | DIASTOLIC BLOOD PRESSURE: 70 MMHG | TEMPERATURE: 97.1 F | WEIGHT: 260 LBS | BODY MASS INDEX: 42.87 KG/M2

## 2023-04-06 DIAGNOSIS — F33.1 MODERATE EPISODE OF RECURRENT MAJOR DEPRESSIVE DISORDER (HCC): ICD-10-CM

## 2023-04-06 DIAGNOSIS — R42 DYSEQUILIBRIUM: Primary | ICD-10-CM

## 2023-04-06 DIAGNOSIS — R42 DYSEQUILIBRIUM: ICD-10-CM

## 2023-04-06 DIAGNOSIS — F41.9 ANXIETY: Primary | ICD-10-CM

## 2023-04-06 DIAGNOSIS — I10 ESSENTIAL HYPERTENSION: ICD-10-CM

## 2023-04-06 DIAGNOSIS — E87.6 HYPOKALEMIA: ICD-10-CM

## 2023-04-06 LAB
BASOPHILS # BLD: 0.1 K/UL (ref 0–0.2)
BASOPHILS NFR BLD: 0.7 %
DEPRECATED RDW RBC AUTO: 14.8 % (ref 12.4–15.4)
EOSINOPHIL # BLD: 0.1 K/UL (ref 0–0.6)
EOSINOPHIL NFR BLD: 1.4 %
HCT VFR BLD AUTO: 41.2 % (ref 36–48)
HGB BLD-MCNC: 14.1 G/DL (ref 12–16)
LYMPHOCYTES # BLD: 1.5 K/UL (ref 1–5.1)
LYMPHOCYTES NFR BLD: 14.9 %
MAGNESIUM SERPL-MCNC: 1.6 MG/DL (ref 1.8–2.4)
MCH RBC QN AUTO: 31.4 PG (ref 26–34)
MCHC RBC AUTO-ENTMCNC: 34.3 G/DL (ref 31–36)
MCV RBC AUTO: 91.7 FL (ref 80–100)
MONOCYTES # BLD: 0.7 K/UL (ref 0–1.3)
MONOCYTES NFR BLD: 6.8 %
NEUTROPHILS # BLD: 7.7 K/UL (ref 1.7–7.7)
NEUTROPHILS NFR BLD: 76.2 %
PLATELET # BLD AUTO: 229 K/UL (ref 135–450)
PMV BLD AUTO: 10 FL (ref 5–10.5)
POTASSIUM SERPL-SCNC: 3.5 MMOL/L (ref 3.5–5.1)
RBC # BLD AUTO: 4.5 M/UL (ref 4–5.2)
WBC # BLD AUTO: 10.1 K/UL (ref 4–11)

## 2023-04-06 ASSESSMENT — ENCOUNTER SYMPTOMS
SHORTNESS OF BREATH: 0
COUGH: 0

## 2023-04-06 NOTE — PATIENT INSTRUCTIONS
some of them. These stages are meant as a guide to help you understand your reactions and those of others who are grieving. Denial:  Denial (not acknowledging the loss) can help contain the shock of loss. Denial can act as a safety mechanism to block out grief until we are ready to handle it. Sadness and depression:  Deep, intense grief and mourning appear during this stage. When the full understanding of our loss comes, it can seem overwhelming. During this stage, you may cry often and unexpectedly. You may not want to be around people or to do things that you normally enjoy. During this stage, it is best to remain as active as possible and to seek supportive people who will allow you to say what you need to or to cry when you need to. It is important to allow yourself to work through your full range and experience of emotions. Anger:  Rage and anger can be intense toward the person who , toward friends and relatives, and even toward God. It is important to have an outlet to release anger through activities such as exercise, hobbies, or through therapy. Guilt, shame, and blame are feelings that need to be addressed, especially if it is toward you. Acceptance: This stage includes coming to terms with the loss. It does not mean that you have found the answers to your questions or that you stop thinking about the person who is gone. It does signify a reinvestment in life and a willingness to readjust to your new circumstances while carrying the memory of your loved one with you. How to Help Yourself  Give yourself time to grieve. It is normal and important to express your grief and to work through the concerns that arise for you at this time. Stuffing your feelings may not be helpful and may delay or prolong your grief. Find supportive people to reach out to during your grief. This is the time when the support of others may be the most helpful.   Dont be afraid to tell them how they

## 2023-04-06 NOTE — PROGRESS NOTES
Food Insecurity: No Food Insecurity    Worried About Running Out of Food in the Last Year: Never true    Ran Out of Food in the Last Year: Never true   Transportation Needs: No Transportation Needs    Lack of Transportation (Medical): No    Lack of Transportation (Non-Medical): No   Physical Activity: Inactive    Days of Exercise per Week: 0 days    Minutes of Exercise per Session: 0 min   Stress: Not on file   Social Connections: Not on file   Intimate Partner Violence: Not At Risk    Fear of Current or Ex-Partner: No    Emotionally Abused: No    Physically Abused: No    Sexually Abused: No   Housing Stability: Unknown    Unable to Pay for Housing in the Last Year: No    Number of Places Lived in the Last Year: Not on file    Unstable Housing in the Last Year: No       Family History   Problem Relation Age of Onset    Arthritis Mother     Obesity Mother     Anemia Mother     Other Mother         pulmonary embolism    Arthritis Father     Arrhythmia Father     Diabetes Other     Diabetes Paternal Grandfather     Cancer Neg Hx     Breast Cancer Neg Hx     Colon Cancer Neg Hx        Vitals:    04/06/23 1332   BP: 100/70   Pulse: 74   Resp: 16   Temp: 97.1 °F (36.2 °C)   SpO2: 97%       Wt Readings from Last 3 Encounters:   04/06/23 260 lb (117.9 kg)   03/28/23 253 lb (114.8 kg)   03/21/23 256 lb (116.1 kg)       Review of Systems   Constitutional:  Positive for fatigue. Negative for activity change. HENT:          Minor sinus and nasal sx. Respiratory:  Negative for cough and shortness of breath. Cardiovascular:  Negative for chest pain and palpitations. Gastrointestinal:         Some diarrhea past few days   Neurological:  Positive for dizziness.      Objective:   Physical Exam  Constitutional:       Comments: Looks a bit tired   HENT:      Right Ear: Tympanic membrane normal.      Left Ear: Tympanic membrane normal.      Nose:      Comments: Some turbinate congestion- right >  Eyes:      General: No

## 2023-04-06 NOTE — TELEPHONE ENCOUNTER
Patient called to check the status of the medicine that was discussed at her appt today for dizziness. She states the pharm doesn't have it. Please send to [facility] [place] on file.

## 2023-04-06 NOTE — PROGRESS NOTES
describes family and friends as social support. O:  MSE:    Appearance: good hygiene   Attitude: cooperative and friendly  Consciousness: alert  Orientation: oriented to person, place, time, general circumstance  Memory    recent and remote memory intact   Attention/Concentration    intact  Psychomotor Activity:normal  Eye Contact: normal  Speech: normal rate and volume, well-articulated  Mood    Anxious  Depressed  Affect Congruent  Perception: within normal limits  Thought Content: within normal limits  Thought Process: logical, coherent and goal-directed  Associations    logical connections  Insight: good  Judgment    Intact  Appetite abnormal: decreased  Sleep disturbance Yes  Fatigue Yes  Loss of pleasure Yes  Impulsive behavior No  Morbid Ideation: no   Suicide Assessment: no suicidal ideation, plan, or intent  Homicidal Ideation: no    History:    Medications:   Current Outpatient Medications   Medication Sig Dispense Refill    methylPREDNISolone (MEDROL DOSEPACK) 4 MG tablet TAKE BY MOUTH AS DIRECTED PER PACKAGE INSTRUCTIONS      zoster recombinant adjuvanted vaccine (SHINGRIX) 50 MCG/0.5ML SUSR injection 50 MCG IM then repeat 2-6 months. 0.5 mL 1    methocarbamol (ROBAXIN) 500 MG tablet TAKE 1 TABLET BY MOUTH 3 TIMES A DAY AS NEEDED FOR NECK PAIN 90 tablet 3    celecoxib (CELEBREX) 200 MG capsule Take 1 capsule by mouth daily 60 capsule 3    sertraline (ZOLOFT) 25 MG tablet Take 1 tablet by mouth daily For mood 30 tablet 1    ALPRAZolam (XANAX) 0.5 MG tablet TAKE 1 TABLET BY MOUTH 3 TIMES DAILY AS NEEDED FOR SLEEP OR ANXIETY.  90 tablet 2    rOPINIRole (REQUIP) 2 MG tablet TAKE 2 TABLETS AT NIGHT AND ONE IN THE MORNING FOR LEGS 90 tablet 3    potassium chloride (KLOR-CON M20) 20 MEQ extended release tablet TAKE ONE OR TWO DAILY AS DIRECTED FOR POTASSIUM 60 tablet 3    metoclopramide (REGLAN) 5 MG tablet Take 1 tablet by mouth 3 times daily as needed (nausea) For nausea 30 tablet 1    spironolactone

## 2023-04-07 LAB
ALBUMIN SERPL-MCNC: 3.9 G/DL (ref 3.4–5)
ALBUMIN/GLOB SERPL: 1.4 {RATIO} (ref 1.1–2.2)
ALP SERPL-CCNC: 85 U/L (ref 40–129)
ALT SERPL-CCNC: 28 U/L (ref 10–40)
ANION GAP SERPL CALCULATED.3IONS-SCNC: 21 MMOL/L (ref 3–16)
AST SERPL-CCNC: 24 U/L (ref 15–37)
BILIRUB SERPL-MCNC: 0.5 MG/DL (ref 0–1)
BUN SERPL-MCNC: 4 MG/DL (ref 7–20)
CALCIUM SERPL-MCNC: 9.9 MG/DL (ref 8.3–10.6)
CHLORIDE SERPL-SCNC: 103 MMOL/L (ref 99–110)
CO2 SERPL-SCNC: 20 MMOL/L (ref 21–32)
CREAT SERPL-MCNC: 0.7 MG/DL (ref 0.6–1.1)
GFR SERPLBLD CREATININE-BSD FMLA CKD-EPI: >60 ML/MIN/{1.73_M2}
GLUCOSE SERPL-MCNC: 96 MG/DL (ref 70–99)
POTASSIUM SERPL-SCNC: 3.7 MMOL/L (ref 3.5–5.1)
PROT SERPL-MCNC: 6.6 G/DL (ref 6.4–8.2)
SODIUM SERPL-SCNC: 144 MMOL/L (ref 136–145)

## 2023-04-07 RX ORDER — MECLIZINE HCL 12.5 MG/1
12.5 TABLET ORAL 3 TIMES DAILY PRN
Qty: 15 TABLET | Refills: 1 | Status: SHIPPED | OUTPATIENT
Start: 2023-04-07 | End: 2023-05-08

## 2023-04-07 NOTE — TELEPHONE ENCOUNTER
Spoke to the patient. Labs reviewed and are good. Potassium is in normal range. Magnesium is slightly low but we will just follow for now. She feels a bit better than yesterday  We will send meclizine which was admitted yesterday. Patient instructed.

## 2023-04-18 ENCOUNTER — CLINICAL DOCUMENTATION (OUTPATIENT)
Dept: SPIRITUAL SERVICES | Age: 58
End: 2023-04-18

## 2023-04-18 NOTE — ACP (ADVANCE CARE PLANNING)
Advance Care Planning   Ambulatory ACP Specialist Patient Outreach    Date:  4/18/2023  ACP Specialist:  Gary Petit    Outreach call to patient in follow-up to ACP Specialist referral from: Diana Cifuentes DO    [x] PCP  [] Provider   [] Ambulatory Care Management [] Other for Reason:    [x] Advance Directive Assistance  [] Code Status Discussion  [] Complete Portable DNR Order  [] Discuss Goals of Care  [] Complete POST/MOST  [] Early ACP Decision-Making  [] Other    Date Referral Received:3/28/23    Today's Outreach:  [x] First   [] Second  [] Third                               Third outreach made by [x]  phone  [] email []   [employer]     Intervention:  [] Spoke with Patient  [x] Left VM requesting return call      Outcome:First attempt to reach Pt for ACP referral. Left voicemail. Next Step:   [] ACP scheduled conversation  [x] Outreach again in one week               [] Email / Mail ACP Info Sheets  [] Email / Mail Advance Directive            [] Close Referral. Routing closure to referring provider/staff and to ACP Specialist . [] Closure Letter mailed to Patient with Invitation to Contact ACP Specialist if/when ready.     Thank you for this referral.

## 2023-04-25 ENCOUNTER — OFFICE VISIT (OUTPATIENT)
Dept: INFECTIOUS DISEASES | Age: 58
End: 2023-04-25
Payer: MEDICARE

## 2023-04-25 VITALS
DIASTOLIC BLOOD PRESSURE: 98 MMHG | SYSTOLIC BLOOD PRESSURE: 144 MMHG | BODY MASS INDEX: 43.01 KG/M2 | TEMPERATURE: 97.9 F | WEIGHT: 267.6 LBS | HEIGHT: 66 IN

## 2023-04-25 DIAGNOSIS — Z86.19 HISTORY OF ESBL E. COLI INFECTION: ICD-10-CM

## 2023-04-25 DIAGNOSIS — N30.90 CYSTITIS: Primary | ICD-10-CM

## 2023-04-25 DIAGNOSIS — Z86.19 HISTORY OF SURGICAL SITE INFECTION: ICD-10-CM

## 2023-04-25 DIAGNOSIS — N12 PYELONEPHRITIS: ICD-10-CM

## 2023-04-25 PROCEDURE — 3074F SYST BP LT 130 MM HG: CPT | Performed by: INTERNAL MEDICINE

## 2023-04-25 PROCEDURE — G8417 CALC BMI ABV UP PARAM F/U: HCPCS | Performed by: INTERNAL MEDICINE

## 2023-04-25 PROCEDURE — G8427 DOCREV CUR MEDS BY ELIG CLIN: HCPCS | Performed by: INTERNAL MEDICINE

## 2023-04-25 PROCEDURE — 99204 OFFICE O/P NEW MOD 45 MIN: CPT | Performed by: INTERNAL MEDICINE

## 2023-04-25 PROCEDURE — 81003 URINALYSIS AUTO W/O SCOPE: CPT | Performed by: INTERNAL MEDICINE

## 2023-04-25 PROCEDURE — 3078F DIAST BP <80 MM HG: CPT | Performed by: INTERNAL MEDICINE

## 2023-04-25 RX ORDER — CHLORHEXIDINE GLUCONATE 213 G/1000ML
SOLUTION TOPICAL
Qty: 236 ML | Refills: 1 | Status: SHIPPED | OUTPATIENT
Start: 2023-04-25 | End: 2023-05-09

## 2023-04-25 RX ORDER — NITROFURANTOIN 25; 75 MG/1; MG/1
100 CAPSULE ORAL 2 TIMES DAILY
Qty: 20 CAPSULE | Refills: 0 | Status: SHIPPED | OUTPATIENT
Start: 2023-04-25 | End: 2023-05-02

## 2023-04-25 NOTE — PROGRESS NOTES
Infectious Diseases   Consult Note      Reason for Consult:  \"frequent infections,\" pre-op clearance   Requesting Physician:  Negro Guadarrama MD         Subjective:   CHIEF COMPLAINT:   none given       HPI:     Mago Henriquez is a 56yoF with history of HTN, GERD, endometriosis, anxiety, depresion, hernia repair with mesh, S/p ODALIS, low back pain getting SENIA                Referred for evaluation of history of post-op infections    Shoulder sgy 2020, another repair 2020  Alfredamadeo Murrayer, injured shoulder 2021. Had a washout for concern of infection, all cultures were negative. She had 10d course of po doxy. 2021 - exploratory laparoscopy c/b UTI   Developed UTI after hysterectomy  10/2021, brief admission ~3 weeks after sgy    Admission [facility] 2022 with pyelonephritis with ESBL E coli being etiologic agent. She was discharged on ertapenem without ID involvement. Treated for bronchitis 2022 with Augmentin     CBC diff 23 was wnl   ESR 3/1/23 was wnl at 24, CRP that date 11     Plan for R TSR and possibly her neck. She is referred for ID clearance. No date for TSR yet (Manuelita Piety)    No history of skin infections except once beneath the R breast.    However, mother with whom she lives has history of MRSA SSTI. She is concerned today she may have UTI. No dysuria. Urine \"feels thicker. \"  Low back pain, urinary frequency. No fever or chills.           Past Surgical History:       Diagnosis Date    Anxiety     Depression     Endometriosis     GERD (gastroesophageal reflux disease)     Headache     Hypertension     Obesity     Osteoarthritis     PONV (postoperative nausea and vomiting)     Restless leg syndrome          Procedure Laterality Date    ANUS SURGERY  2018    fissure     SECTION      x3    COLONOSCOPY      DILATION AND CURETTAGE OF UTERUS N/A 2021    VIDEO HYSTEROSCOPY DILATATION AND CURETTAGE, POSSIBLE MYOSURE, POSSIBLE POLYPECTOMY performed by Sawyer Moreno DO at

## 2023-04-26 ENCOUNTER — TELEPHONE (OUTPATIENT)
Dept: INFECTIOUS DISEASES | Age: 58
End: 2023-04-26

## 2023-04-26 LAB
BACTERIA URNS QL MICRO: NORMAL /HPF
BILIRUB UR QL STRIP.AUTO: NEGATIVE
CLARITY UR: CLEAR
COLOR UR: YELLOW
EPI CELLS #/AREA URNS AUTO: 1 /HPF (ref 0–5)
GLUCOSE UR STRIP.AUTO-MCNC: NEGATIVE MG/DL
HGB UR QL STRIP.AUTO: NEGATIVE
HYALINE CASTS #/AREA URNS AUTO: 0 /LPF (ref 0–8)
KETONES UR STRIP.AUTO-MCNC: NEGATIVE MG/DL
LEUKOCYTE ESTERASE UR QL STRIP.AUTO: NEGATIVE
MRSA DNA SPEC QL NAA+PROBE: NORMAL
NITRITE UR QL STRIP.AUTO: NEGATIVE
PH UR STRIP.AUTO: 6.5 [PH] (ref 5–8)
PROT UR STRIP.AUTO-MCNC: ABNORMAL MG/DL
RBC CLUMPS #/AREA URNS AUTO: 2 /HPF (ref 0–4)
SP GR UR STRIP.AUTO: 1.02 (ref 1–1.03)
UA COMPLETE W REFLEX CULTURE PNL UR: ABNORMAL
UA DIPSTICK W REFLEX MICRO PNL UR: YES
URN SPEC COLLECT METH UR: ABNORMAL
UROBILINOGEN UR STRIP-ACNC: 0.2 E.U./DL
WBC #/AREA URNS AUTO: 1 /HPF (ref 0–5)

## 2023-04-26 NOTE — TELEPHONE ENCOUNTER
Can you please let Kinjal Barryiliana know that the UA was negative for infection  Leads me to think the low back pain is of other origin.     Suggest she can stop the macrobid that I prescribed    Call back with concerns in that realm    I will let her know about the nasal MRSA result when received     Thanks

## 2023-04-27 ENCOUNTER — PATIENT MESSAGE (OUTPATIENT)
Dept: FAMILY MEDICINE CLINIC | Age: 58
End: 2023-04-27

## 2023-04-27 RX ORDER — OMEPRAZOLE 40 MG/1
CAPSULE, DELAYED RELEASE ORAL
Qty: 60 CAPSULE | Refills: 5 | Status: SHIPPED | OUTPATIENT
Start: 2023-04-27

## 2023-04-27 NOTE — TELEPHONE ENCOUNTER
Last ov 04/06/2023   Future Appointments   Date Time Provider Diann Sotelo   5/2/2023  1:00 PM Aníbal Ortega [facility]

## 2023-05-01 NOTE — PROGRESS NOTES
Hypertension     Obesity     Osteoarthritis     PONV (postoperative nausea and vomiting)     Restless leg syndrome          Plan:  Patient interventions:  Trained in strategies for increasing balanced thinking  Provided Psychoeducation re: grief processing techniques  Supportive techniques  Promoted hope using strengths-based approach  Assisted Pt with grief processing    Patient Behavioral Change Plan:   See Patient Instructions

## 2023-05-02 ENCOUNTER — OFFICE VISIT (OUTPATIENT)
Dept: PSYCHOLOGY | Age: 58
End: 2023-05-02
Payer: MEDICARE

## 2023-05-02 DIAGNOSIS — F33.1 MODERATE EPISODE OF RECURRENT MAJOR DEPRESSIVE DISORDER (HCC): ICD-10-CM

## 2023-05-02 DIAGNOSIS — F41.9 ANXIETY: Primary | ICD-10-CM

## 2023-05-02 PROCEDURE — 90834 PSYTX W PT 45 MINUTES: CPT | Performed by: SOCIAL WORKER

## 2023-05-02 PROCEDURE — 1036F TOBACCO NON-USER: CPT | Performed by: SOCIAL WORKER

## 2023-05-02 ASSESSMENT — ANXIETY QUESTIONNAIRES
4. TROUBLE RELAXING: 2
3. WORRYING TOO MUCH ABOUT DIFFERENT THINGS: 2
GAD7 TOTAL SCORE: 11
1. FEELING NERVOUS, ANXIOUS, OR ON EDGE: 1
5. BEING SO RESTLESS THAT IT IS HARD TO SIT STILL: 1
2. NOT BEING ABLE TO STOP OR CONTROL WORRYING: 2
IF YOU CHECKED OFF ANY PROBLEMS ON THIS QUESTIONNAIRE, HOW DIFFICULT HAVE THESE PROBLEMS MADE IT FOR YOU TO DO YOUR WORK, TAKE CARE OF THINGS AT HOME, OR GET ALONG WITH OTHER PEOPLE: SOMEWHAT DIFFICULT
6. BECOMING EASILY ANNOYED OR IRRITABLE: 1
7. FEELING AFRAID AS IF SOMETHING AWFUL MIGHT HAPPEN: 2

## 2023-05-02 ASSESSMENT — PATIENT HEALTH QUESTIONNAIRE - PHQ9
3. TROUBLE FALLING OR STAYING ASLEEP: 2
4. FEELING TIRED OR HAVING LITTLE ENERGY: 2
1. LITTLE INTEREST OR PLEASURE IN DOING THINGS: 2
6. FEELING BAD ABOUT YOURSELF - OR THAT YOU ARE A FAILURE OR HAVE LET YOURSELF OR YOUR FAMILY DOWN: 0
2. FEELING DOWN, DEPRESSED OR HOPELESS: 2
10. IF YOU CHECKED OFF ANY PROBLEMS, HOW DIFFICULT HAVE THESE PROBLEMS MADE IT FOR YOU TO DO YOUR WORK, TAKE CARE OF THINGS AT HOME, OR GET ALONG WITH OTHER PEOPLE: 1
SUM OF ALL RESPONSES TO PHQ QUESTIONS 1-9: 13
8. MOVING OR SPEAKING SO SLOWLY THAT OTHER PEOPLE COULD HAVE NOTICED. OR THE OPPOSITE, BEING SO FIGETY OR RESTLESS THAT YOU HAVE BEEN MOVING AROUND A LOT MORE THAN USUAL: 1
SUM OF ALL RESPONSES TO PHQ9 QUESTIONS 1 & 2: 4
5. POOR APPETITE OR OVEREATING: 2
7. TROUBLE CONCENTRATING ON THINGS, SUCH AS READING THE NEWSPAPER OR WATCHING TELEVISION: 2
9. THOUGHTS THAT YOU WOULD BE BETTER OFF DEAD, OR OF HURTING YOURSELF: 0
SUM OF ALL RESPONSES TO PHQ QUESTIONS 1-9: 13

## 2023-05-02 NOTE — PATIENT INSTRUCTIONS
Make a list of questions to ask the doctor tomorrow. Continue using creative expression for grief emotions.

## 2023-05-08 DIAGNOSIS — F41.9 ANXIETY: ICD-10-CM

## 2023-05-08 RX ORDER — ALPRAZOLAM 0.5 MG/1
TABLET ORAL
Qty: 90 TABLET | Refills: 2 | Status: SHIPPED | OUTPATIENT
Start: 2023-05-08 | End: 2023-06-08

## 2023-05-08 RX ORDER — MECLIZINE HCL 12.5 MG/1
TABLET ORAL
Qty: 15 TABLET | Refills: 1 | Status: SHIPPED | OUTPATIENT
Start: 2023-05-08 | End: 2023-05-31

## 2023-05-09 DIAGNOSIS — M25.519 ACUTE SHOULDER PAIN, UNSPECIFIED LATERALITY: ICD-10-CM

## 2023-05-10 ENCOUNTER — TELEPHONE (OUTPATIENT)
Dept: INFECTIOUS DISEASES | Age: 58
End: 2023-05-10

## 2023-05-15 ENCOUNTER — TELEMEDICINE (OUTPATIENT)
Dept: PSYCHOLOGY | Age: 58
End: 2023-05-15
Payer: MEDICARE

## 2023-05-15 DIAGNOSIS — F41.9 ANXIETY: Primary | ICD-10-CM

## 2023-05-15 DIAGNOSIS — F33.1 MODERATE EPISODE OF RECURRENT MAJOR DEPRESSIVE DISORDER (HCC): ICD-10-CM

## 2023-05-15 PROCEDURE — 1036F TOBACCO NON-USER: CPT | Performed by: SOCIAL WORKER

## 2023-05-15 PROCEDURE — 90832 PSYTX W PT 30 MINUTES: CPT | Performed by: SOCIAL WORKER

## 2023-05-15 NOTE — PATIENT INSTRUCTIONS
Make a list of upcoming activities and prioritize engagement. Ask for help from others in case you are not able to attend.

## 2023-05-18 NOTE — TELEPHONE ENCOUNTER
Spoke with patient and she stated that she had been busy and forgot to return call. I advised of MD note and she verbalized understanding. Note was routed last week to Dr Shad Jim and I also advised patient as well.     Thank you

## 2023-05-19 ENCOUNTER — CLINICAL DOCUMENTATION (OUTPATIENT)
Dept: SPIRITUAL SERVICES | Age: 58
End: 2023-05-19

## 2023-05-19 NOTE — ACP (ADVANCE CARE PLANNING)
Advance Care Planning   Ambulatory ACP Specialist Patient Outreach    Date:  5/19/2023  ACP Specialist:  Tati Melvin    Outreach call to patient in follow-up to ACP Specialist referral from: Sharla Wilhelm DO    [x] PCP  [] Provider   [] Ambulatory Care Management [] Other for Reason:    [x] Advance Directive Assistance  [] Code Status Discussion  [] Complete Portable DNR Order  [] Discuss Goals of Care  [] Complete POST/MOST  [] Early ACP Decision-Making  [] Other    Date Referral Received:3/28/23    Today's Outreach:  [] First   [x] Second  [] Third                               Third outreach made by [x]  phone  [] email []   [employer]     Intervention:  [x] Spoke with Patient  [] Left VM requesting return call      Outcome:Second call for Advance Directives referral. Pt busy, but asked  to call back Monday (5/22) in the morning. Assured her that we would attempt to do so. Next Step:   [x] ACP scheduled conversation  [] Outreach again in one week               [] Email / Mail ACP Info Sheets  [] Email / Mail Advance Directive            [] Close Referral. Routing closure to referring provider/staff and to ACP Specialist . [] Closure Letter mailed to Patient with Invitation to Contact ACP Specialist if/when ready.     Thank you for this referral.

## 2023-05-22 ENCOUNTER — CLINICAL DOCUMENTATION (OUTPATIENT)
Dept: SPIRITUAL SERVICES | Age: 58
End: 2023-05-22

## 2023-05-22 NOTE — ACP (ADVANCE CARE PLANNING)
Advance Care Planning   Ambulatory ACP Specialist Patient Outreach    Date:  5/22/2023  ACP Specialist:  Mellisa Trammell    Outreach call to patient in follow-up to ACP Specialist referral from: Daniela Diana DO    [x] PCP  [] Provider   [] Ambulatory Care Management [] Other for Reason:    [x] Advance Directive Assistance  [] Code Status Discussion  [] Complete Portable DNR Order  [] Discuss Goals of Care  [] Complete POST/MOST  [] Early ACP Decision-Making  [] Other    Date Referral Received:3/28/23    Today's Outreach:  [] First   [] Second  [x] Third                               Third outreach made by [x]  phone  [] email []   [employer]     Intervention:  [x] Spoke with Patient  [] Left VM requesting return call      Outcome:Pt busy and asked  to call back later. Assured her we would attempt to do so. Next Step:   [] ACP scheduled conversation  [x] Outreach again later today        [] Email / Mail ACP Info Sheets  [] Email / Mail Advance Directive            [] Close Referral. Routing closure to referring provider/staff and to ACP Specialist . [] Closure Letter mailed to Patient with Invitation to Contact ACP Specialist if/when ready.     Thank you for this referral.

## 2023-05-23 ENCOUNTER — CLINICAL DOCUMENTATION (OUTPATIENT)
Dept: SPIRITUAL SERVICES | Age: 58
End: 2023-05-23

## 2023-05-23 NOTE — ACP (ADVANCE CARE PLANNING)
Advance Care Planning   Ambulatory ACP Specialist Patient Outreach    Date:  5/23/2023  ACP Specialist:  Robert Cool    Outreach call to patient in follow-up to ACP Specialist referral from: Cat Mahajan DO    [x] PCP  [] Provider   [] Ambulatory Care Management [] Other for Reason:    [x] Advance Directive Assistance  [] Code Status Discussion  [] Complete Portable DNR Order  [] Discuss Goals of Care  [] Complete POST/MOST  [] Early ACP Decision-Making  [] Other    Date Referral Received:3/28/23    Today's Outreach:  [] First   [] Second  [x] Fourth                               Third outreach made by [x]  phone  [] email []   [employer]     Intervention:  [] Spoke with Patient  [x] Left VM requesting return call      Outcome:Attempted to follow up with Pt for Advance Directives consult. Left voicemail for Pt. Next Step:   [] ACP scheduled conversation  [x] Outreach again in one week               [] Email / Mail ACP Info Sheets  [] Email / Mail Advance Directive            [] Close Referral. Routing closure to referring provider/staff and to ACP Specialist . [] Closure Letter mailed to Patient with Invitation to Contact ACP Specialist if/when ready.     Thank you for this referral.

## 2023-05-24 ENCOUNTER — OFFICE VISIT (OUTPATIENT)
Dept: FAMILY MEDICINE CLINIC | Age: 58
End: 2023-05-24

## 2023-05-24 VITALS
SYSTOLIC BLOOD PRESSURE: 112 MMHG | TEMPERATURE: 97.2 F | RESPIRATION RATE: 16 BRPM | HEART RATE: 75 BPM | OXYGEN SATURATION: 96 % | DIASTOLIC BLOOD PRESSURE: 84 MMHG | BODY MASS INDEX: 43.99 KG/M2 | WEIGHT: 264 LBS | HEIGHT: 65 IN

## 2023-05-24 DIAGNOSIS — E66.01 OBESITY, CLASS III, BMI 40-49.9 (MORBID OBESITY) (HCC): ICD-10-CM

## 2023-05-24 DIAGNOSIS — I10 ESSENTIAL HYPERTENSION: ICD-10-CM

## 2023-05-24 DIAGNOSIS — M19.011 PRIMARY OSTEOARTHRITIS OF RIGHT SHOULDER: Primary | ICD-10-CM

## 2023-05-24 DIAGNOSIS — Z01.818 PRE-OP EXAM: ICD-10-CM

## 2023-05-24 DIAGNOSIS — M51.36 DDD (DEGENERATIVE DISC DISEASE), LUMBAR: ICD-10-CM

## 2023-05-24 ASSESSMENT — ENCOUNTER SYMPTOMS
TROUBLE SWALLOWING: 0
DIARRHEA: 1
SORE THROAT: 0
SHORTNESS OF BREATH: 0
COUGH: 0
PHOTOPHOBIA: 0
BACK PAIN: 1
RHINORRHEA: 1
WHEEZING: 0

## 2023-05-24 NOTE — PROGRESS NOTES
ondansetron (ZOFRAN ODT) 4 MG disintegrating tablet Take 2 tablets by mouth every 8 hours as needed for Nausea 20 tablet 0    DULoxetine (CYMBALTA) 60 MG extended release capsule Take 1 capsule by mouth 2 times daily 60 capsule 3    Acetaminophen (TYLENOL PO) Take 650 mg by mouth daily as needed       MELATONIN PO Take 20 mg by mouth nightly as needed        No current facility-administered medications for this visit.

## 2023-05-30 DIAGNOSIS — M25.519 ACUTE SHOULDER PAIN, UNSPECIFIED LATERALITY: ICD-10-CM

## 2023-05-31 ENCOUNTER — CLINICAL DOCUMENTATION (OUTPATIENT)
Dept: SPIRITUAL SERVICES | Age: 58
End: 2023-05-31

## 2023-05-31 RX ORDER — MECLIZINE HCL 12.5 MG/1
TABLET ORAL
Qty: 15 TABLET | Refills: 1 | Status: SHIPPED | OUTPATIENT
Start: 2023-05-31 | End: 2023-07-04

## 2023-05-31 NOTE — ACP (ADVANCE CARE PLANNING)
Advance Care Planning   Ambulatory ACP Specialist Patient Outreach    Date:  5/31/2023  ACP Specialist:  Benito Mcgovern    Outreach call to patient in follow-up to ACP Specialist referral from: Pepe Cunningham DO    [x] PCP  [] Provider   [] Ambulatory Care Management [] Other for Reason:    [x] Advance Directive Assistance  [] Code Status Discussion  [] Complete Portable DNR Order  [] Discuss Goals of Care  [] Complete POST/MOST  [] Early ACP Decision-Making  [] Other    Date Referral Received:3/28/23    Today's Outreach:  [] First   [] Second  [x] Fifth                               Third outreach made by [x]  phone  [] email []   [employer]     Intervention:  [x] Spoke with Patient  [] Left VM requesting return call      Outcome:Per Pt's request, mailing copy of Advance Directive forms. Pt will call Outpatient Spiritual Care when ready to go over forms. Next Step:   [] ACP scheduled conversation  [] Outreach again in one week               [x] Email / Mail ACP Info Sheets  [x] Email / Mail Advance Directive            [] Close Referral. Routing closure to referring provider/staff and to ACP Specialist . [] Closure Letter mailed to Patient with Invitation to Contact ACP Specialist if/when ready.     Thank you for this referral.

## 2023-05-31 NOTE — TELEPHONE ENCOUNTER
5/24/2023    Future Appointments   Date Time Provider Diann Sotelo   6/5/2023  1:30 PM BECKIE Garcia [facility]

## 2023-06-01 RX ORDER — ROPINIROLE 2 MG/1
TABLET, FILM COATED ORAL
Qty: 90 TABLET | Refills: 3 | Status: SHIPPED | OUTPATIENT
Start: 2023-06-01

## 2023-06-05 ENCOUNTER — HOSPITAL ENCOUNTER (OUTPATIENT)
Dept: GENERAL RADIOLOGY | Age: 58
Discharge: HOME | End: 2023-06-05
Payer: MEDICARE

## 2023-06-05 ENCOUNTER — TELEMEDICINE (OUTPATIENT)
Dept: PSYCHOLOGY | Age: 58
End: 2023-06-05
Payer: MEDICARE

## 2023-06-05 DIAGNOSIS — Z01.818 PRE-OP EXAM: ICD-10-CM

## 2023-06-05 DIAGNOSIS — E66.01 OBESITY, CLASS III, BMI 40-49.9 (MORBID OBESITY) (HCC): ICD-10-CM

## 2023-06-05 DIAGNOSIS — I10 ESSENTIAL HYPERTENSION: ICD-10-CM

## 2023-06-05 DIAGNOSIS — F41.9 ANXIETY: Primary | ICD-10-CM

## 2023-06-05 PROCEDURE — 90832 PSYTX W PT 30 MINUTES: CPT | Performed by: SOCIAL WORKER

## 2023-06-05 PROCEDURE — 71046 X-RAY EXAM CHEST 2 VIEWS: CPT

## 2023-06-05 PROCEDURE — 1036F TOBACCO NON-USER: CPT | Performed by: SOCIAL WORKER

## 2023-06-05 RX ORDER — IBUPROFEN 200 MG
200 TABLET ORAL EVERY 8 HOURS PRN
COMMUNITY

## 2023-06-15 ENCOUNTER — HOSPITAL ENCOUNTER (OUTPATIENT)
Age: 58
Setting detail: OBSERVATION
Discharge: HOME | End: 2023-06-17
Attending: ORTHOPAEDIC SURGERY | Admitting: ORTHOPAEDIC SURGERY
Payer: MEDICARE

## 2023-06-15 ENCOUNTER — APPOINTMENT (OUTPATIENT)
Dept: GENERAL RADIOLOGY | Age: 58
End: 2023-06-15
Attending: ORTHOPAEDIC SURGERY
Payer: MEDICARE

## 2023-06-15 DIAGNOSIS — S46.011D TRAUMATIC COMPLETE TEAR OF RIGHT ROTATOR CUFF, SUBSEQUENT ENCOUNTER: Primary | ICD-10-CM

## 2023-06-15 PROBLEM — M19.011 ARTHRITIS OF RIGHT SHOULDER REGION: Status: ACTIVE | Noted: 2023-06-15

## 2023-06-15 LAB
ABO + RH BLD: NORMAL
BLD GP AB SCN SERPL QL: NORMAL

## 2023-06-15 PROCEDURE — 3700000000 HC ANESTHESIA ATTENDED CARE: Performed by: ORTHOPAEDIC SURGERY

## 2023-06-15 PROCEDURE — 3600000015 HC SURGERY LEVEL 5 ADDTL 15MIN: Performed by: ORTHOPAEDIC SURGERY

## 2023-06-15 PROCEDURE — 6360000002 HC RX W HCPCS: Performed by: ORTHOPAEDIC SURGERY

## 2023-06-15 PROCEDURE — 94150 VITAL CAPACITY TEST: CPT

## 2023-06-15 PROCEDURE — 3600000005 HC SURGERY LEVEL 5 BASE: Performed by: ORTHOPAEDIC SURGERY

## 2023-06-15 PROCEDURE — C1776 JOINT DEVICE (IMPLANTABLE): HCPCS | Performed by: ORTHOPAEDIC SURGERY

## 2023-06-15 PROCEDURE — C1713 ANCHOR/SCREW BN/BN,TIS/BN: HCPCS | Performed by: ORTHOPAEDIC SURGERY

## 2023-06-15 PROCEDURE — 2580000003 HC RX 258: Performed by: ORTHOPAEDIC SURGERY

## 2023-06-15 PROCEDURE — C1894 INTRO/SHEATH, NON-LASER: HCPCS | Performed by: ORTHOPAEDIC SURGERY

## 2023-06-15 PROCEDURE — G0378 HOSPITAL OBSERVATION PER HR: HCPCS

## 2023-06-15 PROCEDURE — 94761 N-INVAS EAR/PLS OXIMETRY MLT: CPT

## 2023-06-15 PROCEDURE — 1200000000 HC SEMI PRIVATE

## 2023-06-15 PROCEDURE — 86901 BLOOD TYPING SEROLOGIC RH(D): CPT

## 2023-06-15 PROCEDURE — 6370000000 HC RX 637 (ALT 250 FOR IP): Performed by: ORTHOPAEDIC SURGERY

## 2023-06-15 PROCEDURE — 6360000002 HC RX W HCPCS: Performed by: ANESTHESIOLOGY

## 2023-06-15 PROCEDURE — 2580000003 HC RX 258: Performed by: ANESTHESIOLOGY

## 2023-06-15 PROCEDURE — 86850 RBC ANTIBODY SCREEN: CPT

## 2023-06-15 PROCEDURE — A4217 STERILE WATER/SALINE, 500 ML: HCPCS | Performed by: ORTHOPAEDIC SURGERY

## 2023-06-15 PROCEDURE — 7100000001 HC PACU RECOVERY - ADDTL 15 MIN: Performed by: ORTHOPAEDIC SURGERY

## 2023-06-15 PROCEDURE — 3700000001 HC ADD 15 MINUTES (ANESTHESIA): Performed by: ORTHOPAEDIC SURGERY

## 2023-06-15 PROCEDURE — 7100000000 HC PACU RECOVERY - FIRST 15 MIN: Performed by: ORTHOPAEDIC SURGERY

## 2023-06-15 PROCEDURE — 2709999900 HC NON-CHARGEABLE SUPPLY: Performed by: ORTHOPAEDIC SURGERY

## 2023-06-15 PROCEDURE — 2700000000 HC OXYGEN THERAPY PER DAY

## 2023-06-15 PROCEDURE — 96365 THER/PROPH/DIAG IV INF INIT: CPT

## 2023-06-15 PROCEDURE — 86900 BLOOD TYPING SEROLOGIC ABO: CPT

## 2023-06-15 PROCEDURE — 2720000010 HC SURG SUPPLY STERILE: Performed by: ORTHOPAEDIC SURGERY

## 2023-06-15 PROCEDURE — 73020 X-RAY EXAM OF SHOULDER: CPT

## 2023-06-15 DEVICE — IMPLANTABLE DEVICE: Type: IMPLANTABLE DEVICE | Site: SHOULDER | Status: FUNCTIONAL

## 2023-06-15 DEVICE — SCREW BNE L15MM DIA4.75MM HD DIA3.5MM CORT FIX ANG: Type: IMPLANTABLE DEVICE | Site: SHOULDER | Status: FUNCTIONAL

## 2023-06-15 DEVICE — STEM HUM 10MM MINI SHLDR CO CHROM COMPHSVE REV PRI CEM: Type: IMPLANTABLE DEVICE | Site: SHOULDER | Status: FUNCTIONAL

## 2023-06-15 DEVICE — IMPLANT HMRL TY +6 STD: Type: IMPLANTABLE DEVICE | Site: SHOULDER | Status: FUNCTIONAL

## 2023-06-15 DEVICE — 3.5MM PROTEX CT POLYAXIAL SCREW 10MM
Type: IMPLANTABLE DEVICE | Site: SHOULDER | Status: FUNCTIONAL
Brand: PROTEX CT

## 2023-06-15 DEVICE — SCREW BNE L20MM DIA4.75MM HD DIA3.5MM CORT FIX ANG: Type: IMPLANTABLE DEVICE | Site: SHOULDER | Status: FUNCTIONAL

## 2023-06-15 DEVICE — SHOULDR S3 TOT ADV REVRS IMPL CAPPED S3: Type: IMPLANTABLE DEVICE | Site: SHOULDER | Status: FUNCTIONAL

## 2023-06-15 DEVICE — SCREW BNE L20MM DIA6.5MM HEX HD DIA3.5MM FOR COMPHSVE CONV: Type: IMPLANTABLE DEVICE | Site: SHOULDER | Status: FUNCTIONAL

## 2023-06-15 RX ORDER — ONDANSETRON 4 MG/1
8 TABLET, ORALLY DISINTEGRATING ORAL EVERY 8 HOURS PRN
Status: DISCONTINUED | OUTPATIENT
Start: 2023-06-15 | End: 2023-06-15 | Stop reason: SDUPTHER

## 2023-06-15 RX ORDER — HYDRALAZINE HYDROCHLORIDE 20 MG/ML
10 INJECTION INTRAMUSCULAR; INTRAVENOUS
Status: DISCONTINUED | OUTPATIENT
Start: 2023-06-15 | End: 2023-06-15 | Stop reason: HOSPADM

## 2023-06-15 RX ORDER — SODIUM CHLORIDE 0.9 % (FLUSH) 0.9 %
5-40 SYRINGE (ML) INJECTION PRN
Status: DISCONTINUED | OUTPATIENT
Start: 2023-06-15 | End: 2023-06-17 | Stop reason: HOSPADM

## 2023-06-15 RX ORDER — METHOCARBAMOL 500 MG/1
500 TABLET, FILM COATED ORAL 3 TIMES DAILY PRN
Status: DISCONTINUED | OUTPATIENT
Start: 2023-06-15 | End: 2023-06-17 | Stop reason: HOSPADM

## 2023-06-15 RX ORDER — ROPINIROLE 2 MG/1
2 TABLET, FILM COATED ORAL 2 TIMES DAILY
Status: DISCONTINUED | OUTPATIENT
Start: 2023-06-15 | End: 2023-06-17 | Stop reason: HOSPADM

## 2023-06-15 RX ORDER — SODIUM CHLORIDE 0.9 % (FLUSH) 0.9 %
5-40 SYRINGE (ML) INJECTION EVERY 12 HOURS SCHEDULED
Status: DISCONTINUED | OUTPATIENT
Start: 2023-06-15 | End: 2023-06-17 | Stop reason: HOSPADM

## 2023-06-15 RX ORDER — SENNA PLUS 8.6 MG/1
1 TABLET ORAL DAILY PRN
Status: DISCONTINUED | OUTPATIENT
Start: 2023-06-15 | End: 2023-06-17 | Stop reason: HOSPADM

## 2023-06-15 RX ORDER — SODIUM CHLORIDE 9 MG/ML
INJECTION, SOLUTION INTRAVENOUS PRN
Status: DISCONTINUED | OUTPATIENT
Start: 2023-06-15 | End: 2023-06-17 | Stop reason: HOSPADM

## 2023-06-15 RX ORDER — HYDROMORPHONE HYDROCHLORIDE 1 MG/ML
0.5 INJECTION, SOLUTION INTRAMUSCULAR; INTRAVENOUS; SUBCUTANEOUS EVERY 10 MIN PRN
Status: DISCONTINUED | OUTPATIENT
Start: 2023-06-15 | End: 2023-06-15 | Stop reason: HOSPADM

## 2023-06-15 RX ORDER — ALBUTEROL SULFATE 2.5 MG/3ML
2.5 SOLUTION RESPIRATORY (INHALATION) EVERY 4 HOURS PRN
Status: DISCONTINUED | OUTPATIENT
Start: 2023-06-15 | End: 2023-06-17 | Stop reason: HOSPADM

## 2023-06-15 RX ORDER — OXYCODONE HYDROCHLORIDE 5 MG/1
10 TABLET ORAL EVERY 4 HOURS PRN
Status: DISCONTINUED | OUTPATIENT
Start: 2023-06-15 | End: 2023-06-17 | Stop reason: HOSPADM

## 2023-06-15 RX ORDER — MEPERIDINE HYDROCHLORIDE 25 MG/ML
12.5 INJECTION INTRAMUSCULAR; INTRAVENOUS; SUBCUTANEOUS EVERY 5 MIN PRN
Status: DISCONTINUED | OUTPATIENT
Start: 2023-06-15 | End: 2023-06-15 | Stop reason: HOSPADM

## 2023-06-15 RX ORDER — SENNA PLUS 8.6 MG/1
1 TABLET ORAL DAILY PRN
COMMUNITY
Start: 2023-06-15 | End: 2023-07-15

## 2023-06-15 RX ORDER — PANTOPRAZOLE SODIUM 40 MG/1
40 TABLET, DELAYED RELEASE ORAL
Status: DISCONTINUED | OUTPATIENT
Start: 2023-06-16 | End: 2023-06-17 | Stop reason: HOSPADM

## 2023-06-15 RX ORDER — MIDAZOLAM HYDROCHLORIDE 1 MG/ML
INJECTION INTRAMUSCULAR; INTRAVENOUS
Status: COMPLETED
Start: 2023-06-15 | End: 2023-06-15

## 2023-06-15 RX ORDER — ALPRAZOLAM 0.5 MG/1
0.5 TABLET ORAL NIGHTLY PRN
Status: DISCONTINUED | OUTPATIENT
Start: 2023-06-15 | End: 2023-06-17 | Stop reason: HOSPADM

## 2023-06-15 RX ORDER — OXYCODONE HYDROCHLORIDE 5 MG/1
5 TABLET ORAL EVERY 4 HOURS PRN
Qty: 42 TABLET | Refills: 0 | Status: SHIPPED | OUTPATIENT
Start: 2023-06-15 | End: 2023-06-22

## 2023-06-15 RX ORDER — CEFADROXIL 500 MG/1
500 CAPSULE ORAL 2 TIMES DAILY
Qty: 14 CAPSULE | Refills: 0 | Status: SHIPPED | OUTPATIENT
Start: 2023-06-15 | End: 2023-06-22

## 2023-06-15 RX ORDER — VANCOMYCIN HYDROCHLORIDE 1 G/20ML
INJECTION, POWDER, LYOPHILIZED, FOR SOLUTION INTRAVENOUS PRN
Status: DISCONTINUED | OUTPATIENT
Start: 2023-06-15 | End: 2023-06-15 | Stop reason: HOSPADM

## 2023-06-15 RX ORDER — POTASSIUM CHLORIDE 750 MG/1
10 TABLET, EXTENDED RELEASE ORAL 2 TIMES DAILY WITH MEALS
Status: DISCONTINUED | OUTPATIENT
Start: 2023-06-15 | End: 2023-06-17 | Stop reason: HOSPADM

## 2023-06-15 RX ORDER — SODIUM CHLORIDE 0.9 % (FLUSH) 0.9 %
5-40 SYRINGE (ML) INJECTION PRN
Status: DISCONTINUED | OUTPATIENT
Start: 2023-06-15 | End: 2023-06-15 | Stop reason: HOSPADM

## 2023-06-15 RX ORDER — DIPHENHYDRAMINE HYDROCHLORIDE 50 MG/ML
12.5 INJECTION INTRAMUSCULAR; INTRAVENOUS
Status: DISCONTINUED | OUTPATIENT
Start: 2023-06-15 | End: 2023-06-15 | Stop reason: HOSPADM

## 2023-06-15 RX ORDER — ACETAMINOPHEN 325 MG/1
650 TABLET ORAL DAILY PRN
Status: DISCONTINUED | OUTPATIENT
Start: 2023-06-15 | End: 2023-06-17 | Stop reason: HOSPADM

## 2023-06-15 RX ORDER — GABAPENTIN 100 MG/1
200 CAPSULE ORAL 2 TIMES DAILY
Status: DISCONTINUED | OUTPATIENT
Start: 2023-06-15 | End: 2023-06-17 | Stop reason: HOSPADM

## 2023-06-15 RX ORDER — LABETALOL HYDROCHLORIDE 5 MG/ML
10 INJECTION, SOLUTION INTRAVENOUS
Status: DISCONTINUED | OUTPATIENT
Start: 2023-06-15 | End: 2023-06-15 | Stop reason: HOSPADM

## 2023-06-15 RX ORDER — ALPRAZOLAM 0.5 MG/1
0.5 TABLET ORAL NIGHTLY PRN
COMMUNITY

## 2023-06-15 RX ORDER — ONDANSETRON 4 MG/1
4 TABLET, ORALLY DISINTEGRATING ORAL EVERY 8 HOURS PRN
Status: DISCONTINUED | OUTPATIENT
Start: 2023-06-15 | End: 2023-06-17 | Stop reason: HOSPADM

## 2023-06-15 RX ORDER — OXYCODONE HYDROCHLORIDE 5 MG/1
5 TABLET ORAL EVERY 4 HOURS PRN
Status: DISCONTINUED | OUTPATIENT
Start: 2023-06-15 | End: 2023-06-17 | Stop reason: HOSPADM

## 2023-06-15 RX ORDER — BUPIVACAINE HYDROCHLORIDE 5 MG/ML
INJECTION, SOLUTION EPIDURAL; INTRACAUDAL
Status: COMPLETED
Start: 2023-06-15 | End: 2023-06-15

## 2023-06-15 RX ORDER — METOCLOPRAMIDE HYDROCHLORIDE 5 MG/ML
10 INJECTION INTRAMUSCULAR; INTRAVENOUS
Status: DISCONTINUED | OUTPATIENT
Start: 2023-06-15 | End: 2023-06-15 | Stop reason: HOSPADM

## 2023-06-15 RX ORDER — SODIUM CHLORIDE 0.9 % (FLUSH) 0.9 %
5-40 SYRINGE (ML) INJECTION EVERY 12 HOURS SCHEDULED
Status: DISCONTINUED | OUTPATIENT
Start: 2023-06-15 | End: 2023-06-15 | Stop reason: HOSPADM

## 2023-06-15 RX ORDER — ASPIRIN 81 MG/1
81 TABLET ORAL DAILY
Status: DISCONTINUED | OUTPATIENT
Start: 2023-06-16 | End: 2023-06-17 | Stop reason: HOSPADM

## 2023-06-15 RX ORDER — ONDANSETRON 2 MG/ML
4 INJECTION INTRAMUSCULAR; INTRAVENOUS EVERY 6 HOURS PRN
Status: DISCONTINUED | OUTPATIENT
Start: 2023-06-15 | End: 2023-06-17 | Stop reason: HOSPADM

## 2023-06-15 RX ORDER — SERTRALINE HYDROCHLORIDE 25 MG/1
25 TABLET, FILM COATED ORAL DAILY
Status: DISCONTINUED | OUTPATIENT
Start: 2023-06-15 | End: 2023-06-15 | Stop reason: CLARIF

## 2023-06-15 RX ORDER — ASPIRIN 81 MG/1
81 TABLET ORAL DAILY
Qty: 30 TABLET | Refills: 0 | Status: SHIPPED | OUTPATIENT
Start: 2023-06-16

## 2023-06-15 RX ORDER — METOCLOPRAMIDE 10 MG/1
5 TABLET ORAL 3 TIMES DAILY PRN
Status: DISCONTINUED | OUTPATIENT
Start: 2023-06-15 | End: 2023-06-17 | Stop reason: HOSPADM

## 2023-06-15 RX ORDER — MECOBALAMIN 5000 MCG
20 TABLET,DISINTEGRATING ORAL NIGHTLY PRN
Status: DISCONTINUED | OUTPATIENT
Start: 2023-06-15 | End: 2023-06-17 | Stop reason: HOSPADM

## 2023-06-15 RX ORDER — SODIUM CHLORIDE 9 MG/ML
INJECTION, SOLUTION INTRAVENOUS PRN
Status: DISCONTINUED | OUTPATIENT
Start: 2023-06-15 | End: 2023-06-15 | Stop reason: HOSPADM

## 2023-06-15 RX ORDER — HYDROMORPHONE HYDROCHLORIDE 1 MG/ML
0.5 INJECTION, SOLUTION INTRAMUSCULAR; INTRAVENOUS; SUBCUTANEOUS EVERY 5 MIN PRN
Status: DISCONTINUED | OUTPATIENT
Start: 2023-06-15 | End: 2023-06-15 | Stop reason: HOSPADM

## 2023-06-15 RX ORDER — POLYETHYLENE GLYCOL 3350 17 G/17G
17 POWDER, FOR SOLUTION ORAL DAILY PRN
Status: DISCONTINUED | OUTPATIENT
Start: 2023-06-15 | End: 2023-06-17 | Stop reason: HOSPADM

## 2023-06-15 RX ORDER — MECLIZINE HCL 12.5 MG/1
12.5 TABLET ORAL 3 TIMES DAILY PRN
Status: DISCONTINUED | OUTPATIENT
Start: 2023-06-15 | End: 2023-06-17 | Stop reason: HOSPADM

## 2023-06-15 RX ORDER — DICYCLOMINE HYDROCHLORIDE 10 MG/1
10 CAPSULE ORAL 4 TIMES DAILY PRN
Status: DISCONTINUED | OUTPATIENT
Start: 2023-06-15 | End: 2023-06-17 | Stop reason: HOSPADM

## 2023-06-15 RX ORDER — SODIUM CHLORIDE, SODIUM LACTATE, POTASSIUM CHLORIDE, CALCIUM CHLORIDE 600; 310; 30; 20 MG/100ML; MG/100ML; MG/100ML; MG/100ML
INJECTION, SOLUTION INTRAVENOUS CONTINUOUS
Status: DISCONTINUED | OUTPATIENT
Start: 2023-06-15 | End: 2023-06-15 | Stop reason: HOSPADM

## 2023-06-15 RX ORDER — ACETAMINOPHEN 325 MG/1
650 TABLET ORAL EVERY 6 HOURS
Status: DISCONTINUED | OUTPATIENT
Start: 2023-06-15 | End: 2023-06-17 | Stop reason: HOSPADM

## 2023-06-15 RX ORDER — DULOXETIN HYDROCHLORIDE 60 MG/1
60 CAPSULE, DELAYED RELEASE ORAL 2 TIMES DAILY
Status: DISCONTINUED | OUTPATIENT
Start: 2023-06-15 | End: 2023-06-17 | Stop reason: HOSPADM

## 2023-06-15 RX ADMIN — HYDROMORPHONE HYDROCHLORIDE 0.5 MG: 1 INJECTION, SOLUTION INTRAMUSCULAR; INTRAVENOUS; SUBCUTANEOUS at 15:12

## 2023-06-15 RX ADMIN — ACETAMINOPHEN 650 MG: 325 TABLET ORAL at 21:14

## 2023-06-15 RX ADMIN — CEFAZOLIN 3000 MG: 2 INJECTION, POWDER, FOR SOLUTION INTRAMUSCULAR; INTRAVENOUS at 22:36

## 2023-06-15 RX ADMIN — GABAPENTIN 200 MG: 100 CAPSULE ORAL at 21:14

## 2023-06-15 RX ADMIN — ROPINIROLE 2 MG: 2 TABLET, FILM COATED ORAL at 21:14

## 2023-06-15 RX ADMIN — METHOCARBAMOL 500 MG: 500 TABLET ORAL at 21:14

## 2023-06-15 RX ADMIN — SODIUM CHLORIDE, PRESERVATIVE FREE 10 ML: 5 INJECTION INTRAVENOUS at 21:13

## 2023-06-15 RX ADMIN — ACETAMINOPHEN 650 MG: 325 TABLET ORAL at 19:07

## 2023-06-15 RX ADMIN — DULOXETINE HYDROCHLORIDE 60 MG: 60 CAPSULE, DELAYED RELEASE ORAL at 21:14

## 2023-06-15 RX ADMIN — POTASSIUM CHLORIDE 10 MEQ: 750 TABLET, EXTENDED RELEASE ORAL at 19:09

## 2023-06-15 RX ADMIN — SODIUM CHLORIDE, POTASSIUM CHLORIDE, SODIUM LACTATE AND CALCIUM CHLORIDE: 600; 310; 30; 20 INJECTION, SOLUTION INTRAVENOUS at 11:59

## 2023-06-15 RX ADMIN — HYDROMORPHONE HYDROCHLORIDE 0.5 MG: 1 INJECTION, SOLUTION INTRAMUSCULAR; INTRAVENOUS; SUBCUTANEOUS at 16:02

## 2023-06-15 RX ADMIN — OXYCODONE HYDROCHLORIDE 10 MG: 5 TABLET ORAL at 21:13

## 2023-06-15 ASSESSMENT — PAIN DESCRIPTION - ONSET
ONSET: ON-GOING

## 2023-06-15 ASSESSMENT — PAIN DESCRIPTION - DESCRIPTORS
DESCRIPTORS: ACHING;DISCOMFORT
DESCRIPTORS: DISCOMFORT
DESCRIPTORS: BURNING;DISCOMFORT
DESCRIPTORS: BURNING;DISCOMFORT

## 2023-06-15 ASSESSMENT — PAIN SCALES - GENERAL
PAINLEVEL_OUTOF10: 4
PAINLEVEL_OUTOF10: 0
PAINLEVEL_OUTOF10: 3
PAINLEVEL_OUTOF10: 6
PAINLEVEL_OUTOF10: 7
PAINLEVEL_OUTOF10: 4
PAINLEVEL_OUTOF10: 8
PAINLEVEL_OUTOF10: 4
PAINLEVEL_OUTOF10: 0

## 2023-06-15 ASSESSMENT — PAIN DESCRIPTION - LOCATION
LOCATION: SHOULDER

## 2023-06-15 ASSESSMENT — PAIN DESCRIPTION - ORIENTATION
ORIENTATION: RIGHT

## 2023-06-15 ASSESSMENT — PAIN DESCRIPTION - FREQUENCY
FREQUENCY: INTERMITTENT
FREQUENCY: CONTINUOUS
FREQUENCY: INTERMITTENT
FREQUENCY: CONTINUOUS
FREQUENCY: CONTINUOUS

## 2023-06-15 ASSESSMENT — PAIN DESCRIPTION - DIRECTION
RADIATING_TOWARDS: ARMPIT
RADIATING_TOWARDS: ARMPIT

## 2023-06-15 ASSESSMENT — PAIN DESCRIPTION - PAIN TYPE
TYPE: SURGICAL PAIN
TYPE: SURGICAL PAIN
TYPE: ACUTE PAIN;SURGICAL PAIN
TYPE: SURGICAL PAIN
TYPE: ACUTE PAIN;SURGICAL PAIN

## 2023-06-15 ASSESSMENT — PAIN - FUNCTIONAL ASSESSMENT
PAIN_FUNCTIONAL_ASSESSMENT: 0-10
PAIN_FUNCTIONAL_ASSESSMENT: PREVENTS OR INTERFERES SOME ACTIVE ACTIVITIES AND ADLS
PAIN_FUNCTIONAL_ASSESSMENT: ACTIVITIES ARE NOT PREVENTED
PAIN_FUNCTIONAL_ASSESSMENT: PREVENTS OR INTERFERES SOME ACTIVE ACTIVITIES AND ADLS

## 2023-06-15 NOTE — PLAN OF CARE
Problem: Discharge Planning  Goal: Discharge to home or other facility with appropriate resources  Outcome: Progressing     Problem: Pain  Goal: Verbalizes/displays adequate comfort level or baseline comfort level  Outcome: Progressing     Problem: Safety - Adult  Goal: Free from fall injury  Outcome: Progressing    Fall risk precaution in place. Bed is locked and in lowest position. 2/4 side rails are up. Call light with in reach. Fall risk bracelet in place, non slip socks on.frequent check on patient. free from falls at this time. will continue to monitor.      Problem: ABCDS Injury Assessment  Goal: Absence of physical injury  Outcome: Progressing

## 2023-06-15 NOTE — OP NOTE
Operative Report    Patient: Gonzalo Lowry  YOB: 1965  Age: 62 y.o. Sex: female  MRN: [id number]    DATE OF OPERATION : 6/15/2023     SURGEON: Silas Frank MD    FIRST SURGICAL ASSISTANT:  Markus Osorio PA-C. The Physician Assistant was necessary to perform the surgery today by assisting with application of implants and manipulation of the extremity. This help was not otherwise available in the operating room today. PREOPERATIVE DIAGNOSIS: Right shoulder rotator cuff arthropathy. POSTOPERATIVE DIAGNOSIS:Right  shoulder rotator cuff arthropathy. PROCEDURE PERFORMED: Reverse right total shoulder replacement. COMPLICATIONS: None. EBL: 100 cc      ANESTHESIA: Preoperative interscalene block and a general anesthetic. Pre op Antibiotics: 2g Ancef      INDICATIONS FOR SURGERY: Gonzalo Lowry, is a 62 y.o. female  patient who presented to clinic with pain and limited shoulder function due to rotator cuff arthropathy. The risks  and benefits of Right  shoulder reverse total shoulder replacement was carefully explained in great deal and they have elected to proceed to surgery. Of particular note, she has a history of failed rotator cuff repair at an outside facility, and did have a postoperative infection. This was addressed with I&D's and since that time she has had 2 Fluoro guided aspirations which have been negative for infection. Due to this history, during the case today I did use Irrisept, Betadine washes copiously during the surgery and did apply Vancomycin powder at the conclusion of the case. No signs of infection were encountered during surgery. OPERATIVE FINDINGS: There was a massive superior rotator cuff tear with significant retraction. There was end-stage osteoarthritis at the glenohumeral joint.      IMPLANTS USED:   [employer]omet Comprehensive reverse total shoulder system  Size 10 mm stem  25 mm mini-metal base plate  1 central and 3 periheral screws,   36 mm

## 2023-06-15 NOTE — DISCHARGE INSTRUCTIONS
DISCHARGE INSTRUCTIONS    [facility]    Activity instructions    PT/OT instructions:  Non weight bearing. Sling in place except for range of motion and hygiene. Once block has worn off: Pendulum exercises for the shoulder, and elbow/ wrist/ hand range of motion, three times a day (out of sling for these). Do not ambulate without assistance unless cleared to do so  IS Spirometer every 2 hrs while awake    Suggested bowel care with the following medications   Senokot 1-2 tabs by mouth twice daily, continue while on narcotics, hold for loose stools. Non weight bearing on operative upper extremity    ( x  ) Dressing/wound care  Keep gray sealed dressing on until your clinic follow up. You may shower after 3 days. No tub baths. Do not scrub dressing. Pat dry with soft towel. NO LOTIONS OR CREAMS     DVT PROPHYLAXIS (prevention of blood clots)-   Aspirin 81 mg once daily for 4 weeks    Call office with any questions regarding anticoagulation medications  [phone]    Follow up with Dr. Maco Chávez ~2 weeks after surgery.     ELECTRONICALLY SIGNED BY: Gracie Awad MD

## 2023-06-15 NOTE — H&P
Update History & Physical    The patient's History and Physical was reviewed with the patient and I examined the patient. There was no change. The surgical site was confirmed by the patient and me. Plan: The risks, benefits, expected outcome, and alternative to the recommended procedure have been discussed with the patient. Patient understands and wants to proceed with the procedure.      Steph Coello MD  6/15/2023

## 2023-06-15 NOTE — FLOWSHEET NOTE
Procedure: Right interscalene peripheral block  MD: Dr. Car Padron performed @ 1601  Pt monitored closely on heart monitor, 2L NC, continuous pulse oximetry, EtCO2, and frequent BPs. Pt remained alert and oriented x4. pt tolerated procedure well.

## 2023-06-15 NOTE — CONSULTS
V2.0  Consult      Name:  Gonzalo Lowry /Age/Sex: 1965  (62 y.o. female)   MRN & CSN:  [id number] & [id number] Encounter Date/Time: 6/15/2023 6:23 PM EDT   Location:  [address] [place] [address] PCP: [address] [place] [address] Mayte Trujillo Day: 1    Assessment and Plan:   Gonzalo Lowry is a 62 y.o. female who presented for right total shoulder replacement. Plan:    Right shoulder rotator cuff arthropathy s/p reverse right total shoulder replacement  -Post-op abx DVT ppx per ortho  -Continue prn analgesics and antiemetics    Depression, anxiety -continue home xanax, zoloft, cymbalta  GERD -continue PPI  RLS -continue home requip, robaxin, neurontin    Will continue to follow    Disposition:       Diet ADULT DIET; Regular   DVT Prophylaxis [] Lovenox, []  Heparin, [] SCDs, [] Ambulation,  [] Eliquis, [] Xarelto, [] Coumadin   Code Status Full Code   Surrogate Decision Maker/ POA      Personally reviewed Lab Studies and Imaging     History from:     patient, electronic medical record    History of Present Illness:     Chief Complaint:   Gonzalo Lowry is a 62 y.o. female with pmh of right shoulder rotator cuff arthropathy who presents for right total shoulder replacement. Pt seen post procedure and doing well. Pain controlled, no nausea. Weaning oxygen and other VSS. Review of Systems:        Pertinent positives and negatives discussed in HPI     Objective: Intake/Output Summary (Last 24 hours) at 6/15/2023 1823  Last data filed at 6/15/2023 1645  Gross per 24 hour   Intake 1240 ml   Output 50 ml   Net 1190 ml      Vitals:   Vitals:    06/15/23 1630 06/15/23 1645 06/15/23 1726 06/15/23 1739   BP: 126/81 126/80 123/77    Pulse: 76 72 74 74   Resp: 18 13 15 16   Temp:  97.6 °F (36.4 °C) 97.8 °F (36.6 °C)    TempSrc:  Temporal Oral    SpO2: 93% 95% 94% 94%   Weight:       Height:           Medications Prior to Admission     Prior to Admission medications    Medication Sig Start Date End Date Taking?  Authorizing

## 2023-06-16 PROCEDURE — 2580000003 HC RX 258: Performed by: ORTHOPAEDIC SURGERY

## 2023-06-16 PROCEDURE — 6360000002 HC RX W HCPCS: Performed by: ORTHOPAEDIC SURGERY

## 2023-06-16 PROCEDURE — 97530 THERAPEUTIC ACTIVITIES: CPT

## 2023-06-16 PROCEDURE — G0378 HOSPITAL OBSERVATION PER HR: HCPCS

## 2023-06-16 PROCEDURE — 2700000000 HC OXYGEN THERAPY PER DAY

## 2023-06-16 PROCEDURE — 96366 THER/PROPH/DIAG IV INF ADDON: CPT

## 2023-06-16 PROCEDURE — 97535 SELF CARE MNGMENT TRAINING: CPT

## 2023-06-16 PROCEDURE — 97162 PT EVAL MOD COMPLEX 30 MIN: CPT

## 2023-06-16 PROCEDURE — 6370000000 HC RX 637 (ALT 250 FOR IP): Performed by: ORTHOPAEDIC SURGERY

## 2023-06-16 PROCEDURE — 97165 OT EVAL LOW COMPLEX 30 MIN: CPT

## 2023-06-16 PROCEDURE — 94761 N-INVAS EAR/PLS OXIMETRY MLT: CPT

## 2023-06-16 PROCEDURE — 97116 GAIT TRAINING THERAPY: CPT

## 2023-06-16 RX ADMIN — CEFAZOLIN 3000 MG: 2 INJECTION, POWDER, FOR SOLUTION INTRAMUSCULAR; INTRAVENOUS at 05:51

## 2023-06-16 RX ADMIN — ROPINIROLE 2 MG: 2 TABLET, FILM COATED ORAL at 21:14

## 2023-06-16 RX ADMIN — POTASSIUM CHLORIDE 10 MEQ: 750 TABLET, EXTENDED RELEASE ORAL at 16:39

## 2023-06-16 RX ADMIN — OXYCODONE HYDROCHLORIDE 10 MG: 5 TABLET ORAL at 17:45

## 2023-06-16 RX ADMIN — DULOXETINE HYDROCHLORIDE 60 MG: 60 CAPSULE, DELAYED RELEASE ORAL at 21:14

## 2023-06-16 RX ADMIN — OXYCODONE HYDROCHLORIDE 10 MG: 5 TABLET ORAL at 07:52

## 2023-06-16 RX ADMIN — ACETAMINOPHEN 650 MG: 325 TABLET ORAL at 02:58

## 2023-06-16 RX ADMIN — DULOXETINE HYDROCHLORIDE 60 MG: 60 CAPSULE, DELAYED RELEASE ORAL at 08:36

## 2023-06-16 RX ADMIN — OXYCODONE HYDROCHLORIDE 10 MG: 5 TABLET ORAL at 22:23

## 2023-06-16 RX ADMIN — GABAPENTIN 200 MG: 100 CAPSULE ORAL at 21:14

## 2023-06-16 RX ADMIN — ACETAMINOPHEN 650 MG: 325 TABLET ORAL at 21:14

## 2023-06-16 RX ADMIN — SODIUM CHLORIDE, PRESERVATIVE FREE 10 ML: 5 INJECTION INTRAVENOUS at 08:39

## 2023-06-16 RX ADMIN — GABAPENTIN 200 MG: 100 CAPSULE ORAL at 08:36

## 2023-06-16 RX ADMIN — ROPINIROLE 2 MG: 2 TABLET, FILM COATED ORAL at 08:36

## 2023-06-16 RX ADMIN — PANTOPRAZOLE SODIUM 40 MG: 40 TABLET, DELAYED RELEASE ORAL at 08:36

## 2023-06-16 RX ADMIN — OXYCODONE HYDROCHLORIDE 10 MG: 5 TABLET ORAL at 13:18

## 2023-06-16 RX ADMIN — ACETAMINOPHEN 650 MG: 325 TABLET ORAL at 16:39

## 2023-06-16 RX ADMIN — OXYCODONE HYDROCHLORIDE 10 MG: 5 TABLET ORAL at 01:34

## 2023-06-16 RX ADMIN — SODIUM CHLORIDE, PRESERVATIVE FREE 10 ML: 5 INJECTION INTRAVENOUS at 21:15

## 2023-06-16 RX ADMIN — ACETAMINOPHEN 650 MG: 325 TABLET ORAL at 08:37

## 2023-06-16 RX ADMIN — ASPIRIN 81 MG: 81 TABLET, COATED ORAL at 08:36

## 2023-06-16 RX ADMIN — POTASSIUM CHLORIDE 10 MEQ: 750 TABLET, EXTENDED RELEASE ORAL at 08:36

## 2023-06-16 ASSESSMENT — PAIN DESCRIPTION - DESCRIPTORS
DESCRIPTORS: ACHING;DISCOMFORT
DESCRIPTORS: ACHING;BURNING
DESCRIPTORS: ACHING
DESCRIPTORS: SORE
DESCRIPTORS: ACHING

## 2023-06-16 ASSESSMENT — PAIN DESCRIPTION - LOCATION
LOCATION: SHOULDER

## 2023-06-16 ASSESSMENT — PAIN - FUNCTIONAL ASSESSMENT
PAIN_FUNCTIONAL_ASSESSMENT: PREVENTS OR INTERFERES SOME ACTIVE ACTIVITIES AND ADLS
PAIN_FUNCTIONAL_ASSESSMENT: ACTIVITIES ARE NOT PREVENTED
PAIN_FUNCTIONAL_ASSESSMENT: PREVENTS OR INTERFERES SOME ACTIVE ACTIVITIES AND ADLS

## 2023-06-16 ASSESSMENT — PAIN DESCRIPTION - FREQUENCY
FREQUENCY: CONTINUOUS
FREQUENCY: INTERMITTENT

## 2023-06-16 ASSESSMENT — PAIN DESCRIPTION - ORIENTATION
ORIENTATION: INNER;RIGHT
ORIENTATION: RIGHT

## 2023-06-16 ASSESSMENT — PAIN SCALES - WONG BAKER
WONGBAKER_NUMERICALRESPONSE: 2
WONGBAKER_NUMERICALRESPONSE: 2

## 2023-06-16 ASSESSMENT — PAIN SCALES - GENERAL
PAINLEVEL_OUTOF10: 1
PAINLEVEL_OUTOF10: 4
PAINLEVEL_OUTOF10: 5
PAINLEVEL_OUTOF10: 7
PAINLEVEL_OUTOF10: 4
PAINLEVEL_OUTOF10: 7
PAINLEVEL_OUTOF10: 4
PAINLEVEL_OUTOF10: 7
PAINLEVEL_OUTOF10: 4

## 2023-06-16 ASSESSMENT — PAIN DESCRIPTION - PAIN TYPE
TYPE: SURGICAL PAIN
TYPE: ACUTE PAIN;SURGICAL PAIN

## 2023-06-16 ASSESSMENT — PAIN DESCRIPTION - ONSET
ONSET: ON-GOING

## 2023-06-16 NOTE — DISCHARGE SUMMARY
[place] DISCHARGE SUMMARY         The patient was taken to the operating room  where the aforementioned procedure was preformed. The patient was taken to the post operative anesthesia recovery unit in stable condition. The patient was then transferred to the orthopaedic floor for post operative pain management and convalesce       The patient was followed medically in the hospital for the above surgical procedures performed and below medical issues during their hospital stay    Principal Problem:    Arthritis of right shoulder region  Resolved Problems:    * No resolved hospital problems. *         (x )The patient was placed on anticoagulation therapy for DVT prophylaxis       The patient was discharged in stable condition . Please see medical reconciliation for discharge medications. The discharge instructions were explained to the patient and the family. The patient will follow up in the office in 2 weeks for repeat examination and xray .

## 2023-06-16 NOTE — CARE COORDINATION
Notification received from [facility] that patient has been down graded to Observation, ROSSI and CC44 letters have been provided to the patient and documented in Epic.     Electronically signed by Lisa Terry RN on 6/16/2023 at 12:05 PM   [phone]

## 2023-06-16 NOTE — PLAN OF CARE
Problem: Discharge Planning  Goal: Discharge to home or other facility with appropriate resources  6/15/2023 2321 by Arlet Gray RN  Outcome: Progressing  6/15/2023 1806 by Nixon Mendoza RN  Outcome: Progressing     Problem: Pain  Goal: Verbalizes/displays adequate comfort level or baseline comfort level  6/15/2023 2321 by Arlet Gray RN  Outcome: Progressing  6/15/2023 1806 by Nixon Mendoza RN  Outcome: Progressing     Problem: Safety - Adult  Goal: Free from fall injury  6/15/2023 2321 by Arlet Gray RN  Outcome: Progressing  6/15/2023 1806 by Nixon Mendoza RN  Outcome: Progressing     Problem: ABCDS Injury Assessment  Goal: Absence of physical injury  6/15/2023 2321 by Arlet Gray RN  Outcome: Progressing  6/15/2023 1806 by Nixon Mendoza RN  Outcome: Progressing

## 2023-06-16 NOTE — PLAN OF CARE
Problem: Discharge Planning  Goal: Discharge to home or other facility with appropriate resources  6/16/2023 1159 by Maxwell Mccoy RN  Outcome: Progressing   Patient is working towards goals to be discharged. Problem: Pain  Goal: Verbalizes/displays adequate comfort level or baseline comfort level  6/16/2023 1159 by Maxwell Mccoy RN  Outcome: Progressing   Pain medications are being managed by the [facility] Problem: Safety - Adult  Goal: Free from fall injury  6/16/2023 1159 by Maxwell Mccoy RN  Outcome: Progressing   Patient has all standard precautions in place, chair alarm is turned on and call light and tray table are in reach.

## 2023-06-16 NOTE — CARE COORDINATION
CM spoke with patient at bedside. She is from home with her mother, independent pta, drives self. She reports no DME use or needs at this time. Patient denies any needs for [address]. Her mother will transport her home at discharge.       Suhail Dixon RN, BSN,    Ortho/Neuro   [phone]

## 2023-06-17 VITALS
BODY MASS INDEX: 45.31 KG/M2 | HEART RATE: 68 BPM | RESPIRATION RATE: 17 BRPM | HEIGHT: 64 IN | DIASTOLIC BLOOD PRESSURE: 65 MMHG | TEMPERATURE: 98.1 F | OXYGEN SATURATION: 94 % | WEIGHT: 265.4 LBS | SYSTOLIC BLOOD PRESSURE: 112 MMHG

## 2023-06-17 PROCEDURE — 97116 GAIT TRAINING THERAPY: CPT

## 2023-06-17 PROCEDURE — 2580000003 HC RX 258: Performed by: ORTHOPAEDIC SURGERY

## 2023-06-17 PROCEDURE — 6370000000 HC RX 637 (ALT 250 FOR IP): Performed by: ORTHOPAEDIC SURGERY

## 2023-06-17 PROCEDURE — 97110 THERAPEUTIC EXERCISES: CPT

## 2023-06-17 PROCEDURE — 97535 SELF CARE MNGMENT TRAINING: CPT

## 2023-06-17 PROCEDURE — G0378 HOSPITAL OBSERVATION PER HR: HCPCS

## 2023-06-17 PROCEDURE — 97530 THERAPEUTIC ACTIVITIES: CPT

## 2023-06-17 RX ADMIN — OXYCODONE HYDROCHLORIDE 10 MG: 5 TABLET ORAL at 03:52

## 2023-06-17 RX ADMIN — ACETAMINOPHEN 650 MG: 325 TABLET ORAL at 16:07

## 2023-06-17 RX ADMIN — DULOXETINE HYDROCHLORIDE 60 MG: 60 CAPSULE, DELAYED RELEASE ORAL at 08:59

## 2023-06-17 RX ADMIN — GABAPENTIN 200 MG: 100 CAPSULE ORAL at 09:01

## 2023-06-17 RX ADMIN — METHOCARBAMOL 500 MG: 500 TABLET ORAL at 09:01

## 2023-06-17 RX ADMIN — ROPINIROLE 2 MG: 2 TABLET, FILM COATED ORAL at 08:59

## 2023-06-17 RX ADMIN — OXYCODONE HYDROCHLORIDE 5 MG: 5 TABLET ORAL at 16:07

## 2023-06-17 RX ADMIN — ACETAMINOPHEN 650 MG: 325 TABLET ORAL at 09:00

## 2023-06-17 RX ADMIN — POTASSIUM CHLORIDE 10 MEQ: 750 TABLET, EXTENDED RELEASE ORAL at 08:59

## 2023-06-17 RX ADMIN — SODIUM CHLORIDE, PRESERVATIVE FREE 10 ML: 5 INJECTION INTRAVENOUS at 09:01

## 2023-06-17 RX ADMIN — ASPIRIN 81 MG: 81 TABLET, COATED ORAL at 08:59

## 2023-06-17 RX ADMIN — ACETAMINOPHEN 650 MG: 325 TABLET ORAL at 03:44

## 2023-06-17 RX ADMIN — PANTOPRAZOLE SODIUM 40 MG: 40 TABLET, DELAYED RELEASE ORAL at 06:40

## 2023-06-17 RX ADMIN — OXYCODONE HYDROCHLORIDE 10 MG: 5 TABLET ORAL at 07:29

## 2023-06-17 ASSESSMENT — PAIN SCALES - GENERAL
PAINLEVEL_OUTOF10: 7
PAINLEVEL_OUTOF10: 4
PAINLEVEL_OUTOF10: 4
PAINLEVEL_OUTOF10: 7
PAINLEVEL_OUTOF10: 4
PAINLEVEL_OUTOF10: 7
PAINLEVEL_OUTOF10: 5
PAINLEVEL_OUTOF10: 7
PAINLEVEL_OUTOF10: 5

## 2023-06-17 ASSESSMENT — PAIN DESCRIPTION - DESCRIPTORS
DESCRIPTORS: ACHING;SHARP
DESCRIPTORS: ACHING
DESCRIPTORS: ACHING
DESCRIPTORS: ACHING;DISCOMFORT
DESCRIPTORS: ACHING

## 2023-06-17 ASSESSMENT — PAIN DESCRIPTION - FREQUENCY
FREQUENCY: CONTINUOUS
FREQUENCY: CONTINUOUS

## 2023-06-17 ASSESSMENT — PAIN DESCRIPTION - ONSET
ONSET: ON-GOING
ONSET: ON-GOING

## 2023-06-17 ASSESSMENT — PAIN - FUNCTIONAL ASSESSMENT
PAIN_FUNCTIONAL_ASSESSMENT: ACTIVITIES ARE NOT PREVENTED
PAIN_FUNCTIONAL_ASSESSMENT: PREVENTS OR INTERFERES SOME ACTIVE ACTIVITIES AND ADLS
PAIN_FUNCTIONAL_ASSESSMENT: PREVENTS OR INTERFERES SOME ACTIVE ACTIVITIES AND ADLS
PAIN_FUNCTIONAL_ASSESSMENT: ACTIVITIES ARE NOT PREVENTED
PAIN_FUNCTIONAL_ASSESSMENT: PREVENTS OR INTERFERES SOME ACTIVE ACTIVITIES AND ADLS

## 2023-06-17 ASSESSMENT — PAIN DESCRIPTION - LOCATION
LOCATION: SHOULDER

## 2023-06-17 ASSESSMENT — PAIN DESCRIPTION - PAIN TYPE
TYPE: SURGICAL PAIN
TYPE: SURGICAL PAIN

## 2023-06-17 ASSESSMENT — PAIN DESCRIPTION - ORIENTATION
ORIENTATION: LEFT
ORIENTATION: RIGHT

## 2023-06-17 NOTE — PLAN OF CARE
Problem: Discharge Planning  Goal: Discharge to home or other facility with appropriate resources  6/17/2023 0816 by Donato Mena RN  Outcome: Progressing     Problem: Pain  Goal: Verbalizes/displays adequate comfort level or baseline comfort level  6/17/2023 0816 by Donato Mena RN  Outcome: Progressing     Problem: Safety - Adult  Goal: Free from fall injury  6/17/2023 0816 by Donato Mena RN  Outcome: Progressing     Problem: ABCDS Injury Assessment  Goal: Absence of physical injury  6/17/2023 0816 by Donato Mena RN  Outcome: Progressing

## 2023-06-17 NOTE — PROGRESS NOTES
4 Eyes Skin Assessment       NAME:  Shantelle Argueta  YOB: 1965  MEDICAL RECORD NUMBER:  [id number]       The patient is being assessed for   Admission       I agree that One RN has performed a thorough Head to Toe Skin Assessment on the patient. ALL assessment sites listed below have been assessed. Areas assessed by both nurses:       Head, Face, Ears, Shoulders, Back, Chest, Arms, Elbows, Hands, Sacrum. Buttock, Coccyx, Ischium, Legs. Feet and Heels, Under Medical Devices , and Other                                  Does the Patient have a Wound?  No noted wound(s) scattered bruises around incision site       Julito Prevention initiated by RN: No   Wound Care Orders initiated by RN: No       Pressure Injury (Stage 3,4, Unstageable, DTI, NWPT, and Complex wounds) if present, place referral order by RN under : No       New and Established Ostomies, if present place, referral order under : No        Nurse 1 eSignature: Electronically signed by Alison Crawford RN on 6/15/23 at 5:54 PM EDT       **SHARE this note so that the co-signing nurse can place an eSignature**       Nurse 2 eSignature: Electronically signed by Steve Barcenas RN on 6/15/23 at 5:55 PM EDT
Blood pressure on soft side, attending MD notified. No new orders. Pt encouraged to drink fluids. Will con to monitor.
Hospitalist Progress Note      PCP: Brianda Turk DO    Date of Admission: 6/15/2023  Current Hospital Day: 3    Chief Admission Complaint:     Presenting History:    Estefani Hernandez is a 62 y.o. female who presented for right total shoulder replacement. Assessment/Plan:    Current Principle Problem:    Arthritis of right shoulder region    This is a 60-year-old female admitted with right shoulder rotator cuff arthropathy status post reverse right total shoulder replacement. September orthopedic patient stated that she is being discharged home today pain is well controlled. Depression anxiety continue with home medication on Xanax and Zoloft and Cymbalta. GERD continue the PPI. Restless leg syndrome on Requip, Robaxin, Neurontin. Patient with noted with low blood pressure today asymptomatic not on any antihypertensive medication. DVT Prophylaxis: Per orthopedic    No results for input(s): PLT in the last 72 hours. Diet: ADULT DIET;  Regular  Code Status: Full Code      PT/OT Eval Status: Consulted    Dispo -     Subjective: Patient denies any chest pain or shortness breath nausea vomiting ,shoulder pain is well controlled      Medications:  Reviewed    Infusion Medications    sodium chloride       Scheduled Medications    DULoxetine  60 mg Oral BID    gabapentin  200 mg Oral BID    pantoprazole  40 mg Oral QAM AC    rOPINIRole  2 mg Oral BID    potassium chloride  10 mEq Oral BID WC    sodium chloride flush  5-40 mL IntraVENous 2 times per day    acetaminophen  650 mg Oral Q6H    aspirin  81 mg Oral Daily     PRN Meds: dicyclomine, metoclopramide, methocarbamol, meclizine, ALPRAZolam, melatonin, acetaminophen, albuterol, sodium chloride flush, sodium chloride, ondansetron **OR** ondansetron, oxyCODONE **OR** oxyCODONE, senna, polyethylene glycol      Intake/Output Summary (Last 24 hours) at 6/17/2023 0926  Last data filed at 6/16/2023 1257  Gross per 24 hour   Intake 240 ml   Output --   Net 240 ml
Occupational Therapy    Checked on pt per POC today. Pt anticipating d/c. Noted pt to be in standard issue sling following reverse TSA. Pt does not feel sling is adequately supporting her shoulder. She has worn immobilizers after her last previous 4 shoulder surgeries. In bed, MIKHAIL is falling into extension. Placed call to [facility] and message was relayed to on-call PA to inquire whether OT can obtain shoulder immobilizer and fit to pt today prior to her d/c. Will await call back. Pt and RN aware of plan.      Jet Perez, OTR/L, [id number]
Occupational Therapy  Daily Treatment Note  Patient Name: Nikky Palomo  MRN: [id number]    Assessment: Pt c/o discomfort/lack of support in standard sling. Approval obtained and immobilizer placed with pt reporting increased comfort. Pt is ambulating with SBA and verbalizes safe plan for all ADLs with mother's assist at d/c. Recommend initial 24 hr A. Discharge Recommendations: Nikky Novakr scored a 20/24 on the AM-PAC ADL Inpatient form. Current research shows that an AM-PAC score of 18 or greater is typically associated with a discharge to the patient's home setting. Based on the patient's AM-PAC score, and their current ADL deficits, it is recommended that the patient have 2-3 sessions per week of Occupational Therapy at d/c to increase the patient's independence. At this time, this patient demonstrates the endurance and safety to discharge home with 24 hr A. Please see assessment section for further patient specific details. If patient discharges prior to next session this note will serve as a discharge summary. Please see below for the latest assessment towards goals. Equipment Needs:  No    Chart Reviewed: Yes     Other position/activity restrictions: Ambulate patient. Shoulder immobilizer: May remove for physical therapy, dressing change and hygiene under supervision of a nurse, physician or physical therapy. Additional Pertinent Hx: Pt to OR 6/15 for right reverse TSR    Diagnosis: Primary osteoarthritis of R shoulder  Treatment Diagnosis: impaired ADLs    Subjective: Pt in bed on arrival. Pleasant and cooperative. Feels much more comfortable in immobilizer.      Pain: Yes, 4/10 and more tolerable after recent pain med, improved comfort in immobilizer, ice placed after session      Objective:    Cognition/Orientation: WFL    Bed mobility   Supine to sit: SBA      Functional Mobility   Sit to Stand: SBA  Stand to Sit: SBA  Chair Transfer: SBA  Static stance: SBA  Ambulation: SBA  Time
Occupational Therapy  Facility/Department: [facility] 5T ORTHO/NEURO  Occupational Therapy Initial Assessment and Treatment    Name: Yamil Cerda  : 1965  MRN: [id number]  Date of Service: 2023    Discharge Recommendations:   Yamil Cerda scored a 20/24 on the AM-PAC ADL Inpatient form. Current research shows that an AM-PAC score of 18 or greater is typically associated with a discharge to the patient's home setting. Based on the patient's AM-PAC score, and their current ADL deficits, it is recommended that the patient have 2-3 sessions per week of Occupational Therapy at d/c to increase the patient's independence. At this time, this patient demonstrates the endurance and safety to discharge home with (home services) and a follow up treatment frequency of 2-3x/wk. Please see assessment section for further patient specific details. If patient discharges prior to next session this note will serve as a discharge summary. Please see below for the latest assessment towards goals. Patient Diagnosis(es): The encounter diagnosis was Traumatic complete tear of right rotator cuff, subsequent encounter. Past Medical History:  has a past medical history of Anxiety, Depression, Endometriosis, GERD (gastroesophageal reflux disease), Headache, Obesity, Osteoarthritis, PONV (postoperative nausea and vomiting), Restless leg syndrome, and Wears dentures. Past Surgical History:  has a past surgical history that includes Tonsillectomy ();  section; Ovary removal (Right, ); Upper gastrointestinal endoscopy (); Knee arthroscopy (Left, 11/15/2012); Upper gastrointestinal endoscopy (); hernia repair (2018); Anus surgery (2018); fracture surgery; Rotator cuff repair (Right, 2020); Shoulder arthroscopy (Right, 2020); Colonoscopy; Shoulder arthroscopy (Right, 2021); Shoulder arthroscopy (Right, 2021);  Dilation and curettage of uterus (N/A, 2021);
PACU Transfer to Floor Note    Procedure(s):  RIGHT REVERSE TOTAL SHOULDER ARTHROPLASTY    Current Allergies: Droperidol, Haldol [haloperidol lactate], and Toradol [ketorolac tromethamine]    Pt meets criteria as per Maylin Score and ASPAN Standards to transfer to next phase of care. No results for input(s): POCGLU in the last 72 hours. Vitals:    06/15/23 1645   BP: 126/80   Pulse: 72   Resp: 13   Temp: 97.6 °F (36.4 °C)   SpO2: 95%     Vitals within 20% of pt's admission vitals as per MAYLIN SCORE    SpO2: 95 %    O2 Flow Rate (L/min): 5 L/min      Intake/Output Summary (Last 24 hours) at 6/15/2023 1652  Last data filed at 6/15/2023 1645  Gross per 24 hour   Intake 1240 ml   Output 50 ml   Net 1190 ml       Pain assessment:  present - adequately treated    Pain Level: 4    Patient was assessed for alterations to skin integrity. There were not alterations observed. All belongings sent with patient to inpatient room (8612). No further changes. Is patient incontinent: no    Handoff report given at bedside.    Family updated and directed to pt room      6/15/2023 4:52 PM
Patient admitted to PACU #4 from OR per bed at 1500 s/p RIGHT REVERSE TOTAL SHOULDER ARTHROPLASTY - Right. Report received at bedside in PACU per CRNA and OR nurse. Patient was reported to be hemodynamically stable in OR with no complications along with receiving a pre-op nerve block with exparel with wrist band on. Patient connected to PACU monitoring equipment. IVF's infusing with site unremarkable. Patient arrived to PACU responsive but not fully wakeful from anesthesia but with respirations easy, even and regular with complaints of pain which she was medicated per CRNA, see anesthesia record. Right shoulder surgical dressing with therabond and tegaderm with right arm in a regular sling. Ice pack applied to right shoulder. No further changes. Will continue to monitor.
Patient is alert and oriented X4, VSS, patient is tolerating a regular diet, pt takes medications whole by mouth, patient is wearing sling on shoulder, patient worked with therapy today, patient is voiding adequately per BRP,  patient is a possible discharge to home today, patient is assist X1 standby assist, patient has all standard fall precautions in place chair alarm is turned on and call light and tray are in reach.
Patient is alert and oriented x4, PERRLA intact, opens eyes spontaneously, follows commands, afebrile. Patient Pain medication administered, see MAR. Patient blood pressure normal, sinus rhythm on monitor. Patient on room air. Patient has regular diet, up to toilet, no bowel movement during shift. Patient safety measures maintained. Patient free from injuries and falls.   call light within reach
Patient's mother at bedside in PACU visiting with patient along with patient's inpatient room number.
Physical Therapy  Facility/Department: [facility]  Physical Therapy Treatment    Name: Mich Salguero  : 1965  MRN: [id number]  Date of Service: 2023    Discharge Recommendations: Mich Salguero scored a 18/24 on the AM-PAC short mobility form. Current research shows that an AM-PAC score of 18 or greater is typically associated with a discharge to the patient's home setting. Based on the patient's AM-PAC score and their current functional mobility deficits, it is recommended that the patient have 2-3 sessions per week of Physical Therapy at d/c to increase the patient's independence. At this time, this patient demonstrates the endurance and safety to discharge home with  (home vs OP services) and a follow up treatment frequency of 2-3x/wk. Please see assessment section for further patient specific details. If patient discharges prior to next session this note will serve as a discharge summary. Please see below for the latest assessment towards goals. PT Equipment Recommendations  Equipment Needed: No      Patient Diagnosis(es): The encounter diagnosis was Traumatic complete tear of right rotator cuff, subsequent encounter. Past Medical History:  has a past medical history of Anxiety, Depression, Endometriosis, GERD (gastroesophageal reflux disease), Headache, Obesity, Osteoarthritis, PONV (postoperative nausea and vomiting), Restless leg syndrome, and Wears dentures. Past Surgical History:  has a past surgical history that includes Tonsillectomy ();  section; Ovary removal (Right, ); Upper gastrointestinal endoscopy (); Knee arthroscopy (Left, 11/15/2012); Upper gastrointestinal endoscopy (); hernia repair (2018); Anus surgery (2018); fracture surgery; Rotator cuff repair (Right, 2020); Shoulder arthroscopy (Right, 2020); Colonoscopy; Shoulder arthroscopy (Right, 2021); Shoulder arthroscopy (Right, 2021);  Dilation and
Physical Therapy  Facility/Department: [place] [address]  Physical Therapy Initial Assessment/Treatment    Name: Mattihas Johnson  : 1965  MRN: [id number]  Date of Service: 2023    Discharge Recommendations:    Matthias Johnson scored a 18/24 on the AM-PAC short mobility form. Current research shows that an AM-PAC score of 18 or greater is typically associated with a discharge to the patient's home setting. Based on the patient's AM-PAC score and their current functional mobility deficits, it is recommended that the patient have 2-3 sessions per week of Physical Therapy at d/c to increase the patient's independence. At this time, this patient demonstrates the endurance and safety to discharge home with home services and a follow up treatment frequency of 2-3x/wk. Please see assessment section for further patient specific details. If patient discharges prior to next session this note will serve as a discharge summary. Please see below for the latest assessment towards goals. PT Equipment Recommendations  Equipment Needed: No      Patient Diagnosis(es): The encounter diagnosis was Traumatic complete tear of right rotator cuff, subsequent encounter. Past Medical History:  has a past medical history of Anxiety, Depression, Endometriosis, GERD (gastroesophageal reflux disease), Headache, Obesity, Osteoarthritis, PONV (postoperative nausea and vomiting), Restless leg syndrome, and Wears dentures. Past Surgical History:  has a past surgical history that includes Tonsillectomy ();  section; Ovary removal (Right, ); Upper gastrointestinal endoscopy (); Knee arthroscopy (Left, 11/15/2012); Upper gastrointestinal endoscopy (); hernia repair (2018); Anus surgery (2018); fracture surgery; Rotator cuff repair (Right, 2020); Shoulder arthroscopy (Right, 2020); Colonoscopy; Shoulder arthroscopy (Right, 2021);  Shoulder arthroscopy (Right, 2021);
Pt alert and oriented. IV started. T&S drawn and sent to lab. LR infusing. Ancef to OR.
Pt is A&O X4. VSS. RA.  pain is being managed with pain medicine per MAR. Incision site CDI. Shoulder immobilizer on all times. Tolerating diet and fluids. Denies N/V. Tolerating ambulation. Voiding adequately. All fall precaution is in place. Call light is within the reach.
Pt is admitted to room no 5522 from PACU. Pt had Right reverse total shoulder arthroplasty. Pt is alert and oriented x4, is on o2 5 litre per nasal cannula. Pt's level of pain is 4 at the incision site. Denies any N/V.   4 eyes skin assessment completed. Pt is oriented about the call light and bed alarms. All fall precautions is in the place and call light is with in the reach. Will con to monitor.
Pt is discharged from the unit along with all of her belongings. Discharged instruction explained to the patient and verbalized understanding.
Report given to [place], RN receiving patient on 5 tower in PACU at 0340 with all information provided including patient received a pre-op nerve block and medications given while in PACU. No further questions.
[facility] Progress Note        Subjective:  Pt is resting in bed with minimal complaints of discomfort. Denies fever, chills, n/v.    Blood pressure 131/83, pulse 60, temperature 98 °F (36.7 °C), temperature source Oral, resp. rate 16, height 5' 4\" (1.626 m), weight 265 lb 6.4 oz (120.4 kg), last menstrual period 09/20/2017, SpO2 94 %, not currently breastfeeding.     PHYSICAL EXAM:   Right Upper Extremity  Dressing clean and dry  Full ROM of digits distally  Sensation intact to light touch distally  Skin warm and well perfused    HgB:    Lab Results   Component Value Date/Time    HGB 13.6 06/05/2023 08:16 AM       ASSESSMENT AND PLAN:    62 y.o. female status post right reverse total shoulder arthroplasty    1:  Non-weight bearing  2:  Deep venous thrombosis prophylaxis: ASA 81 mg PO  3:  Continue mobilization, physical therapy  4:  D/C Plan:  Likely discharge today      Conrad Michael PA-C
X-ray taken in PACU per physician order. Patient tolerated without any complaints.
Component Value Date/Time    NITRU Negative 04/25/2023 10:49 AM    COLORU Yellow 04/25/2023 10:49 AM    PHUR 6.5 04/25/2023 10:49 AM    WBCUA 1 04/25/2023 10:49 AM    RBCUA 2 04/25/2023 10:49 AM    MUCUS 3+ 09/21/2022 05:51 PM    BACTERIA None Seen 04/25/2023 10:49 AM    CLARITYU Clear 04/25/2023 10:49 AM    SPECGRAV 1.019 04/25/2023 10:49 AM    LEUKOCYTESUR Negative 04/25/2023 10:49 AM    UROBILINOGEN 0.2 04/25/2023 10:49 AM    BILIRUBINUR Negative 04/25/2023 10:49 AM    BILIRUBINUR n 08/23/2022 02:28 PM    BLOODU Negative 04/25/2023 10:49 AM    GLUCOSEU Negative 04/25/2023 10:49 AM    KETUA Negative 04/25/2023 10:49 AM    AMORPHOUS 1+ 09/26/2021 03:30 PM     Urine Cultures:   Lab Results   Component Value Date/Time    LABURIN 50,000 CFU/ml 10/04/2022 01:30 PM     Blood Cultures:   Lab Results   Component Value Date/Time    BC No growth after 5 days of incubation. 06/10/2017 10:19 PM     Lab Results   Component Value Date/Time    BLOODCULT2 No growth after 5 days of incubation. 06/11/2017 02:36 AM     Organism:   Lab Results   Component Value Date/Time    ORG Enterococcus faecium 10/04/2022 01:30 PM       Imaging/Diagnostics Last 24 Hours   XR SHOULDER RIGHT 1 VW    Result Date: 6/15/2023  EXAM: XR SHOULDER RIGHT 1 VW HISTORY: postop COMPARISON: February 23, 2023 FINDINGS: Bones: A reverse right shoulder arthroplasty is present. Orthopedic hardware is intact without evidence of loosening. Soft tissues: Soft tissue swelling is present consistent with recent surgery. 1.  A reverse right shoulder arthroplasty is present.         Electronically signed by Patt Garza MD on 6/16/2023 at 11:34 PM

## 2023-06-19 ENCOUNTER — CARE COORDINATION (OUTPATIENT)
Dept: CASE MANAGEMENT | Age: 58
End: 2023-06-19

## 2023-06-19 DIAGNOSIS — Z96.611 S/P REVERSE TOTAL SHOULDER ARTHROPLASTY, RIGHT: Primary | ICD-10-CM

## 2023-06-19 PROCEDURE — 1111F DSCHRG MED/CURRENT MED MERGE: CPT | Performed by: FAMILY MEDICINE

## 2023-06-19 NOTE — CARE COORDINATION
Care Transitions Initial Follow Up Call    Outreach made within 2 business days of discharge: Yes    Patient: Mich Salugero Patient : 1965   MRN: [id number]  Reason for Admission: Rotator cuff tear,   Discharge Date: 23       Spoke with: Patient     [facility]     TCM Interactive Patient Contact:  Was patient able to fill all prescriptions: Yes  Was patient instructed to bring all medications to the follow-up visit: Yes  Is patient taking all medications as directed in the discharge summary?  Yes  Does patient understand their discharge instructions: Yes  Does patient have questions or concerns that need addressed prior to 7-14 day follow up office visit: no    Scheduled appointment with PCP within 7-14 days    Follow Up  Future Appointments   Date Time Provider Diann Sotelo   2023  1:30 PM Caridad Saavedra [facility]   2023 11:00 AM DO Nevin Russell 13
scheduling offered: Yes. Is follow up appointment scheduled within 7 days of discharge? Yes. Follow Up  Future Appointments   Date Time Provider Diann Sotelo   6/20/2023  1:30 PM BECKIE Mckeon [facility]       Care Transition Nurse provided contact information. Plan for follow-up call in 5-7 days based on severity of symptoms and risk factors. Plan for next call: symptom management-Any worsening pain, other post op complications, that may arise.     Thank Toya Jackson RN  Care Transition Coordinator  Contact [facility]:[phone]

## 2023-06-20 ENCOUNTER — TELEMEDICINE (OUTPATIENT)
Dept: PSYCHOLOGY | Age: 58
End: 2023-06-20
Payer: MEDICARE

## 2023-06-20 DIAGNOSIS — F41.9 ANXIETY: Primary | ICD-10-CM

## 2023-06-20 PROCEDURE — 90832 PSYTX W PT 30 MINUTES: CPT | Performed by: SOCIAL WORKER

## 2023-06-20 PROCEDURE — 1036F TOBACCO NON-USER: CPT | Performed by: SOCIAL WORKER

## 2023-06-20 NOTE — PATIENT INSTRUCTIONS
Talk with your dad about your concerns.    Consider online Al-Anon groups: [email or web address]

## 2023-06-23 ENCOUNTER — TELEPHONE (OUTPATIENT)
Dept: FAMILY MEDICINE CLINIC | Age: 58
End: 2023-06-23

## 2023-06-27 ENCOUNTER — CARE COORDINATION (OUTPATIENT)
Dept: CASE MANAGEMENT | Age: 58
End: 2023-06-27

## 2023-06-28 ENCOUNTER — OFFICE VISIT (OUTPATIENT)
Dept: FAMILY MEDICINE CLINIC | Age: 58
End: 2023-06-28

## 2023-06-28 VITALS
WEIGHT: 274 LBS | TEMPERATURE: 97.8 F | SYSTOLIC BLOOD PRESSURE: 130 MMHG | OXYGEN SATURATION: 95 % | HEART RATE: 56 BPM | BODY MASS INDEX: 47.03 KG/M2 | RESPIRATION RATE: 16 BRPM | DIASTOLIC BLOOD PRESSURE: 86 MMHG

## 2023-06-28 DIAGNOSIS — E66.01 OBESITY, CLASS III, BMI 40-49.9 (MORBID OBESITY) (HCC): ICD-10-CM

## 2023-06-28 DIAGNOSIS — F32.5 MAJOR DEPRESSIVE DISORDER WITH SINGLE EPISODE, IN FULL REMISSION (HCC): ICD-10-CM

## 2023-06-28 DIAGNOSIS — I10 ESSENTIAL HYPERTENSION: ICD-10-CM

## 2023-06-28 DIAGNOSIS — Z96.611 S/P REVERSE TOTAL SHOULDER ARTHROPLASTY, RIGHT: ICD-10-CM

## 2023-06-28 DIAGNOSIS — Z09 HOSPITAL DISCHARGE FOLLOW-UP: Primary | ICD-10-CM

## 2023-06-28 DIAGNOSIS — F41.9 ANXIETY: ICD-10-CM

## 2023-06-28 RX ORDER — METHYLPREDNISOLONE 4 MG/1
TABLET ORAL
COMMUNITY
Start: 2023-06-27

## 2023-06-28 RX ORDER — FUROSEMIDE 20 MG/1
TABLET ORAL
COMMUNITY
Start: 2023-06-23 | End: 2023-06-30 | Stop reason: SDUPTHER

## 2023-06-28 SDOH — ECONOMIC STABILITY: FOOD INSECURITY: WITHIN THE PAST 12 MONTHS, YOU WORRIED THAT YOUR FOOD WOULD RUN OUT BEFORE YOU GOT MONEY TO BUY MORE.: NEVER TRUE

## 2023-06-28 SDOH — ECONOMIC STABILITY: INCOME INSECURITY: HOW HARD IS IT FOR YOU TO PAY FOR THE VERY BASICS LIKE FOOD, HOUSING, MEDICAL CARE, AND HEATING?: NOT HARD AT ALL

## 2023-06-28 SDOH — ECONOMIC STABILITY: FOOD INSECURITY: WITHIN THE PAST 12 MONTHS, THE FOOD YOU BOUGHT JUST DIDN'T LAST AND YOU DIDN'T HAVE MONEY TO GET MORE.: NEVER TRUE

## 2023-06-28 ASSESSMENT — ENCOUNTER SYMPTOMS
SHORTNESS OF BREATH: 0
COUGH: 0

## 2023-06-28 ASSESSMENT — ANXIETY QUESTIONNAIRES
5. BEING SO RESTLESS THAT IT IS HARD TO SIT STILL: 0
7. FEELING AFRAID AS IF SOMETHING AWFUL MIGHT HAPPEN: 1
1. FEELING NERVOUS, ANXIOUS, OR ON EDGE: 1
2. NOT BEING ABLE TO STOP OR CONTROL WORRYING: 3
3. WORRYING TOO MUCH ABOUT DIFFERENT THINGS: 3
6. BECOMING EASILY ANNOYED OR IRRITABLE: 1
GAD7 TOTAL SCORE: 10
IF YOU CHECKED OFF ANY PROBLEMS ON THIS QUESTIONNAIRE, HOW DIFFICULT HAVE THESE PROBLEMS MADE IT FOR YOU TO DO YOUR WORK, TAKE CARE OF THINGS AT HOME, OR GET ALONG WITH OTHER PEOPLE: EXTREMELY DIFFICULT
4. TROUBLE RELAXING: 1

## 2023-06-28 ASSESSMENT — PATIENT HEALTH QUESTIONNAIRE - PHQ9
1. LITTLE INTEREST OR PLEASURE IN DOING THINGS: 0
SUM OF ALL RESPONSES TO PHQ9 QUESTIONS 1 & 2: 1
SUM OF ALL RESPONSES TO PHQ QUESTIONS 1-9: 4
6. FEELING BAD ABOUT YOURSELF - OR THAT YOU ARE A FAILURE OR HAVE LET YOURSELF OR YOUR FAMILY DOWN: 0
7. TROUBLE CONCENTRATING ON THINGS, SUCH AS READING THE NEWSPAPER OR WATCHING TELEVISION: 0
2. FEELING DOWN, DEPRESSED OR HOPELESS: 1
SUM OF ALL RESPONSES TO PHQ QUESTIONS 1-9: 4
9. THOUGHTS THAT YOU WOULD BE BETTER OFF DEAD, OR OF HURTING YOURSELF: 0
SUM OF ALL RESPONSES TO PHQ QUESTIONS 1-9: 4
10. IF YOU CHECKED OFF ANY PROBLEMS, HOW DIFFICULT HAVE THESE PROBLEMS MADE IT FOR YOU TO DO YOUR WORK, TAKE CARE OF THINGS AT HOME, OR GET ALONG WITH OTHER PEOPLE: 2
3. TROUBLE FALLING OR STAYING ASLEEP: 1
SUM OF ALL RESPONSES TO PHQ QUESTIONS 1-9: 4
5. POOR APPETITE OR OVEREATING: 0
8. MOVING OR SPEAKING SO SLOWLY THAT OTHER PEOPLE COULD HAVE NOTICED. OR THE OPPOSITE, BEING SO FIGETY OR RESTLESS THAT YOU HAVE BEEN MOVING AROUND A LOT MORE THAN USUAL: 1
4. FEELING TIRED OR HAVING LITTLE ENERGY: 1

## 2023-06-30 ENCOUNTER — TELEPHONE (OUTPATIENT)
Dept: FAMILY MEDICINE CLINIC | Age: 58
End: 2023-06-30

## 2023-06-30 RX ORDER — FUROSEMIDE 20 MG/1
TABLET ORAL
Qty: 30 TABLET | Refills: 1 | Status: SHIPPED | OUTPATIENT
Start: 2023-06-30

## 2023-06-30 RX ORDER — SPIRONOLACTONE 25 MG/1
25 TABLET ORAL DAILY
Qty: 30 TABLET | Refills: 1 | Status: SHIPPED | OUTPATIENT
Start: 2023-06-30 | End: 2023-07-21

## 2023-07-03 ENCOUNTER — CARE COORDINATION (OUTPATIENT)
Dept: CASE MANAGEMENT | Age: 58
End: 2023-07-03

## 2023-07-03 NOTE — CARE COORDINATION
[facility] Care Transitions Follow Up Call      Patient: Austin Queen  Patient : 1965   MRN: <[id number]>  Reason for Admission: S/P Total Shoulder Arthroplasty, Right  Discharge Date: 23 RARS: Readmission Risk Score: 9.2      Attempted to contact patient for follow up transition call. Left voicemail message to return call with an update on condition since discharge. Contact information provided. Will continue to follow up.         Follow Up  Future Appointments   Date Time Provider [address]   2023  3:00 PM BECKIE Lundy [facility]          Care Transitions Subsequent and Final Call    Subsequent and Final Calls  Are you currently active with any services?: [address]  Transitions Interventions  Other Interventions:             Wolfgang Zambrano LPN  Care Coordinator  [phone]

## 2023-07-03 NOTE — TELEPHONE ENCOUNTER
Future Appointments   Date Time Provider [address]   7/11/2023  3:00 PM Ant Fermin [facility]     LOV 6/28/2023

## 2023-07-04 RX ORDER — MECLIZINE HCL 12.5 MG/1
TABLET ORAL
Qty: 15 TABLET | Refills: 1 | Status: SHIPPED | OUTPATIENT
Start: 2023-07-04

## 2023-07-10 ENCOUNTER — CARE COORDINATION (OUTPATIENT)
Dept: CASE MANAGEMENT | Age: 58
End: 2023-07-10

## 2023-07-10 NOTE — CARE COORDINATION
top risk factors for readmission: medical condition-S/P Total Shoulder Arthroplasty, right  Interventions to address risk factors: Education of patient/family/caregiver/guardian to support self-management-Patient instructed to continue to monitor for any of the above noted s/s, as well as monitoring for any returning severe pain. Make sure to take Motrin 200 mg PO before therapy and then maybe another 200 mg PO after therapy. Care Transitions Subsequent and Final Call    Schedule Follow Up Appointment with PCP: Declined  Subsequent and Final Calls  Do you have any ongoing symptoms?: Yes  Onset of Patient-reported symptoms: Today  Patient-reported symptoms: Pain  Have your medications changed?: No  Do you have any questions related to your medications?: No  Do you currently have any active services?: No  Are you currently active with any services?: Home Health  Do you have any needs or concerns that I can assist you with?: No  Identified Barriers: None  Care Transitions Interventions  No Identified Needs  Other Interventions:           Care Transition Nurse provided contact information for future needs. No further follow-up call indicated based on severity of symptoms and risk factors.     Thank [place] Captain, RN  Care Transition Coordinator  [facility][phone]

## 2023-07-11 ENCOUNTER — TELEMEDICINE (OUTPATIENT)
Dept: PSYCHOLOGY | Age: 58
End: 2023-07-11
Payer: MEDICARE

## 2023-07-11 DIAGNOSIS — F33.1 MODERATE EPISODE OF RECURRENT MAJOR DEPRESSIVE DISORDER (HCC): ICD-10-CM

## 2023-07-11 DIAGNOSIS — F41.9 ANXIETY: Primary | ICD-10-CM

## 2023-07-11 PROCEDURE — 1036F TOBACCO NON-USER: CPT | Performed by: SOCIAL WORKER

## 2023-07-11 PROCEDURE — 90832 PSYTX W PT 30 MINUTES: CPT | Performed by: SOCIAL WORKER

## 2023-07-11 NOTE — PROGRESS NOTES
Behavioral Health Consultation- Follow UP  BECKIE Ch  7/11/2023   3:50 PM      Lisette Seip, was evaluated through a synchronous (real-time) audio-video encounter. The patient (or guardian if applicable) is aware that this is a billable service, which includes applicable co-pays. This Virtual Visit was conducted with patient's (and/or legal guardian's) consent. Verbal consent to proceed has been obtained within the past 12 months. Patient identification was verified, and a caregiver was present when appropriate. The patient was located in a state where the provider was licensed to provide care. Time spent with Patient: 30 minutes  This is patient's sixth  [facility] appointment. Reason for Consult:    Chief Complaint   Patient presents with    Anxiety     Referring Provider: Mariana Chaney DO  [address] [place] [address],  [address] [place] [address]    Patient provided informed consent for the behavioral health program. Discussed with patient model of service to include the limits of confidentiality (i.e. abuse reporting, suicide intervention, etc.) and short-term intervention focused approach. Pt indicated understanding. Feedback given to PCP. Verified the following information:  Patient's identification: Yes  Patient location: [address] [place] [address]   Patient's call back number: [phone]   Patient's emergency contact's name and number, as well as permission to contact them if needed: Extended Emergency Contact Information  Primary Emergency Contact: Yemi Caraballo  Address: [address] [address] [address] Phone: [phone]  Mobile Phone: [phone]  Relation: Parent  Secondary Emergency Contact: Jonathan Caballero Phone: [phone]  Mobile Phone: [phone]  Relation: Child     Provider location: [facility]:  Last appointment 6/20/23    Pt has started PT. Pt is healing well from her shoulder surgery.  Pt states that her father is having issues with

## 2023-07-17 RX ORDER — METHOCARBAMOL 500 MG/1
TABLET, FILM COATED ORAL
Qty: 90 TABLET | Refills: 3 | Status: SHIPPED | OUTPATIENT
Start: 2023-07-17

## 2023-07-17 NOTE — TELEPHONE ENCOUNTER
Future Appointments   Date Time Provider [address]   7/25/2023  3:00 PM Miguel Angel Henry [facility]     LOV 6/28/2023

## 2023-07-21 ENCOUNTER — TELEMEDICINE (OUTPATIENT)
Dept: FAMILY MEDICINE CLINIC | Age: 58
End: 2023-07-21

## 2023-07-21 DIAGNOSIS — M25.472 EDEMA OF BOTH ANKLES: Primary | ICD-10-CM

## 2023-07-21 DIAGNOSIS — R60.0 EDEMA OF BOTH FEET: ICD-10-CM

## 2023-07-21 DIAGNOSIS — M25.471 EDEMA OF BOTH ANKLES: Primary | ICD-10-CM

## 2023-07-21 RX ORDER — OXYCODONE HYDROCHLORIDE 5 MG/1
TABLET ORAL
COMMUNITY
Start: 2023-07-12

## 2023-07-21 ASSESSMENT — PATIENT HEALTH QUESTIONNAIRE - PHQ9
3. TROUBLE FALLING OR STAYING ASLEEP: 0
4. FEELING TIRED OR HAVING LITTLE ENERGY: 0
SUM OF ALL RESPONSES TO PHQ QUESTIONS 1-9: 0
5. POOR APPETITE OR OVEREATING: 0
SUM OF ALL RESPONSES TO PHQ QUESTIONS 1-9: 0
SUM OF ALL RESPONSES TO PHQ9 QUESTIONS 1 & 2: 0
8. MOVING OR SPEAKING SO SLOWLY THAT OTHER PEOPLE COULD HAVE NOTICED. OR THE OPPOSITE, BEING SO FIGETY OR RESTLESS THAT YOU HAVE BEEN MOVING AROUND A LOT MORE THAN USUAL: 0
9. THOUGHTS THAT YOU WOULD BE BETTER OFF DEAD, OR OF HURTING YOURSELF: 0
6. FEELING BAD ABOUT YOURSELF - OR THAT YOU ARE A FAILURE OR HAVE LET YOURSELF OR YOUR FAMILY DOWN: 0
7. TROUBLE CONCENTRATING ON THINGS, SUCH AS READING THE NEWSPAPER OR WATCHING TELEVISION: 0
1. LITTLE INTEREST OR PLEASURE IN DOING THINGS: 0
10. IF YOU CHECKED OFF ANY PROBLEMS, HOW DIFFICULT HAVE THESE PROBLEMS MADE IT FOR YOU TO DO YOUR WORK, TAKE CARE OF THINGS AT HOME, OR GET ALONG WITH OTHER PEOPLE: 0
SUM OF ALL RESPONSES TO PHQ QUESTIONS 1-9: 0
2. FEELING DOWN, DEPRESSED OR HOPELESS: 0
SUM OF ALL RESPONSES TO PHQ QUESTIONS 1-9: 0

## 2023-07-21 NOTE — PROGRESS NOTES
2023    TELEHEALTH EVALUATION -- Audio/Visual (During BTJYS-43 public health emergency)    HPI:    Kaya Schuler (:  1965) has requested an audio/video evaluation for the following concern(s):    Chief Complaint   Patient presents with    Leg Swelling     B/l leg swelling. Pain when walking, tightness,        Ankles and feet, Started taking Lasix 20 mg (1 at noon yesterday and 1 in the evening and 1 today). Had shoulder in  and had ankle and feet edema 1.5 weeks later. Edema decreased after Lasix was started, denies dietary changes, has been elevating LE's    Immobilization at least 3 days: No  Tachycardia:No  Prior DVT or PE: No  Hemoptysis: No  Malignancy: No    Denies warmth or fever,   Review of Systems   Cardiovascular:  Positive for leg swelling (Bilateral). Allergies   Allergen Reactions    Droperidol Other (See Comments)     unresponsive    Haldol [Haloperidol Lactate] Other (See Comments)     \"felt out of it, similar to the toradol\"    Toradol [Ketorolac Tromethamine] Other (See Comments)     \"out of it\"  \"felt like sleep paralysis\"       PHYSICAL EXAMINATION:  [ INSTRUCTIONS:  \"[x]\" Indicates a positive item  \"[]\" Indicates a negative item  -- DELETE ALL ITEMS NOT EXAMINED]  Vital Signs: (As obtained by patient/caregiver or practitioner observation)    Height  -  5'  6\"         Weight -   260 lb           Blood pressure-        Heart rate-     Temperature-      Constitutional: [x] Appears well-developed and well-nourished [x] No apparent distress      [] Abnormal-   Mental status  [x] Alert and awake  [x] Oriented to person/place/time [x]Able to follow commands      Eyes:  EOM    [x]  Normal  [] Abnormal-  Sclera  []  Normal  [] Abnormal -         Discharge []  None visible  [] Abnormal -    HENT:   [x] Normocephalic, atraumatic.   [] Abnormal   [] Mouth/Throat: Mucous membranes are moist.     External Ears [x] Normal  [] Abnormal-     Neck: [x] No visualized mass

## 2023-07-24 ENCOUNTER — TELEPHONE (OUTPATIENT)
Dept: FAMILY MEDICINE CLINIC | Age: 58
End: 2023-07-24

## 2023-07-24 RX ORDER — METOLAZONE 2.5 MG/1
2.5 TABLET ORAL DAILY
Qty: 30 TABLET | Refills: 1 | Status: SHIPPED | OUTPATIENT
Start: 2023-07-24

## 2023-07-24 NOTE — TELEPHONE ENCOUNTER
Spoke to pt. Swelling not appreciably responding to lasix    Cut back to usual dose.   Add  zaroxolyn low dose-  daily and then qod     Advise 1 week

## 2023-07-24 NOTE — TELEPHONE ENCOUNTER
She did a virtual appointment with Dr. Renee Swift this past Friday, 7/21/2023. He up dosage of lasix Friday. The swelling in feet an ankles are not any better. Very painful and difficult to walk. What is the next step? She was on on 20 mg Furosemide 2 x a day Dr. Ebenezer Wiggins told her to increase to 3 x a day.

## 2023-07-25 ENCOUNTER — TELEMEDICINE (OUTPATIENT)
Dept: PSYCHOLOGY | Age: 58
End: 2023-07-25
Payer: MEDICARE

## 2023-07-25 DIAGNOSIS — F41.9 ANXIETY: Primary | ICD-10-CM

## 2023-07-25 DIAGNOSIS — F33.1 MODERATE EPISODE OF RECURRENT MAJOR DEPRESSIVE DISORDER (HCC): ICD-10-CM

## 2023-07-25 PROCEDURE — 90832 PSYTX W PT 30 MINUTES: CPT | Performed by: SOCIAL WORKER

## 2023-07-25 PROCEDURE — 1036F TOBACCO NON-USER: CPT | Performed by: SOCIAL WORKER

## 2023-07-25 NOTE — PROGRESS NOTES
they wanted to hospitalize him, but he declined. Pt is struggling with swelling in her legs. She has followed up with PCP. Pt has started physical therapy. Pt states that her mother fell yesterday at 4 am, and Pt needed to call 911 for help. Pt is worried about her parent's health and her own health. Pt reports feeling depressed and angry yesterday. Processed this with her, and she appears to be experiencing some grief related to her parents. Pt states that she has not spoken to her son since Saturday and is afraid to call him for fear that something will be wrong. Discussed self-care. Pt will spend time at her friend's home watching her house and cats alone, and this will be a break for her. Identified a self-care plan for the next 2 days, prior to pet-sitting.      O:  MSE:    Appearance: good hygiene   Attitude: cooperative and friendly  Consciousness: alert  Orientation: oriented to person, place, time, general circumstance  Memory    recent and remote memory intact  Attention/Concentration    intact  Psychomotor Activity:normal  Eye Contact: normal  Speech: normal rate and volume, well-articulated  Mood    Anxious  Depressed  Affect Congruent  Perception: within normal limits  Thought Content: within normal limits  Thought Process: logical, coherent and goal-directed  Associations    logical connections  Insight: good  Judgment    Intact  Appetite normal  Sleep disturbance Yes  Fatigue Yes  Loss of pleasure No  Impulsive behavior No  Morbid Ideation: no   Suicide Assessment: no suicidal ideation, plan, or intent  Homicidal Ideation: no    History:    Medications:   Current Outpatient Medications   Medication Sig Dispense Refill    metOLazone (ZAROXOLYN) 2.5 MG tablet Take 1 tablet by mouth daily For swelling as needed 30 tablet 1    oxyCODONE (ROXICODONE) 5 MG immediate release tablet TAKE1/2-1 TABLET BY ORAL ROUTE EVERY 6 HOURS AS NEEDED FOR PAIN      methocarbamol (ROBAXIN) 500 MG tablet TAKE 1 TABLET BY MOUTH

## 2023-07-25 NOTE — PATIENT INSTRUCTIONS
Continue to engage in daily self-care. Write out your anger, then dispose of the paper. Then listen to a song that makes you feel good.

## 2023-07-28 DIAGNOSIS — F41.9 ANXIETY DISORDER, UNSPECIFIED: ICD-10-CM

## 2023-07-28 RX ORDER — ALPRAZOLAM 0.5 MG/1
TABLET ORAL
Qty: 90 TABLET | Refills: 1 | Status: SHIPPED | OUTPATIENT
Start: 2023-07-28 | End: 2023-09-27 | Stop reason: SDUPTHER

## 2023-07-28 NOTE — TELEPHONE ENCOUNTER
Future Appointments   Date Time Provider [address]   8/15/2023 11:30 AM Erickson Cisse [facility]     LOV 7/21/2023

## 2023-07-31 ENCOUNTER — TELEPHONE (OUTPATIENT)
Dept: FAMILY MEDICINE CLINIC | Age: 58
End: 2023-07-31

## 2023-07-31 RX ORDER — ETODOLAC 400 MG/1
400 TABLET, FILM COATED ORAL 2 TIMES DAILY
Qty: 30 TABLET | Refills: 1 | Status: SHIPPED | OUTPATIENT
Start: 2023-07-31

## 2023-07-31 NOTE — TELEPHONE ENCOUNTER
Spoke to the patient. Having a lot of gas. Will recommend simethicone. Having a lot of joint and arthritis like symptoms. Ibuprofen is not effective. We will change to Relafen. Patient instructed in use and cautions. Continue the Zaroxolyn for now.

## 2023-07-31 NOTE — TELEPHONE ENCOUNTER
Pt called this morning. She is still experiencing bilat leg/foot swelling. Pt was seen virtually on 7/21/23 by Dr Cristian Leigh. She was in touch with Dr Marcell Teague last week regarding the swelling. Pt also has gas and feels like she has fever, but doesn't when she takes temp. Pt needs to know what to do. Please advise.

## 2023-08-02 RX ORDER — SIMETHICONE 80 MG
TABLET,CHEWABLE ORAL
Qty: 100 TABLET | Refills: 3 | Status: SHIPPED | OUTPATIENT
Start: 2023-08-02

## 2023-08-10 NOTE — PROGRESS NOTES
Behavioral Health Consultation- Follow UP  BECKIE Arechiga  8/15/2023   12:09 PM      Carmen Redder, was evaluated through a synchronous (real-time) audio-video encounter. The patient (or guardian if applicable) is aware that this is a billable service, which includes applicable co-pays. This Virtual Visit was conducted with patient's (and/or legal guardian's) consent. Verbal consent to proceed has been obtained within the past 12 months. Patient identification was verified, and a caregiver was present when appropriate. The patient was located in a state where the provider was licensed to provide care. Time spent with Patient: 30 minutes  This is patient's eighth  [facility] appointment. Reason for Consult:    Chief Complaint   Patient presents with    Anxiety    Depression     Referring Provider: Adelso Reyes DO  [address]  [address],  [address]    Patient provided informed consent for the behavioral health program. Discussed with patient model of service to include the limits of confidentiality (i.e. abuse reporting, suicide intervention, etc.) and short-term intervention focused approach. Pt indicated understanding. Feedback given to PCP. Verified the following information:  Patient's identification: Yes  Patient location: [address] [place] [address] [address]    Patient's call back number: [phone]   Patient's emergency contact's name and number, as well as permission to contact them if needed: Extended Emergency Contact Information  Primary Emergency Contact: [facility]  Address: [address] [address] [place] Phone: [phone]  Mobile Phone: [phone]  Relation: Parent  Secondary Emergency Contact: Jonathan Caballero Phone: [phone]  Mobile Phone: [phone]  Relation: Child     Provider location: [facility]:  Last appointment 7/25/23    Pt reports the past 1.5 weeks have been very difficult.  Pt states that her mother has fallen a few

## 2023-08-15 ENCOUNTER — TELEMEDICINE (OUTPATIENT)
Dept: PSYCHOLOGY | Age: 58
End: 2023-08-15
Payer: MEDICARE

## 2023-08-15 DIAGNOSIS — F33.1 MODERATE EPISODE OF RECURRENT MAJOR DEPRESSIVE DISORDER (HCC): ICD-10-CM

## 2023-08-15 DIAGNOSIS — F41.9 ANXIETY: Primary | ICD-10-CM

## 2023-08-15 PROCEDURE — 90832 PSYTX W PT 30 MINUTES: CPT | Performed by: SOCIAL WORKER

## 2023-08-15 PROCEDURE — 1036F TOBACCO NON-USER: CPT | Performed by: SOCIAL WORKER

## 2023-08-15 RX ORDER — IBUPROFEN 200 MG
200 TABLET ORAL EVERY 8 HOURS PRN
Qty: 120 TABLET | Refills: 1 | Status: SHIPPED | OUTPATIENT
Start: 2023-08-15 | End: 2023-08-16 | Stop reason: DRUGHIGH

## 2023-08-15 NOTE — TELEPHONE ENCOUNTER
Patient called to inform Dr. Jovanna Mehta her leg swelling went down, her legs have been back to normal size for a couple days. Patient needs a refill on ibuprofen 800mg. They need a 30 day supply.      Mail order or local pharmacy: [facility] #[id number] - [address], [address] [facility],[address] [place] [address] Route

## 2023-08-16 RX ORDER — SERTRALINE HYDROCHLORIDE 25 MG/1
25 TABLET, FILM COATED ORAL DAILY
Qty: 30 TABLET | Refills: 3 | Status: SHIPPED | OUTPATIENT
Start: 2023-08-16

## 2023-08-16 RX ORDER — IBUPROFEN 800 MG/1
800 TABLET ORAL EVERY 8 HOURS PRN
Qty: 90 TABLET | Refills: 1 | Status: SHIPPED | OUTPATIENT
Start: 2023-08-16

## 2023-08-16 NOTE — TELEPHONE ENCOUNTER
LOV 7/21/2023    Future Appointments   Date Time Provider [address]   8/28/2023  2:30 PM Ney Cardinal [facility]

## 2023-08-23 NOTE — PROGRESS NOTES
TOBACCO:   reports that she has never smoked. She has been exposed to tobacco smoke. She has never used smokeless tobacco.  ETOH:   reports current alcohol use of about 4.0 standard drinks per week. Family History:   Family History   Problem Relation Age of Onset    Arthritis Mother     Obesity Mother     Anemia Mother     Other Mother         pulmonary embolism    Arthritis Father     Arrhythmia Father     Diabetes Other     Diabetes Paternal Grandfather     Cancer Neg Hx     Breast Cancer Neg Hx     Colon Cancer Neg Hx          A:  Patient endorses stable mood. Denies SI/HI, with good insight and motivation. Diagnosis:    1. Anxiety    2.  Moderate episode of recurrent major depressive disorder (HCC)          Past Diagnosis:        Diagnosis Date    Anxiety     Depression     Endometriosis     GERD (gastroesophageal reflux disease)     Headache     Obesity     Osteoarthritis     PONV (postoperative nausea and vomiting)     Restless leg syndrome     Wears dentures          Plan:  Patient interventions:  Discussed self-care (sleep, nutrition, rewarding activities, social support, exercise)  Emphasized self-care as important for managing overall health  Trained in strategies for increasing balanced thinking  Supportive techniques  Promoted hope using strengths-based approach    Patient Behavioral Change Plan:   See Patient Instructions

## 2023-08-28 ENCOUNTER — TELEMEDICINE (OUTPATIENT)
Dept: PSYCHOLOGY | Age: 58
End: 2023-08-28
Payer: MEDICARE

## 2023-08-28 DIAGNOSIS — F41.9 ANXIETY: Primary | ICD-10-CM

## 2023-08-28 DIAGNOSIS — F33.1 MODERATE EPISODE OF RECURRENT MAJOR DEPRESSIVE DISORDER (HCC): ICD-10-CM

## 2023-08-28 PROCEDURE — 1036F TOBACCO NON-USER: CPT | Performed by: SOCIAL WORKER

## 2023-08-28 PROCEDURE — 90832 PSYTX W PT 30 MINUTES: CPT | Performed by: SOCIAL WORKER

## 2023-08-28 NOTE — PATIENT INSTRUCTIONS
Continue to engage in daily self-care/stress relieving activities. Eat some healthy foods during the day.

## 2023-08-30 ENCOUNTER — TELEPHONE (OUTPATIENT)
Dept: PSYCHOLOGY | Age: 58
End: 2023-08-30

## 2023-08-30 ENCOUNTER — TELEPHONE (OUTPATIENT)
Dept: FAMILY MEDICINE CLINIC | Age: 58
End: 2023-08-30

## 2023-08-30 DIAGNOSIS — F33.1 MODERATE EPISODE OF RECURRENT MAJOR DEPRESSIVE DISORDER (HCC): ICD-10-CM

## 2023-08-30 DIAGNOSIS — F41.9 ANXIETY: Primary | ICD-10-CM

## 2023-08-30 NOTE — TELEPHONE ENCOUNTER
Pt called asking that Nasrin Solano call her back regarding the situation with her mom. At her last visit she was told to call if she needed to speak with Nasrin Solano.      Last appt 8/28/23    Future Appointments   Date Time Provider [address]   9/11/2023 11:00 AM BECKIE Lundy [facility]

## 2023-08-30 NOTE — TELEPHONE ENCOUNTER
Paperwork received and on provider's desk for review.   Pt called stating that her mother is moving to hospice this afternoon. Pt reports feeling scared. Pt has spent the past 2 days at the hospital, and is going home at night to sleep. Pt is having sleep difficulties. Pt states that she may sleep at hospice, if needed. Discussed sleep hygiene tips. Emphasized self-care to help Pt manage sxs. Provided supportive techniques.      Time of call: 15 minutes

## 2023-08-30 NOTE — TELEPHONE ENCOUNTER
August 30, 2023  Renetta Guess  to [facility] (supporting Jeannette George, [address])           5:56 AM  My mom isn't going to make it. We are just keeping her comfortable. I don't know what to do. How do I get through this. I am so scared. Please call me.  Norah Boggs

## 2023-09-01 ENCOUNTER — OFFICE VISIT (OUTPATIENT)
Dept: FAMILY MEDICINE CLINIC | Age: 58
End: 2023-09-01

## 2023-09-01 VITALS
RESPIRATION RATE: 16 BRPM | BODY MASS INDEX: 46.86 KG/M2 | WEIGHT: 273 LBS | HEART RATE: 74 BPM | TEMPERATURE: 97.8 F | DIASTOLIC BLOOD PRESSURE: 71 MMHG | SYSTOLIC BLOOD PRESSURE: 101 MMHG | OXYGEN SATURATION: 94 %

## 2023-09-01 DIAGNOSIS — F43.21 GRIEF: ICD-10-CM

## 2023-09-01 DIAGNOSIS — M25.472 EDEMA OF BOTH ANKLES: ICD-10-CM

## 2023-09-01 DIAGNOSIS — F41.9 ANXIETY: ICD-10-CM

## 2023-09-01 DIAGNOSIS — L03.119 CELLULITIS OF LOWER EXTREMITY, UNSPECIFIED LATERALITY: Primary | ICD-10-CM

## 2023-09-01 DIAGNOSIS — F32.5 MAJOR DEPRESSIVE DISORDER WITH SINGLE EPISODE, IN FULL REMISSION (HCC): ICD-10-CM

## 2023-09-01 DIAGNOSIS — M25.471 EDEMA OF BOTH ANKLES: ICD-10-CM

## 2023-09-01 RX ORDER — MELOXICAM 15 MG/1
TABLET ORAL
COMMUNITY
Start: 2023-08-15

## 2023-09-01 RX ORDER — CYCLOBENZAPRINE HCL 5 MG
5 TABLET ORAL 3 TIMES DAILY PRN
COMMUNITY
Start: 2023-08-15

## 2023-09-01 RX ORDER — CEPHALEXIN 500 MG/1
500 CAPSULE ORAL 2 TIMES DAILY
Qty: 14 CAPSULE | Refills: 0 | Status: SHIPPED | OUTPATIENT
Start: 2023-09-01 | End: 2023-09-08

## 2023-09-01 SDOH — ECONOMIC STABILITY: FOOD INSECURITY: WITHIN THE PAST 12 MONTHS, THE FOOD YOU BOUGHT JUST DIDN'T LAST AND YOU DIDN'T HAVE MONEY TO GET MORE.: NEVER TRUE

## 2023-09-01 SDOH — ECONOMIC STABILITY: INCOME INSECURITY: HOW HARD IS IT FOR YOU TO PAY FOR THE VERY BASICS LIKE FOOD, HOUSING, MEDICAL CARE, AND HEATING?: NOT HARD AT ALL

## 2023-09-01 SDOH — ECONOMIC STABILITY: FOOD INSECURITY: WITHIN THE PAST 12 MONTHS, YOU WORRIED THAT YOUR FOOD WOULD RUN OUT BEFORE YOU GOT MONEY TO BUY MORE.: NEVER TRUE

## 2023-09-01 ASSESSMENT — PATIENT HEALTH QUESTIONNAIRE - PHQ9
SUM OF ALL RESPONSES TO PHQ QUESTIONS 1-9: 11
2. FEELING DOWN, DEPRESSED OR HOPELESS: 1
10. IF YOU CHECKED OFF ANY PROBLEMS, HOW DIFFICULT HAVE THESE PROBLEMS MADE IT FOR YOU TO DO YOUR WORK, TAKE CARE OF THINGS AT HOME, OR GET ALONG WITH OTHER PEOPLE: 3
9. THOUGHTS THAT YOU WOULD BE BETTER OFF DEAD, OR OF HURTING YOURSELF: 0
1. LITTLE INTEREST OR PLEASURE IN DOING THINGS: 1
6. FEELING BAD ABOUT YOURSELF - OR THAT YOU ARE A FAILURE OR HAVE LET YOURSELF OR YOUR FAMILY DOWN: 0
DEPRESSION UNABLE TO ASSESS: FUNCTIONAL CAPACITY MOTIVATION LIMITS ACCURACY
SUM OF ALL RESPONSES TO PHQ9 QUESTIONS 1 & 2: 2
5. POOR APPETITE OR OVEREATING: 1
SUM OF ALL RESPONSES TO PHQ QUESTIONS 1-9: 11
3. TROUBLE FALLING OR STAYING ASLEEP: 3
7. TROUBLE CONCENTRATING ON THINGS, SUCH AS READING THE NEWSPAPER OR WATCHING TELEVISION: 1
4. FEELING TIRED OR HAVING LITTLE ENERGY: 3
SUM OF ALL RESPONSES TO PHQ QUESTIONS 1-9: 11
8. MOVING OR SPEAKING SO SLOWLY THAT OTHER PEOPLE COULD HAVE NOTICED. OR THE OPPOSITE, BEING SO FIGETY OR RESTLESS THAT YOU HAVE BEEN MOVING AROUND A LOT MORE THAN USUAL: 1
SUM OF ALL RESPONSES TO PHQ QUESTIONS 1-9: 11

## 2023-09-01 ASSESSMENT — COLUMBIA-SUICIDE SEVERITY RATING SCALE - C-SSRS
2. HAVE YOU ACTUALLY HAD ANY THOUGHTS OF KILLING YOURSELF?: NO
6. HAVE YOU EVER DONE ANYTHING, STARTED TO DO ANYTHING, OR PREPARED TO DO ANYTHING TO END YOUR LIFE?: NO
1. WITHIN THE PAST MONTH, HAVE YOU WISHED YOU WERE DEAD OR WISHED YOU COULD GO TO SLEEP AND NOT WAKE UP?: NO

## 2023-09-01 ASSESSMENT — ANXIETY QUESTIONNAIRES
7. FEELING AFRAID AS IF SOMETHING AWFUL MIGHT HAPPEN: 1
3. WORRYING TOO MUCH ABOUT DIFFERENT THINGS: 1
2. NOT BEING ABLE TO STOP OR CONTROL WORRYING: 1
1. FEELING NERVOUS, ANXIOUS, OR ON EDGE: 1
6. BECOMING EASILY ANNOYED OR IRRITABLE: 1
5. BEING SO RESTLESS THAT IT IS HARD TO SIT STILL: 1
GAD7 TOTAL SCORE: 7
4. TROUBLE RELAXING: 1
IF YOU CHECKED OFF ANY PROBLEMS ON THIS QUESTIONNAIRE, HOW DIFFICULT HAVE THESE PROBLEMS MADE IT FOR YOU TO DO YOUR WORK, TAKE CARE OF THINGS AT HOME, OR GET ALONG WITH OTHER PEOPLE: EXTREMELY DIFFICULT

## 2023-09-01 ASSESSMENT — ENCOUNTER SYMPTOMS
COUGH: 0
COLOR CHANGE: 1
SHORTNESS OF BREATH: 0
NAUSEA: 0
DIARRHEA: 0
VOMITING: 0

## 2023-09-01 NOTE — PROGRESS NOTES
BID x 7 days  Leg swelling was minimal on exam  Call next week with update  - cephALEXin (KEFLEX) 500 MG capsule; Take 1 capsule by mouth 2 times daily for 7 days  Dispense: 14 capsule; Refill: 0    2. Edema of both ankles  Improved, minimal swelling on exam. Diuretics PRN    3. Anxiety  Uncontrolled, stressors- mother sick in hospice. Increase Zoloft to 50 mg daily. Keep appointment with Rasheed Colbert sertraline (ZOLOFT) 50 MG tablet; Take 1 tablet by mouth daily For mood  Dispense: 30 tablet; Refill: 2    4. Major depressive disorder with single episode, in full remission ([address])  Uncontrolled, stressors- mother sick in hospice. Increase Zoloft to 50 mg daily. Keep appointment with Rasheed Calixto  - sertraline (ZOLOFT) 50 MG tablet; Take 1 tablet by mouth daily For mood  Dispense: 30 tablet; Refill: 2    5. Grief  Uncontrolled, stressors- mother sick in hospice. Increase Zoloft to 50 mg daily. Keep appointment with Rasheed Calixto  - sertraline (ZOLOFT) 50 MG tablet; Take 1 tablet by mouth daily For mood  Dispense: 30 tablet; Refill: 2      Return if symptoms worsen or fail to improve. An electronic signature was used to authenticate this note.     --Gautam Matute, AVERY - CNP on 9/1/2023 at 11:26 AM

## 2023-09-01 NOTE — PATIENT INSTRUCTIONS
- start the antibiotic for cellulitis of the legs  - keep elevating the legs when you are not active to help with the swelling  - call early next week with update

## 2023-09-05 NOTE — PROGRESS NOTES
night and was with her when she . Pt states that she and her family are planning her celebration of life. Pt states that her family has been supportive with the decision about her after-life celebration. Pt states that several of her family members gathered together on Saturday. Pt reports feeling numb. Pt states that she did not sleep well Friday night, and last night she was home alone. Pt states it was very quiet, and this feels strange. Pt states that her friend and oldest daughter have been very supportive, and her daughter has been helping with logistics. Pt states that her mother used to pay all the bills/rent, so her daughter is helping her with this now. Pt is having a lot anxiety about where she will live. Pt states that she should be able to remain in her home until November or December. She plans to join a wait list for low-income housing. Pt reports that she does not want to think about too much as this makes her feel overwhelmed. Pt states that she is planning to visit her father in the hospital soon. Pt states that her son has been asking her for money for the past several months, but she is not able to give it to him at this time. Discussed boundary setting.       O:  MSE:    Appearance: good hygiene   Attitude: cooperative and friendly  Consciousness: alert  Orientation: oriented to person, place, time, general circumstance  Memory    recent and remote memory intact  Attention/Concentration    intact  Psychomotor Activity:normal  Eye Contact: normal  Speech: normal rate and volume, well-articulated  Mood    Anxious  Affect Appropriate  Perception: within normal limits  Thought Content: within normal limits  Thought Process: logical, coherent and goal-directed  Associations    logical connections  Insight: good  Judgment    Intact  Appetite normal  Sleep disturbance Yes  Fatigue Yes  Loss of pleasure No  Impulsive behavior No  Morbid Ideation: no   Suicide Assessment: no suicidal ideation,

## 2023-09-11 ENCOUNTER — TELEMEDICINE (OUTPATIENT)
Dept: PSYCHOLOGY | Age: 58
End: 2023-09-11
Payer: MEDICARE

## 2023-09-11 DIAGNOSIS — F43.21 GRIEF: Primary | ICD-10-CM

## 2023-09-11 DIAGNOSIS — F41.9 ANXIETY: ICD-10-CM

## 2023-09-11 PROCEDURE — 1036F TOBACCO NON-USER: CPT | Performed by: SOCIAL WORKER

## 2023-09-11 PROCEDURE — 90832 PSYTX W PT 30 MINUTES: CPT | Performed by: SOCIAL WORKER

## 2023-09-19 RX ORDER — ROPINIROLE 2 MG/1
TABLET, FILM COATED ORAL
Qty: 90 TABLET | Refills: 3 | Status: SHIPPED | OUTPATIENT
Start: 2023-09-19

## 2023-09-20 RX ORDER — METOLAZONE 2.5 MG/1
2.5 TABLET ORAL DAILY
Qty: 30 TABLET | Refills: 1 | Status: SHIPPED | OUTPATIENT
Start: 2023-09-20

## 2023-09-20 RX ORDER — FUROSEMIDE 20 MG/1
TABLET ORAL
Qty: 30 TABLET | Refills: 3 | Status: SHIPPED | OUTPATIENT
Start: 2023-09-20

## 2023-09-20 NOTE — TELEPHONE ENCOUNTER
LOV 9/1/2023.   Future Appointments   Date Time Provider [address]   9/25/2023 11:00 AM BECKIE Castillo [facility]

## 2023-09-20 NOTE — PROGRESS NOTES
Behavioral Health Consultation- Follow UP  BECKIE Leonardo  9/25/2023   11:49 AM      Aurea Estes, was evaluated through a synchronous (real-time) audio-video encounter. The patient (or guardian if applicable) is aware that this is a billable service, which includes applicable co-pays. This Virtual Visit was conducted with patient's (and/or legal guardian's) consent. Verbal consent to proceed has been obtained within the past 12 months. Patient identification was verified, and a caregiver was present when appropriate. The patient was located in a state where the provider was licensed to provide care. Time spent with Patient: 30 minutes  This is patient's 12th  [facility] appointment. Reason for Consult:    Chief Complaint   Patient presents with    Other     grief     Referring Provider: Ger James DO  [address] [place] [address],  [address] [place] [address]    Patient provided informed consent for the behavioral health program. Discussed with patient model of service to include the limits of confidentiality (i.e. abuse reporting, suicide intervention, etc.) and short-term intervention focused approach. Pt indicated understanding. Feedback given to PCP. Verified the following information:  Patient's identification: Yes  Patient location: [address] [place], [place] [address] [address] [place] [address]   Patient's call back number: [phone]   Patient's emergency contact's name and number, as well as permission to contact them if needed: Extended Emergency Contact Information  Primary Emergency Contact: Jodi Arellano  Address: [place] [address] [address] [place] [address] of [address] [address] Phone: [phone]  Mobile Phone: [phone]  Relation: Parent  Secondary Emergency Contact: Jonathan Caballero Phone: [phone]  Mobile Phone: [phone]  Relation: Child     Provider location: [facility]:  Last appointment 9/11/23    Pt reports that she and her family are having her mother's celebration of life on Sunday.  Pt reports her

## 2023-09-25 ENCOUNTER — TELEMEDICINE (OUTPATIENT)
Dept: PSYCHOLOGY | Age: 58
End: 2023-09-25
Payer: MEDICARE

## 2023-09-25 DIAGNOSIS — F43.21 GRIEF: Primary | ICD-10-CM

## 2023-09-25 PROCEDURE — 1036F TOBACCO NON-USER: CPT | Performed by: SOCIAL WORKER

## 2023-09-25 PROCEDURE — 90832 PSYTX W PT 30 MINUTES: CPT | Performed by: SOCIAL WORKER

## 2023-09-26 RX ORDER — MECLIZINE HCL 12.5 MG/1
TABLET ORAL
Qty: 15 TABLET | Refills: 1 | Status: SHIPPED | OUTPATIENT
Start: 2023-09-26

## 2023-09-26 NOTE — TELEPHONE ENCOUNTER
Future Appointments   Date Time Provider [address]   10/9/2023 11:00 AM Yesi Zurita [facility]     LOV 6/28/2023

## 2023-09-27 DIAGNOSIS — F41.9 ANXIETY DISORDER, UNSPECIFIED: ICD-10-CM

## 2023-09-27 RX ORDER — ALPRAZOLAM 0.5 MG/1
TABLET ORAL
Qty: 90 TABLET | Refills: 1 | Status: SHIPPED | OUTPATIENT
Start: 2023-09-27 | End: 2023-10-27

## 2023-09-27 NOTE — TELEPHONE ENCOUNTER
Future Appointments   Date Time Provider [address]   10/9/2023 11:00 AM York Chol [facility]     LOV 9/1/2023

## 2023-09-30 ENCOUNTER — OFFICE VISIT (OUTPATIENT)
Dept: URGENT CARE | Age: 58
End: 2023-09-30

## 2023-09-30 VITALS
DIASTOLIC BLOOD PRESSURE: 70 MMHG | WEIGHT: 273 LBS | TEMPERATURE: 98.4 F | RESPIRATION RATE: 19 BRPM | HEART RATE: 68 BPM | SYSTOLIC BLOOD PRESSURE: 106 MMHG | HEIGHT: 66 IN | BODY MASS INDEX: 43.87 KG/M2 | OXYGEN SATURATION: 94 %

## 2023-09-30 DIAGNOSIS — S00.11XA CONTUSION OF RIGHT EYEBROW, INITIAL ENCOUNTER: ICD-10-CM
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
## 2023-09-30 DIAGNOSIS — M25.562 ACUTE PAIN OF LEFT KNEE: Primary | ICD-10-CM

## 2023-09-30 NOTE — PATIENT INSTRUCTIONS
Given bruising limited to area of abrasion over the right eyebrow, without noted raccoon eyes, changes in vision, or other signs of trauma, low concerns for orbital fracture - more likely facial contusion. Given mechanism of injury, left knee bruising, tenderness, swelling, and decreased ROM, x-ray obtained to rule out concerns of fractures or dislocations    Initial x-ray impression: No fractures or dislocations  If radiology impression finds otherwise, will contact you with updated x-ray impression along with any changes to the treatment plan. Given such significant tenderness, swelling, and decreased ROM in the knee, with no fractures noted, concern for potential soft tissue injury (ACL/PCL or meniscus), however given such tenderness hampering deeper exam, cannot note any further exam findings to further clarify/rule out underlying injuries. Will place in knee brace to help with compression and knee stability. Recommend RICE therapy  Apply a compressive ACE bandage. Rest and elevate the affected painful area. Apply cold compresses intermittently as needed. As pain recedes, begin normal activities slowly as tolerated. Orthopedic referral provided for further evaluation and treatment given concerns for further underlying injury that may require further evaluation and treatment.

## 2023-10-05 NOTE — PROGRESS NOTES
Resource Strain: Low Risk  (9/1/2023)    Overall Financial Resource Strain (CARDIA)     Difficulty of Paying Living Expenses: Not hard at all   Food Insecurity: No Food Insecurity (9/1/2023)    Hunger Vital Sign     Worried About Running Out of Food in the Last Year: Never true     Ran Out of Food in the Last Year: Never true   Transportation Needs: Unknown (9/1/2023)    PRAPARE - Transportation     Lack of Transportation (Medical): Not on file     Lack of Transportation (Non-Medical): No   Physical Activity: Inactive (3/28/2023)    Exercise Vital Sign     Days of Exercise per Week: 0 days     Minutes of Exercise per Session: 0 min   Stress: Not on file   Social Connections: Not on file   Intimate Partner Violence: Not At Risk (7/13/2022)    Humiliation, Afraid, Rape, and Kick questionnaire     Fear of Current or Ex-Partner: No     Emotionally Abused: No     Physically Abused: No     Sexually Abused: No   Housing Stability: Unknown (9/1/2023)    Housing Stability Vital Sign     Unable to Pay for Housing in the Last Year: Not on file     Number of [address] in the Last Year: Not on file     Unstable Housing in the Last Year: No     TOBACCO:   reports that she has never smoked. She has been exposed to tobacco smoke. She has never used smokeless tobacco.  ETOH:   reports current alcohol use of about 4.0 standard drinks of alcohol per week. Family History:   Family History   Problem Relation Age of Onset    Arthritis Mother     Obesity Mother     Anemia Mother     Other Mother         pulmonary embolism    Arthritis Father     Arrhythmia Father     Diabetes Other     Diabetes Paternal Grandfather     Cancer Neg Hx     Breast Cancer Neg Hx     Colon Cancer Neg Hx          A:  Patient endorses stable mood. Denies SI/HI, with good insight and motivation. Diagnosis:    1. Grief    2.  Anxiety          Past Diagnosis:        Diagnosis Date    Anxiety     Depression     Endometriosis     GERD (gastroesophageal reflux

## 2023-10-09 ENCOUNTER — TELEMEDICINE (OUTPATIENT)
Dept: PSYCHOLOGY | Age: 58
End: 2023-10-09
Payer: MEDICARE

## 2023-10-09 DIAGNOSIS — F41.9 ANXIETY: ICD-10-CM

## 2023-10-09 DIAGNOSIS — F43.21 GRIEF: Primary | ICD-10-CM

## 2023-10-09 PROCEDURE — 90832 PSYTX W PT 30 MINUTES: CPT | Performed by: SOCIAL WORKER

## 2023-10-09 PROCEDURE — 1036F TOBACCO NON-USER: CPT | Performed by: SOCIAL WORKER

## 2023-10-12 ENCOUNTER — OFFICE VISIT (OUTPATIENT)
Dept: FAMILY MEDICINE CLINIC | Age: 58
End: 2023-10-12

## 2023-10-12 VITALS
SYSTOLIC BLOOD PRESSURE: 104 MMHG | TEMPERATURE: 97.1 F | BODY MASS INDEX: 45.03 KG/M2 | OXYGEN SATURATION: 95 % | WEIGHT: 279 LBS | DIASTOLIC BLOOD PRESSURE: 62 MMHG | HEART RATE: 71 BPM | RESPIRATION RATE: 16 BRPM

## 2023-10-12 DIAGNOSIS — F43.21 GRIEF: ICD-10-CM

## 2023-10-12 DIAGNOSIS — M25.562 CHRONIC PAIN OF LEFT KNEE: Primary | ICD-10-CM

## 2023-10-12 DIAGNOSIS — F41.9 ANXIETY: ICD-10-CM

## 2023-10-12 DIAGNOSIS — F32.5 MAJOR DEPRESSIVE DISORDER WITH SINGLE EPISODE, IN FULL REMISSION (HCC): ICD-10-CM

## 2023-10-12 DIAGNOSIS — G89.29 CHRONIC PAIN OF LEFT KNEE: Primary | ICD-10-CM

## 2023-10-12 RX ORDER — SERTRALINE HYDROCHLORIDE 100 MG/1
100 TABLET, FILM COATED ORAL DAILY
Qty: 90 TABLET | Refills: 0 | Status: SHIPPED | OUTPATIENT
Start: 2023-10-12

## 2023-10-12 SDOH — ECONOMIC STABILITY: FOOD INSECURITY: WITHIN THE PAST 12 MONTHS, THE FOOD YOU BOUGHT JUST DIDN'T LAST AND YOU DIDN'T HAVE MONEY TO GET MORE.: NEVER TRUE

## 2023-10-12 SDOH — ECONOMIC STABILITY: FOOD INSECURITY: WITHIN THE PAST 12 MONTHS, YOU WORRIED THAT YOUR FOOD WOULD RUN OUT BEFORE YOU GOT MONEY TO BUY MORE.: NEVER TRUE

## 2023-10-12 SDOH — ECONOMIC STABILITY: INCOME INSECURITY: HOW HARD IS IT FOR YOU TO PAY FOR THE VERY BASICS LIKE FOOD, HOUSING, MEDICAL CARE, AND HEATING?: NOT HARD AT ALL

## 2023-10-12 ASSESSMENT — ENCOUNTER SYMPTOMS
DIARRHEA: 0
COUGH: 0
SHORTNESS OF BREATH: 0
VOMITING: 0
NAUSEA: 0

## 2023-10-12 ASSESSMENT — PATIENT HEALTH QUESTIONNAIRE - PHQ9
3. TROUBLE FALLING OR STAYING ASLEEP: 3
4. FEELING TIRED OR HAVING LITTLE ENERGY: 0
SUM OF ALL RESPONSES TO PHQ QUESTIONS 1-9: 12
SUM OF ALL RESPONSES TO PHQ9 QUESTIONS 1 & 2: 3
7. TROUBLE CONCENTRATING ON THINGS, SUCH AS READING THE NEWSPAPER OR WATCHING TELEVISION: 3
5. POOR APPETITE OR OVEREATING: 1
SUM OF ALL RESPONSES TO PHQ QUESTIONS 1-9: 12
1. LITTLE INTEREST OR PLEASURE IN DOING THINGS: 1
10. IF YOU CHECKED OFF ANY PROBLEMS, HOW DIFFICULT HAVE THESE PROBLEMS MADE IT FOR YOU TO DO YOUR WORK, TAKE CARE OF THINGS AT HOME, OR GET ALONG WITH OTHER PEOPLE: 1
8. MOVING OR SPEAKING SO SLOWLY THAT OTHER PEOPLE COULD HAVE NOTICED. OR THE OPPOSITE, BEING SO FIGETY OR RESTLESS THAT YOU HAVE BEEN MOVING AROUND A LOT MORE THAN USUAL: 2
SUM OF ALL RESPONSES TO PHQ QUESTIONS 1-9: 12
6. FEELING BAD ABOUT YOURSELF - OR THAT YOU ARE A FAILURE OR HAVE LET YOURSELF OR YOUR FAMILY DOWN: 0
9. THOUGHTS THAT YOU WOULD BE BETTER OFF DEAD, OR OF HURTING YOURSELF: 0
DEPRESSION UNABLE TO ASSESS: FUNCTIONAL CAPACITY MOTIVATION LIMITS ACCURACY
2. FEELING DOWN, DEPRESSED OR HOPELESS: 2
SUM OF ALL RESPONSES TO PHQ QUESTIONS 1-9: 12

## 2023-10-12 ASSESSMENT — ANXIETY QUESTIONNAIRES
4. TROUBLE RELAXING: 1
6. BECOMING EASILY ANNOYED OR IRRITABLE: 1
1. FEELING NERVOUS, ANXIOUS, OR ON EDGE: 1
2. NOT BEING ABLE TO STOP OR CONTROL WORRYING: 1
IF YOU CHECKED OFF ANY PROBLEMS ON THIS QUESTIONNAIRE, HOW DIFFICULT HAVE THESE PROBLEMS MADE IT FOR YOU TO DO YOUR WORK, TAKE CARE OF THINGS AT HOME, OR GET ALONG WITH OTHER PEOPLE: VERY DIFFICULT
3. WORRYING TOO MUCH ABOUT DIFFERENT THINGS: 1
GAD7 TOTAL SCORE: 7
5. BEING SO RESTLESS THAT IT IS HARD TO SIT STILL: 1
7. FEELING AFRAID AS IF SOMETHING AWFUL MIGHT HAPPEN: 1

## 2023-10-12 NOTE — PATIENT INSTRUCTIONS
- increase the sertraline to 100 mg daily  - continue alprazolam  - keep appointment with Courtney Clifton   - call for any needs

## 2023-10-12 NOTE — PROGRESS NOTES
disturbance. Negative for self-injury and suicidal ideas. The patient is nervous/anxious. All other systems reviewed and are negative. Prior to Visit Medications    Medication Sig Taking? Authorizing Provider   sertraline (ZOLOFT) 100 MG tablet Take 1 tablet by mouth daily Yes Nazario Alvarado, APRN - CNP   ALPRAZolam (XANAX) 0.5 MG tablet TAKE 1 TABLET BY MOUTH 3 TIMES DAILY AS NEEDED FOR SLEEP OR ANXIETY. Yes Anice Donnie A, DO   meclizine (ANTIVERT) 12.5 MG tablet TAKE 1 TABLET BY MOUTH THREE TIMES A DAY AS NEEDED FOR DIZZINESS Yes Anice Donnie A, DO   furosemide (LASIX) 20 MG tablet TAKE 1 TABLET BY MOUTH DAILY AS NEEDED (FOR SWELLING) Yes Anice Donnie A, DO   metOLazone (ZAROXOLYN) 2.5 MG tablet TAKE 1 TABLET BY MOUTH DAILY FOR SWELLING AS NEEDED Yes Anice Donnie A, DO   rOPINIRole (REQUIP) 2 MG tablet TAKE 2 TABLETS AT NIGHT AND ONE IN THE MORNING FOR LEGS Yes Anice Donnie A, DO   ibuprofen (ADVIL;MOTRIN) 800 MG tablet Take 1 tablet by mouth every 8 hours as needed for Pain Yes Anice Donnie A, DO   methocarbamol (ROBAXIN) 500 MG tablet TAKE 1 TABLET BY MOUTH 3 TIMES A DAY AS NEEDED FOR NECK PAIN Yes Anice Donnie A, DO   omeprazole (PRILOSEC) 40 MG delayed release capsule TAKE 1 CAPSULE BY MOUTH IN THE MORNING AND AT BEDTIME DR. FRYE INCREASED TO TWICE A DAY Yes Anice Donnie A, DO   potassium chloride (KLOR-CON M20) 20 MEQ extended release tablet TAKE ONE OR TWO DAILY AS DIRECTED FOR POTASSIUM Yes Anice Donnie A, DO   metoclopramide (REGLAN) 5 MG tablet Take 1 tablet by mouth 3 times daily as needed (nausea) For nausea Yes Anice Donnie A, DO   albuterol sulfate HFA (VENTOLIN HFA) 108 (90 Base) MCG/ACT inhaler Inhale 2 puffs into the lungs 4 times daily as needed for Wheezing Yes Anice Donnie A, DO   MELATONIN PO Take 20 mg by mouth nightly as needed  Yes Provider, Historical, MD        Social History     Tobacco Use    Smoking status: Never     Passive exposure: Past    Smokeless tobacco: Never

## 2023-10-17 NOTE — PROGRESS NOTES
Onset    Arthritis Mother     Obesity Mother     Anemia Mother     Other Mother         pulmonary embolism    Arthritis Father     Arrhythmia Father     Diabetes Other     Diabetes Paternal Grandfather     Cancer Neg Hx     Breast Cancer Neg Hx     Colon Cancer Neg Hx          A:  Patient endorses stable mood. Denies SI/HI, with good insight and motivation. Diagnosis:    1. Anxiety    2.  Grief          Past Diagnosis:        Diagnosis Date    Anxiety     Depression     Endometriosis     GERD (gastroesophageal reflux disease)     Headache     Obesity     Osteoarthritis     PONV (postoperative nausea and vomiting)     Restless leg syndrome     Wears dentures          Plan:  Patient interventions:  Emphasized self-care as important for managing overall health  Trained in relaxation strategies  Trained in strategies for increasing balanced thinking  Provided Psychoeducation re: the stress cycle and ways to complete the stress cycle  Supportive techniques  Assisted Pt with grief processing    Patient Behavioral Change Plan:   See Patient Instructions

## 2023-10-23 ENCOUNTER — TELEMEDICINE (OUTPATIENT)
Dept: PSYCHOLOGY | Age: 58
End: 2023-10-23
Payer: MEDICARE

## 2023-10-23 DIAGNOSIS — F43.21 GRIEF: ICD-10-CM

## 2023-10-23 DIAGNOSIS — F41.9 ANXIETY: Primary | ICD-10-CM

## 2023-10-23 PROCEDURE — 1036F TOBACCO NON-USER: CPT | Performed by: SOCIAL WORKER

## 2023-10-23 PROCEDURE — 90832 PSYTX W PT 30 MINUTES: CPT | Performed by: SOCIAL WORKER

## 2023-11-10 ENCOUNTER — TELEPHONE (OUTPATIENT)
Dept: FAMILY MEDICINE CLINIC | Age: 58
End: 2023-11-10

## 2023-11-10 DIAGNOSIS — M17.10 PRIMARY OSTEOARTHRITIS OF KNEE, UNSPECIFIED LATERALITY: Primary | ICD-10-CM

## 2023-11-10 RX ORDER — ACETAMINOPHEN AND CODEINE PHOSPHATE 300; 30 MG/1; MG/1
1 TABLET ORAL EVERY 8 HOURS PRN
Qty: 60 TABLET | Refills: 0 | Status: SHIPPED | OUTPATIENT
Start: 2023-11-10 | End: 2023-11-30

## 2023-11-10 NOTE — TELEPHONE ENCOUNTER
Patient called. She wanted to let you know she has her knee surgery on 23. She wanted to let you know her dad passed yesterday. (Her mom  2 months ago.) Causing her not to sleep and be very overwhelmed. She is asking for Tylenolol III ortho will no longer write. Can you write enough to get her to the surgery? Pharmacy is [facility] on [address]

## 2023-11-10 NOTE — TELEPHONE ENCOUNTER
Pls tell her I am sorry to hear about her folks passing. I sent her some pain meds. Has to try and make it to the surgery. Use ice and non medication therapy also.   Wish her luck with the surgery

## 2023-11-13 ENCOUNTER — TELEPHONE (OUTPATIENT)
Dept: FAMILY MEDICINE CLINIC | Age: 58
End: 2023-11-13

## 2023-11-14 NOTE — TELEPHONE ENCOUNTER
Patient paged on call. She reports home COVID test positive today. Symptoms started yesterday 11/12/2023. Current symptoms include headache, nasal congestion and low grade fever yesterday 100.8. she denies SOB. She has been treating symptoms with Tylenol and Advil and OTC cold medication which are providing some relief. . Discussed monitoring symptoms for respiratory compromise. Discussed starting Paxlovid and possible rebound side effects with this treatment. She will monitor symptoms for now. Advised could also call to schedule virtual visit if she wanted to pursue antiviral therapy.  Call with any shortness of breath, etc.

## 2023-11-20 ENCOUNTER — OFFICE VISIT (OUTPATIENT)
Dept: FAMILY MEDICINE CLINIC | Age: 58
End: 2023-11-20
Payer: MEDICARE

## 2023-11-20 ENCOUNTER — TELEMEDICINE (OUTPATIENT)
Dept: PSYCHOLOGY | Age: 58
End: 2023-11-20

## 2023-11-20 ENCOUNTER — TELEPHONE (OUTPATIENT)
Dept: ORTHOPEDIC SURGERY | Age: 58
End: 2023-11-20

## 2023-11-20 VITALS
WEIGHT: 273 LBS | SYSTOLIC BLOOD PRESSURE: 153 MMHG | RESPIRATION RATE: 16 BRPM | TEMPERATURE: 97.1 F | OXYGEN SATURATION: 98 % | HEART RATE: 58 BPM | BODY MASS INDEX: 43.87 KG/M2 | DIASTOLIC BLOOD PRESSURE: 96 MMHG | HEIGHT: 66 IN

## 2023-11-20 DIAGNOSIS — F43.21 GRIEF: Primary | ICD-10-CM

## 2023-11-20 DIAGNOSIS — R03.0 ELEVATED BLOOD PRESSURE READING: ICD-10-CM

## 2023-11-20 DIAGNOSIS — R05.1 ACUTE COUGH: ICD-10-CM

## 2023-11-20 DIAGNOSIS — Z86.16 HISTORY OF COVID-19: Primary | ICD-10-CM

## 2023-11-20 DIAGNOSIS — F41.8 MIXED ANXIETY AND DEPRESSIVE DISORDER: ICD-10-CM

## 2023-11-20 PROCEDURE — G8427 DOCREV CUR MEDS BY ELIG CLIN: HCPCS | Performed by: STUDENT IN AN ORGANIZED HEALTH CARE EDUCATION/TRAINING PROGRAM

## 2023-11-20 PROCEDURE — NBSRV NON-BILLABLE SERVICE: Performed by: SOCIAL WORKER

## 2023-11-20 PROCEDURE — G8482 FLU IMMUNIZE ORDER/ADMIN: HCPCS | Performed by: STUDENT IN AN ORGANIZED HEALTH CARE EDUCATION/TRAINING PROGRAM

## 2023-11-20 PROCEDURE — 3017F COLORECTAL CA SCREEN DOC REV: CPT | Performed by: STUDENT IN AN ORGANIZED HEALTH CARE EDUCATION/TRAINING PROGRAM

## 2023-11-20 PROCEDURE — G8417 CALC BMI ABV UP PARAM F/U: HCPCS | Performed by: STUDENT IN AN ORGANIZED HEALTH CARE EDUCATION/TRAINING PROGRAM

## 2023-11-20 PROCEDURE — 3078F DIAST BP <80 MM HG: CPT | Performed by: STUDENT IN AN ORGANIZED HEALTH CARE EDUCATION/TRAINING PROGRAM

## 2023-11-20 PROCEDURE — 99214 OFFICE O/P EST MOD 30 MIN: CPT | Performed by: STUDENT IN AN ORGANIZED HEALTH CARE EDUCATION/TRAINING PROGRAM

## 2023-11-20 PROCEDURE — 3074F SYST BP LT 130 MM HG: CPT | Performed by: STUDENT IN AN ORGANIZED HEALTH CARE EDUCATION/TRAINING PROGRAM

## 2023-11-20 PROCEDURE — 1036F TOBACCO NON-USER: CPT | Performed by: STUDENT IN AN ORGANIZED HEALTH CARE EDUCATION/TRAINING PROGRAM

## 2023-11-20 RX ORDER — ECHINACEA PURPUREA EXTRACT 125 MG
1 TABLET ORAL PRN
Qty: 1 EACH | Refills: 3 | Status: SHIPPED | OUTPATIENT
Start: 2023-11-20

## 2023-11-20 RX ORDER — DEXTROMETHORPHAN HYDROBROMIDE AND PROMETHAZINE HYDROCHLORIDE 15; 6.25 MG/5ML; MG/5ML
5 SYRUP ORAL 4 TIMES DAILY PRN
Qty: 118 ML | Refills: 0 | Status: SHIPPED | OUTPATIENT
Start: 2023-11-20 | End: 2023-11-27

## 2023-11-20 RX ORDER — ALBUTEROL SULFATE 90 UG/1
2 AEROSOL, METERED RESPIRATORY (INHALATION) 4 TIMES DAILY PRN
Qty: 54 G | Refills: 1 | Status: CANCELLED | OUTPATIENT
Start: 2023-11-20

## 2023-11-20 RX ORDER — FLUTICASONE PROPIONATE 50 MCG
1 SPRAY, SUSPENSION (ML) NASAL DAILY
Qty: 16 G | Refills: 0 | Status: CANCELLED | OUTPATIENT
Start: 2023-11-20

## 2023-11-20 RX ORDER — ALBUTEROL SULFATE 90 UG/1
2 AEROSOL, METERED RESPIRATORY (INHALATION) 4 TIMES DAILY PRN
Qty: 18 G | Refills: 0 | Status: SHIPPED | OUTPATIENT
Start: 2023-11-20 | End: 2023-11-28

## 2023-11-20 RX ORDER — FLUTICASONE PROPIONATE 50 MCG
1 SPRAY, SUSPENSION (ML) NASAL DAILY
Qty: 16 G | Refills: 0 | Status: SHIPPED | OUTPATIENT
Start: 2023-11-20

## 2023-11-20 SDOH — ECONOMIC STABILITY: INCOME INSECURITY: HOW HARD IS IT FOR YOU TO PAY FOR THE VERY BASICS LIKE FOOD, HOUSING, MEDICAL CARE, AND HEATING?: NOT HARD AT ALL

## 2023-11-20 SDOH — ECONOMIC STABILITY: FOOD INSECURITY: WITHIN THE PAST 12 MONTHS, THE FOOD YOU BOUGHT JUST DIDN'T LAST AND YOU DIDN'T HAVE MONEY TO GET MORE.: NEVER TRUE

## 2023-11-20 SDOH — ECONOMIC STABILITY: FOOD INSECURITY: WITHIN THE PAST 12 MONTHS, YOU WORRIED THAT YOUR FOOD WOULD RUN OUT BEFORE YOU GOT MONEY TO BUY MORE.: NEVER TRUE

## 2023-11-20 ASSESSMENT — PATIENT HEALTH QUESTIONNAIRE - PHQ9
6. FEELING BAD ABOUT YOURSELF - OR THAT YOU ARE A FAILURE OR HAVE LET YOURSELF OR YOUR FAMILY DOWN: 0
4. FEELING TIRED OR HAVING LITTLE ENERGY: 0
3. TROUBLE FALLING OR STAYING ASLEEP: 0
SUM OF ALL RESPONSES TO PHQ QUESTIONS 1-9: 0
1. LITTLE INTEREST OR PLEASURE IN DOING THINGS: 0
SUM OF ALL RESPONSES TO PHQ QUESTIONS 1-9: 0
2. FEELING DOWN, DEPRESSED OR HOPELESS: 0
5. POOR APPETITE OR OVEREATING: 0
SUM OF ALL RESPONSES TO PHQ9 QUESTIONS 1 & 2: 0
SUM OF ALL RESPONSES TO PHQ QUESTIONS 1-9: 0
SUM OF ALL RESPONSES TO PHQ QUESTIONS 1-9: 0
10. IF YOU CHECKED OFF ANY PROBLEMS, HOW DIFFICULT HAVE THESE PROBLEMS MADE IT FOR YOU TO DO YOUR WORK, TAKE CARE OF THINGS AT HOME, OR GET ALONG WITH OTHER PEOPLE: 0
8. MOVING OR SPEAKING SO SLOWLY THAT OTHER PEOPLE COULD HAVE NOTICED. OR THE OPPOSITE, BEING SO FIGETY OR RESTLESS THAT YOU HAVE BEEN MOVING AROUND A LOT MORE THAN USUAL: 0
9. THOUGHTS THAT YOU WOULD BE BETTER OFF DEAD, OR OF HURTING YOURSELF: 0
7. TROUBLE CONCENTRATING ON THINGS, SUCH AS READING THE NEWSPAPER OR WATCHING TELEVISION: 0

## 2023-11-20 ASSESSMENT — COLUMBIA-SUICIDE SEVERITY RATING SCALE - C-SSRS
4. HAVE YOU HAD THESE THOUGHTS AND HAD SOME INTENTION OF ACTING ON THEM?: NO
7. DID THIS OCCUR IN THE LAST THREE MONTHS: NO
3. HAVE YOU BEEN THINKING ABOUT HOW YOU MIGHT KILL YOURSELF?: NO
5. HAVE YOU STARTED TO WORK OUT OR WORKED OUT THE DETAILS OF HOW TO KILL YOURSELF? DO YOU INTEND TO CARRY OUT THIS PLAN?: NO

## 2023-11-20 ASSESSMENT — ENCOUNTER SYMPTOMS
VOMITING: 0
ABDOMINAL PAIN: 0
SINUS PAIN: 1
RHINORRHEA: 1
WHEEZING: 1
SWOLLEN GLANDS: 0
COUGH: 1
SORE THROAT: 0
DIARRHEA: 0
NAUSEA: 0

## 2023-11-20 NOTE — PATIENT INSTRUCTIONS
- check home blood pressures , call with home readings in 2 weeks   -Use Flonase once daily along with nasal saline sinus rinses 2 to 4 times/day.    - Promethazine DM cough syrup 3 times daily as needed - Do not take with Reglan / metoclopramide   - Albuterol inhaler as needed for wheezing   - Increase daily water intake, use bedside humidifier.  - Call if no improvement of symptoms by the end of the week

## 2023-11-20 NOTE — PROGRESS NOTES
[place] [address], [address] [place] [address]  Tel: [phone]      2023   SUBJECTIVE/OBJECTIVE  HPI    Marcial Sosa (:  1965) is a 62 y.o. female, here for evaluation of the following medical concerns:  Chief Complaint   Patient presents with    Cough     COVID + 1 week ago     Patient is a 62 y.o. female  has a past medical history of Anxiety, Depression, Endometriosis, GERD (gastroesophageal reflux disease), Headache, Obesity, Osteoarthritis, PONV (postoperative nausea and vomiting), Restless leg syndrome, and Wears dentures. who presents post COVID. Patient called with positive COVID test, symptoms started on 2023 included headache, nasal congestion and low-grade fever. Has been taking Tylenol and over-the-counter medications. URI   This is a new problem. The problem has been unchanged. There has been no fever (Last fever Saturday, 99.9). Associated symptoms include congestion, coughing (dry), headaches, rhinorrhea (clear), sinus pain and wheezing (shortness of breath). Pertinent negatives include no abdominal pain, chest pain, diarrhea, dysuria, ear pain, nausea, plugged ear sensation (ringing), sore throat (scratchy), swollen glands or vomiting. Treatments tried: Nyquil cold medicine, Ibuprofen and Tylenol. Anxiety and depression. Last month increased dose of sertraline to 100 mg daily and Xanax 0.5 mg tid . Dealing with her mother passing away 2 months ago       Review of Systems   HENT:  Positive for congestion, rhinorrhea (clear) and sinus pain. Negative for ear pain and sore throat (scratchy). Respiratory:  Positive for cough (dry) and wheezing (shortness of breath). Cardiovascular:  Negative for chest pain. Gastrointestinal:  Negative for abdominal pain, diarrhea, nausea and vomiting. Genitourinary:  Negative for dysuria. Neurological:  Positive for headaches.        Physical exam  BP (!) 153/96   Pulse 58

## 2023-11-20 NOTE — TELEPHONE ENCOUNTER
Orthopedic Nurse Navigator Summary    Patient Name:  Lilibeth Gifford  Anticipated Date of Surgery:  12/05/23  Attended Pre-op Education Class:  Video sent to patient email 11/09/23  PCP: Augusta Garcia DO  Date of PCP visit for H&P: 12/01/23  Is patient in a Pain Management program:  Review of Medical history reveals history of: HTN, GERD, PONV, FAULKNER    Critical Lab Values  - Hemoglobin (g/dL):  Date: 12/01/23 Value 13.2  - Hematocrit(%): Date: 12/01/23  Value 39.2  - HgbA1C:  Date:  Value  - Albumin:  Date: 12/01/23  Value 4.0  - BUN:  Date: 12/01/23  Value 8  - Creatinine:  Date: 12/01/23  Value 0.6    12/01/23 MRSA swab- negative    Coronary Artery Disease/HTN/CHF history: HTN  Cardiologist:  Cardiac clearance necessary:  No  Date of cardiac clearance appt:  Final Cardiac recommendations: On any anticoagulation: No    Diabetes History:  No  Most recent HgbA1C:  Pulmonary:  COPD/Emphysema/Use of home oxygen: No  Alcohol use: Yes    BMI greater than 40 at time of scheduling:  Yes- 45.03  Additional medical concerns:   Additional recommendations for above concerns:  Attended Pre-Hab program:    Anticipated Discharge Disposition:  Home with [facility]  Who will be with patient at home following discharge:  Her son is going to come stay with her  Equipment patient already has:  none  Bedroom on first or second floor:  second  Bathroom on first or second floor:  second  Weight bearing status:  wbat  Pre-op ambulatory status: painful ambulation  Number of entry steps:  2  Caregiver assistance:  full time    Stacey Briseno RN  Date:   11/20/23

## 2023-11-22 DIAGNOSIS — F41.9 ANXIETY DISORDER, UNSPECIFIED: ICD-10-CM

## 2023-11-22 RX ORDER — ALPRAZOLAM 0.5 MG/1
TABLET ORAL
Qty: 90 TABLET | Refills: 1 | Status: SHIPPED | OUTPATIENT
Start: 2023-11-22 | End: 2024-02-20

## 2023-11-22 NOTE — TELEPHONE ENCOUNTER
Future Appointments   Date Time Provider [address]   11/28/2023 12:30 PM [facility] CT RM 2 [facility] CT [facility]   11/30/2023  9:30 AM Lauro Harada, LISW [facility]   12/1/2023 10:40 AM Blenda Lazar, APRN - CNP [facility] [place] - [facility]         LOV 6/28/2023

## 2023-11-28 ENCOUNTER — HOSPITAL ENCOUNTER (OUTPATIENT)
Dept: CT IMAGING | Age: 58
Discharge: HOME | End: 2023-11-28
Attending: ORTHOPAEDIC SURGERY
Payer: MEDICARE

## 2023-11-28 DIAGNOSIS — M17.12 UNILATERAL PRIMARY OSTEOARTHRITIS, LEFT KNEE: ICD-10-CM

## 2023-11-28 PROCEDURE — 73700 CT LOWER EXTREMITY W/O DYE: CPT

## 2023-11-28 NOTE — PROGRESS NOTES
Total Joint video emailed & reviewed to patient by Tanya Levine. Hibiclens instructions reviewed to use x 5 days preop. MERI screening done. MERI score was 3.0. TJ book, IS instructions, TJ video link, and fall contract placed on chart for DOS. Patient may need to see the video upon arrival for her TKR as she has been unable to view it at home and Tanya Levine did a phone interview with patient. Patient is allergic to Ketorolac and Dr. Bethea  order for ortho mix is unclear. His  Rodrigo Summers has been notified by phone and this order has been left off patient's order set for DOS. Awaiting advisement from office. SC    Patient also lives alone and is concerned about her safety if discharged to home right after procedure. Would like to go to rehab facility for initial recovery. Process has been initiated by email to Christine Osorio RN.

## 2023-12-01 ENCOUNTER — HOSPITAL ENCOUNTER (OUTPATIENT)
Dept: GENERAL RADIOLOGY | Age: 58
Discharge: HOME | End: 2023-12-01
Payer: MEDICARE

## 2023-12-01 ENCOUNTER — OFFICE VISIT (OUTPATIENT)
Dept: FAMILY MEDICINE CLINIC | Age: 58
End: 2023-12-01

## 2023-12-01 VITALS
RESPIRATION RATE: 16 BRPM | HEART RATE: 66 BPM | SYSTOLIC BLOOD PRESSURE: 118 MMHG | DIASTOLIC BLOOD PRESSURE: 80 MMHG | WEIGHT: 274 LBS | BODY MASS INDEX: 44.22 KG/M2 | OXYGEN SATURATION: 97 %

## 2023-12-01 DIAGNOSIS — F41.8 MIXED ANXIETY AND DEPRESSIVE DISORDER: ICD-10-CM

## 2023-12-01 DIAGNOSIS — E66.01 OBESITY, CLASS III, BMI 40-49.9 (MORBID OBESITY) (HCC): ICD-10-CM

## 2023-12-01 DIAGNOSIS — M17.12 PRIMARY OSTEOARTHRITIS OF LEFT KNEE: ICD-10-CM

## 2023-12-01 DIAGNOSIS — Z01.818 PREOP EXAMINATION: ICD-10-CM

## 2023-12-01 DIAGNOSIS — Z01.818 PREOP EXAMINATION: Primary | ICD-10-CM

## 2023-12-01 PROCEDURE — 71046 X-RAY EXAM CHEST 2 VIEWS: CPT

## 2023-12-01 SDOH — ECONOMIC STABILITY: FOOD INSECURITY: WITHIN THE PAST 12 MONTHS, THE FOOD YOU BOUGHT JUST DIDN'T LAST AND YOU DIDN'T HAVE MONEY TO GET MORE.: NEVER TRUE

## 2023-12-01 SDOH — ECONOMIC STABILITY: INCOME INSECURITY: HOW HARD IS IT FOR YOU TO PAY FOR THE VERY BASICS LIKE FOOD, HOUSING, MEDICAL CARE, AND HEATING?: NOT VERY HARD

## 2023-12-01 SDOH — ECONOMIC STABILITY: FOOD INSECURITY: WITHIN THE PAST 12 MONTHS, YOU WORRIED THAT YOUR FOOD WOULD RUN OUT BEFORE YOU GOT MONEY TO BUY MORE.: NEVER TRUE

## 2023-12-01 ASSESSMENT — PATIENT HEALTH QUESTIONNAIRE - PHQ9
9. THOUGHTS THAT YOU WOULD BE BETTER OFF DEAD, OR OF HURTING YOURSELF: 0
10. IF YOU CHECKED OFF ANY PROBLEMS, HOW DIFFICULT HAVE THESE PROBLEMS MADE IT FOR YOU TO DO YOUR WORK, TAKE CARE OF THINGS AT HOME, OR GET ALONG WITH OTHER PEOPLE: 1
SUM OF ALL RESPONSES TO PHQ QUESTIONS 1-9: 10
SUM OF ALL RESPONSES TO PHQ9 QUESTIONS 1 & 2: 4
7. TROUBLE CONCENTRATING ON THINGS, SUCH AS READING THE NEWSPAPER OR WATCHING TELEVISION: 3
6. FEELING BAD ABOUT YOURSELF - OR THAT YOU ARE A FAILURE OR HAVE LET YOURSELF OR YOUR FAMILY DOWN: 0
8. MOVING OR SPEAKING SO SLOWLY THAT OTHER PEOPLE COULD HAVE NOTICED. OR THE OPPOSITE, BEING SO FIGETY OR RESTLESS THAT YOU HAVE BEEN MOVING AROUND A LOT MORE THAN USUAL: 0
3. TROUBLE FALLING OR STAYING ASLEEP: 2
2. FEELING DOWN, DEPRESSED OR HOPELESS: 3
SUM OF ALL RESPONSES TO PHQ QUESTIONS 1-9: 10
SUM OF ALL RESPONSES TO PHQ QUESTIONS 1-9: 10
1. LITTLE INTEREST OR PLEASURE IN DOING THINGS: 1
4. FEELING TIRED OR HAVING LITTLE ENERGY: 1
SUM OF ALL RESPONSES TO PHQ QUESTIONS 1-9: 10
5. POOR APPETITE OR OVEREATING: 0

## 2023-12-01 ASSESSMENT — ENCOUNTER SYMPTOMS
NAUSEA: 0
VOMITING: 0
COUGH: 0
DIARRHEA: 0
SHORTNESS OF BREATH: 0

## 2023-12-02 LAB
ABO + RH BLD: NORMAL
ALBUMIN SERPL-MCNC: 4 G/DL (ref 3.4–5)
ALBUMIN/GLOB SERPL: 1.7 {RATIO} (ref 1.1–2.2)
ALP SERPL-CCNC: 111 U/L (ref 40–129)
ALT SERPL-CCNC: 26 U/L (ref 10–40)
ANION GAP SERPL CALCULATED.3IONS-SCNC: 11 MMOL/L (ref 3–16)
ANISOCYTOSIS BLD QL SMEAR: ABNORMAL
APTT BLD: 34.8 SEC (ref 22.7–35.9)
AST SERPL-CCNC: 21 U/L (ref 15–37)
BASOPHILS # BLD: 0 K/UL (ref 0–0.2)
BASOPHILS NFR BLD: 0 %
BILIRUB SERPL-MCNC: 0.3 MG/DL (ref 0–1)
BLD GP AB SCN SERPL QL: NORMAL
BUN SERPL-MCNC: 8 MG/DL (ref 7–20)
CALCIUM SERPL-MCNC: 9 MG/DL (ref 8.3–10.6)
CHLORIDE SERPL-SCNC: 105 MMOL/L (ref 99–110)
CO2 SERPL-SCNC: 26 MMOL/L (ref 21–32)
CREAT SERPL-MCNC: 0.6 MG/DL (ref 0.6–1.1)
DEPRECATED RDW RBC AUTO: 16.2 % (ref 12.4–15.4)
EOSINOPHIL # BLD: 0.2 K/UL (ref 0–0.6)
EOSINOPHIL NFR BLD: 2 %
GFR SERPLBLD CREATININE-BSD FMLA CKD-EPI: >60 ML/MIN/{1.73_M2}
GLUCOSE SERPL-MCNC: 108 MG/DL (ref 70–99)
HCT VFR BLD AUTO: 39.2 % (ref 36–48)
HGB BLD-MCNC: 13.2 G/DL (ref 12–16)
INR PPP: 1.15 (ref 0.84–1.16)
LYMPHOCYTES # BLD: 1.5 K/UL (ref 1–5.1)
LYMPHOCYTES NFR BLD: 15 %
MCH RBC QN AUTO: 29.6 PG (ref 26–34)
MCHC RBC AUTO-ENTMCNC: 33.7 G/DL (ref 31–36)
MCV RBC AUTO: 87.8 FL (ref 80–100)
MONOCYTES # BLD: 0.6 K/UL (ref 0–1.3)
MONOCYTES NFR BLD: 6 %
MRSA SPEC QL CULT: NORMAL
NEUTROPHILS # BLD: 7.6 K/UL (ref 1.7–7.7)
NEUTROPHILS NFR BLD: 77 %
PLATELET # BLD AUTO: 198 K/UL (ref 135–450)
PMV BLD AUTO: 10.4 FL (ref 5–10.5)
POTASSIUM SERPL-SCNC: 3.5 MMOL/L (ref 3.5–5.1)
PROT SERPL-MCNC: 6.4 G/DL (ref 6.4–8.2)
PROTHROMBIN TIME: 14.7 SEC (ref 11.5–14.8)
RBC # BLD AUTO: 4.47 M/UL (ref 4–5.2)
SODIUM SERPL-SCNC: 142 MMOL/L (ref 136–145)
WBC # BLD AUTO: 9.9 K/UL (ref 4–11)

## 2023-12-04 ENCOUNTER — ANESTHESIA EVENT (OUTPATIENT)
Dept: OPERATING ROOM | Age: 58
End: 2023-12-04
Payer: MEDICARE

## 2023-12-05 ENCOUNTER — ANESTHESIA (OUTPATIENT)
Dept: OPERATING ROOM | Age: 58
End: 2023-12-05
Payer: MEDICARE

## 2023-12-05 ENCOUNTER — HOSPITAL ENCOUNTER (INPATIENT)
Age: 58
LOS: 4 days | Discharge: HOME HEALTH | End: 2023-12-09
Attending: ORTHOPAEDIC SURGERY | Admitting: ORTHOPAEDIC SURGERY
Payer: MEDICARE

## 2023-12-05 ENCOUNTER — APPOINTMENT (OUTPATIENT)
Dept: GENERAL RADIOLOGY | Age: 58
End: 2023-12-05
Attending: ORTHOPAEDIC SURGERY
Payer: MEDICARE

## 2023-12-05 PROBLEM — M17.12 OSTEOARTHRITIS OF LEFT KNEE, UNSPECIFIED OSTEOARTHRITIS TYPE: Status: ACTIVE | Noted: 2023-12-05

## 2023-12-05 LAB
ABO + RH BLD: NORMAL
BLD GP AB SCN SERPL QL: NORMAL

## 2023-12-05 PROCEDURE — 3600000004 HC SURGERY LEVEL 4 BASE: Performed by: ORTHOPAEDIC SURGERY

## 2023-12-05 PROCEDURE — 8E0Y0CZ ROBOTIC ASSISTED PROCEDURE OF LOWER EXTREMITY, OPEN APPROACH: ICD-10-PCS | Performed by: ORTHOPAEDIC SURGERY

## 2023-12-05 PROCEDURE — 2720000010 HC SURG SUPPLY STERILE: Performed by: ORTHOPAEDIC SURGERY

## 2023-12-05 PROCEDURE — 6360000002 HC RX W HCPCS: Performed by: ANESTHESIOLOGY

## 2023-12-05 PROCEDURE — 6360000002 HC RX W HCPCS

## 2023-12-05 PROCEDURE — 6360000002 HC RX W HCPCS: Performed by: ORTHOPAEDIC SURGERY

## 2023-12-05 PROCEDURE — 2500000003 HC RX 250 WO HCPCS: Performed by: NURSE ANESTHETIST, CERTIFIED REGISTERED

## 2023-12-05 PROCEDURE — 73560 X-RAY EXAM OF KNEE 1 OR 2: CPT

## 2023-12-05 PROCEDURE — 2580000003 HC RX 258: Performed by: ORTHOPAEDIC SURGERY

## 2023-12-05 PROCEDURE — A4217 STERILE WATER/SALINE, 500 ML: HCPCS | Performed by: ORTHOPAEDIC SURGERY

## 2023-12-05 PROCEDURE — 2500000003 HC RX 250 WO HCPCS

## 2023-12-05 PROCEDURE — 2500000003 HC RX 250 WO HCPCS: Performed by: ORTHOPAEDIC SURGERY

## 2023-12-05 PROCEDURE — 2709999900 HC NON-CHARGEABLE SUPPLY: Performed by: ORTHOPAEDIC SURGERY

## 2023-12-05 PROCEDURE — C1776 JOINT DEVICE (IMPLANTABLE): HCPCS | Performed by: ORTHOPAEDIC SURGERY

## 2023-12-05 PROCEDURE — 3700000000 HC ANESTHESIA ATTENDED CARE: Performed by: ORTHOPAEDIC SURGERY

## 2023-12-05 PROCEDURE — 86850 RBC ANTIBODY SCREEN: CPT

## 2023-12-05 PROCEDURE — 3700000001 HC ADD 15 MINUTES (ANESTHESIA): Performed by: ORTHOPAEDIC SURGERY

## 2023-12-05 PROCEDURE — 6370000000 HC RX 637 (ALT 250 FOR IP): Performed by: ORTHOPAEDIC SURGERY

## 2023-12-05 PROCEDURE — 1200000000 HC SEMI PRIVATE

## 2023-12-05 PROCEDURE — 86901 BLOOD TYPING SEROLOGIC RH(D): CPT

## 2023-12-05 PROCEDURE — 2580000003 HC RX 258: Performed by: ANESTHESIOLOGY

## 2023-12-05 PROCEDURE — C1713 ANCHOR/SCREW BN/BN,TIS/BN: HCPCS | Performed by: ORTHOPAEDIC SURGERY

## 2023-12-05 PROCEDURE — 7100000000 HC PACU RECOVERY - FIRST 15 MIN: Performed by: ORTHOPAEDIC SURGERY

## 2023-12-05 PROCEDURE — 64447 NJX AA&/STRD FEMORAL NRV IMG: CPT | Performed by: ANESTHESIOLOGY

## 2023-12-05 PROCEDURE — 7100000001 HC PACU RECOVERY - ADDTL 15 MIN: Performed by: ORTHOPAEDIC SURGERY

## 2023-12-05 PROCEDURE — 2580000003 HC RX 258: Performed by: NURSE ANESTHETIST, CERTIFIED REGISTERED

## 2023-12-05 PROCEDURE — 0SRD0J9 REPLACEMENT OF LEFT KNEE JOINT WITH SYNTHETIC SUBSTITUTE, CEMENTED, OPEN APPROACH: ICD-10-PCS | Performed by: ORTHOPAEDIC SURGERY

## 2023-12-05 PROCEDURE — 86900 BLOOD TYPING SEROLOGIC ABO: CPT

## 2023-12-05 PROCEDURE — 3E0T3BZ INTRODUCTION OF ANESTHETIC AGENT INTO PERIPHERAL NERVES AND PLEXI, PERCUTANEOUS APPROACH: ICD-10-PCS | Performed by: ORTHOPAEDIC SURGERY

## 2023-12-05 PROCEDURE — 3600000014 HC SURGERY LEVEL 4 ADDTL 15MIN: Performed by: ORTHOPAEDIC SURGERY

## 2023-12-05 DEVICE — COMPONENT PART KNEE CAPPED K1 STRYKER: Type: IMPLANTABLE DEVICE | Site: KNEE | Status: FUNCTIONAL

## 2023-12-05 DEVICE — COMPONENT PAT DIA31MM THK8MM KNEE X3 SYMMETRIC TRIATHLON: Type: IMPLANTABLE DEVICE | Site: KNEE | Status: FUNCTIONAL

## 2023-12-05 DEVICE — BASEPLATE TIB SZ 3 UNIV KNEE TRITANIUM TOT STBL CEM: Type: IMPLANTABLE DEVICE | Site: KNEE | Status: FUNCTIONAL

## 2023-12-05 DEVICE — IMPLANTABLE DEVICE: Type: IMPLANTABLE DEVICE | Site: KNEE | Status: FUNCTIONAL

## 2023-12-05 DEVICE — INSERT TIB CS 3 12 MM ARTC POST KNEE BEAR TECHNOLOGY X3: Type: IMPLANTABLE DEVICE | Site: KNEE | Status: FUNCTIONAL

## 2023-12-05 DEVICE — CEMENT BNE 20ML 41GM FULL DOSE PMMA W/ TOBRA M VISC RADPQ: Type: IMPLANTABLE DEVICE | Site: KNEE | Status: FUNCTIONAL

## 2023-12-05 RX ORDER — FENTANYL CITRATE 50 UG/ML
INJECTION, SOLUTION INTRAMUSCULAR; INTRAVENOUS PRN
Status: DISCONTINUED | OUTPATIENT
Start: 2023-12-05 | End: 2023-12-05 | Stop reason: SDUPTHER

## 2023-12-05 RX ORDER — ROPIVACAINE HYDROCHLORIDE 5 MG/ML
INJECTION, SOLUTION EPIDURAL; INFILTRATION; PERINEURAL
Status: COMPLETED
Start: 2023-12-05 | End: 2023-12-05

## 2023-12-05 RX ORDER — LABETALOL HYDROCHLORIDE 5 MG/ML
10 INJECTION, SOLUTION INTRAVENOUS
Status: DISCONTINUED | OUTPATIENT
Start: 2023-12-05 | End: 2023-12-05 | Stop reason: HOSPADM

## 2023-12-05 RX ORDER — FLUTICASONE PROPIONATE 50 MCG
1 SPRAY, SUSPENSION (ML) NASAL DAILY
Status: DISCONTINUED | OUTPATIENT
Start: 2023-12-05 | End: 2023-12-09 | Stop reason: HOSPADM

## 2023-12-05 RX ORDER — ALBUTEROL SULFATE 2.5 MG/3ML
2.5 SOLUTION RESPIRATORY (INHALATION) EVERY 4 HOURS PRN
Status: DISCONTINUED | OUTPATIENT
Start: 2023-12-05 | End: 2023-12-09 | Stop reason: HOSPADM

## 2023-12-05 RX ORDER — HYDROMORPHONE HYDROCHLORIDE 2 MG/ML
INJECTION, SOLUTION INTRAMUSCULAR; INTRAVENOUS; SUBCUTANEOUS PRN
Status: DISCONTINUED | OUTPATIENT
Start: 2023-12-05 | End: 2023-12-05 | Stop reason: SDUPTHER

## 2023-12-05 RX ORDER — PROCHLORPERAZINE EDISYLATE 5 MG/ML
5 INJECTION INTRAMUSCULAR; INTRAVENOUS
Status: DISCONTINUED | OUTPATIENT
Start: 2023-12-05 | End: 2023-12-05 | Stop reason: HOSPADM

## 2023-12-05 RX ORDER — FENTANYL CITRATE 50 UG/ML
25 INJECTION, SOLUTION INTRAMUSCULAR; INTRAVENOUS EVERY 5 MIN PRN
Status: COMPLETED | OUTPATIENT
Start: 2023-12-05 | End: 2023-12-05

## 2023-12-05 RX ORDER — ACETAMINOPHEN 325 MG/1
650 TABLET ORAL EVERY 6 HOURS
Status: DISCONTINUED | OUTPATIENT
Start: 2023-12-05 | End: 2023-12-09 | Stop reason: HOSPADM

## 2023-12-05 RX ORDER — PANTOPRAZOLE SODIUM 40 MG/1
40 TABLET, DELAYED RELEASE ORAL
Status: DISCONTINUED | OUTPATIENT
Start: 2023-12-06 | End: 2023-12-09 | Stop reason: HOSPADM

## 2023-12-05 RX ORDER — OXYCODONE HYDROCHLORIDE 5 MG/1
5 TABLET ORAL EVERY 4 HOURS PRN
Status: DISCONTINUED | OUTPATIENT
Start: 2023-12-05 | End: 2023-12-09 | Stop reason: HOSPADM

## 2023-12-05 RX ORDER — ROPIVACAINE HYDROCHLORIDE 5 MG/ML
INJECTION, SOLUTION EPIDURAL; INFILTRATION; PERINEURAL
Status: COMPLETED | OUTPATIENT
Start: 2023-12-05 | End: 2023-12-05

## 2023-12-05 RX ORDER — EPHEDRINE SULFATE 50 MG/ML
INJECTION INTRAVENOUS PRN
Status: DISCONTINUED | OUTPATIENT
Start: 2023-12-05 | End: 2023-12-05 | Stop reason: SDUPTHER

## 2023-12-05 RX ORDER — POLYETHYLENE GLYCOL 3350 17 G/17G
17 POWDER, FOR SOLUTION ORAL DAILY PRN
Status: DISCONTINUED | OUTPATIENT
Start: 2023-12-05 | End: 2023-12-09 | Stop reason: HOSPADM

## 2023-12-05 RX ORDER — MIDAZOLAM HYDROCHLORIDE 1 MG/ML
INJECTION INTRAMUSCULAR; INTRAVENOUS
Status: COMPLETED
Start: 2023-12-05 | End: 2023-12-05

## 2023-12-05 RX ORDER — ROCURONIUM BROMIDE 10 MG/ML
INJECTION, SOLUTION INTRAVENOUS PRN
Status: DISCONTINUED | OUTPATIENT
Start: 2023-12-05 | End: 2023-12-05 | Stop reason: SDUPTHER

## 2023-12-05 RX ORDER — VANCOMYCIN HYDROCHLORIDE 1 G/20ML
INJECTION, POWDER, LYOPHILIZED, FOR SOLUTION INTRAVENOUS PRN
Status: DISCONTINUED | OUTPATIENT
Start: 2023-12-05 | End: 2023-12-05 | Stop reason: HOSPADM

## 2023-12-05 RX ORDER — MECLIZINE HCL 12.5 MG/1
12.5 TABLET ORAL 3 TIMES DAILY PRN
Status: DISCONTINUED | OUTPATIENT
Start: 2023-12-05 | End: 2023-12-09 | Stop reason: HOSPADM

## 2023-12-05 RX ORDER — HYDROMORPHONE HYDROCHLORIDE 1 MG/ML
0.5 INJECTION, SOLUTION INTRAMUSCULAR; INTRAVENOUS; SUBCUTANEOUS EVERY 5 MIN PRN
Status: DISCONTINUED | OUTPATIENT
Start: 2023-12-05 | End: 2023-12-05 | Stop reason: HOSPADM

## 2023-12-05 RX ORDER — PROPOFOL 10 MG/ML
INJECTION, EMULSION INTRAVENOUS PRN
Status: DISCONTINUED | OUTPATIENT
Start: 2023-12-05 | End: 2023-12-05 | Stop reason: SDUPTHER

## 2023-12-05 RX ORDER — ALPRAZOLAM 0.5 MG/1
0.5 TABLET ORAL 3 TIMES DAILY PRN
Status: DISCONTINUED | OUTPATIENT
Start: 2023-12-05 | End: 2023-12-09 | Stop reason: HOSPADM

## 2023-12-05 RX ORDER — GLYCOPYRROLATE 0.2 MG/ML
INJECTION INTRAMUSCULAR; INTRAVENOUS PRN
Status: DISCONTINUED | OUTPATIENT
Start: 2023-12-05 | End: 2023-12-05 | Stop reason: SDUPTHER

## 2023-12-05 RX ORDER — FENTANYL CITRATE 50 UG/ML
INJECTION, SOLUTION INTRAMUSCULAR; INTRAVENOUS
Status: COMPLETED
Start: 2023-12-05 | End: 2023-12-05

## 2023-12-05 RX ORDER — IBUPROFEN 400 MG/1
600 TABLET ORAL EVERY 8 HOURS
Status: COMPLETED | OUTPATIENT
Start: 2023-12-05 | End: 2023-12-08

## 2023-12-05 RX ORDER — IPRATROPIUM BROMIDE AND ALBUTEROL SULFATE 2.5; .5 MG/3ML; MG/3ML
1 SOLUTION RESPIRATORY (INHALATION)
Status: DISCONTINUED | OUTPATIENT
Start: 2023-12-05 | End: 2023-12-05 | Stop reason: HOSPADM

## 2023-12-05 RX ORDER — ROPINIROLE 2 MG/1
2 TABLET, FILM COATED ORAL 2 TIMES DAILY
Status: DISCONTINUED | OUTPATIENT
Start: 2023-12-05 | End: 2023-12-09 | Stop reason: HOSPADM

## 2023-12-05 RX ORDER — ONDANSETRON 2 MG/ML
4 INJECTION INTRAMUSCULAR; INTRAVENOUS
Status: DISCONTINUED | OUTPATIENT
Start: 2023-12-05 | End: 2023-12-05 | Stop reason: HOSPADM

## 2023-12-05 RX ORDER — SODIUM CHLORIDE, SODIUM LACTATE, POTASSIUM CHLORIDE, CALCIUM CHLORIDE 600; 310; 30; 20 MG/100ML; MG/100ML; MG/100ML; MG/100ML
INJECTION, SOLUTION INTRAVENOUS CONTINUOUS
Status: DISCONTINUED | OUTPATIENT
Start: 2023-12-05 | End: 2023-12-05 | Stop reason: HOSPADM

## 2023-12-05 RX ORDER — POTASSIUM CHLORIDE 750 MG/1
10 TABLET, EXTENDED RELEASE ORAL 2 TIMES DAILY WITH MEALS
Status: DISCONTINUED | OUTPATIENT
Start: 2023-12-05 | End: 2023-12-09 | Stop reason: HOSPADM

## 2023-12-05 RX ORDER — SODIUM CHLORIDE 9 MG/ML
INJECTION, SOLUTION INTRAVENOUS PRN
Status: DISCONTINUED | OUTPATIENT
Start: 2023-12-05 | End: 2023-12-09 | Stop reason: HOSPADM

## 2023-12-05 RX ORDER — SENNA AND DOCUSATE SODIUM 50; 8.6 MG/1; MG/1
1 TABLET, FILM COATED ORAL 2 TIMES DAILY
Status: DISCONTINUED | OUTPATIENT
Start: 2023-12-05 | End: 2023-12-09 | Stop reason: HOSPADM

## 2023-12-05 RX ORDER — OXYCODONE HYDROCHLORIDE 5 MG/1
10 TABLET ORAL EVERY 4 HOURS PRN
Status: DISCONTINUED | OUTPATIENT
Start: 2023-12-05 | End: 2023-12-09 | Stop reason: HOSPADM

## 2023-12-05 RX ORDER — FAMOTIDINE 10 MG/ML
INJECTION, SOLUTION INTRAVENOUS PRN
Status: DISCONTINUED | OUTPATIENT
Start: 2023-12-05 | End: 2023-12-05 | Stop reason: SDUPTHER

## 2023-12-05 RX ORDER — SODIUM CHLORIDE 0.9 % (FLUSH) 0.9 %
5-40 SYRINGE (ML) INJECTION EVERY 12 HOURS SCHEDULED
Status: DISCONTINUED | OUTPATIENT
Start: 2023-12-05 | End: 2023-12-09 | Stop reason: HOSPADM

## 2023-12-05 RX ORDER — SODIUM CHLORIDE 0.9 % (FLUSH) 0.9 %
5-40 SYRINGE (ML) INJECTION EVERY 12 HOURS SCHEDULED
Status: DISCONTINUED | OUTPATIENT
Start: 2023-12-05 | End: 2023-12-05 | Stop reason: HOSPADM

## 2023-12-05 RX ORDER — DEXAMETHASONE SODIUM PHOSPHATE 4 MG/ML
INJECTION, SOLUTION INTRA-ARTICULAR; INTRALESIONAL; INTRAMUSCULAR; INTRAVENOUS; SOFT TISSUE PRN
Status: DISCONTINUED | OUTPATIENT
Start: 2023-12-05 | End: 2023-12-05 | Stop reason: SDUPTHER

## 2023-12-05 RX ORDER — ONDANSETRON 2 MG/ML
INJECTION INTRAMUSCULAR; INTRAVENOUS PRN
Status: DISCONTINUED | OUTPATIENT
Start: 2023-12-05 | End: 2023-12-05 | Stop reason: SDUPTHER

## 2023-12-05 RX ORDER — METHOCARBAMOL 100 MG/ML
INJECTION, SOLUTION INTRAMUSCULAR; INTRAVENOUS PRN
Status: DISCONTINUED | OUTPATIENT
Start: 2023-12-05 | End: 2023-12-05 | Stop reason: SDUPTHER

## 2023-12-05 RX ORDER — ACETAMINOPHEN 325 MG/1
650 TABLET ORAL
Status: DISCONTINUED | OUTPATIENT
Start: 2023-12-05 | End: 2023-12-05 | Stop reason: HOSPADM

## 2023-12-05 RX ORDER — KETAMINE HCL IN NACL, ISO-OSM 20 MG/2 ML
SYRINGE (ML) INJECTION PRN
Status: DISCONTINUED | OUTPATIENT
Start: 2023-12-05 | End: 2023-12-05 | Stop reason: SDUPTHER

## 2023-12-05 RX ORDER — ONDANSETRON 4 MG/1
4 TABLET, ORALLY DISINTEGRATING ORAL EVERY 8 HOURS PRN
Status: DISCONTINUED | OUTPATIENT
Start: 2023-12-05 | End: 2023-12-09 | Stop reason: HOSPADM

## 2023-12-05 RX ORDER — MIDAZOLAM HYDROCHLORIDE 1 MG/ML
INJECTION INTRAMUSCULAR; INTRAVENOUS PRN
Status: DISCONTINUED | OUTPATIENT
Start: 2023-12-05 | End: 2023-12-05 | Stop reason: SDUPTHER

## 2023-12-05 RX ORDER — ASPIRIN 81 MG/1
81 TABLET ORAL 2 TIMES DAILY
Status: DISCONTINUED | OUTPATIENT
Start: 2023-12-06 | End: 2023-12-09 | Stop reason: HOSPADM

## 2023-12-05 RX ORDER — SODIUM CHLORIDE 0.9 % (FLUSH) 0.9 %
5-40 SYRINGE (ML) INJECTION PRN
Status: DISCONTINUED | OUTPATIENT
Start: 2023-12-05 | End: 2023-12-09 | Stop reason: HOSPADM

## 2023-12-05 RX ORDER — SODIUM CHLORIDE 0.9 % (FLUSH) 0.9 %
5-40 SYRINGE (ML) INJECTION PRN
Status: DISCONTINUED | OUTPATIENT
Start: 2023-12-05 | End: 2023-12-05 | Stop reason: HOSPADM

## 2023-12-05 RX ORDER — ONDANSETRON 2 MG/ML
4 INJECTION INTRAMUSCULAR; INTRAVENOUS EVERY 6 HOURS PRN
Status: DISCONTINUED | OUTPATIENT
Start: 2023-12-05 | End: 2023-12-09 | Stop reason: HOSPADM

## 2023-12-05 RX ORDER — LIDOCAINE HYDROCHLORIDE 20 MG/ML
INJECTION, SOLUTION EPIDURAL; INFILTRATION; INTRACAUDAL; PERINEURAL PRN
Status: DISCONTINUED | OUTPATIENT
Start: 2023-12-05 | End: 2023-12-05 | Stop reason: SDUPTHER

## 2023-12-05 RX ORDER — MAGNESIUM HYDROXIDE 1200 MG/15ML
LIQUID ORAL CONTINUOUS PRN
Status: DISCONTINUED | OUTPATIENT
Start: 2023-12-05 | End: 2023-12-05 | Stop reason: HOSPADM

## 2023-12-05 RX ORDER — METOCLOPRAMIDE 10 MG/1
5 TABLET ORAL 3 TIMES DAILY PRN
Status: DISCONTINUED | OUTPATIENT
Start: 2023-12-05 | End: 2023-12-09 | Stop reason: HOSPADM

## 2023-12-05 RX ORDER — SODIUM CHLORIDE 9 MG/ML
INJECTION, SOLUTION INTRAVENOUS PRN
Status: DISCONTINUED | OUTPATIENT
Start: 2023-12-05 | End: 2023-12-05 | Stop reason: HOSPADM

## 2023-12-05 RX ORDER — METHOCARBAMOL 500 MG/1
500 TABLET, FILM COATED ORAL 3 TIMES DAILY PRN
Status: DISCONTINUED | OUTPATIENT
Start: 2023-12-05 | End: 2023-12-09 | Stop reason: HOSPADM

## 2023-12-05 RX ADMIN — HYDROMORPHONE HYDROCHLORIDE 0.5 MG: 1 INJECTION, SOLUTION INTRAMUSCULAR; INTRAVENOUS; SUBCUTANEOUS at 17:02

## 2023-12-05 RX ADMIN — LIDOCAINE HYDROCHLORIDE 80 MG: 20 INJECTION, SOLUTION EPIDURAL; INFILTRATION; INTRACAUDAL; PERINEURAL at 12:49

## 2023-12-05 RX ADMIN — SODIUM CHLORIDE, PRESERVATIVE FREE 10 ML: 5 INJECTION INTRAVENOUS at 22:00

## 2023-12-05 RX ADMIN — ROCURONIUM BROMIDE 80 MG: 10 INJECTION, SOLUTION INTRAVENOUS at 12:57

## 2023-12-05 RX ADMIN — DEXAMETHASONE SODIUM PHOSPHATE 4 MG: 4 INJECTION, SOLUTION INTRAMUSCULAR; INTRAVENOUS at 12:54

## 2023-12-05 RX ADMIN — SODIUM CHLORIDE 1000 MG: 900 INJECTION INTRAVENOUS at 13:10

## 2023-12-05 RX ADMIN — HYDROMORPHONE HYDROCHLORIDE 0.5 MG: 1 INJECTION, SOLUTION INTRAMUSCULAR; INTRAVENOUS; SUBCUTANEOUS at 16:45

## 2023-12-05 RX ADMIN — ALPRAZOLAM 0.5 MG: 0.5 TABLET ORAL at 21:55

## 2023-12-05 RX ADMIN — OXYCODONE 5 MG: 5 TABLET ORAL at 19:53

## 2023-12-05 RX ADMIN — SODIUM CHLORIDE 3000 MG: 900 INJECTION INTRAVENOUS at 22:06

## 2023-12-05 RX ADMIN — METHOCARBAMOL 500 MG: 500 TABLET ORAL at 21:55

## 2023-12-05 RX ADMIN — PROPOFOL 140 MG: 10 INJECTION, EMULSION INTRAVENOUS at 12:49

## 2023-12-05 RX ADMIN — EPHEDRINE SULFATE 5 MG: 50 INJECTION INTRAVENOUS at 13:12

## 2023-12-05 RX ADMIN — METHOCARBAMOL 1000 MG: 100 INJECTION, SOLUTION INTRAMUSCULAR; INTRAVENOUS at 13:29

## 2023-12-05 RX ADMIN — HYDROMORPHONE HYDROCHLORIDE 0.5 MG: 2 INJECTION, SOLUTION INTRAMUSCULAR; INTRAVENOUS; SUBCUTANEOUS at 13:53

## 2023-12-05 RX ADMIN — MIDAZOLAM HYDROCHLORIDE 2 MG: 2 INJECTION, SOLUTION INTRAMUSCULAR; INTRAVENOUS at 11:00

## 2023-12-05 RX ADMIN — EPHEDRINE SULFATE 10 MG: 50 INJECTION INTRAVENOUS at 14:56

## 2023-12-05 RX ADMIN — DEXMEDETOMIDINE HYDROCHLORIDE 6 MCG: 100 INJECTION, SOLUTION INTRAVENOUS at 14:58

## 2023-12-05 RX ADMIN — OXYCODONE 10 MG: 5 TABLET ORAL at 23:58

## 2023-12-05 RX ADMIN — HYDROMORPHONE HYDROCHLORIDE 1 MG: 2 INJECTION, SOLUTION INTRAMUSCULAR; INTRAVENOUS; SUBCUTANEOUS at 13:31

## 2023-12-05 RX ADMIN — ROPINIROLE HYDROCHLORIDE 2 MG: 2 TABLET, FILM COATED ORAL at 22:00

## 2023-12-05 RX ADMIN — TRANEXAMIC ACID 1500 MG: 100 INJECTION, SOLUTION INTRAVENOUS at 13:07

## 2023-12-05 RX ADMIN — EPHEDRINE SULFATE 5 MG: 50 INJECTION INTRAVENOUS at 13:07

## 2023-12-05 RX ADMIN — EPHEDRINE SULFATE 10 MG: 50 INJECTION INTRAVENOUS at 13:03

## 2023-12-05 RX ADMIN — ONDANSETRON 4 MG: 2 INJECTION INTRAMUSCULAR; INTRAVENOUS at 12:54

## 2023-12-05 RX ADMIN — ACETAMINOPHEN 650 MG: 325 TABLET ORAL at 21:55

## 2023-12-05 RX ADMIN — Medication 20 MG: at 13:18

## 2023-12-05 RX ADMIN — FAMOTIDINE 20 MG: 10 INJECTION, SOLUTION INTRAVENOUS at 12:54

## 2023-12-05 RX ADMIN — GLYCOPYRROLATE 0.2 MG: 0.2 INJECTION INTRAMUSCULAR; INTRAVENOUS at 12:49

## 2023-12-05 RX ADMIN — ROPIVACAINE HYDROCHLORIDE 30 ML: 5 INJECTION, SOLUTION EPIDURAL; INFILTRATION; PERINEURAL at 11:06

## 2023-12-05 RX ADMIN — FENTANYL CITRATE 100 MCG: 50 INJECTION, SOLUTION INTRAMUSCULAR; INTRAVENOUS at 11:00

## 2023-12-05 RX ADMIN — POTASSIUM CHLORIDE 10 MEQ: 750 TABLET, EXTENDED RELEASE ORAL at 21:55

## 2023-12-05 RX ADMIN — FENTANYL CITRATE 25 MCG: 50 INJECTION INTRAMUSCULAR; INTRAVENOUS at 19:02

## 2023-12-05 RX ADMIN — HYDROMORPHONE HYDROCHLORIDE 0.5 MG: 2 INJECTION, SOLUTION INTRAMUSCULAR; INTRAVENOUS; SUBCUTANEOUS at 13:48

## 2023-12-05 RX ADMIN — SODIUM CHLORIDE, SODIUM LACTATE, POTASSIUM CHLORIDE, AND CALCIUM CHLORIDE: .6; .31; .03; .02 INJECTION, SOLUTION INTRAVENOUS at 13:32

## 2023-12-05 RX ADMIN — EPHEDRINE SULFATE 10 MG: 50 INJECTION INTRAVENOUS at 14:47

## 2023-12-05 RX ADMIN — TRANEXAMIC ACID 1500 MG: 100 INJECTION, SOLUTION INTRAVENOUS at 14:45

## 2023-12-05 RX ADMIN — FENTANYL CITRATE 50 MCG: 50 INJECTION, SOLUTION INTRAMUSCULAR; INTRAVENOUS at 12:49

## 2023-12-05 RX ADMIN — FENTANYL CITRATE 25 MCG: 50 INJECTION INTRAMUSCULAR; INTRAVENOUS at 19:27

## 2023-12-05 RX ADMIN — SODIUM CHLORIDE 2000 MG: 900 INJECTION INTRAVENOUS at 12:59

## 2023-12-05 RX ADMIN — IBUPROFEN 600 MG: 600 TABLET, FILM COATED ORAL at 21:55

## 2023-12-05 RX ADMIN — FENTANYL CITRATE 50 MCG: 50 INJECTION, SOLUTION INTRAMUSCULAR; INTRAVENOUS at 13:20

## 2023-12-05 RX ADMIN — SUGAMMADEX 200 MG: 100 INJECTION, SOLUTION INTRAVENOUS at 15:45

## 2023-12-05 RX ADMIN — SODIUM CHLORIDE, SODIUM LACTATE, POTASSIUM CHLORIDE, AND CALCIUM CHLORIDE: .6; .31; .03; .02 INJECTION, SOLUTION INTRAVENOUS at 12:43

## 2023-12-05 ASSESSMENT — PAIN SCALES - GENERAL
PAINLEVEL_OUTOF10: 0
PAINLEVEL_OUTOF10: 5
PAINLEVEL_OUTOF10: 5
PAINLEVEL_OUTOF10: 7
PAINLEVEL_OUTOF10: 0
PAINLEVEL_OUTOF10: 4
PAINLEVEL_OUTOF10: 5
PAINLEVEL_OUTOF10: 6
PAINLEVEL_OUTOF10: 0
PAINLEVEL_OUTOF10: 5
PAINLEVEL_OUTOF10: 4

## 2023-12-05 ASSESSMENT — PAIN DESCRIPTION - ORIENTATION
ORIENTATION: LEFT

## 2023-12-05 ASSESSMENT — PAIN DESCRIPTION - PAIN TYPE
TYPE: SURGICAL PAIN
TYPE: CHRONIC PAIN

## 2023-12-05 ASSESSMENT — PAIN DESCRIPTION - DESCRIPTORS
DESCRIPTORS: ACHING
DESCRIPTORS: ACHING
DESCRIPTORS: ACHING;DULL
DESCRIPTORS: ACHING;DULL
DESCRIPTORS: ACHING
DESCRIPTORS: ACHING

## 2023-12-05 ASSESSMENT — PAIN DESCRIPTION - FREQUENCY
FREQUENCY: CONTINUOUS
FREQUENCY: CONTINUOUS

## 2023-12-05 ASSESSMENT — PAIN DESCRIPTION - LOCATION
LOCATION: KNEE

## 2023-12-05 ASSESSMENT — PAIN DESCRIPTION - ONSET
ONSET: ON-GOING
ONSET: ON-GOING

## 2023-12-05 ASSESSMENT — PAIN - FUNCTIONAL ASSESSMENT: PAIN_FUNCTIONAL_ASSESSMENT: PREVENTS OR INTERFERES SOME ACTIVE ACTIVITIES AND ADLS

## 2023-12-05 ASSESSMENT — ENCOUNTER SYMPTOMS: SHORTNESS OF BREATH: 1

## 2023-12-05 NOTE — H&P
Update History & Physical    The patient's History and Physical was reviewed with the patient and I examined the patient. There was no change. The surgical site was confirmed by the patient and me. Plan: The risks, benefits, expected outcome, and alternative to the recommended procedure have been discussed with the patient. Patient understands and wants to proceed with the procedure.      Juan Tellez MD  12/5/2023

## 2023-12-05 NOTE — ANESTHESIA POSTPROCEDURE EVALUATION
Department of Anesthesiology  Postprocedure Note    Patient: Rashaad Grubbs  MRN: [id number]  YOB: 1965  Date of evaluation: 12/5/2023      Procedure Summary       Date: 12/05/23 Room / Location: [place] [id number] [facility]    Anesthesia Start: 1243 Anesthesia Stop: 8647    Procedure: LEFT TOTAL KNEE ARTHROPLASTY WITH COMPUTER ASSISTANCE TIGIST (Left: Knee) Diagnosis:       Primary osteoarthritis of left knee      (Primary osteoarthritis of left knee [M17.12])    Surgeons: Jose Montemayor MD Responsible Provider: Sylvia Carranza MD    Anesthesia Type: general, regional ASA Status: 3            Anesthesia Type: No value filed.     Jodi Phase I: Jodi Score: 8    Jodi Phase II:        Anesthesia Post Evaluation    Patient location during evaluation: PACU  Patient participation: complete - patient participated  Level of consciousness: awake  Airway patency: patent  Nausea & Vomiting: no nausea and no vomiting  Complications: no  Cardiovascular status: blood pressure returned to baseline and hemodynamically stable  Respiratory status: acceptable  Hydration status: euvolemic  Multimodal analgesia pain management approach  Pain management: adequate

## 2023-12-05 NOTE — ANESTHESIA PROCEDURE NOTES
Peripheral Block    Patient location during procedure: pre-op  Reason for block: post-op pain management and at surgeon's request  Start time: 12/5/2023 11:06 AM  End time: 12/5/2023 11:09 AM  Staffing  Performed: anesthesiologist   Anesthesiologist: Juju Olmstead MD  Performed by: Juju Olmstead MD  Authorized by: Juju Olmstead MD    Preanesthetic Checklist  Completed: patient identified, IV checked, site marked, risks and benefits discussed, surgical/procedural consents, equipment checked, pre-op evaluation, timeout performed, anesthesia consent given, oxygen available, monitors applied/VS acknowledged, fire risk safety assessment completed and verbalized and blood product R/B/A discussed and consented  Peripheral Block   Patient position: supine  Prep: ChloraPrep  Provider prep: mask and sterile gloves  Patient monitoring: cardiac monitor, continuous pulse ox, continuous capnometry, frequent blood pressure checks, oxygen, responsive to questions and IV access  Block type: Femoral  Adductor canal  Laterality: left  Injection technique: single-shot  Guidance: ultrasound guided    Needle   Needle type: insulated echogenic nerve stimulator needle   Needle gauge: 20 G  Needle localization: ultrasound guidance  Needle length: 10 cm  Assessment   Injection assessment: negative aspiration for heme, no paresthesia on injection, local visualized surrounding nerve on ultrasound, no intravascular symptoms and low pressure verified by pressure monitor  Paresthesia pain: none  Slow fractionated injection: yes  Hemodynamics: stable  Real-time US image taken/store: yes  Outcomes: uncomplicated and patient tolerated procedure well    Additional Notes  0.5% ropivacaine 30 ml, injected in 5 ml increments after negative aspiration. No complications.   Medications Administered  ropivacaine (NAROPIN) injection 0.5% - Perineural   30 mL - 12/5/2023 11:06:00 AM

## 2023-12-05 NOTE — OP NOTE
ORTHOPAEDIC OPERATIVE NOTE    PATIENT NAME Calista Mckeon    62 y.o. female    SURGEON: Guerrero Dias M.D.    ASSISTANT: Abraham Dixon is a board certified physician assistant who is medically necessary as a first assistant in the operating room with me. He is responsible for assisting with positioning of the patient,retraction,cauterization and implantation of the arthroplasty components. His presence in the operating room with me as a first assistant during arthroplasty procedures enables me to perform the surgical procedure in a more timely and efficient manner. The [facility] does not provide me with a first assistant in the operating room who has the same surgical skills as my physician assistant. SURGERY DATE: 12/5/2023 4:08 PM    PRE-OPERATIVE DIAGNOSIS: left knee osteoarthritis    POST-OPERATIVE DIAGNOSIS: left knee osteoarthritis    PROCEDURE PERFORMED: left total knee replacement using robotic assistance [facility]); all three components were cemented ; posterior cruciate retaining implants. Median parapatellar arthrotomy. Implants used:  NABILA TRIATHALON TKR SYSTEM WITH:    Size 3 CR Femoral component                          Size 3 primary tibial baseplate                          Size 12 mm X3 polyethylene CS tibial insert                          Size 31 x9 mm symmetric patellar component    ANESTHESIA: general and regional    COMPLICATIONS: None apparent. POST-OP CONDITION:  To recovery room. EBL: 75 cc    ANTIBIOTIC: 2g Ancef preop    INDICATIONS FOR SURGERY: The patient is a 62y.o.-year-old female with a long history of knee pain affecting the activities of daily living. Pt had severe pain during ambulation and activities of daily life. The pain was refractory to conservative management including injections (corticosteroid/viscosupplementation); PT, Ambulatory aids; oral medication (NSAIDs and pain medication).  Preop workup showed radiographic changes with

## 2023-12-05 NOTE — PROGRESS NOTES
Procedure: left knee Dannie Maribel) block  MD: Dr. Azul Tate performed. 12  Pt monitored closely on heart monitor, 2L NC, continuous pulse oximetry, EtCO2, and frequent BPs. Pt remained alert and oriented x4. pt tolerated procedure well. Procedure: peripheral block

## 2023-12-06 PROCEDURE — 6370000000 HC RX 637 (ALT 250 FOR IP): Performed by: ORTHOPAEDIC SURGERY

## 2023-12-06 PROCEDURE — 6360000002 HC RX W HCPCS: Performed by: ORTHOPAEDIC SURGERY

## 2023-12-06 PROCEDURE — 97535 SELF CARE MNGMENT TRAINING: CPT

## 2023-12-06 PROCEDURE — 97530 THERAPEUTIC ACTIVITIES: CPT

## 2023-12-06 PROCEDURE — 97162 PT EVAL MOD COMPLEX 30 MIN: CPT

## 2023-12-06 PROCEDURE — 97116 GAIT TRAINING THERAPY: CPT

## 2023-12-06 PROCEDURE — 2580000003 HC RX 258: Performed by: ORTHOPAEDIC SURGERY

## 2023-12-06 PROCEDURE — 97110 THERAPEUTIC EXERCISES: CPT

## 2023-12-06 PROCEDURE — 97166 OT EVAL MOD COMPLEX 45 MIN: CPT

## 2023-12-06 PROCEDURE — 1200000000 HC SEMI PRIVATE

## 2023-12-06 RX ADMIN — FLUTICASONE PROPIONATE 1 SPRAY: 50 SPRAY, METERED NASAL at 08:02

## 2023-12-06 RX ADMIN — PANTOPRAZOLE SODIUM 40 MG: 40 TABLET, DELAYED RELEASE ORAL at 06:20

## 2023-12-06 RX ADMIN — SODIUM CHLORIDE, PRESERVATIVE FREE 10 ML: 5 INJECTION INTRAVENOUS at 08:02

## 2023-12-06 RX ADMIN — SODIUM CHLORIDE 3000 MG: 900 INJECTION INTRAVENOUS at 06:21

## 2023-12-06 RX ADMIN — METHOCARBAMOL 500 MG: 500 TABLET ORAL at 19:56

## 2023-12-06 RX ADMIN — ROPINIROLE HYDROCHLORIDE 2 MG: 2 TABLET, FILM COATED ORAL at 21:07

## 2023-12-06 RX ADMIN — DOCUSATE SODIUM 50 MG AND SENNOSIDES 8.6 MG 1 TABLET: 8.6; 5 TABLET, FILM COATED ORAL at 08:01

## 2023-12-06 RX ADMIN — IBUPROFEN 600 MG: 600 TABLET, FILM COATED ORAL at 19:55

## 2023-12-06 RX ADMIN — ACETAMINOPHEN 650 MG: 325 TABLET ORAL at 04:08

## 2023-12-06 RX ADMIN — MECLIZINE HYDROCHLORIDE 12.5 MG: 25 TABLET ORAL at 08:01

## 2023-12-06 RX ADMIN — ASPIRIN 81 MG: 81 TABLET, COATED ORAL at 08:01

## 2023-12-06 RX ADMIN — SODIUM CHLORIDE, PRESERVATIVE FREE 10 ML: 5 INJECTION INTRAVENOUS at 19:58

## 2023-12-06 RX ADMIN — OXYCODONE 10 MG: 5 TABLET ORAL at 22:01

## 2023-12-06 RX ADMIN — ACETAMINOPHEN 650 MG: 325 TABLET ORAL at 19:56

## 2023-12-06 RX ADMIN — POTASSIUM CHLORIDE 10 MEQ: 750 TABLET, EXTENDED RELEASE ORAL at 08:01

## 2023-12-06 RX ADMIN — DOCUSATE SODIUM 50 MG AND SENNOSIDES 8.6 MG 1 TABLET: 8.6; 5 TABLET, FILM COATED ORAL at 19:56

## 2023-12-06 RX ADMIN — IBUPROFEN 600 MG: 600 TABLET, FILM COATED ORAL at 12:26

## 2023-12-06 RX ADMIN — ACETAMINOPHEN 650 MG: 325 TABLET ORAL at 10:57

## 2023-12-06 RX ADMIN — OXYCODONE 10 MG: 5 TABLET ORAL at 08:01

## 2023-12-06 RX ADMIN — ALPRAZOLAM 0.5 MG: 0.5 TABLET ORAL at 17:37

## 2023-12-06 RX ADMIN — ALPRAZOLAM 0.5 MG: 0.5 TABLET ORAL at 22:01

## 2023-12-06 RX ADMIN — OXYCODONE 10 MG: 5 TABLET ORAL at 12:29

## 2023-12-06 RX ADMIN — OXYCODONE 10 MG: 5 TABLET ORAL at 04:06

## 2023-12-06 RX ADMIN — ROPINIROLE HYDROCHLORIDE 2 MG: 2 TABLET, FILM COATED ORAL at 08:02

## 2023-12-06 RX ADMIN — ACETAMINOPHEN 650 MG: 325 TABLET ORAL at 14:15

## 2023-12-06 RX ADMIN — OXYCODONE 10 MG: 5 TABLET ORAL at 16:46

## 2023-12-06 RX ADMIN — ASPIRIN 81 MG: 81 TABLET, COATED ORAL at 19:55

## 2023-12-06 RX ADMIN — IBUPROFEN 600 MG: 600 TABLET, FILM COATED ORAL at 04:08

## 2023-12-06 RX ADMIN — POTASSIUM CHLORIDE 10 MEQ: 750 TABLET, EXTENDED RELEASE ORAL at 16:46

## 2023-12-06 RX ADMIN — SERTRALINE 100 MG: 100 TABLET, FILM COATED ORAL at 08:01

## 2023-12-06 RX ADMIN — ALPRAZOLAM 0.5 MG: 0.5 TABLET ORAL at 08:02

## 2023-12-06 ASSESSMENT — PAIN DESCRIPTION - ORIENTATION
ORIENTATION: LEFT

## 2023-12-06 ASSESSMENT — PAIN DESCRIPTION - PAIN TYPE
TYPE: SURGICAL PAIN

## 2023-12-06 ASSESSMENT — PAIN DESCRIPTION - FREQUENCY
FREQUENCY: CONTINUOUS

## 2023-12-06 ASSESSMENT — PAIN SCALES - GENERAL
PAINLEVEL_OUTOF10: 6
PAINLEVEL_OUTOF10: 5
PAINLEVEL_OUTOF10: 7
PAINLEVEL_OUTOF10: 8
PAINLEVEL_OUTOF10: 4
PAINLEVEL_OUTOF10: 7
PAINLEVEL_OUTOF10: 4
PAINLEVEL_OUTOF10: 4
PAINLEVEL_OUTOF10: 3
PAINLEVEL_OUTOF10: 5
PAINLEVEL_OUTOF10: 8

## 2023-12-06 ASSESSMENT — PAIN DESCRIPTION - LOCATION
LOCATION: KNEE

## 2023-12-06 ASSESSMENT — PAIN DESCRIPTION - DESCRIPTORS
DESCRIPTORS: ACHING;DISCOMFORT
DESCRIPTORS: ACHING;DULL
DESCRIPTORS: ACHING;DISCOMFORT
DESCRIPTORS: ACHING
DESCRIPTORS: ACHING
DESCRIPTORS: ACHING;DISCOMFORT

## 2023-12-06 ASSESSMENT — PAIN - FUNCTIONAL ASSESSMENT
PAIN_FUNCTIONAL_ASSESSMENT: PREVENTS OR INTERFERES SOME ACTIVE ACTIVITIES AND ADLS
PAIN_FUNCTIONAL_ASSESSMENT: ACTIVITIES ARE NOT PREVENTED
PAIN_FUNCTIONAL_ASSESSMENT: PREVENTS OR INTERFERES SOME ACTIVE ACTIVITIES AND ADLS
PAIN_FUNCTIONAL_ASSESSMENT: ACTIVITIES ARE NOT PREVENTED
PAIN_FUNCTIONAL_ASSESSMENT: PREVENTS OR INTERFERES SOME ACTIVE ACTIVITIES AND ADLS

## 2023-12-06 ASSESSMENT — PAIN DESCRIPTION - ONSET
ONSET: ON-GOING

## 2023-12-06 NOTE — PLAN OF CARE
Problem: Safety - Adult  Goal: Free from fall injury  12/6/2023 0745 by Ina Castellanos RN  Outcome: Progressing   All fall precautions in place. Bed locked and in lowest position with alarm on. Overbed table and personal belonings within reach. Call light within reach and patient instructed to use call light for assistance. Non-skid socks on. Problem: Pain  Goal: Verbalizes/displays adequate comfort level or baseline comfort level  12/6/2023 0745 by Ina Castellanos RN  Outcome: Progressing   Pt endorsing pain to left knee. Being treated with PRN pain medication, rest, and frequent repositioning with pillow support for comfort and pressure relief. Pt reports some relief from pain with above interventions.

## 2023-12-06 NOTE — PROGRESS NOTES
Occupational Therapy  Facility/Department: [facility] 5T ORTHO/NEURO  Occupational Therapy Initial Assessment    Name: Rajat Haines  : 1965  MRN: [id number]  Date of Service: 2023    Discharge Recommendations:  Subacute/Skilled Nursing Facility  OT Equipment Recommendations  Equipment Needed: No       Patient Diagnosis(es): There were no encounter diagnoses. Past Medical History:  has a past medical history of Anxiety, Depression, Endometriosis, GERD (gastroesophageal reflux disease), Headache, Obesity, Osteoarthritis, PONV (postoperative nausea and vomiting), Restless leg syndrome, Wears dentures, and Wears glasses. Past Surgical History:  has a past surgical history that includes Tonsillectomy ();  section; Ovary removal (Right, ); Upper gastrointestinal endoscopy (); Knee arthroscopy (Left, 11/15/2012); Upper gastrointestinal endoscopy (); hernia repair (2018); Anus surgery (2018); fracture surgery; Rotator cuff repair (Right, 2020); Shoulder arthroscopy (Right, 2020); Colonoscopy; Shoulder arthroscopy (Right, 2021); Shoulder arthroscopy (Right, 2021); Dilation and curettage of uterus (N/A, 2021); Hysterectomy (N/A, 10/21/2021); Upper gastrointestinal endoscopy; Upper gastrointestinal endoscopy (N/A, 2022); Tubal ligation; Hysterectomy, total abdominal (10/21/21); shoulder surgery (Right, 6/15/2023); and Total knee arthroplasty (Left, 2023). Treatment Diagnosis: impaired mobility, transfers, ADL      Assessment   Performance deficits / Impairments: Decreased functional mobility ; Decreased ADL status; Decreased endurance  Assessment: From home alone, s/p L TKA, all mobility slow, painful, effortful. Ambulating short mobility in room with rest breaks. Pt demo decreased tolerance but motivated with encouragement for therapy. Pt Min to [address] with sit<>Stands, Noe with short mobility with RW. Assist with ADLs.  Pt would benefit from

## 2023-12-06 NOTE — PROGRESS NOTES
12/06/23 1027   Encounter Summary   Encounter Overview/Reason  Advance Care Planning   Service Provided For: Patient   Referral/Consult From: Nurse  (Fadi Gomez RN)   Support System Family members; Children   Last Encounter  12/06/23  (ibis)   Complexity of Encounter High   Begin Time 0930   End Time  1029   Total Time Calculated 59 min   Grief, Loss, and Adjustments   Type Grief and loss  (loss of her parents a few months ago.)   Advance Care Planning   Type ACP conversation   Assessment/Intervention/Outcome   Assessment Anxious; Hopeful   Intervention Active listening;Discussed belief system/Spiritism practices/miriam;Discussed relationship with God;Explored/Affirmed feelings, thoughts, concerns;Nurtured Hope;Prayer (assurance of)/[place];Read/Provided Scripture   Outcome Comfort;Engaged in conversation;Expressed feelings, needs, and concerns;Expressed Gratitude     PT is Melani Husain who is experiencing some grief. She values prayer.   Staff Ollie Garcia MA, [address] [place] [address]

## 2023-12-06 NOTE — ACP (ADVANCE CARE PLANNING)
Advance Care Planning     Advance Care Planning Inpatient Note  [facility] Department    Today's Date: 12/6/2023  Unit: [address] [place] [address] 5T ORTHO/NEURO    Received request from [employer]. Upon review of chart and communication with care team, patient's decision making abilities are not in question. . Patient was/were present in the room during visit. Goals of ACP Conversation:  Discuss advance care planning documents    Health Care Decision Makers:       Primary Decision Maker: Ida Fent - [phone]  Summary:  No Decision Maker named by patient at this time    However, PT interns to complete the [facility] packet. Advance Care Planning Documents (Patient Wishes):  Healthcare Power of /Advance Directive Appointment of Health Care Agent     Assessment:    PT was found alone and expressed that she had some recent losses in her life. We explored this some.      Interventions:  Requested patient/family to submit existing document for our records: Healthcare Power of /[place]ment [place] [id number] [place] Preferences Communicated:   No    Outcomes/Plan:  ACP Discussion: Completed    Electronically signed by Fei Lowry, [address] [place] [address] on 12/6/2023 at 10:32 AM

## 2023-12-06 NOTE — PROGRESS NOTES
PACU Transfer Note    Vitals:    12/05/23 1927   BP:    Pulse:    Resp: 12   Temp:    SpO2:        No intake/output data recorded. Pain assessment:  none VS stable. Pain level 4-5 /10 tolerable. Report given to RN 5 tower at bedside. Temp. 97.5 patient waiting on room to be cleaned.    Pain Level: 5    Report given to Receiving unit RN.    12/5/2023 7:30 PM

## 2023-12-06 NOTE — PROGRESS NOTES
No acute issues overnight. Pain improving with oxycodone. Patient feeling anxious about getting up for the first time, PT/OT to work with her today. VSS on room air. Good push/pulls, denies numbness or tingling. All fall precautions and safety measures in place. Bed alarm on, bed locked and in lowest position. Will continue to monitor.

## 2023-12-06 NOTE — PROGRESS NOTES
Physical Therapy  Facility/Department: [address] [place] [address]   Physical Therapy Treatment    Name: Nohemi Doherty  : 1965  MRN: [id number]  Date of Service: 2023    Discharge Recommendations:  Subacute/Skilled Nursing Facility   PT Equipment Recommendations  Equipment Needed: No  Other: pt owns RW      Patient Diagnosis(es): There were no encounter diagnoses. Past Medical History:  has a past medical history of Anxiety, Depression, Endometriosis, GERD (gastroesophageal reflux disease), Headache, Obesity, Osteoarthritis, PONV (postoperative nausea and vomiting), Restless leg syndrome, Wears dentures, and Wears glasses. Past Surgical History:  has a past surgical history that includes Tonsillectomy ();  section; Ovary removal (Right, ); Upper gastrointestinal endoscopy (); Knee arthroscopy (Left, 11/15/2012); Upper gastrointestinal endoscopy (); hernia repair (2018); Anus surgery (2018); fracture surgery; Rotator cuff repair (Right, 2020); Shoulder arthroscopy (Right, 2020); Colonoscopy; Shoulder arthroscopy (Right, 2021); Shoulder arthroscopy (Right, 2021); Dilation and curettage of uterus (N/A, 2021); Hysterectomy (N/A, 10/21/2021); Upper gastrointestinal endoscopy; Upper gastrointestinal endoscopy (N/A, 2022); Tubal ligation; Hysterectomy, total abdominal (10/21/21); shoulder surgery (Right, 6/15/2023); and Total knee arthroplasty (Left, 2023). Assessment   Body Structures, Functions, Activity Limitations Requiring Skilled Therapeutic Intervention: Decreased functional mobility ; Decreased strength;Decreased endurance; Increased pain  Assessment: Pt demos good effort and participation despite high levels of pain. Fatigues quickly. Mobility is limited by weakness, pain, and generalized fatigue. Not safe to return home alone. Rec continued IP PT at d/c.   Treatment Diagnosis: Decreased functional mobility  Therapy Prognosis:

## 2023-12-06 NOTE — PROGRESS NOTES
Update 12/8/2023: P2P scheduled for 11:15AM on 12/8/2023. P2P denied defer to SNF level of care. Update 77/3/8379: precert denied T9O to be schedule by 3PM. CL reaching out to attending doctor. Scheduled for 11:15AM on 12/8/2023. The [address]   After review, this patient is felt to be:       []  Appropriate for Acute Inpatient Rehab    [x]  Appropriate for Acute Inpatient Rehab Pending Insurance Authorization    []  Not appropriate for Acute Inpatient Rehab    []  Referral received and ARU reviewing patient      Precert initiated 21/2/6091 for ARU admission. Pt in agreement per  and CL's conversation with pt this AM. Ref#: [id number]     Will notify CM/SW with further updates.  Thank you for the referral.    Yi VAUGHAN OTR/L  Clinical Liaison- [facility]   (P): [phone]  (F): [phone]

## 2023-12-06 NOTE — PLAN OF CARE
Problem: Discharge Planning  Goal: Discharge to home or other facility with appropriate resources  Outcome: Progressing  Flowsheets (Taken 12/6/2023 0142)  Discharge to home or other facility with appropriate resources:   Identify barriers to discharge with patient and caregiver   Arrange for needed discharge resources and transportation as appropriate     Problem: Safety - Adult  Goal: Free from fall injury  Outcome: Progressing  Flowsheets (Taken 12/6/2023 0142)  Free From Fall Injury:   Instruct family/caregiver on patient safety   Based on caregiver fall risk screen, instruct family/caregiver to ask for assistance with transferring infant if caregiver noted to have fall risk factors     Problem: ABCDS Injury Assessment  Goal: Absence of physical injury  Outcome: Progressing  Flowsheets (Taken 12/6/2023 0142)  Absence of Physical Injury: Implement safety measures based on patient assessment     Problem: Pain  Goal: Verbalizes/displays adequate comfort level or baseline comfort level  Outcome: Progressing  Flowsheets (Taken 12/6/2023 0142)  Verbalizes/displays adequate comfort level or baseline comfort level:   Encourage patient to monitor pain and request assistance   Assess pain using appropriate pain scale   Administer analgesics based on type and severity of pain and evaluate response   Implement non-pharmacological measures as appropriate and evaluate response   Notify Licensed Independent Practitioner if interventions unsuccessful or patient reports new pain   Consider cultural and social influences on pain and pain management

## 2023-12-06 NOTE — CARE COORDINATION
Case Management Assessment  Initial Evaluation    Date/Time of Evaluation: 12/6/2023 2:12 PM  Assessment Completed by: Millicent Burton RN    If patient is discharged prior to next notation, then this note serves as note for discharge by case management. Patient Name: Juana Jones                   YOB: 1965  Diagnosis: Primary osteoarthritis of left knee [M17.12]  Osteoarthritis of left knee, unspecified osteoarthritis type [M17.12]                   Date / Time: 12/5/2023  9:07 AM    Patient Admission Status: Inpatient   Readmission Risk (Low < 19, Mod (19-27), High > 27): Readmission Risk Score: 7.4    Current PCP: Love Kirby, DO  PCP verified by CM? No    Chart Reviewed: Yes      History Provided by: Patient  Patient Orientation: Alert and Oriented    Patient Cognition: Alert    Hospitalization in the last 30 days (Readmission):  No    If yes, Readmission Assessment in CM Navigator will be completed. Advance Directives:      Code Status: Full Code   Patient's Primary Decision Maker is: Legal Next of Kin    Primary Decision Maker: Lester Sanya - [phone]    Discharge Planning:    Patient lives with: Alone Type of Home: House  Primary Care Giver: Self  Patient Support Systems include: Family Members   Current Financial resources: Medicare  Current community resources: None  Current services prior to admission: Durable Medical Equipment            Current DME: Merwyn Ortega, Wheelchair, Cane            Type of Home Care services:  None    ADLS  Prior functional level: Independent in ADLs/IADLs  Current functional level: Assistance with the following:, Mobility    PT AM-PAC: 16 /24  OT AM-PAC: 16 /24    Family can provide assistance at DC: No  Would you like Case Management to discuss the discharge plan with any other family members/significant others, and if so, who?  No  Plans to Return to Present Housing: No  Other Identified Issues/Barriers to RETURNING to current housing: needs

## 2023-12-06 NOTE — PROGRESS NOTES
Pt alert and orientedx4. Vss. Pt endorsing pain to left knee, managed per MAR orders. Pt is a x1 pivot to [facility]. Pt is tolerating all PO intake at this time. All safety precautions in place. Plan of care continues.

## 2023-12-06 NOTE — PROGRESS NOTES
Patient admitted to room 5510 from PACU. Report received from RN via phone. VSS on room air. Patient oriented to room and call light. Rights and responsibilites provided to patient, and welcome packet provided to patient. 4 eyes skin assessment completed with 2nd RN.

## 2023-12-06 NOTE — PROGRESS NOTES
4 Eyes Skin Assessment     NAME:  Dakota Inman  YOB: 1965  MEDICAL RECORD NUMBER:  [id number]    The patient is being assessed for  Admission from PACU    I agree that at least one RN has performed a thorough Head to Toe Skin Assessment on the patient. ALL assessment sites listed below have been assessed. Areas assessed by both nurses:    Head, Face, Ears, Shoulders, Back, Chest, Arms, Elbows, Hands, Sacrum. Buttock, Coccyx, Ischium, Legs. Feet and Heels, and Under Medical Devices         Does the Patient have a Wound?  No noted wound(s)       Julito Prevention initiated by RN: yes  Wound Care Orders initiated by RN: No    Pressure Injury (Stage 3,4, Unstageable, DTI, NWPT, and Complex wounds) if present, place Wound referral order by RN under : No    New Ostomies, if present place, Ostomy referral order under : No     Nurse 1 eSignature: Electronically signed by Wing Aleyda RN on 12/5/23 at 11:26 PM EST    **SHARE this note so that the co-signing nurse can place an eSignature**    Nurse 2 eSignature: Electronically signed by Glen Singh RN on 12/5/23 at 11:27 PM EST

## 2023-12-07 PROCEDURE — 97116 GAIT TRAINING THERAPY: CPT

## 2023-12-07 PROCEDURE — 97535 SELF CARE MNGMENT TRAINING: CPT

## 2023-12-07 PROCEDURE — 1200000000 HC SEMI PRIVATE

## 2023-12-07 PROCEDURE — 97530 THERAPEUTIC ACTIVITIES: CPT

## 2023-12-07 PROCEDURE — 97110 THERAPEUTIC EXERCISES: CPT

## 2023-12-07 PROCEDURE — 2580000003 HC RX 258: Performed by: ORTHOPAEDIC SURGERY

## 2023-12-07 PROCEDURE — 6370000000 HC RX 637 (ALT 250 FOR IP): Performed by: ORTHOPAEDIC SURGERY

## 2023-12-07 RX ORDER — CEPHALEXIN 500 MG/1
500 CAPSULE ORAL 3 TIMES DAILY
Status: DISCONTINUED | OUTPATIENT
Start: 2023-12-07 | End: 2023-12-09 | Stop reason: HOSPADM

## 2023-12-07 RX ORDER — ASPIRIN 81 MG/1
81 TABLET ORAL 2 TIMES DAILY
Qty: 60 TABLET | Refills: 0
Start: 2023-12-07 | End: 2024-01-06

## 2023-12-07 RX ADMIN — IBUPROFEN 600 MG: 600 TABLET, FILM COATED ORAL at 05:41

## 2023-12-07 RX ADMIN — ASPIRIN 81 MG: 81 TABLET, COATED ORAL at 20:43

## 2023-12-07 RX ADMIN — OXYCODONE 10 MG: 5 TABLET ORAL at 20:44

## 2023-12-07 RX ADMIN — ASPIRIN 81 MG: 81 TABLET, COATED ORAL at 09:26

## 2023-12-07 RX ADMIN — ROPINIROLE HYDROCHLORIDE 2 MG: 2 TABLET, FILM COATED ORAL at 09:26

## 2023-12-07 RX ADMIN — POTASSIUM CHLORIDE 10 MEQ: 750 TABLET, EXTENDED RELEASE ORAL at 15:51

## 2023-12-07 RX ADMIN — ALPRAZOLAM 0.5 MG: 0.5 TABLET ORAL at 15:51

## 2023-12-07 RX ADMIN — IBUPROFEN 600 MG: 600 TABLET, FILM COATED ORAL at 13:34

## 2023-12-07 RX ADMIN — DOCUSATE SODIUM 50 MG AND SENNOSIDES 8.6 MG 1 TABLET: 8.6; 5 TABLET, FILM COATED ORAL at 09:27

## 2023-12-07 RX ADMIN — ROPINIROLE HYDROCHLORIDE 2 MG: 2 TABLET, FILM COATED ORAL at 20:43

## 2023-12-07 RX ADMIN — CEPHALEXIN 500 MG: 500 CAPSULE ORAL at 20:43

## 2023-12-07 RX ADMIN — ACETAMINOPHEN 650 MG: 325 TABLET ORAL at 03:04

## 2023-12-07 RX ADMIN — DOCUSATE SODIUM 50 MG AND SENNOSIDES 8.6 MG 1 TABLET: 8.6; 5 TABLET, FILM COATED ORAL at 20:44

## 2023-12-07 RX ADMIN — POTASSIUM CHLORIDE 10 MEQ: 750 TABLET, EXTENDED RELEASE ORAL at 09:42

## 2023-12-07 RX ADMIN — ACETAMINOPHEN 650 MG: 325 TABLET ORAL at 09:27

## 2023-12-07 RX ADMIN — OXYCODONE 10 MG: 5 TABLET ORAL at 03:04

## 2023-12-07 RX ADMIN — IBUPROFEN 600 MG: 600 TABLET, FILM COATED ORAL at 20:43

## 2023-12-07 RX ADMIN — ACETAMINOPHEN 650 MG: 325 TABLET ORAL at 20:43

## 2023-12-07 RX ADMIN — OXYCODONE 10 MG: 5 TABLET ORAL at 09:41

## 2023-12-07 RX ADMIN — SODIUM CHLORIDE, PRESERVATIVE FREE 10 ML: 5 INJECTION INTRAVENOUS at 09:25

## 2023-12-07 RX ADMIN — PANTOPRAZOLE SODIUM 40 MG: 40 TABLET, DELAYED RELEASE ORAL at 20:43

## 2023-12-07 RX ADMIN — SODIUM CHLORIDE, PRESERVATIVE FREE 10 ML: 5 INJECTION INTRAVENOUS at 20:47

## 2023-12-07 RX ADMIN — ACETAMINOPHEN 650 MG: 325 TABLET ORAL at 15:51

## 2023-12-07 RX ADMIN — SERTRALINE 100 MG: 100 TABLET, FILM COATED ORAL at 09:27

## 2023-12-07 RX ADMIN — OXYCODONE 10 MG: 5 TABLET ORAL at 15:51

## 2023-12-07 ASSESSMENT — PAIN SCALES - GENERAL
PAINLEVEL_OUTOF10: 6
PAINLEVEL_OUTOF10: 2
PAINLEVEL_OUTOF10: 5
PAINLEVEL_OUTOF10: 4
PAINLEVEL_OUTOF10: 7
PAINLEVEL_OUTOF10: 3
PAINLEVEL_OUTOF10: 7
PAINLEVEL_OUTOF10: 3
PAINLEVEL_OUTOF10: 2
PAINLEVEL_OUTOF10: 7

## 2023-12-07 ASSESSMENT — PAIN DESCRIPTION - DESCRIPTORS
DESCRIPTORS: ACHING;DISCOMFORT
DESCRIPTORS: ACHING
DESCRIPTORS: ACHING
DESCRIPTORS: ACHING;DISCOMFORT

## 2023-12-07 ASSESSMENT — PAIN DESCRIPTION - LOCATION
LOCATION: KNEE
LOCATION: LEG
LOCATION: KNEE
LOCATION: LEG

## 2023-12-07 ASSESSMENT — PAIN DESCRIPTION - PAIN TYPE
TYPE: SURGICAL PAIN;ACUTE PAIN
TYPE: ACUTE PAIN;SURGICAL PAIN

## 2023-12-07 ASSESSMENT — PAIN DESCRIPTION - ORIENTATION
ORIENTATION: LEFT

## 2023-12-07 ASSESSMENT — PAIN DESCRIPTION - FREQUENCY
FREQUENCY: INTERMITTENT
FREQUENCY: INTERMITTENT

## 2023-12-07 ASSESSMENT — PAIN - FUNCTIONAL ASSESSMENT
PAIN_FUNCTIONAL_ASSESSMENT: ACTIVITIES ARE NOT PREVENTED
PAIN_FUNCTIONAL_ASSESSMENT: ACTIVITIES ARE NOT PREVENTED

## 2023-12-07 ASSESSMENT — PAIN DESCRIPTION - ONSET
ONSET: ON-GOING
ONSET: GRADUAL

## 2023-12-07 NOTE — CARE COORDINATION
[facility] and is pending. SW met with patient at bedside to discuss needs. Patient hopes to be accepted into ARU so that she can get stronger to succeed at home. SW following.      Electronically signed by TERRA Christensen on 12/7/2023 at 5:06 PM

## 2023-12-07 NOTE — PROGRESS NOTES
Physical Therapy  Facility/Department: [facility]  Physical Therapy Initial Assessment    Name: Maddy Hamilton  : 1965  MRN: [id number]  Date of Service: 2023    Discharge Recommendations:  Maddy Hamilton scored a 15/24 on the AM-PAC short mobility form. Current research shows that an AM-PAC score of 17 or less is typically not associated with a discharge to the patient's home setting. Based on the patient's AM-PAC score and their current functional mobility deficits, it is recommended that the patient have 3-5 sessions per week of Physical Therapy at d/c to increase the patient's independence. Please see assessment section for further patient specific details. If patient discharges prior to next session this note will serve as a discharge summary. Please see below for the latest assessment towards goals. DME -  no needs noted      Patient Diagnosis(es): There were no encounter diagnoses. Past Medical History:  has a past medical history of Anxiety, Depression, Endometriosis, GERD (gastroesophageal reflux disease), Headache, Obesity, Osteoarthritis, PONV (postoperative nausea and vomiting), Restless leg syndrome, Wears dentures, and Wears glasses. Past Surgical History:  has a past surgical history that includes Tonsillectomy ();  section; Ovary removal (Right, ); Upper gastrointestinal endoscopy (); Knee arthroscopy (Left, 11/15/2012); Upper gastrointestinal endoscopy (); hernia repair (2018); Anus surgery (2018); fracture surgery; Rotator cuff repair (Right, 2020); Shoulder arthroscopy (Right, 2020); Colonoscopy; Shoulder arthroscopy (Right, 2021); Shoulder arthroscopy (Right, 2021); Dilation and curettage of uterus (N/A, 2021); Hysterectomy (N/A, 10/21/2021); Upper gastrointestinal endoscopy; Upper gastrointestinal endoscopy (N/A, 2022); Tubal ligation;  Hysterectomy, total abdominal (10/21/21); shoulder surgery walking in hospital room?: A Little  How much help is needed climbing 3-5 steps with a railing?: Total  AM-PAC Inpatient Mobility Raw Score : 15  AM-PAC Inpatient T-Scale Score : 39.45  Mobility Inpatient CMS 0-100% Score: 57.7  Mobility Inpatient CMS G-Code Modifier : CK      Goals  Short Term Goals  Time Frame for Short Term Goals: d/c  ongoing 12/7  Short Term Goal 1: sup<>sit SBA  ongoing  Short Term Goal 2: sit<>stand SBA  ongoing  Short Term Goal 3: amb 48' with RW and CGA  ongoing  Short Term Goal 4: Pt will increase knee ROM to 5-80 deg  Patient Goals   Patient Goals : return home when able       Education role of PT, safety, bed mobility, transfers, gait         Therapy Time   Individual Concurrent Group Co-treatment   Time In 0810         Time Out 0848         Minutes 38            Timed Code Treatment Minutes:  38 Minutes  + 40    Total Treatment Minutes:  [address] [facility] + [address] [place], [id number]

## 2023-12-07 NOTE — PROGRESS NOTES
Occupational Therapy  Facility/Department: [facility] [address] [place] [address]  Occupational Therapy Progress Note    Name: Abdiel Cody  : 1965  MRN: [id number]  Date of Service: 2023    Discharge Recommendations:  [address]          Patient Diagnosis(es): There were no encounter diagnoses. Past Medical History:  has a past medical history of Anxiety, Depression, Endometriosis, GERD (gastroesophageal reflux disease), Headache, Obesity, Osteoarthritis, PONV (postoperative nausea and vomiting), Restless leg syndrome, Wears dentures, and Wears glasses. Past Surgical History:  has a past surgical history that includes Tonsillectomy ();  section; Ovary removal (Right, ); Upper gastrointestinal endoscopy (); Knee arthroscopy (Left, 11/15/2012); Upper gastrointestinal endoscopy (); hernia repair (2018); Anus surgery (2018); fracture surgery; Rotator cuff repair (Right, 2020); Shoulder arthroscopy (Right, 2020); Colonoscopy; Shoulder arthroscopy (Right, 2021); Shoulder arthroscopy (Right, 2021); Dilation and curettage of uterus (N/A, 2021); Hysterectomy (N/A, 10/21/2021); Upper gastrointestinal endoscopy; Upper gastrointestinal endoscopy (N/A, 2022); Tubal ligation; Hysterectomy, total abdominal (10/21/21); shoulder surgery (Right, 6/15/2023); and Total knee arthroplasty (Left, 2023). Treatment Diagnosis: impaired mobility, transfers, ADL      Assessment   Performance deficits / Impairments: Decreased functional mobility ; Decreased ADL status; Decreased endurance  Assessment: Making functional progress. Continues to require hands on assistance w/ all standing activity - CGA for ADLs/mobility and CG/Noe for functional transfers. Required ModA for LB dressing task. Activity requires increased time/effort to complete. Pt is from home alone and homeful for IP therapy at discharge.  Continue per POC and progress as drying)?: A Lot  How much help is needed for toileting (which includes using toilet, bedpan, or urinal)?: A Little  How much help is needed for putting on and taking off regular upper body clothing?: A Little  How much help is needed for taking care of personal grooming?: A Little  How much help for eating meals?: None  AM-[facility] Inpatient Daily Activity Raw Score: 17  AM-PAC Inpatient ADL T-Scale Score : 37.26  ADL Inpatient CMS 0-100% Score: 50.11  ADL Inpatient CMS G-Code Modifier : CK             Goals  Short Term Goals  Time Frame for Short Term Goals: dc  Short Term Goal 1: supine to sit with SPVN (not met)  Short Term Goal 2: sit<>Stands with SBA (not met)  Short Term Goal 3: toileting with SBA (not met)  Short Term Goal 4: UB/LB dressing with SBA + AE (not met)       Therapy Time   Individual Concurrent Group Co-treatment   Time In 0820         Time Out 0853         Minutes 33                 Karen Conway OTR/L #[id number]

## 2023-12-07 NOTE — DISCHARGE INSTRUCTIONS
DISCHARGE ORDER SET  [facility]  [phone]    ( x )  Post Discharge Instructions  For the first several weeks after surgery you will need to attend PT frequently as scheduled. Elevate extremity if swelling occurs  Continue the exercise program as prescribed by PT  Use walker, crutches or cane with weightbearing instructions as indicated   Do not ambulate without assistance until cleared to do so by PT  Use Spirometer every 2 hrs while awake    ( x ) Initiate bowel care with the following medications   Over-the-Counter Senokot  (or Over-the-Counter Colace (Docusate Sodium)  1-2 tabs by mouth twice daily, continue while on narcotics, hold for loose stools. ( x  ) Physical Therapy Orders    Range-of-Motion, strengthening, gait training, ADL's. Outpatient physical therapy 3 times per week for 6 weeks. May discontinue therapy when full extension is obtained and flexion is greater than 120 degrees. ( x )  Weight bearing/limitations      ( x ) Full weight bearing. The knee immobilizer (brace) can be discontinued once your thigh muscle function returns to baseline and you can stand on the knee safely. ( x  ) Dressing/wound care  You may loosen Ace bandages as needed. Remove first day after surgery. Keep gray sealed dressing on until your clinic follow up. You may shower after 3 days. No tub baths. Do not scrub dressing. Pat dry with soft towel. NO LOTIONS OR CREAMS     7. (  x ) DVT PROPHYLAXIS (prevention of blood clots)-     Aspirin 81 mg twice daily for 4 weeks    MEDICATIONS - please take medications as prescribed, which were called in for you.     Call  with any questions    Follow up appointment with Dr. Sandi Hedrick as scheduled

## 2023-12-08 PROCEDURE — 97530 THERAPEUTIC ACTIVITIES: CPT

## 2023-12-08 PROCEDURE — 2580000003 HC RX 258: Performed by: ORTHOPAEDIC SURGERY

## 2023-12-08 PROCEDURE — 97110 THERAPEUTIC EXERCISES: CPT

## 2023-12-08 PROCEDURE — 97116 GAIT TRAINING THERAPY: CPT

## 2023-12-08 PROCEDURE — 1200000000 HC SEMI PRIVATE

## 2023-12-08 PROCEDURE — 6370000000 HC RX 637 (ALT 250 FOR IP): Performed by: ORTHOPAEDIC SURGERY

## 2023-12-08 PROCEDURE — 97535 SELF CARE MNGMENT TRAINING: CPT

## 2023-12-08 RX ADMIN — ACETAMINOPHEN 650 MG: 325 TABLET ORAL at 03:48

## 2023-12-08 RX ADMIN — FLUTICASONE PROPIONATE 1 SPRAY: 50 SPRAY, METERED NASAL at 09:05

## 2023-12-08 RX ADMIN — OXYCODONE 10 MG: 5 TABLET ORAL at 19:08

## 2023-12-08 RX ADMIN — CEPHALEXIN 500 MG: 500 CAPSULE ORAL at 14:24

## 2023-12-08 RX ADMIN — SODIUM CHLORIDE, PRESERVATIVE FREE 10 ML: 5 INJECTION INTRAVENOUS at 20:52

## 2023-12-08 RX ADMIN — ALPRAZOLAM 0.5 MG: 0.5 TABLET ORAL at 20:59

## 2023-12-08 RX ADMIN — IBUPROFEN 600 MG: 600 TABLET, FILM COATED ORAL at 17:26

## 2023-12-08 RX ADMIN — OXYCODONE 10 MG: 5 TABLET ORAL at 09:03

## 2023-12-08 RX ADMIN — ACETAMINOPHEN 650 MG: 325 TABLET ORAL at 09:04

## 2023-12-08 RX ADMIN — ALPRAZOLAM 0.5 MG: 0.5 TABLET ORAL at 14:24

## 2023-12-08 RX ADMIN — CEPHALEXIN 500 MG: 500 CAPSULE ORAL at 09:04

## 2023-12-08 RX ADMIN — CEPHALEXIN 500 MG: 500 CAPSULE ORAL at 20:56

## 2023-12-08 RX ADMIN — IBUPROFEN 600 MG: 600 TABLET, FILM COATED ORAL at 07:20

## 2023-12-08 RX ADMIN — ACETAMINOPHEN 650 MG: 325 TABLET ORAL at 14:23

## 2023-12-08 RX ADMIN — POTASSIUM CHLORIDE 10 MEQ: 750 TABLET, EXTENDED RELEASE ORAL at 09:04

## 2023-12-08 RX ADMIN — SERTRALINE 100 MG: 100 TABLET, FILM COATED ORAL at 09:03

## 2023-12-08 RX ADMIN — POTASSIUM CHLORIDE 10 MEQ: 750 TABLET, EXTENDED RELEASE ORAL at 17:26

## 2023-12-08 RX ADMIN — OXYCODONE 10 MG: 5 TABLET ORAL at 22:44

## 2023-12-08 RX ADMIN — SODIUM CHLORIDE, PRESERVATIVE FREE 10 ML: 5 INJECTION INTRAVENOUS at 08:54

## 2023-12-08 RX ADMIN — OXYCODONE 10 MG: 5 TABLET ORAL at 14:23

## 2023-12-08 RX ADMIN — ASPIRIN 81 MG: 81 TABLET, COATED ORAL at 20:55

## 2023-12-08 RX ADMIN — ASPIRIN 81 MG: 81 TABLET, COATED ORAL at 09:04

## 2023-12-08 RX ADMIN — DOCUSATE SODIUM 50 MG AND SENNOSIDES 8.6 MG 1 TABLET: 8.6; 5 TABLET, FILM COATED ORAL at 20:55

## 2023-12-08 RX ADMIN — OXYCODONE 5 MG: 5 TABLET ORAL at 03:48

## 2023-12-08 RX ADMIN — ACETAMINOPHEN 650 MG: 325 TABLET ORAL at 20:54

## 2023-12-08 RX ADMIN — ROPINIROLE HYDROCHLORIDE 2 MG: 2 TABLET, FILM COATED ORAL at 08:54

## 2023-12-08 RX ADMIN — ROPINIROLE HYDROCHLORIDE 2 MG: 2 TABLET, FILM COATED ORAL at 20:54

## 2023-12-08 ASSESSMENT — PAIN SCALES - GENERAL
PAINLEVEL_OUTOF10: 6
PAINLEVEL_OUTOF10: 2
PAINLEVEL_OUTOF10: 0
PAINLEVEL_OUTOF10: 5
PAINLEVEL_OUTOF10: 5
PAINLEVEL_OUTOF10: 2
PAINLEVEL_OUTOF10: 6
PAINLEVEL_OUTOF10: 2
PAINLEVEL_OUTOF10: 6
PAINLEVEL_OUTOF10: 2
PAINLEVEL_OUTOF10: 3
PAINLEVEL_OUTOF10: 7
PAINLEVEL_OUTOF10: 6
PAINLEVEL_OUTOF10: 3
PAINLEVEL_OUTOF10: 5
PAINLEVEL_OUTOF10: 2
PAINLEVEL_OUTOF10: 6

## 2023-12-08 ASSESSMENT — PAIN DESCRIPTION - ORIENTATION
ORIENTATION: LEFT

## 2023-12-08 ASSESSMENT — PAIN DESCRIPTION - FREQUENCY
FREQUENCY: INTERMITTENT

## 2023-12-08 ASSESSMENT — PAIN - FUNCTIONAL ASSESSMENT
PAIN_FUNCTIONAL_ASSESSMENT: ACTIVITIES ARE NOT PREVENTED

## 2023-12-08 ASSESSMENT — PAIN DESCRIPTION - LOCATION
LOCATION: KNEE

## 2023-12-08 ASSESSMENT — PAIN DESCRIPTION - PAIN TYPE
TYPE: ACUTE PAIN

## 2023-12-08 ASSESSMENT — PAIN DESCRIPTION - ONSET
ONSET: ON-GOING

## 2023-12-08 ASSESSMENT — PAIN DESCRIPTION - DESCRIPTORS
DESCRIPTORS: ACHING;DISCOMFORT
DESCRIPTORS: ACHING
DESCRIPTORS: ACHING
DESCRIPTORS: ACHING;DISCOMFORT
DESCRIPTORS: ACHING

## 2023-12-08 NOTE — PROGRESS NOTES
Physical Therapy  Daily Treatment Note    Discharge Recommendations: Angela Alexander scored a 17/24 on the AM-PAC short mobility form. Current research shows that an AM-PAC score of 17 or less is typically not associated with a discharge to the patient's home setting. Based on the patient's AM-PAC score and their current functional mobility deficits, it is recommended that the patient have 3-5 sessions per week of Physical Therapy at d/c to increase the patient's independence. Please see assessment section for further patient specific details. Assessment:  Pt with improved gait tolerance today. Mobility slow, effortful and antalgic. No LOB noted with ambulation using walker. Pt from home alone. Would benefit from continued IP PT to maximize function prior to going home. Initially, she was hopeful for ARU at D/C, but now wants to go home with assist of family. Pt would benefit from initial 24 hour assist, use of walker for mobility and continued home PT. No new PT DME needs. Equipment Needs: No (Pt has a standard walker at home)    [address]: LEVEL 1 STANDARD (If D/[place] home)  - Initial home health evaluation to occur within 24-48 hours, in patient home   - Therapy to evaluate with goal of regaining prior level of functioning   - Therapy to evaluate if patient has [address] needs for personal care    Chart Reviewed: Yes     Other position/activity restrictions: FWBAT   Additional Pertinent Hx: 63 y/o LEFT TOTAL KNEE ARTHROPLASTY WITH COMPUTER ASSISTANCE TIGIST. WB Status: FWBAT    Diagnosis: s/p LEFT TOTAL KNEE ARTHROPLASTY WITH COMPUTER ASSISTANCE TIGIST   Treatment Diagnosis: Decreased functional mobility    Subjective: Pt in chair initially. Agreeable to working with PT. \"I need to talk to the . My insurance won't let me go to rehab. I don't want to go to a nursing home. I'm going to go home. \" States she has several family members who are able to assist for first several days at home. (This therapist contacted Ina Brantley to relay message.)    Pain: 6/10 L knee, end of session. RN aware and addressing with meds. Ice packs to knee after therapy. Objective:    Transfers  Sit to stand: SBA from chair (twice). Effortful. Stand to sit: SBA into chair; SBA onto bed. Cues initially for hand placement. Ambulation  Assistance Level: CGA initially, progressing to SBA  Assistive device: Standard walker  Distance: 60 ft in room. Distance limited by L knee pain and fatigue. Quality of gait: Step-to pattern; decreased step length/height; antalgic L LE; moderate reliance on walker for support; no LOB; effortful    Bed mobility  Sit to Supine: SBA, HOB flat without use of rail. Effortful for L LE. Exercises  10 reps B ankle pumps, quad sets, glut sets, L heel slides, hip abd/add, SAQ (min assist initially)  Other: Occasional rest breaks. Balance  Static stance with walker SBA  Ambulation with standard walker CGA initially, progressing to SBA  Sat EOB with Supervision    Patient Education  Continuing HEP at home. Using TKR booklet as a reference for exercises. Expressed understanding. Calling for assist with needs. Expressed understanding. Safety Devices  Pt left with needs in reach. In bed with bed alarm on. RN updated.      AM-PAC score  AM-PAC Inpatient Mobility Raw Score : 17  AM-PAC Inpatient T-Scale Score : 42.13  Mobility Inpatient CMS 0-100% Score: 50.57  Mobility Inpatient CMS G-Code Modifier : CK    Goals: (as determined and assessed by primary PT)  Time Frame for Short Term Goals: d/c  ongoing 12/7  Short Term Goal 1: sup<>sit SBA  ongoing   Short Term Goal 2: sit<>stand SBA  ongoing   Short Term Goal 3: amb 48' with RW and CGA  ongoing  Short Term Goal 4: Pt will increase knee ROM to 5-80 deg       Plan:  Plan: 2 times a day 7 days a week    Current Treatment Recommendations: Strengthening, Balance training, Functional mobility training, Transfer training, Gait training,

## 2023-12-08 NOTE — DISCHARGE INSTR - COC
Continuity of Care Form    Patient Name: Debbie Sarabia   :  1965  MRN:  [id number]    Admit date:  2023  Discharge date:  ***    Code Status Order: Full Code   Advance Directives:   Advance Care Flowsheet Documentation       Date/Time Healthcare Directive Type of Healthcare Directive Copy in [address] Agent's Name Healthcare Agent's Phone Number    23 1049 No, patient does not have an advance directive for healthcare treatment -- -- -- -- --            Admitting Physician:  Tiffani Dixon MD  PCP: Adelaida Pollard DO    Discharging Nurse: [facility] Unit/Room#: 9032/5767-49  Discharging Unit Phone Number: ***    Emergency Contact:   Extended Emergency Contact Information  Primary Emergency Contact: Rey Lesch, [place]  Mobile Phone: [phone]  Relation: None  Secondary Emergency Contact: Jonathan Caballero Phone: [phone]  Mobile Phone: [phone]  Relation: Child    Past Surgical History:  Past Surgical History:   Procedure Laterality Date    ANUS SURGERY  2018    fissure     SECTION      x3    COLONOSCOPY      DILATION AND CURETTAGE OF UTERUS N/A 2021    VIDEO HYSTEROSCOPY DILATATION AND CURETTAGE, POSSIBLE MYOSURE, POSSIBLE POLYPECTOMY performed by Diamante Mallory DO at [facility]      right wrist    HERNIA REPAIR  2018    LAPAROSCOPIC PARAESOPHAGEAL HERNIA REPAIR WITH MESH    HYSTERECTOMY (CERVIX STATUS UNKNOWN) N/A 10/21/2021    ROBOTIC ASSISTED LAPAROSCOPIC HYSTERECTOMY WITH RIGHT SALPINGECTOMY, RIGHT OOPHORECTOMY, CYSTOSCOPY, LYSIS OF ADHESIONS  CPT CODE - 81059 performed by Roney Valdez DO at [address] TOTAL ABDOMINAL (CERVIX REMOVED)  10/21/21    KNEE ARTHROSCOPY Left 11/15/2012    ARTHROSCOPY LEFT KNEE WITH SYOVECTOMY AND CHONDROPLASTY    OVARY REMOVAL Right     ROTATOR CUFF REPAIR Right 2020    EXAM UNDER ANESTHESIA VIDEO

## 2023-12-08 NOTE — CARE COORDINATION
Case Management Assessment            Discharge Note                    Date / Time of Note: 12/8/2023 4:18 PM                  Discharge Note Completed by: TERRA Albert    Patient Name: Debbie Sarabia   YOB: 1965  Diagnosis: Primary osteoarthritis of left knee [M17.12]  Osteoarthritis of left knee, unspecified osteoarthritis type [M17.12]   Date / Time: 12/5/2023  9:07 AM    Current PCP: Adelaida Pollard DO  Clinic patient: No    Hospitalization in the last 30 days: No       Advance Directives:  Code Status: Full Code  [place] DNR form completed and on chart: Not Indicated    Financial:  Payor: Rosy Pacer / Plan: [address] [place] [address] Road / Product Type: *No Product type* /      Pharmacy:    [address] [place] [address]  Phone: [phone] Fax: [phone]      Assistance purchasing medications?:    Assistance provided by Case Management: None at this time    Does patient want to participate in local refill/ meds to beds program?:      Meds To Beds General Rules:  1. Can ONLY be done Monday- Friday between 8:30am-5pm  2. Prescription(s) must be in pharmacy by 3pm to be filled same day  3. Copy of patient's insurance/ prescription drug card and patient face sheet must be sent along with the prescription(s)  4. Cost of Rx cannot be added to hospital bill. If financial assistance is needed, please contact unit  or ;  or  CANNOT provide pharmacy voucher for patients co-pays  5.  Patients can then  the prescription on their way out of the hospital at discharge, or pharmacy can deliver to the bedside if staff is available. (payment due at time of pick-up or delivery - cash, check, or card accepted)     Able to afford home medications/ co-pay costs: Yes    ADLS:  Current PT AM-PAC Score: 18 /24  Current OT AM-PAC Score: 18

## 2023-12-08 NOTE — PROGRESS NOTES
Physical Therapy  Daily Treatment Note    Discharge Recommendations: Gareth Butt scored a 18/24 on the AM-PAC short mobility form. Current research shows that an AM-PAC score of 18 or greater is typically associated with a discharge to the patient's home setting. Based on the patient's AM-PAC score and their current functional mobility deficits, it is recommended that the patient have 2-3 sessions per week of Physical Therapy at d/c to increase the patient's independence. At this time, this patient demonstrates the endurance and safety to discharge home with home PT and a follow up treatment frequency of 2-3x/wk. Please see assessment section for further patient specific details. Assessment:  Pt with improved activity tolerance this afternoon. Good effort and participation despite c/o L knee pain and c/o feeling anxious and emotional this afternoon. Pt demonstrated steady gait with walker and did well on curb step multiple trials. Plan is now for home with assist of family. Pt would benefit from initial 24 hour assist, use of wheeled walker for mobility and continued home PT. No new PT DME needs. Equipment Needs: No (pt has a standard walker at home)    Chart Reviewed: Yes     Other position/activity restrictions: FWBAT   Additional Pertinent Hx: 63 y/o LEFT TOTAL KNEE ARTHROPLASTY WITH COMPUTER ASSISTANCE TIGIST. WB Status: FWBAT    Diagnosis: s/p LEFT TOTAL KNEE ARTHROPLASTY WITH COMPUTER ASSISTANCE TIGIST   Treatment Diagnosis: Decreased functional mobility    Subjective: Pt in bed initially. Agreeable to working with PT. Tearful at times. States \"it's just been a bad day. \" Frustrated with internet and TV not working consistently during hospital stay. Also stating food has been and issue. RN aware and has been addressing. Pain: 6/10 L knee. RN aware and recently medicated. Ice packs to knee after therapy.      Objective:    Exercises  10 reps B ankle pumps, quad sets, glut sets, heel slides (limited

## 2023-12-08 NOTE — CARE COORDINATION
CTN contacted [facility] with [facility] [phone]. They have accepted this patient and will pull referral from [facility].  They will contact patient and make arrangements for [facility] by 12/10  Electronically signed by Melanie Caruso LPN on 31/4/4906 at 7:27 PM

## 2023-12-08 NOTE — PROGRESS NOTES
Occupational Therapy  Facility/Department: [address]  Occupational Therapy Treatment Note    Name: Mike Luong  : 1965  MRN: [id number]  Date of Service: 2023    Discharge Recommendations:  [address]  OT Equipment Recommendations  Equipment Needed: Yes [facility])       Patient Diagnosis(es): There were no encounter diagnoses. Past Medical History:  has a past medical history of Anxiety, Depression, Endometriosis, GERD (gastroesophageal reflux disease), Headache, Obesity, Osteoarthritis, PONV (postoperative nausea and vomiting), Restless leg syndrome, Wears dentures, and Wears glasses. Past Surgical History:  has a past surgical history that includes Tonsillectomy ();  section; Ovary removal (Right, ); Upper gastrointestinal endoscopy (); Knee arthroscopy (Left, 11/15/2012); Upper gastrointestinal endoscopy (); hernia repair (2018); Anus surgery (2018); fracture surgery; Rotator cuff repair (Right, 2020); Shoulder arthroscopy (Right, 2020); Colonoscopy; Shoulder arthroscopy (Right, 2021); Shoulder arthroscopy (Right, 2021); Dilation and curettage of uterus (N/A, 2021); Hysterectomy (N/A, 10/21/2021); Upper gastrointestinal endoscopy; Upper gastrointestinal endoscopy (N/A, 2022); Tubal ligation; Hysterectomy, total abdominal (10/21/21); shoulder surgery (Right, 6/15/2023); and Total knee arthroplasty (Left, 2023). Treatment Diagnosis: impaired mobility, transfers, ADL      Assessment   Assessment: Making functional progress. Continues to require intermittent hands on assistance w/ all standing activity - CGA/SBA for functional transfers. Activity requires increased time/effort to complete. Pt is from home alone and homeful for IP therapy at discharge. Continue per POC and progress as tolerated.   Activity Tolerance  Activity Tolerance: Patient Tolerated treatment well  Activity Tolerance Comments: seated rest breaks.   Additional Comments: pt continues to be unable to complete ADLs at PLOF/ without significantly increased time and effort                                    Education Given To: Patient  Education Provided: Role of Therapy;Plan of Care;Transfer Training;ADL Adaptive Strategies  Barriers to Learning: None  Education Outcome: Continued education needed;Verbalized understanding;Demonstrated understanding                   AM-PAC - ADL  AM-PAC Daily Activity - Inpatient     AM-PAC Inpatient Daily Activity Raw Score: 18  AM-PAC Inpatient ADL T-Scale Score : 38.66  ADL Inpatient CMS 0-100% Score: 46.65  ADL Inpatient CMS G-Code Modifier : CK    Goals  Short Term Goals  Time Frame for Short Term Goals: dc  Short Term Goal 1: supine to sit with SPVN (not met)  Short Term Goal 2: sit<>Stands with SBA (progressing, continue to ensure consistency)  Short Term Goal 3: toileting with SBA (met 12/8, continue to ensure consistency)  Short Term Goal 4: UB/LB dressing with SBA + AE (not met)       Therapy Time   Individual Concurrent Group Co-treatment   Time In 1045         Time Out 1126         Minutes 41         Timed Code Treatment Minutes: 340 Ariane Jeter, OT

## 2023-12-09 VITALS
TEMPERATURE: 97.2 F | OXYGEN SATURATION: 94 % | BODY MASS INDEX: 44.65 KG/M2 | HEIGHT: 66 IN | HEART RATE: 67 BPM | RESPIRATION RATE: 18 BRPM | DIASTOLIC BLOOD PRESSURE: 77 MMHG | WEIGHT: 277.8 LBS | SYSTOLIC BLOOD PRESSURE: 129 MMHG

## 2023-12-09 PROCEDURE — 97530 THERAPEUTIC ACTIVITIES: CPT

## 2023-12-09 PROCEDURE — 97116 GAIT TRAINING THERAPY: CPT

## 2023-12-09 PROCEDURE — 6370000000 HC RX 637 (ALT 250 FOR IP): Performed by: ORTHOPAEDIC SURGERY

## 2023-12-09 PROCEDURE — 2580000003 HC RX 258: Performed by: ORTHOPAEDIC SURGERY

## 2023-12-09 PROCEDURE — 97110 THERAPEUTIC EXERCISES: CPT

## 2023-12-09 PROCEDURE — 97535 SELF CARE MNGMENT TRAINING: CPT

## 2023-12-09 RX ADMIN — POTASSIUM CHLORIDE 10 MEQ: 750 TABLET, EXTENDED RELEASE ORAL at 09:01

## 2023-12-09 RX ADMIN — METHOCARBAMOL 500 MG: 500 TABLET ORAL at 09:01

## 2023-12-09 RX ADMIN — SERTRALINE 100 MG: 100 TABLET, FILM COATED ORAL at 09:02

## 2023-12-09 RX ADMIN — ACETAMINOPHEN 650 MG: 325 TABLET ORAL at 01:11

## 2023-12-09 RX ADMIN — ALPRAZOLAM 0.5 MG: 0.5 TABLET ORAL at 09:01

## 2023-12-09 RX ADMIN — METHOCARBAMOL 500 MG: 500 TABLET ORAL at 01:30

## 2023-12-09 RX ADMIN — CEPHALEXIN 500 MG: 500 CAPSULE ORAL at 09:02

## 2023-12-09 RX ADMIN — ROPINIROLE HYDROCHLORIDE 2 MG: 2 TABLET, FILM COATED ORAL at 09:02

## 2023-12-09 RX ADMIN — PANTOPRAZOLE SODIUM 40 MG: 40 TABLET, DELAYED RELEASE ORAL at 06:44

## 2023-12-09 RX ADMIN — ACETAMINOPHEN 650 MG: 325 TABLET ORAL at 09:02

## 2023-12-09 RX ADMIN — OXYCODONE 10 MG: 5 TABLET ORAL at 06:44

## 2023-12-09 RX ADMIN — ASPIRIN 81 MG: 81 TABLET, COATED ORAL at 09:01

## 2023-12-09 RX ADMIN — OXYCODONE 10 MG: 5 TABLET ORAL at 03:05

## 2023-12-09 RX ADMIN — SODIUM CHLORIDE, PRESERVATIVE FREE 10 ML: 5 INJECTION INTRAVENOUS at 09:03

## 2023-12-09 RX ADMIN — FLUTICASONE PROPIONATE 1 SPRAY: 50 SPRAY, METERED NASAL at 09:03

## 2023-12-09 ASSESSMENT — PAIN DESCRIPTION - ORIENTATION
ORIENTATION: LEFT

## 2023-12-09 ASSESSMENT — PAIN SCALES - GENERAL
PAINLEVEL_OUTOF10: 0
PAINLEVEL_OUTOF10: 0
PAINLEVEL_OUTOF10: 6
PAINLEVEL_OUTOF10: 7
PAINLEVEL_OUTOF10: 6
PAINLEVEL_OUTOF10: 7
PAINLEVEL_OUTOF10: 0

## 2023-12-09 ASSESSMENT — PAIN DESCRIPTION - FREQUENCY
FREQUENCY: INTERMITTENT

## 2023-12-09 ASSESSMENT — PAIN DESCRIPTION - LOCATION
LOCATION: NECK
LOCATION: KNEE

## 2023-12-09 ASSESSMENT — PAIN DESCRIPTION - ONSET
ONSET: ON-GOING

## 2023-12-09 ASSESSMENT — PAIN DESCRIPTION - PAIN TYPE
TYPE: ACUTE PAIN

## 2023-12-09 ASSESSMENT — PAIN - FUNCTIONAL ASSESSMENT
PAIN_FUNCTIONAL_ASSESSMENT: ACTIVITIES ARE NOT PREVENTED

## 2023-12-09 ASSESSMENT — PAIN DESCRIPTION - DESCRIPTORS
DESCRIPTORS: ACHING

## 2023-12-09 NOTE — PROGRESS NOTES
Physical Therapy  Facility/Department: [address]  Daily Treatment Note  NAME: Mike Luong  : 1965  MRN: [id number]    Date of Service: 2023    Discharge Recommendations:Nydia Dewey scored a 18/24 on the AM-PAC short mobility form. Current research shows that an AM-PAC score of 18 or greater is typically associated with a discharge to the patient's home setting. Based on the patient's AM-PAC score and their current functional mobility deficits, it is recommended that the patient have 2-3 sessions per week of Physical Therapy at d/c to increase the patient's independence. At this time, this patient demonstrates the endurance and safety to discharge home with home services and a follow up treatment frequency of 2-3x/wk. Please see assessment section for further patient specific details. If patient discharges prior to next session this note will serve as a discharge summary. Please see below for the latest assessment towards goals. 24 hour supervision or assist, Home with Home health PT   PT Equipment Recommendations  Equipment Needed: No  Other: pt owns RW    Patient Diagnosis(es): There were no encounter diagnoses. Assessment   Assessment: The pt presents with improving assist with transfers to supervision and ambulation with SBA and standard walker. She plans to return home today with multiple friends and family members available to stay with her a few days and assist her. Pt to have [facility] as well. Limited in distance of ambulation today 2/2 pain. Activity Tolerance: Patient limited by pain  Equipment Needed: No  Other: pt owns RW     Plan    Physical Therapy Plan  General Plan: 1 time a day 7 days a week  Current Treatment Recommendations: Strengthening;Balance training;Functional mobility training;Transfer training;Gait training; Safety education & training; Therapeutic activities     Restrictions  Position Activity Restriction  Other position/activity restrictions: FWBAT Subjective    Subjective  Subjective: \"I am leaving soon and have already been up and done a bunch. \" The pt presents supine in bed and willing to work with therapist.  Pain: Pt noting she has 7/10 knee pain  Orientation  Overall Orientation Status: Within Functional Limits  Orientation Level: Oriented X4  Cognition  Overall Cognitive Status: WNL     Objective   Vitals     Bed Mobility Training  Bed Mobility Training: Yes  Supine to Sit: Supervision (HOB elevated)  Scooting: Supervision (to EOB)  Balance  Sitting: Intact  Standing: Impaired (intermittent CGA with rolling)  Transfer Training  Transfer Training: Yes  Sit to Stand: Supervision (from bed)  Stand to Sit: Supervision  Gait Training  Gait Training: Yes  Gait  Overall Level of Assistance:  (functional mobility with rolling walker and SBA)  Distance (ft): 120 Feet (pt insistent on not using RW; feels safer with SW)  Assistive Device: Walker  Interventions: Verbal cues (cueing to stand up tall, decrease WB through HHA)  Speed/[place]hell: Slow  Step Length: Left shortened;Right shortened  Gait Abnormalities: Step to gait     PT Exercises  Exercise Treatment: sitting EOB x 10 B AP, LAQ, hip abd, knee flexion x 10 on L LE     Safety Devices  Type of Devices: Call light within reach;Nurse notified; Left in bed;Bed alarm in place       Goals  Short Term Goals  Time Frame for Short Term Goals: d/c  ongoing 12/7  Short Term Goal 1: sup<>sit SBA  ongoing  Short Term Goal 2: sit<>stand SBA  ongoing  Short Term Goal 3: amb 48' with RW and CGA  ongoing  Short Term Goal 4: Pt will increase knee ROM to 5-80 deg  Patient Goals   Patient Goals : return home when able    Education  Patient Education  Education Given To: Patient  Education Provided: Role of Therapy;Transfer Training  Education Provided Comments: WBAT, L ex  Education Method: Demonstration  Barriers to Learning: None  Education Outcome: Verbalized understanding      Therapy Time   Individual Concurrent Group

## 2023-12-09 NOTE — PROGRESS NOTES
Patient is A&O x4.  RA, sat 97%. No complaints of SOB. Medicated for c/o knee pain as needed. Vital signs stable as charted. Respirations appear to easy and unlabored. Lungs clear. Respirations easy with no complaints of cough. No complaints of nausea/vomiting/diarrhea. Up with assist/walker to the bathroom or chair as needed. Right hand PIV intact and flushed. Tolerating regular diet. Left knee dressing intact. Plan of care and safety measures reviewed with the patient. Call light in reach and bed alarm in place. Bed attached to wall for alarm purposes. Will continue to monitor.   Electronically signed by Dutch Rendon RN on 12/8/2023 at 9:45 PM

## 2023-12-09 NOTE — CARE COORDINATION
CM Note: CM spoke with pt's nurse who reports the pt has a ride coming to pick her up at 10 am today. Pt has needed DME at home and [facility] has been arranged through [facility]. No additional CM needs for discharge. For full discharge summary please see discharge summary dated 12/8/2023.   Electronically signed by TERRA Frausto on 12/9/2023 at 9:01 AM  [phone]

## 2023-12-11 ENCOUNTER — CARE COORDINATION (OUTPATIENT)
Dept: CASE MANAGEMENT | Age: 58
End: 2023-12-11

## 2023-12-11 DIAGNOSIS — Z96.652 STATUS POST TOTAL LEFT KNEE REPLACEMENT: Primary | ICD-10-CM

## 2023-12-11 PROCEDURE — 1111F DSCHRG MED/CURRENT MED MERGE: CPT | Performed by: FAMILY MEDICINE

## 2023-12-11 NOTE — CARE COORDINATION
Care Transitions Initial Follow Up Call    Call within 2 business days of discharge: Yes    Patient Current Location:  Home: [address]     Care Transition Nurse contacted the patient by telephone to perform post hospital discharge assessment. Verified name and  with patient as identifiers. Provided introduction to self, and explanation of the Care Transition Nurse role. Patient: Calista Mckeon Patient : 1965   MRN: [id number]   Reason for Admission: S/P Total Knee Arthroplasty, Left  Discharge Date: 23 RARS: Readmission Risk Score: 7.3      Last Discharge Facility       Date Complaint Diagnosis Description Type Department Provider    23   Admission (Discharged) Samantha Willis MD            Was this an external facility discharge? No     Challenges to be reviewed by the provider   Additional needs identified to be addressed with provider: No  none               Method of communication with provider: none. CTN spoke with patient this am for initial 24 hour discharge follow up CTN call. Patient states she is doing well, states she is having some soreness, taking PRN's and states they are effective when taken. Patient denies having any fever, chills, nausea, vomiting, chest pain, SOB or cough. Patient with no congestion, pain, difficulty emptying bladder, feeling lightheaded, dizziness, and heart palpitations. Patient does report having some swelling to left foot, no reports of any calf tenderness. Dressing is dry and intact, no signs of increased swelling or pain. CTN did contact [address] [place] [address] intake department, confirmed referral was received. Patient also states she has spoken with SN with [facility] agency, PT to be in home today, as well as OT. CTN and Pt reviewed meds and CTN completed the 1111f. Care Transition Nurse reviewed discharge instructions, medical action plan, and red flags with patient who verbalized understanding.

## 2023-12-11 NOTE — DISCHARGE SUMMARY
BEACON DISCHARGE SUMMARY     Left total knee replacement    The patient was taken to the operating room  where the aforementioned procedure was preformed. The patient was taken to the post operative anesthesia recovery unit in stable condition. The patient was then transferred to the orthopaedic floor for post operative pain management and convalesce       The patient was followed medically in the hospital for the above surgical procedures performed and below medical issues during their hospital stay    Principal Problem:    Osteoarthritis of left knee, unspecified osteoarthritis type  Resolved Problems:    * No resolved hospital problems. *         (x )The patient was placed on anticoagulation therapy for DVT prophylaxis       The patient was discharged in stable condition . Please see medical reconciliation for discharge medications. The discharge instructions were explained to the patient and the family. The patient will follow up in the office in 2 weeks for repeat examination and xray .

## 2023-12-15 ENCOUNTER — CARE COORDINATION (OUTPATIENT)
Dept: CASE MANAGEMENT | Age: 58
End: 2023-12-15

## 2023-12-15 NOTE — CARE COORDINATION
Care Transitions Follow Up Call    Patient Current Location:  Home: [address] [facility]  [address] [facility] [address] [place] [address]    Care Transition Nurse contacted the patient by telephone to follow up after admission on 2023. Verified name and  with patient as identifiers. Patient: Mey Pierson : 1965   MRN: [id number]   Reason for Admission: S/P Total Knee Arthroplasty, Left  Discharge Date: 23 RARS: Readmission Risk Score: 7.3      Needs to be reviewed by the provider   Additional needs identified to be addressed with provider: No  none             Method of communication with provider: none. CTN spoke with patient this afternoon for follow up CTN call. Patient states she is doing well, no reports of any fever, chills, nausea, vomiting, chest pain, SOB or cough. Patient with no congestion, pain, difficulty emptying bladder, LE edema, feeling lightheaded, dizziness, and heart palpitations. Patient working with PT with [facility] at time of CTN call. Patient also had HFU with PCP V[facility], started on Robaxin 500 mg PO PRN, for neck pain, per chart. Addressed changes since last contact:  none  Discussed follow-up appointments. If no appointment was previously scheduled, appointment scheduling offered: Yes. Is follow up appointment scheduled within 7 days of discharge? Yes. Follow Up  Future Appointments   Date Time Provider [facility] [address]   2024 11:00 AM [facility]   2024  3:00 PM Handy Melendez,  [address] Transition Nurse reviewed medical action plan and red flags with patient and discussed any barriers to care and/or understanding of plan of care after discharge. Discussed appropriate site of care based on symptoms and resources available to patient including: PCP  Specialist  Urgent care clinics  [facility]  When to call 911. The patient agrees to contact the PCP office for questions related to their healthcare.      Advance

## 2023-12-26 ENCOUNTER — CARE COORDINATION (OUTPATIENT)
Dept: CASE MANAGEMENT | Age: 58
End: 2023-12-26

## 2023-12-26 NOTE — CARE COORDINATION
Care Transitions Follow Up Call    Patient Current Location:  Home: [address] [facility]  [address] [facility] [address] [place] [address]    Care Transition Nurse contacted the patient by telephone to follow up after admission on 2023. Verified name and  with patient as identifiers. Patient: Dakota Inman  Patient : 1965   MRN: [id number]   Reason for Admission: S/P Total Knee Arthroplasty, Left  Discharge Date: 23 RARS: Readmission Risk Score: 7.3      Needs to be reviewed by the provider   Additional needs identified to be addressed with provider: No  none             Method of communication with provider: none. CTN spoke with patient this afternoon for follow up CTN call. Patient states she is not doing well, still with lot's of swelling to leg, warmth to touch, had dopplers done today, to rule out or diagnose for DVT. Patient with no sudden SOB/SOBE, no fever, chills, nausea, vomiting, chest pain, SOB or cough. Patient with no congestion,difficulty emptying bladder, LE edema, feeling lightheaded, dizziness, and heart palpitations. Incision healing well, no signs of infection, no drainage. No new or change medications, no other issues or concerns at this time. Patient is continuing to be followed by [facility], SN and PT/OT. Addressed changes since last contact:  none  Discussed follow-up appointments. If no appointment was previously scheduled, appointment scheduling offered: Yes. Is follow up appointment scheduled within 7 days of discharge? Yes. Follow Up  Future Appointments   Date Time Provider [address]   [facility]   2024  3:00 PM Kevon Enriquez  [address] Transition Nurse reviewed medical action plan and red flags with patient and discussed any barriers to care and/or understanding of plan of care after discharge.  Discussed appropriate site of care based on symptoms and resources available to patient including:

## 2023-12-29 ENCOUNTER — COMMUNITY OUTREACH (OUTPATIENT)
Dept: FAMILY MEDICINE CLINIC | Age: 58
End: 2023-12-29

## 2024-01-01 NOTE — TELEPHONE ENCOUNTER
Infant increased to NCPAP +8, FiO2 26%. No apnea/bradycardia. Remains tachypneic at baseline. Oxygen saturations slightly labile. Temps stable swaddled in non-warming RW. Tolerating q3h gavage feeds of mom's EBM/ Similac 360 Total Care with no emesis/spits. Feeds increased without difficulty. DL UVC remains secured at 13 cm, proximal lumen heparin locked and flushed q6h and distal lumen infusing D15 in sterile H20 with heparin without difficulty. Preprandial glucoses obtained, see results. IVF adjusted per order based on preprandial glucoses- see MAR. NNP notified at 1500 and 1800 for increasing fluids due to glucoses <50. Urine output 4.22 ml/kg/hr, x4 small stools noted. Parents at bedside this morning, present during rounds and updated by MD.   Please call the patient and tell her I am glad she is feeling a bit better. I would like her to get a blood test on Friday. I put the orders in the system. We will try to talk to her over the weekend.

## 2024-01-02 ENCOUNTER — CARE COORDINATION (OUTPATIENT)
Dept: CASE MANAGEMENT | Age: 59
End: 2024-01-02

## 2024-01-02 ENCOUNTER — TELEPHONE (OUTPATIENT)
Dept: FAMILY MEDICINE CLINIC | Age: 59
End: 2024-01-02

## 2024-01-02 NOTE — TELEPHONE ENCOUNTER
Pt called to let Dr Sorenson know what has been going on since her L knee replacement on 12/5/23.    Currently it is swollen from calf down to foot, red, painful  About 10 days ago she had an US doppler to check for blood clot  Pt has been on 2 rounds of antibiotics and 1 round of steroids.     Pt has an appt with ortho tomorrow 1/3/24

## 2024-01-02 NOTE — CARE COORDINATION
Care Transitions Follow Up Call    Patient Current Location:  Home: [address] [place] [address]  [address]  [facility] [address]    Care Transition Nurse contacted the patient by telephone to follow up after admission on 2023.  Verified name and  with patient as identifiers.    Patient: Nydia Downs  Patient : 1965   MRN: [id number]   Reason for Admission: S/P Total Knee Arthroplasty, Left  Discharge Date: 23 RARS: Readmission Risk Score: 7.3      Needs to be reviewed by the provider   Additional needs identified to be addressed with provider: No  none             Method of communication with provider: none.    CTN spoke with patient this afternoon for final follow up CTN call.  Patient states she is still having swelling to left knee, down to foot.  Patient had dopplers completed, were negative, per patient.  Patient states first course of PO ABX's made symptoms improve, but then returned, she was placed on PO ABX's again, along with steroid, states symptoms have not gotten better, have only gotten worse.  Patient with worsening pain, and foot is bright red.  Patient has appointment with surgeon tomorrow am.  No reports of any fever, chills, nausea, vomiting, chest pain, SOB or cough.    Patient with no congestion, difficulty emptying bladder, LE edema, feeling lightheaded, dizziness, and heart palpitations.   Feels she may likely be sent to ED and admitted for infection as she states she has a history of getting infections, after surgeries.  No other issues or concerns at this time, SN, PT/OT with [facility] are following as well.    Addressed changes since last contact:  none  Discussed follow-up appointments. If no appointment was previously scheduled, appointment scheduling offered: Yes.   Is follow up appointment scheduled within 7 days of discharge? Yes.    Follow Up  Future Appointments   Date Time Provider Department Center   2024 11:00 AM Juliana Simeon [facility]   2024  3:00 PM [place]

## 2024-01-03 ENCOUNTER — HOSPITAL ENCOUNTER (INPATIENT)
Age: 59
LOS: 4 days | Discharge: HOME | DRG: 603 | End: 2024-01-07
Attending: INTERNAL MEDICINE | Admitting: INTERNAL MEDICINE
Payer: MEDICARE

## 2024-01-03 PROBLEM — L03.90 CELLULITIS: Status: ACTIVE | Noted: 2024-01-03

## 2024-01-03 LAB
ANION GAP SERPL CALCULATED.3IONS-SCNC: 10 MMOL/L (ref 3–16)
BASOPHILS # BLD: 0.1 K/UL (ref 0–0.2)
BASOPHILS NFR BLD: 0.7 %
BUN SERPL-MCNC: 13 MG/DL (ref 7–20)
CALCIUM SERPL-MCNC: 9.3 MG/DL (ref 8.3–10.6)
CHLORIDE SERPL-SCNC: 103 MMOL/L (ref 99–110)
CO2 SERPL-SCNC: 25 MMOL/L (ref 21–32)
CREAT SERPL-MCNC: 0.7 MG/DL (ref 0.6–1.1)
CRP SERPL-MCNC: 11.9 MG/L (ref 0–5.1)
DEPRECATED RDW RBC AUTO: 16.6 % (ref 12.4–15.4)
EOSINOPHIL # BLD: 0.2 K/UL (ref 0–0.6)
EOSINOPHIL NFR BLD: 2.3 %
ERYTHROCYTE [SEDIMENTATION RATE] IN BLOOD BY WESTERGREN METHOD: 24 MM/HR (ref 0–30)
GFR SERPLBLD CREATININE-BSD FMLA CKD-EPI: >60 ML/MIN/{1.73_M2}
GLUCOSE SERPL-MCNC: 108 MG/DL (ref 70–99)
HCT VFR BLD AUTO: 38.9 % (ref 36–48)
HGB BLD-MCNC: 12.6 G/DL (ref 12–16)
LYMPHOCYTES # BLD: 1.8 K/UL (ref 1–5.1)
LYMPHOCYTES NFR BLD: 21.7 %
MCH RBC QN AUTO: 29.5 PG (ref 26–34)
MCHC RBC AUTO-ENTMCNC: 32.5 G/DL (ref 31–36)
MCV RBC AUTO: 90.7 FL (ref 80–100)
MONOCYTES # BLD: 0.7 K/UL (ref 0–1.3)
MONOCYTES NFR BLD: 8.4 %
NEUTROPHILS # BLD: 5.6 K/UL (ref 1.7–7.7)
NEUTROPHILS NFR BLD: 66.9 %
PLATELET # BLD AUTO: 189 K/UL (ref 135–450)
PMV BLD AUTO: 9.6 FL (ref 5–10.5)
POTASSIUM SERPL-SCNC: 4.3 MMOL/L (ref 3.5–5.1)
RBC # BLD AUTO: 4.28 M/UL (ref 4–5.2)
SODIUM SERPL-SCNC: 138 MMOL/L (ref 136–145)
WBC # BLD AUTO: 8.4 K/UL (ref 4–11)

## 2024-01-03 PROCEDURE — 85025 COMPLETE CBC W/AUTO DIFF WBC: CPT

## 2024-01-03 PROCEDURE — 6360000002 HC RX W HCPCS: Performed by: INTERNAL MEDICINE

## 2024-01-03 PROCEDURE — 85652 RBC SED RATE AUTOMATED: CPT

## 2024-01-03 PROCEDURE — 86140 C-REACTIVE PROTEIN: CPT

## 2024-01-03 PROCEDURE — 1200000000 HC SEMI PRIVATE

## 2024-01-03 PROCEDURE — 2580000003 HC RX 258: Performed by: INTERNAL MEDICINE

## 2024-01-03 PROCEDURE — 80048 BASIC METABOLIC PNL TOTAL CA: CPT

## 2024-01-03 PROCEDURE — 36415 COLL VENOUS BLD VENIPUNCTURE: CPT

## 2024-01-03 RX ORDER — OXYCODONE HYDROCHLORIDE 5 MG/1
5 TABLET ORAL EVERY 4 HOURS PRN
Status: DISCONTINUED | OUTPATIENT
Start: 2024-01-03 | End: 2024-01-07 | Stop reason: HOSPADM

## 2024-01-03 RX ORDER — SODIUM CHLORIDE 0.9 % (FLUSH) 0.9 %
5-40 SYRINGE (ML) INJECTION PRN
Status: DISCONTINUED | OUTPATIENT
Start: 2024-01-03 | End: 2024-01-07 | Stop reason: HOSPADM

## 2024-01-03 RX ORDER — METHOCARBAMOL 500 MG/1
500 TABLET, FILM COATED ORAL 3 TIMES DAILY
Status: DISCONTINUED | OUTPATIENT
Start: 2024-01-03 | End: 2024-01-07 | Stop reason: HOSPADM

## 2024-01-03 RX ORDER — ONDANSETRON 2 MG/ML
4 INJECTION INTRAMUSCULAR; INTRAVENOUS EVERY 6 HOURS PRN
Status: DISCONTINUED | OUTPATIENT
Start: 2024-01-03 | End: 2024-01-07 | Stop reason: HOSPADM

## 2024-01-03 RX ORDER — ONDANSETRON 4 MG/1
4 TABLET, ORALLY DISINTEGRATING ORAL EVERY 8 HOURS PRN
Status: DISCONTINUED | OUTPATIENT
Start: 2024-01-03 | End: 2024-01-07 | Stop reason: HOSPADM

## 2024-01-03 RX ORDER — ACETAMINOPHEN 325 MG/1
650 TABLET ORAL EVERY 6 HOURS PRN
Status: DISCONTINUED | OUTPATIENT
Start: 2024-01-03 | End: 2024-01-07 | Stop reason: HOSPADM

## 2024-01-03 RX ORDER — ENOXAPARIN SODIUM 100 MG/ML
30 INJECTION SUBCUTANEOUS 2 TIMES DAILY
Status: DISCONTINUED | OUTPATIENT
Start: 2024-01-04 | End: 2024-01-07 | Stop reason: HOSPADM

## 2024-01-03 RX ORDER — SODIUM CHLORIDE 0.9 % (FLUSH) 0.9 %
5-40 SYRINGE (ML) INJECTION EVERY 12 HOURS SCHEDULED
Status: DISCONTINUED | OUTPATIENT
Start: 2024-01-03 | End: 2024-01-07 | Stop reason: HOSPADM

## 2024-01-03 RX ORDER — ACETAMINOPHEN 650 MG/1
650 SUPPOSITORY RECTAL EVERY 6 HOURS PRN
Status: DISCONTINUED | OUTPATIENT
Start: 2024-01-03 | End: 2024-01-07 | Stop reason: HOSPADM

## 2024-01-03 RX ORDER — SODIUM CHLORIDE 9 MG/ML
INJECTION, SOLUTION INTRAVENOUS PRN
Status: DISCONTINUED | OUTPATIENT
Start: 2024-01-03 | End: 2024-01-07 | Stop reason: HOSPADM

## 2024-01-03 RX ORDER — POLYETHYLENE GLYCOL 3350 17 G/17G
17 POWDER, FOR SOLUTION ORAL DAILY PRN
Status: DISCONTINUED | OUTPATIENT
Start: 2024-01-03 | End: 2024-01-07 | Stop reason: HOSPADM

## 2024-01-03 RX ORDER — OXYCODONE HYDROCHLORIDE 5 MG/1
5 TABLET ORAL EVERY 6 HOURS PRN
COMMUNITY

## 2024-01-03 RX ORDER — ALPRAZOLAM 0.5 MG/1
0.5 TABLET ORAL 3 TIMES DAILY PRN
Status: DISCONTINUED | OUTPATIENT
Start: 2024-01-03 | End: 2024-01-07 | Stop reason: HOSPADM

## 2024-01-03 RX ADMIN — HYDROMORPHONE HYDROCHLORIDE 0.5 MG: 1 INJECTION, SOLUTION INTRAMUSCULAR; INTRAVENOUS; SUBCUTANEOUS at 23:50

## 2024-01-03 RX ADMIN — CEFEPIME 2000 MG: 2 INJECTION, POWDER, FOR SOLUTION INTRAVENOUS at 23:57

## 2024-01-03 ASSESSMENT — PAIN DESCRIPTION - DESCRIPTORS
DESCRIPTORS: ACHING;BURNING;SPASM
DESCRIPTORS: ACHING;SPASM;BURNING

## 2024-01-03 ASSESSMENT — PAIN DESCRIPTION - LOCATION
LOCATION: LEG
LOCATION: LEG

## 2024-01-03 ASSESSMENT — PAIN DESCRIPTION - ONSET
ONSET: GRADUAL
ONSET: GRADUAL

## 2024-01-03 ASSESSMENT — PAIN DESCRIPTION - PAIN TYPE
TYPE: ACUTE PAIN
TYPE: ACUTE PAIN

## 2024-01-03 ASSESSMENT — PAIN SCALES - GENERAL
PAINLEVEL_OUTOF10: 3
PAINLEVEL_OUTOF10: 7
PAINLEVEL_OUTOF10: 7

## 2024-01-03 ASSESSMENT — PAIN DESCRIPTION - FREQUENCY
FREQUENCY: INTERMITTENT
FREQUENCY: INTERMITTENT

## 2024-01-03 ASSESSMENT — PAIN DESCRIPTION - ORIENTATION
ORIENTATION: LEFT
ORIENTATION: LEFT

## 2024-01-04 ENCOUNTER — TELEPHONE (OUTPATIENT)
Dept: FAMILY MEDICINE CLINIC | Age: 59
End: 2024-01-04

## 2024-01-04 PROCEDURE — 97166 OT EVAL MOD COMPLEX 45 MIN: CPT

## 2024-01-04 PROCEDURE — 97530 THERAPEUTIC ACTIVITIES: CPT

## 2024-01-04 PROCEDURE — 97116 GAIT TRAINING THERAPY: CPT

## 2024-01-04 PROCEDURE — 97161 PT EVAL LOW COMPLEX 20 MIN: CPT

## 2024-01-04 PROCEDURE — 1200000000 HC SEMI PRIVATE

## 2024-01-04 PROCEDURE — 2580000003 HC RX 258: Performed by: INTERNAL MEDICINE

## 2024-01-04 PROCEDURE — 6370000000 HC RX 637 (ALT 250 FOR IP): Performed by: INTERNAL MEDICINE

## 2024-01-04 PROCEDURE — 6370000000 HC RX 637 (ALT 250 FOR IP): Performed by: NURSE PRACTITIONER

## 2024-01-04 PROCEDURE — 6360000002 HC RX W HCPCS: Performed by: INTERNAL MEDICINE

## 2024-01-04 PROCEDURE — 97535 SELF CARE MNGMENT TRAINING: CPT

## 2024-01-04 RX ORDER — ROPINIROLE 0.25 MG/1
0.5 TABLET, FILM COATED ORAL NIGHTLY
Status: DISCONTINUED | OUTPATIENT
Start: 2024-01-04 | End: 2024-01-06

## 2024-01-04 RX ORDER — DIPHENHYDRAMINE HCL 25 MG
25 TABLET ORAL EVERY 8 HOURS PRN
Status: DISCONTINUED | OUTPATIENT
Start: 2024-01-04 | End: 2024-01-05

## 2024-01-04 RX ORDER — ROPINIROLE 0.25 MG/1
0.25 TABLET, FILM COATED ORAL NIGHTLY
Status: DISCONTINUED | OUTPATIENT
Start: 2024-01-04 | End: 2024-01-06

## 2024-01-04 RX ADMIN — CEFEPIME 2000 MG: 2 INJECTION, POWDER, FOR SOLUTION INTRAVENOUS at 14:11

## 2024-01-04 RX ADMIN — SODIUM CHLORIDE, PRESERVATIVE FREE 10 ML: 5 INJECTION INTRAVENOUS at 20:51

## 2024-01-04 RX ADMIN — VANCOMYCIN HYDROCHLORIDE 1250 MG: 10 INJECTION, POWDER, LYOPHILIZED, FOR SOLUTION INTRAVENOUS at 22:44

## 2024-01-04 RX ADMIN — METHOCARBAMOL 500 MG: 500 TABLET ORAL at 09:42

## 2024-01-04 RX ADMIN — ALPRAZOLAM 0.5 MG: 0.5 TABLET ORAL at 16:55

## 2024-01-04 RX ADMIN — ENOXAPARIN SODIUM 30 MG: 100 INJECTION SUBCUTANEOUS at 00:59

## 2024-01-04 RX ADMIN — OXYCODONE HYDROCHLORIDE 5 MG: 5 TABLET ORAL at 16:55

## 2024-01-04 RX ADMIN — OXYCODONE HYDROCHLORIDE 5 MG: 5 TABLET ORAL at 09:41

## 2024-01-04 RX ADMIN — VANCOMYCIN HYDROCHLORIDE 2250 MG: 1 INJECTION, POWDER, LYOPHILIZED, FOR SOLUTION INTRAVENOUS at 00:57

## 2024-01-04 RX ADMIN — VANCOMYCIN HYDROCHLORIDE 1250 MG: 10 INJECTION, POWDER, LYOPHILIZED, FOR SOLUTION INTRAVENOUS at 12:28

## 2024-01-04 RX ADMIN — HYDROMORPHONE HYDROCHLORIDE 0.5 MG: 1 INJECTION, SOLUTION INTRAMUSCULAR; INTRAVENOUS; SUBCUTANEOUS at 03:51

## 2024-01-04 RX ADMIN — METHOCARBAMOL 500 MG: 500 TABLET ORAL at 20:50

## 2024-01-04 RX ADMIN — ROPINIROLE HYDROCHLORIDE 0.5 MG: 0.25 TABLET, FILM COATED ORAL at 23:21

## 2024-01-04 RX ADMIN — HYDROMORPHONE HYDROCHLORIDE 0.5 MG: 1 INJECTION, SOLUTION INTRAMUSCULAR; INTRAVENOUS; SUBCUTANEOUS at 12:22

## 2024-01-04 RX ADMIN — HYDROMORPHONE HYDROCHLORIDE 0.5 MG: 1 INJECTION, SOLUTION INTRAMUSCULAR; INTRAVENOUS; SUBCUTANEOUS at 18:57

## 2024-01-04 RX ADMIN — METHOCARBAMOL 500 MG: 500 TABLET ORAL at 12:23

## 2024-01-04 RX ADMIN — METHOCARBAMOL 500 MG: 500 TABLET ORAL at 00:52

## 2024-01-04 RX ADMIN — ALPRAZOLAM 0.5 MG: 0.5 TABLET ORAL at 00:52

## 2024-01-04 RX ADMIN — SODIUM CHLORIDE: 9 INJECTION, SOLUTION INTRAVENOUS at 12:27

## 2024-01-04 RX ADMIN — ENOXAPARIN SODIUM 30 MG: 100 INJECTION SUBCUTANEOUS at 20:50

## 2024-01-04 RX ADMIN — DIPHENHYDRAMINE HYDROCHLORIDE 25 MG: 25 TABLET ORAL at 23:21

## 2024-01-04 RX ADMIN — ENOXAPARIN SODIUM 30 MG: 100 INJECTION SUBCUTANEOUS at 09:41

## 2024-01-04 RX ADMIN — SODIUM CHLORIDE, PRESERVATIVE FREE 10 ML: 5 INJECTION INTRAVENOUS at 00:57

## 2024-01-04 RX ADMIN — SODIUM CHLORIDE, PRESERVATIVE FREE 10 ML: 5 INJECTION INTRAVENOUS at 09:41

## 2024-01-04 RX ADMIN — OXYCODONE HYDROCHLORIDE 5 MG: 5 TABLET ORAL at 20:50

## 2024-01-04 ASSESSMENT — PAIN SCALES - GENERAL
PAINLEVEL_OUTOF10: 7
PAINLEVEL_OUTOF10: 6
PAINLEVEL_OUTOF10: 7
PAINLEVEL_OUTOF10: 0
PAINLEVEL_OUTOF10: 6
PAINLEVEL_OUTOF10: 6
PAINLEVEL_OUTOF10: 5
PAINLEVEL_OUTOF10: 5

## 2024-01-04 ASSESSMENT — PAIN DESCRIPTION - ONSET: ONSET: GRADUAL

## 2024-01-04 ASSESSMENT — PAIN - FUNCTIONAL ASSESSMENT
PAIN_FUNCTIONAL_ASSESSMENT: ACTIVITIES ARE NOT PREVENTED

## 2024-01-04 ASSESSMENT — PAIN DESCRIPTION - DESCRIPTORS
DESCRIPTORS: SPASM
DESCRIPTORS: SHARP
DESCRIPTORS: ACHING
DESCRIPTORS: SHARP
DESCRIPTORS: SHARP
DESCRIPTORS: ACHING;SPASM
DESCRIPTORS: SPASM

## 2024-01-04 ASSESSMENT — PAIN DESCRIPTION - ORIENTATION
ORIENTATION: LEFT

## 2024-01-04 ASSESSMENT — PAIN DESCRIPTION - LOCATION
LOCATION: FOOT;LEG
LOCATION: LEG
LOCATION: FOOT;LEG
LOCATION: LEG;FOOT
LOCATION: LEG
LOCATION: LEG;FOOT
LOCATION: LEG

## 2024-01-04 ASSESSMENT — PAIN DESCRIPTION - FREQUENCY: FREQUENCY: INTERMITTENT

## 2024-01-04 ASSESSMENT — PAIN DESCRIPTION - PAIN TYPE: TYPE: ACUTE PAIN

## 2024-01-04 NOTE — PLAN OF CARE
Orthopedics    Patient known to me from recent TKA    Seen in office yesterday- knee appears completely benign. No significant tenderness at incision which is completely sealed and dry.     No signs of infection at the knee    She does have cellulitis and failed outpt abx.     Agree with IV empiric antibiotics.     PT/OT, WBAT    Please call with any questions.

## 2024-01-04 NOTE — H&P
Never   Vaping Use    Vaping Use: Never used   Substance and Sexual Activity    Alcohol use: Yes     Alcohol/week: 4.0 standard drinks of alcohol     Types: 4 Drinks containing 0.5 oz of alcohol per week     Comment: once / week 2-3  mixed drinks.    Drug use: Yes     Frequency: 4.0 times per week     Types: Marijuana (Weed)    Sexual activity: Not Currently     Partners: Male     Social Determinants of Health     Financial Resource Strain: Low Risk  (12/1/2023)    Overall Financial Resource Strain (CARDIA)     Difficulty of Paying Living Expenses: Not very hard   Food Insecurity: No Food Insecurity (1/3/2024)    Hunger Vital Sign     Worried About Running Out of Food in the Last Year: Never true     Ran Out of Food in the Last Year: Never true   Transportation Needs: No Transportation Needs (1/3/2024)    PRAPARE - Transportation     Lack of Transportation (Medical): No     Lack of Transportation (Non-Medical): No   Physical Activity: Inactive (3/28/2023)    Exercise Vital Sign     Days of Exercise per Week: 0 days     Minutes of Exercise per Session: 0 min   Intimate Partner Violence: Not At Risk (7/13/2022)    Humiliation, Afraid, Rape, and Kick questionnaire     Fear of Current or Ex-Partner: No     Emotionally Abused: No     Physically Abused: No     Sexually Abused: No   Housing Stability: Low Risk  (1/3/2024)    Housing Stability Vital Sign     Unable to Pay for Housing in the Last Year: No     Number of Places Lived in the Last Year: 1     Unstable Housing in the Last Year: No       Medications:   Medications:    methocarbamol  500 mg Oral TID    sodium chloride flush  5-40 mL IntraVENous 2 times per day    [START ON 1/4/2024] enoxaparin  40 mg SubCUTAneous Daily    cefepime  2,000 mg IntraVENous Once    Followed by    [START ON 1/4/2024] cefepime  2,000 mg IntraVENous Q8H    cefepime  2,000 mg IntraVENous Once      Infusions:    sodium chloride       PRN Meds: ALPRAZolam, 0.5 mg, TID PRN  sodium chloride

## 2024-01-04 NOTE — PLAN OF CARE
Patient remains free from falls and injury. Pain adequately relieved with PRN medication.  Problem: Safety - Adult  Goal: Free from fall injury  Outcome: Progressing     Problem: Pain  Goal: Verbalizes/displays adequate comfort level or baseline comfort level  Outcome: Progressing

## 2024-01-04 NOTE — TELEPHONE ENCOUNTER
Patient called to report to Dr. Sorenson and Renee Pemberton that she is currently at [facility]. She recently had knee surgery and she now has cellulitis in the same leg, which they have admitted her for.

## 2024-01-05 LAB
ALBUMIN SERPL-MCNC: 3.7 G/DL (ref 3.4–5)
ALBUMIN/GLOB SERPL: 1.2 {RATIO} (ref 1.1–2.2)
ALP SERPL-CCNC: 100 U/L (ref 40–129)
ALT SERPL-CCNC: 49 U/L (ref 10–40)
ANION GAP SERPL CALCULATED.3IONS-SCNC: 13 MMOL/L (ref 3–16)
ANISOCYTOSIS BLD QL SMEAR: ABNORMAL
AST SERPL-CCNC: 44 U/L (ref 15–37)
BASOPHILS # BLD: 0 K/UL (ref 0–0.2)
BASOPHILS NFR BLD: 0 %
BILIRUB SERPL-MCNC: 0.3 MG/DL (ref 0–1)
BUN SERPL-MCNC: 11 MG/DL (ref 7–20)
CALCIUM SERPL-MCNC: 9.1 MG/DL (ref 8.3–10.6)
CHLORIDE SERPL-SCNC: 105 MMOL/L (ref 99–110)
CO2 SERPL-SCNC: 20 MMOL/L (ref 21–32)
CREAT SERPL-MCNC: 0.8 MG/DL (ref 0.6–1.1)
DEPRECATED RDW RBC AUTO: 16.3 % (ref 12.4–15.4)
EOSINOPHIL # BLD: 0.4 K/UL (ref 0–0.6)
EOSINOPHIL NFR BLD: 6 %
GFR SERPLBLD CREATININE-BSD FMLA CKD-EPI: >60 ML/MIN/{1.73_M2}
GLUCOSE SERPL-MCNC: 143 MG/DL (ref 70–99)
HCT VFR BLD AUTO: 38.2 % (ref 36–48)
HGB BLD-MCNC: 12.4 G/DL (ref 12–16)
LYMPHOCYTES # BLD: 2.1 K/UL (ref 1–5.1)
LYMPHOCYTES NFR BLD: 29 %
MCH RBC QN AUTO: 29 PG (ref 26–34)
MCHC RBC AUTO-ENTMCNC: 32.4 G/DL (ref 31–36)
MCV RBC AUTO: 89.4 FL (ref 80–100)
MONOCYTES # BLD: 0.4 K/UL (ref 0–1.3)
MONOCYTES NFR BLD: 5 %
NEUTROPHILS # BLD: 4.3 K/UL (ref 1.7–7.7)
NEUTROPHILS NFR BLD: 60 %
PLATELET # BLD AUTO: 180 K/UL (ref 135–450)
PMV BLD AUTO: 10.1 FL (ref 5–10.5)
POTASSIUM SERPL-SCNC: 4.1 MMOL/L (ref 3.5–5.1)
PROT SERPL-MCNC: 6.7 G/DL (ref 6.4–8.2)
RBC # BLD AUTO: 4.27 M/UL (ref 4–5.2)
SODIUM SERPL-SCNC: 138 MMOL/L (ref 136–145)
VANCOMYCIN SERPL-MCNC: 16.3 UG/ML
WBC # BLD AUTO: 7.2 K/UL (ref 4–11)

## 2024-01-05 PROCEDURE — 99222 1ST HOSP IP/OBS MODERATE 55: CPT | Performed by: INTERNAL MEDICINE

## 2024-01-05 PROCEDURE — 1200000000 HC SEMI PRIVATE

## 2024-01-05 PROCEDURE — 36415 COLL VENOUS BLD VENIPUNCTURE: CPT

## 2024-01-05 PROCEDURE — 85025 COMPLETE CBC W/AUTO DIFF WBC: CPT

## 2024-01-05 PROCEDURE — 6370000000 HC RX 637 (ALT 250 FOR IP): Performed by: INTERNAL MEDICINE

## 2024-01-05 PROCEDURE — 2580000003 HC RX 258: Performed by: INTERNAL MEDICINE

## 2024-01-05 PROCEDURE — 80053 COMPREHEN METABOLIC PANEL: CPT

## 2024-01-05 PROCEDURE — 6360000002 HC RX W HCPCS: Performed by: INTERNAL MEDICINE

## 2024-01-05 PROCEDURE — 80202 ASSAY OF VANCOMYCIN: CPT

## 2024-01-05 PROCEDURE — 6370000000 HC RX 637 (ALT 250 FOR IP): Performed by: NURSE PRACTITIONER

## 2024-01-05 RX ORDER — HYDROXYZINE HYDROCHLORIDE 10 MG/1
10 TABLET, FILM COATED ORAL 3 TIMES DAILY PRN
Status: DISCONTINUED | OUTPATIENT
Start: 2024-01-05 | End: 2024-01-07 | Stop reason: HOSPADM

## 2024-01-05 RX ADMIN — HYDROXYZINE HYDROCHLORIDE 10 MG: 10 TABLET ORAL at 14:08

## 2024-01-05 RX ADMIN — OXYCODONE HYDROCHLORIDE 5 MG: 5 TABLET ORAL at 18:58

## 2024-01-05 RX ADMIN — OXYCODONE HYDROCHLORIDE 5 MG: 5 TABLET ORAL at 03:02

## 2024-01-05 RX ADMIN — CEFEPIME 2000 MG: 2 INJECTION, POWDER, FOR SOLUTION INTRAVENOUS at 00:35

## 2024-01-05 RX ADMIN — CEFEPIME 2000 MG: 2 INJECTION, POWDER, FOR SOLUTION INTRAVENOUS at 14:07

## 2024-01-05 RX ADMIN — VANCOMYCIN HYDROCHLORIDE 1250 MG: 10 INJECTION, POWDER, LYOPHILIZED, FOR SOLUTION INTRAVENOUS at 12:12

## 2024-01-05 RX ADMIN — VANCOMYCIN HYDROCHLORIDE 1250 MG: 10 INJECTION, POWDER, LYOPHILIZED, FOR SOLUTION INTRAVENOUS at 22:54

## 2024-01-05 RX ADMIN — ENOXAPARIN SODIUM 30 MG: 100 INJECTION SUBCUTANEOUS at 09:40

## 2024-01-05 RX ADMIN — METHOCARBAMOL 500 MG: 500 TABLET ORAL at 14:11

## 2024-01-05 RX ADMIN — ROPINIROLE HYDROCHLORIDE 0.5 MG: 0.25 TABLET, FILM COATED ORAL at 20:46

## 2024-01-05 RX ADMIN — SODIUM CHLORIDE, PRESERVATIVE FREE 10 ML: 5 INJECTION INTRAVENOUS at 20:48

## 2024-01-05 RX ADMIN — HYDROXYZINE HYDROCHLORIDE 10 MG: 10 TABLET ORAL at 20:47

## 2024-01-05 RX ADMIN — ALPRAZOLAM 0.5 MG: 0.5 TABLET ORAL at 20:45

## 2024-01-05 RX ADMIN — OXYCODONE HYDROCHLORIDE 5 MG: 5 TABLET ORAL at 07:20

## 2024-01-05 RX ADMIN — OXYCODONE HYDROCHLORIDE 5 MG: 5 TABLET ORAL at 14:11

## 2024-01-05 RX ADMIN — METHOCARBAMOL 500 MG: 500 TABLET ORAL at 09:40

## 2024-01-05 RX ADMIN — ALPRAZOLAM 0.5 MG: 0.5 TABLET ORAL at 09:43

## 2024-01-05 RX ADMIN — METHOCARBAMOL 500 MG: 500 TABLET ORAL at 20:45

## 2024-01-05 RX ADMIN — ENOXAPARIN SODIUM 30 MG: 100 INJECTION SUBCUTANEOUS at 20:47

## 2024-01-05 ASSESSMENT — PAIN DESCRIPTION - PAIN TYPE
TYPE: CHRONIC PAIN
TYPE: ACUTE PAIN
TYPE: ACUTE PAIN

## 2024-01-05 ASSESSMENT — PAIN SCALES - GENERAL
PAINLEVEL_OUTOF10: 7
PAINLEVEL_OUTOF10: 6
PAINLEVEL_OUTOF10: 5
PAINLEVEL_OUTOF10: 6
PAINLEVEL_OUTOF10: 7
PAINLEVEL_OUTOF10: 6
PAINLEVEL_OUTOF10: 0
PAINLEVEL_OUTOF10: 5

## 2024-01-05 ASSESSMENT — PAIN DESCRIPTION - DESCRIPTORS
DESCRIPTORS: DISCOMFORT
DESCRIPTORS: SPASM
DESCRIPTORS: SPASM
DESCRIPTORS: DISCOMFORT
DESCRIPTORS: SPASM
DESCRIPTORS: SPASM
DESCRIPTORS: SPASM;BURNING

## 2024-01-05 ASSESSMENT — PAIN DESCRIPTION - ONSET
ONSET: GRADUAL
ONSET: GRADUAL
ONSET: ON-GOING

## 2024-01-05 ASSESSMENT — PAIN DESCRIPTION - FREQUENCY
FREQUENCY: INTERMITTENT
FREQUENCY: INTERMITTENT
FREQUENCY: CONTINUOUS

## 2024-01-05 ASSESSMENT — PAIN DESCRIPTION - ORIENTATION
ORIENTATION: LEFT
ORIENTATION: LOWER
ORIENTATION: LEFT

## 2024-01-05 ASSESSMENT — PAIN - FUNCTIONAL ASSESSMENT
PAIN_FUNCTIONAL_ASSESSMENT: ACTIVITIES ARE NOT PREVENTED

## 2024-01-05 ASSESSMENT — PAIN DESCRIPTION - LOCATION
LOCATION: LEG
LOCATION: LEG
LOCATION: BACK
LOCATION: LEG

## 2024-01-05 NOTE — CONSULTS
Clinical Pharmacy Progress Note    Vancomycin - Management by Pharmacy    Consult Date(s): 01/03/24  Consulting Provider(s): Cely Hays    Assessment / Plan  [facility] - Vancomycin  Concurrent Antimicrobials: cefepime  Day of Vanc Therapy / Ordered Duration: day 1 of 7  Current Dosing Method: Bayesian-Guided AUC Dosing  Therapeutic Goal: -600 mg/L*hr  Current Dose / Plan:   Vancomycin 2250 mg loading dose ordered  Vancomycin 1250 mg q12h ordered ~ predicted  mg/L*hr, trough 12.9 mg/L  Will continue to monitor clinical condition and make adjustments to regimen as appropriate.    Thank you for consulting pharmacy,    Tyra Vilchis, PharmD, [facility] 1/3/2024 10:18 PM        Interval update:  redness and pain of lower L leg, doppler 1.5 wks ago neg for DVT    Subjective/Objective:   Nydia Downs is a 58 y.o. female with a PMHx significant for anxiety and depression, osteoarthritis  who is admitted with cellulitis.     Pharmacy is consulted to dose vancomycin.    Ht Readings from Last 1 Encounters:   01/03/24 1.676 m (5' 6\")     Wt Readings from Last 1 Encounters:   01/03/24 125.9 kg (277 lb 9 oz)     Current & Prior Antimicrobial Regimen(s):  Vancomycin (1/3 - current)  Cefepime (1/3 - current)    Vancomycin Level(s) / Doses:    Date Time Dose Type of Level / Level Interpretation                 Note: Serum levels collected for AUC-based dosing may be high if collected in close proximity to the dose administered. This is not necessarily indicative of toxicity.    Cultures & Sensitivities:    Date Site Micro Susceptibility / Result                 No results for input(s): \"CREATININE\", \"BUN\", \"WBC\" in the last 72 hours.    Invalid input(s): \"CRCL\"    CrCl cannot be calculated (Patient's most recent lab result is older than the maximum 30 days allowed.).    Additional Lab Values / Findings of Note:    No results for input(s): \"PROCAL\" in the last 72 hours.    
0.9 % 500 mL IVPB, 25 mg/kg (Adjusted), IntraVENous, Once **FOLLOWED BY** vancomycin (VANCOCIN) 1,250 mg in sodium chloride 0.9 % 250 mL IVPB, 1,250 mg, IntraVENous, Q12H    Allergies   Allergen Reactions    Droperidol Other (See Comments)     unresponsive    Haldol [Haloperidol Lactate] Other (See Comments)     \"felt out of it, similar to the toradol\"    Toradol [Ketorolac Tromethamine] Other (See Comments)     \"out of it\"  \"felt like sleep paralysis\"        REVIEW OF SYSTEMS:    CONSTITUTIONAL:  negative for fevers, chills, diaphoresis, activity change, appetite change, fatigue, night sweats and unexpected weight change.   EYES:  negative for blurred vision, eye discharge, visual disturbance and icterus  HEENT:  negative for hearing loss, tinnitus, ear drainage, sinus pressure, nasal congestion, epistaxis and snoring  RESPIRATORY:  No cough, shortness of breath, hemoptysis  CARDIOVASCULAR:  negative for chest pain, palpitations, exertional chest pressure/discomfort, edema, syncope  GASTROINTESTINAL:  negative for nausea, vomiting, diarrhea, constipation, blood in stool and abdominal pain  GENITOURINARY:  negative for frequency, dysuria, urinary incontinence, decreased urine volume, and hematuria  HEMATOLOGIC/LYMPHATIC:  negative for easy bruising, bleeding and lymphadenopathy  ALLERGIC/IMMUNOLOGIC:  negative for recurrent infections, angioedema, anaphylaxis and drug reactions  ENDOCRINE:  negative for weight changes and diabetic symptoms including polyuria, polydipsia and polyphagia  MUSCULOSKELETAL:  left leg redness from foot to knee, swelling, no opening in the TKA incision.   NEUROLOGICAL:  negative for headaches, slurred speech, unilateral weakness  PSYCHIATRIC/BEHAVIORAL: negative for hallucinations, behavioral problems, confusion and agitation.       Objective:   PHYSICAL EXAM:      VITALS:  /74   Pulse 63   Temp 97.5 °F (36.4 °C) (Oral)   Resp 16   Ht 1.676 m (5' 6\")   Wt 125.9 kg (277 lb 9 oz)

## 2024-01-06 LAB
ANION GAP SERPL CALCULATED.3IONS-SCNC: 12 MMOL/L (ref 3–16)
BUN SERPL-MCNC: 11 MG/DL (ref 7–20)
CALCIUM SERPL-MCNC: 8.8 MG/DL (ref 8.3–10.6)
CHLORIDE SERPL-SCNC: 107 MMOL/L (ref 99–110)
CO2 SERPL-SCNC: 21 MMOL/L (ref 21–32)
CREAT SERPL-MCNC: 0.8 MG/DL (ref 0.6–1.1)
GFR SERPLBLD CREATININE-BSD FMLA CKD-EPI: >60 ML/MIN/{1.73_M2}
GLUCOSE SERPL-MCNC: 156 MG/DL (ref 70–99)
POTASSIUM SERPL-SCNC: 3.8 MMOL/L (ref 3.5–5.1)
SODIUM SERPL-SCNC: 140 MMOL/L (ref 136–145)

## 2024-01-06 PROCEDURE — 1200000000 HC SEMI PRIVATE

## 2024-01-06 PROCEDURE — 80048 BASIC METABOLIC PNL TOTAL CA: CPT

## 2024-01-06 PROCEDURE — 83036 HEMOGLOBIN GLYCOSYLATED A1C: CPT

## 2024-01-06 PROCEDURE — 6370000000 HC RX 637 (ALT 250 FOR IP): Performed by: INTERNAL MEDICINE

## 2024-01-06 PROCEDURE — 2580000003 HC RX 258: Performed by: INTERNAL MEDICINE

## 2024-01-06 PROCEDURE — 6360000002 HC RX W HCPCS: Performed by: INTERNAL MEDICINE

## 2024-01-06 PROCEDURE — 36415 COLL VENOUS BLD VENIPUNCTURE: CPT

## 2024-01-06 RX ORDER — ROPINIROLE 0.25 MG/1
0.5 TABLET, FILM COATED ORAL NIGHTLY
Status: DISCONTINUED | OUTPATIENT
Start: 2024-01-06 | End: 2024-01-06

## 2024-01-06 RX ORDER — SERTRALINE HYDROCHLORIDE 100 MG/1
100 TABLET, FILM COATED ORAL DAILY
Status: DISCONTINUED | OUTPATIENT
Start: 2024-01-06 | End: 2024-01-07 | Stop reason: HOSPADM

## 2024-01-06 RX ORDER — ROPINIROLE 0.25 MG/1
0.25 TABLET, FILM COATED ORAL EVERY MORNING
Status: DISCONTINUED | OUTPATIENT
Start: 2024-01-06 | End: 2024-01-07 | Stop reason: HOSPADM

## 2024-01-06 RX ORDER — PANTOPRAZOLE SODIUM 40 MG/1
40 TABLET, DELAYED RELEASE ORAL
Status: DISCONTINUED | OUTPATIENT
Start: 2024-01-07 | End: 2024-01-07 | Stop reason: HOSPADM

## 2024-01-06 RX ORDER — ROPINIROLE 0.25 MG/1
0.5 TABLET, FILM COATED ORAL NIGHTLY
Status: DISCONTINUED | OUTPATIENT
Start: 2024-01-06 | End: 2024-01-07 | Stop reason: HOSPADM

## 2024-01-06 RX ORDER — ROPINIROLE 0.25 MG/1
0.25 TABLET, FILM COATED ORAL EVERY MORNING
Status: DISCONTINUED | OUTPATIENT
Start: 2024-01-06 | End: 2024-01-06

## 2024-01-06 RX ADMIN — SODIUM CHLORIDE, PRESERVATIVE FREE 10 ML: 5 INJECTION INTRAVENOUS at 20:33

## 2024-01-06 RX ADMIN — VANCOMYCIN HYDROCHLORIDE 1250 MG: 10 INJECTION, POWDER, LYOPHILIZED, FOR SOLUTION INTRAVENOUS at 11:12

## 2024-01-06 RX ADMIN — ROPINIROLE HYDROCHLORIDE 0.5 MG: 0.25 TABLET, FILM COATED ORAL at 20:32

## 2024-01-06 RX ADMIN — SERTRALINE HYDROCHLORIDE 100 MG: 100 TABLET ORAL at 09:19

## 2024-01-06 RX ADMIN — OXYCODONE HYDROCHLORIDE 5 MG: 5 TABLET ORAL at 10:05

## 2024-01-06 RX ADMIN — CEFEPIME 2000 MG: 2 INJECTION, POWDER, FOR SOLUTION INTRAVENOUS at 01:25

## 2024-01-06 RX ADMIN — OXYCODONE HYDROCHLORIDE 5 MG: 5 TABLET ORAL at 03:05

## 2024-01-06 RX ADMIN — SODIUM CHLORIDE, PRESERVATIVE FREE 10 ML: 5 INJECTION INTRAVENOUS at 08:11

## 2024-01-06 RX ADMIN — METHOCARBAMOL 500 MG: 500 TABLET ORAL at 13:07

## 2024-01-06 RX ADMIN — METHOCARBAMOL 500 MG: 500 TABLET ORAL at 20:32

## 2024-01-06 RX ADMIN — ALPRAZOLAM 0.5 MG: 0.5 TABLET ORAL at 23:47

## 2024-01-06 RX ADMIN — SODIUM CHLORIDE: 9 INJECTION, SOLUTION INTRAVENOUS at 11:12

## 2024-01-06 RX ADMIN — VANCOMYCIN HYDROCHLORIDE 1250 MG: 10 INJECTION, POWDER, LYOPHILIZED, FOR SOLUTION INTRAVENOUS at 23:42

## 2024-01-06 RX ADMIN — OXYCODONE HYDROCHLORIDE 5 MG: 5 TABLET ORAL at 17:18

## 2024-01-06 RX ADMIN — ENOXAPARIN SODIUM 30 MG: 100 INJECTION SUBCUTANEOUS at 20:31

## 2024-01-06 RX ADMIN — HYDROXYZINE HYDROCHLORIDE 10 MG: 10 TABLET ORAL at 08:13

## 2024-01-06 RX ADMIN — METHOCARBAMOL 500 MG: 500 TABLET ORAL at 08:11

## 2024-01-06 RX ADMIN — ENOXAPARIN SODIUM 30 MG: 100 INJECTION SUBCUTANEOUS at 08:11

## 2024-01-06 RX ADMIN — CEFEPIME 2000 MG: 2 INJECTION, POWDER, FOR SOLUTION INTRAVENOUS at 13:07

## 2024-01-06 RX ADMIN — ROPINIROLE HYDROCHLORIDE 0.25 MG: 0.25 TABLET, FILM COATED ORAL at 09:19

## 2024-01-06 ASSESSMENT — PAIN DESCRIPTION - DESCRIPTORS
DESCRIPTORS: ACHING;DISCOMFORT
DESCRIPTORS: SPASM
DESCRIPTORS: SPASM

## 2024-01-06 ASSESSMENT — PAIN SCALES - GENERAL
PAINLEVEL_OUTOF10: 6
PAINLEVEL_OUTOF10: 6
PAINLEVEL_OUTOF10: 4
PAINLEVEL_OUTOF10: 3
PAINLEVEL_OUTOF10: 6
PAINLEVEL_OUTOF10: 7
PAINLEVEL_OUTOF10: 2
PAINLEVEL_OUTOF10: 5
PAINLEVEL_OUTOF10: 0
PAINLEVEL_OUTOF10: 3
PAINLEVEL_OUTOF10: 5
PAINLEVEL_OUTOF10: 4

## 2024-01-06 ASSESSMENT — PAIN DESCRIPTION - ORIENTATION
ORIENTATION: OTHER (COMMENT)
ORIENTATION: LEFT;LOWER
ORIENTATION: OTHER (COMMENT)

## 2024-01-06 ASSESSMENT — PAIN - FUNCTIONAL ASSESSMENT
PAIN_FUNCTIONAL_ASSESSMENT: ACTIVITIES ARE NOT PREVENTED
PAIN_FUNCTIONAL_ASSESSMENT: ACTIVITIES ARE NOT PREVENTED
PAIN_FUNCTIONAL_ASSESSMENT: PREVENTS OR INTERFERES SOME ACTIVE ACTIVITIES AND ADLS
PAIN_FUNCTIONAL_ASSESSMENT: ACTIVITIES ARE NOT PREVENTED

## 2024-01-06 ASSESSMENT — PAIN SCALES - WONG BAKER
WONGBAKER_NUMERICALRESPONSE: 0

## 2024-01-06 ASSESSMENT — PAIN DESCRIPTION - LOCATION
LOCATION: LEG
LOCATION: BACK
LOCATION: LEG
LOCATION: BACK

## 2024-01-06 ASSESSMENT — PAIN DESCRIPTION - ONSET
ONSET: ON-GOING

## 2024-01-06 ASSESSMENT — PAIN DESCRIPTION - FREQUENCY
FREQUENCY: CONTINUOUS
FREQUENCY: INTERMITTENT

## 2024-01-06 ASSESSMENT — PAIN DESCRIPTION - PAIN TYPE
TYPE: ACUTE PAIN
TYPE: CHRONIC PAIN
TYPE: CHRONIC PAIN
TYPE: ACUTE PAIN

## 2024-01-06 NOTE — PLAN OF CARE
Problem: Discharge Planning  Goal: Discharge to home or other facility with appropriate resources  Outcome: Progressing  Flowsheets (Taken 1/6/2024 0210)  Discharge to home or other facility with appropriate resources: Identify barriers to discharge with patient and caregiver  Note: Patient will d/c home when stable.     Problem: Safety - Adult  Goal: Free from fall injury  Outcome: Progressing  Note: Bed alarm activated. Fall risk protocol in place. Patient in bed with call light and personal belongings in reach.     Problem: Pain  Goal: Verbalizes/displays adequate comfort level or baseline comfort level  Outcome: Progressing  Flowsheets (Taken 1/6/2024 0210)  Verbalizes/displays adequate comfort level or baseline comfort level: Encourage patient to monitor pain and request assistance  Note: Patient denies pain at this time. Will continue to monitor.

## 2024-01-06 NOTE — PLAN OF CARE
Problem: Safety - Adult  Goal: Free from fall injury  1/6/2024 0737 by Jose Francisco Mcrae RN  Outcome: Progressing  Flowsheets (Taken 1/6/2024 0737)  Free From Fall Injury:   Instruct family/caregiver on patient safety   Based on caregiver fall risk screen, instruct family/caregiver to ask for assistance with transferring infant if caregiver noted to have fall risk factors  Note: Patient with recent Left Knee Replacement in December of 2023. Patient also with Cellulitis of same extremity. Patient has been identified as a high fall risk and bed alarm is engaged, room is free of clutter, and call light is within reach of patient. Education provided on calling for assistance prior to ambulating in the prevention of falls. Patient expressed understanding.  1/6/2024 0210 by Karon Coe RN  Outcome: Progressing  Note: Bed alarm activated. Fall risk protocol in place. Patient in bed with call light and personal belongings in reach.     Problem: Pain  Goal: Verbalizes/displays adequate comfort level or baseline comfort level  1/6/2024 0737 by Jose Francisco Mcrae RN  Outcome: Progressing  Flowsheets (Taken 1/6/2024 0737)  Verbalizes/displays adequate comfort level or baseline comfort level:   Encourage patient to monitor pain and request assistance   Assess pain using appropriate pain scale   Administer analgesics based on type and severity of pain and evaluate response  Note: Pain level assessed and PRN medication made available when needed for breakthrough pain. Patient not complaining pain at this time and is resting comfortably in bed.  1/6/2024 0210 by Karon Coe RN  Outcome: Progressing  Flowsheets (Taken 1/6/2024 0210)  Verbalizes/displays adequate comfort level or baseline comfort level: Encourage patient to monitor pain and request assistance  Note: Patient denies pain at this time. Will continue to monitor.

## 2024-01-07 VITALS
HEIGHT: 66 IN | BODY MASS INDEX: 44.61 KG/M2 | RESPIRATION RATE: 18 BRPM | WEIGHT: 277.56 LBS | DIASTOLIC BLOOD PRESSURE: 78 MMHG | OXYGEN SATURATION: 95 % | TEMPERATURE: 97.4 F | SYSTOLIC BLOOD PRESSURE: 133 MMHG | HEART RATE: 80 BPM

## 2024-01-07 PROBLEM — L03.116 CELLULITIS OF LEFT KNEE: Status: ACTIVE | Noted: 2024-01-07

## 2024-01-07 LAB
EST. AVERAGE GLUCOSE BLD GHB EST-MCNC: 114 MG/DL
HBA1C MFR BLD: 5.6 %

## 2024-01-07 PROCEDURE — 6370000000 HC RX 637 (ALT 250 FOR IP): Performed by: INTERNAL MEDICINE

## 2024-01-07 PROCEDURE — 6360000002 HC RX W HCPCS: Performed by: INTERNAL MEDICINE

## 2024-01-07 PROCEDURE — 2580000003 HC RX 258: Performed by: INTERNAL MEDICINE

## 2024-01-07 RX ORDER — FUROSEMIDE 20 MG/1
20 TABLET ORAL DAILY
Qty: 5 TABLET | Refills: 0 | Status: SHIPPED | OUTPATIENT
Start: 2024-01-07 | End: 2024-01-12

## 2024-01-07 RX ORDER — DOXYCYCLINE HYCLATE 100 MG
100 TABLET ORAL 2 TIMES DAILY
Qty: 20 TABLET | Refills: 0 | Status: SHIPPED | OUTPATIENT
Start: 2024-01-07 | End: 2024-01-17

## 2024-01-07 RX ORDER — CEPHALEXIN 500 MG/1
500 CAPSULE ORAL 4 TIMES DAILY
Qty: 40 CAPSULE | Refills: 0 | Status: SHIPPED | OUTPATIENT
Start: 2024-01-07 | End: 2024-01-17

## 2024-01-07 RX ADMIN — ROPINIROLE HYDROCHLORIDE 0.25 MG: 0.25 TABLET, FILM COATED ORAL at 08:36

## 2024-01-07 RX ADMIN — OXYCODONE HYDROCHLORIDE 5 MG: 5 TABLET ORAL at 08:36

## 2024-01-07 RX ADMIN — METHOCARBAMOL 500 MG: 500 TABLET ORAL at 08:36

## 2024-01-07 RX ADMIN — SERTRALINE HYDROCHLORIDE 100 MG: 100 TABLET ORAL at 08:36

## 2024-01-07 RX ADMIN — ENOXAPARIN SODIUM 30 MG: 100 INJECTION SUBCUTANEOUS at 08:36

## 2024-01-07 RX ADMIN — PANTOPRAZOLE SODIUM 40 MG: 40 TABLET, DELAYED RELEASE ORAL at 06:00

## 2024-01-07 RX ADMIN — OXYCODONE HYDROCHLORIDE 5 MG: 5 TABLET ORAL at 01:53

## 2024-01-07 RX ADMIN — SODIUM CHLORIDE, PRESERVATIVE FREE 10 ML: 5 INJECTION INTRAVENOUS at 08:36

## 2024-01-07 RX ADMIN — CEFEPIME 2000 MG: 2 INJECTION, POWDER, FOR SOLUTION INTRAVENOUS at 01:46

## 2024-01-07 ASSESSMENT — PAIN DESCRIPTION - ORIENTATION
ORIENTATION: LEFT

## 2024-01-07 ASSESSMENT — PAIN DESCRIPTION - LOCATION
LOCATION: LEG

## 2024-01-07 ASSESSMENT — PAIN - FUNCTIONAL ASSESSMENT
PAIN_FUNCTIONAL_ASSESSMENT: ACTIVITIES ARE NOT PREVENTED

## 2024-01-07 ASSESSMENT — PAIN DESCRIPTION - DESCRIPTORS
DESCRIPTORS: SPASM

## 2024-01-07 ASSESSMENT — PAIN SCALES - GENERAL
PAINLEVEL_OUTOF10: 6

## 2024-01-07 NOTE — CARE COORDINATION
Case Management Assessment            Discharge Note                    Date / Time of Note: 1/7/2024 9:13 AM                  Discharge Note Completed by: Charity Finley RN    Patient Name: Nydia Downs   YOB: 1965  Diagnosis: Cellulitis [L03.90]  Cellulitis of left knee [L03.116]   Date / Time: 1/3/2024  8:52 PM    Current PCP: Petar Sorenson DO  Clinic patient: No    Hospitalization in the last 30 days: Yes  Readmission Assessment  Number of Days since last admission?: 8-30 days  Previous Disposition: Home Alone  Who is being Interviewed: Patient  What was the patient's/caregiver's perception as to why they think they needed to return back to the hospital?: Other (Comment) (post surgical cellulitis -LE)  Did you visit your Primary Care Physician after you left the hospital, before you returned this time?: No  Why weren't you able to visit your PCP?: Did not have an appointment  Did you see a specialist, such as Cardiac, Pulmonary, Orthopedic Physician, etc. after you left the hospital?: Yes (Ortho)  Who advised the patient to return to the hospital?: Physician  Does the patient report anything that got in the way of taking their medications?: No  In our efforts to provide the best possible care to you and others like you, can you think of anything that we could have done to help you after you left the hospital the first time, so that you might not have needed to return so soon?:  (SXS of cellulitis)    Advance Directives:  Code Status: Full Code  [place] DNR form completed and on chart: No    Financial:  Payor: HU[employer] MEDICARE / Plan: HU[employer] GOLD PLUS HMO / Product Type: *No Product type* /      Pharmacy:    [facility]/pharmacy #[id number] [place], [place] - [address] [facility] [address] - P [phone] - F [phone]  [address] [place] [address]  Phone: [phone] Fax: [phone]      Assistance purchasing medications?: Potential Assistance Purchasing Medications: No  Assistance provided by 
Primary Decision Maker is: Legal Next of Kin    Primary Decision Maker: Loraine Torrez - [phone]    Discharge Planning:    Patient lives with: Alone, Other (Comment) (Children and friends check on patient all the time throughout the day) Type of Home: House  Primary Care Giver: Self  Patient Support Systems include: Family Members   Current Financial resources: Medicare  Current community resources: None  Current services prior to admission: Home Care (PT/OT 3 x a week)            Current DME: Cane, Wheelchair, Walker            Type of Home Care services:  OT, PT, Nursing Services    ADLS  Prior functional level: Independent in ADLs/IADLs  Current functional level: Assistance with the following:, Mobility, Toileting    PT AM-PAC: 22 /24  OT AM-PAC: 22 /24    Family can provide assistance at DC: No  Would you like Case Management to discuss the discharge plan with any other family members/significant others, and if so, who? No  Plans to Return to Present Housing: Yes  Other Identified Issues/Barriers to RETURNING to current housing: none  Potential Assistance needed at discharge: Home Care (to inculde a nurse to asssess left lower extremity)            Potential DME:    Patient expects to discharge to: House  Plan for transportation at discharge: Self    Financial    Payor: HU[employer]NA MEDICARE / Plan: HU[employer] GOLD PLUS HMO / Product Type: *No Product type* /     Does insurance require precert for SNF: Yes    Potential assistance Purchasing Medications: No  Meds-to-Beds request: Yes      [id number] - [place], [place] - [facility] [employer] - P [phone] - F [phone]  [address] [place] [address]  Phone: [phone] Fax: [phone]      Notes:    Factors facilitating achievement of predicted outcomes: Family support, Friend support, Motivated, Cooperative, Pleasant, and Good insight into deficits    Barriers to discharge: Medical complications    Additional Case Management Notes: Patient is IPTA.

## 2024-01-07 NOTE — PROGRESS NOTES
V2.0       PCP: Petar Sorenson DO    Date of Admission: 1/3/2024    Chief Complaint: left moderate erythemaous knee    Hospital Course: 57 yo with recent LTKR admitted for red leg     Subjective: still with left leg pain       Medications:  Reviewed    Infusion Medications    sodium chloride 10 mL/hr at 01/04/24 1227     Scheduled Medications    cefepime  2,000 mg IntraVENous Q12H    methocarbamol  500 mg Oral TID    sodium chloride flush  5-40 mL IntraVENous 2 times per day    enoxaparin  30 mg SubCUTAneous BID    vancomycin  1,250 mg IntraVENous Q12H     PRN Meds: ALPRAZolam, sodium chloride flush, sodium chloride, ondansetron **OR** ondansetron, polyethylene glycol, acetaminophen **OR** acetaminophen, oxyCODONE, HYDROmorphone    No intake or output data in the 24 hours ending 01/04/24 1949    Physical Exam Performed:    /86   Pulse 64   Temp 97.7 °F (36.5 °C) (Oral)   Resp 16   Ht 1.676 m (5' 6\")   Wt 125.9 kg (277 lb 9 oz)   LMP 09/20/2017   SpO2 93%   BMI 44.80 kg/m²     General appearance: No apparent distress, appears stated age and cooperative.  HEENT: Pupils equal, round, and reactive to light. Conjunctivae/corneas clear.  Neck: Supple, with full range of motion. No jugular venous distention. Trachea midline.  Respiratory:  Normal respiratory effort. Clear to auscultation, bilaterally without Rales/Wheezes/Rhonchi.  Cardiovascular: Regular rate and rhythm with normal S1/S2 without murmurs, rubs or gallops.  Abdomen: Soft, non-tender, non-distended with normal bowel sounds.  Musculoskeletal: No clubbing, cyanosis or edema bilaterally.  Full range of motion without deformity.  Skin: moderate erythema of left leg  Neurologic:  Neurovascularly intact without any focal sensory/motor deficits. Cranial nerves: II-XII intact, grossly non-focal.  Psychiatric: Alert and oriented, thought content appropriate, normal insight  Capillary Refill: Brisk, 3 seconds, normal   Peripheral Pulses: +2 
    V2.0    [facility] Progress Note      Name:  Nydia Downs /Age/Sex: 1965  (58 y.o. female)   MRN & CSN:  [id number] & [id number] Encounter Date/Time: 2024 8:08 AM EST   Location:  [address] [place] [address] PCP: Petar Sorenson DO     Attending:Shukri Vicente MD       Hospital Day: 3    Assessment and Recommendations   Nydia Downs is a 58 y.o. female with pmh of asthma GERD who presents with Cellulitis left leg  Recent LTKR       Assessment/Plan:   Left leg cellulitis-mild improvement. Continue abx. ID consult regarding abx stewardship. Seen by ortho. Not felt to have joint infection  Pruritus-start atarax  GERD-stable on protonix  Restless leg-stable on requip      Diet ADULT DIET; Regular   DVT Prophylaxis [x] Lovenox, []  Heparin, [] SCDs, [] Ambulation,  [] Eliquis, [] Xarelto  [] Coumadin   Code Status Full Code   Disposition From: home  Expected Disposition: home  Estimated Date of Discharge: 23  Patient requires continued admission due to need for abx   Surrogate Decision Maker/ POA  Pt has no DPOA     Personally reviewed Lab Studies and Imaging     Discussed management of the case with ortho who recommended continued abx      Imaging that was interpreted personally includes doppler and results were negative for dvt    Drugs that require monitoring for toxicity include vancomycin and the method of monitoring was vanc levels        Subjective:     Chief Complaint:     Nydia Downs is a 58 y.o. female who presents with increase pain and tenderness of leg leg    Recent LTKR    Feels leg is less red today      Review of Systems:      Pertinent positives and negatives discussed in HPI    Objective:     Intake/Output Summary (Last 24 hours) at 2024 0808  Last data filed at 2024  Gross per 24 hour   Intake 10 ml   Output --   Net 10 ml      Vitals:   Vitals:    24 0326 24 0332 24 0720 24 0736   BP: 112/63   122/74   Pulse: 65   63   Resp: 16 17 16 15   Temp: 97.6 °F 
    V2.0    [facility] [place]      Name:  Nydia Downs /Age/Sex: 1965  (58 y.o. female)   MRN & CSN:  [id number] & [id number] Encounter Date/Time: 2024 8:08 AM EST   Location:  [facility] PCP: Petar Sorenson DO     Attending:Shukri Vicente MD       Hospital Day: 4    Assessment and Recommendations   Nydia Downs is a 58 y.o. female with pmh of asthma GERD who presents with Cellulitis left leg  Recent LTKR       Assessment/Plan:   Left leg cellulitis-mild improvement. Continue abx. ID consult noted/ abx iv x 24 more hours  Pruritus-start atarax  GERD-stable on protonix  Restless leg-stable on requip      Diet ADULT DIET; Regular   DVT Prophylaxis [x] Lovenox, []  Heparin, [] SCDs, [] Ambulation,  [] Eliquis, [] Xarelto  [] Coumadin   Code Status Full Code   Disposition From: home  Expected Disposition: home  Estimated Date of Discharge: 23  Patient requires continued admission due to need for abx   Surrogate Decision Maker/ POA  Pt has no DPOA     Personally reviewed Lab Studies and Imaging     Discussed management of the case with ortho who recommended continued abx      Imaging that was interpreted personally includes doppler and results were negative for dvt    Drugs that require monitoring for toxicity include vancomycin and the method of monitoring was vanc levels        Subjective:     Chief Complaint:     Nydia Downs is a 58 y.o. female who presents with increase pain and tenderness of leg leg    Recent LTKR    Feels leg is less red today    Pt visibly upset about life     States still grieving over death of parents.      Review of Systems:      Pertinent positives and negatives discussed in HPI    Objective:     Intake/Output Summary (Last 24 hours) at 2024 1816  Last data filed at 2024 1308  Gross per 24 hour   Intake 120 ml   Output --   Net 120 ml        Vitals:   Vitals:    24 1035 24 1458 24 1718 24 1748   BP:  101/64     Pulse:  81   
4 Eyes Skin Assessment     NAME:  Nydia Downs  YOB: 1965  MEDICAL RECORD NUMBER:  [id number]    The patient is being assessed for  Admission    I agree that at least one RN has performed a thorough Head to Toe Skin Assessment on the patient. ALL assessment sites listed below have been assessed.      Areas assessed by both nurses:    Head, Face, Ears, Shoulders, Back, Chest, Arms, Elbows, Hands, Sacrum. Buttock, Coccyx, Ischium, Legs. Feet and Heels, and Under Medical Devices  redness (cellulitis) to LLE. Surgical scar to left knee        Does the Patient have a Wound? No noted wound(s)       Julito Prevention initiated by RN: No  Wound Care Orders initiated by RN: No    Pressure Injury (Stage 3,4, Unstageable, DTI, NWPT, and Complex wounds) if present, place Wound referral order by RN under : No    New Ostomies, if present place, Ostomy referral order under : No     Nurse 1 eSignature: Electronically signed by Adriana Pisano RN on 1/4/24 at 3:44 AM EST    **SHARE this note so that the co-signing nurse can place an eSignature**    Nurse 2 eSignature: {Esignature:[id number]}   
Clinical Pharmacy Progress Note    Vancomycin - Management by Pharmacy    Consult Date(s): 01/03/24  Consulting Provider(s): Dr Hays    Assessment / Plan  1)  Left leg cellulitis - Vancomycin  Concurrent Antimicrobials: cefepime  Day of Vanc Therapy / Ordered Duration: day 1 of 7  Current Dosing Method: Bayesian-Guided AUC Dosing  Therapeutic Goal: -600 mg/L*hr  Current Dose / Plan:   Received 2250mg IV x1 loading dose overnight.  Will continue with 1250 mg q12h ~ predicted  mg/L*hr, trough 12.9 mg/L.  Random level is ordered for 1/5 AM to further evaluate above regimen.  Will continue to monitor clinical condition and make adjustments to regimen as appropriate.    Please call with questions--  Thanks--  Ashlyn Saleem, PharmD, BCPS, BCGP  [id number] ([facility])   1/4/2024 10:05 AM      Interval update:  Ortho eval pending.    Subjective/Objective:   Nydia Downs is a 58 y.o. female with a PMHx significant for anxiety, depression, osteoarthritis, HTN, GERD, RLS and recent Left TKA (done 12/5/23) who is admitted with cellulitis of left leg (below the knee; notes indicate surgical site does not appear infected).     Pharmacy is consulted to dose vancomycin.    Ht Readings from Last 1 Encounters:   01/03/24 1.676 m (5' 6\")     Wt Readings from Last 1 Encounters:   01/03/24 125.9 kg (277 lb 9 oz)     Current & Prior Antimicrobial Regimen(s):  Cefepime (1/3-current)  Vancomycin - Pharmacy to dose  2250mg IV x1 1/4 01:00  1250mg IV q12h (1/4-current)    Vancomycin Level(s) / Doses:    Date Time Dose Type of Level / Level Interpretation   1/5 06:00 1250mg IV q12h Random = ordered           Note: Serum levels collected for AUC-based dosing may be high if collected in close proximity to the dose administered. This is not necessarily indicative of toxicity.    Cultures & Sensitivities:    Date Site Micro Susceptibility / Result   N/a              Recent Labs     01/03/24 2220 01/03/24 2221 
Clinical Pharmacy Progress Note    Vancomycin - Management by Pharmacy    Consult Date(s): 01/03/24  Consulting Provider(s): Dr Hays    Assessment / Plan  1)  Left leg cellulitis - Vancomycin  Concurrent Antimicrobials: cefepime  Day of Vanc Therapy / Ordered Duration: day 2 of 7  Current Dosing Method: Bayesian-Guided AUC Dosing  Therapeutic Goal: -600 mg/L*hr  Current Dose / Plan:   Currently on 1250 mg q12h  Random level this AM = 16.3 mcg/mL - Calculated AUC = 553 with trough = 19.7 mcg/mL.  SCr stable at 0.8.  While AUC / trough are on the upper end of desired range, lower regimens have < 80% probability of reaching therapeutic AUC.  Will continue same regimen for now.  Will determine timing of / need for further levels pending further decisions re: duration of Vanc therapy.  Will continue to monitor clinical condition and make adjustments to regimen as appropriate.    Please call with questions--  Thanks--  Ashlyn Saleem, PharmD, BCPS, BCGP  [id number] (\[facility]\"")   1/5/2024 10:17 AM      Interval update:  Ortho has low suspicion for joint involvement / surgical site infection.  Mild improvement in cellulitis since yesterday; ID consulted.    Subjective/Objective:   Nydia Downs is a 58 y.o. female with a PMHx significant for anxiety, depression, osteoarthritis, HTN, GERD, RLS and recent Left TKA (done 12/5/23) who is admitted with cellulitis of left leg (below the knee; notes indicate surgical site does not appear infected).     Pharmacy is consulted to dose vancomycin.    Ht Readings from Last 1 Encounters:   01/03/24 1.676 m (5' 6\")     Wt Readings from Last 1 Encounters:   01/03/24 125.9 kg (277 lb 9 oz)     Current & Prior Antimicrobial Regimen(s):  Cefepime (1/3-current)  Vancomycin - Pharmacy to dose  2250mg IV x1 1/4 01:00  1250mg IV q12h (1/4-current)    Vancomycin Level(s) / Doses:    Date Time Dose Type of Level / Level Interpretation   1/5 04:53 1250mg IV q12h Random = 16.3 
Clinical Pharmacy Progress Note    Vancomycin - Management by Pharmacy    Consult Date(s): 01/03/24  Consulting Provider(s): Dr Hays    Assessment / Plan  1)  Left leg cellulitis - Vancomycin  Concurrent Antimicrobials: cefepime  Day of Vanc Therapy / Ordered Duration: day 3 of 7  Current Dosing Method: Bayesian-Guided AUC Dosing  Therapeutic Goal: -600 mg/L*hr  Current Dose / Plan:   Currently on 1250 mg q12h.    Per level checked on 1/5, calculated AUC = 553 with trough = 19.7 mcg/mL.  SCr remains stable at 0.8.  While AUC / trough are on the upper end of desired range, lower regimens have < 80% probability of reaching therapeutic AUC.  Will continue same regimen for now.  Will determine timing of / need for further levels pending further decisions re: duration of Vanc therapy.  Will continue to monitor clinical condition and make adjustments to regimen as appropriate.    Please call with questions--  Thanks--  Leticia SidhuD., BCPS   1/6/2024 12:54 PM  Wireless: [phone]        Interval update:  ID following; recommend transition to PO Doxycycline + Keflex x 10 days at discharge.  ID also has low suspicion for joint involvement / surgical site infection.  Remains afebrile.  Patient notes improvement in pain.      Subjective/Objective:   Nydia Downs is a 58 y.o. female with a PMHx significant for anxiety, depression, osteoarthritis, HTN, GERD, RLS and recent Left TKA (done 12/5/23) who is admitted with cellulitis of left leg (below the knee; notes indicate surgical site does not appear infected).     Pharmacy is consulted to dose vancomycin.    Ht Readings from Last 1 Encounters:   01/03/24 1.676 m (5' 6\")     Wt Readings from Last 1 Encounters:   01/03/24 125.9 kg (277 lb 9 oz)     Current & Prior Antimicrobial Regimen(s):  Cefepime (1/3-current)  Vancomycin - Pharmacy to dose  2250mg IV x1 1/4 01:00  1250mg IV q12h (1/4-current)    Vancomycin Level(s) / Doses:    Date Time Dose Type of 
Patient A/O x4 and independent in the room. VSS. RA. Patient has been calm and cooperative with care throughout shift. Patient stating that she is feeling better but told MD that she would to stay one more day prior to discharging. PRN pain medication made available when needed for pain. Anti-itch medication made available as well when needed. IV antibiotics running through #22 in Right Forearm without issue. Patient ambulates well to and from bathroom with IV pole. Education provided on safety awareness and when to call for assistance. No other issues at this time. Patient likely to discharge tomorrow, 1/7/2024.   
Patient admitted to floor from home around 2100. Patient oriented to room, tv, and call light. MD was in to see patient. Vitals obtained and documented in chart. Patient stating pain 7/10 to LLE. Will medicate for pain. Patient in bed, awake, and call light in reach.  
Patient discharged to home .  IV removed.  Brother here to drive patient home.  Patient feels comfortable to go home.  Reviewed all discharge paper work , all medications at discharge and follow up appointments  
Pharmacist Review and Automatic Dose Adjustment of Prophylactic Enoxaparin    The reviewing pharmacist has made an adjustment to the ordered enoxaparin dose or converted to UFH per the approved [facility] protocol and table as defined below.    Plan / Rationale: Based upon the patient's weight and renal function, the ordered dose of Lovenox 40 mg daily has been converted to Lovenox 30 mg BID.    Thank you,  Maryjane Adams, [facility]  1/4/2024, 12:38 AM      Nydia Downs is a 58 y.o. female.     Recent Labs     01/03/24  2221   CREATININE 0.7       Estimated Creatinine Clearance: 119 mL/min (based on SCr of 0.7 mg/dL).    Recent Labs     01/03/24  2220   HGB 12.6   HCT 38.9        No results for input(s): \"INR\" in the last 72 hours.    Height:   Ht Readings from Last 1 Encounters:   01/03/24 1.676 m (5' 6\")     Weight:  Wt Readings from Last 1 Encounters:   01/03/24 125.9 kg (277 lb 9 oz)            
Pharmacy Note - Extended Infusion Beta-Lactam Adjustment    Cefepime ordered for treatment of Cellulitis. Per [facility] Extended Infusion Beta-Lactam Policy, Cefepime 2000 Q8H will be changed to Cefepime 2000 mg Q12H EI (4 hour infusion).     Estimated Creatinine Clearance: Estimated Creatinine Clearance: 119 mL/min (based on SCr of 0.7 mg/dL).  Dialysis Status, SNATIAGO, CKD: NA  BMI: Body mass index is 44.8 kg/m².    Rationale for Adjustment: Agent demonstrates time-dependent effect on bacterial eradication. Extended-infusion dosing strategy aims to enhance microbiologic and clinical efficacy.    Pharmacy will continue to monitor renal function, cultures and sensitivities (where available) and adjust dose as necessary.      Please call with any questions.      Maryjane Adams, PharmD  [id number] (Main Pharmacy)    
Pt sitting up on the side of the bed. Alert and oriented x 4. Gets up with standby assist. Left lower leg red and elevated on pillows. VSS. Denies any nausea but complaining of left leg spasms and pain. Gave 5 mg of oxycodone PO. Right hand PIV intact and flushes well. Tolerates a regular diet. Instructed to call out for needs. Will continue to monitor.  
Pt transferred from 3south to 6310. Alert and oriented x4. VSS. Oriented to room and call light. IV antibiotics infusing via SL. Pt states leg is feeling much better at this time. All questions addressed.   
Comments: Pt c/o insomnia last pm, requested back to bed to rest        Plan   Occupational Therapy Plan  Times Per Week: eval only - no further OT     Restrictions  Position Activity Restriction  Other position/activity restrictions: up with assistance    Subjective   General  Chart Reviewed: Yes, Orders, Progress Notes, History and Physical  Additional Pertinent Hx: 58 year old female who had (L) TKR here 12/5/2023, presented to doctor's office with redness and swelling (-) for DVT direct admit with LE cellulitis, for IV antibiotics, received OT orders  Family / Caregiver Present: No  Referring Practitioner: DEAN Hays MD  Diagnosis: celullitis  Pt's (L) lower LE \"very tender\" rated 5/10  Social/Functional History  Social/Functional History  Lives With: Alone  Type of Home: House  Home Layout: One level, Able to Live on Main level with bedroom/bathroom  Home Access: Stairs to enter with rails  Entrance Stairs - Number of Steps: 2 JUSTA  Bathroom Shower/Tub: Tub/Shower unit  Bathroom Toilet: Standard (with safety frame, sink for leverage)  Bathroom Equipment: Grab bars in shower, Shower chair, Hand-held shower, 3-in-1 commode  Home Equipment: Walker, rolling, Cane, Reacher, Sock aid  Has the patient had two or more falls in the past year or any fall with injury in the past year?: Yes (2 falls in past 6 months; missing step on friends deck and dog leash got under her foot when she was walking and dog ran away and she fell.)  Receives Help From:  (was getting HHPT and HHOT 3x/wk)  ADL Assistance: Independent  Homemaking Assistance: Independent (family checks in on her almost every day A prn - laundry, being nearby when she showers etc)  Ambulation Assistance: Independent  Transfer Assistance: Independent  Active : No  Patient's  Info: family drive or can get rides through insurance  Occupation: Retired  Leisure & Hobbies: Watching football, listen to music, read  Additional Comments: Pt reports she has 
home
LOB.  Distance: 250'  Stairs/Curb  Stairs?: Yes  Stairs  Stairs Height: 6\"  Rails: Left ascending  Device: No Device  Assistance: Stand by assistance  Comment: safe and steady     Balance  Posture: Good  Sitting - Static: Good  Sitting - Dynamic: Good  Standing - Static: Good  Standing - Dynamic: Good         Education  Patient Education  Education Given To: Patient  Education Provided: Role of Therapy;Transfer Training  Education Method: Demonstration  Barriers to Learning: None  Education Outcome: Verbalized understanding    Therapy Time   Individual Concurrent Group Co-treatment   Time In 1040         Time Out 1125         Minutes 45         Timed Code Treatment Minutes:  30    Total Treatment Minutes:  45     Yandel Albarran, PT, DPT

## 2024-01-07 NOTE — PLAN OF CARE
Problem: Safety - Adult  Goal: Free from fall injury  Outcome: Progressing     Problem: Pain  Goal: Verbalizes/displays adequate comfort level or baseline comfort level  Outcome: Progressing  Flowsheets (Taken 1/7/2024 0400)  Verbalizes/displays adequate comfort level or baseline comfort level:   Encourage patient to monitor pain and request assistance   Assess pain using appropriate pain scale  Note: Patient c/o pain. See Mar. Will continue to monitor.     Problem: Discharge Planning  Goal: Discharge to home or other facility with appropriate resources  Outcome: Progressing

## 2024-01-07 NOTE — PLAN OF CARE
Problem: Discharge Planning  Goal: Discharge to home or other facility with appropriate resources  1/7/2024 0911 by Lois Najera LPN  Outcome: Adequate for Discharge  1/7/2024 0400 by Karon Coe RN  Outcome: Progressing     Problem: Safety - Adult  Goal: Free from fall injury  1/7/2024 0911 by Lois Najera LPN  Outcome: Adequate for Discharge  1/7/2024 0400 by Karon Coe RN  Outcome: Progressing     Problem: Pain  Goal: Verbalizes/displays adequate comfort level or baseline comfort level  1/7/2024 0911 by Lois Najera LPN  Outcome: Adequate for Discharge  1/7/2024 0400 by Karon Coe RN  Outcome: Progressing  Flowsheets (Taken 1/7/2024 0400)  Verbalizes/displays adequate comfort level or baseline comfort level:   Encourage patient to monitor pain and request assistance   Assess pain using appropriate pain scale  Note: Patient c/o pain. See Mar. Will continue to monitor.

## 2024-01-07 NOTE — DISCHARGE INSTR - COC
Continuity of Care Form    Patient Name: Nydia Downs   :  1965  MRN:  [id number]    Admit date:  1/3/2024  Discharge date:  ***    Code Status Order: Full Code   Advance Directives:     Admitting Physician:  Oscar Limon MD  PCP: Petar Sorenson DO    Discharging Nurse: ***  Discharging Hospital Unit/Room#: 6301/6301-01  Discharging Unit Phone Number: ***    Emergency Contact:   Extended Emergency Contact Information  Primary Emergency Contact: LoreLoraine           [place], [place]  Mobile Phone: [phone]  Relation: None  Secondary Emergency Contact: Jasper Downs  Home Phone: [phone]  Mobile Phone: [phone]  Relation: Child    Past Surgical History:  Past Surgical History:   Procedure Laterality Date    ANUS SURGERY  2018    fissure     SECTION      x3    COLONOSCOPY      DILATION AND CURETTAGE OF UTERUS N/A 2021    VIDEO HYSTEROSCOPY DILATATION AND CURETTAGE, POSSIBLE MYOSURE, POSSIBLE POLYPECTOMY performed by Ana Mike DO at [facility] OR    FRACTURE SURGERY      right wrist    HERNIA REPAIR  2018    LAPAROSCOPIC PARAESOPHAGEAL HERNIA REPAIR WITH MESH    HYSTERECTOMY (CERVIX STATUS UNKNOWN) N/A 10/21/2021    ROBOTIC ASSISTED LAPAROSCOPIC HYSTERECTOMY WITH RIGHT SALPINGECTOMY, RIGHT OOPHORECTOMY, CYSTOSCOPY, LYSIS OF ADHESIONS  CPT CODE - 61144 performed by Joanie Del Valle DO at [facility] OR    HYSTERECTOMY, TOTAL ABDOMINAL (CERVIX REMOVED)  10/21/21    KNEE ARTHROSCOPY Left 11/15/2012    ARTHROSCOPY LEFT KNEE WITH SYOVECTOMY AND CHONDROPLASTY    OVARY REMOVAL Right     ROTATOR CUFF REPAIR Right 2020    EXAM UNDER ANESTHESIA VIDEO ARTHROSCOPY RIGHT SHOULDER, MINI- OPEN ROTATOR CUFF REPAIR, NEER ACROMIOPLASTY, LENO PROCEDURE WITH EXPAREL performed by Silvio Rai MD at [facility] OR    SHOULDER ARTHROSCOPY Right 2020    VIDEO ARTHROSCOPY RIGHT SHOULDER, OPEN ROTATOR CUFF REPAIR, BICEPS TENOTOMY -BLOCK- performed by Nando Lam MD at

## 2024-01-08 ENCOUNTER — CARE COORDINATION (OUTPATIENT)
Dept: CASE MANAGEMENT | Age: 59
End: 2024-01-08

## 2024-01-08 DIAGNOSIS — L03.116 CELLULITIS AND ABSCESS OF LEFT LOWER EXTREMITY: Primary | ICD-10-CM

## 2024-01-08 DIAGNOSIS — L02.416 CELLULITIS AND ABSCESS OF LEFT LOWER EXTREMITY: Primary | ICD-10-CM

## 2024-01-08 PROCEDURE — 1111F DSCHRG MED/CURRENT MED MERGE: CPT | Performed by: FAMILY MEDICINE

## 2024-01-08 NOTE — CARE COORDINATION
Care Transitions Initial Follow Up Call    Call within 2 business days of discharge: Yes    Patient Current Location:  Home: [facility] [facility]  [address]  [facility] [address]    Care Transition Nurse contacted the patient by telephone to perform post hospital discharge assessment. Verified name and  with patient as identifiers. Provided introduction to self, and explanation of the Care Transition Nurse role.     Patient: Nydia Downs Patient : 1965   MRN: [id number]   Reason for Admission: Cellulitis left knee, S/P Total Knee Arthroplasty, left  Discharge Date: 24 RARS: Readmission Risk Score: 13.3      Last Discharge Facility       Date Complaint Diagnosis Description Type Department Provider    1/3/24   Admission (Discharged) [facility] Shukri Guardado MD            Was this an external facility discharge? No     Challenges to be reviewed by the provider   Additional needs identified to be addressed with provider: Yes  medications-Needs new script for Xanax 0.5 mg PO TID PRN for sleep and anxiety.  Please send script to [facility]   on [address] [place] [address] ph:[phone] fax:[phone].               Method of communication with provider: chart routing.    CTN spoke with patient this afternoon for initial 24 hour discharge follow up CTN call.   Patient states she is doing about the same, states pain is still present in left leg, swelling is still present, but states redness is down.  No reports of any fever, chills, nausea, vomiting, chest pain, SOB or cough.   Patient with no congestion, difficulty emptying bladder, feeling lightheaded, dizziness, and heart palpitations.  Patient states she has spoken with SN with [facility], scheduled time for first home visit, CTN did contact intake department with [facility], confirmed referral was received.  Patient needing refill on Xanax, also requesting additional services for mental health, CTN will follow up with CTN team, to get

## 2024-01-11 ENCOUNTER — TELEPHONE (OUTPATIENT)
Dept: FAMILY MEDICINE CLINIC | Age: 59
End: 2024-01-11

## 2024-01-11 RX ORDER — METOCLOPRAMIDE 5 MG/1
5 TABLET ORAL 3 TIMES DAILY PRN
Qty: 30 TABLET | Refills: 1 | Status: SHIPPED | OUTPATIENT
Start: 2024-01-11

## 2024-01-11 NOTE — TELEPHONE ENCOUNTER
Pt called she was d/c from [facility] on 1/7/24. She had cellulitis in her leg after having knee surgery.  Pt is on a bunch of meds and it is making her nauseous. She is asking if something could be called in.     Pharm on file.     Please advise.     Last OV 12/1/23 [facility] for pre op    Future Appointments   Date Time Provider Department Center   1/11/2024 11:00 AM Juliana Simeon [facility]   1/16/2024  3:00 PM Petar Sorenson DO [facility] [place] [facility]

## 2024-01-16 ENCOUNTER — OFFICE VISIT (OUTPATIENT)
Dept: FAMILY MEDICINE CLINIC | Age: 59
End: 2024-01-16

## 2024-01-16 ENCOUNTER — CARE COORDINATION (OUTPATIENT)
Dept: CASE MANAGEMENT | Age: 59
End: 2024-01-16

## 2024-01-16 VITALS
SYSTOLIC BLOOD PRESSURE: 94 MMHG | RESPIRATION RATE: 16 BRPM | WEIGHT: 277 LBS | TEMPERATURE: 97.3 F | DIASTOLIC BLOOD PRESSURE: 68 MMHG | BODY MASS INDEX: 44.71 KG/M2 | OXYGEN SATURATION: 96 % | HEART RATE: 65 BPM

## 2024-01-16 DIAGNOSIS — F41.8 MIXED ANXIETY AND DEPRESSIVE DISORDER: ICD-10-CM

## 2024-01-16 DIAGNOSIS — I10 ESSENTIAL HYPERTENSION: ICD-10-CM

## 2024-01-16 DIAGNOSIS — L03.119 CELLULITIS OF LOWER EXTREMITY, UNSPECIFIED LATERALITY: Primary | ICD-10-CM

## 2024-01-16 DIAGNOSIS — F33.1 MODERATE EPISODE OF RECURRENT MAJOR DEPRESSIVE DISORDER (HCC): ICD-10-CM

## 2024-01-16 DIAGNOSIS — E66.01 OBESITY, CLASS III, BMI 40-49.9 (MORBID OBESITY) (HCC): ICD-10-CM

## 2024-01-16 RX ORDER — TRAZODONE HYDROCHLORIDE 50 MG/1
50 TABLET ORAL NIGHTLY
Qty: 30 TABLET | Refills: 1 | Status: SHIPPED | OUTPATIENT
Start: 2024-01-16

## 2024-01-16 SDOH — ECONOMIC STABILITY: FOOD INSECURITY: WITHIN THE PAST 12 MONTHS, THE FOOD YOU BOUGHT JUST DIDN'T LAST AND YOU DIDN'T HAVE MONEY TO GET MORE.: NEVER TRUE

## 2024-01-16 SDOH — ECONOMIC STABILITY: INCOME INSECURITY: HOW HARD IS IT FOR YOU TO PAY FOR THE VERY BASICS LIKE FOOD, HOUSING, MEDICAL CARE, AND HEATING?: NOT HARD AT ALL

## 2024-01-16 SDOH — ECONOMIC STABILITY: FOOD INSECURITY: WITHIN THE PAST 12 MONTHS, YOU WORRIED THAT YOUR FOOD WOULD RUN OUT BEFORE YOU GOT MONEY TO BUY MORE.: NEVER TRUE

## 2024-01-16 ASSESSMENT — PATIENT HEALTH QUESTIONNAIRE - PHQ9
7. TROUBLE CONCENTRATING ON THINGS, SUCH AS READING THE NEWSPAPER OR WATCHING TELEVISION: 3
2. FEELING DOWN, DEPRESSED OR HOPELESS: 1
SUM OF ALL RESPONSES TO PHQ QUESTIONS 1-9: 16
9. THOUGHTS THAT YOU WOULD BE BETTER OFF DEAD, OR OF HURTING YOURSELF: 0
5. POOR APPETITE OR OVEREATING: 3
SUM OF ALL RESPONSES TO PHQ QUESTIONS 1-9: 16
4. FEELING TIRED OR HAVING LITTLE ENERGY: 3
6. FEELING BAD ABOUT YOURSELF - OR THAT YOU ARE A FAILURE OR HAVE LET YOURSELF OR YOUR FAMILY DOWN: 0
SUM OF ALL RESPONSES TO PHQ9 QUESTIONS 1 & 2: 4
SUM OF ALL RESPONSES TO PHQ QUESTIONS 1-9: 16
1. LITTLE INTEREST OR PLEASURE IN DOING THINGS: 3
SUM OF ALL RESPONSES TO PHQ QUESTIONS 1-9: 16
8. MOVING OR SPEAKING SO SLOWLY THAT OTHER PEOPLE COULD HAVE NOTICED. OR THE OPPOSITE, BEING SO FIGETY OR RESTLESS THAT YOU HAVE BEEN MOVING AROUND A LOT MORE THAN USUAL: 0
10. IF YOU CHECKED OFF ANY PROBLEMS, HOW DIFFICULT HAVE THESE PROBLEMS MADE IT FOR YOU TO DO YOUR WORK, TAKE CARE OF THINGS AT HOME, OR GET ALONG WITH OTHER PEOPLE: 1
3. TROUBLE FALLING OR STAYING ASLEEP: 3

## 2024-01-16 ASSESSMENT — ENCOUNTER SYMPTOMS
WHEEZING: 0
SHORTNESS OF BREATH: 0

## 2024-01-16 NOTE — PATIENT INSTRUCTIONS
Continue the same meds     Add the new med at [facility].  Start with 1/2 a tab to see how it does    Advise me 2 weeks

## 2024-01-16 NOTE — CARE COORDINATION
Care Transitions Follow Up Call    Patient Current Location:  Home: [address] [place] [address]  [address]  [facility] [address]    Care Transition Nurse contacted the patient by telephone to follow up after admission on 2024.  Verified name and  with patient as identifiers.    Patient: Nydia Downs  Patient : 1965   MRN: [id number]   Reason for Admission: Cellulitis left knee, S/P Total Knee Arthroplasty, left  Discharge Date: 24 RARS: Readmission Risk Score: 13.3      Needs to be reviewed by the provider   Additional needs identified to be addressed with provider: No  none             Method of communication with provider: none.    CTN spoke with patient this am for follow up CTN call.   Patient states she is doing much better.  Patient states pain, swelling, redness and warmth to touch all significantly improved.  No reports of any fever, chills, nausea, vomiting, chest pain, SOB or cough.   Patient with no congestion, difficulty emptying bladder, feeling lightheaded, dizziness, and heart palpitations.   Patient states she is in better spirits today as well, had VV with Psychiatrist on 2024.  No other issues or concerns at this time, no new or changed medications, PT with [facility] in home today, as well as HFU with PCP office today.    Addressed changes since last contact:  none  Discussed follow-up appointments. If no appointment was previously scheduled, appointment scheduling offered: Yes.   Is follow up appointment scheduled within 7 days of discharge? Yes.    Follow Up  Future Appointments   Date Time Provider Department Center   2024  3:00 PM Petar Sorenson DO [facility] [place] [facility]   2024 11:00 [facility]     Care Transition Nurse reviewed medical action plan and red flags with patient and discussed any barriers to care and/or understanding of plan of care after discharge. Discussed appropriate site of care based on symptoms and resources

## 2024-01-16 NOTE — PROGRESS NOTES
Subjective:      Patient ID: Nydia Downs is a 58 y.o. y.o. female.    Following up cellulitis.  Stress with family passings  and knee surgery.    Cellulitis is improving.  Strength and knee improving.    Has to relocate her living,  brother is helping her.  Does see counseling-  Juliana.  Insurance is referring her for additional service.    Meds and history reviewed.  Meds updated.    Other  Pertinent negatives include no abdominal pain or chest pain.         Chief Complaint   Patient presents with    L knee post-op - discuss care plan    Other     Mother passed in October, Father in November, surgery in December, & had to put her cat down around [place]       Allergies   Allergen Reactions    Droperidol Other (See Comments)     unresponsive    Haldol [Haloperidol Lactate] Other (See Comments)     \"felt out of it, similar to the toradol\"    Toradol [Ketorolac Tromethamine] Other (See Comments)     \"out of it\"  \"felt like sleep paralysis\"       Past Medical History:   Diagnosis Date    Anxiety     Depression     Endometriosis     Essential hypertension 12/10/2018    GERD (gastroesophageal reflux disease)     Headache     Obesity     Osteoarthritis     PONV (postoperative nausea and vomiting)     Restless leg syndrome     Wears dentures     Wears glasses     For reading only       Past Surgical History:   Procedure Laterality Date    ANUS SURGERY  2018    fissure     SECTION      x3    COLONOSCOPY      DILATION AND CURETTAGE OF UTERUS N/A 2021    VIDEO HYSTEROSCOPY DILATATION AND CURETTAGE, POSSIBLE MYOSURE, POSSIBLE POLYPECTOMY performed by Ana Mike DO at [facility] OR    FRACTURE SURGERY      right wrist    HERNIA REPAIR  2018    LAPAROSCOPIC PARAESOPHAGEAL HERNIA REPAIR WITH MESH    HYSTERECTOMY (CERVIX STATUS UNKNOWN) N/A 10/21/2021    ROBOTIC ASSISTED LAPAROSCOPIC HYSTERECTOMY WITH RIGHT SALPINGECTOMY, RIGHT OOPHORECTOMY, CYSTOSCOPY, LYSIS OF ADHESIONS  CPT CODE - 48447

## 2024-01-17 DIAGNOSIS — F41.9 ANXIETY: ICD-10-CM

## 2024-01-17 DIAGNOSIS — F32.5 MAJOR DEPRESSIVE DISORDER WITH SINGLE EPISODE, IN FULL REMISSION (HCC): ICD-10-CM

## 2024-01-17 DIAGNOSIS — F43.21 GRIEF: ICD-10-CM

## 2024-01-17 RX ORDER — SERTRALINE HYDROCHLORIDE 100 MG/1
100 TABLET, FILM COATED ORAL DAILY
Qty: 90 TABLET | Refills: 1 | Status: SHIPPED | OUTPATIENT
Start: 2024-01-17

## 2024-01-17 NOTE — TELEPHONE ENCOUNTER
12/1/2023    Future Appointments   Date Time Provider Department Center   1/25/2024 11:00 AM Juliana Simeon [facility]

## 2024-01-19 DIAGNOSIS — F41.9 ANXIETY DISORDER, UNSPECIFIED: ICD-10-CM

## 2024-01-19 RX ORDER — ALPRAZOLAM 0.5 MG/1
TABLET ORAL
Qty: 90 TABLET | Refills: 1 | Status: SHIPPED | OUTPATIENT
Start: 2024-01-19 | End: 2024-04-18

## 2024-01-19 NOTE — TELEPHONE ENCOUNTER
Future Appointments   Date Time Provider Department Center   1/25/2024 11:00 AM Juliana Simeon [facility] 1/16/2024

## 2024-01-20 PROBLEM — F33.1 MAJOR DEPRESSIVE DISORDER, RECURRENT, MODERATE (HCC): Status: RESOLVED | Noted: 2019-12-31 | Resolved: 2024-01-20

## 2024-01-20 ASSESSMENT — ENCOUNTER SYMPTOMS
TROUBLE SWALLOWING: 0
CONSTIPATION: 0
ABDOMINAL PAIN: 0
DIARRHEA: 0

## 2024-01-21 DIAGNOSIS — Z86.16 HISTORY OF COVID-19: ICD-10-CM

## 2024-01-21 DIAGNOSIS — R05.1 ACUTE COUGH: ICD-10-CM

## 2024-01-22 RX ORDER — FLUTICASONE PROPIONATE 50 MCG
SPRAY, SUSPENSION (ML) NASAL
Qty: 1 EACH | Refills: 3 | Status: SHIPPED | OUTPATIENT
Start: 2024-01-22

## 2024-01-22 RX ORDER — ROPINIROLE 2 MG/1
TABLET, FILM COATED ORAL
Qty: 90 TABLET | Refills: 5 | Status: SHIPPED | OUTPATIENT
Start: 2024-01-22

## 2024-01-22 RX ORDER — OMEPRAZOLE 40 MG/1
CAPSULE, DELAYED RELEASE ORAL
Qty: 60 CAPSULE | Refills: 5 | Status: SHIPPED | OUTPATIENT
Start: 2024-01-22

## 2024-01-22 NOTE — TELEPHONE ENCOUNTER
Future Appointments   Date Time Provider Department Center   1/25/2024 11:00 AM Juliana Simeon [facility] 11/20/2023

## 2024-01-22 NOTE — TELEPHONE ENCOUNTER
1/16/2024    Future Appointments   Date Time Provider Department Center   1/25/2024 11:00 AM Juliana Simeon [facility]

## 2024-01-23 ENCOUNTER — CARE COORDINATION (OUTPATIENT)
Dept: CASE MANAGEMENT | Age: 59
End: 2024-01-23

## 2024-01-23 NOTE — CARE COORDINATION
Care Transitions Follow Up Call    Patient Current Location:  Home: [address] [place] [address]  [address]  [facility] [address]    Care Transition Nurse contacted the patient by telephone to follow up after admission on 2024.  Verified name and  with patient as identifiers.    Patient: Nydia Downs  Patient : 1965   MRN: [id number]   Reason for Admission: Cellulitis left knee, S/P Total Knee Arthroplasty, left  Discharge Date: 24 RARS: Readmission Risk Score: 13.3      Needs to be reviewed by the provider   Additional needs identified to be addressed with provider: No  none             Method of communication with provider: none.    CTN spoke with patient this am for follow up CTN call.   Patient states she is feeling really tired and weak today, started on Monday.  Patient states she is slightly dizzy when changing positions, denies having any difficulty with urination, states she is eating and drinking without and issues.  Patient states she did start a new pill that helps with sleep, is only taking half.  Patient denies having any fever, chills, nausea, vomiting, chest pain, SOB or cough.    Patient with no pain, LE edema, and heart palpitations.   Patient does report feeling slightly congested.  Cellulitis is healing well, swelling down significantly.  OT with [facility] was in home yesterday, PT to be in home later today.  No other issues or concerns at this time.  Patient also had HFU with PCP on 2024.      Addressed changes since last contact:  none  Discussed follow-up appointments. If no appointment was previously scheduled, appointment scheduling offered: Yes.   Is follow up appointment scheduled within 7 days of discharge? Yes.    Follow Up  Future Appointments   Date Time Provider Department Center   2024 11:00 AM Juliana Simeon [facility]     Care Transition Nurse reviewed red flags with patient and discussed any barriers to care and/or understanding of plan of care

## 2024-01-31 ENCOUNTER — CARE COORDINATION (OUTPATIENT)
Dept: CASE MANAGEMENT | Age: 59
End: 2024-01-31

## 2024-02-05 DIAGNOSIS — R10.9 ABDOMINAL CRAMPING: ICD-10-CM

## 2024-02-05 NOTE — TELEPHONE ENCOUNTER
12/1/2023  Future Appointments   Date Time Provider Department Center   2/8/2024 10:00 AM Juliana Simeon [facility]

## 2024-02-06 RX ORDER — DICYCLOMINE HYDROCHLORIDE 10 MG/1
10 CAPSULE ORAL 4 TIMES DAILY PRN
Qty: 120 CAPSULE | Refills: 2 | Status: SHIPPED | OUTPATIENT
Start: 2024-02-06

## 2024-02-12 ENCOUNTER — TELEMEDICINE (OUTPATIENT)
Dept: PSYCHOLOGY | Age: 59
End: 2024-02-12
Payer: MEDICARE

## 2024-02-12 DIAGNOSIS — F41.9 ANXIETY: ICD-10-CM

## 2024-02-12 DIAGNOSIS — F33.1 MODERATE EPISODE OF RECURRENT MAJOR DEPRESSIVE DISORDER (HCC): Primary | ICD-10-CM

## 2024-02-12 PROCEDURE — 1036F TOBACCO NON-USER: CPT | Performed by: SOCIAL WORKER

## 2024-02-12 PROCEDURE — 90832 PSYTX W PT 30 MINUTES: CPT | Performed by: SOCIAL WORKER

## 2024-02-12 NOTE — PROGRESS NOTES
Behavioral Health Consultation- Follow UP  BECKIE Anderson  2/12/2024   9:07 AM      Nydia Downs, was evaluated through a synchronous (real-time) audio-video encounter. The patient (or guardian if applicable) is aware that this is a billable service, which includes applicable co-pays. This Virtual Visit was conducted with patient's (and/or legal guardian's) consent. Verbal consent to proceed has been obtained within the past 12 months. Patient identification was verified, and a caregiver was present when appropriate. The patient was located in a state where the provider was licensed to provide care.    Time spent with Patient: 30 minutes  This is patient's  20th   [facility] appointment.    Reason for Consult:    Chief Complaint   Patient presents with    Depression    Anxiety     Referring Provider: [place] [address]    Patient provided informed consent for the behavioral health program. Discussed with patient model of service to include the limits of confidentiality (i.e. abuse reporting, suicide intervention, etc.) and short-term intervention focused approach. Pt indicated understanding. Feedback given to PCP.    Verified the following information:  Patient's identification: Yes  Patient location: [address] [place] [address] [address]  [facility] [address]   Patient's call back number: [phone]   Patient's emergency contact's name and number, as well as permission to contact them if needed: Extended Emergency Contact Information  Primary Emergency Contact: Loraine Torrez           [place], [place]  Mobile Phone: [phone]  Relation: None  Secondary Emergency Contact: YareliJasper  Home Phone: [phone]  Mobile Phone: [phone]  Relation: Child     Provider location: [place], [place]     S:  Last appointment 1/25/24    Pt reports that she continues to have a lot of stressors. Pt states that she found a new place to live and will begin moving in the first week of March. Pt states it is a one

## 2024-02-12 NOTE — PATIENT INSTRUCTIONS
Continue to engage in daily self-care.  Prioritize and organize tasks on your to-do list based on your needs.  Take some time to rest today after your doctor's appointment.  Use mindfulness to help with anxiety about the future.

## 2024-03-06 DIAGNOSIS — M17.10 PRIMARY OSTEOARTHRITIS OF KNEE, UNSPECIFIED LATERALITY: ICD-10-CM

## 2024-03-06 RX ORDER — ACETAMINOPHEN AND CODEINE PHOSPHATE 300; 30 MG/1; MG/1
1 TABLET ORAL EVERY 8 HOURS PRN
Qty: 60 TABLET | Refills: 0 | OUTPATIENT
Start: 2024-03-06 | End: 2024-03-26

## 2024-03-06 NOTE — TELEPHONE ENCOUNTER
Future Appointments   Date Time Provider Department Center   3/14/2024 11:00 AM Juliana Simeon [facility] 1/16/2024

## 2024-03-06 NOTE — TELEPHONE ENCOUNTER
Please call the patient and tell her I did the prescription monitoring report suggest she is seeing a pain medicine doctor and getting other pain medicine.    She can only get pain medicine from one doctor/practice at a time.  I will be holding any refills at this time.

## 2024-03-07 ENCOUNTER — TELEPHONE (OUTPATIENT)
Dept: FAMILY MEDICINE CLINIC | Age: 59
End: 2024-03-07

## 2024-03-07 NOTE — TELEPHONE ENCOUNTER
I spoke with the patient she would like to be sooner if possible. I also told her if things get worse to seek help at ER.

## 2024-03-07 NOTE — TELEPHONE ENCOUNTER
Allison called in , patient having a hard time right now, patient lost both parents had knee replacement surgery. Patient feels like she is going to have a break down. Her son is in the hospital right now he is  a alcoholic.  Please wants to talk to Dr. Sorenson

## 2024-03-07 NOTE — TELEPHONE ENCOUNTER
Pt scheduled with Juliana  Future Appointments   Date Time Provider Department Center   3/14/2024 11:00 AM Juliana Simeon [facility]   3/18/2024  1:30 PM Juliana Simeon [facility]

## 2024-03-07 NOTE — TELEPHONE ENCOUNTER
Patient wanted Juliana to know this, patient said she really needs to talk to Juliana.  Allison called in , patient having a hard time right now, patient lost both parents had knee replacement surgery. Patient feels like she is going to have a break down. Her son is in the hospital right now he is a alcoholic.   Patient wanted  and Juliana Simeon  to know what she is having a hard time.     Patient made appointment 3-18-24 with Juliana

## 2024-03-08 NOTE — TELEPHONE ENCOUNTER
Spoke to the patient.  Has a lot going on and yesterday felt very overwhelmed.  Today feels better.  Has plans and resources available for counseling.  Was looking to move and found a apartment which is to her liking and is getting settled.  Trying to sell and move out of her mother's house and her father's house both .  Has meds and counseling and support.  Says feels a lot better than when she called yesterday so we will continue with her current program and we will continue to follow-up.

## 2024-03-11 RX ORDER — TRAZODONE HYDROCHLORIDE 50 MG/1
50 TABLET ORAL NIGHTLY
Qty: 30 TABLET | Refills: 2 | Status: SHIPPED | OUTPATIENT
Start: 2024-03-11

## 2024-03-11 NOTE — TELEPHONE ENCOUNTER
Future Appointments   Date Time Provider Department Center   3/14/2024 11:00 AM Juliana Simeon [facility]   3/18/2024  1:30 PM Juliana Simeon [facility] 1/16/2024

## 2024-03-12 DIAGNOSIS — F41.9 ANXIETY DISORDER, UNSPECIFIED: ICD-10-CM

## 2024-03-12 NOTE — TELEPHONE ENCOUNTER
Future Appointments   Date Time Provider Department Center   3/14/2024 11:00 AM Juliana Simeon [facility]   3/18/2024  1:30 PM Juliana Simeon [facility] 1/16/2024

## 2024-03-15 RX ORDER — ALPRAZOLAM 0.5 MG/1
TABLET ORAL
Qty: 90 TABLET | Refills: 1 | Status: SHIPPED | OUTPATIENT
Start: 2024-03-15 | End: 2024-04-13

## 2024-03-19 NOTE — PROGRESS NOTES
Alcohol/week: 4.0 standard drinks of alcohol     Types: 4 Drinks containing 0.5 oz of alcohol per week     Comment: once / week 2-3  mixed drinks.    Drug use: Yes     Frequency: 4.0 times per week     Types: Marijuana (Weed)    Sexual activity: Not Currently     Partners: Male   Other Topics Concern    Not on file   Social History Narrative    Not on file     Social Determinants of Health     Financial Resource Strain: Low Risk  (1/16/2024)    Overall Financial Resource Strain (CARDIA)     Difficulty of Paying Living Expenses: Not hard at all   Food Insecurity: No Food Insecurity (1/16/2024)    Hunger Vital Sign     Worried About Running Out of Food in the Last Year: Never true     Ran Out of Food in the Last Year: Never true   Transportation Needs: No Transportation Needs (1/16/2024)    PRAPARE - Transportation     Lack of Transportation (Medical): No     Lack of Transportation (Non-Medical): No   Physical Activity: Inactive (3/28/2023)    Exercise Vital Sign     Days of Exercise per Week: 0 days     Minutes of Exercise per Session: 0 min   Stress: Not on file   Social Connections: Not on file   Intimate Partner Violence: Not At Risk (7/13/2022)    Humiliation, Afraid, Rape, and Kick questionnaire     Fear of Current or Ex-Partner: No     Emotionally Abused: No     Physically Abused: No     Sexually Abused: No   Housing Stability: Low Risk  (1/16/2024)    Housing Stability Vital Sign     Unable to Pay for Housing in the Last Year: No     Number of Places Lived in the Last Year: 1     Unstable Housing in the Last Year: No     TOBACCO:   reports that she has never smoked. She has been exposed to tobacco smoke. She has never used smokeless tobacco.  ETOH:   reports current alcohol use of about 4.0 standard drinks of alcohol per week.  Family History:   Family History   Problem Relation Age of Onset    Arthritis Mother     Obesity Mother     Anemia Mother     Other Mother         pulmonary embolism    Arthritis Father

## 2024-03-20 ENCOUNTER — TELEMEDICINE (OUTPATIENT)
Dept: PSYCHOLOGY | Age: 59
End: 2024-03-20
Payer: MEDICARE

## 2024-03-20 DIAGNOSIS — F43.21 GRIEF: ICD-10-CM

## 2024-03-20 DIAGNOSIS — F33.1 MODERATE EPISODE OF RECURRENT MAJOR DEPRESSIVE DISORDER (HCC): Primary | ICD-10-CM

## 2024-03-20 DIAGNOSIS — F41.9 ANXIETY: ICD-10-CM

## 2024-03-20 PROCEDURE — 90832 PSYTX W PT 30 MINUTES: CPT | Performed by: SOCIAL WORKER

## 2024-03-20 PROCEDURE — 1036F TOBACCO NON-USER: CPT | Performed by: SOCIAL WORKER

## 2024-03-20 NOTE — PATIENT INSTRUCTIONS
Continue to listen to music, spend time with friends.   Remind yourself \"No feeling is forever.\"

## 2024-03-21 ENCOUNTER — OFFICE VISIT (OUTPATIENT)
Dept: FAMILY MEDICINE CLINIC | Age: 59
End: 2024-03-21

## 2024-03-21 VITALS
OXYGEN SATURATION: 95 % | TEMPERATURE: 97.2 F | DIASTOLIC BLOOD PRESSURE: 82 MMHG | HEART RATE: 66 BPM | WEIGHT: 283 LBS | SYSTOLIC BLOOD PRESSURE: 128 MMHG | BODY MASS INDEX: 45.68 KG/M2 | RESPIRATION RATE: 16 BRPM

## 2024-03-21 DIAGNOSIS — F43.29 PROLONGED GRIEF REACTION: ICD-10-CM

## 2024-03-21 DIAGNOSIS — F41.1 GENERALIZED ANXIETY DISORDER: Primary | ICD-10-CM

## 2024-03-21 DIAGNOSIS — F33.41 RECURRENT MAJOR DEPRESSIVE DISORDER, IN PARTIAL REMISSION (HCC): ICD-10-CM

## 2024-03-21 DIAGNOSIS — I10 ESSENTIAL HYPERTENSION: ICD-10-CM

## 2024-03-21 RX ORDER — LIOTHYRONINE SODIUM 5 UG/1
5 TABLET ORAL DAILY
Qty: 30 TABLET | Refills: 3 | Status: SHIPPED | OUTPATIENT
Start: 2024-03-21

## 2024-03-21 RX ORDER — IBUPROFEN 800 MG/1
800 TABLET ORAL EVERY 8 HOURS PRN
Qty: 90 TABLET | Refills: 1 | Status: SHIPPED | OUTPATIENT
Start: 2024-03-21

## 2024-03-21 SDOH — ECONOMIC STABILITY: FOOD INSECURITY: WITHIN THE PAST 12 MONTHS, YOU WORRIED THAT YOUR FOOD WOULD RUN OUT BEFORE YOU GOT MONEY TO BUY MORE.: NEVER TRUE

## 2024-03-21 SDOH — ECONOMIC STABILITY: FOOD INSECURITY: WITHIN THE PAST 12 MONTHS, THE FOOD YOU BOUGHT JUST DIDN'T LAST AND YOU DIDN'T HAVE MONEY TO GET MORE.: NEVER TRUE

## 2024-03-21 SDOH — ECONOMIC STABILITY: INCOME INSECURITY: HOW HARD IS IT FOR YOU TO PAY FOR THE VERY BASICS LIKE FOOD, HOUSING, MEDICAL CARE, AND HEATING?: NOT HARD AT ALL

## 2024-03-21 ASSESSMENT — PATIENT HEALTH QUESTIONNAIRE - PHQ9
1. LITTLE INTEREST OR PLEASURE IN DOING THINGS: NEARLY EVERY DAY
10. IF YOU CHECKED OFF ANY PROBLEMS, HOW DIFFICULT HAVE THESE PROBLEMS MADE IT FOR YOU TO DO YOUR WORK, TAKE CARE OF THINGS AT HOME, OR GET ALONG WITH OTHER PEOPLE: EXTREMELY DIFFICULT
SUM OF ALL RESPONSES TO PHQ QUESTIONS 1-9: 10
6. FEELING BAD ABOUT YOURSELF - OR THAT YOU ARE A FAILURE OR HAVE LET YOURSELF OR YOUR FAMILY DOWN: NOT AT ALL
SUM OF ALL RESPONSES TO PHQ QUESTIONS 1-9: 10
4. FEELING TIRED OR HAVING LITTLE ENERGY: SEVERAL DAYS
7. TROUBLE CONCENTRATING ON THINGS, SUCH AS READING THE NEWSPAPER OR WATCHING TELEVISION: SEVERAL DAYS
5. POOR APPETITE OR OVEREATING: SEVERAL DAYS
9. THOUGHTS THAT YOU WOULD BE BETTER OFF DEAD, OR OF HURTING YOURSELF: NOT AT ALL
2. FEELING DOWN, DEPRESSED OR HOPELESS: NEARLY EVERY DAY
3. TROUBLE FALLING OR STAYING ASLEEP: SEVERAL DAYS
SUM OF ALL RESPONSES TO PHQ QUESTIONS 1-9: 10
SUM OF ALL RESPONSES TO PHQ QUESTIONS 1-9: 10
SUM OF ALL RESPONSES TO PHQ9 QUESTIONS 1 & 2: 6
8. MOVING OR SPEAKING SO SLOWLY THAT OTHER PEOPLE COULD HAVE NOTICED. OR THE OPPOSITE, BEING SO FIGETY OR RESTLESS THAT YOU HAVE BEEN MOVING AROUND A LOT MORE THAN USUAL: NOT AT ALL

## 2024-03-21 NOTE — PATIENT INSTRUCTIONS
Continue the same medications    Add the new medication to help the depression medication work better.    See what happens over 4-6 weeks    Call if any questions / issues    If you are OK, see me in 3 months

## 2024-03-21 NOTE — PROGRESS NOTES
Subjective:      Patient ID: Nydia Downs is a 59 y.o. y.o. female.  Following  up her meds and depression /     Lost both parents in the fall.  Had to move and in the process.  New apartment is handicapped accessible and location is good    DepressionPatient presents with the following symptoms: nervousness/anxiety.  Patient is not experiencing: suicidal ideas.            Chief Complaint   Patient presents with    Medication Check    Depression     Positive screening       Allergies   Allergen Reactions    Droperidol Other (See Comments)     unresponsive    Haldol [Haloperidol Lactate] Other (See Comments)     \"felt out of it, similar to the toradol\"    Toradol [Ketorolac Tromethamine] Other (See Comments)     \"out of it\"  \"felt like sleep paralysis\"       Past Medical History:   Diagnosis Date    Anxiety     Depression     Endometriosis     Essential hypertension 12/10/2018    GERD (gastroesophageal reflux disease)     Headache     Obesity     Osteoarthritis     PONV (postoperative nausea and vomiting)     Restless leg syndrome     Wears dentures     Wears glasses     For reading only       Past Surgical History:   Procedure Laterality Date    ANUS SURGERY  2018    fissure     SECTION      x3    COLONOSCOPY      DILATION AND CURETTAGE OF UTERUS N/A 2021    VIDEO HYSTEROSCOPY DILATATION AND CURETTAGE, POSSIBLE MYOSURE, POSSIBLE POLYPECTOMY performed by Ana Mike DO at [facility] OR    FRACTURE SURGERY      right wrist    HERNIA REPAIR  2018    LAPAROSCOPIC PARAESOPHAGEAL HERNIA REPAIR WITH MESH    HYSTERECTOMY (CERVIX STATUS UNKNOWN) N/A 10/21/2021    ROBOTIC ASSISTED LAPAROSCOPIC HYSTERECTOMY WITH RIGHT SALPINGECTOMY, RIGHT OOPHORECTOMY, CYSTOSCOPY, LYSIS OF ADHESIONS  CPT CODE - 60816 performed by Joanie Del Valle DO at [facility] OR    HYSTERECTOMY, TOTAL ABDOMINAL (CERVIX REMOVED)  10/21/21    KNEE ARTHROSCOPY Left 11/15/2012    ARTHROSCOPY LEFT KNEE WITH SYOVECTOMY AND

## 2024-04-01 ENCOUNTER — OFFICE VISIT (OUTPATIENT)
Dept: FAMILY MEDICINE CLINIC | Age: 59
End: 2024-04-01
Payer: MEDICARE

## 2024-04-01 VITALS
BODY MASS INDEX: 43.9 KG/M2 | SYSTOLIC BLOOD PRESSURE: 128 MMHG | OXYGEN SATURATION: 94 % | RESPIRATION RATE: 16 BRPM | TEMPERATURE: 98.5 F | DIASTOLIC BLOOD PRESSURE: 82 MMHG | HEART RATE: 75 BPM | WEIGHT: 272 LBS

## 2024-04-01 DIAGNOSIS — R05.1 ACUTE COUGH: ICD-10-CM

## 2024-04-01 DIAGNOSIS — Z12.31 ENCOUNTER FOR SCREENING MAMMOGRAM FOR MALIGNANT NEOPLASM OF BREAST: ICD-10-CM

## 2024-04-01 DIAGNOSIS — B35.4 TINEA CORPORIS: ICD-10-CM

## 2024-04-01 DIAGNOSIS — J06.9 VIRAL URI: Primary | ICD-10-CM

## 2024-04-01 LAB
INFLUENZA A ANTIGEN, POC: NEGATIVE
INFLUENZA B ANTIGEN, POC: NEGATIVE
Lab: 0
PERFORMING INSTRUMENT: NORMAL
QC PASS/FAIL: NORMAL
SARS-COV-2, POC: NORMAL

## 2024-04-01 PROCEDURE — 3079F DIAST BP 80-89 MM HG: CPT | Performed by: NURSE PRACTITIONER

## 2024-04-01 PROCEDURE — 87804 INFLUENZA ASSAY W/OPTIC: CPT | Performed by: NURSE PRACTITIONER

## 2024-04-01 PROCEDURE — 3017F COLORECTAL CA SCREEN DOC REV: CPT | Performed by: NURSE PRACTITIONER

## 2024-04-01 PROCEDURE — G8427 DOCREV CUR MEDS BY ELIG CLIN: HCPCS | Performed by: NURSE PRACTITIONER

## 2024-04-01 PROCEDURE — 3074F SYST BP LT 130 MM HG: CPT | Performed by: NURSE PRACTITIONER

## 2024-04-01 PROCEDURE — 87426 SARSCOV CORONAVIRUS AG IA: CPT | Performed by: NURSE PRACTITIONER

## 2024-04-01 PROCEDURE — 1036F TOBACCO NON-USER: CPT | Performed by: NURSE PRACTITIONER

## 2024-04-01 PROCEDURE — G8417 CALC BMI ABV UP PARAM F/U: HCPCS | Performed by: NURSE PRACTITIONER

## 2024-04-01 PROCEDURE — 99214 OFFICE O/P EST MOD 30 MIN: CPT | Performed by: NURSE PRACTITIONER

## 2024-04-01 RX ORDER — DEXTROMETHORPHAN HYDROBROMIDE AND PROMETHAZINE HYDROCHLORIDE 15; 6.25 MG/5ML; MG/5ML
5 SYRUP ORAL 4 TIMES DAILY PRN
Qty: 240 ML | Refills: 0 | Status: SHIPPED | OUTPATIENT
Start: 2024-04-01

## 2024-04-01 RX ORDER — BENZONATATE 200 MG/1
200 CAPSULE ORAL 3 TIMES DAILY PRN
Qty: 30 CAPSULE | Refills: 0 | Status: SHIPPED | OUTPATIENT
Start: 2024-04-01

## 2024-04-01 RX ORDER — ALBUTEROL SULFATE 90 UG/1
2 AEROSOL, METERED RESPIRATORY (INHALATION) 4 TIMES DAILY PRN
Qty: 18 G | Refills: 1 | Status: SHIPPED | OUTPATIENT
Start: 2024-04-01

## 2024-04-01 RX ORDER — CLOTRIMAZOLE 1 %
CREAM (GRAM) TOPICAL
Qty: 40 G | Refills: 0 | Status: SHIPPED | OUTPATIENT
Start: 2024-04-01 | End: 2024-04-08

## 2024-04-01 ASSESSMENT — ENCOUNTER SYMPTOMS
DIARRHEA: 1
SORE THROAT: 1
VOMITING: 0
WHEEZING: 0
ABDOMINAL PAIN: 0
SWOLLEN GLANDS: 0
RHINORRHEA: 1

## 2024-04-01 NOTE — PROGRESS NOTES
4/1/2024    This is a 59 y.o. female   Chief Complaint   Patient presents with    URI     Cough with pain, body aches, chills 100.1 temp last night, diarrhea, started Saturday evening    .    URI   This is a new problem. The current episode started yesterday. The problem has been unchanged. The maximum temperature recorded prior to her arrival was 100.4 - 100.9 F. Associated symptoms include congestion, coughing, diarrhea, ear pain, nausea, a plugged ear sensation, a rash (right upper arm), rhinorrhea and a sore throat. Pertinent negatives include no abdominal pain, chest pain, dysuria, headaches, joint pain, swollen glands, vomiting or wheezing. She has tried nothing for the symptoms. The treatment provided no relief.        Patient Active Problem List   Diagnosis    Mixed anxiety and depressive disorder    Knee pain    Chondromalacia of left knee    Synovitis of knee    Bilateral carpal tunnel syndrome    Lumbar strain    Anxiety    Restless leg syndrome    De Quervain's disease (radial styloid tenosynovitis)    Osteoarthritis of carpometacarpal joint of thumb    Hemorrhoid prolapse    Dyspnea on exertion    Sciatica    Intractable vomiting with nausea    Hiatal hernia    Elevated blood pressure reading    Generalized anxiety disorder    Essential hypertension    Closed displaced fracture of middle phalanx of right ring finger    Anal lesion    Adjustment disorder with depressed mood    Neck pain    Morbid obesity with BMI of 45.0-49.9, adult (HCC)    S/P robot-assisted surgical procedure    Spell of abnormal behavior    Urinary tract infection due to extended-spectrum beta lactamase (ESBL) producing Escherichia coli    Vitamin D deficiency    Pyelonephritis    Hypokalemia    Arthritis of right shoulder region    Osteoarthritis of left knee, unspecified osteoarthritis type    Cellulitis    Cellulitis of left knee       Current Outpatient Medications   Medication Sig Dispense Refill    ibuprofen (ADVIL;MOTRIN) 800

## 2024-04-04 DIAGNOSIS — R05.1 ACUTE COUGH: Primary | ICD-10-CM

## 2024-04-04 RX ORDER — AMOXICILLIN AND CLAVULANATE POTASSIUM 875; 125 MG/1; MG/1
1 TABLET, FILM COATED ORAL 2 TIMES DAILY
Qty: 20 TABLET | Refills: 0 | Status: SHIPPED | OUTPATIENT
Start: 2024-04-04 | End: 2024-04-14

## 2024-04-04 RX ORDER — TRAZODONE HYDROCHLORIDE 50 MG/1
50 TABLET ORAL NIGHTLY
Qty: 90 TABLET | Refills: 1 | Status: SHIPPED | OUTPATIENT
Start: 2024-04-04

## 2024-04-04 NOTE — TELEPHONE ENCOUNTER
Future Appointments   Date Time Provider Department Center   4/4/2024  3:00 PM Juliana Simeon [facility]     LOV 4/1/2024    90 day supply requested

## 2024-04-05 ENCOUNTER — HOSPITAL ENCOUNTER (OUTPATIENT)
Dept: GENERAL RADIOLOGY | Age: 59
Discharge: HOME | End: 2024-04-05
Payer: MEDICARE

## 2024-04-05 ENCOUNTER — TELEPHONE (OUTPATIENT)
Dept: FAMILY MEDICINE CLINIC | Age: 59
End: 2024-04-05

## 2024-04-05 DIAGNOSIS — R05.1 ACUTE COUGH: ICD-10-CM

## 2024-04-05 PROCEDURE — 71046 X-RAY EXAM CHEST 2 VIEWS: CPT

## 2024-04-05 NOTE — TELEPHONE ENCOUNTER
She may need to go to the hospital if things are worsening. Did she start the antbiotics yet? If she has, she needs to give it a day or two for the medication to work

## 2024-04-05 NOTE — TELEPHONE ENCOUNTER
Patient stopped at the desk to tell Leora that she just completed her XR. She wanted to let Leora know she's not feeling any better.

## 2024-04-05 NOTE — TELEPHONE ENCOUNTER
Patient really not feeling well has Respiratory Virus and would like Xray that was discussed with Leora. Fever has gone down but really not seeing anything getting better still feels really bad Please advise

## 2024-04-16 RX ORDER — METHOCARBAMOL 500 MG/1
TABLET, FILM COATED ORAL
Qty: 90 TABLET | Refills: 3 | Status: SHIPPED | OUTPATIENT
Start: 2024-04-16

## 2024-04-16 NOTE — TELEPHONE ENCOUNTER
4/1/2024    Future Appointments   Date Time Provider Department Center   4/18/2024 11:00 AM Juliana Simeon [facility]

## 2024-04-18 NOTE — PROGRESS NOTES
Late entry from 07:40    Informed of events of morning. Patient back from CT scan. Resting comfortably in bed. Imaging and lab results reviewed. Continue supportive care. PAST SURGICAL HISTORY:  No significant past surgical history

## 2024-04-25 NOTE — TELEPHONE ENCOUNTER
Berny Tong called from [address] [place] [address] to see the status of the paperwork they stated they have faxed twice. ..once in August and the other October 31st. They did have the correct fax and I asked if they could fax again. Stated I would follow up in an hour if no faxed received. Over an hour later--I followed up with [address] [place] [address] and they stated at 10 am it confirmed it went through. ..advised we still had not received anything. She was going to speak with her supervisor and see if there is any other way they could get it through.
Efrain Mcdaniels, please call these people. I do not remember form, nor is one recorded in the chart.  Asked them to send it again and will look for it specifically
Has this been received and completed?
[facility] Delivery and spoke with Raffaele Ruiz. She will try to fax the CMN again.
Please advise.
Please call this lady and tell her that I do not have a record of receiving this.  Asked them to send me a new form and I will get it signed and sent back to her
done
no

## 2024-05-03 NOTE — PROGRESS NOTES
Behavioral Health Consultation- Follow UP  BECKIE Anderson  5/6/2024   11:48 AM      Nydia Downs, was evaluated through a synchronous (real-time) audio-video encounter. The patient (or guardian if applicable) is aware that this is a billable service, which includes applicable co-pays. This Virtual Visit was conducted with patient's (and/or legal guardian's) consent. Verbal consent to proceed has been obtained within the past 12 months. Patient identification was verified, and a caregiver was present when appropriate. The patient was located in a state where the provider was licensed to provide care.    Time spent with Patient: 30 minutes  This is patient's  25th   [facility] appointment.    Reason for Consult:    Chief Complaint   Patient presents with    Anxiety     Referring Provider: [place] [address]    Patient provided informed consent for the behavioral health program. Discussed with patient model of service to include the limits of confidentiality (i.e. abuse reporting, suicide intervention, etc.) and short-term intervention focused approach. Pt indicated understanding. Feedback given to PCP.    Verified the following information:  Patient's identification: Yes  Patient location: [address] [place] [address] B  [place] [id number]   Patient's call back number: [phone]   Patient's emergency contact's name and number, as well as permission to contact them if needed: Extended Emergency Contact Information  Primary Emergency Contact: Loraine Torrez           [place], [place]  Mobile Phone: [phone]  Relation: None  Secondary Emergency Contact: Jasper Downs  Home Phone: [phone]  Mobile Phone: [phone]  Relation: Child     Provider location: [place], [place]     S:  Last appointment 4/18/24    Pt's son was in ICU for 9 days due to alcohol poisoning. He was sedated and restrained for a portion of that time. He was hallucinating as he went through withdrawal. Pt states the

## 2024-05-06 ENCOUNTER — TELEMEDICINE (OUTPATIENT)
Dept: PSYCHOLOGY | Age: 59
End: 2024-05-06
Payer: MEDICARE

## 2024-05-06 DIAGNOSIS — F41.9 ANXIETY: Primary | ICD-10-CM

## 2024-05-06 PROCEDURE — 1036F TOBACCO NON-USER: CPT | Performed by: SOCIAL WORKER

## 2024-05-06 PROCEDURE — 90832 PSYTX W PT 30 MINUTES: CPT | Performed by: SOCIAL WORKER

## 2024-05-13 DIAGNOSIS — F41.9 ANXIETY DISORDER, UNSPECIFIED: ICD-10-CM

## 2024-05-14 RX ORDER — ALPRAZOLAM 0.5 MG/1
TABLET ORAL
Qty: 90 TABLET | Refills: 1 | Status: SHIPPED | OUTPATIENT
Start: 2024-05-14 | End: 2024-07-12

## 2024-05-14 NOTE — TELEPHONE ENCOUNTER
3/21/2024    NO RECENT MED CONTRACT OR DRUG SCREEN     Future Appointments   Date Time Provider Department Center   5/20/2024 11:30 AM Juliana Simeon [facility]

## 2024-05-15 NOTE — PROGRESS NOTES
Behavioral Health Consultation- Follow UP  BECKIE Anderson  5/20/2024   11:57 AM      Nydia Downs, was evaluated through a synchronous (real-time) audio-video encounter. The patient (or guardian if applicable) is aware that this is a billable service, which includes applicable co-pays. This Virtual Visit was conducted with patient's (and/or legal guardian's) consent. Verbal consent to proceed has been obtained within the past 12 months. Patient identification was verified, and a caregiver was present when appropriate. The patient was located in a state where the provider was licensed to provide care.    Time spent with Patient: 30 minutes  This is patient's  26th   [facility] appointment.    Reason for Consult:    Chief Complaint   Patient presents with    Anxiety     Referring Provider: [place] [address]    Patient provided informed consent for the behavioral health program. Discussed with patient model of service to include the limits of confidentiality (i.e. abuse reporting, suicide intervention, etc.) and short-term intervention focused approach. Pt indicated understanding. Feedback given to PCP.    Verified the following information:  Patient's identification: Yes  Patient location: [address] [place] [address] B  [facility] [address]   Patient's call back number: [phone]   Patient's emergency contact's name and number, as well as permission to contact them if needed: Extended Emergency Contact Information  Primary Emergency Contact: Loraine Torrez           [place], [place]  Mobile Phone: [phone]  Relation: None  Secondary Emergency Contact: Jasper Downs  Home Phone: [phone]  Mobile Phone: [phone]  Relation: Child     Provider location: [place], [place]     S:  Last appointment 5/6/24    Pt states that her 's birthday was the Friday before mother's day, then Mother's Day was Sunday, then yesterday was the anniversary of her 's death. Pt states these were difficult

## 2024-05-16 DIAGNOSIS — J06.9 VIRAL URI: ICD-10-CM

## 2024-05-16 RX ORDER — ALBUTEROL SULFATE 90 UG/1
2 AEROSOL, METERED RESPIRATORY (INHALATION) 4 TIMES DAILY PRN
Qty: 18 EACH | Refills: 1 | Status: SHIPPED | OUTPATIENT
Start: 2024-05-16

## 2024-05-16 NOTE — TELEPHONE ENCOUNTER
4/1/2024    Future Appointments   Date Time Provider Department Center   5/20/2024 11:30 AM Juliana Simeon [facility]

## 2024-05-20 ENCOUNTER — TELEMEDICINE (OUTPATIENT)
Dept: PSYCHOLOGY | Age: 59
End: 2024-05-20
Payer: MEDICARE

## 2024-05-20 DIAGNOSIS — F41.9 ANXIETY: Primary | ICD-10-CM

## 2024-05-20 DIAGNOSIS — F43.21 GRIEF: ICD-10-CM

## 2024-05-20 PROCEDURE — 90832 PSYTX W PT 30 MINUTES: CPT | Performed by: SOCIAL WORKER

## 2024-05-20 PROCEDURE — 1036F TOBACCO NON-USER: CPT | Performed by: SOCIAL WORKER

## 2024-05-21 RX ORDER — IBUPROFEN 800 MG/1
800 TABLET ORAL EVERY 8 HOURS PRN
Qty: 90 TABLET | Refills: 1 | Status: SHIPPED | OUTPATIENT
Start: 2024-05-21

## 2024-05-21 NOTE — TELEPHONE ENCOUNTER
Future Appointments   Date Time Provider Department Center   6/6/2024 11:00 AM [facility] Simeon [facility] 3/21/2024

## 2024-06-04 ENCOUNTER — OFFICE VISIT (OUTPATIENT)
Dept: FAMILY MEDICINE CLINIC | Age: 59
End: 2024-06-04

## 2024-06-04 ENCOUNTER — TELEPHONE (OUTPATIENT)
Dept: FAMILY MEDICINE CLINIC | Age: 59
End: 2024-06-04

## 2024-06-04 VITALS
SYSTOLIC BLOOD PRESSURE: 118 MMHG | RESPIRATION RATE: 16 BRPM | WEIGHT: 279 LBS | DIASTOLIC BLOOD PRESSURE: 81 MMHG | BODY MASS INDEX: 45.03 KG/M2 | HEART RATE: 72 BPM | OXYGEN SATURATION: 97 % | TEMPERATURE: 97.1 F

## 2024-06-04 DIAGNOSIS — G25.81 RLS (RESTLESS LEGS SYNDROME): ICD-10-CM

## 2024-06-04 DIAGNOSIS — R79.9 ABNORMAL FINDING OF BLOOD CHEMISTRY, UNSPECIFIED: ICD-10-CM

## 2024-06-04 DIAGNOSIS — F33.41 RECURRENT MAJOR DEPRESSIVE DISORDER, IN PARTIAL REMISSION (HCC): ICD-10-CM

## 2024-06-04 DIAGNOSIS — F51.01 PRIMARY INSOMNIA: Primary | ICD-10-CM

## 2024-06-04 DIAGNOSIS — F51.01 PRIMARY INSOMNIA: ICD-10-CM

## 2024-06-04 DIAGNOSIS — R35.0 URINARY FREQUENCY: ICD-10-CM

## 2024-06-04 DIAGNOSIS — F41.9 ANXIETY DISORDER, UNSPECIFIED TYPE: ICD-10-CM

## 2024-06-04 DIAGNOSIS — N39.0 URINARY TRACT INFECTION WITHOUT HEMATURIA, SITE UNSPECIFIED: ICD-10-CM

## 2024-06-04 LAB
ALBUMIN SERPL-MCNC: 4.1 G/DL (ref 3.4–5)
ALBUMIN/GLOB SERPL: 1.4 {RATIO} (ref 1.1–2.2)
ALP SERPL-CCNC: 102 U/L (ref 40–129)
ALT SERPL-CCNC: 29 U/L (ref 10–40)
ANION GAP SERPL CALCULATED.3IONS-SCNC: 16 MMOL/L (ref 3–16)
AST SERPL-CCNC: 23 U/L (ref 15–37)
BASOPHILS # BLD: 0 K/UL (ref 0–0.2)
BASOPHILS NFR BLD: 0.4 %
BILIRUB SERPL-MCNC: <0.2 MG/DL (ref 0–1)
BILIRUBIN, POC: NEGATIVE
BLOOD URINE, POC: NORMAL
BUN SERPL-MCNC: 11 MG/DL (ref 7–20)
CALCIUM SERPL-MCNC: 9.6 MG/DL (ref 8.3–10.6)
CHLORIDE SERPL-SCNC: 99 MMOL/L (ref 99–110)
CLARITY, POC: NORMAL
CO2 SERPL-SCNC: 23 MMOL/L (ref 21–32)
COLOR, POC: NORMAL
CREAT SERPL-MCNC: 0.8 MG/DL (ref 0.6–1.1)
DEPRECATED RDW RBC AUTO: 14.9 % (ref 12.4–15.4)
EOSINOPHIL # BLD: 0.3 K/UL (ref 0–0.6)
EOSINOPHIL NFR BLD: 2.8 %
FERRITIN SERPL IA-MCNC: 75.5 NG/ML (ref 15–150)
GFR SERPLBLD CREATININE-BSD FMLA CKD-EPI: 85 ML/MIN/{1.73_M2}
GLUCOSE SERPL-MCNC: 106 MG/DL (ref 70–99)
GLUCOSE URINE, POC: NEGATIVE
HCT VFR BLD AUTO: 41.2 % (ref 36–48)
HGB BLD-MCNC: 13.9 G/DL (ref 12–16)
IRON SATN MFR SERPL: 25 % (ref 15–50)
IRON SERPL-MCNC: 83 UG/DL (ref 37–145)
KETONES, POC: NEGATIVE
LEUKOCYTE EST, POC: NORMAL
LYMPHOCYTES # BLD: 2 K/UL (ref 1–5.1)
LYMPHOCYTES NFR BLD: 22.5 %
MCH RBC QN AUTO: 30.2 PG (ref 26–34)
MCHC RBC AUTO-ENTMCNC: 33.7 G/DL (ref 31–36)
MCV RBC AUTO: 89.6 FL (ref 80–100)
MONOCYTES # BLD: 0.8 K/UL (ref 0–1.3)
MONOCYTES NFR BLD: 8.8 %
NEUTROPHILS # BLD: 5.9 K/UL (ref 1.7–7.7)
NEUTROPHILS NFR BLD: 65.5 %
NITRITE, POC: NORMAL
PH, POC: 5.5
PLATELET # BLD AUTO: 203 K/UL (ref 135–450)
PMV BLD AUTO: 9.5 FL (ref 5–10.5)
POTASSIUM SERPL-SCNC: 4.3 MMOL/L (ref 3.5–5.1)
PROT SERPL-MCNC: 7.1 G/DL (ref 6.4–8.2)
PROTEIN, POC: NEGATIVE
RBC # BLD AUTO: 4.6 M/UL (ref 4–5.2)
SODIUM SERPL-SCNC: 138 MMOL/L (ref 136–145)
SPECIFIC GRAVITY, POC: >=1.03
TIBC SERPL-MCNC: 338 UG/DL (ref 260–445)
UROBILINOGEN, POC: NORMAL
WBC # BLD AUTO: 9 K/UL (ref 4–11)

## 2024-06-04 RX ORDER — TRAZODONE HYDROCHLORIDE 50 MG/1
TABLET ORAL
Qty: 180 TABLET | Refills: 1 | Status: SHIPPED | OUTPATIENT
Start: 2024-06-04

## 2024-06-04 RX ORDER — NITROFURANTOIN 25; 75 MG/1; MG/1
100 CAPSULE ORAL 2 TIMES DAILY
Qty: 14 CAPSULE | Refills: 0 | Status: SHIPPED | OUTPATIENT
Start: 2024-06-04 | End: 2024-06-11

## 2024-06-04 RX ORDER — TRAZODONE HYDROCHLORIDE 100 MG/1
100 TABLET ORAL NIGHTLY
Qty: 90 TABLET | Refills: 0 | Status: CANCELLED | OUTPATIENT
Start: 2024-06-04

## 2024-06-04 SDOH — ECONOMIC STABILITY: FOOD INSECURITY: WITHIN THE PAST 12 MONTHS, YOU WORRIED THAT YOUR FOOD WOULD RUN OUT BEFORE YOU GOT MONEY TO BUY MORE.: NEVER TRUE

## 2024-06-04 SDOH — ECONOMIC STABILITY: FOOD INSECURITY: WITHIN THE PAST 12 MONTHS, THE FOOD YOU BOUGHT JUST DIDN'T LAST AND YOU DIDN'T HAVE MONEY TO GET MORE.: NEVER TRUE

## 2024-06-04 SDOH — ECONOMIC STABILITY: INCOME INSECURITY: HOW HARD IS IT FOR YOU TO PAY FOR THE VERY BASICS LIKE FOOD, HOUSING, MEDICAL CARE, AND HEATING?: NOT HARD AT ALL

## 2024-06-04 ASSESSMENT — PATIENT HEALTH QUESTIONNAIRE - PHQ9
4. FEELING TIRED OR HAVING LITTLE ENERGY: SEVERAL DAYS
5. POOR APPETITE OR OVEREATING: NEARLY EVERY DAY
SUM OF ALL RESPONSES TO PHQ QUESTIONS 1-9: 10
SUM OF ALL RESPONSES TO PHQ QUESTIONS 1-9: 10
9. THOUGHTS THAT YOU WOULD BE BETTER OFF DEAD, OR OF HURTING YOURSELF: NOT AT ALL
3. TROUBLE FALLING OR STAYING ASLEEP: SEVERAL DAYS
2. FEELING DOWN, DEPRESSED OR HOPELESS: SEVERAL DAYS
7. TROUBLE CONCENTRATING ON THINGS, SUCH AS READING THE NEWSPAPER OR WATCHING TELEVISION: NEARLY EVERY DAY
DEPRESSION UNABLE TO ASSESS: FUNCTIONAL CAPACITY MOTIVATION LIMITS ACCURACY
10. IF YOU CHECKED OFF ANY PROBLEMS, HOW DIFFICULT HAVE THESE PROBLEMS MADE IT FOR YOU TO DO YOUR WORK, TAKE CARE OF THINGS AT HOME, OR GET ALONG WITH OTHER PEOPLE: NOT DIFFICULT AT ALL
8. MOVING OR SPEAKING SO SLOWLY THAT OTHER PEOPLE COULD HAVE NOTICED. OR THE OPPOSITE, BEING SO FIGETY OR RESTLESS THAT YOU HAVE BEEN MOVING AROUND A LOT MORE THAN USUAL: SEVERAL DAYS
6. FEELING BAD ABOUT YOURSELF - OR THAT YOU ARE A FAILURE OR HAVE LET YOURSELF OR YOUR FAMILY DOWN: NOT AT ALL
SUM OF ALL RESPONSES TO PHQ QUESTIONS 1-9: 10
SUM OF ALL RESPONSES TO PHQ QUESTIONS 1-9: 10
1. LITTLE INTEREST OR PLEASURE IN DOING THINGS: NOT AT ALL
SUM OF ALL RESPONSES TO PHQ9 QUESTIONS 1 & 2: 1

## 2024-06-04 ASSESSMENT — ANXIETY QUESTIONNAIRES
2. NOT BEING ABLE TO STOP OR CONTROL WORRYING: SEVERAL DAYS
IF YOU CHECKED OFF ANY PROBLEMS ON THIS QUESTIONNAIRE, HOW DIFFICULT HAVE THESE PROBLEMS MADE IT FOR YOU TO DO YOUR WORK, TAKE CARE OF THINGS AT HOME, OR GET ALONG WITH OTHER PEOPLE: SOMEWHAT DIFFICULT
6. BECOMING EASILY ANNOYED OR IRRITABLE: SEVERAL DAYS
GAD7 TOTAL SCORE: 6
5. BEING SO RESTLESS THAT IT IS HARD TO SIT STILL: SEVERAL DAYS
3. WORRYING TOO MUCH ABOUT DIFFERENT THINGS: NOT AT ALL
1. FEELING NERVOUS, ANXIOUS, OR ON EDGE: SEVERAL DAYS
4. TROUBLE RELAXING: SEVERAL DAYS
7. FEELING AFRAID AS IF SOMETHING AWFUL MIGHT HAPPEN: SEVERAL DAYS

## 2024-06-04 ASSESSMENT — COLUMBIA-SUICIDE SEVERITY RATING SCALE - C-SSRS
1. WITHIN THE PAST MONTH, HAVE YOU WISHED YOU WERE DEAD OR WISHED YOU COULD GO TO SLEEP AND NOT WAKE UP?: NO
2. HAVE YOU ACTUALLY HAD ANY THOUGHTS OF KILLING YOURSELF?: NO
6. HAVE YOU EVER DONE ANYTHING, STARTED TO DO ANYTHING, OR PREPARED TO DO ANYTHING TO END YOUR LIFE?: NO

## 2024-06-04 ASSESSMENT — ENCOUNTER SYMPTOMS
DIARRHEA: 0
VOMITING: 0
NAUSEA: 0
COUGH: 0
SHORTNESS OF BREATH: 0

## 2024-06-04 NOTE — PROGRESS NOTES
2024     Chief Complaint   Patient presents with    Other     Discuss medication, having trouble getting words out and would like her potassium checked     Anxiety    Depression       Nydia Downs (:  1965) is a 59 y.o. female, here for evaluation of the following medical concerns:    HPI  Anxiety and depression    Not sleeping well at all at night time, states did not sleep at all this past weekend  Taking a long time to fall asleep and then waking up after 60-90 minutes  Taking Trazodone 50 mg QHS  Consistent with sleep routine  Feels blunted and like she can not cry since start liothyronine  Takes Zoloft 100 mg consistently  Low mood- tearful    Restless legs has been worse. Started using a calming cream she found at [facility], that helped a little bit.     Body hurts-  Has left knee replaced in December  Right shoulder done in  last year  Thinks right knee needs to be done     Urinary frequency and urgency  No dysuria or fever or hematuria  Prone to UTI          2024    11:03 AM 10/12/2023    11:08 AM 2023     8:25 AM 2023    11:17 AM 2023     1:00 PM 2022     8:02 AM 10/21/2022    11:02 AM   ROSALIA 7 SCORE   ROSALIA-7 Total Score 6 7 7 10 11 0 0     Interpretation of ROSALIA-7 score: 5-9 = mild anxiety, 10-14 = moderate anxiety, 15+ = severe anxiety. Recommend referral to behavioral health for scores 10 or greater.         2024    11:05 AM 3/21/2024     9:11 AM 2024     3:02 PM 2023    10:34 AM 2023     2:47 PM 10/12/2023    11:09 AM 2023     8:27 AM   PHQ Scores   PHQ2 Score 1 6 4 4 0 3 2   PHQ9 Score 10 10 16 10 0 12 11     Interpretation of Total Score Depression Severity: 1-4 = Minimal depression, 5-9 = Mild depression, 10-14 = Moderate depression, 15-19 = Moderately severe depression, 20-27 = Severe depression        Review of Systems   Constitutional:  Positive for fatigue. Negative for chills and fever.   Respiratory:  Negative for cough and

## 2024-06-04 NOTE — PATIENT INSTRUCTIONS
Blood work today  Increase Trazodone to  mg nightly (start with 1.5 tablets nightly and then titrate up if needed)  Start antibiotic for UTI  F/u with / Delta in 4 weeks for depression or sooner if needed

## 2024-06-05 DIAGNOSIS — F41.9 ANXIETY DISORDER, UNSPECIFIED: ICD-10-CM

## 2024-06-05 RX ORDER — ALPRAZOLAM 0.5 MG/1
TABLET ORAL
Qty: 120 TABLET | Refills: 1 | Status: SHIPPED | OUTPATIENT
Start: 2024-06-05 | End: 2024-08-03

## 2024-06-05 NOTE — TELEPHONE ENCOUNTER
Pls tell her can increase to 4 times a day for a while.  Continue the other efforts to deal with stress also.  I will send a new rx

## 2024-06-07 LAB
BACTERIA UR CULT: ABNORMAL
ORGANISM: ABNORMAL

## 2024-06-28 ENCOUNTER — TELEPHONE (OUTPATIENT)
Dept: FAMILY MEDICINE CLINIC | Age: 59
End: 2024-06-28

## 2024-06-28 NOTE — TELEPHONE ENCOUNTER
Patient called.    She she is scheduled for routine mammo this Monday @ 2:30 pm.    When imaging called her and did her questions patient said yes to having bilateral tenderness.    They suggested she call to get a diagnostic order

## 2024-06-30 DIAGNOSIS — N64.4 MASTODYNIA: ICD-10-CM

## 2024-06-30 DIAGNOSIS — Z12.31 ENCOUNTER FOR SCREENING MAMMOGRAM FOR MALIGNANT NEOPLASM OF BREAST: Primary | ICD-10-CM

## 2024-07-01 ENCOUNTER — OFFICE VISIT (OUTPATIENT)
Dept: FAMILY MEDICINE CLINIC | Age: 59
End: 2024-07-01

## 2024-07-01 VITALS
OXYGEN SATURATION: 99 % | BODY MASS INDEX: 43.9 KG/M2 | TEMPERATURE: 97.2 F | WEIGHT: 272 LBS | HEART RATE: 56 BPM | SYSTOLIC BLOOD PRESSURE: 120 MMHG | DIASTOLIC BLOOD PRESSURE: 90 MMHG

## 2024-07-01 DIAGNOSIS — B35.4 TINEA CORPORIS: ICD-10-CM

## 2024-07-01 DIAGNOSIS — L30.4 INTERTRIGO: Primary | ICD-10-CM

## 2024-07-01 DIAGNOSIS — R03.0 ELEVATED BLOOD PRESSURE READING: ICD-10-CM

## 2024-07-01 RX ORDER — CLOTRIMAZOLE 1 %
CREAM (GRAM) TOPICAL
Qty: 80 G | Refills: 1 | Status: SHIPPED | OUTPATIENT
Start: 2024-07-01

## 2024-07-01 RX ORDER — METHYLPREDNISOLONE 4 MG/1
TABLET ORAL
COMMUNITY
Start: 2024-06-28

## 2024-07-01 RX ORDER — GABAPENTIN 300 MG/1
CAPSULE ORAL
COMMUNITY
Start: 2024-06-28

## 2024-07-01 RX ORDER — TRIAMCINOLONE ACETONIDE 1 MG/G
OINTMENT TOPICAL 2 TIMES DAILY PRN
Qty: 30 G | Refills: 0 | Status: SHIPPED | OUTPATIENT
Start: 2024-07-01 | End: 2024-07-08

## 2024-07-01 SDOH — ECONOMIC STABILITY: INCOME INSECURITY: HOW HARD IS IT FOR YOU TO PAY FOR THE VERY BASICS LIKE FOOD, HOUSING, MEDICAL CARE, AND HEATING?: NOT VERY HARD

## 2024-07-01 SDOH — ECONOMIC STABILITY: FOOD INSECURITY: WITHIN THE PAST 12 MONTHS, YOU WORRIED THAT YOUR FOOD WOULD RUN OUT BEFORE YOU GOT MONEY TO BUY MORE.: NEVER TRUE

## 2024-07-01 SDOH — ECONOMIC STABILITY: FOOD INSECURITY: WITHIN THE PAST 12 MONTHS, THE FOOD YOU BOUGHT JUST DIDN'T LAST AND YOU DIDN'T HAVE MONEY TO GET MORE.: NEVER TRUE

## 2024-07-01 ASSESSMENT — PATIENT HEALTH QUESTIONNAIRE - PHQ9
9. THOUGHTS THAT YOU WOULD BE BETTER OFF DEAD, OR OF HURTING YOURSELF: NOT AT ALL
5. POOR APPETITE OR OVEREATING: NOT AT ALL
6. FEELING BAD ABOUT YOURSELF - OR THAT YOU ARE A FAILURE OR HAVE LET YOURSELF OR YOUR FAMILY DOWN: NOT AT ALL
2. FEELING DOWN, DEPRESSED OR HOPELESS: NOT AT ALL
10. IF YOU CHECKED OFF ANY PROBLEMS, HOW DIFFICULT HAVE THESE PROBLEMS MADE IT FOR YOU TO DO YOUR WORK, TAKE CARE OF THINGS AT HOME, OR GET ALONG WITH OTHER PEOPLE: NOT DIFFICULT AT ALL
8. MOVING OR SPEAKING SO SLOWLY THAT OTHER PEOPLE COULD HAVE NOTICED. OR THE OPPOSITE, BEING SO FIGETY OR RESTLESS THAT YOU HAVE BEEN MOVING AROUND A LOT MORE THAN USUAL: NOT AT ALL
1. LITTLE INTEREST OR PLEASURE IN DOING THINGS: NOT AT ALL
4. FEELING TIRED OR HAVING LITTLE ENERGY: NOT AT ALL
SUM OF ALL RESPONSES TO PHQ QUESTIONS 1-9: 0
SUM OF ALL RESPONSES TO PHQ QUESTIONS 1-9: 0
7. TROUBLE CONCENTRATING ON THINGS, SUCH AS READING THE NEWSPAPER OR WATCHING TELEVISION: NOT AT ALL
3. TROUBLE FALLING OR STAYING ASLEEP: NOT AT ALL
SUM OF ALL RESPONSES TO PHQ QUESTIONS 1-9: 0
SUM OF ALL RESPONSES TO PHQ QUESTIONS 1-9: 0
SUM OF ALL RESPONSES TO PHQ9 QUESTIONS 1 & 2: 0

## 2024-07-01 NOTE — PATIENT INSTRUCTIONS
Start topical /clotrimazole, apply twice daily until rash clears.  Use drying powder such as gold bond to keep area dry  Call in 1-2 weeks if no change or worsening, can try oral    Discussed conservative measures such as avoidance of tight clothing.  Recommended maintaining cool and moisture-free skin as much as possible.   Can apply cool wet compress for 20 minutes several times daily to promote dryness.  Triamcinolone 1-2 times daily for itching

## 2024-07-01 NOTE — PROGRESS NOTES
[facility]  [address]  [address]  [place], [place] [address]  Tel: [phone]      2024   SUBJECTIVE/OBJECTIVE  HPI    Nydia Downs (:  1965) is a 59 y.o. female, here for evaluation of the following medical concerns:  Chief Complaint   Patient presents with    Rash     Possible Singles?  Both Breast        Patient is a 59 y.o. female  has a past medical history of Anxiety, Depression, Endometriosis, Essential hypertension, GERD (gastroesophageal reflux disease), Headache, Obesity, Osteoarthritis, PONV (postoperative nausea and vomiting), Restless leg syndrome, Wears dentures, and Wears glasses. who presents with rash.  Rash  Rash appeared Saturday. Location: under breast. Described as burning and sore. Hot to touch. Itches. Has applied triple antibiotic cream.  The problem has been gradually worsening since onset. The rash is characterized by burning, itchiness, redness, swelling and pain. She was exposed to nothing. Associated symptoms include fatigue and a fever. Pertinent negatives include no congestion, cough, diarrhea, eye pain, facial edema, rhinorrhea, shortness of breath, sore throat or vomiting. (Nausea over the weekend, and headaches. ) Past treatments include antibiotic cream. The treatment provided mild relief. There is no history of allergies.          Anxiety and depression-on alprazolam 0.5 mg 4 times daily as needed, trazodone 50 mg daily, and Zoloft 100 mg daily  Seeing Juliana every once every 2-3 weeks. Having a lot going on. Sleeping better. No SI or HI.     Asthma and albuterol as needed  Review of Systems   Constitutional:  Positive for fatigue and fever.   HENT:  Negative for congestion, rhinorrhea and sore throat.    Eyes:  Negative for pain.   Respiratory:  Negative for cough and shortness of breath.    Gastrointestinal:  Negative for diarrhea and vomiting.   Skin:  Positive for rash.   Hematological:  Positive for adenopathy.

## 2024-07-05 RX ORDER — ROPINIROLE 2 MG/1
TABLET, FILM COATED ORAL
Qty: 270 TABLET | Refills: 1 | Status: SHIPPED | OUTPATIENT
Start: 2024-07-05

## 2024-07-05 NOTE — TELEPHONE ENCOUNTER
LOV 7/1/2024    Future Appointments   Date Time Provider Department Center   7/12/2024 10:00 AM [facility]iana Simeon [facility]

## 2024-07-15 ENCOUNTER — OFFICE VISIT (OUTPATIENT)
Dept: FAMILY MEDICINE CLINIC | Age: 59
End: 2024-07-15

## 2024-07-15 VITALS
SYSTOLIC BLOOD PRESSURE: 130 MMHG | DIASTOLIC BLOOD PRESSURE: 88 MMHG | RESPIRATION RATE: 16 BRPM | BODY MASS INDEX: 44.39 KG/M2 | HEART RATE: 59 BPM | OXYGEN SATURATION: 95 % | TEMPERATURE: 97.3 F | WEIGHT: 275 LBS

## 2024-07-15 DIAGNOSIS — F33.41 RECURRENT MAJOR DEPRESSIVE DISORDER, IN PARTIAL REMISSION (HCC): ICD-10-CM

## 2024-07-15 DIAGNOSIS — I10 ESSENTIAL HYPERTENSION: ICD-10-CM

## 2024-07-15 DIAGNOSIS — F41.9 ANXIETY DISORDER, UNSPECIFIED TYPE: Primary | ICD-10-CM

## 2024-07-15 RX ORDER — NITROFURANTOIN 25; 75 MG/1; MG/1
100 CAPSULE ORAL 2 TIMES DAILY
Qty: 20 CAPSULE | Refills: 0 | Status: SHIPPED | OUTPATIENT
Start: 2024-07-15 | End: 2024-07-25

## 2024-07-15 RX ORDER — FLUOXETINE HYDROCHLORIDE 20 MG/1
20 CAPSULE ORAL DAILY
Qty: 30 CAPSULE | Refills: 3 | Status: SHIPPED | OUTPATIENT
Start: 2024-07-15

## 2024-07-15 SDOH — ECONOMIC STABILITY: FOOD INSECURITY: WITHIN THE PAST 12 MONTHS, YOU WORRIED THAT YOUR FOOD WOULD RUN OUT BEFORE YOU GOT MONEY TO BUY MORE.: NEVER TRUE

## 2024-07-15 SDOH — ECONOMIC STABILITY: FOOD INSECURITY: WITHIN THE PAST 12 MONTHS, THE FOOD YOU BOUGHT JUST DIDN'T LAST AND YOU DIDN'T HAVE MONEY TO GET MORE.: NEVER TRUE

## 2024-07-15 SDOH — ECONOMIC STABILITY: INCOME INSECURITY: HOW HARD IS IT FOR YOU TO PAY FOR THE VERY BASICS LIKE FOOD, HOUSING, MEDICAL CARE, AND HEATING?: NOT HARD AT ALL

## 2024-07-15 ASSESSMENT — PATIENT HEALTH QUESTIONNAIRE - PHQ9
2. FEELING DOWN, DEPRESSED OR HOPELESS: SEVERAL DAYS
SUM OF ALL RESPONSES TO PHQ9 QUESTIONS 1 & 2: 2
SUM OF ALL RESPONSES TO PHQ QUESTIONS 1-9: 2
1. LITTLE INTEREST OR PLEASURE IN DOING THINGS: SEVERAL DAYS
2. FEELING DOWN, DEPRESSED OR HOPELESS: SEVERAL DAYS
9. THOUGHTS THAT YOU WOULD BE BETTER OFF DEAD, OR OF HURTING YOURSELF: NOT AT ALL
4. FEELING TIRED OR HAVING LITTLE ENERGY: NOT AT ALL
5. POOR APPETITE OR OVEREATING: SEVERAL DAYS
10. IF YOU CHECKED OFF ANY PROBLEMS, HOW DIFFICULT HAVE THESE PROBLEMS MADE IT FOR YOU TO DO YOUR WORK, TAKE CARE OF THINGS AT HOME, OR GET ALONG WITH OTHER PEOPLE: NOT DIFFICULT AT ALL
8. MOVING OR SPEAKING SO SLOWLY THAT OTHER PEOPLE COULD HAVE NOTICED. OR THE OPPOSITE, BEING SO FIGETY OR RESTLESS THAT YOU HAVE BEEN MOVING AROUND A LOT MORE THAN USUAL: SEVERAL DAYS
1. LITTLE INTEREST OR PLEASURE IN DOING THINGS: SEVERAL DAYS
SUM OF ALL RESPONSES TO PHQ QUESTIONS 1-9: 5
SUM OF ALL RESPONSES TO PHQ QUESTIONS 1-9: 2
7. TROUBLE CONCENTRATING ON THINGS, SUCH AS READING THE NEWSPAPER OR WATCHING TELEVISION: SEVERAL DAYS
3. TROUBLE FALLING OR STAYING ASLEEP: NOT AT ALL
SUM OF ALL RESPONSES TO PHQ QUESTIONS 1-9: 2
SUM OF ALL RESPONSES TO PHQ QUESTIONS 1-9: 2
SUM OF ALL RESPONSES TO PHQ9 QUESTIONS 1 & 2: 2
6. FEELING BAD ABOUT YOURSELF - OR THAT YOU ARE A FAILURE OR HAVE LET YOURSELF OR YOUR FAMILY DOWN: NOT AT ALL
SUM OF ALL RESPONSES TO PHQ QUESTIONS 1-9: 5

## 2024-07-15 NOTE — PATIENT INSTRUCTIONS
Cut the dose of the sertraline in 1/2   after 1 week, cut the dose in 1/2 again    At that time start taking the Prozac.    Advise me 2-3 weeks.

## 2024-07-15 NOTE — PROGRESS NOTES
Subjective:      Patient ID: Nydia Downs is a 59 y.o. y.o. female.  Feels sluggish . Down    Emotional stress.  Physical stress    Several stressors    Aniversarry of her p[aren't's death coming up --  stress.      Dizziness  Pertinent negatives include no chest pain or coughing.         Chief Complaint   Patient presents with    Shaking     Off balance - doesn't feel right    Dizziness    Sore     L thigh, R forearm - Will not heal    Lower Back Pain     X 1 wk - states she will call the specialist - using heating pad & patches       Allergies   Allergen Reactions    Droperidol Other (See Comments)     unresponsive    Haldol [Haloperidol Lactate] Other (See Comments)     \"felt out of it, similar to the toradol\"    Toradol [Ketorolac Tromethamine] Other (See Comments)     \"out of it\"  \"felt like sleep paralysis\"       Past Medical History:   Diagnosis Date    Anxiety     Depression     Endometriosis     Essential hypertension 12/10/2018    GERD (gastroesophageal reflux disease)     Headache     Obesity     Osteoarthritis     PONV (postoperative nausea and vomiting)     Restless leg syndrome     Wears dentures     Wears glasses     For reading only       Past Surgical History:   Procedure Laterality Date    ANUS SURGERY  2018    fissure     SECTION      x3    COLONOSCOPY      DILATION AND CURETTAGE OF UTERUS N/A 2021    VIDEO HYSTEROSCOPY DILATATION AND CURETTAGE, POSSIBLE MYOSURE, POSSIBLE POLYPECTOMY performed by Ana Mike DO at [facility] OR    FRACTURE SURGERY      right wrist    HERNIA REPAIR  2018    LAPAROSCOPIC PARAESOPHAGEAL HERNIA REPAIR WITH MESH    HYSTERECTOMY (CERVIX STATUS UNKNOWN) N/A 10/21/2021    ROBOTIC ASSISTED LAPAROSCOPIC HYSTERECTOMY WITH RIGHT SALPINGECTOMY, RIGHT OOPHORECTOMY, CYSTOSCOPY, LYSIS OF ADHESIONS  CPT CODE - 69729 performed by Joanie Del Valle DO at [facility] OR    HYSTERECTOMY, TOTAL ABDOMINAL (CERVIX REMOVED)  10/21/21    JOINT REPLACEMENT

## 2024-07-19 ASSESSMENT — ENCOUNTER SYMPTOMS
COUGH: 0
SHORTNESS OF BREATH: 0

## 2024-07-22 RX ORDER — LIOTHYRONINE SODIUM 5 UG/1
5 TABLET ORAL DAILY
Qty: 90 TABLET | Refills: 1 | Status: SHIPPED | OUTPATIENT
Start: 2024-07-22

## 2024-07-22 NOTE — TELEPHONE ENCOUNTER
Future Appointments   Date Time Provider Department Center   7/26/2024 10:30 AM [facility]   7/29/2024  2:30 PM Juliana Simeon [facility] 7/15/2024

## 2024-08-05 RX ORDER — IBUPROFEN 800 MG/1
800 TABLET ORAL EVERY 8 HOURS PRN
Qty: 90 TABLET | Refills: 1 | Status: SHIPPED | OUTPATIENT
Start: 2024-08-05

## 2024-08-05 NOTE — TELEPHONE ENCOUNTER
Lov 7/15/2024    Future Appointments   Date Time Provider Department Center   8/7/2024  8:30 AM [facility]iana Simeon [facility] [place]   8/15/2024  1:20 PM Petar Sorenson DO [facility] DEP

## 2024-08-05 NOTE — PROGRESS NOTES
Behavioral Health Consultation- Follow UP  BECKIE Anderson  8/7/2024   8:59 AM      Nydia Downs, was evaluated through a synchronous (real-time) audio-video encounter. The patient (or guardian if applicable) is aware that this is a billable service, which includes applicable co-pays. This Virtual Visit was conducted with patient's (and/or legal guardian's) consent. Verbal consent to proceed has been obtained within the past 12 months. Patient identification was verified, and a caregiver was present when appropriate. The patient was located in a state where the provider was licensed to provide care.    Time spent with Patient: 32 minutes  This is patient's  30th   [facility] appointment.    Reason for Consult:    Chief Complaint   Patient presents with    Anxiety     Referring Provider: Petar Sorenson DO  [address] [place] [address]    Patient provided informed consent for the behavioral health program. Discussed with patient model of service to include the limits of confidentiality (i.e. abuse reporting, suicide intervention, etc.) and short-term intervention focused approach. Pt indicated understanding. Feedback given to PCP.    Verified the following information:  Patient's identification: Yes  Patient location: [address] [place], [place] [id number] [address]  [facility] [address]   Patient's call back number: [phone]   Patient's emergency contact's name and number, as well as permission to contact them if needed: Extended Emergency Contact Information  Primary Emergency Contact: Loraine Torrez           [place], [place]  Mobile Phone: [phone]  Relation: None  Secondary Emergency Contact: Jasper Downs  Home Phone: [phone]  Mobile Phone: [phone]  Relation: Child     Provider location: [place], [place]     S:  Last appointment 7/18/24    Pt has several upcoming doctor's appointments. Pt has an MRI on her shoulder on Friday. Pt states that her son is \"doing great.\" He has started attending an outpatient program, and he has not had

## 2024-08-07 ENCOUNTER — TELEMEDICINE (OUTPATIENT)
Dept: PSYCHOLOGY | Age: 59
End: 2024-08-07
Payer: MEDICARE

## 2024-08-07 DIAGNOSIS — F41.9 ANXIETY: Primary | ICD-10-CM

## 2024-08-07 DIAGNOSIS — F43.21 GRIEF: ICD-10-CM

## 2024-08-07 DIAGNOSIS — F33.1 MODERATE EPISODE OF RECURRENT MAJOR DEPRESSIVE DISORDER (HCC): ICD-10-CM

## 2024-08-07 PROCEDURE — 1036F TOBACCO NON-USER: CPT | Performed by: SOCIAL WORKER

## 2024-08-07 PROCEDURE — 90832 PSYTX W PT 30 MINUTES: CPT | Performed by: SOCIAL WORKER

## 2024-08-15 ENCOUNTER — OFFICE VISIT (OUTPATIENT)
Dept: FAMILY MEDICINE CLINIC | Age: 59
End: 2024-08-15

## 2024-08-15 VITALS
WEIGHT: 276 LBS | RESPIRATION RATE: 16 BRPM | SYSTOLIC BLOOD PRESSURE: 110 MMHG | OXYGEN SATURATION: 95 % | TEMPERATURE: 97.2 F | HEART RATE: 65 BPM | HEIGHT: 65 IN | BODY MASS INDEX: 45.98 KG/M2 | DIASTOLIC BLOOD PRESSURE: 76 MMHG

## 2024-08-15 DIAGNOSIS — Z00.00 MEDICARE ANNUAL WELLNESS VISIT, SUBSEQUENT: Primary | ICD-10-CM

## 2024-08-15 DIAGNOSIS — Z23 PNEUMOCOCCAL VACCINATION ADMINISTERED AT CURRENT VISIT: ICD-10-CM

## 2024-08-15 RX ORDER — CYCLOBENZAPRINE HCL 10 MG
10 TABLET ORAL 3 TIMES DAILY PRN
COMMUNITY
Start: 2024-08-03

## 2024-08-15 RX ORDER — ALPRAZOLAM 0.5 MG/1
0.5 TABLET ORAL
COMMUNITY
Start: 2024-08-06

## 2024-08-15 SDOH — ECONOMIC STABILITY: FOOD INSECURITY: WITHIN THE PAST 12 MONTHS, THE FOOD YOU BOUGHT JUST DIDN'T LAST AND YOU DIDN'T HAVE MONEY TO GET MORE.: NEVER TRUE

## 2024-08-15 SDOH — ECONOMIC STABILITY: INCOME INSECURITY: HOW HARD IS IT FOR YOU TO PAY FOR THE VERY BASICS LIKE FOOD, HOUSING, MEDICAL CARE, AND HEATING?: NOT HARD AT ALL

## 2024-08-15 SDOH — ECONOMIC STABILITY: FOOD INSECURITY: WITHIN THE PAST 12 MONTHS, YOU WORRIED THAT YOUR FOOD WOULD RUN OUT BEFORE YOU GOT MONEY TO BUY MORE.: NEVER TRUE

## 2024-08-15 ASSESSMENT — PATIENT HEALTH QUESTIONNAIRE - PHQ9
4. FEELING TIRED OR HAVING LITTLE ENERGY: SEVERAL DAYS
SUM OF ALL RESPONSES TO PHQ QUESTIONS 1-9: 16
3. TROUBLE FALLING OR STAYING ASLEEP: NOT AT ALL
2. FEELING DOWN, DEPRESSED OR HOPELESS: NEARLY EVERY DAY
1. LITTLE INTEREST OR PLEASURE IN DOING THINGS: NEARLY EVERY DAY
SUM OF ALL RESPONSES TO PHQ QUESTIONS 1-9: 16
10. IF YOU CHECKED OFF ANY PROBLEMS, HOW DIFFICULT HAVE THESE PROBLEMS MADE IT FOR YOU TO DO YOUR WORK, TAKE CARE OF THINGS AT HOME, OR GET ALONG WITH OTHER PEOPLE: NOT DIFFICULT AT ALL
SUM OF ALL RESPONSES TO PHQ9 QUESTIONS 1 & 2: 6
SUM OF ALL RESPONSES TO PHQ QUESTIONS 1-9: 16
9. THOUGHTS THAT YOU WOULD BE BETTER OFF DEAD, OR OF HURTING YOURSELF: NOT AT ALL
7. TROUBLE CONCENTRATING ON THINGS, SUCH AS READING THE NEWSPAPER OR WATCHING TELEVISION: NEARLY EVERY DAY
5. POOR APPETITE OR OVEREATING: NEARLY EVERY DAY
6. FEELING BAD ABOUT YOURSELF - OR THAT YOU ARE A FAILURE OR HAVE LET YOURSELF OR YOUR FAMILY DOWN: NOT AT ALL
SUM OF ALL RESPONSES TO PHQ QUESTIONS 1-9: 16
8. MOVING OR SPEAKING SO SLOWLY THAT OTHER PEOPLE COULD HAVE NOTICED. OR THE OPPOSITE, BEING SO FIGETY OR RESTLESS THAT YOU HAVE BEEN MOVING AROUND A LOT MORE THAN USUAL: NEARLY EVERY DAY

## 2024-08-15 NOTE — PROGRESS NOTES
Provider  Nicholas Preston MD as Consulting Physician (Otolaryngology)  Ramírez Taylor MD as Surgeon (General Surgery)  Isabelle Rutherford PA-C as Physician Assistant (Physician Assistant Surgical)  Stanfield, Denver Thomas, MD (Orthopedic Surgery)      Reviewed and updated this visit:  Tobacco  Allergies  Meds  Problems  Med Hx  Surg Hx  Soc Hx  Fam Hx         Continue with her other treating physicians and specialists.  Discussed health maintenance, preventative care screening.

## 2024-08-15 NOTE — PATIENT INSTRUCTIONS
with alcohol or drug use, talk to your doctor.   Medicines    Take your medicines exactly as prescribed. Call your doctor if you think you are having a problem with your medicine.     If your doctor recommends aspirin, take the amount directed each day. Make sure you take aspirin and not another kind of pain reliever, such as acetaminophen (Tylenol).   When should you call for help?   Call 911 if you have symptoms of a heart attack. These may include:    Chest pain or pressure, or a strange feeling in the chest.     Sweating.     Shortness of breath.     Pain, pressure, or a strange feeling in the back, neck, jaw, or upper belly or in one or both shoulders or arms.     Lightheadedness or sudden weakness.     A fast or irregular heartbeat.   After you call 911, the  may tell you to chew 1 adult-strength or 2 to 4 low-dose aspirin. Wait for an ambulance. Do not try to drive yourself.  Watch closely for changes in your health, and be sure to contact your doctor if you have any problems.  Where can you learn more?  Go to https://www.[facility].net/patientEd and enter F075 to learn more about \"A Healthy Heart: Care Instructions.\"  Current as of: June 24, 2023  Content Version: 14.1  © 2568-0673 [employer].   Care instructions adapted under license by [employer]. If you have questions about a medical condition or this instruction, always ask your healthcare professional. [employer] disclaims any warranty or liability for your use of this information.      Personalized Preventive Plan for Nydia Downs - 8/15/2024  Medicare offers a range of preventive health benefits. Some of the tests and screenings are paid in full while other may be subject to a deductible, co-insurance, and/or copay.    Some of these benefits include a comprehensive review of your medical history including lifestyle, illnesses that may run in your family, and various assessments and screenings as

## 2024-08-29 DIAGNOSIS — F51.01 PRIMARY INSOMNIA: ICD-10-CM

## 2024-08-29 DIAGNOSIS — F33.41 RECURRENT MAJOR DEPRESSIVE DISORDER, IN PARTIAL REMISSION (HCC): ICD-10-CM

## 2024-08-29 RX ORDER — TRAZODONE HYDROCHLORIDE 50 MG/1
TABLET, FILM COATED ORAL
Qty: 180 TABLET | Refills: 1 | Status: SHIPPED | OUTPATIENT
Start: 2024-08-29

## 2024-08-29 NOTE — TELEPHONE ENCOUNTER
6/4/2024    Future Appointments   Date Time Provider Department Center   9/12/2024 11:00 AM [facility]iana Simeon [facility]

## 2024-09-03 DIAGNOSIS — F41.9 ANXIETY DISORDER, UNSPECIFIED: ICD-10-CM

## 2024-09-03 RX ORDER — ALPRAZOLAM 0.5 MG
TABLET ORAL
Qty: 90 TABLET | Refills: 1 | Status: SHIPPED | OUTPATIENT
Start: 2024-09-03 | End: 2024-11-02

## 2024-09-03 NOTE — TELEPHONE ENCOUNTER
8/15/2024    NO RECENT MED CONTRACT OR DRUG SCREEN     Future Appointments   Date Time Provider Department Center   9/12/2024 11:00 AM [facility] Simeon [facility]

## 2024-09-23 ENCOUNTER — HOSPITAL ENCOUNTER (EMERGENCY)
Age: 59
Discharge: ANOTHER HOSPITAL | End: 2024-09-23
Attending: EMERGENCY MEDICINE
Payer: MEDICARE

## 2024-09-23 ENCOUNTER — APPOINTMENT (OUTPATIENT)
Dept: CT IMAGING | Age: 59
End: 2024-09-23
Payer: MEDICARE

## 2024-09-23 ENCOUNTER — OFFICE VISIT (OUTPATIENT)
Dept: FAMILY MEDICINE CLINIC | Age: 59
End: 2024-09-23

## 2024-09-23 VITALS
TEMPERATURE: 97.1 F | HEART RATE: 78 BPM | WEIGHT: 276 LBS | OXYGEN SATURATION: 97 % | BODY MASS INDEX: 45.37 KG/M2 | RESPIRATION RATE: 16 BRPM | SYSTOLIC BLOOD PRESSURE: 146 MMHG | DIASTOLIC BLOOD PRESSURE: 102 MMHG

## 2024-09-23 VITALS
BODY MASS INDEX: 45.19 KG/M2 | WEIGHT: 281.2 LBS | TEMPERATURE: 97.7 F | HEIGHT: 66 IN | RESPIRATION RATE: 18 BRPM | HEART RATE: 70 BPM | OXYGEN SATURATION: 91 % | DIASTOLIC BLOOD PRESSURE: 98 MMHG | SYSTOLIC BLOOD PRESSURE: 177 MMHG

## 2024-09-23 DIAGNOSIS — L03.213 PERIORBITAL CELLULITIS OF LEFT EYE: Primary | ICD-10-CM

## 2024-09-23 DIAGNOSIS — H02.846 SWELLING OF LEFT EYELID: Primary | ICD-10-CM

## 2024-09-23 DIAGNOSIS — H05.012 ABSCESS OF LEFT PERIORBITAL REGION: ICD-10-CM

## 2024-09-23 DIAGNOSIS — H57.12 LEFT EYE PAIN: ICD-10-CM

## 2024-09-23 LAB
ANION GAP SERPL CALCULATED.3IONS-SCNC: 12 MMOL/L (ref 3–16)
BASOPHILS # BLD: 0.1 K/UL (ref 0–0.2)
BASOPHILS NFR BLD: 0.7 %
BUN SERPL-MCNC: 5 MG/DL (ref 7–20)
CALCIUM SERPL-MCNC: 9.5 MG/DL (ref 8.3–10.6)
CHLORIDE SERPL-SCNC: 102 MMOL/L (ref 99–110)
CO2 SERPL-SCNC: 25 MMOL/L (ref 21–32)
CREAT SERPL-MCNC: 0.6 MG/DL (ref 0.6–1.1)
DEPRECATED RDW RBC AUTO: 14.8 % (ref 12.4–15.4)
EOSINOPHIL # BLD: 0.1 K/UL (ref 0–0.6)
EOSINOPHIL NFR BLD: 0.7 %
GFR SERPLBLD CREATININE-BSD FMLA CKD-EPI: >90 ML/MIN/{1.73_M2}
GLUCOSE SERPL-MCNC: 113 MG/DL (ref 70–99)
HCT VFR BLD AUTO: 44.2 % (ref 36–48)
HGB BLD-MCNC: 14.4 G/DL (ref 12–16)
LYMPHOCYTES # BLD: 1.7 K/UL (ref 1–5.1)
LYMPHOCYTES NFR BLD: 13.1 %
MAGNESIUM SERPL-MCNC: 1.7 MG/DL (ref 1.8–2.4)
MCH RBC QN AUTO: 30.3 PG (ref 26–34)
MCHC RBC AUTO-ENTMCNC: 32.7 G/DL (ref 31–36)
MCV RBC AUTO: 92.9 FL (ref 80–100)
MONOCYTES # BLD: 0.7 K/UL (ref 0–1.3)
MONOCYTES NFR BLD: 5.6 %
NEUTROPHILS # BLD: 10.4 K/UL (ref 1.7–7.7)
NEUTROPHILS NFR BLD: 79.9 %
PLATELET # BLD AUTO: 197 K/UL (ref 135–450)
PMV BLD AUTO: 10 FL (ref 5–10.5)
POTASSIUM SERPL-SCNC: 3.4 MMOL/L (ref 3.5–5.1)
RBC # BLD AUTO: 4.76 M/UL (ref 4–5.2)
SODIUM SERPL-SCNC: 139 MMOL/L (ref 136–145)
WBC # BLD AUTO: 13 K/UL (ref 4–11)

## 2024-09-23 PROCEDURE — 83735 ASSAY OF MAGNESIUM: CPT

## 2024-09-23 PROCEDURE — 6360000004 HC RX CONTRAST MEDICATION: Performed by: STUDENT IN AN ORGANIZED HEALTH CARE EDUCATION/TRAINING PROGRAM

## 2024-09-23 PROCEDURE — 6360000002 HC RX W HCPCS: Performed by: STUDENT IN AN ORGANIZED HEALTH CARE EDUCATION/TRAINING PROGRAM

## 2024-09-23 PROCEDURE — 96375 TX/PRO/DX INJ NEW DRUG ADDON: CPT

## 2024-09-23 PROCEDURE — 96366 THER/PROPH/DIAG IV INF ADDON: CPT

## 2024-09-23 PROCEDURE — 6370000000 HC RX 637 (ALT 250 FOR IP): Performed by: STUDENT IN AN ORGANIZED HEALTH CARE EDUCATION/TRAINING PROGRAM

## 2024-09-23 PROCEDURE — 85025 COMPLETE CBC W/AUTO DIFF WBC: CPT

## 2024-09-23 PROCEDURE — 99285 EMERGENCY DEPT VISIT HI MDM: CPT

## 2024-09-23 PROCEDURE — 96376 TX/PRO/DX INJ SAME DRUG ADON: CPT

## 2024-09-23 PROCEDURE — 80048 BASIC METABOLIC PNL TOTAL CA: CPT

## 2024-09-23 PROCEDURE — 2580000003 HC RX 258: Performed by: STUDENT IN AN ORGANIZED HEALTH CARE EDUCATION/TRAINING PROGRAM

## 2024-09-23 PROCEDURE — 96365 THER/PROPH/DIAG IV INF INIT: CPT

## 2024-09-23 PROCEDURE — 70481 CT ORBIT/EAR/FOSSA W/DYE: CPT

## 2024-09-23 PROCEDURE — 96367 TX/PROPH/DG ADDL SEQ IV INF: CPT

## 2024-09-23 RX ORDER — TETRACAINE HYDROCHLORIDE 5 MG/ML
1 SOLUTION OPHTHALMIC ONCE
Status: COMPLETED | OUTPATIENT
Start: 2024-09-23 | End: 2024-09-23

## 2024-09-23 RX ORDER — MAGNESIUM SULFATE IN WATER 40 MG/ML
2000 INJECTION, SOLUTION INTRAVENOUS ONCE
Status: COMPLETED | OUTPATIENT
Start: 2024-09-23 | End: 2024-09-23

## 2024-09-23 RX ORDER — IOPAMIDOL 755 MG/ML
75 INJECTION, SOLUTION INTRAVASCULAR
Status: COMPLETED | OUTPATIENT
Start: 2024-09-23 | End: 2024-09-23

## 2024-09-23 RX ORDER — POTASSIUM CHLORIDE 1500 MG/1
40 TABLET, EXTENDED RELEASE ORAL ONCE
Status: COMPLETED | OUTPATIENT
Start: 2024-09-23 | End: 2024-09-23

## 2024-09-23 RX ADMIN — FLUORESCEIN SODIUM 1 MG: 1 STRIP OPHTHALMIC at 16:25

## 2024-09-23 RX ADMIN — TETRACAINE HYDROCHLORIDE 1 DROP: 5 SOLUTION OPHTHALMIC at 16:23

## 2024-09-23 RX ADMIN — AMPICILLIN AND SULBACTAM 3000 MG: 2; 1 INJECTION, POWDER, FOR SOLUTION INTRAVENOUS at 15:47

## 2024-09-23 RX ADMIN — HYDROMORPHONE HYDROCHLORIDE 0.5 MG: 1 INJECTION, SOLUTION INTRAMUSCULAR; INTRAVENOUS; SUBCUTANEOUS at 15:42

## 2024-09-23 RX ADMIN — POTASSIUM CHLORIDE 40 MEQ: 1500 TABLET, EXTENDED RELEASE ORAL at 16:26

## 2024-09-23 RX ADMIN — IOPAMIDOL 75 ML: 755 INJECTION, SOLUTION INTRAVENOUS at 14:00

## 2024-09-23 RX ADMIN — MAGNESIUM SULFATE HEPTAHYDRATE 2000 MG: 40 INJECTION, SOLUTION INTRAVENOUS at 16:25

## 2024-09-23 RX ADMIN — HYDROMORPHONE HYDROCHLORIDE 0.5 MG: 1 INJECTION, SOLUTION INTRAMUSCULAR; INTRAVENOUS; SUBCUTANEOUS at 13:15

## 2024-09-23 SDOH — ECONOMIC STABILITY: FOOD INSECURITY: WITHIN THE PAST 12 MONTHS, YOU WORRIED THAT YOUR FOOD WOULD RUN OUT BEFORE YOU GOT MONEY TO BUY MORE.: NEVER TRUE

## 2024-09-23 SDOH — ECONOMIC STABILITY: FOOD INSECURITY: WITHIN THE PAST 12 MONTHS, THE FOOD YOU BOUGHT JUST DIDN'T LAST AND YOU DIDN'T HAVE MONEY TO GET MORE.: NEVER TRUE

## 2024-09-23 SDOH — ECONOMIC STABILITY: INCOME INSECURITY: HOW HARD IS IT FOR YOU TO PAY FOR THE VERY BASICS LIKE FOOD, HOUSING, MEDICAL CARE, AND HEATING?: NOT HARD AT ALL

## 2024-09-23 ASSESSMENT — PATIENT HEALTH QUESTIONNAIRE - PHQ9
SUM OF ALL RESPONSES TO PHQ9 QUESTIONS 1 & 2: 6
7. TROUBLE CONCENTRATING ON THINGS, SUCH AS READING THE NEWSPAPER OR WATCHING TELEVISION: NEARLY EVERY DAY
SUM OF ALL RESPONSES TO PHQ QUESTIONS 1-9: 16
4. FEELING TIRED OR HAVING LITTLE ENERGY: SEVERAL DAYS
1. LITTLE INTEREST OR PLEASURE IN DOING THINGS: NEARLY EVERY DAY
10. IF YOU CHECKED OFF ANY PROBLEMS, HOW DIFFICULT HAVE THESE PROBLEMS MADE IT FOR YOU TO DO YOUR WORK, TAKE CARE OF THINGS AT HOME, OR GET ALONG WITH OTHER PEOPLE: NOT DIFFICULT AT ALL
SUM OF ALL RESPONSES TO PHQ QUESTIONS 1-9: 16
6. FEELING BAD ABOUT YOURSELF - OR THAT YOU ARE A FAILURE OR HAVE LET YOURSELF OR YOUR FAMILY DOWN: NOT AT ALL
DEPRESSION UNABLE TO ASSESS: FUNCTIONAL CAPACITY MOTIVATION LIMITS ACCURACY
SUM OF ALL RESPONSES TO PHQ QUESTIONS 1-9: 16
SUM OF ALL RESPONSES TO PHQ QUESTIONS 1-9: 16
9. THOUGHTS THAT YOU WOULD BE BETTER OFF DEAD, OR OF HURTING YOURSELF: NOT AT ALL
3. TROUBLE FALLING OR STAYING ASLEEP: NOT AT ALL
5. POOR APPETITE OR OVEREATING: NEARLY EVERY DAY
8. MOVING OR SPEAKING SO SLOWLY THAT OTHER PEOPLE COULD HAVE NOTICED. OR THE OPPOSITE, BEING SO FIGETY OR RESTLESS THAT YOU HAVE BEEN MOVING AROUND A LOT MORE THAN USUAL: NEARLY EVERY DAY
2. FEELING DOWN, DEPRESSED OR HOPELESS: NEARLY EVERY DAY

## 2024-09-23 ASSESSMENT — ANXIETY QUESTIONNAIRES
6. BECOMING EASILY ANNOYED OR IRRITABLE: SEVERAL DAYS
3. WORRYING TOO MUCH ABOUT DIFFERENT THINGS: NOT AT ALL
2. NOT BEING ABLE TO STOP OR CONTROL WORRYING: SEVERAL DAYS
4. TROUBLE RELAXING: SEVERAL DAYS
GAD7 TOTAL SCORE: 6
1. FEELING NERVOUS, ANXIOUS, OR ON EDGE: SEVERAL DAYS
IF YOU CHECKED OFF ANY PROBLEMS ON THIS QUESTIONNAIRE, HOW DIFFICULT HAVE THESE PROBLEMS MADE IT FOR YOU TO DO YOUR WORK, TAKE CARE OF THINGS AT HOME, OR GET ALONG WITH OTHER PEOPLE: SOMEWHAT DIFFICULT
5. BEING SO RESTLESS THAT IT IS HARD TO SIT STILL: SEVERAL DAYS
7. FEELING AFRAID AS IF SOMETHING AWFUL MIGHT HAPPEN: SEVERAL DAYS

## 2024-09-23 ASSESSMENT — ENCOUNTER SYMPTOMS
EYE REDNESS: 1
EYE PAIN: 1
EYE DISCHARGE: 1

## 2024-09-23 ASSESSMENT — PAIN DESCRIPTION - DESCRIPTORS
DESCRIPTORS: BURNING
DESCRIPTORS: ACHING;PRESSURE

## 2024-09-23 ASSESSMENT — LIFESTYLE VARIABLES
HOW MANY STANDARD DRINKS CONTAINING ALCOHOL DO YOU HAVE ON A TYPICAL DAY: PATIENT DOES NOT DRINK
HOW OFTEN DO YOU HAVE A DRINK CONTAINING ALCOHOL: NEVER

## 2024-09-23 ASSESSMENT — PAIN DESCRIPTION - ORIENTATION
ORIENTATION: LEFT

## 2024-09-23 ASSESSMENT — PAIN SCALES - GENERAL
PAINLEVEL_OUTOF10: 8
PAINLEVEL_OUTOF10: 7
PAINLEVEL_OUTOF10: 5
PAINLEVEL_OUTOF10: 8

## 2024-09-23 ASSESSMENT — VISUAL ACUITY
OU: 20/30
OD: 20/30
OS: 20/70

## 2024-09-23 ASSESSMENT — PAIN DESCRIPTION - FREQUENCY: FREQUENCY: CONTINUOUS

## 2024-09-23 ASSESSMENT — PAIN DESCRIPTION - PAIN TYPE: TYPE: ACUTE PAIN

## 2024-09-23 ASSESSMENT — PAIN DESCRIPTION - LOCATION
LOCATION: EYE

## 2024-09-24 ASSESSMENT — ENCOUNTER SYMPTOMS
EYE DISCHARGE: 1
EYE PAIN: 1
EYE REDNESS: 1

## 2024-10-01 DIAGNOSIS — M17.10 PRIMARY OSTEOARTHRITIS OF KNEE, UNSPECIFIED LATERALITY: Primary | ICD-10-CM

## 2024-10-01 RX ORDER — IBUPROFEN 800 MG/1
800 TABLET, FILM COATED ORAL EVERY 8 HOURS PRN
Qty: 90 TABLET | Refills: 1 | Status: SHIPPED | OUTPATIENT
Start: 2024-10-01

## 2024-10-01 NOTE — TELEPHONE ENCOUNTER
LOV 9/23/2024    Future Appointments   Date Time Provider Department Center   10/2/2024 11:00 AM Juliana Simeon [facility]

## 2024-10-01 NOTE — PROGRESS NOTES
Topics Concern    Not on file   Social History Narrative    Not on file     Social Determinants of Health     Financial Resource Strain: Low Risk  (9/23/2024)    Overall Financial Resource Strain (CARDIA)     Difficulty of Paying Living Expenses: Not hard at all   Food Insecurity: No Food Insecurity (9/24/2024)    Received from [facility]    Hunger Vital Sign     Worried About Running Out of Food in the Last Year: Never true     Ran Out of Food in the Last Year: Never true   Transportation Needs: No Transportation Needs (9/24/2024)    Received from [facility]    PRAPARE - Transportation     Lack of Transportation (Medical): No     Lack of Transportation (Non-Medical): No   Physical Activity: Inactive (8/15/2024)    Exercise Vital Sign     Days of Exercise per Week: 0 days     Minutes of Exercise per Session: 0 min   Stress: Not on file   Social Connections: Not on file   Intimate Partner Violence: Unknown (1/20/2024)    [facility] from [facility]    Interpersonal Safety     Feel physically or emotionally unsafe where currently live: Not on file     Harm by anyone: Not on file     Emotionally Harmed: Not on file   Housing Stability: Low Risk  (9/24/2024)    Received from [facility]    Housing Stability Vital Sign     Unable to Pay for Housing in the Last Year: No     Number of Times Moved in the Last Year: 1     Homeless in the Last Year: No     TOBACCO:   reports that she has never smoked. She has been exposed to tobacco smoke. She has never used smokeless tobacco.  ETOH:   reports current alcohol use of about 4.0 standard drinks of alcohol per week.  Family History:   Family History   Problem Relation Age of Onset    Arthritis Mother     Obesity Mother     Anemia Mother     Other Mother         pulmonary embolism    Arthritis Father     Arrhythmia Father     Diabetes Other     Diabetes Paternal Grandfather     Cancer Neg Hx     Breast Cancer Neg Hx

## 2024-10-02 ENCOUNTER — TELEMEDICINE (OUTPATIENT)
Dept: PSYCHOLOGY | Age: 59
End: 2024-10-02

## 2024-10-02 DIAGNOSIS — F41.9 ANXIETY: Primary | ICD-10-CM

## 2024-10-04 RX ORDER — ROPINIROLE 2 MG/1
TABLET, FILM COATED ORAL
Qty: 270 TABLET | Refills: 1 | Status: SHIPPED | OUTPATIENT
Start: 2024-10-04

## 2024-10-04 NOTE — TELEPHONE ENCOUNTER
Future Appointments   Date Time Provider Department Center   10/16/2024 11:00 AM Juliana Simeon LISW D PS[facility]CH[facility] 9/23/2024

## 2024-10-16 ENCOUNTER — TELEMEDICINE (OUTPATIENT)
Dept: PSYCHOLOGY | Age: 59
End: 2024-10-16
Payer: MEDICARE

## 2024-10-16 DIAGNOSIS — F41.9 ANXIETY: Primary | ICD-10-CM

## 2024-10-16 PROCEDURE — 1036F TOBACCO NON-USER: CPT | Performed by: SOCIAL WORKER

## 2024-10-16 PROCEDURE — 90832 PSYTX W PT 30 MINUTES: CPT | Performed by: SOCIAL WORKER

## 2024-10-16 NOTE — PROGRESS NOTES
Behavioral Health Consultation- Follow UP  BECKIE Anderson  10/16/2024   11:19 AM      Nydia Downs, was evaluated through a synchronous (real-time) audio-video encounter. The patient (or guardian if applicable) is aware that this is a billable service, which includes applicable co-pays. This Virtual Visit was conducted with patient's (and/or legal guardian's) consent. Verbal consent to proceed has been obtained within the past 12 months. Patient identification was verified, and a caregiver was present when appropriate. The patient was located in a state where the provider was licensed to provide care.    Time spent with Patient: 30 minutes  This is patient's  34th   [facility] appointment.    Reason for Consult:    Chief Complaint   Patient presents with    Anxiety     Referring Provider: Petar Sorenson DO  [address] [place] [address]    Patient provided informed consent for the behavioral health program. Discussed with patient model of service to include the limits of confidentiality (i.e. abuse reporting, suicide intervention, etc.) and short-term intervention focused approach. Pt indicated understanding. Feedback given to PCP.    Verified the following information:  Patient's identification: Yes  Patient location: [address] [place] [address] [id number]  [place] [address]   Patient's call back number: [phone]   Patient's emergency contact's name and number, as well as permission to contact them if needed: Extended Emergency Contact Information  Primary Emergency Contact: Loraine Torrez           [place], [place]  Mobile Phone: [phone]  Relation: None  Secondary Emergency Contact: Jasper Downs  Home Phone: [phone]  Mobile Phone: [phone]  Relation: Child     Provider location: [place], [place]     S:  Last appointment 10/2/24    Pt reports that she has a stomachache and today, which she relates to stress. Pt states that her son is still living with her, and they are having trouble finding him a place to live. Pt

## 2024-10-30 DIAGNOSIS — K21.9 GASTROESOPHAGEAL REFLUX DISEASE, UNSPECIFIED WHETHER ESOPHAGITIS PRESENT: Primary | ICD-10-CM

## 2024-10-30 DIAGNOSIS — F41.9 ANXIETY DISORDER, UNSPECIFIED: ICD-10-CM

## 2024-10-30 RX ORDER — OMEPRAZOLE 40 MG/1
CAPSULE, DELAYED RELEASE ORAL
Qty: 180 CAPSULE | Refills: 1 | Status: SHIPPED | OUTPATIENT
Start: 2024-10-30

## 2024-10-30 NOTE — TELEPHONE ENCOUNTER
Lov 8/15/2024    Future Appointments   Date Time Provider Department Center   11/6/2024 11:00 AM Juliana Simeon [facility]

## 2024-10-31 RX ORDER — ALPRAZOLAM 0.5 MG
TABLET ORAL
Qty: 90 TABLET | Refills: 1 | Status: SHIPPED | OUTPATIENT
Start: 2024-10-31 | End: 2024-12-30

## 2024-10-31 NOTE — TELEPHONE ENCOUNTER
Future Appointments   Date Time Provider Department Center   11/6/2024 11:00 AM Juliana Simeon LISW D PSYCHOLOG[facility] 8/15/2024

## 2024-11-01 NOTE — PROGRESS NOTES
Neg Hx          A:  Patient endorses stable mood. Denies SI/HI, with good insight and motivation. R/o Axis II.    Diagnosis:    1. Anxiety          Past Diagnosis:        Diagnosis Date    Anxiety     Depression     Endometriosis     Essential hypertension 12/10/2018    GERD (gastroesophageal reflux disease)     Headache     Obesity     Osteoarthritis     PONV (postoperative nausea and vomiting)     Restless leg syndrome     Wears dentures     Wears glasses     For reading only         Plan:  Patient interventions:  Discussed self-care (sleep, nutrition, rewarding activities, social support, exercise)  Trained in strategies for increasing balanced thinking  Discussed boundary setting in relationships  Trained in improving communication skills  Supportive techniques  Used open-ended questions and reflection to increase awareness    Patient Behavioral Change Plan:   See Patient Instructions

## 2024-11-04 ENCOUNTER — TELEPHONE (OUTPATIENT)
Dept: ORTHOPEDIC SURGERY | Age: 59
End: 2024-11-04

## 2024-11-04 NOTE — TELEPHONE ENCOUNTER
Orthopedic Nurse Navigator Summary    Patient Name:  Nydia Downs  Anticipated Date of Surgery:  11/21/24  Attended Pre-op Education Class:  Video sent to patient email 11/01/24  PCP: Petar Sorenson DO  Date of PCP visit for H&P:   Is patient in a Pain Management program:  Review of Medical history reveals history of:  HTN, GERD, FAULKNER, PONV    Critical Lab Values  - Hemoglobin (g/dL):  Date: 09/24/24 Value 14.4  - Hematocrit(%): Date: 09/24/24  Value 44.2  - HgbA1C:  Date:  Value  - Albumin:  Date:   Value   - BUN:  Date:  09/24/24 Value 5  - Creatinine:  Date:  09/24/24  Value 0.6    Coronary Artery Disease/HTN/CHF history: HTN  Does the patient see a Cardiologist: No  Date of most recent cardiac appt:  On any anticoagulation:  No    Diabetes History:  No  Most recent HgbA1C:  Pulmonary:  COPD/Emphysema/Use of home oxygen: No  Alcohol use: Yes    BMI greater than 40 at time of scheduling:  BMI 45.39  Additional medical concerns:  Additional recommendations for above concerns:  Attended Pre-Hab program:    Anticipated Discharge Disposition:  Home with C  Who will be with patient at home following discharge:  family  Equipment patient already has:  walker, wheelchair, cane  Bedroom on first or second floor:  second  Bathroom on first or second floor:  second  Weight bearing status:  wbat  Pre-op ambulatory status: painful ambulation  Number of entry steps:  2  Caregiver assistance:  full time    Allison Hatfield RN  Date: 11/04/24

## 2024-11-06 ENCOUNTER — TELEMEDICINE (OUTPATIENT)
Dept: PSYCHOLOGY | Age: 59
End: 2024-11-06
Payer: MEDICARE

## 2024-11-06 DIAGNOSIS — F41.9 ANXIETY: Primary | ICD-10-CM

## 2024-11-06 PROCEDURE — 1036F TOBACCO NON-USER: CPT | Performed by: SOCIAL WORKER

## 2024-11-06 PROCEDURE — 90832 PSYTX W PT 30 MINUTES: CPT | Performed by: SOCIAL WORKER

## 2024-11-14 NOTE — PROGRESS NOTES
Behavioral Health Consultation- Follow UP  BECKIE Anderson  11/20/2024   3:23 PM      Nydia Downs, was evaluated through a synchronous (real-time) audio encounter. The patient (or guardian if applicable) is aware that this is a billable service, which includes applicable co-pays. This Virtual Visit was conducted with patient's (and/or legal guardian's) consent. Verbal consent to proceed has been obtained within the past 12 months. Patient identification was verified, and a caregiver was present when appropriate. The patient was located in a state where the provider was licensed to provide care.    Time spent with Patient: 30 minutes  This is patient's  36th   [facility] [place] appointment.    Reason for Consult:    Chief Complaint   Patient presents with    Anxiety     Referring Provider: Petar Sorenson DO  [address] [place] [address]    Patient provided informed consent for the behavioral health program. Discussed with patient model of service to include the limits of confidentiality (i.e. abuse reporting, suicide intervention, etc.) and short-term intervention focused approach. Pt indicated understanding. Feedback given to PCP.    Verified the following information:  Patient's identification: Yes  Patient location: [address] [place] [address] [address]  [place] [address]   Patient's call back number: [phone]   Patient's emergency contact's name and number, as well as permission to contact them if needed: Extended Emergency Contact Information  Primary Emergency Contact: Mayra Earl  Mobile Phone: [phone]  Relation: Friend   needed? No  Secondary Emergency Contact: Joe Lowe  Mobile Phone: [phone]  Relation: Brother/Sister   needed? No     Provider location: [place], [place]     S:  Last appointment 11/6/24    Pt was unable to connect to VV so a phone call was conducted instead.     Pt is preparing for her right knee replacement surgery tomorrow. Pt reports that she is feeling “OK.” Pt has

## 2024-11-18 ENCOUNTER — HOSPITAL ENCOUNTER (OUTPATIENT)
Age: 59
Discharge: HOME | End: 2024-11-18
Payer: MEDICARE

## 2024-11-18 ENCOUNTER — OFFICE VISIT (OUTPATIENT)
Dept: FAMILY MEDICINE CLINIC | Age: 59
End: 2024-11-18

## 2024-11-18 VITALS
WEIGHT: 279 LBS | OXYGEN SATURATION: 94 % | HEIGHT: 65 IN | SYSTOLIC BLOOD PRESSURE: 138 MMHG | TEMPERATURE: 97.8 F | BODY MASS INDEX: 46.48 KG/M2 | HEART RATE: 70 BPM | RESPIRATION RATE: 16 BRPM | DIASTOLIC BLOOD PRESSURE: 88 MMHG

## 2024-11-18 DIAGNOSIS — Z01.818 PRE-OP EXAM: Primary | ICD-10-CM

## 2024-11-18 DIAGNOSIS — I10 ESSENTIAL HYPERTENSION: ICD-10-CM

## 2024-11-18 DIAGNOSIS — F33.41 RECURRENT MAJOR DEPRESSIVE DISORDER, IN PARTIAL REMISSION (HCC): ICD-10-CM

## 2024-11-18 DIAGNOSIS — E66.01 OBESITY, CLASS III, BMI 40-49.9 (MORBID OBESITY): ICD-10-CM

## 2024-11-18 DIAGNOSIS — M17.11 PRIMARY OSTEOARTHRITIS OF RIGHT KNEE: ICD-10-CM

## 2024-11-18 LAB
APTT BLD: 28.4 SEC (ref 22.1–36.4)
BASOPHILS # BLD: 0.1 K/UL (ref 0–0.2)
BASOPHILS NFR BLD: 0.7 %
DEPRECATED RDW RBC AUTO: 14.7 % (ref 12.4–15.4)
EOSINOPHIL # BLD: 0.2 K/UL (ref 0–0.6)
EOSINOPHIL NFR BLD: 2.1 %
HCT VFR BLD AUTO: 40.9 % (ref 36–48)
HGB BLD-MCNC: 13.7 G/DL (ref 12–16)
INR PPP: 1.05 (ref 0.85–1.15)
LYMPHOCYTES # BLD: 2.1 K/UL (ref 1–5.1)
LYMPHOCYTES NFR BLD: 19.4 %
MCH RBC QN AUTO: 31.1 PG (ref 26–34)
MCHC RBC AUTO-ENTMCNC: 33.6 G/DL (ref 31–36)
MCV RBC AUTO: 92.8 FL (ref 80–100)
MONOCYTES # BLD: 0.6 K/UL (ref 0–1.3)
MONOCYTES NFR BLD: 5.8 %
NEUTROPHILS # BLD: 7.6 K/UL (ref 1.7–7.7)
NEUTROPHILS NFR BLD: 72 %
PLATELET # BLD AUTO: 184 K/UL (ref 135–450)
PMV BLD AUTO: 10.5 FL (ref 5–10.5)
PROTHROMBIN TIME: 13.9 SEC (ref 11.9–14.9)
RBC # BLD AUTO: 4.4 M/UL (ref 4–5.2)
WBC # BLD AUTO: 10.5 K/UL (ref 4–11)

## 2024-11-18 PROCEDURE — 85730 THROMBOPLASTIN TIME PARTIAL: CPT

## 2024-11-18 PROCEDURE — 83036 HEMOGLOBIN GLYCOSYLATED A1C: CPT

## 2024-11-18 PROCEDURE — 36415 COLL VENOUS BLD VENIPUNCTURE: CPT

## 2024-11-18 PROCEDURE — 80053 COMPREHEN METABOLIC PANEL: CPT

## 2024-11-18 PROCEDURE — 87641 MR-STAPH DNA AMP PROBE: CPT

## 2024-11-18 PROCEDURE — 85025 COMPLETE CBC W/AUTO DIFF WBC: CPT

## 2024-11-18 PROCEDURE — 85610 PROTHROMBIN TIME: CPT

## 2024-11-18 SDOH — ECONOMIC STABILITY: FOOD INSECURITY: WITHIN THE PAST 12 MONTHS, YOU WORRIED THAT YOUR FOOD WOULD RUN OUT BEFORE YOU GOT MONEY TO BUY MORE.: NEVER TRUE

## 2024-11-18 SDOH — ECONOMIC STABILITY: INCOME INSECURITY: HOW HARD IS IT FOR YOU TO PAY FOR THE VERY BASICS LIKE FOOD, HOUSING, MEDICAL CARE, AND HEATING?: NOT HARD AT ALL

## 2024-11-18 SDOH — ECONOMIC STABILITY: FOOD INSECURITY: WITHIN THE PAST 12 MONTHS, THE FOOD YOU BOUGHT JUST DIDN'T LAST AND YOU DIDN'T HAVE MONEY TO GET MORE.: NEVER TRUE

## 2024-11-18 ASSESSMENT — ENCOUNTER SYMPTOMS
SORE THROAT: 0
PHOTOPHOBIA: 0
DIARRHEA: 0
COUGH: 0
CONSTIPATION: 0
ABDOMINAL PAIN: 0
SHORTNESS OF BREATH: 0
TROUBLE SWALLOWING: 0

## 2024-11-18 ASSESSMENT — PATIENT HEALTH QUESTIONNAIRE - PHQ9
10. IF YOU CHECKED OFF ANY PROBLEMS, HOW DIFFICULT HAVE THESE PROBLEMS MADE IT FOR YOU TO DO YOUR WORK, TAKE CARE OF THINGS AT HOME, OR GET ALONG WITH OTHER PEOPLE: NOT DIFFICULT AT ALL
8. MOVING OR SPEAKING SO SLOWLY THAT OTHER PEOPLE COULD HAVE NOTICED. OR THE OPPOSITE, BEING SO FIGETY OR RESTLESS THAT YOU HAVE BEEN MOVING AROUND A LOT MORE THAN USUAL: NOT AT ALL
6. FEELING BAD ABOUT YOURSELF - OR THAT YOU ARE A FAILURE OR HAVE LET YOURSELF OR YOUR FAMILY DOWN: NOT AT ALL
SUM OF ALL RESPONSES TO PHQ QUESTIONS 1-9: 1
2. FEELING DOWN, DEPRESSED OR HOPELESS: SEVERAL DAYS
1. LITTLE INTEREST OR PLEASURE IN DOING THINGS: NOT AT ALL
SUM OF ALL RESPONSES TO PHQ QUESTIONS 1-9: 1
4. FEELING TIRED OR HAVING LITTLE ENERGY: NOT AT ALL
SUM OF ALL RESPONSES TO PHQ9 QUESTIONS 1 & 2: 1
SUM OF ALL RESPONSES TO PHQ QUESTIONS 1-9: 1
3. TROUBLE FALLING OR STAYING ASLEEP: NOT AT ALL
7. TROUBLE CONCENTRATING ON THINGS, SUCH AS READING THE NEWSPAPER OR WATCHING TELEVISION: NOT AT ALL
9. THOUGHTS THAT YOU WOULD BE BETTER OFF DEAD, OR OF HURTING YOURSELF: NOT AT ALL
SUM OF ALL RESPONSES TO PHQ QUESTIONS 1-9: 1
5. POOR APPETITE OR OVEREATING: NOT AT ALL

## 2024-11-18 NOTE — PROGRESS NOTES
Subjective:      Patient ID: Nydia Downs is a 59 y.o. y.o. female.    Pre-op  Right total knee        JH    Meds and hx reviewed      Pain and weak feeling   Near falls as feels like giving out.    Chronic pain and debility    HPI      Chief Complaint   Patient presents with    Pre-op Exam     R knee replacement, 24, Dr. Carrington, [place]       Allergies   Allergen Reactions    Droperidol Other (See Comments)     unresponsive    Haldol [Haloperidol Lactate] Other (See Comments)     \"felt out of it, similar to the toradol\"    Toradol [Ketorolac Tromethamine] Other (See Comments)     \"out of it\"  \"felt like sleep paralysis\"       Past Medical History:   Diagnosis Date    Anxiety     Depression     Endometriosis     Essential hypertension 12/10/2018    GERD (gastroesophageal reflux disease)     Headache     Obesity     Osteoarthritis     PONV (postoperative nausea and vomiting)     Restless leg syndrome     Wears dentures     Wears glasses     For reading only       Past Surgical History:   Procedure Laterality Date    ANUS SURGERY  2018    fissure     SECTION      x3    COLONOSCOPY      DILATION AND CURETTAGE OF UTERUS N/A 2021    VIDEO HYSTEROSCOPY DILATATION AND CURETTAGE, POSSIBLE MYOSURE, POSSIBLE POLYPECTOMY performed by Ana Mike DO at [facility] OR    FRACTURE SURGERY      right wrist    HERNIA REPAIR  2018    LAPAROSCOPIC PARAESOPHAGEAL HERNIA REPAIR WITH MESH    HYSTERECTOMY (CERVIX STATUS UNKNOWN) N/A 10/21/2021    ROBOTIC ASSISTED LAPAROSCOPIC HYSTERECTOMY WITH RIGHT SALPINGECTOMY, RIGHT OOPHORECTOMY, CYSTOSCOPY, LYSIS OF ADHESIONS  CPT CODE - 95281 performed by Joanie Del Valle DO at [facility] OR    HYSTERECTOMY, TOTAL ABDOMINAL (CERVIX REMOVED)  10/21/21    JOINT REPLACEMENT  6/15/23  12/5/23    KNEE ARTHROSCOPY Left 11/15/2012    ARTHROSCOPY LEFT KNEE WITH SYOVECTOMY AND CHONDROPLASTY    OVARY REMOVAL Right     ROTATOR CUFF REPAIR Right

## 2024-11-18 NOTE — PROGRESS NOTES
11/18 @ 1440 Pt confirms going to get labwork drawn currently & requesting to have PAT return call back after 330pm today 11/18. Total [facility] video emailed 11/01 & reviewed to patient by Allison 11/4. TJ book, IS instructions, TJ video link, and fall contract placed on chart for DOS. MD    11/18 @ 1502 Spoke with Pallavi @ Dr. Carrington's office & confirms \"OK to give Ortho Mix with Ketorolac Tromethamine despite pt Allergy to Toradol listed \"out of it/felt like paralysis\"\". MD    11/18/24 @ 1545 Left VM for pt to return call to PAT# given to review meds/health hx. MD    11/19 @ 1015  Hibiclens instructions reviewed to use x 5 days preop. MERI screening done. TI Complete. MD

## 2024-11-19 LAB
ALBUMIN SERPL-MCNC: 3.9 G/DL (ref 3.4–5)
ALBUMIN/GLOB SERPL: 1.3 {RATIO} (ref 1.1–2.2)
ALP SERPL-CCNC: 85 U/L (ref 40–129)
ALT SERPL-CCNC: 28 U/L (ref 10–40)
ANION GAP SERPL CALCULATED.3IONS-SCNC: 13 MMOL/L (ref 3–16)
AST SERPL-CCNC: 32 U/L (ref 15–37)
BILIRUB SERPL-MCNC: <0.2 MG/DL (ref 0–1)
BUN SERPL-MCNC: 11 MG/DL (ref 7–20)
CALCIUM SERPL-MCNC: 9.1 MG/DL (ref 8.3–10.6)
CHLORIDE SERPL-SCNC: 101 MMOL/L (ref 99–110)
CO2 SERPL-SCNC: 26 MMOL/L (ref 21–32)
CREAT SERPL-MCNC: 0.7 MG/DL (ref 0.6–1.1)
EST. AVERAGE GLUCOSE BLD GHB EST-MCNC: 108.3 MG/DL
GFR SERPLBLD CREATININE-BSD FMLA CKD-EPI: >90 ML/MIN/{1.73_M2}
GLUCOSE SERPL-MCNC: 96 MG/DL (ref 70–99)
HBA1C MFR BLD: 5.4 %
MRSA DNA SPEC QL NAA+PROBE: NORMAL
POTASSIUM SERPL-SCNC: 3.4 MMOL/L (ref 3.5–5.1)
PROT SERPL-MCNC: 6.8 G/DL (ref 6.4–8.2)
SODIUM SERPL-SCNC: 140 MMOL/L (ref 136–145)

## 2024-11-19 NOTE — PROGRESS NOTES
[facility] PRE-SURGICAL TESTING INSTRUCTIONS                      PRIOR TO PROCEDURE DATE:    1. PLEASE FOLLOW ANY INSTRUCTIONS GIVEN TO YOU PER YOUR SURGEON.      2. Arrange for someone to drive you home and be with you for the first 24 hours after discharge for your safety after your procedure for which you received sedation. Ensure it is someone we can share information with regarding your discharge.     NOTE: At this time ONLY 2 ADULTS may accompany you   One person ENCOURAGED to stay at hospital entire time if outpatient surgery      3. You must contact your surgeon for instructions IF:  You are taking any blood thinners, aspirin, anti-inflammatory or vitamins.  There is a change in your physical condition such as a cold, fever, rash, cuts, sores, or any other infection, especially near your surgical site.    4. Do not drink alcohol the day before or day of your procedure.  Do not use any recreational marijuana at least 24 hours or street drugs (heroin, cocaine) at minimum 5 days prior to your procedure.     5. A Pre-Surgical History and Physical MUST be completed WITHIN 30 DAYS OR LESS prior to your procedure.by your Physician or an Urgent Care        THE DAY OF YOUR PROCEDURE:  1.  Follow instructions for ARRIVAL TIME as DIRECTED BY YOUR SURGEON.     2. Enter the MAIN entrance from [facility] and follow the signs to the free Parking Garage or  Parking (offered free of charge 7 am-5pm).      3. Enter the Main Entrance of the hospital (do not enter from the lower level of the parking garage). Upon entrance, check in with the  at the surgical information desk on your LEFT.   Bring your insurance card and photo ID to register      4. DO NOT EAT ANYTHING 8 hours prior to arrival for surgery.  You may have up to 8 ounces of water 4 hours prior to your arrival for surgery.   NOTE: ALL Gastric, Bariatric & Bowel surgery patients - you MUST follow your surgeon's instructions regarding  drowsiness, changes in your vital signs or breathing patterns. Nausea, headache, muscle aches, or sore throat may also occur after anesthesia.  Your nurse will help you manage these potential side effects.    2. For comfort and safety, arrange to have someone at home with you for the first 24 hours after discharge.    3. You and your family will be given written instructions about your diet, activity, dressing care, medications, and return visits.     4. Once at home, should issues with nausea, pain, or bleeding occur, or should you notice any signs of infection, you should call your surgeon.    5. Always clean your hands before and after caring for your wound. Do not let your family touch your surgery site without cleaning their hands.     6. Narcotic pain medications can cause significant constipation.  You may want to add a stool softener to your postoperative medication schedule or speak to your surgeon on how best to manage this SIDE EFFECT.    SPECIAL INSTRUCTIONS     Thank you for allowing us to care for you.  We strive to exceed your expectations in the delivery of care and service provided to you and your family.     If you need to contact the Pre-Admission Testing staff for any reason, please call us at [phone]    Instructions reviewed with patient during preadmission testing phone interview.  Annelise Hernandez RN.11/19/2024 .9:55 AM      ADDITIONAL EDUCATIONAL INFORMATION REVIEWED PER PHONE WITH YOU AND/OR YOUR FAMILY:  Yes Hibiclens® Bathing Instructions   Yes Antibacterial Soap   Cognitively Impaired

## 2024-11-19 NOTE — PROGRESS NOTES
[facility] Same Day Readiness Screen 2.0  PAT Questionnaire    Does patient have at least one day of 24 hr assist of capable caregiver at d/c?  [x] Yes = 0  [] No = 6    Was patient using an assistive device to walk prior to surgery?  [x] No = 0  [] Yes = 4    How many steps do you have to get to the floor where you plan to initially sleep and use the restroom? (At least 1/2 bath)  [x] 0-2 steps= 0 [] 3+ steps = 1    Has patient fallen in the last 3 months? If yes, how many times?  [x] 0 falls = 0 [] 1+ fall = 1    [] 2+ falls = 4    Does patient have a hx of post-op nausea/vomiting?  [] No = 0 [x] Yes = 2    Other Factors    Age  [x] <70 = 0 [] 71-79 = 1  [] 80+ = 2                     BMI  [] <30 = 0       []31-39 = 1    [x] >40 = 2    Pertinent co-morbidities (consider cardiopulmonary, cardiovascular, neurological, and psychiatric diagnoses)  [] 0 = 0           [] 1-2 = 2       [x] 3+ = 4    Sleep apnea  [x] No = 0         [] Yes = 1    Hx of prolonged emergence from general anesthesia  [x] No = 0         [] Yes = 3      Score: 8      Interpretation:  Red (10-29): Low probability of safe same day discharge  Yellow (6-9): Moderate probability of safe same day discharge  Green (0-5): High probability of safe same day discharge    Score completed by: Karmen Hoyos, PT, DPT

## 2024-11-20 ENCOUNTER — TELEMEDICINE (OUTPATIENT)
Dept: PSYCHOLOGY | Age: 59
End: 2024-11-20
Payer: MEDICARE

## 2024-11-20 DIAGNOSIS — F41.9 ANXIETY: Primary | ICD-10-CM

## 2024-11-20 DIAGNOSIS — F43.21 GRIEF: ICD-10-CM

## 2024-11-20 PROCEDURE — 98968 PH1 ASSMT&MGMT NQHP 21-30: CPT | Performed by: SOCIAL WORKER

## 2024-11-21 ENCOUNTER — HOSPITAL ENCOUNTER (OUTPATIENT)
Age: 59
Setting detail: OBSERVATION
Discharge: HOME | End: 2024-11-23
Attending: ORTHOPAEDIC SURGERY | Admitting: ORTHOPAEDIC SURGERY
Payer: MEDICARE

## 2024-11-21 ENCOUNTER — ANESTHESIA (OUTPATIENT)
Dept: OPERATING ROOM | Age: 59
End: 2024-11-21
Payer: MEDICARE

## 2024-11-21 ENCOUNTER — APPOINTMENT (OUTPATIENT)
Dept: GENERAL RADIOLOGY | Age: 59
End: 2024-11-21
Attending: ORTHOPAEDIC SURGERY
Payer: MEDICARE

## 2024-11-21 ENCOUNTER — ANESTHESIA EVENT (OUTPATIENT)
Dept: OPERATING ROOM | Age: 59
End: 2024-11-21
Payer: MEDICARE

## 2024-11-21 PROBLEM — M17.11 PRIMARY OSTEOARTHRITIS OF RIGHT KNEE: Status: ACTIVE | Noted: 2024-11-21

## 2024-11-21 LAB
ABO + RH BLD: NORMAL
BLD GP AB SCN SERPL QL: NORMAL
GLUCOSE BLD-MCNC: 139 MG/DL (ref 70–99)
PERFORMED ON: ABNORMAL

## 2024-11-21 PROCEDURE — 2580000003 HC RX 258: Performed by: ORTHOPAEDIC SURGERY

## 2024-11-21 PROCEDURE — 86850 RBC ANTIBODY SCREEN: CPT

## 2024-11-21 PROCEDURE — 3700000001 HC ADD 15 MINUTES (ANESTHESIA): Performed by: ORTHOPAEDIC SURGERY

## 2024-11-21 PROCEDURE — 3600000014 HC SURGERY LEVEL 4 ADDTL 15MIN: Performed by: ORTHOPAEDIC SURGERY

## 2024-11-21 PROCEDURE — 7100000000 HC PACU RECOVERY - FIRST 15 MIN: Performed by: ORTHOPAEDIC SURGERY

## 2024-11-21 PROCEDURE — 2580000003 HC RX 258: Performed by: ANESTHESIOLOGY

## 2024-11-21 PROCEDURE — C1776 JOINT DEVICE (IMPLANTABLE): HCPCS | Performed by: ORTHOPAEDIC SURGERY

## 2024-11-21 PROCEDURE — 7100000001 HC PACU RECOVERY - ADDTL 15 MIN: Performed by: ORTHOPAEDIC SURGERY

## 2024-11-21 PROCEDURE — A4217 STERILE WATER/SALINE, 500 ML: HCPCS | Performed by: ORTHOPAEDIC SURGERY

## 2024-11-21 PROCEDURE — 6360000002 HC RX W HCPCS: Performed by: ORTHOPAEDIC SURGERY

## 2024-11-21 PROCEDURE — 2580000003 HC RX 258

## 2024-11-21 PROCEDURE — 6360000002 HC RX W HCPCS: Performed by: ANESTHESIOLOGY

## 2024-11-21 PROCEDURE — 2709999900 HC NON-CHARGEABLE SUPPLY: Performed by: ORTHOPAEDIC SURGERY

## 2024-11-21 PROCEDURE — 6360000002 HC RX W HCPCS

## 2024-11-21 PROCEDURE — 6370000000 HC RX 637 (ALT 250 FOR IP): Performed by: ANESTHESIOLOGY

## 2024-11-21 PROCEDURE — 2500000003 HC RX 250 WO HCPCS

## 2024-11-21 PROCEDURE — 86901 BLOOD TYPING SEROLOGIC RH(D): CPT

## 2024-11-21 PROCEDURE — 2500000003 HC RX 250 WO HCPCS: Performed by: ORTHOPAEDIC SURGERY

## 2024-11-21 PROCEDURE — 6370000000 HC RX 637 (ALT 250 FOR IP): Performed by: ORTHOPAEDIC SURGERY

## 2024-11-21 PROCEDURE — C1713 ANCHOR/SCREW BN/BN,TIS/BN: HCPCS | Performed by: ORTHOPAEDIC SURGERY

## 2024-11-21 PROCEDURE — 3600000004 HC SURGERY LEVEL 4 BASE: Performed by: ORTHOPAEDIC SURGERY

## 2024-11-21 PROCEDURE — 73560 X-RAY EXAM OF KNEE 1 OR 2: CPT

## 2024-11-21 PROCEDURE — 64447 NJX AA&/STRD FEMORAL NRV IMG: CPT | Performed by: ANESTHESIOLOGY

## 2024-11-21 PROCEDURE — 3700000000 HC ANESTHESIA ATTENDED CARE: Performed by: ORTHOPAEDIC SURGERY

## 2024-11-21 PROCEDURE — 2720000010 HC SURG SUPPLY STERILE: Performed by: ORTHOPAEDIC SURGERY

## 2024-11-21 PROCEDURE — 86900 BLOOD TYPING SEROLOGIC ABO: CPT

## 2024-11-21 PROCEDURE — G0378 HOSPITAL OBSERVATION PER HR: HCPCS

## 2024-11-21 DEVICE — PSN TIB STM 5 DEG SZ D R: Type: IMPLANTABLE DEVICE | Site: KNEE | Status: FUNCTIONAL

## 2024-11-21 DEVICE — CEMENT BNE 20ML 41GM FULL DOSE PMMA W/ TOBRA M VISC RADPQ: Type: IMPLANTABLE DEVICE | Site: KNEE | Status: FUNCTIONAL

## 2024-11-21 DEVICE — PSN MC VE ASF R 10MM 8-9/CD: Type: IMPLANTABLE DEVICE | Site: KNEE | Status: FUNCTIONAL

## 2024-11-21 DEVICE — KIT KNEE IMPL CAPPED K1 HEMI STD CEM K1ZIMMERBIOMET: Type: IMPLANTABLE DEVICE | Site: KNEE | Status: FUNCTIONAL

## 2024-11-21 DEVICE — COMPONENT PAT DIA29MM THK8MM STD VIVACIT-E CEM INSET FOR: Type: IMPLANTABLE DEVICE | Site: KNEE | Status: FUNCTIONAL

## 2024-11-21 DEVICE — IMPLANTABLE DEVICE: Type: IMPLANTABLE DEVICE | Site: KNEE | Status: FUNCTIONAL

## 2024-11-21 RX ORDER — MIDAZOLAM HYDROCHLORIDE 1 MG/ML
INJECTION, SOLUTION INTRAMUSCULAR; INTRAVENOUS
Status: COMPLETED | OUTPATIENT
Start: 2024-11-21 | End: 2024-11-21

## 2024-11-21 RX ORDER — HALOPERIDOL 5 MG/ML
1 INJECTION INTRAMUSCULAR
Status: DISCONTINUED | OUTPATIENT
Start: 2024-11-21 | End: 2024-11-21 | Stop reason: HOSPADM

## 2024-11-21 RX ORDER — DEXAMETHASONE SODIUM PHOSPHATE 4 MG/ML
INJECTION, SOLUTION INTRA-ARTICULAR; INTRALESIONAL; INTRAMUSCULAR; INTRAVENOUS; SOFT TISSUE
Status: DISCONTINUED | OUTPATIENT
Start: 2024-11-21 | End: 2024-11-21 | Stop reason: SDUPTHER

## 2024-11-21 RX ORDER — ALBUTEROL SULFATE 0.83 MG/ML
2.5 SOLUTION RESPIRATORY (INHALATION) EVERY 6 HOURS PRN
Status: DISCONTINUED | OUTPATIENT
Start: 2024-11-21 | End: 2024-11-23 | Stop reason: HOSPADM

## 2024-11-21 RX ORDER — ACETAMINOPHEN 500 MG
1000 TABLET ORAL ONCE
Status: COMPLETED | OUTPATIENT
Start: 2024-11-21 | End: 2024-11-21

## 2024-11-21 RX ORDER — SODIUM CHLORIDE 9 MG/ML
INJECTION, SOLUTION INTRAVENOUS PRN
Status: DISCONTINUED | OUTPATIENT
Start: 2024-11-21 | End: 2024-11-21 | Stop reason: HOSPADM

## 2024-11-21 RX ORDER — SODIUM CHLORIDE 0.9 % (FLUSH) 0.9 %
5-40 SYRINGE (ML) INJECTION PRN
Status: DISCONTINUED | OUTPATIENT
Start: 2024-11-21 | End: 2024-11-21 | Stop reason: HOSPADM

## 2024-11-21 RX ORDER — BUPIVACAINE HYDROCHLORIDE 5 MG/ML
INJECTION, SOLUTION EPIDURAL; INTRACAUDAL
Status: COMPLETED | OUTPATIENT
Start: 2024-11-21 | End: 2024-11-21

## 2024-11-21 RX ORDER — SCOLOPAMINE TRANSDERMAL SYSTEM 1 MG/1
1 PATCH, EXTENDED RELEASE TRANSDERMAL
Status: DISCONTINUED | OUTPATIENT
Start: 2024-11-21 | End: 2024-11-23 | Stop reason: HOSPADM

## 2024-11-21 RX ORDER — ROPINIROLE 2 MG/1
2 TABLET, FILM COATED ORAL 2 TIMES DAILY
Status: DISCONTINUED | OUTPATIENT
Start: 2024-11-21 | End: 2024-11-23 | Stop reason: HOSPADM

## 2024-11-21 RX ORDER — FENTANYL CITRATE 50 UG/ML
25 INJECTION, SOLUTION INTRAMUSCULAR; INTRAVENOUS EVERY 5 MIN PRN
Status: DISCONTINUED | OUTPATIENT
Start: 2024-11-21 | End: 2024-11-21 | Stop reason: HOSPADM

## 2024-11-21 RX ORDER — SODIUM CHLORIDE 0.9 % (FLUSH) 0.9 %
5-40 SYRINGE (ML) INJECTION EVERY 12 HOURS SCHEDULED
Status: DISCONTINUED | OUTPATIENT
Start: 2024-11-21 | End: 2024-11-23 | Stop reason: HOSPADM

## 2024-11-21 RX ORDER — HYDRALAZINE HYDROCHLORIDE 20 MG/ML
10 INJECTION INTRAMUSCULAR; INTRAVENOUS
Status: DISCONTINUED | OUTPATIENT
Start: 2024-11-21 | End: 2024-11-21 | Stop reason: HOSPADM

## 2024-11-21 RX ORDER — SODIUM CHLORIDE 9 MG/ML
INJECTION, SOLUTION INTRAVENOUS PRN
Status: DISCONTINUED | OUTPATIENT
Start: 2024-11-21 | End: 2024-11-23 | Stop reason: HOSPADM

## 2024-11-21 RX ORDER — OXYCODONE HYDROCHLORIDE 5 MG/1
10 TABLET ORAL EVERY 4 HOURS PRN
Status: DISCONTINUED | OUTPATIENT
Start: 2024-11-21 | End: 2024-11-23 | Stop reason: HOSPADM

## 2024-11-21 RX ORDER — BUPIVACAINE HYDROCHLORIDE 5 MG/ML
INJECTION, SOLUTION EPIDURAL; INTRACAUDAL
Status: COMPLETED
Start: 2024-11-21 | End: 2024-11-21

## 2024-11-21 RX ORDER — LIDOCAINE HYDROCHLORIDE 10 MG/ML
1 INJECTION, SOLUTION EPIDURAL; INFILTRATION; INTRACAUDAL; PERINEURAL
Status: DISCONTINUED | OUTPATIENT
Start: 2024-11-21 | End: 2024-11-21 | Stop reason: HOSPADM

## 2024-11-21 RX ORDER — PANTOPRAZOLE SODIUM 40 MG/1
40 TABLET, DELAYED RELEASE ORAL
Status: DISCONTINUED | OUTPATIENT
Start: 2024-11-22 | End: 2024-11-23 | Stop reason: HOSPADM

## 2024-11-21 RX ORDER — ONDANSETRON 2 MG/ML
4 INJECTION INTRAMUSCULAR; INTRAVENOUS EVERY 6 HOURS PRN
Status: DISCONTINUED | OUTPATIENT
Start: 2024-11-21 | End: 2024-11-23 | Stop reason: HOSPADM

## 2024-11-21 RX ORDER — OXYCODONE HYDROCHLORIDE 5 MG/1
5 TABLET ORAL EVERY 4 HOURS PRN
Status: DISCONTINUED | OUTPATIENT
Start: 2024-11-21 | End: 2024-11-23 | Stop reason: HOSPADM

## 2024-11-21 RX ORDER — VANCOMYCIN HYDROCHLORIDE 1 G/20ML
INJECTION, POWDER, LYOPHILIZED, FOR SOLUTION INTRAVENOUS PRN
Status: DISCONTINUED | OUTPATIENT
Start: 2024-11-21 | End: 2024-11-21 | Stop reason: HOSPADM

## 2024-11-21 RX ORDER — PROCHLORPERAZINE EDISYLATE 5 MG/ML
5 INJECTION INTRAMUSCULAR; INTRAVENOUS
Status: DISCONTINUED | OUTPATIENT
Start: 2024-11-21 | End: 2024-11-21 | Stop reason: HOSPADM

## 2024-11-21 RX ORDER — CYCLOBENZAPRINE HCL 10 MG
10 TABLET ORAL 3 TIMES DAILY PRN
Status: DISCONTINUED | OUTPATIENT
Start: 2024-11-21 | End: 2024-11-23 | Stop reason: HOSPADM

## 2024-11-21 RX ORDER — KETAMINE HCL IN NACL, ISO-OSM 20 MG/2 ML
SYRINGE (ML) INJECTION
Status: DISCONTINUED | OUTPATIENT
Start: 2024-11-21 | End: 2024-11-21 | Stop reason: SDUPTHER

## 2024-11-21 RX ORDER — ALBUTEROL SULFATE 90 UG/1
2 INHALANT RESPIRATORY (INHALATION) 4 TIMES DAILY PRN
Status: DISCONTINUED | OUTPATIENT
Start: 2024-11-21 | End: 2024-11-21 | Stop reason: CLARIF

## 2024-11-21 RX ORDER — METOCLOPRAMIDE 10 MG/1
5 TABLET ORAL 3 TIMES DAILY PRN
Status: DISCONTINUED | OUTPATIENT
Start: 2024-11-21 | End: 2024-11-23 | Stop reason: HOSPADM

## 2024-11-21 RX ORDER — HYDROMORPHONE HYDROCHLORIDE 1 MG/ML
0.5 INJECTION, SOLUTION INTRAMUSCULAR; INTRAVENOUS; SUBCUTANEOUS
Status: DISCONTINUED | OUTPATIENT
Start: 2024-11-21 | End: 2024-11-23 | Stop reason: HOSPADM

## 2024-11-21 RX ORDER — ALPRAZOLAM 0.5 MG
0.5 TABLET ORAL 3 TIMES DAILY PRN
Status: DISCONTINUED | OUTPATIENT
Start: 2024-11-21 | End: 2024-11-23 | Stop reason: HOSPADM

## 2024-11-21 RX ORDER — FLUTICASONE PROPIONATE 50 MCG
1 SPRAY, SUSPENSION (ML) NASAL DAILY PRN
Status: DISCONTINUED | OUTPATIENT
Start: 2024-11-21 | End: 2024-11-23 | Stop reason: HOSPADM

## 2024-11-21 RX ORDER — SODIUM CHLORIDE 0.9 % (FLUSH) 0.9 %
5-40 SYRINGE (ML) INJECTION PRN
Status: DISCONTINUED | OUTPATIENT
Start: 2024-11-21 | End: 2024-11-23 | Stop reason: HOSPADM

## 2024-11-21 RX ORDER — ONDANSETRON 4 MG/1
4 TABLET, ORALLY DISINTEGRATING ORAL EVERY 8 HOURS PRN
Status: DISCONTINUED | OUTPATIENT
Start: 2024-11-21 | End: 2024-11-23 | Stop reason: HOSPADM

## 2024-11-21 RX ORDER — FENTANYL CITRATE 50 UG/ML
INJECTION, SOLUTION INTRAMUSCULAR; INTRAVENOUS
Status: DISCONTINUED | OUTPATIENT
Start: 2024-11-21 | End: 2024-11-21 | Stop reason: SDUPTHER

## 2024-11-21 RX ORDER — PROPOFOL 10 MG/ML
INJECTION, EMULSION INTRAVENOUS
Status: DISCONTINUED | OUTPATIENT
Start: 2024-11-21 | End: 2024-11-21 | Stop reason: SDUPTHER

## 2024-11-21 RX ORDER — TRAZODONE HYDROCHLORIDE 100 MG/1
100 TABLET ORAL NIGHTLY PRN
Status: DISCONTINUED | OUTPATIENT
Start: 2024-11-21 | End: 2024-11-23 | Stop reason: HOSPADM

## 2024-11-21 RX ORDER — LIDOCAINE HYDROCHLORIDE 20 MG/ML
INJECTION, SOLUTION INTRAVENOUS
Status: DISCONTINUED | OUTPATIENT
Start: 2024-11-21 | End: 2024-11-21 | Stop reason: SDUPTHER

## 2024-11-21 RX ORDER — HYDROMORPHONE HYDROCHLORIDE 1 MG/ML
0.5 INJECTION, SOLUTION INTRAMUSCULAR; INTRAVENOUS; SUBCUTANEOUS EVERY 5 MIN PRN
Status: DISCONTINUED | OUTPATIENT
Start: 2024-11-21 | End: 2024-11-21 | Stop reason: HOSPADM

## 2024-11-21 RX ORDER — SODIUM CHLORIDE 9 MG/ML
INJECTION, SOLUTION INTRAVENOUS
Status: DISCONTINUED | OUTPATIENT
Start: 2024-11-21 | End: 2024-11-21 | Stop reason: SDUPTHER

## 2024-11-21 RX ORDER — ROCURONIUM BROMIDE 10 MG/ML
INJECTION, SOLUTION INTRAVENOUS
Status: DISCONTINUED | OUTPATIENT
Start: 2024-11-21 | End: 2024-11-21 | Stop reason: SDUPTHER

## 2024-11-21 RX ORDER — HYDROMORPHONE HYDROCHLORIDE 2 MG/ML
INJECTION, SOLUTION INTRAMUSCULAR; INTRAVENOUS; SUBCUTANEOUS
Status: DISCONTINUED | OUTPATIENT
Start: 2024-11-21 | End: 2024-11-21 | Stop reason: SDUPTHER

## 2024-11-21 RX ORDER — SODIUM CHLORIDE 0.9 % (FLUSH) 0.9 %
5-40 SYRINGE (ML) INJECTION EVERY 12 HOURS SCHEDULED
Status: DISCONTINUED | OUTPATIENT
Start: 2024-11-21 | End: 2024-11-21 | Stop reason: HOSPADM

## 2024-11-21 RX ORDER — SENNA AND DOCUSATE SODIUM 50; 8.6 MG/1; MG/1
1 TABLET, FILM COATED ORAL 2 TIMES DAILY
Status: DISCONTINUED | OUTPATIENT
Start: 2024-11-21 | End: 2024-11-23 | Stop reason: HOSPADM

## 2024-11-21 RX ORDER — APREPITANT 40 MG/1
40 CAPSULE ORAL ONCE
Status: COMPLETED | OUTPATIENT
Start: 2024-11-21 | End: 2024-11-21

## 2024-11-21 RX ORDER — GABAPENTIN 300 MG/1
300 CAPSULE ORAL NIGHTLY PRN
Status: DISCONTINUED | OUTPATIENT
Start: 2024-11-21 | End: 2024-11-23 | Stop reason: HOSPADM

## 2024-11-21 RX ORDER — ASPIRIN 81 MG/1
81 TABLET ORAL 2 TIMES DAILY
Status: DISCONTINUED | OUTPATIENT
Start: 2024-11-21 | End: 2024-11-23 | Stop reason: HOSPADM

## 2024-11-21 RX ORDER — ONDANSETRON 2 MG/ML
INJECTION INTRAMUSCULAR; INTRAVENOUS
Status: DISCONTINUED | OUTPATIENT
Start: 2024-11-21 | End: 2024-11-21 | Stop reason: SDUPTHER

## 2024-11-21 RX ORDER — SODIUM CHLORIDE, SODIUM LACTATE, POTASSIUM CHLORIDE, CALCIUM CHLORIDE 600; 310; 30; 20 MG/100ML; MG/100ML; MG/100ML; MG/100ML
INJECTION, SOLUTION INTRAVENOUS CONTINUOUS
Status: DISCONTINUED | OUTPATIENT
Start: 2024-11-21 | End: 2024-11-21 | Stop reason: HOSPADM

## 2024-11-21 RX ORDER — MIDAZOLAM HYDROCHLORIDE 1 MG/ML
INJECTION, SOLUTION INTRAMUSCULAR; INTRAVENOUS
Status: COMPLETED
Start: 2024-11-21 | End: 2024-11-21

## 2024-11-21 RX ORDER — EPHEDRINE SULFATE 50 MG/ML
INJECTION INTRAVENOUS
Status: DISCONTINUED | OUTPATIENT
Start: 2024-11-21 | End: 2024-11-21 | Stop reason: SDUPTHER

## 2024-11-21 RX ORDER — METHOCARBAMOL 100 MG/ML
INJECTION, SOLUTION INTRAMUSCULAR; INTRAVENOUS
Status: DISCONTINUED | OUTPATIENT
Start: 2024-11-21 | End: 2024-11-21 | Stop reason: SDUPTHER

## 2024-11-21 RX ORDER — ACETAMINOPHEN 325 MG/1
650 TABLET ORAL EVERY 6 HOURS
Status: DISCONTINUED | OUTPATIENT
Start: 2024-11-21 | End: 2024-11-23 | Stop reason: HOSPADM

## 2024-11-21 RX ORDER — NALOXONE HYDROCHLORIDE 0.4 MG/ML
INJECTION, SOLUTION INTRAMUSCULAR; INTRAVENOUS; SUBCUTANEOUS PRN
Status: DISCONTINUED | OUTPATIENT
Start: 2024-11-21 | End: 2024-11-21 | Stop reason: HOSPADM

## 2024-11-21 RX ADMIN — ROCURONIUM BROMIDE 20 MG: 10 INJECTION, SOLUTION INTRAVENOUS at 12:01

## 2024-11-21 RX ADMIN — HYDROMORPHONE HYDROCHLORIDE 0.5 MG: 1 INJECTION, SOLUTION INTRAMUSCULAR; INTRAVENOUS; SUBCUTANEOUS at 14:41

## 2024-11-21 RX ADMIN — ONDANSETRON 4 MG: 2 INJECTION INTRAMUSCULAR; INTRAVENOUS at 13:15

## 2024-11-21 RX ADMIN — ACETAMINOPHEN 1000 MG: 500 TABLET ORAL at 09:52

## 2024-11-21 RX ADMIN — SENNOSIDES AND DOCUSATE SODIUM 1 TABLET: 50; 8.6 TABLET ORAL at 20:21

## 2024-11-21 RX ADMIN — HYDROMORPHONE HYDROCHLORIDE 0.5 MG: 1 INJECTION, SOLUTION INTRAMUSCULAR; INTRAVENOUS; SUBCUTANEOUS at 20:21

## 2024-11-21 RX ADMIN — PROPOFOL 30 MG: 10 INJECTION, EMULSION INTRAVENOUS at 12:58

## 2024-11-21 RX ADMIN — PROPOFOL 30 MG: 10 INJECTION, EMULSION INTRAVENOUS at 11:19

## 2024-11-21 RX ADMIN — ASPIRIN 81 MG: 81 TABLET, COATED ORAL at 20:21

## 2024-11-21 RX ADMIN — PROPOFOL 140 MG: 10 INJECTION, EMULSION INTRAVENOUS at 11:17

## 2024-11-21 RX ADMIN — HYDROMORPHONE HYDROCHLORIDE 0.5 MG: 2 INJECTION, SOLUTION INTRAMUSCULAR; INTRAVENOUS; SUBCUTANEOUS at 13:55

## 2024-11-21 RX ADMIN — PROPOFOL 30 MG: 10 INJECTION, EMULSION INTRAVENOUS at 13:47

## 2024-11-21 RX ADMIN — BUPIVACAINE HYDROCHLORIDE 20 ML: 5 INJECTION, SOLUTION EPIDURAL; INTRACAUDAL; PERINEURAL at 10:21

## 2024-11-21 RX ADMIN — METHOCARBAMOL 200 MG: 100 INJECTION INTRAMUSCULAR; INTRAVENOUS at 12:45

## 2024-11-21 RX ADMIN — PROPOFOL 20 MG: 10 INJECTION, EMULSION INTRAVENOUS at 12:36

## 2024-11-21 RX ADMIN — METHOCARBAMOL 200 MG: 100 INJECTION INTRAMUSCULAR; INTRAVENOUS at 12:34

## 2024-11-21 RX ADMIN — LIDOCAINE HYDROCHLORIDE 100 MG: 20 INJECTION, SOLUTION INTRAVENOUS at 11:15

## 2024-11-21 RX ADMIN — SODIUM CHLORIDE 3000 MG: 900 INJECTION INTRAVENOUS at 20:27

## 2024-11-21 RX ADMIN — OXYCODONE 10 MG: 5 TABLET ORAL at 16:27

## 2024-11-21 RX ADMIN — SODIUM CHLORIDE, POTASSIUM CHLORIDE, SODIUM LACTATE AND CALCIUM CHLORIDE: 600; 310; 30; 20 INJECTION, SOLUTION INTRAVENOUS at 09:40

## 2024-11-21 RX ADMIN — PROPOFOL 40 MG: 10 INJECTION, EMULSION INTRAVENOUS at 13:39

## 2024-11-21 RX ADMIN — TRANEXAMIC ACID 1000 MG: 100 INJECTION, SOLUTION INTRAVENOUS at 13:18

## 2024-11-21 RX ADMIN — TRANEXAMIC ACID 1000 MG: 100 INJECTION, SOLUTION INTRAVENOUS at 11:32

## 2024-11-21 RX ADMIN — METHOCARBAMOL 200 MG: 100 INJECTION INTRAMUSCULAR; INTRAVENOUS at 12:23

## 2024-11-21 RX ADMIN — ACETAMINOPHEN 650 MG: 325 TABLET, FILM COATED ORAL at 23:06

## 2024-11-21 RX ADMIN — OXYCODONE 5 MG: 5 TABLET ORAL at 23:06

## 2024-11-21 RX ADMIN — APREPITANT 40 MG: 40 CAPSULE ORAL at 09:52

## 2024-11-21 RX ADMIN — ACETAMINOPHEN 650 MG: 325 TABLET, FILM COATED ORAL at 16:27

## 2024-11-21 RX ADMIN — HYDROMORPHONE HYDROCHLORIDE 0.5 MG: 2 INJECTION, SOLUTION INTRAMUSCULAR; INTRAVENOUS; SUBCUTANEOUS at 12:36

## 2024-11-21 RX ADMIN — ROPINIROLE HYDROCHLORIDE 2 MG: 2 TABLET, FILM COATED ORAL at 20:21

## 2024-11-21 RX ADMIN — MIDAZOLAM HYDROCHLORIDE 2 MG: 2 INJECTION, SOLUTION INTRAMUSCULAR; INTRAVENOUS at 10:21

## 2024-11-21 RX ADMIN — SUGAMMADEX 400 MG: 100 INJECTION, SOLUTION INTRAVENOUS at 13:58

## 2024-11-21 RX ADMIN — PROPOFOL 30 MG: 10 INJECTION, EMULSION INTRAVENOUS at 12:34

## 2024-11-21 RX ADMIN — Medication 20 MG: at 11:57

## 2024-11-21 RX ADMIN — METHOCARBAMOL 100 MG: 100 INJECTION INTRAMUSCULAR; INTRAVENOUS at 12:54

## 2024-11-21 RX ADMIN — HYDROMORPHONE HYDROCHLORIDE 1 MG: 2 INJECTION, SOLUTION INTRAMUSCULAR; INTRAVENOUS; SUBCUTANEOUS at 12:05

## 2024-11-21 RX ADMIN — METHOCARBAMOL 100 MG: 100 INJECTION INTRAMUSCULAR; INTRAVENOUS at 12:58

## 2024-11-21 RX ADMIN — EPHEDRINE SULFATE 5 MG: 50 INJECTION INTRAVENOUS at 13:08

## 2024-11-21 RX ADMIN — FENTANYL CITRATE 50 MCG: 50 INJECTION, SOLUTION INTRAMUSCULAR; INTRAVENOUS at 12:01

## 2024-11-21 RX ADMIN — ROCURONIUM BROMIDE 50 MG: 10 INJECTION, SOLUTION INTRAVENOUS at 11:19

## 2024-11-21 RX ADMIN — DEXAMETHASONE SODIUM PHOSPHATE 4 MG: 4 INJECTION INTRA-ARTICULAR; INTRALESIONAL; INTRAMUSCULAR; INTRAVENOUS; SOFT TISSUE at 11:29

## 2024-11-21 RX ADMIN — TRAZODONE HYDROCHLORIDE 100 MG: 100 TABLET ORAL at 23:06

## 2024-11-21 RX ADMIN — METHOCARBAMOL 100 MG: 100 INJECTION INTRAMUSCULAR; INTRAVENOUS at 12:40

## 2024-11-21 RX ADMIN — SODIUM CHLORIDE 3000 MG: 900 INJECTION INTRAVENOUS at 11:24

## 2024-11-21 RX ADMIN — SODIUM CHLORIDE: 9 INJECTION, SOLUTION INTRAVENOUS at 13:02

## 2024-11-21 RX ADMIN — METHOCARBAMOL 100 MG: 100 INJECTION INTRAMUSCULAR; INTRAVENOUS at 12:36

## 2024-11-21 RX ADMIN — PROPOFOL 30 MG: 10 INJECTION, EMULSION INTRAVENOUS at 13:28

## 2024-11-21 RX ADMIN — SODIUM CHLORIDE, PRESERVATIVE FREE 5 ML: 5 INJECTION INTRAVENOUS at 20:45

## 2024-11-21 RX ADMIN — FENTANYL CITRATE 50 MCG: 50 INJECTION, SOLUTION INTRAMUSCULAR; INTRAVENOUS at 11:16

## 2024-11-21 ASSESSMENT — PAIN DESCRIPTION - ORIENTATION
ORIENTATION: RIGHT

## 2024-11-21 ASSESSMENT — PAIN DESCRIPTION - LOCATION
LOCATION: KNEE

## 2024-11-21 ASSESSMENT — PAIN - FUNCTIONAL ASSESSMENT
PAIN_FUNCTIONAL_ASSESSMENT: PREVENTS OR INTERFERES SOME ACTIVE ACTIVITIES AND ADLS
PAIN_FUNCTIONAL_ASSESSMENT: PREVENTS OR INTERFERES SOME ACTIVE ACTIVITIES AND ADLS
PAIN_FUNCTIONAL_ASSESSMENT: 0-10
PAIN_FUNCTIONAL_ASSESSMENT: ACTIVITIES ARE NOT PREVENTED

## 2024-11-21 ASSESSMENT — PAIN DESCRIPTION - PAIN TYPE
TYPE: SURGICAL PAIN

## 2024-11-21 ASSESSMENT — PAIN DESCRIPTION - FREQUENCY
FREQUENCY: CONTINUOUS

## 2024-11-21 ASSESSMENT — PAIN SCALES - GENERAL
PAINLEVEL_OUTOF10: 8
PAINLEVEL_OUTOF10: 5
PAINLEVEL_OUTOF10: 4
PAINLEVEL_OUTOF10: 0
PAINLEVEL_OUTOF10: 7
PAINLEVEL_OUTOF10: 4
PAINLEVEL_OUTOF10: 0
PAINLEVEL_OUTOF10: 5

## 2024-11-21 ASSESSMENT — PAIN DESCRIPTION - DESCRIPTORS
DESCRIPTORS: ACHING;DULL
DESCRIPTORS: ACHING;THROBBING;STABBING
DESCRIPTORS: ACHING;DULL

## 2024-11-21 ASSESSMENT — PAIN DESCRIPTION - ONSET: ONSET: ON-GOING

## 2024-11-21 ASSESSMENT — ENCOUNTER SYMPTOMS: SHORTNESS OF BREATH: 1

## 2024-11-21 NOTE — PROGRESS NOTES
4 Eyes Skin Assessment     NAME:  Nydia Downs  YOB: 1965  MEDICAL RECORD NUMBER:  [id number]    The patient is being assessed for  Post-Op Surgical    I agree that at least one RN has performed a thorough Head to Toe Skin Assessment on the patient. ALL assessment sites listed below have been assessed.      Areas assessed by both nurses:    Head, Face, Ears, Shoulders, Back, Chest, Arms, Elbows, Hands, Sacrum. Buttock, Coccyx, Ischium, and Legs. Feet and Heels        Does the Patient have a Wound? No noted wound(s)       Julito Prevention initiated by RN: No  Wound Care Orders initiated by RN: No    Pressure Injury (Stage 3,4, Unstageable, DTI, NWPT, and Complex wounds) if present, place Wound referral order by RN under : No    New Ostomies, if present place, Ostomy referral order under : No     Nurse 1 eSignature: Electronically signed by Kim Cheung RN on 11/21/24 at 4:39 PM EST    **SHARE this note so that the co-signing nurse can place an eSignature**    Nurse 2 eSignature: Electronically signed by Katlyn Diego RN on 11/21/24 at 4:43 PM EST

## 2024-11-21 NOTE — ANESTHESIA PRE PROCEDURE
ARTHROSCOPIC INCISION AND DRAINAGE performed by Nando Lam MD at [facility] OR   • SHOULDER ARTHROSCOPY Right 04/28/2021    VIDEO ARTHROSCOPY RIGHT SHOULDER, ROTATOR CUFF REPAIR, DEBRIDEMENT performed by Nando Lam MD at [facility] OR   • SHOULDER SURGERY Right 06/15/2023    RIGHT REVERSE TOTAL SHOULDER ARTHROPLASTY performed by Dave Carrington MD at [facility] OR   • TONSILLECTOMY  1981   • TOTAL KNEE ARTHROPLASTY Left 12/05/2023    LEFT TOTAL KNEE ARTHROPLASTY WITH COMPUTER ASSISTANCE TIGIST performed by Dave Carrington MD at [facility] OR   • TUBAL LIGATION     • UPPER GASTROINTESTINAL ENDOSCOPY  2011   • UPPER GASTROINTESTINAL ENDOSCOPY  2015    esophageasl stretch   • UPPER GASTROINTESTINAL ENDOSCOPY      ATTEMPTED BUT PATIENT ASPIRATED   • UPPER GASTROINTESTINAL ENDOSCOPY N/A 05/24/2022    EGD W/ANES. (11:00) performed by Maco Bradley DO at [facility]       Social History:    Social History     Tobacco Use   • Smoking status: Never     Passive exposure: Past   • Smokeless tobacco: Never   Substance Use Topics   • Alcohol use: Yes     Alcohol/week: 4.0 standard drinks of alcohol     Types: 4 Drinks containing 0.5 oz of alcohol per week     Comment: once / week 2-3  mixed drinks.                                Counseling given: Not Answered      Vital Signs (Current):   Vitals:    11/21/24 1008 11/21/24 1015 11/21/24 1025 11/21/24 1026   BP: 119/72 115/71 (!) 109/90 106/69   Pulse: 62 62 63 62   Resp: 16 21 22 20   Temp:       TempSrc:       SpO2: 94% 95% 94% 94%   Weight:       Height:                                                  BP Readings from Last 3 Encounters:   11/21/24 106/69   11/18/24 138/88   09/23/24 (!) 177/98       NPO Status: Time of last liquid consumption: 2300                        Time of last solid consumption: 2100                        Date of last liquid consumption: 11/20/24                        Date of last solid food consumption: 11/20/24    BMI:   Wt Readings from

## 2024-11-21 NOTE — CONSULTS
V2.0  [facility] Consult Note      Name:  Nydia Downs /Age/Sex: 1965  (59 y.o. female)   MRN & CSN:  [id number] & [id number] Encounter Date/Time: 2024 4:28 PM EST   Location:  [facility]/5513- PCP: Petar Sorenson DO     Attending:Dave Carrington MD  Consulting Provider: Jazmyne Jackman MD      Hospital Day: 1    Assessment and Recommendations   Nydia Downs is a 59 y.o. female with pmh of  morbid obesity BMI 47, mood disorder, GERD, restless leg syndrome, who presents with Primary osteoarthritis of right knee    Hospital Problems             Last Modified POA    * (Principal) Primary osteoarthritis of right knee 2024 Yes       Right knee osteoarthritis s/p right total knee arthroplasty   Admitted under orthopedic  Pain management with scheduled Tylenol, and as needed IV Dilaudid and oxycodone  PT/OT  DVT prophylaxis with aspirin 81 twice daily    Restless leg syndrome, on Requip  Mood disorder, on Prozac, Xanax as needed  Insomnia, trazodone as needed  GERD, Prilosec      Diet ADULT DIET; Regular   DVT Prophylaxis [x] aspirin twice daily, []  Heparin, [] SCDs, [] Ambulation,  [] Eliquis, [] Xarelto   Code Status Full Code   Surrogate Decision Maker/ POA On file     Personally reviewed Lab Studies and Imaging         History From:    History obtained from chart review and the patient.     History of Present Illness:      Chief Complaint: Right knee osteoarthritis    Nydia Downs is a 59 y.o. female with medical history significant for morbid obesity BMI 47, mood disorder, GERD, restless leg syndrome, presented with right knee osteoarthritis for elective right total knee arthroplasty.  She is admitted overnight by orthopedics for observation.  Hospitalist consulted for management of medical comorbid conditions.      Review of Systems:      Pertinent positives and negatives discussed in HPI    Objective:     Intake/Output Summary (Last 24 hours) at 2024 1628  Last data

## 2024-11-21 NOTE — ANESTHESIA POSTPROCEDURE EVALUATION
Department of Anesthesiology  Postprocedure Note    Patient: Nydia Downs  MRN: [id number]  YOB: 1965  Date of evaluation: 11/21/2024    Procedure Summary       Date: 11/21/24 Room / Location: [place] [address] / [facility]    Anesthesia Start: 1109 Anesthesia Stop: 1406    Procedure: RIGHT TOTAL KNEE ARTHROPLASTY WITH ROBOTIC ASSISTANCE AMANDA (Right: Knee) Diagnosis:       Primary osteoarthritis of one knee, right      (Primary osteoarthritis of one knee, right [M17.11])    Surgeons: Dave Carrington MD Responsible Provider: Christoph Castro MD    Anesthesia Type: General, Regional ASA Status: 2            Anesthesia Type: General, Regional    Jodi Phase I: Jodi Score: 9    Jodi Phase II:      Anesthesia Post Evaluation    Patient location during evaluation: PACU  Patient participation: complete - patient participated  Level of consciousness: awake and alert  Airway patency: patent  Nausea & Vomiting: no nausea and no vomiting  Cardiovascular status: blood pressure returned to baseline  Respiratory status: acceptable  Hydration status: euvolemic  Pain management: adequate    No notable events documented.

## 2024-11-21 NOTE — H&P
Update History & Physical    The patient's History and Physical was reviewed with the patient and I examined the patient. There was no change. The surgical site was confirmed by the patient and me.       Plan: The risks, benefits, expected outcome, and alternative to the recommended procedure have been discussed with the patient. Patient understands and wants to proceed with the procedure.     Dave Carrington MD  11/21/2024

## 2024-11-21 NOTE — ANESTHESIA PROCEDURE NOTES
Peripheral Block    Patient location during procedure: pre-op  Reason for block: procedure for pain, post-op pain management and at surgeon's request  Start time: 11/21/2024 10:21 AM  End time: 11/21/2024 10:30 AM  Staffing  Performed: anesthesiologist   Anesthesiologist: Christoph Castro MD  Performed by: Christoph Castro MD  Authorized by: Christoph Castro MD    Preanesthetic Checklist  Completed: patient identified, IV checked, site marked, risks and benefits discussed and surgical/procedural consents  Peripheral Block   Patient position: supine  Block type: Femoral  Adductor canal  Laterality: right  Injection technique: single-shot  Guidance: ultrasound guided    Needle   Needle type: pencil-tip   Needle gauge: 20 G  Catheter size: 20 G  Needle length: 8 cm  Assessment   Injection assessment: negative aspiration for heme, no paresthesia on injection, local visualized surrounding nerve on ultrasound and no intravascular symptoms  Paresthesia pain: none  Slow fractionated injection: yes  Hemodynamics: stable  Outcomes: uncomplicated    Additional Notes  TO 1020  Medications Administered  midazolam (VERSED) injection 2 mg/2mL - IntraVENous   2 mg - 11/21/2024 10:21:00 AM  BUPivacaine (MARCAINE) PF injection 0.5% - Perineural   20 mL - 11/21/2024 10:21:00 AM

## 2024-11-21 NOTE — OP NOTE
ORTHOPAEDIC OPERATIVE NOTE     PATIENT NAME   Nydia Downs  59 y.o.     SURGEON: Dave Carrington M.D.     ASSISTANT: RENETTA Benson is a board certified physician assistant who is medically necessary as a first assistant in the operating room with me. He is responsible for assisting with positioning of the patient,retraction,cauterization and implantation of the arthroplasty components. His presence in the operating room with me as a first assistant during arthroplasty procedures enables me to perform the surgical procedure in a more timely and efficient manner. [facility] [place] does not provide me with a first assistant in the operating room who has the same surgical skills as my physician assistant.      SURGERY DATE: 11/21/2024     PRE-OPERATIVE DIAGNOSIS: right knee osteoarthritis     POST-OPERATIVE DIAGNOSIS: right knee osteoarthritis     PROCEDURE PERFORMED: Right total knee replacement using robotic assistance ([employer]ence Robot); all three components were cemented ; posterior cruciate retaining implants. Medial parapatellar arthrotomy.     Implants used:    Jeremy Persona  Femur 8 narrow   Tibia D  Liner 10 mm MC  Patella 29 mm     ANESTHESIA: general and regional     COMPLICATIONS: None apparent.     POST-OP CONDITION:  To recovery room.     EBL: 50 cc     ANTIBIOTIC: 2g Ancef preop     INDICATIONS FOR SURGERY: The patient has a long history of knee pain affecting the activities of daily living.  Pt had severe pain during ambulation and activities of daily life. The pain was refractory to conservative management including injections (corticosteroid/viscosupplementation); PT, Ambulatory aids; oral medication (NSAIDs and pain medication). Preop workup showed radiographic changes with osteophyte formation; loss of cartilage space; subchondral sclerosis and deformity.  The patient is scheduled for a total knee replacement.  The risks, benefits and alternatives of surgery were clearly discussed and  laterally. The patella tracked centrally. Next, the posterior osteophytes and meniscal remnants were resected.  The pain cocktail was injected into the posterior capsule of the medial and lateral gutters.      Final range of motion: 0 extension to 125 degrees of flexion.     Next all trials were removed.  The femoral and tibia array pins were removed. The cut bony surfaces were irrigated with copious amounts of irrigation solution.  The tibial, femoral and patellar components were cemented in place.    The insert was then placed.  The knee was held in extension.  The knee was irrigated with 5 liters of irrigation solution using pulsatile lavage.  The tourniquet was let down before closure.  Any bleeding vessels were coagulated using the Bovie cautery.  The knee was then closed in layers using #2 Ethibond to close the extensor mechanism, a combination of 0- and 2-0 Vicryl to close the subcutaneous tissue and 4-0 Monocryl to close the skin.  Dermaflex was applied to seal the wound and allowed to dry before a dry sterile compression dressing was applied.     The patient was then taken to recovery in stable condition having tolerated the procedure well.

## 2024-11-21 NOTE — PROGRESS NOTES
Procedure: Adductor canal  peripheral block  MD: Dr. Castro   Timeout performed. 1020  IN: 1021  OUT: 1030    Pt monitored closely on heart monitor, 2L NC, continuous pulse oximetry, EtCO2, and frequent BPs.   Pt remained alert and oriented x4. pt tolerated procedure well.

## 2024-11-21 NOTE — PROGRESS NOTES
PACU Transfer Note    Vitals:    11/21/24 1530   BP: 119/71   Pulse: 72   Resp: 14   Temp: 97.9 °F (36.6 °C)   SpO2: 94%       In: 1800 [I.V.:1500]  Out: 200     Pain assessment:  present - adequately treated  Pain Level: 0    Report given to Receiving unit RN at bedside in PACU. Pt awake, taking liquids, no nausea. Pain in right knee resolved after receiving dilaudid in pacu. VSS. O2 at 3L NC. Sats to 90% while sleeping. IS, C&DB encouraged. Family updated. All belongings w/pt. Taken to rm 5513 per Pacu transporters.     11/21/2024 3:49 PM

## 2024-11-21 NOTE — PROGRESS NOTES
Patient admitted to room 5513. Report received from RN at bedside. VSS on 2L of O2. Patient oriented to room and call light. Rights and responsibilities provided to patient, and welcome packet provided to patient. 4 eyes skin assessment completed with 2nd RN.

## 2024-11-22 LAB
ANION GAP SERPL CALCULATED.3IONS-SCNC: 11 MMOL/L (ref 3–16)
BASOPHILS # BLD: 0 K/UL (ref 0–0.2)
BASOPHILS NFR BLD: 0.1 %
BUN SERPL-MCNC: 6 MG/DL (ref 7–20)
CALCIUM SERPL-MCNC: 8.5 MG/DL (ref 8.3–10.6)
CHLORIDE SERPL-SCNC: 102 MMOL/L (ref 99–110)
CO2 SERPL-SCNC: 24 MMOL/L (ref 21–32)
CREAT SERPL-MCNC: 0.7 MG/DL (ref 0.6–1.1)
DEPRECATED RDW RBC AUTO: 14.3 % (ref 12.4–15.4)
EOSINOPHIL # BLD: 0 K/UL (ref 0–0.6)
EOSINOPHIL NFR BLD: 0.1 %
GFR SERPLBLD CREATININE-BSD FMLA CKD-EPI: >90 ML/MIN/{1.73_M2}
GLUCOSE SERPL-MCNC: 179 MG/DL (ref 70–99)
HCT VFR BLD AUTO: 37.6 % (ref 36–48)
HGB BLD-MCNC: 12.3 G/DL (ref 12–16)
LYMPHOCYTES # BLD: 1.4 K/UL (ref 1–5.1)
LYMPHOCYTES NFR BLD: 12.4 %
MAGNESIUM SERPL-MCNC: 1.8 MG/DL (ref 1.8–2.4)
MCH RBC QN AUTO: 30.3 PG (ref 26–34)
MCHC RBC AUTO-ENTMCNC: 32.6 G/DL (ref 31–36)
MCV RBC AUTO: 92.8 FL (ref 80–100)
MONOCYTES # BLD: 0.5 K/UL (ref 0–1.3)
MONOCYTES NFR BLD: 4.6 %
NEUTROPHILS # BLD: 9.2 K/UL (ref 1.7–7.7)
NEUTROPHILS NFR BLD: 82.8 %
PLATELET # BLD AUTO: 176 K/UL (ref 135–450)
PMV BLD AUTO: 9.8 FL (ref 5–10.5)
POTASSIUM SERPL-SCNC: 3.2 MMOL/L (ref 3.5–5.1)
RBC # BLD AUTO: 4.05 M/UL (ref 4–5.2)
SODIUM SERPL-SCNC: 137 MMOL/L (ref 136–145)
WBC # BLD AUTO: 11.1 K/UL (ref 4–11)

## 2024-11-22 PROCEDURE — 85025 COMPLETE CBC W/AUTO DIFF WBC: CPT

## 2024-11-22 PROCEDURE — 97110 THERAPEUTIC EXERCISES: CPT

## 2024-11-22 PROCEDURE — 36415 COLL VENOUS BLD VENIPUNCTURE: CPT

## 2024-11-22 PROCEDURE — 96374 THER/PROPH/DIAG INJ IV PUSH: CPT

## 2024-11-22 PROCEDURE — 97166 OT EVAL MOD COMPLEX 45 MIN: CPT

## 2024-11-22 PROCEDURE — 96375 TX/PRO/DX INJ NEW DRUG ADDON: CPT

## 2024-11-22 PROCEDURE — 6360000002 HC RX W HCPCS: Performed by: ORTHOPAEDIC SURGERY

## 2024-11-22 PROCEDURE — 83735 ASSAY OF MAGNESIUM: CPT

## 2024-11-22 PROCEDURE — 97116 GAIT TRAINING THERAPY: CPT

## 2024-11-22 PROCEDURE — G0378 HOSPITAL OBSERVATION PER HR: HCPCS

## 2024-11-22 PROCEDURE — 6370000000 HC RX 637 (ALT 250 FOR IP): Performed by: ORTHOPAEDIC SURGERY

## 2024-11-22 PROCEDURE — 96376 TX/PRO/DX INJ SAME DRUG ADON: CPT

## 2024-11-22 PROCEDURE — 80048 BASIC METABOLIC PNL TOTAL CA: CPT

## 2024-11-22 PROCEDURE — 97535 SELF CARE MNGMENT TRAINING: CPT

## 2024-11-22 PROCEDURE — 2580000003 HC RX 258: Performed by: ORTHOPAEDIC SURGERY

## 2024-11-22 PROCEDURE — 97162 PT EVAL MOD COMPLEX 30 MIN: CPT

## 2024-11-22 PROCEDURE — 97530 THERAPEUTIC ACTIVITIES: CPT

## 2024-11-22 RX ADMIN — CYCLOBENZAPRINE HYDROCHLORIDE 10 MG: 10 TABLET, FILM COATED ORAL at 04:08

## 2024-11-22 RX ADMIN — HYDROMORPHONE HYDROCHLORIDE 0.5 MG: 1 INJECTION, SOLUTION INTRAMUSCULAR; INTRAVENOUS; SUBCUTANEOUS at 23:52

## 2024-11-22 RX ADMIN — OXYCODONE 10 MG: 5 TABLET ORAL at 21:30

## 2024-11-22 RX ADMIN — ACETAMINOPHEN 650 MG: 325 TABLET, FILM COATED ORAL at 21:29

## 2024-11-22 RX ADMIN — ACETAMINOPHEN 650 MG: 325 TABLET, FILM COATED ORAL at 04:05

## 2024-11-22 RX ADMIN — OXYCODONE 10 MG: 5 TABLET ORAL at 14:41

## 2024-11-22 RX ADMIN — HYDROMORPHONE HYDROCHLORIDE 0.5 MG: 1 INJECTION, SOLUTION INTRAMUSCULAR; INTRAVENOUS; SUBCUTANEOUS at 01:12

## 2024-11-22 RX ADMIN — HYDROMORPHONE HYDROCHLORIDE 0.5 MG: 1 INJECTION, SOLUTION INTRAMUSCULAR; INTRAVENOUS; SUBCUTANEOUS at 19:43

## 2024-11-22 RX ADMIN — ASPIRIN 81 MG: 81 TABLET, COATED ORAL at 21:30

## 2024-11-22 RX ADMIN — HYDROMORPHONE HYDROCHLORIDE 0.5 MG: 1 INJECTION, SOLUTION INTRAMUSCULAR; INTRAVENOUS; SUBCUTANEOUS at 11:57

## 2024-11-22 RX ADMIN — ACETAMINOPHEN 650 MG: 325 TABLET, FILM COATED ORAL at 10:21

## 2024-11-22 RX ADMIN — ALPRAZOLAM 0.5 MG: 0.5 TABLET ORAL at 16:09

## 2024-11-22 RX ADMIN — ACETAMINOPHEN 650 MG: 325 TABLET, FILM COATED ORAL at 16:09

## 2024-11-22 RX ADMIN — ALPRAZOLAM 0.5 MG: 0.5 TABLET ORAL at 01:13

## 2024-11-22 RX ADMIN — ROPINIROLE HYDROCHLORIDE 2 MG: 2 TABLET, FILM COATED ORAL at 09:28

## 2024-11-22 RX ADMIN — ROPINIROLE HYDROCHLORIDE 2 MG: 2 TABLET, FILM COATED ORAL at 21:30

## 2024-11-22 RX ADMIN — METOCLOPRAMIDE 5 MG: 10 TABLET ORAL at 07:38

## 2024-11-22 RX ADMIN — OXYCODONE 10 MG: 5 TABLET ORAL at 09:28

## 2024-11-22 RX ADMIN — SENNOSIDES AND DOCUSATE SODIUM 1 TABLET: 50; 8.6 TABLET ORAL at 21:30

## 2024-11-22 RX ADMIN — PANTOPRAZOLE SODIUM 40 MG: 40 TABLET, DELAYED RELEASE ORAL at 05:09

## 2024-11-22 RX ADMIN — SENNOSIDES AND DOCUSATE SODIUM 1 TABLET: 50; 8.6 TABLET ORAL at 09:28

## 2024-11-22 RX ADMIN — ASPIRIN 81 MG: 81 TABLET, COATED ORAL at 09:28

## 2024-11-22 RX ADMIN — SODIUM CHLORIDE, PRESERVATIVE FREE 10 ML: 5 INJECTION INTRAVENOUS at 21:33

## 2024-11-22 RX ADMIN — OXYCODONE 10 MG: 5 TABLET ORAL at 04:04

## 2024-11-22 RX ADMIN — HYDROMORPHONE HYDROCHLORIDE 0.5 MG: 1 INJECTION, SOLUTION INTRAMUSCULAR; INTRAVENOUS; SUBCUTANEOUS at 16:09

## 2024-11-22 RX ADMIN — FLUOXETINE HYDROCHLORIDE 20 MG: 20 CAPSULE ORAL at 09:28

## 2024-11-22 RX ADMIN — ONDANSETRON 4 MG: 2 INJECTION INTRAMUSCULAR; INTRAVENOUS at 05:09

## 2024-11-22 RX ADMIN — SODIUM CHLORIDE 3000 MG: 900 INJECTION INTRAVENOUS at 04:14

## 2024-11-22 RX ADMIN — SODIUM CHLORIDE, PRESERVATIVE FREE 10 ML: 5 INJECTION INTRAVENOUS at 09:30

## 2024-11-22 ASSESSMENT — PAIN DESCRIPTION - LOCATION
LOCATION: KNEE
LOCATION: KNEE;ANKLE
LOCATION: KNEE

## 2024-11-22 ASSESSMENT — PAIN DESCRIPTION - ONSET
ONSET: ON-GOING

## 2024-11-22 ASSESSMENT — PAIN DESCRIPTION - PAIN TYPE
TYPE: SURGICAL PAIN

## 2024-11-22 ASSESSMENT — PAIN SCALES - GENERAL
PAINLEVEL_OUTOF10: 7
PAINLEVEL_OUTOF10: 7
PAINLEVEL_OUTOF10: 4
PAINLEVEL_OUTOF10: 5
PAINLEVEL_OUTOF10: 7
PAINLEVEL_OUTOF10: 2
PAINLEVEL_OUTOF10: 8
PAINLEVEL_OUTOF10: 2
PAINLEVEL_OUTOF10: 9
PAINLEVEL_OUTOF10: 5
PAINLEVEL_OUTOF10: 4
PAINLEVEL_OUTOF10: 8
PAINLEVEL_OUTOF10: 4
PAINLEVEL_OUTOF10: 2
PAINLEVEL_OUTOF10: 7
PAINLEVEL_OUTOF10: 8

## 2024-11-22 ASSESSMENT — PAIN DESCRIPTION - FREQUENCY
FREQUENCY: CONTINUOUS

## 2024-11-22 ASSESSMENT — PAIN DESCRIPTION - DESCRIPTORS
DESCRIPTORS: ACHING;THROBBING
DESCRIPTORS: ACHING;SORE
DESCRIPTORS: ACHING;THROBBING;STABBING
DESCRIPTORS: ACHING;THROBBING;STABBING
DESCRIPTORS: ACHING;DULL
DESCRIPTORS: ACHING;THROBBING;STABBING
DESCRIPTORS: ACHING
DESCRIPTORS: ACHING;THROBBING;SQUEEZING

## 2024-11-22 ASSESSMENT — PAIN DESCRIPTION - ORIENTATION
ORIENTATION: RIGHT
ORIENTATION: MID
ORIENTATION: RIGHT
ORIENTATION: RIGHT

## 2024-11-22 ASSESSMENT — PAIN - FUNCTIONAL ASSESSMENT

## 2024-11-22 NOTE — PLAN OF CARE
Problem: Pain  Goal: Verbalizes/displays adequate comfort level or baseline comfort level  11/22/2024 0722 by Kim Cheung, RN  Outcome: Progressing  Pain is being managed with scheduled and PRN pain meds per MAR.      Problem: Safety - Adult  Goal: Free from fall injury  11/22/2024 0722 by Kim Cheung, RN  Outcome: Progressing   All fall precautions in place. Bed locked and in lowest position with alarm on. Overbed table and personal belonings within reach. Call light within reach and patient instructed to use call light for assistance. Non-skid socks on.

## 2024-11-22 NOTE — PROGRESS NOTES
Discussed getting up to the chair with patient. Pt given zofran @ 0400. Patient still fairly nauseous, did not feel good about getting up. Pt states that she is up to getting to chair but would like to wait.

## 2024-11-22 NOTE — PROGRESS NOTES
ondansetron **OR** ondansetron, oxyCODONE **OR** oxyCODONE, HYDROmorphone, albuterol      Physical Exam Performed:      General: No distress, alert and oriented x3,  Cardiac: S1 plus S2 regular rate and rhythm, no murmurs rubs or gallops  Respiratory: No wheeze or crackles appreciated, equal air entry bilaterally  GI/: Abdomen soft, obese, nontender, bowel sounds audible  Skin: No rashes or ulcers present, right knee dressing clean and dry  MSK: Peripheral pulses intact    /69   Pulse 82   Temp 98.1 °F (36.7 °C) (Temporal)   Resp 16   Ht 1.626 m (5' 4.02\")   Wt 126.6 kg (279 lb)   LMP 09/20/2017   SpO2 96%   BMI 47.87 kg/m²       Diet: ADULT DIET; Regular    DVT Prophylaxis: []PPx LMWH  []SQ Heparin  []IPC/SCDs  []Eliquis  []Xarelto  []Coumadin  [] Heparin Drip  [x]Other -aspirin twice daily    Code status: Full Code    PT/OT Eval Status:   []NOT yet ordered  [x]Ordered and Pending   []Seen with Recommendations for:   []Home independently  []Home w/ assist  []HHC  []SNF  []Acute Rehab    Multi-Disciplinary Rounds with Case Management completed on 11/22/2024 with the following recommendations:  Anticipated Discharge Location: [x]Home w/ [x]HHC vs []SNF  []Acute Rehab  []LTAC  []Hospice  []Other -    Anticipated Discharge Day/Date: 1 to 2 days  Barriers to Discharge: Postoperative pain  --------------------------------------------------    MDM (any 2 required for [facility] billing)    A. Problems (any 1)  [] Acute/Chronic Illness/injury posing ongoing threat to life and/or bodily function without ongoing treatment    [] Severe exacerbation of chronic illness    --------------------------------------------------  B. Risk of Treatment (any 1)    [] Drugs/treatments that require intensive monitoring for toxicity    [] IV ABX (Vancomycin, Aminoglycosides, etc)     [] Post-Cath/Contrast study requiring serial monitoring    [] IV Narcotic analgesia    [] Aggressive IV diuresis    [] Hypertonic Saline    []  06/11/2017 02:36 AM     Organism:   Lab Results   Component Value Date/Time    ORG Klebsiella pneumoniae 06/04/2024 11:29 AM         Uday Beltrán MD

## 2024-11-22 NOTE — PROGRESS NOTES
Pt is A/Ox4, VSS on room air, and x1 walker/gb. Pt is voiding via bathroom privileges, tolerating food and fluids, and pain is being managed with scheduled and PRN pain meds per MAR. All safety precautions in place, call light within reach, plan of care continues.

## 2024-11-22 NOTE — DISCHARGE INSTRUCTIONS
DISCHARGE ORDER SET  [facility]  [phone]    Post Discharge Instructions  For the first several weeks after surgery you will need to attend PT frequently as scheduled.   Elevate extremity if swelling occurs  Continue the exercise program as prescribed by PT  Use walker, crutches or cane with weightbearing instructions as indicated   Do not ambulate without assistance until cleared to do so by PT  Use Spirometer every 2 hrs while awake    Initiate bowel care with the following medications   Over-the-Counter Senokot  (or Over-the-Counter Colace (Docusate Sodium)  1-2 tabs by mouth twice daily, continue while on narcotics, hold for loose stools.     Physical Therapy Orders    Range-of-Motion, strengthening, gait training, ADL's.    Outpatient physical therapy 3 times per week for 6 weeks.    May discontinue therapy when full extension is obtained and flexion is greater than 120 degrees.    Weight bearing/limitations      Full weight bearing     Dressing/wound care  Keep gray sealed dressing on until your clinic follow up.     You may shower after 3 days. No tub baths.   Do not scrub dressing. Pat dry with soft towel.   NO LOTIONS OR CREAMS     DVT PROPHYLAXIS (prevention of blood clots)-     Aspirin 81 mg twice daily for 4 weeks    MEDICATIONS - please take medications as prescribed. Remember the celecoxib (celebrex), gabapentin and omeprazole which were called in for you as well.     Call  with any questions    Follow up appointment with Dr. Carrington as scheduled

## 2024-11-22 NOTE — PROGRESS NOTES
follow for acute OT.  Treatment Diagnosis: Impaired ADL and functional mobility  Prognosis: Good  Decision Making: Medium Complexity  REQUIRES OT FOLLOW-UP: Yes  Activity Tolerance  Activity Tolerance: Patient Tolerated treatment well     Plan  Occupational Therapy Plan  Times Per Week: 7  Current Treatment Recommendations: Balance training, Functional mobility training, Endurance training, Self-Care / ADL    Restrictions  Position Activity Restriction  Other position/activity restrictions: WBAT, up in chair    Subjective  General  Chart Reviewed: Yes  Additional Pertinent Hx: 59 y.o. F to OR 11/21/24 for RIGHT TOTAL KNEE ARTHROPLASTY WITH ROBOTIC ASSISTANCE AMANDA.     PMH: Anxiety, Depression, Essential HTN, GERD, Obesity, RLS, Hernia Repair, R TSR (6/2023), L TKR (12/2023).  Family / Caregiver Present: No  Referring Practitioner: Dave Carrington MD  Diagnosis: Primary OA R Knee s/p R TKR  Subjective  Subjective: Pt in bathoom on toilet when OT arrived. (nurse present)   Pt hopes to go home today.  Pain: 6/10, nurse aware     Social/Functional History  Social/Functional History  Lives With: Alone  Type of Home: Apartment (ground level)  Home Layout: One level  Home Access: Level entry  Bathroom Shower/Tub: Walk-in shower  Bathroom Toilet: Handicap height  Bathroom Equipment: Grab bars in shower, Grab bars around toilet, Shower chair, Hand-held shower  Home Equipment: Cane, Walker - Rolling, Lift chair, Reacher, Sock aid  Has the patient had two or more falls in the past year or any fall with injury in the past year?: No  ADL Assistance: Independent (with assistive devices)  Homemaking Assistance: Independent  Ambulation Assistance: Independent  Transfer Assistance: Independent  Active : Yes  Occupation: On disability  Leisure & Hobbies: Watch football    Objective     Safety Devices  Type of Devices: Left in chair;Call light within reach;Nurse notified (Chair was previously set up by nurse w/o alarm pad,  nurse denied need .)     Toilet Transfers  Toilet - Technique: Ambulating  Equipment Used: Standard toilet (Grab bar)  Toilet Transfer: Contact guard assistance  AROM: Within functional limits  Strength: Within functional limits  Coordination: Within functional limits  ADL  Feeding: Independent  Grooming: Contact guard assistance (to SBA to wash hands, comb hair, rinse mouth, standing at sink.)  LE Dressing: Contact guard assistance (to don brief)  Putting On/Taking Off Footwear Skilled Clinical Factors: Pt has sock aid and reacher  Toileting: Contact guard assistance (seated for toileting hygiene)     Activity Tolerance  Activity Tolerance Comments: BP seated in chair: 121/82     Transfers  Stand Step Transfers: Contact guard assistance  Sit to stand: Contact guard assistance  Stand to sit: Contact guard assistance  Transfer Comments: Pt walked from bathroom to chair on oposite side of room with wheeled walker and CG.  Vision  Vision: Impaired  Vision Exceptions: Wears glasses for reading  Hearing  Hearing: Within functional limits  Cognition  Overall Cognitive Status: WNL  Orientation  Overall Orientation Status: Within Normal Limits                  Education Given To: Patient  Education Provided: Role of Therapy;Plan of Care;Precautions;ADL Adaptive Strategies;Transfer Training  Education Method: Verbal;Demonstration  Barriers to Learning: None  Education Outcome: Verbalized understanding;Demonstrated understanding            Treatment included ADL and transfer training.         AM-PAC - ADL  AM-PAC Daily Activity - Inpatient   How much help is needed for putting on and taking off regular lower body clothing?: A Little  How much help is needed for bathing (which includes washing, rinsing, drying)?: A Little  How much help is needed for toileting (which includes using toilet, bedpan, or urinal)?: A Little  How much help is needed for putting on and taking off regular upper body clothing?: None  How much help is

## 2024-11-22 NOTE — PROGRESS NOTES
[facility] Progress Note        Subjective:  Pt is resting in bed with minimal complaints of discomfort. Denies fever and chills but does endorse some nausea and vomiting throughout the night. Pain is improving.    Blood pressure 118/69, pulse 82, temperature 98.1 °F (36.7 °C), temperature source Temporal, resp. rate 16, height 1.626 m (5' 4.02\"), weight 126.6 kg (279 lb), last menstrual period 09/20/2017, SpO2 96%, not currently breastfeeding.    PHYSICAL EXAM:   Right lower extremity  Dressing clean and dry  +EHL/FHL/ADF  Sensation intact to light touch distally  Skin warm and well perfused    HgB:    Lab Results   Component Value Date/Time    HGB 12.3 11/22/2024 05:34 AM       ASSESSMENT AND PLAN:    59 y.o. female status post Right Total Knee Arthroplasty    1:  Weight bearing as tolerated  2:  Deep venous thrombosis prophylaxis: ASA 81 mg PO BID  3:  Continue mobilization, physical therapy  4:  D/C Plan:  Continue PT/OT. Likely d/c home today      Shaji Osorio PA-C

## 2024-11-22 NOTE — PLAN OF CARE
Problem: Discharge Planning  Goal: Discharge to home or other facility with appropriate resources  Outcome: Progressing  Flowsheets (Taken 11/21/2024 2200)  Discharge to home or other facility with appropriate resources:   Identify barriers to discharge with patient and caregiver   Arrange for needed discharge resources and transportation as appropriate   Identify discharge learning needs (meds, wound care, etc)  Note: Patient awaiting evaluation by PT / OT prior to discharge determination.     Problem: Pain  Goal: Verbalizes/displays adequate comfort level or baseline comfort level  Outcome: Progressing  Flowsheets (Taken 11/21/2024 2200)  Verbalizes/displays adequate comfort level or baseline comfort level:   Encourage patient to monitor pain and request assistance   Assess pain using appropriate pain scale   Administer analgesics based on type and severity of pain and evaluate response   Implement non-pharmacological measures as appropriate and evaluate response  Note: Pain managed per MAR.     Problem: Safety - Adult  Goal: Free from fall injury  Outcome: Progressing  Flowsheets (Taken 11/21/2024 2200)  Free From Fall Injury: Instruct family/caregiver on patient safety  Note: All fall and safety precautions in place, bed locked in low position. Call light within reach.

## 2024-11-22 NOTE — PROGRESS NOTES
Physical Therapy  Daily Treatment Note      Discharge Recommendation:  24 hour supervision/assist & Home PT  Equipment Needs:  No new needs    Assessment:  Pt with improved activity tolerance this afternoon, but still limited due to significant R knee pain with activity. Pt with good participation despite this. She seems motivated for recovery. From home alone. Would benefit from initial 24 hour supervision/assist, use of rolling walker for mobility and continued home PT. No new DME needs anticipated.     HOME HEALTH CARE: LEVEL 1 STANDARD  - Initial home health evaluation to occur within 24-48 hours, in patient home   - Therapy to evaluate with goal of regaining prior level of functioning   - Therapy to evaluate if patient has Home Health Aide needs for personal care    Chart Reviewed: Yes     Other position/activity restrictions: WBAT, up in chair   Additional Pertinent Hx: 59 y.o. female to OR 11/21 for R TKR; PMHx: anxiety, depression, HA, obesity, OA, R wrist surgery, hernia repair, multiple shoulder surgeries, L TKR Dec 2023      Diagnosis: R knee OA   Treatment Diagnosis: impaired gait and transfers s/p R TKR    Subjective: Pt in chair. Ready to work with PT.   \"I need to go to the bathroom.\"  Wants to go to bed after therapy session. \"I'm going to take a nap.\"    Pain: 8/10 R knee. RN aware and addressed with pain meds at start of session. Ice packs to knee after therapy.    Objective:    Stand to sit: CGA from chair; CGA from commode with grab bar. Effortful.   Stand to sit: CGA onto commode with grab bar; SBA onto bed.    Ambulation  Assistance Level: CGA  Assistive device: Rolling walker  Distance: 20 ft, 12 ft. Seated rest between walks.   Quality of gait: Decreased stance time R LE; step-to pattern; heavy reliance on walker for support decreased pace; no LOB  Other: Distance limited by pain.    Bed mobility  Sit to Supine: CGA for R LE, HOB flat without railing. Effortful for LEs.     Exercises  In  bed:  10 reps B ankle pumps, quad sets, glut sets, R heel slides (limited knee flexion tolerated), hip abd/add (min assist), SAQ (effortful)    Balance  Sat on commode with Supervision  Static stance with walker SBA  Ambulation with rolling walker CGA    Patient Education  Calling for assist with needs. Expressed understanding.   Continuing HEP at home. Pt expressed understanding. She was issued a HEP sheet to use as a reference.     Safety Devices  Pt left with needs in reach. In bed with bed alarm on. RN updated.     AM-PAC score  AM-PAC Inpatient Mobility Raw Score : 16  AM-PAC Inpatient T-Scale Score : 40.78  Mobility Inpatient CMS 0-100% Score: 54.16  Mobility Inpatient CMS G-Code Modifier : CK    Goals: (as determined and assessed by primary PT)  Time Frame for Short Term Goals: discharge  Short Term Goal 1: Pt will transfer supine <--> sit with supervision   Short Term Goal 2: pt will transfer sit <--> stand with supervision   Short Term Goal 3: pt will amb 150 ft with RW and supervision  Short Term Goal 4: Pt will demo indep with LE HEP       Plan: 2 times a day 7 days a week   Current Treatment Recommendations: Strengthening, ROM, Balance training, Functional mobility training, Gait training, Transfer training, Patient/Caregiver education & training, Therapeutic activities, Modalities, Home exercise program, Equipment evaluation, education, & procurement    Therapy Time    Individual  Concurrent  Group  Co-treatment    Time In  1435            Time Out  1505            Minutes  30              Timed Code Treatment Minutes: 30  Total Treatment Minutes: 30    Will continue in AM per plan of care.  If patient is discharged prior to next treatment, this note will serve as the discharge summary.    Farnaz Edwards, PTA #[id number]

## 2024-11-22 NOTE — DISCHARGE SUMMARY
[facility]ACON DISCHARGE SUMMARY     Right Total Knee Athroplasty    The patient was taken to the operating room  where the aforementioned procedure was preformed.  The patient was taken to the post operative anesthesia recovery unit in stable condition. The patient was then transferred to the orthopaedic floor for post operative pain management and convalesce       The patient was followed medically in the hospital for the above surgical procedures performed and below medical issues during their hospital stay    Principal Problem:    Primary osteoarthritis of right knee  Resolved Problems:    * No resolved hospital problems. *        The patient was placed on anticoagulation therapy for DVT prophylaxis       The patient was discharged in stable condition .      Please see medical reconciliation for discharge medications.    The discharge instructions were explained to the patient and the family.  The patient will follow up in the office in 2 weeks for repeat examination and xray .

## 2024-11-22 NOTE — PROGRESS NOTES
Patient aox4,VSSRA. Toerating fluids w/o complications. Denies nausea this shift. Patient anxious throughout shift. Endorsing pain to R knee, being managed per MAR. ACE bandage in place R knee, no strikethrough. CAP refill <3 sec. Weak dorsi / plantar flexion. All fall / safety precautions in place. NAEO. POC continues.

## 2024-11-22 NOTE — CARE COORDINATION
Case Management Assessment  Initial Evaluation    Date/Time of Evaluation: 11/22/2024 2:29 PM  Assessment Completed by: Nasima Grayson RN    If patient is discharged prior to next notation, then this note serves as note for discharge by case management.    Patient Name: Nydia Downs                   YOB: 1965  Diagnosis: Primary osteoarthritis of one knee, right [M17.11]  Primary osteoarthritis of right knee [M17.11]                   Date / Time: 11/21/2024  8:43 AM    Patient Admission Status: Observation   Readmission Risk (Low < 19, Mod (19-27), High > 27): Readmission Risk Score: 13.3    Current PCP: Petar Sorenson, DO  PCP verified by CM? No    Chart Reviewed: Yes      History Provided by: Patient  Patient Orientation: Alert and Oriented    Patient Cognition: Alert    Hospitalization in the last 30 days (Readmission):  No    If yes, Readmission Assessment in CM Navigator will be completed.    Advance Directives:      Code Status: Full Code   Patient's Primary Decision Maker is: Legal Next of Kin    Primary Decision Maker: Loraine Torrez - [phone]    Discharge Planning:    Patient lives with: Alone Type of Home: Apartment  Primary Care Giver: Self  Patient Support Systems include: Family Members   Current Financial resources: Medicare, Medicaid  Current community resources: None  Current services prior to admission: None            Current DME:              Type of Home Care services:  None    ADLS  Prior functional level: Independent in ADLs/IADLs  Current functional level: Assistance with the following:, Bathing, Dressing, Mobility    PT AM-PAC: 15 /24  OT AM-PAC: 20 /24    Family can provide assistance at DC: Yes  Would you like Case Management to discuss the discharge plan with any other family members/significant others, and if so, who? No  Plans to Return to Present Housing: Yes  Other Identified Issues/Barriers to RETURNING to current housing: none  Potential Assistance needed at  discharge: N/A            Potential DME:    Patient expects to discharge to: House  Plan for transportation at discharge: Self    Financial    Payor: [employer] MEDICARE / Plan: [employer] GOLD PLUS HMO / Product Type: *No Product type* /     Does insurance require precert for SNF: Yes    Potential assistance Purchasing Medications:    Meds-to-Beds request: Yes      [id number] - [place], [place] - [address] - P [phone] - F [phone]  [address] [place] [address]  Phone: [phone] Fax: [phone]      Notes:    Factors facilitating achievement of predicted outcomes: Family support, Cooperative, and Pleasant    Barriers to discharge: Pain    Additional Case Management Notes: Patient is from home alone will have assist from daughter and other family at discharge.  Patient would like home care, has had [facility] in the past.  CM made a referral to [facility].  Patient states she has all needed DME.  Family to transport home.    The Plan for Transition of Care is related to the following treatment goals of Primary osteoarthritis of one knee, right [M17.11]  Primary osteoarthritis of right knee [M17.11]    IF APPLICABLE: The Patient and/or patient representative Nydia and her family were provided with a choice of provider and agrees with the discharge plan. Freedom of choice list with basic dialogue that supports the patient's individualized plan of care/goals and shares the quality data associated with the providers was provided to: Patient   Patient Representative Name:       The Patient and/or Patient Representative Agree with the Discharge Plan? Yes    Nasima Grayson RN  Case Management Department  Ph: [phone] Fax: [phone]

## 2024-11-22 NOTE — PROGRESS NOTES
Physical Therapy  Facility/Department: [facility] ORTHO/NEURO  Physical Therapy Initial Assessment / Treatment    Name: Nydia Downs  : 1965  MRN: [id number]  Date of Service: 2024    Discharge Recommendations:  24 hour supervision or assist, Home with Home health PT   PT Equipment Recommendations  Equipment Needed: No      Patient Diagnosis(es):   Past Medical History:  has a past medical history of Anxiety, Depression, Endometriosis, Essential hypertension, GERD (gastroesophageal reflux disease), Headache, Obesity, Osteoarthritis, PONV (postoperative nausea and vomiting), Restless leg syndrome, Wears dentures, and Wears glasses.  Past Surgical History:  has a past surgical history that includes Tonsillectomy ();  section; Ovary removal (Right, ); Upper gastrointestinal endoscopy (); Knee arthroscopy (Left, 11/15/2012); Upper gastrointestinal endoscopy (); hernia repair (2018); Anus surgery (2018); fracture surgery; Rotator cuff repair (Right, 2020); Shoulder arthroscopy (Right, 2020); Colonoscopy; Shoulder arthroscopy (Right, 2021); Shoulder arthroscopy (Right, 2021); Dilation and curettage of uterus (N/A, 2021); Hysterectomy (N/A, 10/21/2021); Upper gastrointestinal endoscopy; Upper gastrointestinal endoscopy (N/A, 2022); Tubal ligation; Hysterectomy, total abdominal (10/21/21); shoulder surgery (Right, 06/15/2023); Total knee arthroplasty (Left, 2023); joint replacement (6/15/23  12/5/23); and Total knee arthroplasty (Right, 2024).    Assessment  Body Structures, Functions, Activity Limitations Requiring Skilled Therapeutic Intervention: Decreased functional mobility ;Increased pain;Decreased strength;Decreased ROM;Decreased endurance  Assessment: Pt is 59 y.o. female POD #1 s/p R TKR.  Pt is from home alone and indep with mobility at baseline.  Today, pt is limited by pain, weakness, and dizziness with mobility.

## 2024-11-23 VITALS
RESPIRATION RATE: 17 BRPM | OXYGEN SATURATION: 96 % | TEMPERATURE: 98.4 F | SYSTOLIC BLOOD PRESSURE: 149 MMHG | DIASTOLIC BLOOD PRESSURE: 84 MMHG | BODY MASS INDEX: 47.63 KG/M2 | HEART RATE: 70 BPM | WEIGHT: 279 LBS | HEIGHT: 64 IN

## 2024-11-23 PROCEDURE — G0378 HOSPITAL OBSERVATION PER HR: HCPCS

## 2024-11-23 PROCEDURE — 2580000003 HC RX 258: Performed by: ORTHOPAEDIC SURGERY

## 2024-11-23 PROCEDURE — 97110 THERAPEUTIC EXERCISES: CPT

## 2024-11-23 PROCEDURE — 97116 GAIT TRAINING THERAPY: CPT

## 2024-11-23 PROCEDURE — 6360000002 HC RX W HCPCS: Performed by: ORTHOPAEDIC SURGERY

## 2024-11-23 PROCEDURE — 97530 THERAPEUTIC ACTIVITIES: CPT

## 2024-11-23 PROCEDURE — 96376 TX/PRO/DX INJ SAME DRUG ADON: CPT

## 2024-11-23 PROCEDURE — 6370000000 HC RX 637 (ALT 250 FOR IP): Performed by: ORTHOPAEDIC SURGERY

## 2024-11-23 RX ADMIN — HYDROMORPHONE HYDROCHLORIDE 0.5 MG: 1 INJECTION, SOLUTION INTRAMUSCULAR; INTRAVENOUS; SUBCUTANEOUS at 04:47

## 2024-11-23 RX ADMIN — ACETAMINOPHEN 650 MG: 325 TABLET, FILM COATED ORAL at 09:16

## 2024-11-23 RX ADMIN — ALPRAZOLAM 0.5 MG: 0.5 TABLET ORAL at 00:05

## 2024-11-23 RX ADMIN — ASPIRIN 81 MG: 81 TABLET, COATED ORAL at 09:17

## 2024-11-23 RX ADMIN — PANTOPRAZOLE SODIUM 40 MG: 40 TABLET, DELAYED RELEASE ORAL at 05:03

## 2024-11-23 RX ADMIN — ACETAMINOPHEN 650 MG: 325 TABLET, FILM COATED ORAL at 04:47

## 2024-11-23 RX ADMIN — FLUOXETINE HYDROCHLORIDE 20 MG: 20 CAPSULE ORAL at 09:17

## 2024-11-23 RX ADMIN — CYCLOBENZAPRINE HYDROCHLORIDE 10 MG: 10 TABLET, FILM COATED ORAL at 12:10

## 2024-11-23 RX ADMIN — SODIUM CHLORIDE, PRESERVATIVE FREE 10 ML: 5 INJECTION INTRAVENOUS at 09:21

## 2024-11-23 RX ADMIN — ROPINIROLE HYDROCHLORIDE 2 MG: 2 TABLET, FILM COATED ORAL at 09:17

## 2024-11-23 RX ADMIN — SENNOSIDES AND DOCUSATE SODIUM 1 TABLET: 50; 8.6 TABLET ORAL at 09:17

## 2024-11-23 RX ADMIN — OXYCODONE 10 MG: 5 TABLET ORAL at 02:23

## 2024-11-23 RX ADMIN — OXYCODONE 10 MG: 5 TABLET ORAL at 09:17

## 2024-11-23 ASSESSMENT — PAIN DESCRIPTION - PAIN TYPE
TYPE: SURGICAL PAIN

## 2024-11-23 ASSESSMENT — PAIN DESCRIPTION - DESCRIPTORS
DESCRIPTORS: ACHING

## 2024-11-23 ASSESSMENT — PAIN DESCRIPTION - LOCATION
LOCATION: KNEE

## 2024-11-23 ASSESSMENT — PAIN SCALES - GENERAL
PAINLEVEL_OUTOF10: 7
PAINLEVEL_OUTOF10: 5
PAINLEVEL_OUTOF10: 8
PAINLEVEL_OUTOF10: 8
PAINLEVEL_OUTOF10: 7
PAINLEVEL_OUTOF10: 2

## 2024-11-23 ASSESSMENT — PAIN - FUNCTIONAL ASSESSMENT
PAIN_FUNCTIONAL_ASSESSMENT: ACTIVITIES ARE NOT PREVENTED

## 2024-11-23 ASSESSMENT — PAIN DESCRIPTION - ORIENTATION
ORIENTATION: RIGHT

## 2024-11-23 ASSESSMENT — PAIN DESCRIPTION - ONSET
ONSET: ON-GOING
ONSET: ON-GOING

## 2024-11-23 ASSESSMENT — PAIN DESCRIPTION - FREQUENCY
FREQUENCY: CONTINUOUS
FREQUENCY: CONTINUOUS

## 2024-11-23 NOTE — PROGRESS NOTES
Physical Therapy  Facility/Department: [facility] 5T ORTHO/NEURO  Physical Therapy Daily Treatment    Name: Nydia Downs  : 1965  MRN: [id number]  Date of Service: 2024    Discharge Recommendations:  24 hour supervision or assist, Home with Home health PT   PT Equipment Recommendations  Equipment Needed: No  Other: pt owns RW      Patient Diagnosis(es): R TKA  Past Medical History:  has a past medical history of Anxiety, Depression, Endometriosis, Essential hypertension, GERD (gastroesophageal reflux disease), Headache, Obesity, Osteoarthritis, PONV (postoperative nausea and vomiting), Restless leg syndrome, Wears dentures, and Wears glasses.  Past Surgical History:  has a past surgical history that includes Tonsillectomy ();  section; Ovary removal (Right, ); Upper gastrointestinal endoscopy (); Knee arthroscopy (Left, 11/15/2012); Upper gastrointestinal endoscopy (); hernia repair (2018); Anus surgery (2018); fracture surgery; Rotator cuff repair (Right, 2020); Shoulder arthroscopy (Right, 2020); Colonoscopy; Shoulder arthroscopy (Right, 2021); Shoulder arthroscopy (Right, 2021); Dilation and curettage of uterus (N/A, 2021); Hysterectomy (N/A, 10/21/2021); Upper gastrointestinal endoscopy; Upper gastrointestinal endoscopy (N/A, 2022); Tubal ligation; Hysterectomy, total abdominal (10/21/21); shoulder surgery (Right, 06/15/2023); Total knee arthroplasty (Left, 2023); joint replacement (6/15/23  12/5/23); and Total knee arthroplasty (Right, 2024).    Assessment  Assessment: pt progressing well this date, able to transfer and ambulate household distances with mostly SBA. Pt appears steady with no LOB or knee buckling noted this date. Pt plans to DC home today with support from family, edu on positioning (no pillow under knee), and reviewed HEP. Pt has no stairs at home and plans to sleep in recliner. PT rec home with initial  Limits  Cognition  Overall Cognitive Status: WNL    Objective  Temp: 98.4 °F (36.9 °C)  Pulse: 70  Heart Rate Source: Monitor  Respirations: 17  SpO2: 96 %  O2 Device: None (Room air)  BP: (!) 149/84  MAP (Calculated): 106  BP Location: Left upper arm  BP Method: Automatic  Patient Position: Semi fowlers                         Balance  Sitting: Intact  Standing: With support (SBA with RW)  Bed mobility  Supine to Sit: Stand by assistance (inc time, effortful)  Bed Mobility Comments: pt plans to sleep in recliner at DC  Transfers  Sit to Stand: Stand by assistance (to RW, cues for hand placement initially. Good carryover within session)  Stand to Sit: Stand by assistance  Ambulation  Device: Rolling Walker  Assistance: Stand by assistance;Contact guard assistance  Quality of Gait: slow, effortful, small steps, appears steady with no LOB or knee buckling noted  Gait Deviations: Decreased step length;Decreased step height;Slow [place]  Distance: 10+60+30ft  Comments: limited by fatigue - pt is household ambulator at baseline as of recent.        Exercise Treatment: Reviewed TKA HEP: ankle pumps, heel slides, quad sets, glut sets, hip abd, SAQ, LAQ x10 each; knee flexion stretch x5, knee ext stretch x2min                         AM-PAC - Mobility    AM-PAC Basic Mobility - Inpatient   How much help is needed turning from your back to your side while in a flat bed without using bedrails?: A Little  How much help is needed moving from lying on your back to sitting on the side of a flat bed without using bedrails?: A Little  How much help is needed moving to and from a bed to a chair?: A Little  How much help is needed standing up from a chair using your arms?: A Little  How much help is needed walking in hospital room?: A Little  How much help is needed climbing 3-5 steps with a railing?: A Lot  AM-PAC Inpatient Mobility Raw Score : 17  AM-PAC Inpatient T-Scale Score : 42.13  Mobility Inpatient CMS 0-100% Score:

## 2024-11-23 NOTE — CARE COORDINATION
Case Management Assessment            Discharge Note                    Date / Time of Note: 11/23/2024 11:34 AM                  Discharge Note Completed by: ZEB WEEKS    Patient Name: Nydia Downs   YOB: 1965  Diagnosis: Primary osteoarthritis of one knee, right [M17.11]  Primary osteoarthritis of right knee [M17.11]   Date / Time: 11/21/2024  8:43 AM    Current PCP: Petar Sorenson DO  Clinic patient: No    Hospitalization in the last 30 days: No       Advance Directives:  Code Status: Full Code  [place] DNR form completed and on chart: Not Indicated    Financial:  Payor: HU[employer] MEDICARE / Plan: [employer] GOLD PLUS HMO / Product Type: *No Product type* /      Pharmacy:    [facility]/pharmacy #[id number] - [place], [place] - [address] -  [phone] - F [phone]  [address] [address]  Phone: [phone] Fax: [phone]      Assistance purchasing medications?:    Assistance provided by Case Management: None at this time    Does patient want to participate in local refill/ meds to beds program?: Yes    Meds To Beds General Rules:  1. Can ONLY be done Monday- Friday between 8:30am-5pm  2. Prescription(s) must be in pharmacy by 3pm to be filled same day  3.Copy of patient's insurance/ prescription drug card and patient face sheet must be sent along with the prescription(s)  4. Cost of Rx cannot be added to hospital bill. If financial assistance is needed, please contact unit  or ;  or  CANNOT provide pharmacy voucher for patients co-pays  5. Patients can then  the prescription on their way out of the hospital at discharge, or pharmacy can deliver to the bedside if staff is available. (payment due at time of pick-up or delivery - cash, check, or card accepted)     Able to afford home medications/ co-pay costs: Yes    ADLS:  Current PT AM-PAC Score: 16 /24  Current OT AM-PAC Score: 20 /24    DISCHARGE Disposition:  Home with [facility] Health Care: [facility]     LOC at discharge: Skilled  JEANMARIE Completed: Yes    Notification completed in HENS/PAS?:  Not Applicable    IMM Completed:   Not Indicated due to: OBS         Transportation:  Transportation PLAN for discharge: family   Mode of Transport: Not Applicable  Reason for medical transport: Not Applicable  Name of Transport Company: Not Applicable  Time of Transport: n/a    Transport form completed: Not Indicated    Home Care:  Home Care ordered at discharge: Yes  Home Care Agency: [facility]  Phone: [phone] Fax: [phone]  Orders faxed: Yes    Durable Medical Equipment:  DME Provider: n/a  Equipment obtained during hospitalization: n/a    Home Oxygen and Respiratory Equipment:  Oxygen needed at discharge?: Not Indicated  Home Oxygen Company: Not Applicable  Portable tank available for discharge?: Not Indicated    Dialysis:  Dialysis patient: No    Dialysis Center:  Not Applicable    Hospice Services:  Location: Not Applicable  Agency: Not Applicable    Consents signed: Not Indicated    Referrals made at DISCHARGE for outpatient continued care:  Not Applicable    Additional CM Notes: Discharge order noted. Patient to discharge home with [facility]. I do not have Sydneys number (Nasima spoke to her per chart review) but I have another liaison, Jj that I called and left a voicemail. Order and facesheet was faxed to [phone].  Family to transport.    CM team available if further discharge needs arise.    COVID Result:    Lab Results   Component Value Date/Time    COVID19 Not-Detected 04/01/2024 11:54 AM    COVID19 Not Detected 03/23/2022 03:34 PM    COVID19 NOT DETECTED 01/06/2021 12:02 PM       The Plan for Transition of Care is related to the following treatment goals of Primary osteoarthritis of one knee, right [M17.11]  Primary osteoarthritis of right knee [M17.11]    The Patient and/or patient representative Nydia and her family were

## 2024-11-23 NOTE — PLAN OF CARE
Problem: Discharge Planning  Goal: Discharge to home or other facility with appropriate resources  Outcome: Progressing     Collaboration with social work to meet pt discharge needs    Problem: Pain  Goal: Verbalizes/displays adequate comfort level or baseline comfort level  Outcome: Progressing     Pt pain managed via MAR    Problem: Safety - Adult  Goal: Free from fall injury  Outcome: Progressing     Standard safety precautions in place, bed locked and in lowest position, bed/chair alarm on, bedside table/call light within reach, gripper socks applied, pt oriented to fall prevention measures.    Problem: ABCDS Injury Assessment  Goal: Absence of physical injury  Outcome: Progressing

## 2024-11-23 NOTE — PROGRESS NOTES
Pt awake in bed, alert and oriented x4. VSS on RA, pt exhibiting no s/s acute distress at this time. Pt tolerating PO diet well. Pt ambulated this shift x1 GB walker tolerating well. Pt voiding via BRP. Pt pain managed via MAR. Standard safety precautions in place, bed locked and in lowest position, bed/chair alarm on, gripper socks applied, bedside table/call light within reach, pt oriented to fall prevention measures. Pt states she Has no other needs at this time. Plan of care to continue.    Electronically signed by HEIDI CORDERO RN on 11/23/2024 at 6:45 AM

## 2024-11-23 NOTE — PROGRESS NOTES
IV Narcotic analgesia    [] Aggressive IV diuresis    [] Hypertonic Saline    [] Critical electrolyte abnormalities requiring IV replacement    [] Insulin - Scheduled/SSI or Insulin gtt    [] Anticoagulation (Heparin gtt or Coumadin - other anticoagulants in special circumstances)    [] Cardiac Medications (IV Amiodarone/Diltiazem, Tikosyn, etc)    [] Hemodialysis    [] Other -    [] Change in code status    [] Decision to escalate care    [] Major surgery/procedure with associated risk factors    --------------------------------------------------  C. Data (any 2)    [x] Data Review (any 3)    [x] Consultant notes from yesterday/today    [x] All available current labs reviewed interpreted for clinical significance    [x] Appropriate follow-up labs were ordered  [] Collateral history obtained     [] Independent Interpretation of tests (any 1)    [] Telemetry (Rhythm Strip) personally reviewed and interpreted        [] Imaging personally reviewed and interpreted     [x] Discussion (any 1)  [x] Multi-Disciplinary Rounds with Case Management  [] Discussed management of the case with           Labs:  Personally reviewed on 11/23/2024 and interpreted for clinical significance as documented above.     Recent Labs     11/22/24  0534   WBC 11.1*   HGB 12.3   HCT 37.6        Recent Labs     11/22/24  0534      K 3.2*      CO2 24   BUN 6*   CREATININE 0.7   CALCIUM 8.5   MG 1.80     No results for input(s): \"PROBNP\", \"TROPHS\" in the last 72 hours.  No results for input(s): \"LABA1C\" in the last 72 hours.  No results for input(s): \"AST\", \"ALT\", \"BILIDIR\", \"BILITOT\", \"ALKPHOS\" in the last 72 hours.  No results for input(s): \"INR\", \"LACTA\", \"TSH\" in the last 72 hours.    Urine Cultures:   Lab Results   Component Value Date/Time    LABURIN 50,000 CFU/ml 06/04/2024 11:29 AM     Blood Cultures:   Lab Results   Component Value Date/Time    BC No growth after 5 days of incubation. 06/10/2017 10:19 PM     Lab Results    Component Value Date/Time    BLOODCULT2 No growth after 5 days of incubation. 06/11/2017 02:36 AM     Organism:   Lab Results   Component Value Date/Time    ORG Klebsiella pneumoniae 06/04/2024 11:29 AM         Uday Beltrán MD

## 2024-11-23 NOTE — DISCHARGE INSTR - COC
MDCK, Quadv PF, 0.5mL 12/26/2017, 10/07/2021, 11/11/2022, 10/12/2023    Pneumococcal, PCV20, PREVNAR 20, (age 6w+), IM, 0.5mL 08/15/2024    TDaP, ADACEL (age 10y-64y), BOOSTRIX (age 10y+), IM, 0.5mL 02/11/2016    Zoster Recombinant (Shingrix) 11/22/2021       Active Problems:  Patient Active Problem List   Diagnosis Code    Mixed anxiety and depressive disorder F41.8    Knee pain M25.569    Chondromalacia of left knee M94.262    Synovitis of knee M65.969    Bilateral carpal tunnel syndrome G56.03    Lumbar strain S39.012A    Anxiety F41.9    Restless leg syndrome G25.81    De Quervain's disease (radial styloid tenosynovitis) M65.4    Osteoarthritis of carpometacarpal joint of thumb M18.9    Hemorrhoid prolapse K64.8    Dyspnea on exertion R06.09    Sciatica M54.30    Intractable vomiting with nausea R11.2    Hiatal hernia K44.9    Elevated blood pressure reading R03.0    Generalized anxiety disorder F41.1    Essential hypertension I10    Closed displaced fracture of middle phalanx of right ring finger S62.624A    Anal lesion K62.9    Adjustment disorder with depressed mood F43.21    Neck pain M54.2    Morbid obesity with BMI of 45.0-49.9, adult E66.01, Z68.42    S/P robot-assisted surgical procedure Z98.890    Spell of abnormal behavior R46.89    Urinary tract infection due to extended-spectrum beta lactamase (ESBL) producing Escherichia coli N39.0, B96.29, Z16.12    Vitamin D deficiency E55.9    Pyelonephritis N12    Hypokalemia E87.6    Arthritis of right shoulder region M19.011    Osteoarthritis of left knee, unspecified osteoarthritis type M17.12    Cellulitis L03.90    Cellulitis of left knee L03.116    Primary osteoarthritis of right knee M17.11       Isolation/Infection:   Isolation            Contact          Patient Infection Status       Infection Onset Added Last Indicated Last Indicated By Review Planned Expiration Resolved Resolved By    ESBL (Extended Spectrum Beta Lactamase) 11/12/21 11/17/21 09/21/22  {[facility]:[id number]}

## 2024-11-24 DIAGNOSIS — M17.10 PRIMARY OSTEOARTHRITIS OF KNEE, UNSPECIFIED LATERALITY: ICD-10-CM

## 2024-11-25 ENCOUNTER — CARE COORDINATION (OUTPATIENT)
Dept: CASE MANAGEMENT | Age: 59
End: 2024-11-25

## 2024-11-25 DIAGNOSIS — M17.11 PRIMARY OSTEOARTHRITIS OF RIGHT KNEE: Primary | ICD-10-CM

## 2024-11-25 PROCEDURE — 1111F DSCHRG MED/CURRENT MED MERGE: CPT | Performed by: FAMILY MEDICINE

## 2024-11-25 NOTE — TELEPHONE ENCOUNTER
LOV 11/18/2024      Future Appointments   Date Time Provider Department Center   12/12/2024 11:00 AM Juliana Simeon LISW [facility]

## 2024-11-25 NOTE — CARE COORDINATION
Care Transitions Note    Initial Call - Call within 2 business days of discharge: Yes    Patient Current Location:  Home: [address] [place] [address] [id number]  [facility] [address]    Care Transition Nurse contacted the patient by telephone to perform post hospital discharge assessment, verified name and  as identifiers. Provided introduction to self, and explanation of the Care Transition Nurse role.     Patient: Nydia Downs    Patient : 1965   MRN: [id number]    Reason for Admission: osteoarthritis of right knee  Discharge Date: 24  RURS: Readmission Risk Score: 13.3      Last Discharge Facility       Date Complaint Diagnosis Description Type Department Provider    24   Admission (Discharged) Dave Ahuja MD            Was this an external facility discharge? No    Additional needs identified to be addressed with provider   High priority: patient discharged from [facility] [place] on 24 s/p RTK  arthroplasty w/ Dr Carrington. Attempted to schedule HFU with PCP but first available appt was 12/10. Please assist with scheduling patient within the 7 day timeframe.           Method of communication with provider: chart routing.    Patients top risk factors for readmission: functional physical ability    Interventions to address risk factors:   Review of patient management of conditions/medications: post op    Care Summary Note: spoke to Mellissa at [facility] and SOC scheduled today    Patient answered call and verified . Patient pleasant and agreeable to transition call. Discussed recent hospitalization and post op RTK pain. Patient doing \"alright\" and confirmed that home care nurse completed SOC this morning. Therapy scheduled to begin tomorrow. Confirmed she has AVS and full medication reconciliation completed. Patient suppose to schedule follow up with surgeon 24 or 24 and has provider's number. Stated she would call this afternoon and get scheduled. CTN attempted to schedule

## 2024-11-26 RX ORDER — IBUPROFEN 800 MG/1
800 TABLET, FILM COATED ORAL EVERY 8 HOURS PRN
Qty: 90 TABLET | Refills: 1 | Status: SHIPPED | OUTPATIENT
Start: 2024-11-26

## 2024-12-02 ENCOUNTER — CARE COORDINATION (OUTPATIENT)
Dept: CASE MANAGEMENT | Age: 59
End: 2024-12-02

## 2024-12-02 NOTE — CARE COORDINATION
11:00 AM Juliana Simeon LISW Psychology [phone]            Care Transition Nurse provided contact information.  Plan for follow-up call in 6-10 days based on severity of symptoms and risk factors.  Plan for next call: symptom management-see note      Pallavi Borrego RN

## 2024-12-09 ENCOUNTER — TELEPHONE (OUTPATIENT)
Dept: FAMILY MEDICINE CLINIC | Age: 59
End: 2024-12-09

## 2024-12-09 ENCOUNTER — CARE COORDINATION (OUTPATIENT)
Dept: CASE MANAGEMENT | Age: 59
End: 2024-12-09

## 2024-12-09 NOTE — CARE COORDINATION
Care Transitions Note    Follow Up Call     Patient Current Location:  Home: [id number]  [facility] [address]    Care Transition Nurse contacted the patient by telephone. Verified name and  as identifiers.    Additional needs identified to be addressed with provider   No needs identified         Method of communication with provider: none.    Care Summary Note: Patient answered call and verified . Patient pleasant and agreeable to transition call. Patient stated \"not good\" since last contact. Patient went to orthopedic today for recheck. Patient stated that her foot and calf are swollen and knee is scabbed over from allergic reaction to dressing. Completed antibiotics and steroids since last contact. Patient is scheduled for ultrasound of her leg tomorrow to rule out blood clot at [facility] in [place]. Patient concerned because her mother passed away about a year ago d/t blood clots. Worried that condition is hereditary. Patient was given refill for pain medication and scheduled f/u in 2 weeks.  PT in-home scheduled this afternoon. Denied any acute needs at present time.  Agreeable to f/u calls.  Educated on the use of urgent care or physician’s 24 hr access line if assistance is needed after hours.       Plan of care updates since last contact:  Review of patient management of conditions/medications: see note       Advance Care Planning:   Does patient have an Advance Directive: reviewed during previous call, see note. .    Medication Review:  Full medication reconciliation completed during previous call.    Remote Patient Monitoring:  Offered patient enrollment in the Remote Patient Monitoring (RPM) program for in-home monitoring: Deferred at this time because unable to discuss d/t continued post op treatment and testing; will discuss at next outreach.    Assessments:  Care Transitions Subsequent and Final Call    Subsequent and Final Calls  Care Transitions Interventions  Other Interventions:

## 2024-12-09 NOTE — TELEPHONE ENCOUNTER
Pt called in stating that she would like for randall to call her back about re scheduling. Offered to re schedule pt but she said that she doesn't know what her schedule will look like because of physical therapy and her appointments

## 2024-12-16 ENCOUNTER — CARE COORDINATION (OUTPATIENT)
Dept: CASE MANAGEMENT | Age: 59
End: 2024-12-16

## 2024-12-16 NOTE — CARE COORDINATION
Care Transitions Note    Follow Up Call     Patient Current Location:  Home: [id number]  [facility] [address]    Care Transition Nurse contacted the patient by telephone. Verified name and  as identifiers.    Additional needs identified to be addressed with provider   No needs identified         Method of communication with provider: none.    Care Summary Note: Patient answered call and verified . Patient pleasant and agreeable to transition call.  Patient had US and no blood clots were diagnosis. Patient stated that she continues to have swelling and feels that she may have cellulitis. Stated she has a history of cellulitis. Patient scheduled to see PCP tomorrow morning and [place] home care nurse scheduled tomorrow afternoon. Patient will discuss at f/u visit. Taking all medications as directed and denied any acute needs at present time.  Agreeable to f/u calls.  Educated on the use of urgent care or physician’s 24 hr access line if assistance is needed after hours.      Plan of care updates since last contact:  Review of patient management of conditions/medications: see note       Advance Care Planning:   Does patient have an Advance Directive: reviewed during previous call, see note. .    Medication Review:  Full medication reconciliation completed during previous call.    Remote Patient Monitoring:  Offered patient enrollment in the Remote Patient Monitoring (RPM) program for in-home monitoring: Yes, but did not enroll at this time: already monitoring with home equipment.    Assessments:  Care Transitions Subsequent and Final Call    Subsequent and Final Calls  Are you currently active with any services?: Home Health  Care Transitions Interventions  Other Interventions:              Follow Up Appointment:   Reviewed upcoming appointment(s).  Future Appointments         Provider Specialty Dept Phone    2024 10:20 AM Petar Sorenson DO Family Medicine [phone]    2025 11:30 AM

## 2024-12-17 ENCOUNTER — OFFICE VISIT (OUTPATIENT)
Dept: FAMILY MEDICINE CLINIC | Age: 59
End: 2024-12-17

## 2024-12-17 VITALS
BODY MASS INDEX: 47.35 KG/M2 | WEIGHT: 276 LBS | HEART RATE: 70 BPM | DIASTOLIC BLOOD PRESSURE: 74 MMHG | SYSTOLIC BLOOD PRESSURE: 108 MMHG | TEMPERATURE: 97.7 F | RESPIRATION RATE: 16 BRPM | OXYGEN SATURATION: 96 %

## 2024-12-17 DIAGNOSIS — Z96.651 STATUS POST TOTAL RIGHT KNEE REPLACEMENT: ICD-10-CM

## 2024-12-17 DIAGNOSIS — I10 ESSENTIAL HYPERTENSION: ICD-10-CM

## 2024-12-17 DIAGNOSIS — L03.115 CELLULITIS OF RIGHT LOWER EXTREMITY: Primary | ICD-10-CM

## 2024-12-17 RX ORDER — ASPIRIN 81 MG/1
81 TABLET, COATED ORAL 2 TIMES DAILY
COMMUNITY
Start: 2024-11-19

## 2024-12-17 RX ORDER — METOCLOPRAMIDE 5 MG/1
5 TABLET ORAL 3 TIMES DAILY PRN
Qty: 30 TABLET | Refills: 1 | Status: SHIPPED | OUTPATIENT
Start: 2024-12-17

## 2024-12-17 ASSESSMENT — PATIENT HEALTH QUESTIONNAIRE - PHQ9
1. LITTLE INTEREST OR PLEASURE IN DOING THINGS: SEVERAL DAYS
SUM OF ALL RESPONSES TO PHQ QUESTIONS 1-9: 5
SUM OF ALL RESPONSES TO PHQ QUESTIONS 1-9: 2
4. FEELING TIRED OR HAVING LITTLE ENERGY: NOT AT ALL
SUM OF ALL RESPONSES TO PHQ QUESTIONS 1-9: 2
7. TROUBLE CONCENTRATING ON THINGS, SUCH AS READING THE NEWSPAPER OR WATCHING TELEVISION: SEVERAL DAYS
SUM OF ALL RESPONSES TO PHQ QUESTIONS 1-9: 5
SUM OF ALL RESPONSES TO PHQ9 QUESTIONS 1 & 2: 2
SUM OF ALL RESPONSES TO PHQ QUESTIONS 1-9: 2
8. MOVING OR SPEAKING SO SLOWLY THAT OTHER PEOPLE COULD HAVE NOTICED. OR THE OPPOSITE, BEING SO FIGETY OR RESTLESS THAT YOU HAVE BEEN MOVING AROUND A LOT MORE THAN USUAL: SEVERAL DAYS
SUM OF ALL RESPONSES TO PHQ QUESTIONS 1-9: 2
SUM OF ALL RESPONSES TO PHQ QUESTIONS 1-9: 5
3. TROUBLE FALLING OR STAYING ASLEEP: SEVERAL DAYS
1. LITTLE INTEREST OR PLEASURE IN DOING THINGS: SEVERAL DAYS
9. THOUGHTS THAT YOU WOULD BE BETTER OFF DEAD, OR OF HURTING YOURSELF: NOT AT ALL
6. FEELING BAD ABOUT YOURSELF - OR THAT YOU ARE A FAILURE OR HAVE LET YOURSELF OR YOUR FAMILY DOWN: NOT AT ALL
SUM OF ALL RESPONSES TO PHQ QUESTIONS 1-9: 5
5. POOR APPETITE OR OVEREATING: NOT AT ALL
2. FEELING DOWN, DEPRESSED OR HOPELESS: SEVERAL DAYS
10. IF YOU CHECKED OFF ANY PROBLEMS, HOW DIFFICULT HAVE THESE PROBLEMS MADE IT FOR YOU TO DO YOUR WORK, TAKE CARE OF THINGS AT HOME, OR GET ALONG WITH OTHER PEOPLE: SOMEWHAT DIFFICULT
2. FEELING DOWN, DEPRESSED OR HOPELESS: SEVERAL DAYS
SUM OF ALL RESPONSES TO PHQ9 QUESTIONS 1 & 2: 2

## 2024-12-17 NOTE — PROGRESS NOTES
Subjective:      Patient ID: Nydia Downs is a 59 y.o. y.o. female.  Following up rigth total knee  Has a skin reaction across the incision-  allergic / inflammatory response  Lower leg swelling    Is doing PT / OT    Had venous doppler neg    On Abx and using topical med for the itch and the skin change   Is improving    He is generally medically stable      Is getting better motion.  The incision was inflammed and itchy and skin rearcted-  that is improving.    HPI      Chief Complaint   Patient presents with    Post-op Problem     R knee, 24       Allergies   Allergen Reactions    Droperidol Other (See Comments)     unresponsive    Haldol [Haloperidol Lactate] Other (See Comments)     \"felt out of it, similar to the toradol\"    Toradol [Ketorolac Tromethamine] Other (See Comments)     \"out of it\"  \"felt like sleep paralysis\"       Past Medical History:   Diagnosis Date    Anxiety     Depression     Endometriosis     Essential hypertension 12/10/2018    GERD (gastroesophageal reflux disease)     Headache     Obesity     Osteoarthritis     PONV (postoperative nausea and vomiting)     Restless leg syndrome     Wears dentures     Wears glasses     For reading only       Past Surgical History:   Procedure Laterality Date    ANUS SURGERY  2018    fissure     SECTION      x3    COLONOSCOPY      DILATION AND CURETTAGE OF UTERUS N/A 2021    VIDEO HYSTEROSCOPY DILATATION AND CURETTAGE, POSSIBLE MYOSURE, POSSIBLE POLYPECTOMY performed by Ana Mike DO at [facility] OR    FRACTURE SURGERY      right wrist    HERNIA REPAIR  2018    LAPAROSCOPIC PARAESOPHAGEAL HERNIA REPAIR WITH MESH    HYSTERECTOMY (CERVIX STATUS UNKNOWN) N/A 10/21/2021    ROBOTIC ASSISTED LAPAROSCOPIC HYSTERECTOMY WITH RIGHT SALPINGECTOMY, RIGHT OOPHORECTOMY, CYSTOSCOPY, LYSIS OF ADHESIONS  CPT CODE - 29943 performed by Joanie Del Valle DO at [facility] OR    HYSTERECTOMY, TOTAL ABDOMINAL (CERVIX REMOVED)  10/21/21

## 2024-12-22 DIAGNOSIS — F41.9 ANXIETY DISORDER, UNSPECIFIED: ICD-10-CM

## 2024-12-23 NOTE — TELEPHONE ENCOUNTER
LOV 12/17/2024    Future Appointments   Date Time Provider Department Center   1/6/2025 11:30 AM Juliana Simeon LISW [facility]

## 2024-12-26 ENCOUNTER — CARE COORDINATION (OUTPATIENT)
Dept: CASE MANAGEMENT | Age: 59
End: 2024-12-26

## 2024-12-26 DIAGNOSIS — F41.9 ANXIETY DISORDER, UNSPECIFIED: ICD-10-CM

## 2024-12-26 NOTE — TELEPHONE ENCOUNTER
Future Appointments   Date Time Provider Department Center   1/6/2025 11:30 AM Juliana Simeon L[facility] [place] [facility] 12/17/2024

## 2024-12-26 NOTE — CARE COORDINATION
Care Transitions Note    Final Call     Patient Current Location:  Home: [address]  [id number]  [facility] [address]    Care Transition Nurse contacted the patient by telephone. Verified name and  as identifiers.    Patient graduated from the Care Transitions program.  Patient/family verbalizes confidence in the ability to self-manage at this time..      Advance Care Planning:   Does patient have an Advance Directive: reviewed during previous call, see note. .    Handoff:   Patient was not referred to the ACM team due to no additional needs identified.       Care Summary Note: Patient answered call and verified . Patient pleasant and agreeable to transition call. Doing well, but still has some swelling and dx with possible cellulitis. Started and completed antibiotics. Stated she is now walking on effected leg and getting stronger. Active with home care services. Taking all medication as directed and denied any acute needs at present time.  stable and no further transition support needed.  Educated on the use of urgent care or physician’s 24 hr access line if assistance is needed after hours.    Assessments:  Care Transitions Subsequent and Final Call    Subsequent and Final Calls  Do you have any ongoing symptoms?: No  Have your medications changed?: No  Do you have any questions related to your medications?: No  Do you currently have any active services?: Yes  Are you currently active with any services?: Home Health  Do you have any needs or concerns that I can assist you with?: No  Identified Barriers: None  Care Transitions Interventions  Other Interventions:              Upcoming Appointments:    Future Appointments         Provider Specialty Dept Phone    2025 11:30 AM Juliana Simeon LISW Psychology [phone]            Patient has agreed to contact primary care provider and/or specialist for any further questions, concerns, or needs.    Pallavi Borrego RN

## 2024-12-27 RX ORDER — ALPRAZOLAM 0.5 MG
TABLET ORAL
Qty: 90 TABLET | Refills: 1 | Status: SHIPPED | OUTPATIENT
Start: 2024-12-27 | End: 2025-02-25

## 2024-12-27 RX ORDER — ALPRAZOLAM 0.5 MG
TABLET ORAL
Qty: 90 TABLET | Refills: 1 | OUTPATIENT
Start: 2024-12-27 | End: 2025-02-25

## 2025-01-25 DIAGNOSIS — M17.10 PRIMARY OSTEOARTHRITIS OF KNEE, UNSPECIFIED LATERALITY: ICD-10-CM

## 2025-01-27 RX ORDER — IBUPROFEN 800 MG/1
800 TABLET, FILM COATED ORAL EVERY 8 HOURS PRN
Qty: 90 TABLET | Refills: 1 | Status: SHIPPED | OUTPATIENT
Start: 2025-01-27

## 2025-01-27 NOTE — TELEPHONE ENCOUNTER
Future Appointments   Date Time Provider Department Center   1/29/2025 11:20 AM Petar Sorenson DO [facility] DE[facility]   1/30/2025 11:00 AM [facility]

## 2025-01-29 ENCOUNTER — OFFICE VISIT (OUTPATIENT)
Dept: FAMILY MEDICINE CLINIC | Age: 60
End: 2025-01-29
Payer: MEDICARE

## 2025-01-29 VITALS
SYSTOLIC BLOOD PRESSURE: 118 MMHG | DIASTOLIC BLOOD PRESSURE: 70 MMHG | TEMPERATURE: 97.4 F | HEART RATE: 71 BPM | WEIGHT: 282 LBS | RESPIRATION RATE: 16 BRPM | OXYGEN SATURATION: 93 % | BODY MASS INDEX: 48.38 KG/M2

## 2025-01-29 DIAGNOSIS — R47.9 DIFFICULTY WITH SPEECH: ICD-10-CM

## 2025-01-29 DIAGNOSIS — F33.1 MODERATE EPISODE OF RECURRENT MAJOR DEPRESSIVE DISORDER (HCC): ICD-10-CM

## 2025-01-29 DIAGNOSIS — Z23 NEEDS FLU SHOT: Primary | ICD-10-CM

## 2025-01-29 PROCEDURE — 3074F SYST BP LT 130 MM HG: CPT | Performed by: FAMILY MEDICINE

## 2025-01-29 PROCEDURE — G8427 DOCREV CUR MEDS BY ELIG CLIN: HCPCS | Performed by: FAMILY MEDICINE

## 2025-01-29 PROCEDURE — 90661 CCIIV3 VAC ABX FR 0.5 ML IM: CPT | Performed by: FAMILY MEDICINE

## 2025-01-29 PROCEDURE — 3017F COLORECTAL CA SCREEN DOC REV: CPT | Performed by: FAMILY MEDICINE

## 2025-01-29 PROCEDURE — 99213 OFFICE O/P EST LOW 20 MIN: CPT | Performed by: FAMILY MEDICINE

## 2025-01-29 PROCEDURE — G0008 ADMIN INFLUENZA VIRUS VAC: HCPCS | Performed by: FAMILY MEDICINE

## 2025-01-29 PROCEDURE — 1036F TOBACCO NON-USER: CPT | Performed by: FAMILY MEDICINE

## 2025-01-29 PROCEDURE — G8417 CALC BMI ABV UP PARAM F/U: HCPCS | Performed by: FAMILY MEDICINE

## 2025-01-29 PROCEDURE — 3078F DIAST BP <80 MM HG: CPT | Performed by: FAMILY MEDICINE

## 2025-01-29 SDOH — ECONOMIC STABILITY: FOOD INSECURITY: WITHIN THE PAST 12 MONTHS, YOU WORRIED THAT YOUR FOOD WOULD RUN OUT BEFORE YOU GOT MONEY TO BUY MORE.: NEVER TRUE

## 2025-01-29 SDOH — ECONOMIC STABILITY: FOOD INSECURITY: WITHIN THE PAST 12 MONTHS, THE FOOD YOU BOUGHT JUST DIDN'T LAST AND YOU DIDN'T HAVE MONEY TO GET MORE.: NEVER TRUE

## 2025-01-29 ASSESSMENT — PATIENT HEALTH QUESTIONNAIRE - PHQ9
SUM OF ALL RESPONSES TO PHQ QUESTIONS 1-9: 17
SUM OF ALL RESPONSES TO PHQ QUESTIONS 1-9: 17
6. FEELING BAD ABOUT YOURSELF - OR THAT YOU ARE A FAILURE OR HAVE LET YOURSELF OR YOUR FAMILY DOWN: NOT AT ALL
2. FEELING DOWN, DEPRESSED OR HOPELESS: NEARLY EVERY DAY
8. MOVING OR SPEAKING SO SLOWLY THAT OTHER PEOPLE COULD HAVE NOTICED. OR THE OPPOSITE, BEING SO FIGETY OR RESTLESS THAT YOU HAVE BEEN MOVING AROUND A LOT MORE THAN USUAL: SEVERAL DAYS
3. TROUBLE FALLING OR STAYING ASLEEP: NEARLY EVERY DAY
10. IF YOU CHECKED OFF ANY PROBLEMS, HOW DIFFICULT HAVE THESE PROBLEMS MADE IT FOR YOU TO DO YOUR WORK, TAKE CARE OF THINGS AT HOME, OR GET ALONG WITH OTHER PEOPLE: NOT DIFFICULT AT ALL
1. LITTLE INTEREST OR PLEASURE IN DOING THINGS: NEARLY EVERY DAY
5. POOR APPETITE OR OVEREATING: SEVERAL DAYS
SUM OF ALL RESPONSES TO PHQ QUESTIONS 1-9: 17
9. THOUGHTS THAT YOU WOULD BE BETTER OFF DEAD, OR OF HURTING YOURSELF: NOT AT ALL
SUM OF ALL RESPONSES TO PHQ QUESTIONS 1-9: 17
4. FEELING TIRED OR HAVING LITTLE ENERGY: NEARLY EVERY DAY
7. TROUBLE CONCENTRATING ON THINGS, SUCH AS READING THE NEWSPAPER OR WATCHING TELEVISION: NEARLY EVERY DAY
SUM OF ALL RESPONSES TO PHQ9 QUESTIONS 1 & 2: 6

## 2025-01-29 NOTE — PROGRESS NOTES
Arrhythmia Father     Diabetes Other     Diabetes Paternal Grandfather     Cancer Neg Hx     Breast Cancer Neg Hx     Colon Cancer Neg Hx        Vitals:    01/29/25 1135   BP: 118/70   Pulse: 71   Resp: 16   Temp: 97.4 °F (36.3 °C)   SpO2: 93%       Wt Readings from Last 3 Encounters:   01/29/25 127.9 kg (282 lb)   12/17/24 125.2 kg (276 lb)   11/21/24 126.6 kg (279 lb)       Review of Systems   Respiratory:  Negative for shortness of breath.    Cardiovascular:  Negative for chest pain and palpitations.   Neurological:  Negative for tremors, seizures and facial asymmetry.   Psychiatric/Behavioral:  Positive for depression and dysphoric mood. Negative for self-injury and suicidal ideas.        Objective:   Physical Exam  Constitutional:       Appearance: She is obese. She is not ill-appearing.   Pulmonary:      Effort: Pulmonary effort is normal.   Musculoskeletal:      Right lower leg: No edema.      Left lower leg: No edema.   Neurological:      Mental Status: She is alert.   Psychiatric:         Mood and Affect: Mood normal.         Behavior: Behavior normal.         Assessment:       Diagnosis Orders   1. Needs flu shot          Acid reflux  Anxiety  Depressive disorder, partial remission        Plan:     Stop Reglan      Get neuro eval  Med instructions and follow-up with the neurologist.  Follow-up with me 3 months or so.    Current Outpatient Medications   Medication Sig Dispense Refill    ibuprofen (ADVIL;MOTRIN) 800 MG tablet TAKE 1 TABLET BY MOUTH EVERY 8 HOURS AS NEEDED FOR PAIN 90 tablet 1    ALPRAZolam (XANAX) 0.5 MG tablet TAKE 1 TABLET BY MOUTH 3 TIMES DAILY AS NEEDED FOR SLEEP OR ANXIETY. 90 tablet 1    ASPIRIN LOW DOSE 81 MG EC tablet Take 1 tablet by mouth 2 times daily      metoclopramide (REGLAN) 5 MG tablet Take 1 tablet by mouth 3 times daily as needed (nausea) For nausea 30 tablet 1    FLUoxetine (PROZAC) 20 MG capsule TAKE 1 CAPSULE BY MOUTH EVERY DAY FOR DEPRESSION (Patient taking

## 2025-01-29 NOTE — PATIENT INSTRUCTIONS
Continue with the ortho doc    Call the neurologist and get checked - [facility] - [phone]    Stop the Reglan ( nausea medication -  already done )          [facility] 211   Speak to a trained professional 24/7 who can connect you to essential community services including food, clothing, transportation, housing, utilities, employment services, childcare, and baby supplies. 211 serves nationwide.  [address] [place] [address].[email or web address] for resources in [facility], [place] and [facility] in [place]; [facility], [place], and [facility] in [place].   [email or web address]/resources for resources in [facility], [place], [place], [place], [place], [place], [place], [facility], [place], [place], and [facility] in [place].    Homeless Crisis Line  Central Access Point (CAP Line): Intake system for families and individuals who are currently experiencing homelessness or who are at risk of becoming homeless. Can provide list of shelters and bed availability  Phone Number: [phone] ([phone]) Contact this number first.  It is a centralized point of entry for services.  Hours of Operation:  Monday - Friday 9am - 5pm; Saturday & Sunday 10pm - 2pm      Homeless Shelters: [facility]: Women and Children  Located at [address] / Phone: [phone]  [facility]: Men   Located at [address] / Phone: [phone]  [facility] [place] for Men  Located at [address] / Phone: [phone]  [facility]  [address] [facility] / Phone: [phone]    Homeless Shelters: [facility] Community Services: Emergency shelter in hotels for a stay of one month. The agency also has rental assistance funds for security deposits and first month's rent for shelter clients  Located at [facility] [place], [place]; Phone: [phone]      Homeless Shelters: [facility]:

## 2025-01-31 ENCOUNTER — HOSPITAL ENCOUNTER (EMERGENCY)
Age: 60
Discharge: HOME | End: 2025-01-31
Attending: EMERGENCY MEDICINE
Payer: MEDICARE

## 2025-01-31 ENCOUNTER — APPOINTMENT (OUTPATIENT)
Dept: GENERAL RADIOLOGY | Age: 60
End: 2025-01-31
Payer: MEDICARE

## 2025-01-31 VITALS
HEART RATE: 74 BPM | SYSTOLIC BLOOD PRESSURE: 107 MMHG | WEIGHT: 282 LBS | TEMPERATURE: 98.2 F | RESPIRATION RATE: 17 BRPM | DIASTOLIC BLOOD PRESSURE: 74 MMHG | OXYGEN SATURATION: 94 % | HEIGHT: 64 IN | BODY MASS INDEX: 48.14 KG/M2

## 2025-01-31 DIAGNOSIS — R05.9 COUGH, UNSPECIFIED TYPE: Primary | ICD-10-CM

## 2025-01-31 DIAGNOSIS — J11.1 INFLUENZA: ICD-10-CM

## 2025-01-31 LAB
ALBUMIN SERPL-MCNC: 3.9 G/DL (ref 3.4–5)
ALBUMIN/GLOB SERPL: 1.1 {RATIO} (ref 1.1–2.2)
ALP SERPL-CCNC: 92 U/L (ref 40–129)
ALT SERPL-CCNC: 42 U/L (ref 10–40)
ANION GAP SERPL CALCULATED.3IONS-SCNC: 15 MMOL/L (ref 3–16)
AST SERPL-CCNC: 54 U/L (ref 15–37)
BASOPHILS # BLD: 0 K/UL (ref 0–0.2)
BASOPHILS NFR BLD: 0.2 %
BILIRUB SERPL-MCNC: 0.3 MG/DL (ref 0–1)
BUN SERPL-MCNC: 13 MG/DL (ref 7–20)
CALCIUM SERPL-MCNC: 8.9 MG/DL (ref 8.3–10.6)
CHLORIDE SERPL-SCNC: 100 MMOL/L (ref 99–110)
CO2 SERPL-SCNC: 21 MMOL/L (ref 21–32)
CREAT SERPL-MCNC: 1.2 MG/DL (ref 0.6–1.1)
DEPRECATED RDW RBC AUTO: 15 % (ref 12.4–15.4)
EKG ATRIAL RATE: 82 BPM
EKG DIAGNOSIS: NORMAL
EKG P AXIS: 29 DEGREES
EKG P-R INTERVAL: 104 MS
EKG Q-T INTERVAL: 372 MS
EKG QRS DURATION: 84 MS
EKG QTC CALCULATION (BAZETT): 434 MS
EKG R AXIS: 10 DEGREES
EKG T AXIS: 4 DEGREES
EKG VENTRICULAR RATE: 82 BPM
EOSINOPHIL # BLD: 0 K/UL (ref 0–0.6)
EOSINOPHIL NFR BLD: 0 %
FLUAV RNA RESP QL NAA+PROBE: DETECTED
FLUBV RNA RESP QL NAA+PROBE: NOT DETECTED
GFR SERPLBLD CREATININE-BSD FMLA CKD-EPI: 52 ML/MIN/{1.73_M2}
GLUCOSE SERPL-MCNC: 116 MG/DL (ref 70–99)
HCT VFR BLD AUTO: 43.5 % (ref 36–48)
HGB BLD-MCNC: 14.3 G/DL (ref 12–16)
LYMPHOCYTES # BLD: 0.9 K/UL (ref 1–5.1)
LYMPHOCYTES NFR BLD: 15.2 %
MAGNESIUM SERPL-MCNC: 1.77 MG/DL (ref 1.8–2.4)
MCH RBC QN AUTO: 29.5 PG (ref 26–34)
MCHC RBC AUTO-ENTMCNC: 32.9 G/DL (ref 31–36)
MCV RBC AUTO: 89.8 FL (ref 80–100)
MONOCYTES # BLD: 0.7 K/UL (ref 0–1.3)
MONOCYTES NFR BLD: 12.9 %
NEUTROPHILS # BLD: 4.1 K/UL (ref 1.7–7.7)
NEUTROPHILS NFR BLD: 71.7 %
PLATELET # BLD AUTO: 163 K/UL (ref 135–450)
PMV BLD AUTO: 10.3 FL (ref 5–10.5)
POTASSIUM SERPL-SCNC: 3.3 MMOL/L (ref 3.5–5.1)
PROT SERPL-MCNC: 7.4 G/DL (ref 6.4–8.2)
RBC # BLD AUTO: 4.84 M/UL (ref 4–5.2)
SARS-COV-2 RNA RESP QL NAA+PROBE: NOT DETECTED
SODIUM SERPL-SCNC: 136 MMOL/L (ref 136–145)
WBC # BLD AUTO: 5.7 K/UL (ref 4–11)

## 2025-01-31 PROCEDURE — 2580000003 HC RX 258: Performed by: PHYSICIAN ASSISTANT

## 2025-01-31 PROCEDURE — 93005 ELECTROCARDIOGRAM TRACING: CPT | Performed by: EMERGENCY MEDICINE

## 2025-01-31 PROCEDURE — 71046 X-RAY EXAM CHEST 2 VIEWS: CPT

## 2025-01-31 PROCEDURE — 83735 ASSAY OF MAGNESIUM: CPT

## 2025-01-31 PROCEDURE — 6360000002 HC RX W HCPCS: Performed by: PHYSICIAN ASSISTANT

## 2025-01-31 PROCEDURE — 85025 COMPLETE CBC W/AUTO DIFF WBC: CPT

## 2025-01-31 PROCEDURE — 6370000000 HC RX 637 (ALT 250 FOR IP): Performed by: PHYSICIAN ASSISTANT

## 2025-01-31 PROCEDURE — 87636 SARSCOV2 & INF A&B AMP PRB: CPT

## 2025-01-31 PROCEDURE — 0202U NFCT DS 22 TRGT SARS-COV-2: CPT

## 2025-01-31 PROCEDURE — 94640 AIRWAY INHALATION TREATMENT: CPT

## 2025-01-31 PROCEDURE — 96366 THER/PROPH/DIAG IV INF ADDON: CPT

## 2025-01-31 PROCEDURE — 96365 THER/PROPH/DIAG IV INF INIT: CPT

## 2025-01-31 PROCEDURE — 99285 EMERGENCY DEPT VISIT HI MDM: CPT

## 2025-01-31 PROCEDURE — 80053 COMPREHEN METABOLIC PANEL: CPT

## 2025-01-31 RX ORDER — MAGNESIUM SULFATE 1 G/100ML
1000 INJECTION INTRAVENOUS ONCE
Status: COMPLETED | OUTPATIENT
Start: 2025-01-31 | End: 2025-01-31

## 2025-01-31 RX ORDER — SODIUM CHLORIDE, SODIUM LACTATE, POTASSIUM CHLORIDE, AND CALCIUM CHLORIDE .6; .31; .03; .02 G/100ML; G/100ML; G/100ML; G/100ML
1000 INJECTION, SOLUTION INTRAVENOUS ONCE
Status: COMPLETED | OUTPATIENT
Start: 2025-01-31 | End: 2025-01-31

## 2025-01-31 RX ORDER — IPRATROPIUM BROMIDE AND ALBUTEROL SULFATE 2.5; .5 MG/3ML; MG/3ML
1 SOLUTION RESPIRATORY (INHALATION) ONCE
Status: COMPLETED | OUTPATIENT
Start: 2025-01-31 | End: 2025-01-31

## 2025-01-31 RX ORDER — ALBUTEROL SULFATE 90 UG/1
2 INHALANT RESPIRATORY (INHALATION) 4 TIMES DAILY PRN
Qty: 18 G | Refills: 0 | Status: SHIPPED | OUTPATIENT
Start: 2025-01-31 | End: 2025-01-31 | Stop reason: SDUPTHER

## 2025-01-31 RX ADMIN — MAGNESIUM SULFATE 1000 MG: 1 INJECTION INTRAVENOUS at 15:48

## 2025-01-31 RX ADMIN — SODIUM CHLORIDE, POTASSIUM CHLORIDE, SODIUM LACTATE AND CALCIUM CHLORIDE 1000 ML: 600; 310; 30; 20 INJECTION, SOLUTION INTRAVENOUS at 15:48

## 2025-01-31 RX ADMIN — IPRATROPIUM BROMIDE AND ALBUTEROL SULFATE 1 DOSE: 2.5; .5 SOLUTION RESPIRATORY (INHALATION) at 15:27

## 2025-01-31 ASSESSMENT — ENCOUNTER SYMPTOMS
COUGH: 1
ABDOMINAL PAIN: 0
NAUSEA: 0
SHORTNESS OF BREATH: 0
VOMITING: 0
DIARRHEA: 0

## 2025-01-31 ASSESSMENT — PAIN SCALES - GENERAL: PAINLEVEL_OUTOF10: 6

## 2025-01-31 ASSESSMENT — PAIN - FUNCTIONAL ASSESSMENT: PAIN_FUNCTIONAL_ASSESSMENT: 0-10

## 2025-01-31 ASSESSMENT — PAIN DESCRIPTION - LOCATION: LOCATION: HEAD

## 2025-01-31 ASSESSMENT — PAIN DESCRIPTION - FREQUENCY: FREQUENCY: CONTINUOUS

## 2025-01-31 NOTE — ED NOTES
Reviewed discharge instructions, medication reconciliation, and patient education information with patient. Patient stated understanding, and answered questions to satisfaction. Patient verbalized satisfaction of care. PIV removed at this time. Patient verbalized understanding of returning to ER if symptoms worsen, and to follow up with primary health care provider.        Chloe Aggarwal, RN  01/31/25 5985

## 2025-01-31 NOTE — DISCHARGE INSTRUCTIONS
As we discussed, continue to hydrate at home  Use your rescue inhaler as needed for symptoms  Return to the emergency department with new or worsening symptoms

## 2025-01-31 NOTE — ED TRIAGE NOTES
Pt arrives with productive cough, body aches, lack of appetite, and shortness of breath since Wednesday. Been taking tylenol for symptoms. States she has had fever past two days.

## 2025-01-31 NOTE — ED NOTES
[facility] Emergency Department  MEDICAL SCREENING EXAM    Date of Service: 2025    Reason for Visit: Cough and Shortness of Breath        Patient History, Brief Exam, and Initial Assessment     Abbreviated HPI: Nydia Downs is a 59 y.o. female presenting with cough shortness of breath myalgias.  Started a few days ago.    INITIAL VITALS: BP: 107/72, Temp: 98.4 °F (36.9 °C), Pulse: 80, Respirations: 18, SpO2: 99 %    Plan     Patient was evaluated in the REU for a medical screening exam.  To further evaluate the presenting complaints, the following orders have been placed:  No orders of the defined types were placed in this encounter.       See primary provider's note for full details and final disposition.     Current Facility-Administered Medications:   No orders of the defined types were placed in this encounter.        REU Dispo   Patient is awake alert having myalgias cough congestion.  On exam she is awake alert no respiratory distress will initiate laboratory evaluation chest x-ray and reassessment      Relevant Medical History     Past Medical History:   Diagnosis Date    Anxiety     Depression     Endometriosis     Essential hypertension 12/10/2018    GERD (gastroesophageal reflux disease)     Headache     Obesity     Osteoarthritis     PONV (postoperative nausea and vomiting)     Restless leg syndrome     Wears dentures     Wears glasses     For reading only     Past Surgical History:   Procedure Laterality Date    ANUS SURGERY  2018    fissure     SECTION      x3    COLONOSCOPY      DILATION AND CURETTAGE OF UTERUS N/A 2021    VIDEO HYSTEROSCOPY DILATATION AND CURETTAGE, POSSIBLE MYOSURE, POSSIBLE POLYPECTOMY performed by Ana Mike DO at [facility] OR    FRACTURE SURGERY      right wrist    HERNIA REPAIR  2018    LAPAROSCOPIC PARAESOPHAGEAL HERNIA REPAIR WITH MESH    HYSTERECTOMY (CERVIX STATUS UNKNOWN) N/A 10/21/2021    ROBOTIC ASSISTED LAPAROSCOPIC  unresponsive    Haldol [Haloperidol Lactate] Other (See Comments)     \"felt out of it, similar to the toradol\"    Toradol [Ketorolac Tromethamine] Other (See Comments)     \"out of it\"  \"felt like sleep paralysis\"         Christoph Medellin MD  01/31/25 4867

## 2025-01-31 NOTE — ED PROVIDER NOTES
ED Attending Attestation Note     Date of evaluation: 1/31/2025    I have discussed the case with the ARIEL. I have personally performed a history, physical exam, and my own medical decision making. I have reviewed the note and agree with the findings and plan.  I have reviewed the ECG and concur with the ARIEL's interpretation.     INITIAL VITALS: BP: 107/72, Temp: 98.4 °F (36.9 °C), Pulse: 80, Respirations: 18, SpO2: 99 %   Physical Exam  Vitals reviewed.   Pulmonary:      Breath sounds: Examination of the right-upper field reveals wheezing. Examination of the left-upper field reveals wheezing. Wheezing present.   Neurological:      Mental Status: She is alert.         MDM:  My assessment reveals a well-appearing 59-year-old female presenting with cough, fatigue, and chills over the last 2 or 3 days.  On exam, she does have mild end expiratory wheezing which improved after breathing treatments.  Her workup is positive for influenza A, likely the etiology of her symptoms.  Ultimately, she was discharged home with PCP follow-up and strict return precautions       Jarred Alas MD  01/31/25 3299

## 2025-01-31 NOTE — ED PROVIDER NOTES
THE [facility]  EMERGENCY DEPARTMENT ENCOUNTER          PHYSICIAN ASSISTANT NOTE       Date of evaluation: 2025    Chief Complaint     Cough and Shortness of Breath      History of Present Illness     Nydia Downs is a 59 y.o. female with a past medical history as detailed in her chart who comes to the emergency department today complaining of a cough, fatigue, chills ongoing over the past 2 to 3 days.  She denies sick contacts.  She does note some decreased intake of food but does note she has been trying to hydrate.  She endorses that she is able to complete her ADLs at home despite feeling poorly.  She denies any nausea or vomiting, bowel or bladder changes including diarrhea.    Review of Systems     Review of Systems   Constitutional:  Positive for chills and fatigue. Negative for fever.   Respiratory:  Positive for cough. Negative for shortness of breath.    Cardiovascular:  Negative for chest pain.   Gastrointestinal:  Negative for abdominal pain, diarrhea, nausea and vomiting.   Genitourinary:  Negative for dysuria.   Neurological:  Negative for headaches.       Past Medical, Surgical, Family, and Social History     She has a past medical history of Anxiety, Depression, Endometriosis, Essential hypertension, GERD (gastroesophageal reflux disease), Headache, Obesity, Osteoarthritis, PONV (postoperative nausea and vomiting), Restless leg syndrome, Wears dentures, and Wears glasses.  She has a past surgical history that includes Tonsillectomy ();  section; Ovary removal (Right, ); Upper gastrointestinal endoscopy (); Knee arthroscopy (Left, 11/15/2012); Upper gastrointestinal endoscopy (); hernia repair (2018); Anus surgery (2018); fracture surgery; Rotator cuff repair (Right, 2020); Shoulder arthroscopy (Right, 2020); Colonoscopy; Shoulder arthroscopy (Right, 2021); Shoulder arthroscopy (Right, 2021); Dilation and curettage of uterus (N/A,  06/03/2021); Hysterectomy (N/A, 10/21/2021); Upper gastrointestinal endoscopy; Upper gastrointestinal endoscopy (N/A, 05/24/2022); Tubal ligation; Hysterectomy, total abdominal (10/21/21); shoulder surgery (Right, 06/15/2023); Total knee arthroplasty (Left, 12/05/2023); joint replacement (6/15/23  12/5/23); and Total knee arthroplasty (Right, 11/21/2024).  Her family history includes Anemia in her mother; Arrhythmia in her father; Arthritis in her father and mother; Diabetes in her paternal grandfather and another family member; Obesity in her mother; Other in her mother.  She reports that she has never smoked. She has been exposed to tobacco smoke. She has never used smokeless tobacco. She reports current alcohol use of about 4.0 standard drinks of alcohol per week. She reports that she does not currently use drugs after having used the following drugs: Marijuana (Weed). Frequency: 4.00 times per week.    Medications     Previous Medications    ALPRAZOLAM (XANAX) 0.5 MG TABLET    TAKE 1 TABLET BY MOUTH 3 TIMES DAILY AS NEEDED FOR SLEEP OR ANXIETY.    ASPIRIN LOW DOSE 81 MG EC TABLET    Take 1 tablet by mouth 2 times daily    CLOTRIMAZOLE (LOTRIMIN) 1 % CREAM    Apply topically 2 times daily for 2-4 weeks    CYCLOBENZAPRINE (FLEXERIL) 10 MG TABLET    Take 1 tablet by mouth 3 times daily as needed for Muscle spasms    FLUOXETINE (PROZAC) 20 MG CAPSULE    TAKE 1 CAPSULE BY MOUTH EVERY DAY FOR DEPRESSION    GABAPENTIN (NEURONTIN) 300 MG CAPSULE    Take 1 capsule by mouth nightly as needed (nerve pain).    IBUPROFEN (ADVIL;MOTRIN) 800 MG TABLET    TAKE 1 TABLET BY MOUTH EVERY 8 HOURS AS NEEDED FOR PAIN    METOCLOPRAMIDE (REGLAN) 5 MG TABLET    Take 1 tablet by mouth 3 times daily as needed (nausea) For nausea    OMEPRAZOLE (PRILOSEC) 40 MG DELAYED RELEASE CAPSULE    TAKE 1 CAPSULE BY MOUTH IN THE MORNING AND AT BEDTIME DR. FRYE INCREASED TO TWICE A DAY    ROPINIROLE (REQUIP) 2 MG TABLET    TAKE 2 TABLETS AT NIGHT AND

## 2025-02-01 LAB
ORGANISM: ABNORMAL
REPORT: NORMAL
RESP PATH DNA+RNA PNL NPH NAA+NON-PROBE: ABNORMAL

## 2025-02-02 ENCOUNTER — PATIENT MESSAGE (OUTPATIENT)
Dept: FAMILY MEDICINE CLINIC | Age: 60
End: 2025-02-02

## 2025-02-15 DIAGNOSIS — F33.41 RECURRENT MAJOR DEPRESSIVE DISORDER, IN PARTIAL REMISSION: ICD-10-CM

## 2025-02-15 DIAGNOSIS — F51.01 PRIMARY INSOMNIA: ICD-10-CM

## 2025-02-17 RX ORDER — TRAZODONE HYDROCHLORIDE 50 MG/1
TABLET ORAL
Qty: 90 TABLET | Refills: 1 | Status: SHIPPED | OUTPATIENT
Start: 2025-02-17

## 2025-02-22 DIAGNOSIS — F41.9 ANXIETY DISORDER, UNSPECIFIED: ICD-10-CM

## 2025-02-24 ENCOUNTER — TELEPHONE (OUTPATIENT)
Dept: FAMILY MEDICINE CLINIC | Age: 60
End: 2025-02-24

## 2025-02-24 RX ORDER — METHOCARBAMOL 500 MG/1
500 TABLET, FILM COATED ORAL 3 TIMES DAILY
Qty: 90 TABLET | Refills: 3 | Status: SHIPPED | OUTPATIENT
Start: 2025-02-24

## 2025-02-24 RX ORDER — ALPRAZOLAM 0.5 MG
TABLET ORAL
Qty: 90 TABLET | Refills: 1 | Status: SHIPPED | OUTPATIENT
Start: 2025-02-24 | End: 2025-04-25

## 2025-02-24 NOTE — TELEPHONE ENCOUNTER
Patient called  She is asking to restart her Methocarbamol (muscle relaxer for her knee)  She stated she was on this then he put her on cyclobenzaprine.    She said she does not know the mg of Robaxin, it is oral not an injection    [facility] [address] [place]    1/29/2025 last ov  No future appointments.

## 2025-03-11 DIAGNOSIS — B35.4 TINEA CORPORIS: ICD-10-CM

## 2025-03-11 DIAGNOSIS — L30.4 INTERTRIGO: ICD-10-CM

## 2025-03-11 RX ORDER — TRIAMCINOLONE ACETONIDE 1 MG/G
OINTMENT TOPICAL 2 TIMES DAILY PRN
Qty: 30 G | Refills: 1 | Status: SHIPPED | OUTPATIENT
Start: 2025-03-11 | End: 2025-03-18

## 2025-04-02 ENCOUNTER — OFFICE VISIT (OUTPATIENT)
Dept: FAMILY MEDICINE CLINIC | Age: 60
End: 2025-04-02

## 2025-04-02 VITALS
BODY MASS INDEX: 49.78 KG/M2 | HEART RATE: 68 BPM | SYSTOLIC BLOOD PRESSURE: 124 MMHG | OXYGEN SATURATION: 97 % | WEIGHT: 290 LBS | RESPIRATION RATE: 16 BRPM | TEMPERATURE: 97.8 F | DIASTOLIC BLOOD PRESSURE: 82 MMHG

## 2025-04-02 DIAGNOSIS — F32.A DEPRESSION, UNSPECIFIED DEPRESSION TYPE: ICD-10-CM

## 2025-04-02 DIAGNOSIS — F43.21 GRIEF: ICD-10-CM

## 2025-04-02 DIAGNOSIS — L30.9 DERMATITIS: Primary | ICD-10-CM

## 2025-04-02 DIAGNOSIS — F41.9 ANXIETY: ICD-10-CM

## 2025-04-02 RX ORDER — TRIAMCINOLONE ACETONIDE 1 MG/G
CREAM TOPICAL
Qty: 28 G | Refills: 0 | Status: SHIPPED | OUTPATIENT
Start: 2025-04-02

## 2025-04-02 RX ORDER — FLUOXETINE HYDROCHLORIDE 40 MG/1
40 CAPSULE ORAL DAILY
Qty: 90 CAPSULE | Refills: 0 | Status: SHIPPED | OUTPATIENT
Start: 2025-04-02

## 2025-04-02 SDOH — ECONOMIC STABILITY: FOOD INSECURITY: WITHIN THE PAST 12 MONTHS, YOU WORRIED THAT YOUR FOOD WOULD RUN OUT BEFORE YOU GOT MONEY TO BUY MORE.: NEVER TRUE

## 2025-04-02 SDOH — ECONOMIC STABILITY: FOOD INSECURITY: WITHIN THE PAST 12 MONTHS, THE FOOD YOU BOUGHT JUST DIDN'T LAST AND YOU DIDN'T HAVE MONEY TO GET MORE.: NEVER TRUE

## 2025-04-02 ASSESSMENT — PATIENT HEALTH QUESTIONNAIRE - PHQ9
DEPRESSION UNABLE TO ASSESS: FUNCTIONAL CAPACITY MOTIVATION LIMITS ACCURACY
6. FEELING BAD ABOUT YOURSELF - OR THAT YOU ARE A FAILURE OR HAVE LET YOURSELF OR YOUR FAMILY DOWN: NOT AT ALL
2. FEELING DOWN, DEPRESSED OR HOPELESS: NEARLY EVERY DAY
SUM OF ALL RESPONSES TO PHQ QUESTIONS 1-9: 17
SUM OF ALL RESPONSES TO PHQ QUESTIONS 1-9: 17
5. POOR APPETITE OR OVEREATING: SEVERAL DAYS
7. TROUBLE CONCENTRATING ON THINGS, SUCH AS READING THE NEWSPAPER OR WATCHING TELEVISION: NEARLY EVERY DAY
SUM OF ALL RESPONSES TO PHQ QUESTIONS 1-9: 17
8. MOVING OR SPEAKING SO SLOWLY THAT OTHER PEOPLE COULD HAVE NOTICED. OR THE OPPOSITE, BEING SO FIGETY OR RESTLESS THAT YOU HAVE BEEN MOVING AROUND A LOT MORE THAN USUAL: SEVERAL DAYS
9. THOUGHTS THAT YOU WOULD BE BETTER OFF DEAD, OR OF HURTING YOURSELF: NOT AT ALL
SUM OF ALL RESPONSES TO PHQ QUESTIONS 1-9: 17
1. LITTLE INTEREST OR PLEASURE IN DOING THINGS: NEARLY EVERY DAY
10. IF YOU CHECKED OFF ANY PROBLEMS, HOW DIFFICULT HAVE THESE PROBLEMS MADE IT FOR YOU TO DO YOUR WORK, TAKE CARE OF THINGS AT HOME, OR GET ALONG WITH OTHER PEOPLE: NOT DIFFICULT AT ALL
4. FEELING TIRED OR HAVING LITTLE ENERGY: NEARLY EVERY DAY
3. TROUBLE FALLING OR STAYING ASLEEP: NEARLY EVERY DAY

## 2025-04-02 ASSESSMENT — ANXIETY QUESTIONNAIRES
7. FEELING AFRAID AS IF SOMETHING AWFUL MIGHT HAPPEN: SEVERAL DAYS
1. FEELING NERVOUS, ANXIOUS, OR ON EDGE: MORE THAN HALF THE DAYS
IF YOU CHECKED OFF ANY PROBLEMS ON THIS QUESTIONNAIRE, HOW DIFFICULT HAVE THESE PROBLEMS MADE IT FOR YOU TO DO YOUR WORK, TAKE CARE OF THINGS AT HOME, OR GET ALONG WITH OTHER PEOPLE: SOMEWHAT DIFFICULT
GAD7 TOTAL SCORE: 7
4. TROUBLE RELAXING: SEVERAL DAYS
3. WORRYING TOO MUCH ABOUT DIFFERENT THINGS: NOT AT ALL
6. BECOMING EASILY ANNOYED OR IRRITABLE: SEVERAL DAYS
2. NOT BEING ABLE TO STOP OR CONTROL WORRYING: SEVERAL DAYS
5. BEING SO RESTLESS THAT IT IS HARD TO SIT STILL: SEVERAL DAYS

## 2025-04-02 NOTE — PATIENT INSTRUCTIONS
Increase Prozac to 40 mg daily  Try the steroid cream on the arms every morning and in th evening  Apply vaseline or aquaphor the forearms before bed   Follow up if rash is not improve in 2 weeks   Follow up in 1 month for depression

## 2025-04-02 NOTE — PROGRESS NOTES
2025     Nydia Downs (:  1965) is a 60 y.o. female, here for evaluation of the following medical concerns:    Chief Complaint   Patient presents with    Other     Spots on arms that appear under skin and then scab up        HPI:  History of Present Illness  The patient is a 60-year-old female who presents with complaints of a rash on her arm.    She has been observing a rash on her forearms for several weeks. Starts as dry patches sometimes pruritic but not painful. She reports no similar symptoms on her palms or between her fingers. Her skincare routine involves the use of regular soap and water, and she avoids using any scented products in the shower, except for her hair. She has attempted self-treatment with over-the-counter hydrocortisone, despite the absence of itching. She has not used any antibiotic cream.    A significant increase in anxiety and depression is reported over the past few weeks. She is currently seeking a new counselor as she felt her previous sessions were becoming repetitive. Plans to contact hospice for grief counseling. She is currently on Prozac 20 mg and PRN alprazolam            2025    11:12 AM 2025    11:38 AM 2024    10:34 AM 2024    10:33 AM 2024     1:01 PM 2024    11:30 AM 8/15/2024     1:22 PM   PHQ Scores   PHQ2 Score 6 6 2 2 1 6 6   PHQ9 Score 17 17 5 2 1 16 16     Interpretation of Total Score Depression Severity: 1-4 = Minimal depression, 5-9 = Mild depression, 10-14 = Moderate depression, 15-19 = Moderately severe depression, 20-27 = Severe depression         ROS:  Review of Systems   Skin:  Positive for rash.   Psychiatric/Behavioral:  Positive for decreased concentration and dysphoric mood. Negative for sleep disturbance and suicidal ideas. The patient is nervous/anxious.         Physical exam:  Physical Exam  Vitals and nursing note reviewed.   Constitutional:       General: She is not in acute distress.     Appearance:

## 2025-04-20 DIAGNOSIS — F41.9 ANXIETY DISORDER, UNSPECIFIED: ICD-10-CM

## 2025-04-21 RX ORDER — ALPRAZOLAM 0.5 MG
TABLET ORAL
Qty: 90 TABLET | Refills: 1 | Status: SHIPPED | OUTPATIENT
Start: 2025-04-21 | End: 2025-06-20

## 2025-04-23 DIAGNOSIS — K21.9 GASTROESOPHAGEAL REFLUX DISEASE, UNSPECIFIED WHETHER ESOPHAGITIS PRESENT: ICD-10-CM

## 2025-04-23 RX ORDER — OMEPRAZOLE 40 MG/1
CAPSULE, DELAYED RELEASE ORAL
Qty: 180 CAPSULE | Refills: 1 | Status: SHIPPED | OUTPATIENT
Start: 2025-04-23

## 2025-05-28 ENCOUNTER — OFFICE VISIT (OUTPATIENT)
Dept: FAMILY MEDICINE CLINIC | Age: 60
End: 2025-05-28

## 2025-05-28 VITALS
SYSTOLIC BLOOD PRESSURE: 139 MMHG | RESPIRATION RATE: 16 BRPM | BODY MASS INDEX: 49.71 KG/M2 | HEART RATE: 68 BPM | OXYGEN SATURATION: 96 % | DIASTOLIC BLOOD PRESSURE: 83 MMHG | TEMPERATURE: 98.2 F | WEIGHT: 289.6 LBS

## 2025-05-28 DIAGNOSIS — M79.89 LEG SWELLING: ICD-10-CM

## 2025-05-28 DIAGNOSIS — L03.116 CELLULITIS OF LEFT LOWER EXTREMITY: Primary | ICD-10-CM

## 2025-05-28 DIAGNOSIS — E66.813 OBESITY, CLASS III, BMI 40-49.9 (MORBID OBESITY) (HCC): ICD-10-CM

## 2025-05-28 LAB
ALBUMIN SERPL-MCNC: 4.2 G/DL (ref 3.4–5)
ALBUMIN/GLOB SERPL: 1.4 {RATIO} (ref 1.1–2.2)
ALP SERPL-CCNC: 100 U/L (ref 40–129)
ALT SERPL-CCNC: 43 U/L (ref 10–40)
ANION GAP SERPL CALCULATED.3IONS-SCNC: 12 MMOL/L (ref 3–16)
AST SERPL-CCNC: 41 U/L (ref 15–37)
BILIRUB SERPL-MCNC: 0.3 MG/DL (ref 0–1)
BUN SERPL-MCNC: 10 MG/DL (ref 7–20)
CALCIUM SERPL-MCNC: 9.4 MG/DL (ref 8.3–10.6)
CHLORIDE SERPL-SCNC: 103 MMOL/L (ref 99–110)
CO2 SERPL-SCNC: 23 MMOL/L (ref 21–32)
CREAT SERPL-MCNC: 0.8 MG/DL (ref 0.6–1.2)
GFR SERPLBLD CREATININE-BSD FMLA CKD-EPI: 84 ML/MIN/{1.73_M2}
GLUCOSE SERPL-MCNC: 121 MG/DL (ref 70–99)
POTASSIUM SERPL-SCNC: 4 MMOL/L (ref 3.5–5.1)
PROT SERPL-MCNC: 7.2 G/DL (ref 6.4–8.2)
SODIUM SERPL-SCNC: 138 MMOL/L (ref 136–145)

## 2025-05-28 RX ORDER — CEPHALEXIN 500 MG/1
500 CAPSULE ORAL 2 TIMES DAILY
Qty: 14 CAPSULE | Refills: 0 | Status: SHIPPED | OUTPATIENT
Start: 2025-05-28 | End: 2025-06-04

## 2025-05-28 RX ORDER — ACETAMINOPHEN AND CODEINE PHOSPHATE 300; 15 MG/1; MG/1
1 TABLET ORAL EVERY 8 HOURS PRN
Qty: 9 TABLET | Refills: 0 | Status: CANCELLED | OUTPATIENT
Start: 2025-05-28 | End: 2025-05-31

## 2025-05-28 RX ORDER — ACETAMINOPHEN AND CODEINE PHOSPHATE 300; 30 MG/1; MG/1
1 TABLET ORAL EVERY 8 HOURS PRN
Qty: 9 TABLET | Refills: 0 | Status: SHIPPED | OUTPATIENT
Start: 2025-05-28 | End: 2025-05-31

## 2025-05-28 RX ORDER — FUROSEMIDE 40 MG/1
40 TABLET ORAL DAILY
Qty: 90 TABLET | Refills: 0 | Status: SHIPPED | OUTPATIENT
Start: 2025-05-28

## 2025-05-28 ASSESSMENT — PATIENT HEALTH QUESTIONNAIRE - PHQ9
8. MOVING OR SPEAKING SO SLOWLY THAT OTHER PEOPLE COULD HAVE NOTICED. OR THE OPPOSITE, BEING SO FIGETY OR RESTLESS THAT YOU HAVE BEEN MOVING AROUND A LOT MORE THAN USUAL: SEVERAL DAYS
6. FEELING BAD ABOUT YOURSELF - OR THAT YOU ARE A FAILURE OR HAVE LET YOURSELF OR YOUR FAMILY DOWN: NOT AT ALL
SUM OF ALL RESPONSES TO PHQ QUESTIONS 1-9: 14
3. TROUBLE FALLING OR STAYING ASLEEP: NEARLY EVERY DAY
7. TROUBLE CONCENTRATING ON THINGS, SUCH AS READING THE NEWSPAPER OR WATCHING TELEVISION: NEARLY EVERY DAY
2. FEELING DOWN, DEPRESSED OR HOPELESS: NEARLY EVERY DAY
4. FEELING TIRED OR HAVING LITTLE ENERGY: NEARLY EVERY DAY
5. POOR APPETITE OR OVEREATING: SEVERAL DAYS
9. THOUGHTS THAT YOU WOULD BE BETTER OFF DEAD, OR OF HURTING YOURSELF: NOT AT ALL
10. IF YOU CHECKED OFF ANY PROBLEMS, HOW DIFFICULT HAVE THESE PROBLEMS MADE IT FOR YOU TO DO YOUR WORK, TAKE CARE OF THINGS AT HOME, OR GET ALONG WITH OTHER PEOPLE: VERY DIFFICULT

## 2025-05-28 ASSESSMENT — ENCOUNTER SYMPTOMS
NAUSEA: 1
DIARRHEA: 1
COLOR CHANGE: 1

## 2025-05-28 NOTE — PROGRESS NOTES
2025     Chief Complaint   Patient presents with    Swelling     Swelling in feet and ankles x 4 days     Nausea    Diarrhea       Nydia Downs (:  1965) is a 60 y.o. female, here for evaluation of the following medical concerns:    HPI    Here with complaints of 4 days history of leg pain, swelling and redness. Left leg mostly impacted, slight swelling in the right leg as well but no erythema. She also c/o nausea and diarrhea. Denies vomiting. No bloody diarrhea. Pt has been taking Lasix 40 mg daily, compression stockings and leg elevation. Denies shortness of breath or chest pain. Denies fever. Pain in foot, ankle, anterior lower leg rates 6 out of 10. She is also having shooting pain down the left buttock and upper leg upon standing which she attributes to the way she has been limping because of the left foot pain. She denies recent falls or injuries. Tylenol and Advil consisently but not helping with her pain.       Review of Systems   Constitutional:  Negative for fever.   Cardiovascular:  Positive for leg swelling.   Gastrointestinal:  Positive for diarrhea and nausea.   Skin:  Positive for color change.       Prior to Visit Medications    Medication Sig Taking? Authorizing Provider   cephALEXin (KEFLEX) 500 MG capsule Take 1 capsule by mouth 2 times daily for 7 days Yes Renee Pemberton APRN - CNP   furosemide (LASIX) 40 MG tablet Take 1 tablet by mouth daily Yes Renee Pemberton APRN - CNP   acetaminophen-codeine (TYLENOL #3) 300-30 MG per tablet Take 1 tablet by mouth every 8 hours as needed for Pain for up to 3 days. Max Daily Amount: 3 tablets Yes Renee Pemberton APRN - CNP   omeprazole (PRILOSEC) 40 MG delayed release capsule TAKE 1 CAPSULE BY MOUTH IN THE MORNING AND AT BEDTIME DR. FRYE INCREASED TO TWICE A DAY Yes Petar Sorenson DO   ALPRAZolam (XANAX) 0.5 MG tablet TAKE 1 TABLET BY MOUTH 3 TIMES DAILY AS NEEDED FOR SLEEP OR ANXIETY. Yes Petar Sorenson DO

## 2025-05-28 NOTE — PATIENT INSTRUCTIONS
Start antibiotic  Get blood work done  Keep legs elevated  Lasix 40 mg daily  Short course of Tylenol #3 space out at least 4 hours between Xanax and Tylenol #3   Follow up 1 week PRN

## 2025-05-29 ENCOUNTER — RESULTS FOLLOW-UP (OUTPATIENT)
Dept: FAMILY MEDICINE CLINIC | Age: 60
End: 2025-05-29

## 2025-06-02 RX ORDER — ROPINIROLE 2 MG/1
TABLET, FILM COATED ORAL
Qty: 270 TABLET | Refills: 1 | Status: SHIPPED | OUTPATIENT
Start: 2025-06-02

## 2025-06-15 DIAGNOSIS — F33.41 RECURRENT MAJOR DEPRESSIVE DISORDER, IN PARTIAL REMISSION: ICD-10-CM

## 2025-06-15 DIAGNOSIS — F51.01 PRIMARY INSOMNIA: ICD-10-CM

## 2025-06-15 DIAGNOSIS — F41.9 ANXIETY DISORDER, UNSPECIFIED: ICD-10-CM

## 2025-06-16 RX ORDER — FLUOXETINE HYDROCHLORIDE 40 MG/1
40 CAPSULE ORAL DAILY
Qty: 90 CAPSULE | Refills: 0 | OUTPATIENT
Start: 2025-06-16

## 2025-06-16 RX ORDER — TRAZODONE HYDROCHLORIDE 50 MG/1
TABLET ORAL
Qty: 180 TABLET | Refills: 1 | Status: SHIPPED | OUTPATIENT
Start: 2025-06-16

## 2025-06-16 RX ORDER — METHOCARBAMOL 500 MG/1
500 TABLET, FILM COATED ORAL 3 TIMES DAILY
Qty: 90 TABLET | Refills: 3 | Status: SHIPPED | OUTPATIENT
Start: 2025-06-16

## 2025-06-16 RX ORDER — METHOCARBAMOL 500 MG/1
TABLET, FILM COATED ORAL
Qty: 90 TABLET | Refills: 3 | OUTPATIENT
Start: 2025-06-16

## 2025-06-16 RX ORDER — ALPRAZOLAM 0.5 MG
0.5 TABLET ORAL 3 TIMES DAILY PRN
Qty: 90 TABLET | Refills: 1 | Status: SHIPPED | OUTPATIENT
Start: 2025-06-16 | End: 2025-08-15

## 2025-06-16 NOTE — TELEPHONE ENCOUNTER
Future Appointments   Date Time Provider Department Center   7/1/2025 11:00 AM Petar Sorenson DO [facility] DEP     LOV 1/29/2025

## 2025-06-23 ENCOUNTER — APPOINTMENT (OUTPATIENT)
Dept: GENERAL RADIOLOGY | Age: 60
End: 2025-06-23
Payer: MEDICARE

## 2025-06-23 ENCOUNTER — HOSPITAL ENCOUNTER (EMERGENCY)
Age: 60
Discharge: HOME | End: 2025-06-23
Attending: EMERGENCY MEDICINE
Payer: MEDICARE

## 2025-06-23 ENCOUNTER — TELEPHONE (OUTPATIENT)
Dept: FAMILY MEDICINE CLINIC | Age: 60
End: 2025-06-23

## 2025-06-23 VITALS
TEMPERATURE: 98.3 F | BODY MASS INDEX: 47.09 KG/M2 | HEIGHT: 66 IN | WEIGHT: 293 LBS | RESPIRATION RATE: 19 BRPM | HEART RATE: 62 BPM | SYSTOLIC BLOOD PRESSURE: 121 MMHG | OXYGEN SATURATION: 95 % | DIASTOLIC BLOOD PRESSURE: 77 MMHG

## 2025-06-23 DIAGNOSIS — M79.89 SWELLING OF BOTH LOWER EXTREMITIES: Primary | ICD-10-CM

## 2025-06-23 LAB
ALBUMIN SERPL-MCNC: 3.4 G/DL (ref 3.4–5)
ALBUMIN/GLOB SERPL: 1.1 {RATIO} (ref 1.1–2.2)
ALP SERPL-CCNC: 83 U/L (ref 40–129)
ALT SERPL-CCNC: 31 U/L (ref 10–40)
ANION GAP SERPL CALCULATED.3IONS-SCNC: 13 MMOL/L (ref 3–16)
AST SERPL-CCNC: 34 U/L (ref 15–37)
BASOPHILS # BLD: 0 K/UL (ref 0–0.2)
BASOPHILS NFR BLD: 0.5 %
BILIRUB SERPL-MCNC: 0.3 MG/DL (ref 0–1)
BUN SERPL-MCNC: 11 MG/DL (ref 7–20)
CALCIUM SERPL-MCNC: 8.9 MG/DL (ref 8.3–10.6)
CHLORIDE SERPL-SCNC: 107 MMOL/L (ref 99–110)
CO2 SERPL-SCNC: 22 MMOL/L (ref 21–32)
CREAT SERPL-MCNC: 0.8 MG/DL (ref 0.6–1.2)
D-DIMER QUANTITATIVE: 0.29 UG/ML FEU (ref 0–0.6)
DEPRECATED RDW RBC AUTO: 15.8 % (ref 12.4–15.4)
EKG ATRIAL RATE: 58 BPM
EKG DIAGNOSIS: NORMAL
EKG P AXIS: 36 DEGREES
EKG P-R INTERVAL: 154 MS
EKG Q-T INTERVAL: 438 MS
EKG QRS DURATION: 88 MS
EKG QTC CALCULATION (BAZETT): 429 MS
EKG R AXIS: -4 DEGREES
EKG T AXIS: 21 DEGREES
EKG VENTRICULAR RATE: 58 BPM
EOSINOPHIL # BLD: 0.2 K/UL (ref 0–0.6)
EOSINOPHIL NFR BLD: 2.9 %
GFR SERPLBLD CREATININE-BSD FMLA CKD-EPI: 84 ML/MIN/{1.73_M2}
GLUCOSE SERPL-MCNC: 114 MG/DL (ref 70–99)
HCT VFR BLD AUTO: 37 % (ref 36–48)
HGB BLD-MCNC: 12.3 G/DL (ref 12–16)
LYMPHOCYTES # BLD: 1.4 K/UL (ref 1–5.1)
LYMPHOCYTES NFR BLD: 18.9 %
MCH RBC QN AUTO: 29.7 PG (ref 26–34)
MCHC RBC AUTO-ENTMCNC: 33.2 G/DL (ref 31–36)
MCV RBC AUTO: 89.5 FL (ref 80–100)
MONOCYTES # BLD: 0.5 K/UL (ref 0–1.3)
MONOCYTES NFR BLD: 6.5 %
NEUTROPHILS # BLD: 5.1 K/UL (ref 1.7–7.7)
NEUTROPHILS NFR BLD: 71.2 %
NT-PROBNP SERPL-MCNC: 398 PG/ML (ref 0–124)
PLATELET # BLD AUTO: 165 K/UL (ref 135–450)
PMV BLD AUTO: 9.8 FL (ref 5–10.5)
POTASSIUM SERPL-SCNC: 3.7 MMOL/L (ref 3.5–5.1)
PROT SERPL-MCNC: 6.4 G/DL (ref 6.4–8.2)
RBC # BLD AUTO: 4.13 M/UL (ref 4–5.2)
SODIUM SERPL-SCNC: 142 MMOL/L (ref 136–145)
TROPONIN, HIGH SENSITIVITY: 13 NG/L (ref 0–14)
WBC # BLD AUTO: 7.2 K/UL (ref 4–11)

## 2025-06-23 PROCEDURE — 85379 FIBRIN DEGRADATION QUANT: CPT

## 2025-06-23 PROCEDURE — 6370000000 HC RX 637 (ALT 250 FOR IP)

## 2025-06-23 PROCEDURE — 36415 COLL VENOUS BLD VENIPUNCTURE: CPT

## 2025-06-23 PROCEDURE — 84484 ASSAY OF TROPONIN QUANT: CPT

## 2025-06-23 PROCEDURE — 93005 ELECTROCARDIOGRAM TRACING: CPT

## 2025-06-23 PROCEDURE — 71046 X-RAY EXAM CHEST 2 VIEWS: CPT

## 2025-06-23 PROCEDURE — 85025 COMPLETE CBC W/AUTO DIFF WBC: CPT

## 2025-06-23 PROCEDURE — 80053 COMPREHEN METABOLIC PANEL: CPT

## 2025-06-23 PROCEDURE — 83880 ASSAY OF NATRIURETIC PEPTIDE: CPT

## 2025-06-23 PROCEDURE — 99285 EMERGENCY DEPT VISIT HI MDM: CPT

## 2025-06-23 RX ORDER — OXYCODONE HYDROCHLORIDE 5 MG/1
5 TABLET ORAL ONCE
Refills: 0 | Status: COMPLETED | OUTPATIENT
Start: 2025-06-23 | End: 2025-06-23

## 2025-06-23 RX ORDER — ACETAMINOPHEN 500 MG
1000 TABLET ORAL ONCE
Status: COMPLETED | OUTPATIENT
Start: 2025-06-23 | End: 2025-06-23

## 2025-06-23 RX ADMIN — OXYCODONE 5 MG: 5 TABLET ORAL at 11:23

## 2025-06-23 RX ADMIN — ACETAMINOPHEN 1000 MG: 500 TABLET ORAL at 11:23

## 2025-06-23 ASSESSMENT — PAIN DESCRIPTION - LOCATION
LOCATION: FOOT
LOCATION: LEG;FOOT

## 2025-06-23 ASSESSMENT — PAIN DESCRIPTION - ORIENTATION
ORIENTATION: LEFT;RIGHT
ORIENTATION: RIGHT;LEFT

## 2025-06-23 ASSESSMENT — PAIN - FUNCTIONAL ASSESSMENT
PAIN_FUNCTIONAL_ASSESSMENT: 0-10
PAIN_FUNCTIONAL_ASSESSMENT: PREVENTS OR INTERFERES SOME ACTIVE ACTIVITIES AND ADLS
PAIN_FUNCTIONAL_ASSESSMENT: PREVENTS OR INTERFERES WITH MANY ACTIVE NOT PASSIVE ACTIVITIES

## 2025-06-23 ASSESSMENT — PAIN DESCRIPTION - PAIN TYPE: TYPE: ACUTE PAIN

## 2025-06-23 ASSESSMENT — PAIN SCALES - GENERAL
PAINLEVEL_OUTOF10: 8
PAINLEVEL_OUTOF10: 7

## 2025-06-23 ASSESSMENT — PAIN DESCRIPTION - DESCRIPTORS: DESCRIPTORS: DULL

## 2025-06-23 ASSESSMENT — PAIN DESCRIPTION - FREQUENCY: FREQUENCY: CONTINUOUS

## 2025-06-23 NOTE — ED PROVIDER NOTES
ED Attending Attestation Note     Date of evaluation: 6/23/2025    This patient was seen by the resident.  I have seen and examined the patient, agree with the workup, evaluation, management and diagnosis. The care plan has been discussed.      Briefly, Nydia Downs is a 60 y.o. female with a PMH inclusive of depression, anxiety, hypertension who presents for evaluation of bilateral pedal edema.  States she has had this intermittently for months but it is worse in the last 3 weeks.  Gotten worse over the weekend such that she is having difficulty walking due to pain.  She has not had any injuries.  She saw her doctor 3 weeks ago when this started to get worse and she was started on diuretics.  Felt like she was urinating a lot but her swelling did not improve so she stopped taking the diuretics.    She denies any chest pain.  States she has developed some shortness of breath in the last couple of days worse with exertion.    Notable exam findings include bilateral pretibial and pedal edema, bilateral feet warm and well-perfused with 2+ DP pulses bilaterally no respiratory distress    Assessment/ Medical Decision Making:       On my review of her medical record she has had multiple prior venous duplex studies.  She had an echo with normal EF in 2021.    Lower clinical suspicion for VTE given bilateral symptoms and chronicity of symptoms but will obtain D-dimer in addition to other labs to evaluate for cause of edema.    Has PCP appointment scheduled in 1 week.  Disposition will be pending the results of testing.         Candida Gaines MD  06/23/25 2682

## 2025-06-23 NOTE — ED NOTES
Patient prepared for and ready to be discharged. Patient discharged at this time in no acute distress after verbalizing understanding of discharge instructions. Patient left after receiving After Visit Summary instructions.        Mony David, RN  06/23/25 8502

## 2025-06-23 NOTE — TELEPHONE ENCOUNTER
KIRA    Pt called to let Dr Sorenson him know that she spoke with the on call doctor on Friday night regarding the swelling he her feet. Her brother is on his way to take her to [facility] to find out what is going on. She said over the weekend the swelling and pain in her left foot is worse than right.

## 2025-06-23 NOTE — ED PROVIDER NOTES
THE [facility]  EMERGENCY DEPARTMENT ENCOUNTER          EM RESIDENT NOTE     Date of Service: 6/23/2025  Reason for Visit: Foot Swelling (Bilat feet swelling/feeling weak. Intermittent x3 wks )      HPI     Nydia Downs is a 60 y.o. female with PMHx of HTN, obesity, OA, s/p bilateral TKA (12/2023, 11/2024), RLS who presents with persistent bilateral lower leg swelling and pain.  She has history of bilateral lower extremity swelling which is occasional painful for quite some time, but current episode of swelling seems different and is much more painful than usual.  She has had swelling in her lower legs on and off for months, says it typically goes down with ice/heat, elevation, and time.  However, so has been more persistent for the past month and over the past 3 to 5 days has been very painful.  She normally ambulates independently without much discomfort (since her knee replacements), but now having excruciating pain with walking, though able to do so independently without any difficulty.  Left leg is more painful and has occasional shooting pains that shoot down from her her buttocks/hip.  She was seen by ACNP in family medicine clinic 5/28/2025 where she was given Keflex for possible cellulitis, Lasix for her lower extremity edema, and Tylenol #3 for pain - may have helped for a few days, but overall did not notice much improvement For the past 3 days, has had shortness of breath when getting up to walk or when in a lot of pain.  Some intermittent nausea and loose stools, unsure if related to the antibiotics or Lasix she was recently prescribed.  Stop taking Lasix 3 days ago due to how much she was urinating and concerned it was making her dehydrated.  Unsure if she has orthopnea, she has been sleeping in a recliner with her legs elevated.    No injury, no back pain, and no weakness not attributed to pain.  Denies any fever/chills, chest pain, abdominal pain.  She has no history of DVT or PE, and has

## 2025-06-23 NOTE — DISCHARGE INSTRUCTIONS
You were seen in the emergency department for pain and swelling of your legs and feet. Labs, test, and exam showed no dangerous cause of your symptoms. Does not look like cellulitis or other infection. Heart is squeezing OK and you do not have extra fluid on your body -- rather, the fluid is just where it shouldn't be. Treatment for swelling is compression (compression stocks, or ACE wraps if you can't fit into compression stockings), elevation, and trying to do normal activities as tolerated. The Lasix is unlikely to help.    Pain should get better as swelling does. Can take Tylenol and/or ibuprofen as needed for pain. Warm or ice packs if helpful.    See your primary care doctor as discussed to be re-evaluated and discuss ongoing treatment of this condition.

## 2025-06-25 ENCOUNTER — CARE COORDINATION (OUTPATIENT)
Dept: CARE COORDINATION | Age: 60
End: 2025-06-25

## 2025-06-25 NOTE — CARE COORDINATION
Ambulatory Care Coordination Note     2025      Patient Current Location:  Home: [address] [facility] [address]     This patient was received as a referral from Ambulatory Care Manager .    ACM contacted the patient by telephone. Verified name and  with patient as identifiers. Provided introduction to self, and explanation of the ACM role.   Patient declined care management services at this time.          ACM: Joanie Chew RN     Challenges to be reviewed by the provider   Additional needs identified to be addressed with provider No         Method of communication with provider: none.    Utilization: Initial Call - Discharge Date: 25   Discharge Facility: [facility]  Reason for ED Visit: BLE leg swelling  Visit Diagnosis: BLE Edema    Number of ED visits in the last 6 months: 2      Do you have any ongoing symptoms? Yes, my symptoms have improved.   Current symptoms: BLE edema/pain.    Did you call your PCP prior to going to the ED? No, did not call the PCP office.     Review of Discharge Instructions:   [x] AVS discharge instructions  [x] Right Care, Right Place, Right Time document  [x] Medication changes; no changes with medication  [x] Follow up appointments    Provided contact information for Senior Services re: Socialization/classes  Provided contact information for Transportation (CTC)    Advance Care Planning:   Patient reports she has ACP documents but has not yet completed.       PCP/Specialist follow up:   Future Appointments         Provider Specialty Dept Phone    2025 11:00 AM Petar Sorenson DO Family Medicine [phone]            Follow Up:   No further Ambulatory Care Management follow-up scheduled at this time.

## 2025-06-26 NOTE — PROGRESS NOTES
SUBJECTIVE:      Chief Complaint   Patient presents with    Cough    Rash       HPI: Diane Stout is a 11 y.o. female here with mom because of cough and congestion for the past week,  no fever. Rash on face and back that started a week ago. Eating and drinking well, urine output  +. No N/V/D/abdominal pain/sore throat. + Sick contacts-mom with similar symptoms. Denies increase work of breathing or behavior changes.     BP 92/60 (BP Site: Right Upper Arm, Patient Position: Sitting, BP Cuff Size: Child)   Pulse 66   Temp 98.5 °F (36.9 °C) (Temporal)   Resp 20   Wt 44.4 kg (97 lb 12.8 oz)   SpO2 98%     No Known Allergies    Current Outpatient Medications on File Prior to Visit   Medication Sig Dispense Refill    melatonin 3 MG TABS tablet Take 3 mg by mouth daily       No current facility-administered medications on file prior to visit.       No past medical history on file.    No family history on file.    Review of Systems   Constitutional: Negative.    HENT: Negative.     Respiratory:  Positive for cough.    Cardiovascular: Negative.    Gastrointestinal: Negative.    Skin:  Positive for rash.         OBJECTIVE:         Physical Exam  Vitals and nursing note reviewed.   Constitutional:       General: She is active. She is not in acute distress.  HENT:      Right Ear: Tympanic membrane normal.      Left Ear: Tympanic membrane normal.      Mouth/Throat:      Mouth: Mucous membranes are moist.      Pharynx: Oropharynx is clear.   Eyes:      Conjunctiva/sclera: Conjunctivae normal.      Pupils: Pupils are equal, round, and reactive to light.   Cardiovascular:      Rate and Rhythm: Normal rate and regular rhythm.      Heart sounds: S1 normal and S2 normal.   Pulmonary:      Effort: Pulmonary effort is normal. No respiratory distress.      Breath sounds: Normal breath sounds and air entry.   Chest:      Comments: Scattered rhonchi bilaterally   Abdominal:      Palpations: Abdomen is soft.      Tenderness: There is no  patint admitted to PACU from OR. VS WNL, airway in place. Appears to be free of pain.

## 2025-06-30 NOTE — PROGRESS NOTES
7/1/2025    This is a 60 y.o. female   Chief Complaint   Patient presents with    Follow-up     Swelling in legs, short of breath    .    HPI     Pt presents today for the following:    Leg Edema with SOB: Sx intermittently for month but worse in the last 3 weeks, started on Lasix 40 mg daily on 05/28/25 but pt states she was urinating frequently and stopped it. Also prescribed Keflex on 05/28 for cellulitis. pt was seen in [facility] ED on 06/23 for bilateral leg edema, ECHO reassuring to r/o cardiac cause for edema, pt given ACE wraps and instructed to elevate her LE's. Pt contacted by [facility] on 06/25/25 but declined services.  Saw Podiatry yesterday to have her toenails cut. Tried ACE wraps but they left a cheyanne on her leg. States that SOB has worsened. Using compression socks from [employer].     Depression Screen Positive: Mother, father, and cat passed in 2023.  Has seen Juliana Simeon in the past, now will see online therapist for the first time this week.  Past Medical History:   Diagnosis Date    Anxiety     Carpal tunnel syndrome     Depression     Endometriosis     Essential hypertension 12/10/2018    Fractures     GERD (gastroesophageal reflux disease)     Headache     Obesity     Osteoarthritis     PONV (postoperative nausea and vomiting)     Restless leg syndrome     Wears dentures     Wears glasses     For reading only       Admission on 06/23/2025, Discharged on 06/23/2025   Component Date Value Ref Range Status    WBC 06/23/2025 7.2  4.0 - 11.0 K/uL Final    RBC 06/23/2025 4.13  4.00 - 5.20 M/uL Final    Hemoglobin 06/23/2025 12.3  12.0 - 16.0 g/dL Final    Hematocrit 06/23/2025 37.0  36.0 - 48.0 % Final    MCV 06/23/2025 89.5  80.0 - 100.0 fL Final    MCH 06/23/2025 29.7  26.0 - 34.0 pg Final    MCHC 06/23/2025 33.2  31.0 - 36.0 g/dL Final    RDW 06/23/2025 15.8 (H)  12.4 - 15.4 % Final    Platelets 06/23/2025 165  135 - 450 K/uL Final    MPV 06/23/2025 9.8  5.0 - 10.5 fL Final    Neutrophils % 06/23/2025 71.2  %

## 2025-07-01 ENCOUNTER — OFFICE VISIT (OUTPATIENT)
Dept: FAMILY MEDICINE CLINIC | Age: 60
End: 2025-07-01

## 2025-07-01 VITALS
RESPIRATION RATE: 21 BRPM | OXYGEN SATURATION: 95 % | TEMPERATURE: 97.3 F | BODY MASS INDEX: 47.97 KG/M2 | DIASTOLIC BLOOD PRESSURE: 86 MMHG | HEART RATE: 87 BPM | WEIGHT: 293 LBS | SYSTOLIC BLOOD PRESSURE: 124 MMHG

## 2025-07-01 DIAGNOSIS — Z13.31 POSITIVE DEPRESSION SCREENING: ICD-10-CM

## 2025-07-01 DIAGNOSIS — R60.0 BILATERAL LEG EDEMA: Primary | ICD-10-CM

## 2025-07-01 RX ORDER — TORSEMIDE 10 MG/1
10 TABLET ORAL DAILY
Qty: 30 TABLET | Refills: 0 | Status: SHIPPED | OUTPATIENT
Start: 2025-07-01

## 2025-07-01 SDOH — ECONOMIC STABILITY: FOOD INSECURITY: WITHIN THE PAST 12 MONTHS, THE FOOD YOU BOUGHT JUST DIDN'T LAST AND YOU DIDN'T HAVE MONEY TO GET MORE.: NEVER TRUE

## 2025-07-01 SDOH — ECONOMIC STABILITY: INCOME INSECURITY: IN THE LAST 12 MONTHS, WAS THERE A TIME WHEN YOU WERE NOT ABLE TO PAY THE MORTGAGE OR RENT ON TIME?: NO

## 2025-07-01 SDOH — ECONOMIC STABILITY: FOOD INSECURITY: WITHIN THE PAST 12 MONTHS, YOU WORRIED THAT YOUR FOOD WOULD RUN OUT BEFORE YOU GOT MONEY TO BUY MORE.: NEVER TRUE

## 2025-07-01 ASSESSMENT — ENCOUNTER SYMPTOMS: SHORTNESS OF BREATH: 1

## 2025-07-01 ASSESSMENT — PATIENT HEALTH QUESTIONNAIRE - PHQ9
7. TROUBLE CONCENTRATING ON THINGS, SUCH AS READING THE NEWSPAPER OR WATCHING TELEVISION: MORE THAN HALF THE DAYS
3. TROUBLE FALLING OR STAYING ASLEEP: NEARLY EVERY DAY
5. POOR APPETITE OR OVEREATING: MORE THAN HALF THE DAYS
1. LITTLE INTEREST OR PLEASURE IN DOING THINGS: MORE THAN HALF THE DAYS
2. FEELING DOWN, DEPRESSED OR HOPELESS: MORE THAN HALF THE DAYS
7. TROUBLE CONCENTRATING ON THINGS, SUCH AS READING THE NEWSPAPER OR WATCHING TELEVISION: MORE THAN HALF THE DAYS
SUM OF ALL RESPONSES TO PHQ QUESTIONS 1-9: 14
5. POOR APPETITE OR OVEREATING: MORE THAN HALF THE DAYS
SUM OF ALL RESPONSES TO PHQ QUESTIONS 1-9: 14
SUM OF ALL RESPONSES TO PHQ QUESTIONS 1-9: 14
10. IF YOU CHECKED OFF ANY PROBLEMS, HOW DIFFICULT HAVE THESE PROBLEMS MADE IT FOR YOU TO DO YOUR WORK, TAKE CARE OF THINGS AT HOME, OR GET ALONG WITH OTHER PEOPLE: SOMEWHAT DIFFICULT
3. TROUBLE FALLING OR STAYING ASLEEP: NEARLY EVERY DAY
4. FEELING TIRED OR HAVING LITTLE ENERGY: MORE THAN HALF THE DAYS
SUM OF ALL RESPONSES TO PHQ9 QUESTIONS 1 & 2: 4
1. LITTLE INTEREST OR PLEASURE IN DOING THINGS: MORE THAN HALF THE DAYS
6. FEELING BAD ABOUT YOURSELF - OR THAT YOU ARE A FAILURE OR HAVE LET YOURSELF OR YOUR FAMILY DOWN: NOT AT ALL
9. THOUGHTS THAT YOU WOULD BE BETTER OFF DEAD, OR OF HURTING YOURSELF: NOT AT ALL
6. FEELING BAD ABOUT YOURSELF - OR THAT YOU ARE A FAILURE OR HAVE LET YOURSELF OR YOUR FAMILY DOWN: NOT AT ALL
8. MOVING OR SPEAKING SO SLOWLY THAT OTHER PEOPLE COULD HAVE NOTICED. OR THE OPPOSITE, BEING SO FIGETY OR RESTLESS THAT YOU HAVE BEEN MOVING AROUND A LOT MORE THAN USUAL: SEVERAL DAYS
4. FEELING TIRED OR HAVING LITTLE ENERGY: MORE THAN HALF THE DAYS
SUM OF ALL RESPONSES TO PHQ QUESTIONS 1-9: 14
10. IF YOU CHECKED OFF ANY PROBLEMS, HOW DIFFICULT HAVE THESE PROBLEMS MADE IT FOR YOU TO DO YOUR WORK, TAKE CARE OF THINGS AT HOME, OR GET ALONG WITH OTHER PEOPLE: SOMEWHAT DIFFICULT
SUM OF ALL RESPONSES TO PHQ QUESTIONS 1-9: 14
9. THOUGHTS THAT YOU WOULD BE BETTER OFF DEAD, OR OF HURTING YOURSELF: NOT AT ALL
2. FEELING DOWN, DEPRESSED OR HOPELESS: MORE THAN HALF THE DAYS
8. MOVING OR SPEAKING SO SLOWLY THAT OTHER PEOPLE COULD HAVE NOTICED. OR THE OPPOSITE - BEING SO FIDGETY OR RESTLESS THAT YOU HAVE BEEN MOVING AROUND A LOT MORE THAN USUAL: SEVERAL DAYS

## 2025-07-01 ASSESSMENT — ANXIETY QUESTIONNAIRES
7. FEELING AFRAID AS IF SOMETHING AWFUL MIGHT HAPPEN: MORE THAN HALF THE DAYS
3. WORRYING TOO MUCH ABOUT DIFFERENT THINGS: NEARLY EVERY DAY
1. FEELING NERVOUS, ANXIOUS, OR ON EDGE: NEARLY EVERY DAY
3. WORRYING TOO MUCH ABOUT DIFFERENT THINGS: NEARLY EVERY DAY
7. FEELING AFRAID AS IF SOMETHING AWFUL MIGHT HAPPEN: MORE THAN HALF THE DAYS
4. TROUBLE RELAXING: NEARLY EVERY DAY
6. BECOMING EASILY ANNOYED OR IRRITABLE: MORE THAN HALF THE DAYS
4. TROUBLE RELAXING: NEARLY EVERY DAY
IF YOU CHECKED OFF ANY PROBLEMS ON THIS QUESTIONNAIRE, HOW DIFFICULT HAVE THESE PROBLEMS MADE IT FOR YOU TO DO YOUR WORK, TAKE CARE OF THINGS AT HOME, OR GET ALONG WITH OTHER PEOPLE: SOMEWHAT DIFFICULT
IF YOU CHECKED OFF ANY PROBLEMS ON THIS QUESTIONNAIRE, HOW DIFFICULT HAVE THESE PROBLEMS MADE IT FOR YOU TO DO YOUR WORK, TAKE CARE OF THINGS AT HOME, OR GET ALONG WITH OTHER PEOPLE: SOMEWHAT DIFFICULT
1. FEELING NERVOUS, ANXIOUS, OR ON EDGE: NEARLY EVERY DAY
5. BEING SO RESTLESS THAT IT IS HARD TO SIT STILL: SEVERAL DAYS
5. BEING SO RESTLESS THAT IT IS HARD TO SIT STILL: SEVERAL DAYS
6. BECOMING EASILY ANNOYED OR IRRITABLE: MORE THAN HALF THE DAYS

## 2025-07-09 ENCOUNTER — OFFICE VISIT (OUTPATIENT)
Dept: FAMILY MEDICINE CLINIC | Age: 60
End: 2025-07-09

## 2025-07-09 VITALS
DIASTOLIC BLOOD PRESSURE: 89 MMHG | WEIGHT: 293 LBS | OXYGEN SATURATION: 95 % | BODY MASS INDEX: 47.42 KG/M2 | TEMPERATURE: 97.5 F | SYSTOLIC BLOOD PRESSURE: 130 MMHG | RESPIRATION RATE: 16 BRPM | HEART RATE: 58 BPM

## 2025-07-09 DIAGNOSIS — F32.A DEPRESSION, UNSPECIFIED DEPRESSION TYPE: Primary | ICD-10-CM

## 2025-07-09 DIAGNOSIS — R60.0 BILATERAL LEG EDEMA: ICD-10-CM

## 2025-07-09 DIAGNOSIS — F41.1 GENERALIZED ANXIETY DISORDER: ICD-10-CM

## 2025-07-09 DIAGNOSIS — I10 ESSENTIAL HYPERTENSION: ICD-10-CM

## 2025-07-09 SDOH — ECONOMIC STABILITY: FOOD INSECURITY: WITHIN THE PAST 12 MONTHS, YOU WORRIED THAT YOUR FOOD WOULD RUN OUT BEFORE YOU GOT MONEY TO BUY MORE.: NEVER TRUE

## 2025-07-09 SDOH — ECONOMIC STABILITY: FOOD INSECURITY: WITHIN THE PAST 12 MONTHS, THE FOOD YOU BOUGHT JUST DIDN'T LAST AND YOU DIDN'T HAVE MONEY TO GET MORE.: NEVER TRUE

## 2025-07-09 ASSESSMENT — PATIENT HEALTH QUESTIONNAIRE - PHQ9
2. FEELING DOWN, DEPRESSED OR HOPELESS: NEARLY EVERY DAY
10. IF YOU CHECKED OFF ANY PROBLEMS, HOW DIFFICULT HAVE THESE PROBLEMS MADE IT FOR YOU TO DO YOUR WORK, TAKE CARE OF THINGS AT HOME, OR GET ALONG WITH OTHER PEOPLE: EXTREMELY DIFFICULT
5. POOR APPETITE OR OVEREATING: SEVERAL DAYS
4. FEELING TIRED OR HAVING LITTLE ENERGY: MORE THAN HALF THE DAYS
9. THOUGHTS THAT YOU WOULD BE BETTER OFF DEAD, OR OF HURTING YOURSELF: NOT AT ALL
1. LITTLE INTEREST OR PLEASURE IN DOING THINGS: MORE THAN HALF THE DAYS
SUM OF ALL RESPONSES TO PHQ QUESTIONS 1-9: 11
8. MOVING OR SPEAKING SO SLOWLY THAT OTHER PEOPLE COULD HAVE NOTICED. OR THE OPPOSITE, BEING SO FIGETY OR RESTLESS THAT YOU HAVE BEEN MOVING AROUND A LOT MORE THAN USUAL: NOT AT ALL
3. TROUBLE FALLING OR STAYING ASLEEP: SEVERAL DAYS
7. TROUBLE CONCENTRATING ON THINGS, SUCH AS READING THE NEWSPAPER OR WATCHING TELEVISION: MORE THAN HALF THE DAYS
6. FEELING BAD ABOUT YOURSELF - OR THAT YOU ARE A FAILURE OR HAVE LET YOURSELF OR YOUR FAMILY DOWN: NOT AT ALL

## 2025-07-09 ASSESSMENT — ENCOUNTER SYMPTOMS
COUGH: 0
WHEEZING: 0

## 2025-07-09 NOTE — TELEPHONE ENCOUNTER
Lov 7/9/2025  Future Appointments   Date Time Provider Department Center   7/25/2025 11:30 AM Charles Mays MD AND [facility]

## 2025-07-09 NOTE — PROGRESS NOTES
Subjective:      Patient ID: Nydia Downs is a 60 y.o. y.o. female.  Following up in general    Had both knees done and doing well  Right shoulder replacement-  doing well.    Has vein issues and seeing vascular later this month.    Activity tolerance not great,  is trying to be more active and strengthen.      Edema  Pertinent negatives include no chest pain or coughing.   DepressionPatient is not experiencing: palpitations and suicidal ideas.      Leg Pain           Chief Complaint   Patient presents with    Edema     On and off for years- [facility] ER  day  L leg is worse  Appt with Vascular Surgeon on  -  referral     Depression     Depression and anxiety- Parents passed away a little over a year ago    Leg Pain     Bumps on L shin       Allergies   Allergen Reactions    Droperidol Other (See Comments)     unresponsive    Haldol [Haloperidol Lactate] Other (See Comments)     \"felt out of it, similar to the toradol\"    Toradol [Ketorolac Tromethamine] Other (See Comments)     \"out of it\"  \"felt like sleep paralysis\"       Past Medical History:   Diagnosis Date    Anxiety     Carpal tunnel syndrome     Depression     Endometriosis     Essential hypertension 12/10/2018    Fractures     GERD (gastroesophageal reflux disease)     Headache     Obesity     Osteoarthritis     PONV (postoperative nausea and vomiting)     Restless leg syndrome     Wears dentures     Wears glasses     For reading only       Past Surgical History:   Procedure Laterality Date    ABDOMEN SURGERY  2016    ANUS SURGERY  2018    fissure     SECTION      x3    COLONOSCOPY      DILATION AND CURETTAGE OF UTERUS N/A 2021    VIDEO HYSTEROSCOPY DILATATION AND CURETTAGE, POSSIBLE MYOSURE, POSSIBLE POLYPECTOMY performed by Ana Mike DO at [facility] OR    FRACTURE SURGERY      right wrist    HAND SURGERY      HERNIA REPAIR  2018    LAPAROSCOPIC PARAESOPHAGEAL HERNIA REPAIR WITH MESH

## 2025-07-12 DIAGNOSIS — F41.1 GENERALIZED ANXIETY DISORDER: ICD-10-CM

## 2025-07-12 DIAGNOSIS — F32.A DEPRESSION, UNSPECIFIED DEPRESSION TYPE: Primary | ICD-10-CM

## 2025-07-13 DIAGNOSIS — M17.10 PRIMARY OSTEOARTHRITIS OF KNEE, UNSPECIFIED LATERALITY: ICD-10-CM

## 2025-07-14 ENCOUNTER — CARE COORDINATION (OUTPATIENT)
Dept: CARE COORDINATION | Age: 60
End: 2025-07-14

## 2025-07-14 ENCOUNTER — TELEPHONE (OUTPATIENT)
Dept: FAMILY MEDICINE CLINIC | Age: 60
End: 2025-07-14

## 2025-07-14 RX ORDER — IBUPROFEN 800 MG/1
800 TABLET, FILM COATED ORAL EVERY 8 HOURS PRN
Qty: 90 TABLET | Refills: 1 | Status: SHIPPED | OUTPATIENT
Start: 2025-07-14

## 2025-07-14 NOTE — CARE COORDINATION
Psychiatrists in network on [employer]'s provider site that are accepting new pt's near pt's residence are:    [facility]  [phone] [address], [address]  [place], [place] [address]  1.62 miles    [facility]  [phone]  [address], [address]  [place], [place] [address]  8.14 miles    [facility]  [phone] [address], [address]  [place], [place] [address]  9.7 miles    [facility]   [phone]  [address], [address]  [place], [place] [address]  9.72 miles    [facility]  [phone]  [address], [address]  [place], [place] [address]  9.22 miles    [facility]  [phone]  [address],  [place], [place] [address]  9.63 miles    [facility]  [phone]  [address]  [place], [place] [address]  9.98 miles    [facility]  [phone]  [address],  [place], [place] [address]  10.1 miles    [facility]  [phone]  [address], [address]  [place], [place] [address]  11.58 miles    [facility]  [phone]  [address], [address]  [place], [place] [address]  11.74 miles    [facility]  [address],  [place], [place] [address]  12.02 miles    [facility]  [phone]  [address],  [place], [place] [address]  12.68 miles     [facility]  [phone] [address],  [place], [place] [address]  13.79 miles    [facility]  [phone]  [address], [address]  [place], [place] [address]  13.8 miles    [facility]  [phone] [address],  [place] [id number]  15.51 miles    [employer] message sent to pt with above in network psychiatrists and will also request to be mailed to pt's residence as requested by ACM.

## 2025-07-14 NOTE — CARE COORDINATION
Ambulatory Care Coordination Note     2025      Patient Current Location:  Home: [address] [facility] [address]     This patient was received as a referral from Provider.    ACM contacted the patient by telephone. Verified name and  with patient as identifiers. Provided introduction to self, and explanation of the ACM role.   Patient accepted care management services at this time.       PCP requesting list of psychiatrists within patient's network in order to place referral  Request sent to [facility]     ACM: Joanie Chew RN     Challenges to be reviewed by the provider   Additional needs identified to be addressed with provider Yes  Assisted patient with scheduling appointment 7/15/25 at 11:40 AM               Method of communication with provider: phone.    Utilization: Initial Call - N/A    Offered patient enrollment in the Remote Patient Monitoring (RPM) program for in-home monitoring: Deferred at this time because assisting patient with scheduling appointment for acute visit; will discuss at next outreach.         Assessments Completed:   General Assessment    Do you have any symptoms that are causing concern?: Yes  Progression since Onset: Gradually Worsening  Reported Symptoms: Other (Comment: Patient c/o BLE edema and bilateral feet red/painful. Patient also c/o watery diarrhea that started late Saturday PM/Early  AM)          Medications Reviewed:   Not completed during this call: patient will see PCP tomorrow and review at that homer    Advance Care Planning:   Not reviewed during this call     Care Planning:   Not completed during this call    PCP/Specialist follow up:   Future Appointments         Provider Specialty Dept Phone    7/15/2025 11:40 AM Petar Sorenson DO Family Medicine [phone]    2025 11:30 AM Charles Mays MD Vascular Surgery [phone]            Follow Up:   Plan for next ACM outreach in approximately 3-5 days to complete:  - CC Protocol assessments  -

## 2025-07-14 NOTE — TELEPHONE ENCOUNTER
Patient calling in for inform Dr. Sorenson that feet and ankles swelling is back. Asking for suggestions on what to do next.     Last appointment:7/9/2025  Future Appointments   Date Time Provider Department Center   7/25/2025 11:30 AM Charles Mays MD AND [facility]

## 2025-07-15 ENCOUNTER — OFFICE VISIT (OUTPATIENT)
Dept: FAMILY MEDICINE CLINIC | Age: 60
End: 2025-07-15

## 2025-07-15 VITALS
RESPIRATION RATE: 16 BRPM | WEIGHT: 293 LBS | BODY MASS INDEX: 47.61 KG/M2 | DIASTOLIC BLOOD PRESSURE: 87 MMHG | HEART RATE: 61 BPM | TEMPERATURE: 97.6 F | OXYGEN SATURATION: 95 % | SYSTOLIC BLOOD PRESSURE: 153 MMHG

## 2025-07-15 DIAGNOSIS — R19.7 DIARRHEA, UNSPECIFIED TYPE: Primary | ICD-10-CM

## 2025-07-15 DIAGNOSIS — L03.119 CELLULITIS OF FOOT: ICD-10-CM

## 2025-07-15 DIAGNOSIS — R19.7 DIARRHEA, UNSPECIFIED TYPE: ICD-10-CM

## 2025-07-15 DIAGNOSIS — I10 ESSENTIAL HYPERTENSION: ICD-10-CM

## 2025-07-15 DIAGNOSIS — R60.0 BILATERAL LEG EDEMA: ICD-10-CM

## 2025-07-15 RX ORDER — CEPHALEXIN 500 MG/1
500 CAPSULE ORAL 3 TIMES DAILY
Qty: 21 CAPSULE | Refills: 0 | Status: SHIPPED | OUTPATIENT
Start: 2025-07-15

## 2025-07-15 RX ORDER — LOPERAMIDE HYDROCHLORIDE 2 MG/1
2 CAPSULE ORAL 4 TIMES DAILY PRN
Qty: 12 CAPSULE | Refills: 1 | Status: SHIPPED | OUTPATIENT
Start: 2025-07-15 | End: 2025-07-21

## 2025-07-15 SDOH — ECONOMIC STABILITY: FOOD INSECURITY: WITHIN THE PAST 12 MONTHS, YOU WORRIED THAT YOUR FOOD WOULD RUN OUT BEFORE YOU GOT MONEY TO BUY MORE.: NEVER TRUE

## 2025-07-15 SDOH — ECONOMIC STABILITY: FOOD INSECURITY: WITHIN THE PAST 12 MONTHS, THE FOOD YOU BOUGHT JUST DIDN'T LAST AND YOU DIDN'T HAVE MONEY TO GET MORE.: NEVER TRUE

## 2025-07-15 ASSESSMENT — PATIENT HEALTH QUESTIONNAIRE - PHQ9
10. IF YOU CHECKED OFF ANY PROBLEMS, HOW DIFFICULT HAVE THESE PROBLEMS MADE IT FOR YOU TO DO YOUR WORK, TAKE CARE OF THINGS AT HOME, OR GET ALONG WITH OTHER PEOPLE: SOMEWHAT DIFFICULT
4. FEELING TIRED OR HAVING LITTLE ENERGY: NEARLY EVERY DAY
3. TROUBLE FALLING OR STAYING ASLEEP: NEARLY EVERY DAY
2. FEELING DOWN, DEPRESSED OR HOPELESS: NEARLY EVERY DAY
SUM OF ALL RESPONSES TO PHQ QUESTIONS 1-9: 15
SUM OF ALL RESPONSES TO PHQ QUESTIONS 1-9: 15
8. MOVING OR SPEAKING SO SLOWLY THAT OTHER PEOPLE COULD HAVE NOTICED. OR THE OPPOSITE, BEING SO FIGETY OR RESTLESS THAT YOU HAVE BEEN MOVING AROUND A LOT MORE THAN USUAL: NOT AT ALL
9. THOUGHTS THAT YOU WOULD BE BETTER OFF DEAD, OR OF HURTING YOURSELF: NOT AT ALL
5. POOR APPETITE OR OVEREATING: NOT AT ALL
1. LITTLE INTEREST OR PLEASURE IN DOING THINGS: NEARLY EVERY DAY
SUM OF ALL RESPONSES TO PHQ QUESTIONS 1-9: 15
SUM OF ALL RESPONSES TO PHQ QUESTIONS 1-9: 15
6. FEELING BAD ABOUT YOURSELF - OR THAT YOU ARE A FAILURE OR HAVE LET YOURSELF OR YOUR FAMILY DOWN: NOT AT ALL
7. TROUBLE CONCENTRATING ON THINGS, SUCH AS READING THE NEWSPAPER OR WATCHING TELEVISION: NEARLY EVERY DAY

## 2025-07-15 ASSESSMENT — ENCOUNTER SYMPTOMS: DIARRHEA: 1

## 2025-07-15 NOTE — PATIENT INSTRUCTIONS
Double up the torsemide dose    Start the anti-diarrhea  and do a BRAT diet -- bananas, rice, toast , applesauce     Take the cephalexin-  antibiotic.    Call me tomorrow for the blood result and report

## 2025-07-15 NOTE — PROGRESS NOTES
on file   Occupational History    Occupation:    Tobacco Use    Smoking status: Never     Passive exposure: Past    Smokeless tobacco: Never   Vaping Use    Vaping status: Never Used   Substance and Sexual Activity    Alcohol use: Yes     Alcohol/week: 4.0 standard drinks of alcohol     Types: 4 Drinks containing 0.5 oz of alcohol per week     Comment: once / week 2-3  mixed drinks.    Drug use: Yes     Frequency: 4.0 times per week     Types: Marijuana (Weed)     Comment: Last Dose 11/13/24 \"Gummies\"    Sexual activity: Not Currently     Partners: Male   Other Topics Concern    Not on file   Social History Narrative    Not on file     Social Drivers of Health     Financial Resource Strain: Low Risk  (11/18/2024)    Overall Financial Resource Strain (CARDIA)     Difficulty of Paying Living Expenses: Not hard at all   Food Insecurity: No Food Insecurity (7/15/2025)    Hunger Vital Sign     Worried About Running Out of Food in the Last Year: Never true     Ran Out of Food in the Last Year: Never true   Transportation Needs: No Transportation Needs (7/15/2025)    PRAPARE - Transportation     Lack of Transportation (Medical): No     Lack of Transportation (Non-Medical): No   Physical Activity: Inactive (8/15/2024)    Exercise Vital Sign     Days of Exercise per Week: 0 days     Minutes of Exercise per Session: 0 min   Stress: Not on file   Social Connections: Not on file   Intimate Partner Violence: Unknown (1/20/2024)    Received from [facility], [facility]    Interpersonal Safety     Feel physically or emotionally unsafe where currently live: Not on file     Harm by anyone: Not on file     Emotionally Harmed: Not on file   Housing Stability: Low Risk  (7/15/2025)    Housing Stability Vital Sign     Unable to Pay for Housing in the Last Year: No     Number of Times Moved in the Last Year: 0     Homeless in the Last Year: No       Family History

## 2025-07-16 ENCOUNTER — CARE COORDINATION (OUTPATIENT)
Dept: CARE COORDINATION | Age: 60
End: 2025-07-16

## 2025-07-16 LAB
ANION GAP SERPL CALCULATED.3IONS-SCNC: 12 MMOL/L (ref 3–16)
BASOPHILS # BLD: 0.1 K/UL (ref 0–0.2)
BASOPHILS NFR BLD: 0.9 %
BUN SERPL-MCNC: 10 MG/DL (ref 7–20)
CALCIUM SERPL-MCNC: 9.3 MG/DL (ref 8.3–10.6)
CHLORIDE SERPL-SCNC: 107 MMOL/L (ref 99–110)
CO2 SERPL-SCNC: 24 MMOL/L (ref 21–32)
CREAT SERPL-MCNC: 0.8 MG/DL (ref 0.6–1.2)
DEPRECATED RDW RBC AUTO: 15.9 % (ref 12.4–15.4)
EOSINOPHIL # BLD: 0.1 K/UL (ref 0–0.6)
EOSINOPHIL NFR BLD: 1.5 %
GFR SERPLBLD CREATININE-BSD FMLA CKD-EPI: 84 ML/MIN/{1.73_M2}
GLUCOSE SERPL-MCNC: 120 MG/DL (ref 70–99)
HCT VFR BLD AUTO: 38.6 % (ref 36–48)
HGB BLD-MCNC: 12.8 G/DL (ref 12–16)
LYMPHOCYTES # BLD: 1.2 K/UL (ref 1–5.1)
LYMPHOCYTES NFR BLD: 14.1 %
MCH RBC QN AUTO: 30 PG (ref 26–34)
MCHC RBC AUTO-ENTMCNC: 33.2 G/DL (ref 31–36)
MCV RBC AUTO: 90.5 FL (ref 80–100)
MONOCYTES # BLD: 0.5 K/UL (ref 0–1.3)
MONOCYTES NFR BLD: 5.3 %
NEUTROPHILS # BLD: 6.9 K/UL (ref 1.7–7.7)
NEUTROPHILS NFR BLD: 78.2 %
PLATELET # BLD AUTO: 163 K/UL (ref 135–450)
PMV BLD AUTO: 10.7 FL (ref 5–10.5)
POTASSIUM SERPL-SCNC: 3.5 MMOL/L (ref 3.5–5.1)
RBC # BLD AUTO: 4.26 M/UL (ref 4–5.2)
SODIUM SERPL-SCNC: 143 MMOL/L (ref 136–145)
WBC # BLD AUTO: 8.8 K/UL (ref 4–11)

## 2025-07-16 NOTE — CARE COORDINATION
Ambulatory Care Coordination Note     2025         Patient contacted the ACM by telephone. Verified name and  with patient as identifiers.         ACM: Joanie Chew RN     Challenges to be reviewed by the provider   Additional needs identified to be addressed with provider No  N/A               Method of communication with provider: chart routing.    Utilization: Has the patient been seen in the ED since your last call? Yes,   Discharge Date: 25   Discharge Facility: [facility]  Reason for ED Visit: Swelling in both legs  Visit Diagnosis: BLE Edema    Number of ED visits in the last 6 months: 2      Do you have any ongoing symptoms? Diarrhea improving  Did you call your PCP prior to going to the ED? No, did not call the PCP office.     Review of Discharge Instructions:   [x] Medication changes  [x] Follow up appointments   [x] Patient seen by PCP for ED f/u yesterday 7/15/25       Care Summary Note:   Patient reports she received the list of psychiatrists via her [employer]  Patient inquiring about lab results  Explained to patient role of ACM and unable to interpret lab results  Reviewed the PCP's response to lab results  Reviewed new medications  Patient reports the diarrhea resolving with Imodium; still feels fatigued  Reviewed s/s of dehydration; patient denies symptoms  Will send clinical references via [employer] -- Dehydration for reference     Assessments Completed:   General Assessment    Do you have any symptoms that are causing concern?: No          Medications Reviewed:   Reviewed medication changes    Advance Care Planning:   Not reviewed during this call     Care Planning:   Not completed during this call    PCP/Specialist follow up:   Future Appointments         Provider Specialty Dept Phone    2025 11:30 AM Charles Mays MD Vascular Surgery [phone]            Follow Up:   Plan for next AC outreach in approximately 1 week to complete:  - disease specific

## 2025-07-23 ENCOUNTER — CARE COORDINATION (OUTPATIENT)
Dept: CARE COORDINATION | Age: 60
End: 2025-07-23

## 2025-07-23 ENCOUNTER — TELEPHONE (OUTPATIENT)
Dept: VASCULAR SURGERY | Age: 60
End: 2025-07-23

## 2025-07-23 DIAGNOSIS — R60.1 GENERALIZED EDEMA: Primary | ICD-10-CM

## 2025-07-23 NOTE — CARE COORDINATION
Ambulatory Care Coordination Note     2025      Patient Current Location:  Home:  [address] [facility] [address]     ACM contacted the patient by telephone. Verified name and  with patient as identifiers.         ACM: Joanie Chew RN     Challenges to be reviewed by the provider   Additional needs identified to be addressed with provider No         Method of communication with provider: phone.    Utilization: Patient has not had any utilization since our last call.     Care Summary Note:     Patient reports she is unable to go to urgent care today  This ACM assisted patient with scheduling acute visit with PCP office for tomorrow morning    Assessments Completed:   General Assessment    Do you have any symptoms that are causing concern?: Yes  Reported Symptoms: Other, Fever (Comment: patient c/o liquid diarrhea restarted this am. Patient reports she had a fever 101.1 on Monday and today fever 99.6F; no N/V. Bilat ankle/feet still red)        Offered patient enrollment in the Remote Patient Monitoring (RPM) program for in-home monitoring: Deferred at this time because addressing acute visit; will discuss at next outreach.    Medications Reviewed:   Reviewed new medications      PCP/Specialist follow up:   Future Appointments         Provider Specialty Dept Phone    2025 8:00 AM James Pierce MD Family Medicine [phone]    2025 3:00 PM (Arrive by 2:30 PM) [facility] VASC LAB 1 Vascular Lab [phone]    2025 1:15 PM Charles Mays MD Vascular Surgery [phone]            Follow Up:   Plan for next AC outreach in approximately 1-2 days  to complete:  - disease specific assessments  - follow up appointment with PCP office  - assist with scheduling appointment with .   Patient  is agreeable to this plan.

## 2025-07-23 NOTE — TELEPHONE ENCOUNTER
Called patient as she has apt to see Dr Mays on Friday for swelling in her legs. No venous studies since 2024. I have her scheduled for vds for 8/5/25 at [facility] at 2:30pm and then apt to see Dr Mays on 8/8/2025. She is currently on antibiotics for cellulitis. I asked if was elevating legs above the heart and she states yes. Says all her swelling is in her feet. Pamalyssa

## 2025-07-24 ENCOUNTER — OFFICE VISIT (OUTPATIENT)
Dept: FAMILY MEDICINE CLINIC | Age: 60
End: 2025-07-24
Payer: MEDICARE

## 2025-07-24 VITALS
BODY MASS INDEX: 47.29 KG/M2 | OXYGEN SATURATION: 96 % | HEART RATE: 61 BPM | RESPIRATION RATE: 16 BRPM | WEIGHT: 293 LBS | SYSTOLIC BLOOD PRESSURE: 122 MMHG | DIASTOLIC BLOOD PRESSURE: 78 MMHG | TEMPERATURE: 98.5 F

## 2025-07-24 DIAGNOSIS — R19.7 ACUTE DIARRHEA: Primary | ICD-10-CM

## 2025-07-24 PROCEDURE — 3078F DIAST BP <80 MM HG: CPT

## 2025-07-24 PROCEDURE — 3017F COLORECTAL CA SCREEN DOC REV: CPT

## 2025-07-24 PROCEDURE — 3074F SYST BP LT 130 MM HG: CPT

## 2025-07-24 PROCEDURE — 99214 OFFICE O/P EST MOD 30 MIN: CPT

## 2025-07-24 PROCEDURE — 1036F TOBACCO NON-USER: CPT

## 2025-07-24 PROCEDURE — G8417 CALC BMI ABV UP PARAM F/U: HCPCS

## 2025-07-24 PROCEDURE — G8427 DOCREV CUR MEDS BY ELIG CLIN: HCPCS

## 2025-07-24 NOTE — PROGRESS NOTES
25    Chief Complaint   Patient presents with    Diarrhea     Clear liquid - has 2 days left on atb    Fever     Started Monday night 101.2  Tu - 100  Last night 99.8     HPI: Nydia Downs is a 60 y.o. female who presents for diarrhea that has been present for a couple for about a week but became worse on Tuesday and has been very watery since then. Symptoms have improved mildly since then. Pt is having a BM every 2 hours approx. Pt does have abdominal cramping before BM but no other abdominal pain. Pt did have fevers of 101.4 on Monday and has been trending down from there.     Past Medical History:   Diagnosis Date    Anxiety     Carpal tunnel syndrome     Depression     Endometriosis     Essential hypertension 12/10/2018    Fractures     GERD (gastroesophageal reflux disease)     Headache     Obesity     Osteoarthritis     PONV (postoperative nausea and vomiting)     Restless leg syndrome     Wears dentures     Wears glasses     For reading only       Past Surgical History:   Procedure Laterality Date    ABDOMEN SURGERY  2016    ANUS SURGERY  2018    fissure     SECTION      x3    COLONOSCOPY      DILATION AND CURETTAGE OF UTERUS N/A 2021    VIDEO HYSTEROSCOPY DILATATION AND CURETTAGE, POSSIBLE MYOSURE, POSSIBLE POLYPECTOMY performed by Ana Mike DO at [facility] OR    FRACTURE SURGERY      right wrist    HAND SURGERY      HERNIA REPAIR  2018    LAPAROSCOPIC PARAESOPHAGEAL HERNIA REPAIR WITH MESH    HYSTERECTOMY (CERVIX STATUS UNKNOWN) N/A 10/21/2021    ROBOTIC ASSISTED LAPAROSCOPIC HYSTERECTOMY WITH RIGHT SALPINGECTOMY, RIGHT OOPHORECTOMY, CYSTOSCOPY, LYSIS OF ADHESIONS  CPT CODE - 61324 performed by Joanie Del Valle DO at [facility] OR    HYSTERECTOMY, TOTAL ABDOMINAL (CERVIX REMOVED)  10/21/21    JOINT REPLACEMENT  6/15/23  12/5/23    KNEE ARTHROSCOPY Left 11/15/2012    ARTHROSCOPY LEFT KNEE WITH SYOVECTOMY AND CHONDROPLASTY    OVARY REMOVAL Right

## 2025-07-25 ENCOUNTER — CARE COORDINATION (OUTPATIENT)
Dept: CARE COORDINATION | Age: 60
End: 2025-07-25

## 2025-07-25 DIAGNOSIS — R60.0 BILATERAL LEG EDEMA: ICD-10-CM

## 2025-07-25 RX ORDER — TORSEMIDE 10 MG/1
10 TABLET ORAL DAILY
Qty: 90 TABLET | Refills: 1 | Status: SHIPPED | OUTPATIENT
Start: 2025-07-25

## 2025-07-25 NOTE — CARE COORDINATION
Ambulatory Care Coordination Note     7/25/2025      Patient outreach attempt by this ACM today to perform care management follow up . ACM was unable to reach the patient by telephone today;   left voice message requesting a return phone call to this ACM.     ACM: Joanie Chew RN     PCP/Specialist follow up:   Future Appointments         Provider Specialty Dept Phone    8/5/2025 3:00 PM (Arrive by 2:30 PM) [facility] LAB 1 Vascular Lab [phone]    8/8/2025 1:15 PM Charles Mays MD Vascular Surgery [phone]            Follow Up:   Plan for next ACM outreach in approximately 1 week to complete:  - follow-up appointment with PCP office.

## 2025-07-25 NOTE — TELEPHONE ENCOUNTER
Lov 7/1/2025  Future Appointments   Date Time Provider Department Center   8/5/2025  3:00 PM [facility] LAB 1 [facility]   8/8/2025  1:15 PM Charles Mays MD AND VASC [facility]

## 2025-07-28 DIAGNOSIS — R19.7 ACUTE DIARRHEA: ICD-10-CM

## 2025-07-28 LAB — C DIFF TOX A+B STL QL IA: NORMAL

## 2025-07-29 ENCOUNTER — RESULTS FOLLOW-UP (OUTPATIENT)
Dept: FAMILY MEDICINE CLINIC | Age: 60
End: 2025-07-29

## 2025-07-29 ENCOUNTER — CARE COORDINATION (OUTPATIENT)
Dept: CARE COORDINATION | Age: 60
End: 2025-07-29

## 2025-07-29 ENCOUNTER — TELEPHONE (OUTPATIENT)
Dept: FAMILY MEDICINE CLINIC | Age: 60
End: 2025-07-29

## 2025-07-29 DIAGNOSIS — A04.72 C. DIFFICILE COLITIS: Primary | ICD-10-CM

## 2025-07-29 LAB
C DIFF TOX GENS STL QL NAA+PROBE: ABNORMAL
ORGANISM: ABNORMAL

## 2025-07-29 RX ORDER — VANCOMYCIN HYDROCHLORIDE 125 MG/1
125 CAPSULE ORAL 4 TIMES DAILY
Qty: 40 CAPSULE | Refills: 0 | Status: SHIPPED | OUTPATIENT
Start: 2025-07-29 | End: 2025-08-08

## 2025-07-29 NOTE — CARE COORDINATION
Ambulatory Care Coordination Note     2025      Patient Current Location:  Home: [address] [facility] [address]     ACM contacted the patient by telephone. Verified name and  with patient as identifiers.         ACM: Joanie Chew RN     Challenges to be reviewed by the provider   Additional needs identified to be addressed with provider Yes  medications- Requesting anti-nausea medication               Method of communication with provider: chart routing.    Utilization: Patient has not had any utilization since our last call.     Care Summary Note:   Patient reports she had N/V this morning but symptoms have since resolved  Patient is drinking water, jeanie-aide and gator-aide; she is avoiding anything spicy  Reviewed clinical references (will send this information via [employer] for review):  Dehydration-patient denies symptoms  C-diff  Patient has d/c the antidiarrhea medications and she has started the po vancomycin  Reviewed precautions and s/s to seek immediate care  Reviewed how to contact the physician on call        PCP/Specialist follow up:   Future Appointments         Provider Specialty Dept Phone    2025 3:00 PM (Arrive by 2:30 PM) [facility] 1 Vascular Lab [phone]    2025 1:15 PM Charles Mays MD Vascular Surgery [phone]            Follow Up:   Plan for next AC outreach in approximately 1 week to complete:  - disease specific assessments  - assist w/scheduling with .   Patient  is agreeable to this plan.

## 2025-07-30 ENCOUNTER — TELEPHONE (OUTPATIENT)
Dept: FAMILY MEDICINE CLINIC | Age: 60
End: 2025-07-30

## 2025-07-30 DIAGNOSIS — R11.0 NAUSEA: Primary | ICD-10-CM

## 2025-07-30 RX ORDER — ONDANSETRON 4 MG/1
4 TABLET, FILM COATED ORAL 3 TIMES DAILY PRN
Qty: 15 TABLET | Refills: 0 | Status: SHIPPED | OUTPATIENT
Start: 2025-07-30

## 2025-07-30 NOTE — CARE COORDINATION
Ambulatory Care Coordination Note     2025      Patient Current Location:  Home: [address] [facility] [id number]     ACM contacted the patient by telephone. Verified name and  with patient as identifiers.      Patient reports she is feeling a little better today.  The diarrhea is slowing down and so far no N/V.  Reminded patient s/s of dehydration  Patient reports she is fixing a pitcher of juice now

## 2025-08-05 ENCOUNTER — CARE COORDINATION (OUTPATIENT)
Dept: CARE COORDINATION | Age: 60
End: 2025-08-05

## 2025-08-05 ENCOUNTER — HOSPITAL ENCOUNTER (OUTPATIENT)
Dept: VASCULAR LAB | Age: 60
Discharge: HOME | End: 2025-08-07
Attending: SURGERY
Payer: MEDICARE

## 2025-08-05 DIAGNOSIS — R60.1 GENERALIZED EDEMA: ICD-10-CM

## 2025-08-05 PROCEDURE — 93970 EXTREMITY STUDY: CPT

## 2025-08-06 PROCEDURE — 93970 EXTREMITY STUDY: CPT | Performed by: SURGERY

## 2025-08-07 ENCOUNTER — CARE COORDINATION (OUTPATIENT)
Dept: CARE COORDINATION | Age: 60
End: 2025-08-07

## 2025-08-08 ENCOUNTER — OFFICE VISIT (OUTPATIENT)
Dept: VASCULAR SURGERY | Age: 60
End: 2025-08-08
Payer: MEDICARE

## 2025-08-08 VITALS
WEIGHT: 293 LBS | HEIGHT: 66 IN | BODY MASS INDEX: 47.09 KG/M2 | SYSTOLIC BLOOD PRESSURE: 142 MMHG | DIASTOLIC BLOOD PRESSURE: 88 MMHG

## 2025-08-08 DIAGNOSIS — M79.89 LEG SWELLING: Primary | ICD-10-CM

## 2025-08-08 PROCEDURE — 1036F TOBACCO NON-USER: CPT | Performed by: SURGERY

## 2025-08-08 PROCEDURE — 3017F COLORECTAL CA SCREEN DOC REV: CPT | Performed by: SURGERY

## 2025-08-08 PROCEDURE — G8417 CALC BMI ABV UP PARAM F/U: HCPCS | Performed by: SURGERY

## 2025-08-08 PROCEDURE — 99203 OFFICE O/P NEW LOW 30 MIN: CPT | Performed by: SURGERY

## 2025-08-08 PROCEDURE — 3079F DIAST BP 80-89 MM HG: CPT | Performed by: SURGERY

## 2025-08-08 PROCEDURE — 3077F SYST BP >= 140 MM HG: CPT | Performed by: SURGERY

## 2025-08-08 PROCEDURE — G8427 DOCREV CUR MEDS BY ELIG CLIN: HCPCS | Performed by: SURGERY

## 2025-08-11 DIAGNOSIS — F41.9 ANXIETY DISORDER, UNSPECIFIED: ICD-10-CM

## 2025-08-12 ENCOUNTER — PATIENT MESSAGE (OUTPATIENT)
Dept: FAMILY MEDICINE CLINIC | Age: 60
End: 2025-08-12

## 2025-08-12 ENCOUNTER — CARE COORDINATION (OUTPATIENT)
Dept: CARE COORDINATION | Age: 60
End: 2025-08-12

## 2025-08-12 RX ORDER — ROPINIROLE 2 MG/1
TABLET, FILM COATED ORAL
Qty: 270 TABLET | Refills: 1 | Status: SHIPPED | OUTPATIENT
Start: 2025-08-12

## 2025-08-12 RX ORDER — ALPRAZOLAM 0.5 MG
0.5 TABLET ORAL 3 TIMES DAILY PRN
Qty: 90 TABLET | Refills: 1 | Status: SHIPPED | OUTPATIENT
Start: 2025-08-12 | End: 2025-10-11

## 2025-08-19 ENCOUNTER — CARE COORDINATION (OUTPATIENT)
Dept: CARE COORDINATION | Age: 60
End: 2025-08-19

## 2025-08-27 ENCOUNTER — CARE COORDINATION (OUTPATIENT)
Dept: CARE COORDINATION | Age: 60
End: 2025-08-27

## 2025-08-27 ASSESSMENT — SOCIAL DETERMINANTS OF HEALTH (SDOH)
HOW OFTEN DO YOU GET TOGETHER WITH FRIENDS OR RELATIVES?: ONCE A WEEK
DO YOU BELONG TO ANY CLUBS OR ORGANIZATIONS SUCH AS CHURCH GROUPS UNIONS, FRATERNAL OR ATHLETIC GROUPS, OR SCHOOL GROUPS?: YES
IN A TYPICAL WEEK, HOW MANY TIMES DO YOU TALK ON THE PHONE WITH FAMILY, FRIENDS, OR NEIGHBORS?: MORE THAN THREE TIMES A WEEK
IN A TYPICAL WEEK, HOW MANY TIMES DO YOU TALK ON THE PHONE WITH FAMILY, FRIENDS, OR NEIGHBORS?: THREE TIMES A WEEK
HOW OFTEN DO YOU GET TOGETHER WITH FRIENDS OR RELATIVES?: ONCE A WEEK
HOW OFTEN DO YOU ATTENT MEETINGS OF THE CLUB OR ORGANIZATION YOU BELONG TO?: MORE THAN 4 TIMES PER YEAR
HOW OFTEN DO YOU ATTEND CHURCH OR RELIGIOUS SERVICES?: NEVER
HOW OFTEN DO YOU ATTEND CHURCH OR RELIGIOUS SERVICES?: NEVER
DO YOU BELONG TO ANY CLUBS OR ORGANIZATIONS SUCH AS CHURCH GROUPS UNIONS, FRATERNAL OR ATHLETIC GROUPS, OR SCHOOL GROUPS?: YES
HOW OFTEN DO YOU ATTENT MEETINGS OF THE CLUB OR ORGANIZATION YOU BELONG TO?: MORE THAN 4 TIMES PER YEAR

## 2025-09-02 ENCOUNTER — OFFICE VISIT (OUTPATIENT)
Dept: FAMILY MEDICINE CLINIC | Age: 60
End: 2025-09-02
Payer: MEDICARE

## 2025-09-02 ENCOUNTER — HOSPITAL ENCOUNTER (OUTPATIENT)
Dept: MAMMOGRAPHY | Age: 60
Discharge: HOME | End: 2025-09-02
Payer: MEDICARE

## 2025-09-02 VITALS
HEART RATE: 65 BPM | WEIGHT: 293 LBS | SYSTOLIC BLOOD PRESSURE: 130 MMHG | RESPIRATION RATE: 16 BRPM | TEMPERATURE: 97.7 F | BODY MASS INDEX: 47.48 KG/M2 | OXYGEN SATURATION: 95 % | DIASTOLIC BLOOD PRESSURE: 78 MMHG

## 2025-09-02 DIAGNOSIS — R11.0 NAUSEA: ICD-10-CM

## 2025-09-02 DIAGNOSIS — F33.41 RECURRENT MAJOR DEPRESSIVE DISORDER, IN PARTIAL REMISSION: ICD-10-CM

## 2025-09-02 DIAGNOSIS — R60.0 PEDAL EDEMA: ICD-10-CM

## 2025-09-02 DIAGNOSIS — Z12.31 ENCOUNTER FOR SCREENING MAMMOGRAM FOR BREAST CANCER: ICD-10-CM

## 2025-09-02 DIAGNOSIS — I10 ESSENTIAL HYPERTENSION: Primary | ICD-10-CM

## 2025-09-02 PROCEDURE — 3017F COLORECTAL CA SCREEN DOC REV: CPT | Performed by: FAMILY MEDICINE

## 2025-09-02 PROCEDURE — 99213 OFFICE O/P EST LOW 20 MIN: CPT | Performed by: FAMILY MEDICINE

## 2025-09-02 PROCEDURE — 3078F DIAST BP <80 MM HG: CPT | Performed by: FAMILY MEDICINE

## 2025-09-02 PROCEDURE — 77063 BREAST TOMOSYNTHESIS BI: CPT

## 2025-09-02 PROCEDURE — G8417 CALC BMI ABV UP PARAM F/U: HCPCS | Performed by: FAMILY MEDICINE

## 2025-09-02 PROCEDURE — 1036F TOBACCO NON-USER: CPT | Performed by: FAMILY MEDICINE

## 2025-09-02 PROCEDURE — 3075F SYST BP GE 130 - 139MM HG: CPT | Performed by: FAMILY MEDICINE

## 2025-09-02 PROCEDURE — G8427 DOCREV CUR MEDS BY ELIG CLIN: HCPCS | Performed by: FAMILY MEDICINE

## 2025-09-02 RX ORDER — PHENOL 1.4 %
AEROSOL, SPRAY (ML) MUCOUS MEMBRANE
COMMUNITY

## 2025-09-02 RX ORDER — ONDANSETRON 4 MG/1
4 TABLET, FILM COATED ORAL 3 TIMES DAILY PRN
Qty: 15 TABLET | Refills: 3 | Status: SHIPPED | OUTPATIENT
Start: 2025-09-02

## 2025-09-02 SDOH — ECONOMIC STABILITY: FOOD INSECURITY: WITHIN THE PAST 12 MONTHS, THE FOOD YOU BOUGHT JUST DIDN'T LAST AND YOU DIDN'T HAVE MONEY TO GET MORE.: NEVER TRUE

## 2025-09-02 SDOH — ECONOMIC STABILITY: FOOD INSECURITY: WITHIN THE PAST 12 MONTHS, YOU WORRIED THAT YOUR FOOD WOULD RUN OUT BEFORE YOU GOT MONEY TO BUY MORE.: NEVER TRUE

## 2025-09-02 ASSESSMENT — PATIENT HEALTH QUESTIONNAIRE - PHQ9
SUM OF ALL RESPONSES TO PHQ QUESTIONS 1-9: 2
2. FEELING DOWN, DEPRESSED OR HOPELESS: SEVERAL DAYS
SUM OF ALL RESPONSES TO PHQ QUESTIONS 1-9: 2
1. LITTLE INTEREST OR PLEASURE IN DOING THINGS: SEVERAL DAYS

## 2025-09-02 ASSESSMENT — ENCOUNTER SYMPTOMS: SHORTNESS OF BREATH: 0

## (undated) DEVICE — GLOVE ORANGE PI 8   MSG9080

## (undated) DEVICE — DRESSING,GAUZE,XEROFORM,CURAD,1"X8",ST: Brand: CURAD

## (undated) DEVICE — 3D CONTACT DRESSING 1.5IN X 10IN: Brand: THERABOND 3D ANTIMICROBIAL BARRIER SYSTEMS

## (undated) DEVICE — TOWEL,OR,DSP,ST,BLUE,STD,4/PK,20PK/CS: Brand: MEDLINE

## (undated) DEVICE — CANNULA THREADED FLEX 8.0 X 72MM: Brand: CLEAR-TRAC

## (undated) DEVICE — GLOVE SURG SZ 65 L12IN FNGR THK79MIL GRN LTX FREE

## (undated) DEVICE — GOWN,SIRUS,POLYRNF,XLN/3XL,18/CS: Brand: MEDLINE

## (undated) DEVICE — INSTRUMENT KIT ORTHOPEDIC KNEE NAVITRACK

## (undated) DEVICE — DRESSING WND 1.5X10 IN ANTIMICROBIAL CONTACT THERABOND 3D

## (undated) DEVICE — BOWL AND CEMENT CARTRIDGE WITH BREAKAWAY FEMORAL NOZZLE AND MEDIUM PRESSURIZER: Brand: ACM

## (undated) DEVICE — 3M™ IOBAN™ 2 ANTIMICROBIAL INCISE DRAPE 6650EZ: Brand: IOBAN™ 2

## (undated) DEVICE — SOLUTION IV 1000ML LAC RINGERS PH 6.5 INJ USP VIAFLX PLAS

## (undated) DEVICE — DUAL CUT SAGITTAL BLADE

## (undated) DEVICE — CATHETER IV 20GA L1.25IN PNK FEP SFTY STR HUB RADPQ DISP

## (undated) DEVICE — UNDERPAD HOSP W30XL36IN WHT SUP ABSRB POLYMER AIR PERM DISP

## (undated) DEVICE — TUBING PMP L8FT LNG W/ CONN FOR AR-6400 REDEUCE

## (undated) DEVICE — DRAPE 70X60IN SPLIT IMPERV ADHES STRIP

## (undated) DEVICE — MEDI-VAC NON-CONDUCTIVE SUCTION TUBING: Brand: CARDINAL HEALTH

## (undated) DEVICE — DYONICS 4.0 MM ELITE                                    ACROMIOBLASTER STRAIGHT DISPOSABLE                                    BURRS, SAGE GREEN, 10000 MAXIMUM                                    RPM, PACKAGED 6 PER BOX, STERILE

## (undated) DEVICE — PIN HOLDING HDLSS 3.2X75 MM TROCAR ZUK

## (undated) DEVICE — 3M™ TEGADERM™ TRANSPARENT FILM DRESSING FRAME STYLE, 1624W, 2-3/8 IN X 2-3/4 IN (6 CM X 7 CM), 100/CT 4CT/CASE: Brand: 3M™ TEGADERM™

## (undated) DEVICE — CANNULA ARTHSCP L4CM DIA8MM PASSPRT BTTN

## (undated) DEVICE — BLADE SURG NO10 S STL STR DISP GLASSVAN

## (undated) DEVICE — SOLUTION IV 1000ML 0.9% SOD CHL

## (undated) DEVICE — SYRINGE BLB 50CC IRRIG PLIABLE FNGR FLNG GRAD FLSK DISP

## (undated) DEVICE — DRESSING THERABOND 3D ANTIMIC CNTCT SYS 15INCHX10INCH

## (undated) DEVICE — DRAPE,UNDERBUTTOCKS,PCH,STERILE: Brand: MEDLINE

## (undated) DEVICE — SYRINGE MED 50ML LUERLOCK TIP

## (undated) DEVICE — PAD,ABDOMINAL,5"X9",ST,LF,25/BX: Brand: MEDLINE INDUSTRIES, INC.

## (undated) DEVICE — SUTURE MONOCRYL + SZ 4-0 L27IN ABSRB UD L19MM PS-2 3/8 CIR MCP426H

## (undated) DEVICE — GAUZE,SPONGE,2"X2",8PLY,STERILE,LF,2'S: Brand: MEDLINE

## (undated) DEVICE — GLOVE SURG SZ 8 L12IN FNGR THK94MIL STD WHT LTX FREE

## (undated) DEVICE — 40763 BEACH CHAIR HEAD RESTRAINT: Brand: 40763 BEACH CHAIR HEAD RESTRAINT

## (undated) DEVICE — TUBE IRRIG L8IN LNG PT W/ CONN FOR PMP SYS REDEUCE

## (undated) DEVICE — GLOVE SURG SZ 75 L12IN FNGR THK79MIL GRN LTX FREE

## (undated) DEVICE — 3M™ STERI-STRIP™ REINFORCED ADHESIVE SKIN CLOSURES, R1547, 1/2 IN X 4 IN (12 MM X 100 MM), 6 STRIPS/ENVELOPE: Brand: 3M™ STERI-STRIP™

## (undated) DEVICE — BIT DRL DIA2.7MM PERIPH SCR REUSE FOR COMPHSVE REV SHLDR

## (undated) DEVICE — TOWEL,OR,DSP,ST,BLUE,DLX,8/PK,10PK/CS: Brand: MEDLINE

## (undated) DEVICE — SOLUTION IRRIG 1000ML STRL H2O USP PLAS POUR BTL

## (undated) DEVICE — CANNULA SMOOTH FLEX 8.0 X 72MM: Brand: CLEAR-TRAC

## (undated) DEVICE — SUTURE MCRYL + SZ 4-0 L27IN ABSRB UD L19MM PS-2 3/8 CIR MCP426H

## (undated) DEVICE — ENDOSCOPIC KIT 2 12 FT OP4 DE2 GWN SYR

## (undated) DEVICE — SUTURE ABSORBABLE MONOFILAMENT 1 CTX 36 CM 48 MM VIO PDS +

## (undated) DEVICE — GAUZE,SPONGE,4"X4",16PLY,STRL,LF,10/TRAY: Brand: MEDLINE

## (undated) DEVICE — COLUMN DRAPE

## (undated) DEVICE — SUTURE NONABSORBABLE MONOFILAMENT 3-0 PS-1 18 IN BLK ETHILON 1663H

## (undated) DEVICE — SUTURE MCRYL SZ 4-0 L18IN ABSRB UD L19MM PS-2 3/8 CIR PRIM Y496G

## (undated) DEVICE — SET FLD MGMT OUTFLO TB DISP FOR CTRL SYS AQUILEX

## (undated) DEVICE — LIGHT HANDLE: Brand: DEVON

## (undated) DEVICE — SOLUTION IV IRRIG WATER 1000ML POUR BRL 2F7114

## (undated) DEVICE — BOOT POS LEG DEMAYO

## (undated) DEVICE — CANNULA ARTHSCP L5CM ID8MM DBL DAM 1 PC MOLD LO PROF FLNG

## (undated) DEVICE — SET ADMIN PRIMING 7ML L30IN 7.35LB 20 GTT 2ND RLER CLMP

## (undated) DEVICE — TOWEL,STOP FLAG GOLD N-W: Brand: MEDLINE

## (undated) DEVICE — SHOULDER STABILIZATION KIT,                                    DISPOSABLE 12 PER BOX

## (undated) DEVICE — SYRINGE CATH TIP 50ML

## (undated) DEVICE — CANNULA SEAL

## (undated) DEVICE — GOWN,SIRUS,NON REINFRCD,LARGE,SET IN SL: Brand: MEDLINE

## (undated) DEVICE — STERILE PVP: Brand: MEDLINE INDUSTRIES, INC.

## (undated) DEVICE — TIBURON BEACH CHAIR SHOULDER DRAPE: Brand: CONVERTORS

## (undated) DEVICE — 2.0MM WIRE PASS DRILL BIT MEDIUM

## (undated) DEVICE — BAG WND LAV 1L CLR ETH ACET ACID SOD ACETT BENZALKONIUM CHL

## (undated) DEVICE — COUNTER NDL 40 COUNT HLD 70 NUM FOAM BLK SGL MAG W BLDE REMV

## (undated) DEVICE — CONMED SCOPE SAVER BITE BLOCK, 20X27 MM: Brand: SCOPE SAVER

## (undated) DEVICE — SOLUTION IV SALINE IRRIGATION 250ML STERILE

## (undated) DEVICE — DRAPE,U/ SHT,SPLIT,PLAS,STERIL: Brand: MEDLINE

## (undated) DEVICE — AMBIENT SUPER TURBOVAC 90: Brand: COBLATION

## (undated) DEVICE — SET FLD MGMT CTRL SYS INFLO TB AQUILEX

## (undated) DEVICE — SET ENDOSCP SEAL HYSTEROSCOPE RIG OUTFLO CHN DISP MYOSURE

## (undated) DEVICE — SUTURE VCRL SZ 2-0 L18IN ABSRB UD CT-1 L36MM 1/2 CIR J839D

## (undated) DEVICE — BLADELESS OBTURATOR: Brand: WECK VISTA

## (undated) DEVICE — TOTAL KNEE: Brand: MEDLINE INDUSTRIES, INC.

## (undated) DEVICE — TRAY PREP DRY W/ PREM GLV 2 APPL 6 SPNG 2 UNDPD 1 OVERWRAP

## (undated) DEVICE — AGENT HEMOSTATIC SURGIFLOW MATRIX KIT W/THROMBIN

## (undated) DEVICE — SMARTGOWN BREATHABLE SURGICAL GOWN: Brand: CONVERTORS

## (undated) DEVICE — INTENDED FOR TISSUE SEPARATION, AND OTHER PROCEDURES THAT REQUIRE A SHARP SURGICAL BLADE TO PUNCTURE OR CUT.: Brand: BARD-PARKER ® STAINLESS STEEL BLADES

## (undated) DEVICE — STERILE HOOK LOCK LATEX FREE ELASTIC BANDAGE 6INX5YD: Brand: HOOK LOCK™

## (undated) DEVICE — CUP MANIP DIA2.5CM PNEUMO OCCL BLLN FOR ES RUMI II KOH

## (undated) DEVICE — NEEDLE SUT PASS FOR ROT CUF LABRAL REP MULTFI SCORPION

## (undated) DEVICE — SKIN MARKER,REGULAR TIP WITH RULER AND LABELS: Brand: DEVON

## (undated) DEVICE — Z CONVERTED USE 2271320 CANISTER SUCT ACC ADPT SPEC COLL

## (undated) DEVICE — TROCAR: Brand: KII FIOS FIRST ENTRY

## (undated) DEVICE — 3M™ STERI-DRAPE™ U-DRAPE, LONG 1019: Brand: STERI-DRAPE™

## (undated) DEVICE — ELECTRODE PT RET AD L9FT HI MOIST COND ADH HYDRGEL CORDED

## (undated) DEVICE — GLOVE SURG SZ 65 L12IN FNGR THK94MIL STD WHT LTX FREE

## (undated) DEVICE — GOWN,SIRUS,POLYRNF,BRTHSLV,XL,30/CS: Brand: MEDLINE

## (undated) DEVICE — FLUID CONTROL SHOULDER DRAPE PACK: Brand: CONVERTORS

## (undated) DEVICE — 1010 S-DRAPE TOWEL DRAPE 10/BX: Brand: STERI-DRAPE™

## (undated) DEVICE — PIN FIX STERILE L80MM DIA3.2MM FLUT CAS

## (undated) DEVICE — GLOVE SURG SZ 65 THK91MIL LTX FREE SYN POLYISOPRENE

## (undated) DEVICE — 3M™ STERI-STRIP™ REINFORCED ADHESIVE SKIN CLOSURES, R1546, 1/4 IN X 4 IN (6 MM X 100 MM), 10 STRIPS/ENVELOPE: Brand: 3M™ STERI-STRIP™

## (undated) DEVICE — PAD TBL OP RM TRENDELENBURG STATIC TORSO W/STRAPS

## (undated) DEVICE — SUTURE TIGERTAPE TIGERWIRE SZ 2-0 L30IN NONABSORBABLE AR72377T

## (undated) DEVICE — SET GRAV VENT NVENT CK VLV 3 NDL FREE PRT 10 GTT

## (undated) DEVICE — 3M™ MEDIPORE™ SOFT CLOTH TAPE, 4 INCH X 10 YARDS, 12 ROLLS/CASE, 2964: Brand: 3M™ MEDIPORE™

## (undated) DEVICE — SUTURE SUTTAPE L40IN DIA1.3MM NONABSORBABLE WHT BLU L26.5MM AR7500

## (undated) DEVICE — SOLUTION IV IRRIG 500ML 0.9% SODIUM CHL 2F7123

## (undated) DEVICE — 90403 SHOULDER ARTHROSCOPY KIT: Brand: 90403 SHOULDER ARTHROSCOPY KIT

## (undated) DEVICE — GLOVE,SURG,SENSICARE,ALOE,LF,PF,7: Brand: MEDLINE

## (undated) DEVICE — NEEDLE SPNL L3.5IN PNK HUB S STL REG WALL FIT STYL W/ QNCKE

## (undated) DEVICE — KIT TRK KNEE PROC VIZADISC

## (undated) DEVICE — Device

## (undated) DEVICE — SUTURE VCRL SZ 0 L36IN ABSRB UD L36MM CT-1 1/2 CIR J946H

## (undated) DEVICE — GLOVE SURG SZ 75 L12IN FNGR THK94MIL STD WHT LTX FREE

## (undated) DEVICE — INSTRUMENT SCREW BNE L25MM DIA2.5MM KNEE FULL THRD HEX FEM PERSONA

## (undated) DEVICE — WEREWOLF FLOW 90 COBLATION WAND: Brand: COBLATION

## (undated) DEVICE — SYRINGE MED 30ML STD CLR PLAS LUERLOCK TIP N CTRL DISP

## (undated) DEVICE — PAD,ABDOMINAL,8"X10",ST,LF: Brand: MEDLINE

## (undated) DEVICE — 2.0MM WIRE PASS DRILL BIT

## (undated) DEVICE — UNDERGLOVE SURG SZ 8 BLU LTX FREE SYN POLYISOPRENE POLYMER

## (undated) DEVICE — CUFF RESTRN WRST OR ANK 45FT AD FOAM

## (undated) DEVICE — SUTURE VICRYL + SZ 0 L18IN ABSRB UD L36MM CT-1 1/2 CIR VCP840D

## (undated) DEVICE — SOLUTION IRRIG 5L LAC R BG

## (undated) DEVICE — MARKER,SKIN,WI/RULER AND LABELS: Brand: MEDLINE

## (undated) DEVICE — SUTURE PDS II SZ 0 L27IN ABSRB VLT L26MM CT-2 1/2 CIR Z334H

## (undated) DEVICE — GOWN,SIRUS,POLYRNF,SETINSLV,L,20/CS: Brand: MEDLINE

## (undated) DEVICE — SURGICAL PROCEDURE PACK IV U-BAR

## (undated) DEVICE — 4-PORT MANIFOLD: Brand: NEPTUNE 2

## (undated) DEVICE — 4.5 MM FULL RADIUS STRAIGHT                                    BLADES, POWER/EP-1, YELLOW, PACKAGED                                    6 PER BOX, STERILE: Brand: DYONICS

## (undated) DEVICE — SUTURE VCRL SZ 0 L36IN ABSRB UD CT-1 L36MM 1/2 CIR TAPR PNT VCP946H

## (undated) DEVICE — PIN BNE FIX TEMP L140MM DIA4MM MAKO

## (undated) DEVICE — FIRSTPASS ST SUTURE PASSER, SELF CAPTURE: Brand: FIRSTPASS

## (undated) DEVICE — PUMP SUC IRR TBNG L10FT W/ HNDPC ASSEMB STRYKEFLOW 2

## (undated) DEVICE — KIT INT FIX FEM TIB CKPT MAKOPLASTY

## (undated) DEVICE — SUTURE VCRL SZ 3-0 L27IN ABSRB UD L26MM SH 1/2 CIR J416H

## (undated) DEVICE — SYRINGE IRRIG 60ML SFT PLIABLE BLB EZ TO GRP 1 HND USE W/

## (undated) DEVICE — 1810 FOAM BLOCK NEEDLE COUNTER: Brand: DEVON

## (undated) DEVICE — TRI-LUMEN FILTERED TUBE SET WITH ACTIVATED CHARCOAL FILTER: Brand: AIRSEAL

## (undated) DEVICE — STRIP,CLOSURE,WOUND,MEDI-STRIP,1/2X4: Brand: MEDLINE

## (undated) DEVICE — ADHESIVE SKIN CLOSURE WND 8.661X1.5 IN 22 CM LIQUIBAND SECUR

## (undated) DEVICE — PACK PROCEDURE SURG ARTHSCP CUST

## (undated) DEVICE — SHEET,DRAPE,53X77,STERILE: Brand: MEDLINE

## (undated) DEVICE — KNEE HOLDER DISPOSABLE LINER: Brand: ALVARADO®  KNEE SUPPORT

## (undated) DEVICE — SUTURE VICRYL + SZ 1 L18IN ABSRB UD L36MM CT-1 1/2 CIR VCP841D

## (undated) DEVICE — EXTENSION SET, 2 INJECTION SITES, MALE LUER LOCK ADAPTER WITH RETRACTABLE COLLAR: Brand: INTERLINK

## (undated) DEVICE — Y-TYPE TUR/BLADDER IRRIGATION SET, REGULATING CLAMP

## (undated) DEVICE — DYONICS 5.5 MM BONECUTTER PLATINUM                                    BLADE, ORANGE, 10000 MAXIMUM RPM,                                    PACKAGED 6 PER BOX, STERILE

## (undated) DEVICE — PENCIL ES L3M BTTN SWCH S STL HEX LOK BLDE ELECTRD HOLSTER

## (undated) DEVICE — GLOVE,SURG,SENSICARE,ALOE,LF,PF,6: Brand: MEDLINE

## (undated) DEVICE — ABDOMINAL PAD: Brand: DERMACEA

## (undated) DEVICE — SCREW BNE HD 3.5X48 MM HEX PERSONA (NOT IMPLANTED)

## (undated) DEVICE — MANIPULATOR 6.7MM RUMI UTERINE 6CM STER

## (undated) DEVICE — PVC URETHRAL CATHETER: Brand: DOVER

## (undated) DEVICE — Z CONVERTED USE 2271043 CONTAINER SPEC COLL 4OZ SCR ON LID PEEL PCH

## (undated) DEVICE — MAT FLR W32XL58IN

## (undated) DEVICE — APPLICATOR MEDICATED 26 CC SOLUTION HI LT ORNG CHLORAPREP

## (undated) DEVICE — SUTURE VCRL + SZ 1 L18IN ABSRB UD L36MM CT-1 1/2 CIR VCP841D

## (undated) DEVICE — INTRODUCER HEMSTAS 8FRX12CM ACT SHTH W/ .038IN GWIRE MAX

## (undated) DEVICE — STERILE POLYISOPRENE POWDER-FREE SURGICAL GLOVES: Brand: PROTEXIS

## (undated) DEVICE — SUTURE VCRL + SZ 3-0 L18IN ABSRB UD SH 1/2 CIR TAPERCUT NDL VCP864D

## (undated) DEVICE — SUTURE VCRL + SZ 0 L18IN ABSRB UD L36MM CT-1 1/2 CIR VCP840D

## (undated) DEVICE — TRAP FLUID

## (undated) DEVICE — STANDARD HYPODERMIC NEEDLE,POLYPROPYLENE HUB: Brand: MONOJECT

## (undated) DEVICE — TIP COVER ACCESSORY

## (undated) DEVICE — BLADE SURG SAW S STL NAR OSC W/ SERR EDGE DISP

## (undated) DEVICE — SUTURE VCRL SZ 0 L27IN ABSRB UD L26MM CT-2 1/2 CIR J270H

## (undated) DEVICE — MINOR SET UP PK

## (undated) DEVICE — CANNULA ARTHSCP L3CM ID8MM DBL DAM 1 PC MOLD LO PROF FLNG

## (undated) DEVICE — 60 ML SYRINGE,CATHETER TIP: Brand: MONOJECT

## (undated) DEVICE — PAD,NON-ADHERENT,3X8,STERILE,LF,1/PK: Brand: MEDLINE

## (undated) DEVICE — APPLICATOR ENDOSCP L34CM W/ S STL CANN PLAS OBT STYL FOR

## (undated) DEVICE — BLADE SURG SAW STD S STL OSC W/ SERR EDGE DISP

## (undated) DEVICE — KIT APPL 11:1 PROC W/ FIBRIJET MED CUP APPL TIP TY

## (undated) DEVICE — TIP APPL TOP 2 SPRY

## (undated) DEVICE — GOWN SIRUS NONREIN LG W/TWL: Brand: MEDLINE INDUSTRIES, INC.

## (undated) DEVICE — AIRSEAL 12 MM ACCESS PORT AND PALM GRIP OBTURATOR WITH BLADELESS OPTICAL TIP, 120 MM LENGTH: Brand: AIRSEAL

## (undated) DEVICE — CANNULA,OXY,ADULT,SUPERSOFT,W/7'TUB,SC: Brand: MEDLINE INDUSTRIES, INC.

## (undated) DEVICE — GLOVE ORANGE PI 7   MSG9070

## (undated) DEVICE — CORD RETRCT SIL

## (undated) DEVICE — TISSUE RETRIEVAL SYSTEM: Brand: INZII RETRIEVAL SYSTEM

## (undated) DEVICE — SOLUTION WND IRRIGATION 450 ML 0.5 PVP-I 0.9 NACL

## (undated) DEVICE — YANKAUER,BULB TIP,W/O VENT,RIGID,STERILE: Brand: MEDLINE

## (undated) DEVICE — 3M™ COBAN™ NL STERILE NON-LATEX SELF-ADHERENT WRAP, 2086S, 6 IN X 5 YD (15 CM X 4,5 M), 12 ROLLS/CASE: Brand: 3M™ COBAN™

## (undated) DEVICE — SUTURE ETHBND EXCEL SZ 2 L30IN NONABSORBABLE GRN L40MM V-37 MX69G

## (undated) DEVICE — SST BUR, WIRE PASS DRILL, 2 FLUTES, MED., 2MM DIA.: Brand: MICROAIRE®

## (undated) DEVICE — CHLORAPREP 26ML ORANGE

## (undated) DEVICE — SYRINGE, LUER LOCK, 10ML: Brand: MEDLINE

## (undated) DEVICE — SUTURE VICRYL SZ 2-0 L18IN ABSRB UD CT-1 L36MM 1/2 CIR J839D

## (undated) DEVICE — SUTURE STRATAFIX SPRL SZ 1 L14IN ABSRB VLT L48CM CTX 1/2 SXPD2B405

## (undated) DEVICE — ARM DRAPE

## (undated) DEVICE — INVIEW CLEAR LEGGINGS: Brand: CONVERTORS

## (undated) DEVICE — 3M™ TEGADERM™ TRANSPARENT FILM DRESSING FRAME STYLE, 1627, 4 IN X 10 IN (10 CM X 25 CM), 20/CT 4CT/CASE: Brand: 3M™ TEGADERM™

## (undated) DEVICE — KIT DRP FOR RIO ROBOTIC ARM ASST SYS

## (undated) DEVICE — DRAPE ROSA RBTC UNT 20 DROP

## (undated) DEVICE — TOTAL SHOULDER: Brand: MEDLINE INDUSTRIES, INC.

## (undated) DEVICE — PIN BONE FIX L110MM DIA3.2MM

## (undated) DEVICE — DECANTER FLD 9IN ST BG FOR ASEP TRNSF OF FLD